# Patient Record
Sex: MALE | Race: WHITE | NOT HISPANIC OR LATINO | Employment: OTHER | URBAN - METROPOLITAN AREA
[De-identification: names, ages, dates, MRNs, and addresses within clinical notes are randomized per-mention and may not be internally consistent; named-entity substitution may affect disease eponyms.]

---

## 2019-09-09 ENCOUNTER — HOSPITAL ENCOUNTER (OUTPATIENT)
Facility: MEDICAL CENTER | Age: 59
DRG: 871 | End: 2019-09-09
Admitting: INTERNAL MEDICINE
Payer: MEDICARE

## 2019-09-10 ENCOUNTER — HOSPITAL ENCOUNTER (INPATIENT)
Facility: MEDICAL CENTER | Age: 59
LOS: 20 days | DRG: 871 | End: 2019-09-30
Attending: INTERNAL MEDICINE | Admitting: INTERNAL MEDICINE
Payer: MEDICARE

## 2019-09-10 ENCOUNTER — HOSPITAL ENCOUNTER (OUTPATIENT)
Dept: RADIOLOGY | Facility: MEDICAL CENTER | Age: 59
End: 2019-09-10

## 2019-09-10 DIAGNOSIS — J98.4 PNEUMONITIS: ICD-10-CM

## 2019-09-10 DIAGNOSIS — G89.29 OTHER CHRONIC PAIN: ICD-10-CM

## 2019-09-10 DIAGNOSIS — M54.2 CHRONIC NECK PAIN: ICD-10-CM

## 2019-09-10 DIAGNOSIS — A41.9 SEVERE SEPSIS (HCC): ICD-10-CM

## 2019-09-10 DIAGNOSIS — J96.01 ACUTE RESPIRATORY FAILURE WITH HYPOXIA (HCC): ICD-10-CM

## 2019-09-10 DIAGNOSIS — I95.9 HYPOTENSION, UNSPECIFIED HYPOTENSION TYPE: ICD-10-CM

## 2019-09-10 DIAGNOSIS — G89.29 CHRONIC NECK PAIN: ICD-10-CM

## 2019-09-10 DIAGNOSIS — R65.20 SEVERE SEPSIS (HCC): ICD-10-CM

## 2019-09-10 DIAGNOSIS — J43.8 PARASEPTAL EMPHYSEMA (HCC): ICD-10-CM

## 2019-09-10 DIAGNOSIS — A41.9 SEPSIS, DUE TO UNSPECIFIED ORGANISM: ICD-10-CM

## 2019-09-10 DIAGNOSIS — I27.20 PULMONARY HYPERTENSION (HCC): ICD-10-CM

## 2019-09-10 PROBLEM — R94.31 QT PROLONGATION: Status: ACTIVE | Noted: 2019-09-10

## 2019-09-10 PROBLEM — J18.9 CAP (COMMUNITY ACQUIRED PNEUMONIA): Status: ACTIVE | Noted: 2019-09-10

## 2019-09-10 PROBLEM — N17.9 AKI (ACUTE KIDNEY INJURY) (HCC): Status: ACTIVE | Noted: 2019-09-10

## 2019-09-10 LAB
ANION GAP SERPL CALC-SCNC: 9 MMOL/L (ref 0–11.9)
BASOPHILS # BLD AUTO: 0.4 % (ref 0–1.8)
BASOPHILS # BLD: 0.05 K/UL (ref 0–0.12)
BUN SERPL-MCNC: 17 MG/DL (ref 8–22)
CALCIUM SERPL-MCNC: 8.7 MG/DL (ref 8.5–10.5)
CHLORIDE SERPL-SCNC: 101 MMOL/L (ref 96–112)
CO2 SERPL-SCNC: 25 MMOL/L (ref 20–33)
CREAT SERPL-MCNC: 1.1 MG/DL (ref 0.5–1.4)
EKG IMPRESSION: NORMAL
EKG IMPRESSION: NORMAL
EOSINOPHIL # BLD AUTO: 0.33 K/UL (ref 0–0.51)
EOSINOPHIL NFR BLD: 2.9 % (ref 0–6.9)
ERYTHROCYTE [DISTWIDTH] IN BLOOD BY AUTOMATED COUNT: 43.4 FL (ref 35.9–50)
GLUCOSE SERPL-MCNC: 179 MG/DL (ref 65–99)
GRAM STN SPEC: NORMAL
HCT VFR BLD AUTO: 36.8 % (ref 42–52)
HGB BLD-MCNC: 11.9 G/DL (ref 14–18)
IMM GRANULOCYTES # BLD AUTO: 0.05 K/UL (ref 0–0.11)
IMM GRANULOCYTES NFR BLD AUTO: 0.4 % (ref 0–0.9)
LACTATE BLD-SCNC: 0.4 MMOL/L (ref 0.5–2)
LYMPHOCYTES # BLD AUTO: 1.7 K/UL (ref 1–4.8)
LYMPHOCYTES NFR BLD: 14.9 % (ref 22–41)
MAGNESIUM SERPL-MCNC: 1.9 MG/DL (ref 1.5–2.5)
MCH RBC QN AUTO: 27.8 PG (ref 27–33)
MCHC RBC AUTO-ENTMCNC: 32.3 G/DL (ref 33.7–35.3)
MCV RBC AUTO: 86 FL (ref 81.4–97.8)
MONOCYTES # BLD AUTO: 0.68 K/UL (ref 0–0.85)
MONOCYTES NFR BLD AUTO: 6 % (ref 0–13.4)
NEUTROPHILS # BLD AUTO: 8.61 K/UL (ref 1.82–7.42)
NEUTROPHILS NFR BLD: 75.4 % (ref 44–72)
NRBC # BLD AUTO: 0 K/UL
NRBC BLD-RTO: 0 /100 WBC
PLATELET # BLD AUTO: 243 K/UL (ref 164–446)
PMV BLD AUTO: 9.2 FL (ref 9–12.9)
POTASSIUM SERPL-SCNC: 3.6 MMOL/L (ref 3.6–5.5)
PROCALCITONIN SERPL-MCNC: 0.52 NG/ML
RBC # BLD AUTO: 4.28 M/UL (ref 4.7–6.1)
SIGNIFICANT IND 70042: NORMAL
SITE SITE: NORMAL
SODIUM SERPL-SCNC: 135 MMOL/L (ref 135–145)
SOURCE SOURCE: NORMAL
TROPONIN T SERPL-MCNC: 15 NG/L (ref 6–19)
TROPONIN T SERPL-MCNC: 17 NG/L (ref 6–19)
WBC # BLD AUTO: 11.4 K/UL (ref 4.8–10.8)

## 2019-09-10 PROCEDURE — 85025 COMPLETE CBC W/AUTO DIFF WBC: CPT

## 2019-09-10 PROCEDURE — 93010 ELECTROCARDIOGRAM REPORT: CPT | Performed by: INTERNAL MEDICINE

## 2019-09-10 PROCEDURE — 700111 HCHG RX REV CODE 636 W/ 250 OVERRIDE (IP): Performed by: INTERNAL MEDICINE

## 2019-09-10 PROCEDURE — 94760 N-INVAS EAR/PLS OXIMETRY 1: CPT

## 2019-09-10 PROCEDURE — 83735 ASSAY OF MAGNESIUM: CPT

## 2019-09-10 PROCEDURE — 93005 ELECTROCARDIOGRAM TRACING: CPT | Performed by: INTERNAL MEDICINE

## 2019-09-10 PROCEDURE — 99223 1ST HOSP IP/OBS HIGH 75: CPT | Performed by: INTERNAL MEDICINE

## 2019-09-10 PROCEDURE — 700102 HCHG RX REV CODE 250 W/ 637 OVERRIDE(OP): Performed by: INTERNAL MEDICINE

## 2019-09-10 PROCEDURE — A9270 NON-COVERED ITEM OR SERVICE: HCPCS | Performed by: INTERNAL MEDICINE

## 2019-09-10 PROCEDURE — 770020 HCHG ROOM/CARE - TELE (206)

## 2019-09-10 PROCEDURE — 36415 COLL VENOUS BLD VENIPUNCTURE: CPT

## 2019-09-10 PROCEDURE — 84145 PROCALCITONIN (PCT): CPT

## 2019-09-10 PROCEDURE — 80048 BASIC METABOLIC PNL TOTAL CA: CPT

## 2019-09-10 PROCEDURE — 700105 HCHG RX REV CODE 258: Performed by: INTERNAL MEDICINE

## 2019-09-10 PROCEDURE — 87502 INFLUENZA DNA AMP PROBE: CPT

## 2019-09-10 PROCEDURE — 83605 ASSAY OF LACTIC ACID: CPT

## 2019-09-10 PROCEDURE — 84484 ASSAY OF TROPONIN QUANT: CPT | Mod: 91

## 2019-09-10 PROCEDURE — 87205 SMEAR GRAM STAIN: CPT

## 2019-09-10 RX ORDER — TEMAZEPAM 15 MG/1
15 CAPSULE ORAL EVERY EVENING
Status: DISCONTINUED | OUTPATIENT
Start: 2019-09-10 | End: 2019-09-14

## 2019-09-10 RX ORDER — TRAMADOL HYDROCHLORIDE 50 MG/1
100 TABLET ORAL EVERY 4 HOURS PRN
Status: DISCONTINUED | OUTPATIENT
Start: 2019-09-10 | End: 2019-09-11

## 2019-09-10 RX ORDER — POLYETHYLENE GLYCOL 3350 17 G/17G
1 POWDER, FOR SOLUTION ORAL
Status: DISCONTINUED | OUTPATIENT
Start: 2019-09-10 | End: 2019-09-14

## 2019-09-10 RX ORDER — GABAPENTIN 400 MG/1
1600 CAPSULE ORAL EVERY EVENING
Status: DISCONTINUED | OUTPATIENT
Start: 2019-09-10 | End: 2019-09-14

## 2019-09-10 RX ORDER — BISACODYL 10 MG
10 SUPPOSITORY, RECTAL RECTAL
Status: DISCONTINUED | OUTPATIENT
Start: 2019-09-10 | End: 2019-09-14

## 2019-09-10 RX ORDER — SIMVASTATIN 40 MG
40 TABLET ORAL NIGHTLY
Status: DISCONTINUED | OUTPATIENT
Start: 2019-09-10 | End: 2019-09-14

## 2019-09-10 RX ORDER — METHADONE HYDROCHLORIDE 10 MG/1
20-30 TABLET ORAL 3 TIMES DAILY
Status: DISCONTINUED | OUTPATIENT
Start: 2019-09-10 | End: 2019-09-10

## 2019-09-10 RX ORDER — METHADONE HYDROCHLORIDE 10 MG/1
30 TABLET ORAL EVERY MORNING
Status: DISCONTINUED | OUTPATIENT
Start: 2019-09-10 | End: 2019-09-10

## 2019-09-10 RX ORDER — TRAMADOL HYDROCHLORIDE 50 MG/1
50-100 TABLET ORAL EVERY 4 HOURS PRN
Status: DISCONTINUED | OUTPATIENT
Start: 2019-09-10 | End: 2019-09-10

## 2019-09-10 RX ORDER — TRAMADOL HYDROCHLORIDE 50 MG/1
50 TABLET ORAL EVERY 4 HOURS PRN
Status: DISCONTINUED | OUTPATIENT
Start: 2019-09-10 | End: 2019-09-11

## 2019-09-10 RX ORDER — GABAPENTIN 400 MG/1
2400 CAPSULE ORAL EVERY MORNING
Status: DISCONTINUED | OUTPATIENT
Start: 2019-09-11 | End: 2019-09-14

## 2019-09-10 RX ORDER — METHADONE HYDROCHLORIDE 10 MG/1
30 TABLET ORAL
Status: DISCONTINUED | OUTPATIENT
Start: 2019-09-10 | End: 2019-09-10

## 2019-09-10 RX ORDER — GABAPENTIN 800 MG/1
TABLET ORAL 2 TIMES DAILY
COMMUNITY

## 2019-09-10 RX ORDER — LEVOTHYROXINE SODIUM 0.12 MG/1
125 TABLET ORAL
Status: DISCONTINUED | OUTPATIENT
Start: 2019-09-10 | End: 2019-09-14

## 2019-09-10 RX ORDER — OXYCODONE HYDROCHLORIDE 10 MG/1
10 TABLET ORAL EVERY 4 HOURS PRN
Status: DISCONTINUED | OUTPATIENT
Start: 2019-09-10 | End: 2019-09-10

## 2019-09-10 RX ORDER — ONDANSETRON 4 MG/1
4 TABLET, FILM COATED ORAL EVERY 6 HOURS PRN
Status: DISCONTINUED | OUTPATIENT
Start: 2019-09-10 | End: 2019-09-10

## 2019-09-10 RX ORDER — TEMAZEPAM 15 MG/1
15 CAPSULE ORAL EVERY EVENING
Refills: 0 | Status: ON HOLD | COMMUNITY
Start: 2019-08-27 | End: 2019-09-30 | Stop reason: SDUPTHER

## 2019-09-10 RX ORDER — PROMETHAZINE HYDROCHLORIDE 25 MG/1
25 TABLET ORAL ONCE
Status: COMPLETED | OUTPATIENT
Start: 2019-09-10 | End: 2019-09-10

## 2019-09-10 RX ORDER — METHADONE HYDROCHLORIDE 10 MG/1
10 TABLET ORAL EVERY EVENING
Status: DISCONTINUED | OUTPATIENT
Start: 2019-09-10 | End: 2019-09-10

## 2019-09-10 RX ORDER — SODIUM CHLORIDE, SODIUM LACTATE, POTASSIUM CHLORIDE, AND CALCIUM CHLORIDE .6; .31; .03; .02 G/100ML; G/100ML; G/100ML; G/100ML
1000 INJECTION, SOLUTION INTRAVENOUS
Status: DISCONTINUED | OUTPATIENT
Start: 2019-09-10 | End: 2019-09-16

## 2019-09-10 RX ORDER — AZITHROMYCIN 250 MG/1
500 TABLET, FILM COATED ORAL DAILY
Status: DISCONTINUED | OUTPATIENT
Start: 2019-09-11 | End: 2019-09-10

## 2019-09-10 RX ORDER — OXYCODONE HYDROCHLORIDE 5 MG/1
5 TABLET ORAL EVERY 4 HOURS PRN
Status: DISCONTINUED | OUTPATIENT
Start: 2019-09-10 | End: 2019-09-14

## 2019-09-10 RX ORDER — DOXYCYCLINE 100 MG/1
100 TABLET ORAL EVERY 12 HOURS
Status: DISCONTINUED | OUTPATIENT
Start: 2019-09-10 | End: 2019-09-14

## 2019-09-10 RX ORDER — METHADONE HYDROCHLORIDE 10 MG/1
10 TABLET ORAL SEE ADMIN INSTRUCTIONS
Status: DISCONTINUED | OUTPATIENT
Start: 2019-09-10 | End: 2019-09-10

## 2019-09-10 RX ORDER — ACETAMINOPHEN 325 MG/1
650 TABLET ORAL EVERY 6 HOURS PRN
Status: DISCONTINUED | OUTPATIENT
Start: 2019-09-10 | End: 2019-09-14

## 2019-09-10 RX ORDER — AMOXICILLIN 250 MG
2 CAPSULE ORAL 2 TIMES DAILY
Status: DISCONTINUED | OUTPATIENT
Start: 2019-09-10 | End: 2019-09-14

## 2019-09-10 RX ORDER — SODIUM CHLORIDE 9 MG/ML
INJECTION, SOLUTION INTRAVENOUS CONTINUOUS
Status: DISCONTINUED | OUTPATIENT
Start: 2019-09-10 | End: 2019-09-13

## 2019-09-10 RX ORDER — ONDANSETRON 2 MG/ML
4 INJECTION INTRAMUSCULAR; INTRAVENOUS EVERY 4 HOURS PRN
Status: DISCONTINUED | OUTPATIENT
Start: 2019-09-10 | End: 2019-09-10

## 2019-09-10 RX ORDER — ALBUTEROL SULFATE 90 UG/1
2 AEROSOL, METERED RESPIRATORY (INHALATION) EVERY 6 HOURS PRN
Status: DISCONTINUED | OUTPATIENT
Start: 2019-09-10 | End: 2019-09-11

## 2019-09-10 RX ORDER — GABAPENTIN 400 MG/1
800 CAPSULE ORAL 2 TIMES DAILY
Status: DISCONTINUED | OUTPATIENT
Start: 2019-09-10 | End: 2019-09-10

## 2019-09-10 RX ADMIN — TEMAZEPAM 15 MG: 15 CAPSULE ORAL at 04:03

## 2019-09-10 RX ADMIN — DOXYCYCLINE 100 MG: 100 TABLET, FILM COATED ORAL at 16:19

## 2019-09-10 RX ADMIN — SODIUM CHLORIDE: 9 INJECTION, SOLUTION INTRAVENOUS at 22:04

## 2019-09-10 RX ADMIN — LEVOTHYROXINE SODIUM 125 MCG: 125 TABLET ORAL at 08:40

## 2019-09-10 RX ADMIN — TEMAZEPAM 15 MG: 15 CAPSULE ORAL at 20:29

## 2019-09-10 RX ADMIN — AZITHROMYCIN MONOHYDRATE 500 MG: 500 INJECTION, POWDER, LYOPHILIZED, FOR SOLUTION INTRAVENOUS at 04:12

## 2019-09-10 RX ADMIN — METHADONE HYDROCHLORIDE 30 MG: 10 TABLET ORAL at 11:59

## 2019-09-10 RX ADMIN — METHADONE HYDROCHLORIDE 30 MG: 10 TABLET ORAL at 06:07

## 2019-09-10 RX ADMIN — SODIUM CHLORIDE: 9 INJECTION, SOLUTION INTRAVENOUS at 04:03

## 2019-09-10 RX ADMIN — PROMETHAZINE HYDROCHLORIDE 25 MG: 25 TABLET ORAL at 16:11

## 2019-09-10 RX ADMIN — SIMVASTATIN 40 MG: 40 TABLET, FILM COATED ORAL at 20:29

## 2019-09-10 RX ADMIN — SENNOSIDES, DOCUSATE SODIUM 2 TABLET: 50; 8.6 TABLET, FILM COATED ORAL at 16:19

## 2019-09-10 RX ADMIN — CEFTRIAXONE SODIUM 2 G: 2 INJECTION, POWDER, FOR SOLUTION INTRAMUSCULAR; INTRAVENOUS at 12:01

## 2019-09-10 RX ADMIN — SODIUM CHLORIDE: 9 INJECTION, SOLUTION INTRAVENOUS at 13:38

## 2019-09-10 RX ADMIN — GABAPENTIN 1600 MG: 400 CAPSULE ORAL at 16:19

## 2019-09-10 RX ADMIN — SENNOSIDES, DOCUSATE SODIUM 2 TABLET: 50; 8.6 TABLET, FILM COATED ORAL at 06:07

## 2019-09-10 RX ADMIN — GABAPENTIN 800 MG: 400 CAPSULE ORAL at 04:03

## 2019-09-10 ASSESSMENT — COGNITIVE AND FUNCTIONAL STATUS - GENERAL
SUGGESTED CMS G CODE MODIFIER MOBILITY: CI
CLIMB 3 TO 5 STEPS WITH RAILING: A LITTLE
SUGGESTED CMS G CODE MODIFIER DAILY ACTIVITY: CH
DAILY ACTIVITIY SCORE: 24
MOBILITY SCORE: 23

## 2019-09-10 ASSESSMENT — LIFESTYLE VARIABLES
CONSUMPTION TOTAL: NEGATIVE
HAVE PEOPLE ANNOYED YOU BY CRITICIZING YOUR DRINKING: NO
EVER FELT BAD OR GUILTY ABOUT YOUR DRINKING: NO
AVERAGE NUMBER OF DAYS PER WEEK YOU HAVE A DRINK CONTAINING ALCOHOL: 0
EVER_SMOKED: YES
TOTAL SCORE: 0
TOTAL SCORE: 0
EVER_SMOKED: YES
HOW MANY TIMES IN THE PAST YEAR HAVE YOU HAD 5 OR MORE DRINKS IN A DAY: 0
TOTAL SCORE: 0
EVER HAD A DRINK FIRST THING IN THE MORNING TO STEADY YOUR NERVES TO GET RID OF A HANGOVER: NO
HAVE YOU EVER FELT YOU SHOULD CUT DOWN ON YOUR DRINKING: NO
ON A TYPICAL DAY WHEN YOU DRINK ALCOHOL HOW MANY DRINKS DO YOU HAVE: 0
ALCOHOL_USE: NO

## 2019-09-10 ASSESSMENT — ENCOUNTER SYMPTOMS
COUGH: 1
FEVER: 0
HEADACHES: 0
NECK PAIN: 0
SPUTUM PRODUCTION: 1
FLANK PAIN: 0
CHILLS: 1
SHORTNESS OF BREATH: 1
SORE THROAT: 0
ABDOMINAL PAIN: 0
BRUISES/BLEEDS EASILY: 0
BLOOD IN STOOL: 0
DIAPHORESIS: 0
DIZZINESS: 0
DIARRHEA: 0
BACK PAIN: 0
VOMITING: 0
PALPITATIONS: 0
NAUSEA: 0
BLURRED VISION: 0
MYALGIAS: 0
SEIZURES: 0
WHEEZING: 0
FOCAL WEAKNESS: 0

## 2019-09-10 ASSESSMENT — COPD QUESTIONNAIRES
COPD SCREENING SCORE: 5
DURING THE PAST 4 WEEKS HOW MUCH DID YOU FEEL SHORT OF BREATH: SOME OF THE TIME
DO YOU EVER COUGH UP ANY MUCUS OR PHLEGM?: NO/ONLY WITH OCCASIONAL COLDS OR INFECTIONS
HAVE YOU SMOKED AT LEAST 100 CIGARETTES IN YOUR ENTIRE LIFE: YES

## 2019-09-10 ASSESSMENT — PATIENT HEALTH QUESTIONNAIRE - PHQ9
SUM OF ALL RESPONSES TO PHQ9 QUESTIONS 1 AND 2: 0
2. FEELING DOWN, DEPRESSED, IRRITABLE, OR HOPELESS: NOT AT ALL
1. LITTLE INTEREST OR PLEASURE IN DOING THINGS: NOT AT ALL

## 2019-09-10 NOTE — PROGRESS NOTES
Assumed care this am from night shift RN. Patient sleeping during report. Call bell and personal items within reach, bed locked in low position. Bed exit alarm armed. Hourly rounding in place.

## 2019-09-10 NOTE — CARE PLAN
Problem: Communication  Goal: The ability to communicate needs accurately and effectively will improve  Outcome: PROGRESSING AS EXPECTED  Note:   Patient encouraged to voice all feelings/questions/concerns. All needs met at this time. Call light within reach.      Problem: Safety  Goal: Will remain free from falls  Outcome: PROGRESSING AS EXPECTED  Note:   Safety precautions in place. Education provided to patient, verbalized understanding. Bed in lowest/locked position. Bed alarm on. Room close to nurses station. Call light and personal belongings within reach. Will continue to monitor.       Problem: Infection  Goal: Will remain free from infection  Outcome: PROGRESSING AS EXPECTED  Note:   Standard precautions in place. Education provided to patient. Hand hygiene being preformed correctly prior to and after patient contact by staff.

## 2019-09-10 NOTE — PROGRESS NOTES
59-year-old male who presents with exertional shortness of breath and was found to be hypoxic in the low 50s on room air with improvement to 92% on 4 L of oxygen.  CTA revealed patchy infiltrates concerning for pneumonia.  Also had some T wave inversions on EKG however denied any chest pain.  Initial troponin negative.

## 2019-09-10 NOTE — ASSESSMENT & PLAN NOTE
Extubated on 9/16/2019    Continue oxygen and RT protocol  No wheezing on exam we will DC prednisone

## 2019-09-10 NOTE — ASSESSMENT & PLAN NOTE
Patient chronically on methadone  Avoid other QTC prolonging agents  Monitor and replete electrolytes

## 2019-09-10 NOTE — PROGRESS NOTES
[facility] Patient able to ambulate self to hospital bed. Found to be A&Ox4. VSS, on 4L NC. Tele box placed on patient, monitor room notified. Current rhythm SR 70s. No signs of distress, complains of pain to the neck, back, and feet. See pain assessment for details. Safety precautions in place and discussed with patient, verbalized understanding. Patient is refusing non-slip socks. Bed in lowest/locked position. Call light and personal belongings within reach.

## 2019-09-10 NOTE — ASSESSMENT & PLAN NOTE
This is severe sepsis with the following associated acute organ dysfunction(s): acute kidney failure, acute respiratory failure.   Likely source is pneumonia  Sepsis resolved

## 2019-09-10 NOTE — PROGRESS NOTES
2 RN skin check completed with BEBA Finley  Devices in place: NC, glasses  Skin assessed under devices: Yes, intact  Confirmed pressure ulcers found on: n/a  New potential pressure ulcers notes on: n/a  The following interventions in place: pressure redistribution mattress, pillows for positioning/support, pillows to float the heels, silicone NC, grey foams on NC at ears, moisturizer, and education provided to patient regarding intervention in place to prevent skin breakdown    Bilateral ears: pink/blanching  BUE: scattered dry/red patches, excoriated from scratching   Sacrum: pink/blanching  Groin: pink/mosit   L knee: dry patch

## 2019-09-10 NOTE — H&P
Hospital Medicine History & Physical Note    Date of Service  9/10/2019    Primary Care Physician  Pcp Not In Computer    Consultants  None    Code Status  Full code    Chief Complaint  Shortness of breath    History of Presenting Illness  59 y.o. male with a past medical history of asthma, emphysema, chronic pain, hypothyroidism who presented 9/10/2019 with shortness of breath for the past 4 days.  The patient reports worsening dyspnea and a productive cough.  He reports chills.  He denies any chest pain, lightheadedness, nausea or diaphoresis.  He quit smoking cigarettes 4 years ago.    He presented to [facility], I discussed the case with the transferring physician and reviewed the medical records from the transferring facility which are summarized as follows:    WBC 12.3, hemoglobin 12.8, platelets 258, neutrophils 69.2%    Sodium 137, potassium 4, chloride 100, total CO2 28, calcium 9.4, glucose 105, BUN 23, creatinine 1.54, total protein 8, albumin 3.4, total bili 0.4, alkaline phosphatase 122, AST 39, ALT 23, anion gap 13    Lactic acid 2.6    Troponin 0 0.023    CTA chest with IV contrast: No evidence of pulmonary embolism.  There is mild to moderate pulmonary paraseptal emphysema.  Patchy regions of bilateral pulmonary groundglass opacities concerning for multifocal pneumonia.  No pleural effusions are seen    EKG interpreted by me reveals sinus rhythm with occasional supraventricular premature complex.  T wave inversions in V6.  No ST elevation noted    He was noted to be hypoxic at the outlying facility and was started on 4 L of oxygen.  He was treated with IV ceftriaxone and IV fluids prior to transfer    Review of Systems  Review of Systems   Constitutional: Positive for chills and malaise/fatigue. Negative for diaphoresis and fever.   HENT: Negative for hearing loss and sore throat.    Eyes: Negative for blurred vision.   Respiratory: Positive for cough, sputum production and shortness  of breath. Negative for wheezing.    Cardiovascular: Negative for chest pain, palpitations and leg swelling.   Gastrointestinal: Negative for abdominal pain, blood in stool, diarrhea, nausea and vomiting.   Genitourinary: Negative for dysuria, flank pain and urgency.   Musculoskeletal: Negative for back pain, joint pain, myalgias and neck pain.   Skin: Negative for rash.   Neurological: Negative for dizziness, focal weakness, seizures and headaches.   Endo/Heme/Allergies: Does not bruise/bleed easily.   Psychiatric/Behavioral: Negative for suicidal ideas.   All other systems reviewed and are negative.      Past Medical History   has a past medical history of Anxiety, ASTHMA, C6 cervical fracture, Chronic back pain, Chronic pain, COPD, Depression, Diverticulitis, Hypothyroid, Neck pain, Neuropathy (CMS-HCC), Pelvic fracture, Pneumonia, and Unspecified cataract.    Surgical History   has a past surgical history that includes other orthopedic surgery; exploratory laparotomy (N/A, 2015); colostomy (2015); acl reconstruction (Left, ); bone spur excision (Left, ); retinal detachment repair (Right, 2015); colostomy closure (N/A, 2015); and exploratory laparotomy (8/15/2015).     Family History  No pertinent family    Social History   reports that he quit smoking about 4 years ago. His smoking use included cigarettes. He has a 17.00 pack-year smoking history. He has never used smokeless tobacco. He reports that he does not drink alcohol or use drugs.    Allergies  No Known Allergies    Medications  Prior to Admission Medications   Prescriptions Last Dose Informant Patient Reported? Taking?   PrednisoLONE Sodium Phosphate (PREDNISOLONE SOD PHOS, OPHTH, OP) 2019 at 2000  Yes No   Si Drop by Ophthalmic route 4 times a day.   albuterol (VENTOLIN OR PROVENTIL) 108 (90 BASE) MCG/ACT AERS inhalation aerosol 2019  No No   Sig: Inhale 2 Puffs by mouth every 6 hours as needed for Shortness of Breath.    gabapentin (NEURONTIN) 400 MG CAPS 9/9/2019 at 0800  No No   Sig: Take 2 Caps by mouth 3 times a day.   Patient taking differently: Take 800 mg by mouth 2 times a day.   levothyroxine (SYNTHROID) 125 MCG Tab 9/9/2019 at 0730 Patient Yes No   Sig: Take 125 mcg by mouth every morning before breakfast.   methadone (DOLOPHINE) 10 MG TABS 9/9/2019 at 1200 Patient Yes No   Sig: Take 20-30 mg by mouth 3 times a day. 30 mg in AM  20 mg at NOON  20 mg at BEDTIME   ondansetron (ZOFRAN) 4 MG TABS tablet 9/9/2019 at 2100  No No   Sig: Take 1 Tab by mouth every 6 hours as needed for Nausea/Vomiting.   oxycodone immediate release (ROXICODONE) 10 MG immediate release tablet 9/1/2019  No No   Sig: Take 1 Tab by mouth every four hours as needed for Moderate Pain.   simvastatin (ZOCOR) 40 MG Tab 9/8/2019 at 2000  Yes No   Sig: Take 40 mg by mouth every evening.   temazepam (RESTORIL) 15 MG Cap 9/8/2019 at 2200  Yes No   Sig: Take 15 mg by mouth every evening.   tramadol (ULTRAM) 50 MG TABS 9/1/2019 Patient Yes No   Sig: Take  mg by mouth every four hours as needed. Indications: Moderate to Moderately Severe Pain      Facility-Administered Medications: None       Physical Exam  Temp:  [-13.8 °C (7.1 °F)] -13.8 °C (7.1 °F)  Pulse:  [66] 66  Resp:  [16] 16  BP: (136)/(87) 136/87  SpO2:  [96 %] 96 %    Physical Exam   Constitutional: He is oriented to person, place, and time. He appears well-developed and well-nourished. No distress.   HENT:   Head: Normocephalic and atraumatic.   Mouth/Throat: Oropharynx is clear and moist.   Eyes: Pupils are equal, round, and reactive to light. Conjunctivae are normal. No scleral icterus.   Neck: Normal range of motion. Neck supple.   Cardiovascular: Normal rate, regular rhythm and normal heart sounds.   Pulmonary/Chest: No respiratory distress. He has no wheezes. He has no rales.   Abdominal: Soft. Bowel sounds are normal. He exhibits no distension. There is no tenderness. There is no rebound.    Musculoskeletal: Normal range of motion. He exhibits no edema or tenderness.   Lymphadenopathy:     He has no cervical adenopathy.   Neurological: He is alert and oriented to person, place, and time. No cranial nerve deficit. Coordination normal.   Skin: Skin is warm. No rash noted.   Psychiatric: He has a normal mood and affect. His behavior is normal.   Nursing note and vitals reviewed.      Laboratory:          No results for input(s): ALTSGPT, ASTSGOT, ALKPHOSPHAT, TBILIRUBIN, DBILIRUBIN, GAMMAGT, AMYLASE, LIPASE, ALB, PREALBUMIN, GLUCOSE in the last 72 hours.      No results for input(s): NTPROBNP in the last 72 hours.      Recent Labs     09/10/19  0235   TROPONINT 17       Urinalysis:    No results found     Imaging:  No orders to display         Assessment/Plan:  I anticipate this patient will require at least two midnights for appropriate medical management, necessitating inpatient admission.    Sepsis (HCC)- (present on admission)  Assessment & Plan  This is severe sepsis with the following associated acute organ dysfunction(s): acute kidney failure, acute respiratory failure.   Likely source is pneumonia  Patient has been started on IV fluids per septic protocol  Trend lactate  Patient is started on IV ceftriaxone and azithromycin  Follow blood cultures      CAP (community acquired pneumonia)- (present on admission)  Assessment & Plan  Patient has been started on IV ceftriaxone and azithromycin  Check pro calcitonin, if within normal limits we'll consider de-escalating antibiotics  RT protocol  Continue supplemental oxygen, wean as tolerated  Follow blood cultures      Chronic pain- (present on admission)  Assessment & Plan  Continue methadone and Neurontin    INDIA (acute kidney injury) (HCC)- (present on admission)  Assessment & Plan  Likely prerenal  IV fluid hydration with normal saline  Monitor BMP and assess response  Avoid IV contrast/nephrotoxins/NSAIDs  Dose adjust meds for decreased  GFR        Acute respiratory failure with hypoxia (HCC)- (present on admission)  Assessment & Plan  Secondary to pneumonia  Currently on 4 L of oxygen  RT protocol      VTE prophylaxis: Heparin    I spent a total of 35 minutes of non face to face time performing additional research, reviewing medical records from transferring facility, discussing plan of care with other healthcare providers. Start time: 3 10 am. End time: 3 45 am

## 2019-09-10 NOTE — CARE PLAN
Problem: Safety  Goal: Will remain free from injury  Outcome: PROGRESSING AS EXPECTED  Note:   Patient calls appropriately for assistance, oxygen tubing extension provided for restroom. Bed locked in low position, non skid socks in place.      Problem: Respiratory:  Goal: Respiratory status will improve  Outcome: PROGRESSING AS EXPECTED  Intervention: Educate and encourage incentive spirometry usage  Note:   IS reviewed and patient able to pull 1200 and demonstrates correctly.

## 2019-09-10 NOTE — ASSESSMENT & PLAN NOTE
With narcotic dependence    On high-dose methadone and gabapentin chronically and holding parameters to methadone to hold for SBP less than 90

## 2019-09-11 PROBLEM — J43.8 PARASEPTAL EMPHYSEMA (HCC): Status: ACTIVE | Noted: 2019-09-11

## 2019-09-11 PROBLEM — J98.4 PNEUMONITIS: Status: ACTIVE | Noted: 2019-09-10

## 2019-09-11 PROBLEM — J98.4 ACUTE PNEUMONITIS: Status: ACTIVE | Noted: 2019-09-11

## 2019-09-11 PROBLEM — J84.9 INTERSTITIAL LUNG DISEASE (HCC): Status: ACTIVE | Noted: 2019-09-11

## 2019-09-11 LAB
EKG IMPRESSION: NORMAL
ERYTHROCYTE [DISTWIDTH] IN BLOOD BY AUTOMATED COUNT: 43.9 FL (ref 35.9–50)
FLUAV RNA SPEC QL NAA+PROBE: NEGATIVE
FLUAV RNA SPEC QL NAA+PROBE: NEGATIVE
FLUBV RNA SPEC QL NAA+PROBE: NEGATIVE
FLUBV RNA SPEC QL NAA+PROBE: NEGATIVE
GRAM STN SPEC: NORMAL
HCT VFR BLD AUTO: 38.3 % (ref 42–52)
HGB BLD-MCNC: 12.2 G/DL (ref 14–18)
MCH RBC QN AUTO: 27.5 PG (ref 27–33)
MCHC RBC AUTO-ENTMCNC: 31.9 G/DL (ref 33.7–35.3)
MCV RBC AUTO: 86.3 FL (ref 81.4–97.8)
PLATELET # BLD AUTO: 297 K/UL (ref 164–446)
PMV BLD AUTO: 8.7 FL (ref 9–12.9)
RBC # BLD AUTO: 4.44 M/UL (ref 4.7–6.1)
RSV RNA SPEC QL NAA+PROBE: NEGATIVE
SIGNIFICANT IND 70042: NORMAL
SITE SITE: NORMAL
SOURCE SOURCE: NORMAL
WBC # BLD AUTO: 12.7 K/UL (ref 4.8–10.8)

## 2019-09-11 PROCEDURE — 700102 HCHG RX REV CODE 250 W/ 637 OVERRIDE(OP): Performed by: INTERNAL MEDICINE

## 2019-09-11 PROCEDURE — 82784 ASSAY IGA/IGD/IGG/IGM EACH: CPT

## 2019-09-11 PROCEDURE — 85027 COMPLETE CBC AUTOMATED: CPT

## 2019-09-11 PROCEDURE — A9270 NON-COVERED ITEM OR SERVICE: HCPCS | Performed by: INTERNAL MEDICINE

## 2019-09-11 PROCEDURE — 770020 HCHG ROOM/CARE - TELE (206)

## 2019-09-11 PROCEDURE — 93005 ELECTROCARDIOGRAM TRACING: CPT | Performed by: INTERNAL MEDICINE

## 2019-09-11 PROCEDURE — 87631 RESP VIRUS 3-5 TARGETS: CPT

## 2019-09-11 PROCEDURE — 87205 SMEAR GRAM STAIN: CPT

## 2019-09-11 PROCEDURE — 94760 N-INVAS EAR/PLS OXIMETRY 1: CPT

## 2019-09-11 PROCEDURE — 92610 EVALUATE SWALLOWING FUNCTION: CPT

## 2019-09-11 PROCEDURE — 99223 1ST HOSP IP/OBS HIGH 75: CPT | Performed by: INTERNAL MEDICINE

## 2019-09-11 PROCEDURE — 99233 SBSQ HOSP IP/OBS HIGH 50: CPT | Performed by: INTERNAL MEDICINE

## 2019-09-11 PROCEDURE — 700105 HCHG RX REV CODE 258: Performed by: INTERNAL MEDICINE

## 2019-09-11 PROCEDURE — 36415 COLL VENOUS BLD VENIPUNCTURE: CPT

## 2019-09-11 PROCEDURE — 700111 HCHG RX REV CODE 636 W/ 250 OVERRIDE (IP): Performed by: INTERNAL MEDICINE

## 2019-09-11 RX ORDER — ECHINACEA PURPUREA EXTRACT 125 MG
2 TABLET ORAL
Status: DISCONTINUED | OUTPATIENT
Start: 2019-09-11 | End: 2019-09-30 | Stop reason: HOSPADM

## 2019-09-11 RX ORDER — HEPARIN SODIUM 5000 [USP'U]/ML
5000 INJECTION, SOLUTION INTRAVENOUS; SUBCUTANEOUS EVERY 8 HOURS
Status: DISCONTINUED | OUTPATIENT
Start: 2019-09-12 | End: 2019-09-13

## 2019-09-11 RX ORDER — TIOTROPIUM BROMIDE 18 UG/1
1 CAPSULE ORAL; RESPIRATORY (INHALATION)
Status: DISCONTINUED | OUTPATIENT
Start: 2019-09-11 | End: 2019-09-14

## 2019-09-11 RX ORDER — IPRATROPIUM BROMIDE AND ALBUTEROL SULFATE 2.5; .5 MG/3ML; MG/3ML
3 SOLUTION RESPIRATORY (INHALATION)
Status: DISCONTINUED | OUTPATIENT
Start: 2019-09-11 | End: 2019-09-14

## 2019-09-11 RX ADMIN — SENNOSIDES, DOCUSATE SODIUM 2 TABLET: 50; 8.6 TABLET, FILM COATED ORAL at 17:25

## 2019-09-11 RX ADMIN — GABAPENTIN 2400 MG: 400 CAPSULE ORAL at 05:03

## 2019-09-11 RX ADMIN — OXYCODONE HYDROCHLORIDE 5 MG: 5 TABLET ORAL at 01:42

## 2019-09-11 RX ADMIN — LEVOTHYROXINE SODIUM 125 MCG: 125 TABLET ORAL at 05:03

## 2019-09-11 RX ADMIN — DOXYCYCLINE 100 MG: 100 TABLET, FILM COATED ORAL at 05:03

## 2019-09-11 RX ADMIN — GABAPENTIN 1600 MG: 400 CAPSULE ORAL at 17:25

## 2019-09-11 RX ADMIN — SODIUM CHLORIDE: 9 INJECTION, SOLUTION INTRAVENOUS at 15:53

## 2019-09-11 RX ADMIN — SODIUM CHLORIDE: 9 INJECTION, SOLUTION INTRAVENOUS at 06:57

## 2019-09-11 RX ADMIN — TEMAZEPAM 15 MG: 15 CAPSULE ORAL at 17:26

## 2019-09-11 RX ADMIN — CEFTRIAXONE SODIUM 2 G: 2 INJECTION, POWDER, FOR SOLUTION INTRAMUSCULAR; INTRAVENOUS at 05:04

## 2019-09-11 RX ADMIN — SENNOSIDES, DOCUSATE SODIUM 2 TABLET: 50; 8.6 TABLET, FILM COATED ORAL at 05:03

## 2019-09-11 RX ADMIN — TIOTROPIUM BROMIDE 1 CAPSULE: 18 CAPSULE ORAL; RESPIRATORY (INHALATION) at 22:24

## 2019-09-11 RX ADMIN — SIMVASTATIN 40 MG: 40 TABLET, FILM COATED ORAL at 20:31

## 2019-09-11 RX ADMIN — DOXYCYCLINE 100 MG: 100 TABLET, FILM COATED ORAL at 17:25

## 2019-09-11 ASSESSMENT — ENCOUNTER SYMPTOMS
ABDOMINAL PAIN: 0
SEIZURES: 0
EYE REDNESS: 0
PND: 0
FOCAL WEAKNESS: 0
SORE THROAT: 0
BLOOD IN STOOL: 0
SINUS PAIN: 0
NAUSEA: 0
SPUTUM PRODUCTION: 0
FALLS: 0
HEADACHES: 0
NECK PAIN: 1
VOMITING: 0
WHEEZING: 0
WEAKNESS: 0
DIZZINESS: 0
SHORTNESS OF BREATH: 1
CHILLS: 0
BACK PAIN: 1
COUGH: 1
HEMOPTYSIS: 0
LOSS OF CONSCIOUSNESS: 0
TREMORS: 0
PALPITATIONS: 0
NERVOUS/ANXIOUS: 0
EYE PAIN: 0
MYALGIAS: 0
FEVER: 0
CONSTIPATION: 0
INSOMNIA: 0
DIARRHEA: 0
PSYCHIATRIC NEGATIVE: 1

## 2019-09-11 ASSESSMENT — PATIENT HEALTH QUESTIONNAIRE - PHQ9
SUM OF ALL RESPONSES TO PHQ9 QUESTIONS 1 AND 2: 0
1. LITTLE INTEREST OR PLEASURE IN DOING THINGS: NOT AT ALL
2. FEELING DOWN, DEPRESSED, IRRITABLE, OR HOPELESS: NOT AT ALL

## 2019-09-11 NOTE — CARE PLAN
Problem: Knowledge Deficit  Goal: Knowledge of disease process/condition, treatment plan, diagnostic tests, and medications will improve  Outcome: PROGRESSING AS EXPECTED   Assess knowledge level of disease process/condition, treatment plan, diagnostic tests, and medications.      Problem: Pain Management  Goal: Pain level will decrease to patient's comfort goal  Outcome: PROGRESSING AS EXPECTED   Follow pain managment plan developed in collaboration with patient and Interdisciplinary Team

## 2019-09-11 NOTE — PROGRESS NOTES
Report received. Patient oriented x4. Pain level 5 out of 10 medication provided earlier.. Denies SOB. Fall risk interventions in place, bed in low position,upself

## 2019-09-11 NOTE — PROGRESS NOTES
I saw Pancho Montenegropavithra French.  History of chronic pain syndrome on methadone and high doses of gabapentin.  History of emphysema but not on chronic O2.  After further inquiry, he has recurrent pneumonias, sinusitides.  Presented at [facility] with SOB. He was found to be hypoxic there. He was put on O2.   CTA Chest showed no evidence of pulmonary embolism but showed patchy opacities concerning for pneumonia. He was sent here.  He was started on IV antibiotics, and breathing treatments.  Given his history ordered IgA, influenza screen and exercise oximetry.  He is from [facility], if he does not improve on antibiotics consider ordering Hantavirus study and Pulmonology consult.  Screen for pneumonia vaccination.  I also reviewed his EKG and his QT is prolonged.  I have discontinued all QT prolonging drugs

## 2019-09-11 NOTE — CARE PLAN
Problem: Communication  Goal: The ability to communicate needs accurately and effectively will improve  Outcome: PROGRESSING AS EXPECTED     Problem: Pain Management  Goal: Pain level will decrease to patient's comfort goal  Outcome: PROGRESSING AS EXPECTED

## 2019-09-11 NOTE — THERAPY
"  Speech Language Therapy Clinical Swallow Evaluation completed.  Functional Status: Pt seen on this date for a clinical swallow evaluation. Pt A&Ox4 and agreeable to therapy and pt's son present at bedside. MD requesting swallow evaluation to rule out aspiration. Pt c/o thin liquids \"getting stuck\" intermittently, however, did not c/o globus with any trials during this session. He reports 3 bouts of pneumonia since 2011. No gross deficits during the oral motor evaluation. Cough x1 appreciated with large consecutive cup sips, however, with cues to take smaller sips, no overt s/sx of aspiration. No overt s/sx of aspiration with soft solids, mixed consistencies, and solids. Vocal quality clear with all PO trials. Upon palpation, laryngeal elevation was complete and swallow trigger timely. At this time, recommend that pt continue regular/thin liquid diet with strategies (small bites/sips, reduced eating rate, up at 90* during meals). Dysphagia and role of SLP education provided to pt and his son and they verbalized understanding. SLP to follow x1-2 to ensure tolerance.    Recommendations - Diet: continue regular/thin liquids with strategies                          Strategies: Head of Bed at 90 Degrees                          Medication Administration: whole with liquid wash  Plan of Care: Will benefit from Speech Therapy 2 times per week  Post-Acute Therapy: Anticipate that the patient will have no further speech therapy needs after discharge from the hospital.      See \"Rehab Therapy-Acute\" Patient Summary Report for complete documentation.   "

## 2019-09-11 NOTE — CONSULTS
[facility] INFECTIOUS DISEASES INPATIENT CONSULT NOTE     Date of Service: 9/11/2019    Consult Requested By: Berny Flood M.D.    Reason for Consultation: Pneumonia    Chief Complaint: Shortness of breath    History of Present Illness:     Pancho Martinez Jr. is a 59 y.o. male admitted 9/10/2019.  Patient has history of chronic pain syndrome on methadone and high doses of gabapentin, emphysema not on chronic oxygen at home.  Patient lives in [place], presented to [facility] with shortness of breath, was placed on oxygen.  CT showed no PE but showed patchy opacities.  He was then transferred to [facility], started on IV ceftriaxone and doxycycline.  Afebrile, mild leukocytosis to 12.  When further history was obtained, patient stated multiple recurrent pneumonias, thus ID consulted for recommendations.    CT chest with significant emphysematous changes, diffuse groundglass opacities.  Mildly elevated procalcitonin to 0.52.  Sputum culture not performed due to likely oropharyngeal contamination of the sample.    Per patient, he has been doing relatively well, with no restriction of physical activity such as cleaning his home etc., till about 3 weeks ago when he developed a progressive shortness of breath on exertion, particularly worse over the past 3 days.  He had a similar episode of shortness of breath back in 2011 when he was noted to have emphysematous changes and interstitial thickening on CT, but states that this episode is not as bad as it was in 2011.  Patient lives with his 2 sons, 1 of them had a viral URI about 3 weeks ago.  Patient states that he is feeling a little better since he has been inpatient.    Patient states that he started smoking at 14 years of age, smoked about a pack a day till the age of 24, then quit for 10 years, restarted at the age of 34 and then quit again 5 years ago.  He reports no weight loss.    Patient reports no cough or chest pain.    Review of Systems:  All  other systems reviewed and are negative expect as noted in HPI    Past Medical History:   Diagnosis Date   • Anxiety    • ASTHMA    • C6 cervical fracture     c5-7   • Chronic back pain    • Chronic pain    • COPD    • Depression    • Diverticulitis    • Hypothyroid    • Neck pain    • Neuropathy (CMS-HCC)    • Pelvic fracture    • Pneumonia    • Unspecified cataract        Past Surgical History:   Procedure Laterality Date   • EXPLORATORY LAPAROTOMY  8/15/2015    Procedure: EXPLORATORY LAPAROTOMY, abdominal washout;  Surgeon: Ad Raman M.D.;  [facility];  Service:    • COLOSTOMY CLOSURE N/A 2015    Procedure: COLOSTOMY CLOSURE;  Surgeon: Nataliia Maldonado M.D.;  Location: SURGERY [facility];  Service:    • EXPLORATORY LAPAROTOMY N/A 2015    Procedure: EXPLORATORY LAPAROTOMY;  Surgeon: Nataliia Maldonado M.D.;  Location: SURGERY [facility];  Service:    • COLOSTOMY  2015   • RETINAL DETACHMENT REPAIR Right 2015   • BONE SPUR EXCISION Left    • ACL RECONSTRUCTION Left    • OTHER ORTHOPEDIC SURGERY      spine fx       Family history  Son with viral URI 3 weeks ago    Social History     Socioeconomic History   • Marital status:      Spouse name: Not on file   • Number of children: Not on file   • Years of education: Not on file   • Highest education level: Not on file   Occupational History   • Not on file   Social Needs   • Financial resource strain: Not on file   • Food insecurity:     Worry: Not on file     Inability: Not on file   • Transportation needs:     Medical: Not on file     Non-medical: Not on file   Tobacco Use   • Smoking status: Former Smoker     Packs/day: 1.00     Years: 17.00     Pack years: 17.00     Types: Cigarettes     Last attempt to quit: 2015     Years since quittin.6   • Smokeless tobacco: Never Used   Substance and Sexual Activity   • Alcohol use: No   • Drug use: No   • Sexual activity: Not on file   Lifestyle   •  Physical activity:     Days per week: Not on file     Minutes per session: Not on file   • Stress: Not on file   Relationships   • Social connections:     Talks on phone: Not on file     Gets together: Not on file     Attends Holiness service: Not on file     Active member of club or organization: Not on file     Attends meetings of clubs or organizations: Not on file     Relationship status: Not on file   • Intimate partner violence:     Fear of current or ex partner: Not on file     Emotionally abused: Not on file     Physically abused: Not on file     Forced sexual activity: Not on file   Other Topics Concern   • Not on file   Social History Narrative    ** Merged History Encounter **            No Known Allergies    Medications:    Current Facility-Administered Medications:   •  senna-docusate (PERICOLACE or SENOKOT S) 8.6-50 MG per tablet 2 Tab, 2 Tab, Oral, BID, 2 Tab at 09/11/19 0503 **AND** polyethylene glycol/lytes (MIRALAX) PACKET 1 Packet, 1 Packet, Oral, QDAY PRN **AND** magnesium hydroxide (MILK OF MAGNESIA) suspension 30 mL, 30 mL, Oral, QDAY PRN **AND** bisacodyl (DULCOLAX) suppository 10 mg, 10 mg, Rectal, QDAY PRN, Asim Holley M.D.  •  Respiratory Care per Protocol, , Nebulization, Continuous RT, Asim Holley M.D.  •  lactated ringers infusion (BOLUS), 1,000 mL, Intravenous, Once PRN, Asim Holley M.D.  •  acetaminophen (TYLENOL) tablet 650 mg, 650 mg, Oral, Q6HRS PRN, Asim Holley M.D.  •  cefTRIAXone (ROCEPHIN) 2 g in  mL IVPB, 2 g, Intravenous, Q24HRS, Asim Holley M.D., Stopped at 09/11/19 0534  •  levothyroxine (SYNTHROID) tablet 125 mcg, 125 mcg, Oral, QAM AC, Asim Holley M.D., 125 mcg at 09/11/19 0503  •  temazepam (RESTORIL) capsule 15 mg, 15 mg, Oral, Q EVENING, Asim Holley M.D., 15 mg at 09/10/19 2029  •  NS infusion, , Intravenous, Continuous, Asim Holley M.D., Last Rate: 125 mL/hr at 09/11/19 0657  •  albuterol inhaler 2 Puff, 2 Puff, Inhalation, Q6HRS PRN, Berny Flood,  "M.D.  •  simvastatin (ZOCOR) tablet 40 mg, 40 mg, Oral, Nightly, Berny Flood M.D., 40 mg at 09/10/19 2029  •  tramadol (ULTRAM) 50 MG tablet 50 mg, 50 mg, Oral, Q4HRS PRN **OR** tramadol (ULTRAM) 50 MG tablet 100 mg, 100 mg, Oral, Q4HRS PRN, Berny Flood M.D.  •  gabapentin (NEURONTIN) capsule 2,400 mg, 2,400 mg, Oral, QAM, Berny Flood M.D., 2,400 mg at 19 0503  •  gabapentin (NEURONTIN) capsule 1,600 mg, 1,600 mg, Oral, Q EVENING, Berny Flood M.D., 1,600 mg at 09/10/19 1619  •  doxycycline monohydrate (ADOXA) tablet 100 mg, 100 mg, Oral, Q12HRS, Berny Flood M.D., 100 mg at 19 0503  •  oxyCODONE immediate-release (ROXICODONE) tablet 5 mg, 5 mg, Oral, Q4HRS PRN, Berny Flood M.D., 5 mg at 19 0142    Physical Exam:   Vital Signs: /82   Pulse 81   Temp 37 °C (98.6 °F) (Temporal)   Resp 17   Ht 1.803 m (5' 11\")   Wt 77 kg (169 lb 12.1 oz)   SpO2 94%   BMI 23.68 kg/m²   Temp  Av.5 °C (88.7 °F)  Min: -13.8 °C (7.1 °F)  Max: 37 °C (98.6 °F)  Vital signs reviewed  Constitutional: Patient is oriented to person, place, and time. Appears ill. No distress  Head: Atraumatic, normocephalic  Eyes: Conjunctivae normal, EOM intact. Pupils are equal, round, and reactive to light.   Mouth/Throat: Lips without lesions, nasal cannula O2 in place, oropharynx is clear and moist.  Neck: Neck supple. No masses/lymphadenopathy  Cardiovascular: Tachycardic, regular rhythm, normal S1S2 and intact distal pulses. No murmur, gallop, or friction rub. No pedal edema.  Pulmonary/Chest: No respiratory distress. Unlabored respiratory effort, scattered bilateral crackles  Abdominal: Soft, non tender. BS + x 4. No masses or hepatosplenomegaly.   Musculoskeletal: No joint tenderness, swelling, erythema, or restriction of motion noted.  Neurological: Alert and oriented to person, place, and time. No gross cranial nerve deficit. No focal neural deficit noted  Skin: Skin is warm and dry. " No rashes or embolic phenomena noted on exposed skin  Psychiatric: Normal mood and affect. Behavior is normal.     LABS:  Recent Labs     09/10/19  0617 09/11/19  0851   WBC 11.4* 12.7*      Recent Labs     09/10/19  0617 09/11/19  0851   HEMOGLOBIN 11.9* 12.2*   HEMATOCRIT 36.8* 38.3*   MCV 86.0 86.3   MCH 27.8 27.5   PLATELETCT 243 297       Recent Labs     09/10/19  0617   SODIUM 135   POTASSIUM 3.6   CHLORIDE 101   CO2 25   CREATININE 1.10        No results for input(s): ALBUMIN in the last 72 hours.    Invalid input(s): AST, ALT, ALKPHOS, BILITOT, TOTALBILIRUB, BILIRUBINTOT, BILIRUBINDIR, BILIRUBININD, ALKALINEPHOS     MICRO:  Blood Culture   Date Value Ref Range Status   01/03/2015 No growth after 5 days of incubation.  Final        Latest pertinent labs were reviewed    IMAGING STUDIES:  CT with significant emphysematous changes, worse compared to previous scans, bilateral diffuse ground glass opacities also noted.    Hospital Course/Assessment:   Pancho Martinez Jr. is a 59 y.o. male with a history of history of chronic pain syndrome on methadone and high doses of gabapentin, significant smoking history, emphysema not on chronic oxygen at home, presented with 3 weeks of progressive shortness of breath, especially worse over the past 3 days.  CT with worsening emphysematous changes and diffuse groundglass opacities.    Patient seems to have underlying worsening emphysematous changes. He may or may not have a superimposed bacterial pneumonia, but if there is a superimposed infection, the groundglass opacities on CT are more suggestive of either a viral or any typical bacterial infection.  His son had a viral URI around the same time the patient's symptoms began.    Plan:   -Will stop IV ceftriaxone. Continue p.o. doxycycline 100 mg twice daily for now  -Will send respiratory viral panel  -We will need to establish care with pulmonary, may benefit from outpatient PFTs, evaluation for continuous home  oxygen  -Anticipate 5 to 7 days of antibiotic    Plan was discussed with the primary team, Dr. Flood    ID will follow. Please feel free to call with questions.    Pablo Dang M.D.    Please note that this dictation was created using voice recognition software. I have worked with technical experts from [facility] to optimize the interface.  I have made every reasonable attempt to correct obvious errors, but there may be errors of grammar and possibly content that I did not discover before finalizing the note.

## 2019-09-11 NOTE — DISCHARGE PLANNING
Anticipated Discharge Disposition: Home, possibly with home oxygen.    Action: RN CM met with pt to discuss dc plan. Assessment done. Pt states his son, whom he lives with in [place] [facility] [facility], will be his ride home on day of discharge. Pt states he does not work and is not forthcoming to financials. Pt kept eyes closed with headphones in during assessment. Affect flat. Pt currently on 4 liters oxygen, does not use at home. Pt uses [facility] pharmacy on [address] In [place].    Barriers to Discharge: Medical clearance, possible financial barrier with potential dc meds.    Plan: Assist with dc meds if needed. Assist with home oxygen if needed.

## 2019-09-11 NOTE — RESPIRATORY CARE
COPD EDUCATION by COPD CLINICAL EDUCATOR  9/11/2019 at 8:54 AM by Indigo Crowley     Patient reviewed by COPD education team. Patient refuses short intervention at this time.

## 2019-09-12 ENCOUNTER — APPOINTMENT (OUTPATIENT)
Dept: RADIOLOGY | Facility: MEDICAL CENTER | Age: 59
DRG: 871 | End: 2019-09-12
Attending: INTERNAL MEDICINE
Payer: MEDICARE

## 2019-09-12 LAB
ANION GAP SERPL CALC-SCNC: 10 MMOL/L (ref 0–11.9)
BASE EXCESS BLDA CALC-SCNC: -2 MMOL/L (ref -4–3)
BODY TEMPERATURE: 37 CENTIGRADE
BUN SERPL-MCNC: 10 MG/DL (ref 8–22)
CALCIUM SERPL-MCNC: 8.3 MG/DL (ref 8.5–10.5)
CHLORIDE SERPL-SCNC: 103 MMOL/L (ref 96–112)
CO2 SERPL-SCNC: 23 MMOL/L (ref 20–33)
CREAT SERPL-MCNC: 0.97 MG/DL (ref 0.5–1.4)
EKG IMPRESSION: NORMAL
EKG IMPRESSION: NORMAL
ERYTHROCYTE [DISTWIDTH] IN BLOOD BY AUTOMATED COUNT: 42.5 FL (ref 35.9–50)
GLUCOSE SERPL-MCNC: 96 MG/DL (ref 65–99)
HCO3 BLDA-SCNC: 22 MMOL/L (ref 17–25)
HCT VFR BLD AUTO: 33.4 % (ref 42–52)
HGB BLD-MCNC: 10.7 G/DL (ref 14–18)
IGA SERPL-MCNC: 282 MG/DL (ref 68–408)
INHALED O2 FLOW RATE: 5 L/MIN (ref 2–10)
MCH RBC QN AUTO: 27.2 PG (ref 27–33)
MCHC RBC AUTO-ENTMCNC: 32 G/DL (ref 33.7–35.3)
MCV RBC AUTO: 85 FL (ref 81.4–97.8)
PCO2 BLDA: 36.7 MMHG (ref 26–37)
PCO2 TEMP ADJ BLDA: 36.7 MMHG (ref 26–37)
PH BLDA: 7.4 [PH] (ref 7.4–7.5)
PH TEMP ADJ BLDA: 7.4 [PH] (ref 7.4–7.5)
PLATELET # BLD AUTO: 243 K/UL (ref 164–446)
PMV BLD AUTO: 8.6 FL (ref 9–12.9)
PO2 BLDA: 60.7 MMHG (ref 64–87)
PO2 TEMP ADJ BLDA: 60.7 MMHG (ref 64–87)
POTASSIUM SERPL-SCNC: 3.4 MMOL/L (ref 3.6–5.5)
PROCALCITONIN SERPL-MCNC: 0.27 NG/ML
RBC # BLD AUTO: 3.93 M/UL (ref 4.7–6.1)
SAO2 % BLDA: 90.7 % (ref 93–99)
SODIUM SERPL-SCNC: 136 MMOL/L (ref 135–145)
WBC # BLD AUTO: 11.1 K/UL (ref 4.8–10.8)

## 2019-09-12 PROCEDURE — A9270 NON-COVERED ITEM OR SERVICE: HCPCS | Performed by: HOSPITALIST

## 2019-09-12 PROCEDURE — 94668 MNPJ CHEST WALL SBSQ: CPT

## 2019-09-12 PROCEDURE — 700102 HCHG RX REV CODE 250 W/ 637 OVERRIDE(OP): Performed by: HOSPITALIST

## 2019-09-12 PROCEDURE — 36415 COLL VENOUS BLD VENIPUNCTURE: CPT

## 2019-09-12 PROCEDURE — 84145 PROCALCITONIN (PCT): CPT

## 2019-09-12 PROCEDURE — 93010 ELECTROCARDIOGRAM REPORT: CPT | Performed by: INTERNAL MEDICINE

## 2019-09-12 PROCEDURE — 94669 MECHANICAL CHEST WALL OSCILL: CPT

## 2019-09-12 PROCEDURE — 770020 HCHG ROOM/CARE - TELE (206)

## 2019-09-12 PROCEDURE — 85027 COMPLETE CBC AUTOMATED: CPT

## 2019-09-12 PROCEDURE — 700111 HCHG RX REV CODE 636 W/ 250 OVERRIDE (IP): Performed by: INTERNAL MEDICINE

## 2019-09-12 PROCEDURE — 700102 HCHG RX REV CODE 250 W/ 637 OVERRIDE(OP): Performed by: INTERNAL MEDICINE

## 2019-09-12 PROCEDURE — 99233 SBSQ HOSP IP/OBS HIGH 50: CPT | Performed by: INTERNAL MEDICINE

## 2019-09-12 PROCEDURE — 87633 RESP VIRUS 12-25 TARGETS: CPT

## 2019-09-12 PROCEDURE — 82803 BLOOD GASES ANY COMBINATION: CPT

## 2019-09-12 PROCEDURE — 700101 HCHG RX REV CODE 250: Performed by: INTERNAL MEDICINE

## 2019-09-12 PROCEDURE — 93005 ELECTROCARDIOGRAM TRACING: CPT | Performed by: INTERNAL MEDICINE

## 2019-09-12 PROCEDURE — 80048 BASIC METABOLIC PNL TOTAL CA: CPT

## 2019-09-12 PROCEDURE — A9270 NON-COVERED ITEM OR SERVICE: HCPCS | Performed by: INTERNAL MEDICINE

## 2019-09-12 PROCEDURE — 94667 MNPJ CHEST WALL 1ST: CPT

## 2019-09-12 PROCEDURE — 94640 AIRWAY INHALATION TREATMENT: CPT

## 2019-09-12 PROCEDURE — 94760 N-INVAS EAR/PLS OXIMETRY 1: CPT

## 2019-09-12 PROCEDURE — 87486 CHLMYD PNEUM DNA AMP PROBE: CPT

## 2019-09-12 PROCEDURE — 700105 HCHG RX REV CODE 258: Performed by: INTERNAL MEDICINE

## 2019-09-12 PROCEDURE — 87581 M.PNEUMON DNA AMP PROBE: CPT

## 2019-09-12 PROCEDURE — 71046 X-RAY EXAM CHEST 2 VIEWS: CPT

## 2019-09-12 RX ORDER — METHADONE HYDROCHLORIDE 10 MG/1
20 TABLET ORAL EVERY EVENING
Status: DISCONTINUED | OUTPATIENT
Start: 2019-09-12 | End: 2019-09-14

## 2019-09-12 RX ORDER — METHYLPREDNISOLONE SODIUM SUCCINATE 40 MG/ML
40 INJECTION, POWDER, LYOPHILIZED, FOR SOLUTION INTRAMUSCULAR; INTRAVENOUS EVERY 6 HOURS
Status: DISCONTINUED | OUTPATIENT
Start: 2019-09-12 | End: 2019-09-12

## 2019-09-12 RX ORDER — POTASSIUM CHLORIDE 20 MEQ/1
40 TABLET, EXTENDED RELEASE ORAL ONCE
Status: COMPLETED | OUTPATIENT
Start: 2019-09-12 | End: 2019-09-12

## 2019-09-12 RX ORDER — METHADONE HYDROCHLORIDE 10 MG/1
30 TABLET ORAL ONCE
Status: COMPLETED | OUTPATIENT
Start: 2019-09-12 | End: 2019-09-12

## 2019-09-12 RX ORDER — IPRATROPIUM BROMIDE AND ALBUTEROL SULFATE 2.5; .5 MG/3ML; MG/3ML
3 SOLUTION RESPIRATORY (INHALATION)
Status: DISCONTINUED | OUTPATIENT
Start: 2019-09-12 | End: 2019-09-12

## 2019-09-12 RX ORDER — METHADONE HYDROCHLORIDE 10 MG/1
20-30 TABLET ORAL 3 TIMES DAILY
Status: DISCONTINUED | OUTPATIENT
Start: 2019-09-12 | End: 2019-09-12

## 2019-09-12 RX ORDER — METHADONE HYDROCHLORIDE 10 MG/1
20 TABLET ORAL
Status: DISCONTINUED | OUTPATIENT
Start: 2019-09-13 | End: 2019-09-14

## 2019-09-12 RX ORDER — METHADONE HYDROCHLORIDE 10 MG/1
30 TABLET ORAL EVERY MORNING
Status: DISCONTINUED | OUTPATIENT
Start: 2019-09-13 | End: 2019-09-14

## 2019-09-12 RX ORDER — IPRATROPIUM BROMIDE AND ALBUTEROL SULFATE 2.5; .5 MG/3ML; MG/3ML
3 SOLUTION RESPIRATORY (INHALATION)
Status: DISCONTINUED | OUTPATIENT
Start: 2019-09-13 | End: 2019-09-13

## 2019-09-12 RX ADMIN — SODIUM CHLORIDE: 9 INJECTION, SOLUTION INTRAVENOUS at 08:41

## 2019-09-12 RX ADMIN — SIMVASTATIN 40 MG: 40 TABLET, FILM COATED ORAL at 20:09

## 2019-09-12 RX ADMIN — DOXYCYCLINE 100 MG: 100 TABLET, FILM COATED ORAL at 06:03

## 2019-09-12 RX ADMIN — GABAPENTIN 2400 MG: 400 CAPSULE ORAL at 06:03

## 2019-09-12 RX ADMIN — SENNOSIDES, DOCUSATE SODIUM 2 TABLET: 50; 8.6 TABLET, FILM COATED ORAL at 06:02

## 2019-09-12 RX ADMIN — SODIUM CHLORIDE: 9 INJECTION, SOLUTION INTRAVENOUS at 18:11

## 2019-09-12 RX ADMIN — METHYLPREDNISOLONE SODIUM SUCCINATE 40 MG: 40 INJECTION, POWDER, FOR SOLUTION INTRAMUSCULAR; INTRAVENOUS at 17:20

## 2019-09-12 RX ADMIN — IPRATROPIUM BROMIDE AND ALBUTEROL SULFATE 3 ML: .5; 3 SOLUTION RESPIRATORY (INHALATION) at 15:03

## 2019-09-12 RX ADMIN — Medication 2 SPRAY: at 00:35

## 2019-09-12 RX ADMIN — SENNOSIDES, DOCUSATE SODIUM 2 TABLET: 50; 8.6 TABLET, FILM COATED ORAL at 17:20

## 2019-09-12 RX ADMIN — IPRATROPIUM BROMIDE AND ALBUTEROL SULFATE 3 ML: .5; 3 SOLUTION RESPIRATORY (INHALATION) at 10:51

## 2019-09-12 RX ADMIN — HEPARIN SODIUM 5000 UNITS: 5000 INJECTION INTRAVENOUS; SUBCUTANEOUS at 00:36

## 2019-09-12 RX ADMIN — POTASSIUM CHLORIDE 40 MEQ: 1500 TABLET, EXTENDED RELEASE ORAL at 06:03

## 2019-09-12 RX ADMIN — DOXYCYCLINE 100 MG: 100 TABLET, FILM COATED ORAL at 17:20

## 2019-09-12 RX ADMIN — HEPARIN SODIUM 5000 UNITS: 5000 INJECTION INTRAVENOUS; SUBCUTANEOUS at 06:02

## 2019-09-12 RX ADMIN — METHADONE HYDROCHLORIDE 20 MG: 10 TABLET ORAL at 20:09

## 2019-09-12 RX ADMIN — LEVOTHYROXINE SODIUM 125 MCG: 125 TABLET ORAL at 06:03

## 2019-09-12 RX ADMIN — IPRATROPIUM BROMIDE AND ALBUTEROL SULFATE 3 ML: .5; 3 SOLUTION RESPIRATORY (INHALATION) at 08:30

## 2019-09-12 RX ADMIN — TIOTROPIUM BROMIDE 1 CAPSULE: 18 CAPSULE ORAL; RESPIRATORY (INHALATION) at 21:58

## 2019-09-12 RX ADMIN — SODIUM CHLORIDE: 9 INJECTION, SOLUTION INTRAVENOUS at 00:38

## 2019-09-12 RX ADMIN — TEMAZEPAM 15 MG: 15 CAPSULE ORAL at 17:20

## 2019-09-12 RX ADMIN — HEPARIN SODIUM 5000 UNITS: 5000 INJECTION INTRAVENOUS; SUBCUTANEOUS at 15:12

## 2019-09-12 RX ADMIN — METHYLPREDNISOLONE SODIUM SUCCINATE 40 MG: 40 INJECTION, POWDER, FOR SOLUTION INTRAMUSCULAR; INTRAVENOUS at 10:22

## 2019-09-12 RX ADMIN — METHADONE HYDROCHLORIDE 30 MG: 10 TABLET ORAL at 10:22

## 2019-09-12 RX ADMIN — IPRATROPIUM BROMIDE AND ALBUTEROL SULFATE 3 ML: .5; 3 SOLUTION RESPIRATORY (INHALATION) at 17:54

## 2019-09-12 RX ADMIN — HEPARIN SODIUM 5000 UNITS: 5000 INJECTION INTRAVENOUS; SUBCUTANEOUS at 21:58

## 2019-09-12 RX ADMIN — GABAPENTIN 1600 MG: 400 CAPSULE ORAL at 17:20

## 2019-09-12 ASSESSMENT — ENCOUNTER SYMPTOMS
VOMITING: 0
PALPITATIONS: 0
INSOMNIA: 0
BLOOD IN STOOL: 0
COUGH: 1
FALLS: 0
DIZZINESS: 0
ABDOMINAL PAIN: 0
DIARRHEA: 0
HEMOPTYSIS: 0
TREMORS: 0
WHEEZING: 0
COUGH: 0
SEIZURES: 0
EYE PAIN: 0
FEVER: 0
DIAPHORESIS: 0
CHILLS: 0
NAUSEA: 0
WEAKNESS: 0
CONSTIPATION: 0
NERVOUS/ANXIOUS: 0
HEADACHES: 0
MYALGIAS: 0
EYE REDNESS: 0
LOSS OF CONSCIOUSNESS: 0
FOCAL WEAKNESS: 0
SHORTNESS OF BREATH: 1

## 2019-09-12 ASSESSMENT — COPD QUESTIONNAIRES
COPD SCREENING SCORE: 5
DO YOU EVER COUGH UP ANY MUCUS OR PHLEGM?: NO/ONLY WITH OCCASIONAL COLDS OR INFECTIONS
DURING THE PAST 4 WEEKS HOW MUCH DID YOU FEEL SHORT OF BREATH: SOME OF THE TIME
HAVE YOU SMOKED AT LEAST 100 CIGARETTES IN YOUR ENTIRE LIFE: YES

## 2019-09-12 ASSESSMENT — PATIENT HEALTH QUESTIONNAIRE - PHQ9
1. LITTLE INTEREST OR PLEASURE IN DOING THINGS: NOT AT ALL
2. FEELING DOWN, DEPRESSED, IRRITABLE, OR HOPELESS: NOT AT ALL
SUM OF ALL RESPONSES TO PHQ9 QUESTIONS 1 AND 2: 0

## 2019-09-12 ASSESSMENT — LIFESTYLE VARIABLES: EVER_SMOKED: YES

## 2019-09-12 NOTE — DISCHARGE PLANNING
Certificate of Competency signed by Dr. Flood for AD purposes. RN CM to give pt packet when pt is awake.
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
## 2019-09-12 NOTE — CARE PLAN
Problem: Communication  Goal: The ability to communicate needs accurately and effectively will improve  Intervention: Educate patient and significant other/support system about the plan of care, procedures, treatments, medications and allow for questions  Note:   Pt educated on plan of care and treatments for the day.      Problem: Safety  Goal: Will remain free from falls  Intervention: Implement fall precautions  Note:   Fall precautions in place, pt educated to call for assistance.

## 2019-09-12 NOTE — DIETARY
"Nutrition services: Day 2 of admit. Pancho Martinez Jr. is a 59 y.o. male with admitting DX of acute respiratory failure with hypoxia, emphysema and pneumonitis, sepsis, paraseptal emphysema, interstitial lung disease, acute pneumonitis, INDIA, QT prolongation, hypothyroidism, and chronic pain. Consult received for COPD admission.     Patient reports he dislikes meals being provided due to lack of flavor. He stated his appetite is \"not great.\" Patient reports eating <25% of meals. However, he states that his weight is stable. Patient said he does not follow a cardiac diet at home and enjoys spaghetti, steak, and tri-tip. Discussed snack options with patient. Added fruit and ice cream.     Assessment:  Height: 180.3 cm (5' 11\")  Weight: 77.3 kg (170 lb 6.7 oz)  Body mass index is 23.77 kg/m²., BMI classification: normal  Diet/Intake: cardiac, <% of meals    Evaluation:  1. Weight: Patient stated UBW is 79.5 kg (175 lbs). Noted some weight loss from UBW, unable to determine severity of weight loss due to unknown time frame of loss.   2. Medications: synthyroid, pericolace, KCl  3. Fluids: NS @ 125 ml/hr  4. Last BM: 9/11    Malnutrition Risk: No criteria at this time.     Recommendations/Plan:  1. Liberalize diet order when medically appropriate.    2. Snack preferences taken and scheduled at this time.   3. Encourage intake of meals/supplements.  4. Document intake of all meals/supplements as % taken in ADL's to provide interdisciplinary communication across all shifts.   5. Monitor weight.  6. Nutrition rep will continue to see patient for ongoing meal and snack preferences.     RD following.      "

## 2019-09-12 NOTE — ASSESSMENT & PLAN NOTE
Bronchodilators as needed  Continue oxygen  Taper off steroids  Will need outpatient follow-up with pulmonary with full PFTs and work-up for ILD

## 2019-09-12 NOTE — ASSESSMENT & PLAN NOTE
Positive for rhinovirus/enterovirus on viral respiratory panel, unlikely to be the primary culprit in this case however may be an exacerbating factor.  Start Solu-Medrol for possible ILD flare  Continue Rocephin and doxycycline

## 2019-09-12 NOTE — PROGRESS NOTES
Hospital Medicine Daily Progress Note    Date of Service  9/11/2019    Chief Complaint  59 y.o. male admitted 9/10/2019 direct admit for shortness of breath      Hospital Course    History of chronic pain syndrome on methadone and high doses of gabapentin.  History of emphysema but not on chronic O2.  After further inquiry, he has recurrent pneumonias, sinusitides.  Presented at [facility] with SOB. He was found to be hypoxic there. He was put on O2.   CTA Chest showed no evidence of pulmonary embolism but showed patchy opacities concerning for pneumonia. He was sent here.  He was started on IV antibiotics, and breathing treatments.      Interval Problem Update  9/11. Complains he wants his methadone. Explained about his Qtc  Spoke with Pulmonology and ID.    Consultants/Specialty  Pulmonology  ID    Code Status  full    Disposition  Home when better    Review of Systems  Review of Systems   Constitutional: Positive for malaise/fatigue. Negative for chills and fever.   HENT: Negative for congestion, hearing loss and nosebleeds.    Eyes: Negative for pain and redness.   Respiratory: Positive for cough and shortness of breath. Negative for hemoptysis and wheezing.    Cardiovascular: Negative for chest pain and palpitations.   Gastrointestinal: Negative for abdominal pain, blood in stool, constipation, diarrhea, nausea and vomiting.   Genitourinary: Negative for dysuria, frequency and hematuria.   Musculoskeletal: Negative for falls, joint pain and myalgias.   Skin: Negative for rash.   Neurological: Negative for dizziness, tremors, focal weakness, seizures, loss of consciousness, weakness and headaches.   Psychiatric/Behavioral: The patient is not nervous/anxious and does not have insomnia.    All other systems reviewed and are negative.       Physical Exam  Temp:  [36.7 °C (98 °F)-37.4 °C (99.3 °F)] 37.4 °C (99.3 °F)  Pulse:  [] 85  Resp:  [16-20] 18  BP: (129-150)/(73-88) 150/88  SpO2:  [90  %-96 %] 92 %    Physical Exam    Fluids    Intake/Output Summary (Last 24 hours) at 9/11/2019 2334  Last data filed at 9/11/2019 2212  Gross per 24 hour   Intake 1590 ml   Output 500 ml   Net 1090 ml       Laboratory  Recent Labs     09/10/19  0617 09/11/19  0851   WBC 11.4* 12.7*   RBC 4.28* 4.44*   HEMOGLOBIN 11.9* 12.2*   HEMATOCRIT 36.8* 38.3*   MCV 86.0 86.3   MCH 27.8 27.5   MCHC 32.3* 31.9*   RDW 43.4 43.9   PLATELETCT 243 297   MPV 9.2 8.7*     Recent Labs     09/10/19  0617   SODIUM 135   POTASSIUM 3.6   CHLORIDE 101   CO2 25   GLUCOSE 179*   BUN 17   CREATININE 1.10   CALCIUM 8.7                   Imaging  OUTSIDE IMAGES-CT CHEST   Final Result      EC-ECHOCARDIOGRAM COMPLETE W/O CONT    (Results Pending)        Assessment/Plan  * Acute respiratory failure with hypoxia, emphsema and pneumonitis (HCC)- (present on admission)  Assessment & Plan  ddrerss above issues  Resp and O2 per protocol  Consulted Pulmonology    Pneumonitis- (present on admission)  Assessment & Plan  Patient has been started on IV ceftriaxone and azithromycin  Check pro calcitonin, if within normal limits we'll consider de-escalating antibiotics  RT protocol  Continue supplemental oxygen, wean as tolerated  Follow blood cultures  STOP azithromycin switch to doxycycline  Pattern on CT seems viral/atypical  Ordered flu already that was negatiove  Resp PCR panel ordered  Consulted and discussed with Dr. Goodman and Dr. Dang.      Sepsis (HCC)- (present on admission)  Assessment & Plan  This is severe sepsis with the following associated acute organ dysfunction(s): acute kidney failure, acute respiratory failure.   Likely source is pneumonia  Patient has been started on IV fluids per septic protocol  Trend lactate  Patient is started on IV ceftriaxone and azithromycin  Follow blood cultures      Paraseptal emphysema (HCC)  Assessment & Plan  Due to his smoking history  Worse compared to previous CT scans  Discussed with Dr. Goodman  May  need O2 at discharge    QT prolongation  Assessment & Plan  STOP methadone. PRN oxycodones for now and will need to follow up with his Pain clinic doctor  STOP azithromycin. Switch to doxycycline.  STOP Zofran  Check Mg level  Long talk with him about methadone which he prefers.  I spoke with on call Cardiology  We can restart the methadone as he has been on this long term and continue holding Zofran and Azithromycin  Keep checking QTcs.    INDIA (acute kidney injury) (HCC)- (present on admission)  Assessment & Plan  Likely prerenal  IV fluid hydration with normal saline  Monitor BMP and assess response  Avoid IV contrast/nephrotoxins/NSAIDs  Dose adjust meds for decreased GFR        Hypothyroidism- (present on admission)  Assessment & Plan  Continue Synthroid    Chronic pain- (present on admission)  Assessment & Plan  Continue methadone and Neurontin  STOP methadone but will restart when QTc acceptable.         VTE prophylaxis: Per Dr. Holley, heparin SQ. Ordered it as that was his intent.  Reviewed vitals, labs, imaging, staff notes.  Discussed assessment and plan with Pancho Martinez Jr.   Discussed with ID, Pulmonology    I spent 60 minutes reviewing  the chart, notes, vitals, labs, imaging, ordering labs, evaluating Pancho Martinez JrKhadra for assessment, enacting the plan above. Medical decision making is therefore complex. Time was devoted to counseling and coordinating care including review of records, pertinent lab data and studies, as well as discussing diagnostic evaluation and work up, planned therapeutic interventions and future disposition of care. Where indicated, the assessment and plan reflect discussion of patient with consultants, other healthcare providers, family members, and additional research needed to obtain further information in formulating the plan of care for Pancho Montenegropavithra Tello

## 2019-09-12 NOTE — RESPIRATORY CARE
COPD EDUCATION by COPD CLINICAL EDUCATOR  9/12/2019  at  2:32 PM by Saad Horne     Patient interviewed by COPD education team.  Patient unable to participate in full program.  Short intervention has been conducted.

## 2019-09-12 NOTE — CARE PLAN
Problem: Knowledge Deficit  Goal: Knowledge of disease process/condition, treatment plan, diagnostic tests, and medications will improve  Outcome: PROGRESSING AS EXPECTED   Patient will communicate with team regarding any concerns or questions of treatment plan and changes to medications. Answer call lights and questions.       Problem: Pain Management  Goal: Pain level will decrease to patient's comfort goal  Outcome: PROGRESSING AS EXPECTED   Provide comfort measures, nontherapeutic pain management, repositioning/rest and medications as prescribed to decrease pt's pain to comfort goal.      Problem: Respiratory:  Goal: Respiratory status will improve  Outcome: PROGRESSING AS EXPECTED   Patient is working with RT for breathing treatment. Educate on COPD, encourage use of incentive spirometer, assess lung sounds and monitor abnormal labs and diagnostic tests.

## 2019-09-12 NOTE — ASSESSMENT & PLAN NOTE
Continues to improve. Almost at baseline. Intubated 9/14-9/16. Bronch with no growth, no cell count/diff performed. Possibly secondary to GERD, chemical aspiration pneumonitis in the setting of chronic Methadone use. Also + rhinovirus, although severity of illness exceeds what would be expected for this virus. Completed 5 days of doxycycline on 9/15 and completed 5 days of Rocephin on 9/18. Pulmonary infiltrates first noted in 2011, worsening in 2015, and worsening again this visit.     Plan  -Completed pulse steroids, since tapered off completed 9/22  -Cont Symbicort, prilosec  -Home oxygen eval if patient going home  -Cont PT/OT and likely SNF placement for deconditioning and weakness  -Set up appointment with Pulmonary follow up, patient lives in [facility], wants to followup in [place]

## 2019-09-12 NOTE — PROGRESS NOTES
Infectious Disease Progress Note    Author: Pablo Dang M.D. Date & Time of service: 2019  3:43 PM    Chief Complaint:  Shortness of breath    Interval History:   afebrile, white count 11.1.  On high-dose IV steroids. Reports feeling a little better. Still having SOA on exertion.    Labs Reviewed and Medications Reviewed.    Review of Systems:  Review of Systems   Constitutional: Positive for malaise/fatigue. Negative for chills, diaphoresis and fever.   Respiratory: Positive for shortness of breath. Negative for cough.    Cardiovascular: Negative for chest pain.   Gastrointestinal: Negative for abdominal pain, diarrhea, nausea and vomiting.   Skin: Negative for itching and rash.   All other systems reviewed and are negative.      Hemodynamics:  Temp (24hrs), Av.1 °C (98.7 °F), Min:36.6 °C (97.8 °F), Max:37.4 °C (99.3 °F)  Temperature: 37 °C (98.6 °F)  Pulse  Av.5  Min: 66  Max: 110   Blood Pressure: 132/89       Physical Exam:  Physical Exam   Constitutional: He is oriented to person, place, and time. He appears well-nourished. No distress.   Thin  Appears ill   HENT:   NC o2 in place   Eyes: Pupils are equal, round, and reactive to light. Conjunctivae are normal. No scleral icterus.   Neck: Neck supple.   Cardiovascular: Normal rate, regular rhythm and normal heart sounds.   No murmur heard.  Pulmonary/Chest: Effort normal and breath sounds normal. No respiratory distress.   B/L scattered crackles   Abdominal: Soft. Bowel sounds are normal. He exhibits no distension. There is no tenderness.   Musculoskeletal: Normal range of motion. He exhibits no edema.   Lymphadenopathy:     He has no cervical adenopathy.   Neurological: He is alert and oriented to person, place, and time. No cranial nerve deficit.   No gross FND   Skin: Skin is warm and dry. No rash noted. He is not diaphoretic. No erythema.   Psychiatric: His behavior is normal.   Flat affect   Nursing note and vitals  reviewed.      Meds:    Current Facility-Administered Medications:   •  Respiratory Care per Protocol  •  ipratropium-albuterol  •  methylPREDNISolone  •  tiotropium  •  ipratropium-albuterol  •  heparin  •  sodium chloride  •  senna-docusate **AND** polyethylene glycol/lytes **AND** magnesium hydroxide **AND** bisacodyl  •  LR  •  acetaminophen  •  levothyroxine  •  temazepam  •  NS  •  simvastatin  •  gabapentin  •  gabapentin  •  doxycycline monohydrate  •  oxyCODONE immediate-release    Labs:  Recent Labs     09/10/19  0617 09/11/19  0851 09/12/19 0317   WBC 11.4* 12.7* 11.1*   RBC 4.28* 4.44* 3.93*   HEMOGLOBIN 11.9* 12.2* 10.7*   HEMATOCRIT 36.8* 38.3* 33.4*   MCV 86.0 86.3 85.0   MCH 27.8 27.5 27.2   RDW 43.4 43.9 42.5   PLATELETCT 243 297 243   MPV 9.2 8.7* 8.6*   NEUTSPOLYS 75.40*  --   --    LYMPHOCYTES 14.90*  --   --    MONOCYTES 6.00  --   --    EOSINOPHILS 2.90  --   --    BASOPHILS 0.40  --   --      Recent Labs     09/10/19  0617 09/12/19 0317   SODIUM 135 136   POTASSIUM 3.6 3.4*   CHLORIDE 101 103   CO2 25 23   GLUCOSE 179* 96   BUN 17 10     Recent Labs     09/10/19  0617 09/12/19 0317   CREATININE 1.10 0.97       Imaging:  Dx-chest-2 Views    Result Date: 9/12/2019 9/12/2019 12:18 PM HISTORY/REASON FOR EXAM:  Short of breath. TECHNIQUE/EXAM DESCRIPTION AND NUMBER OF VIEWS: Two views of the chest. COMPARISON:  1/3/2015. FINDINGS: New airspace opacity in the left lower lobe. No pleural effusions, no pneumothorax are appreciated. Stable cardiopericardial silhouette.     New airspace opacity in the left lower lobe, concerning for pneumonia.      Micro:  Results     Procedure Component Value Units Date/Time    Flu and RSV by PCR [[id number]] Collected:  09/11/19 2045    Order Status:  Completed Specimen:  Respirate from Nasopharyngeal Updated:  09/11/19 2217     Influenza virus A RNA Negative     Influenza virus B, PCR Negative     RSV, PCR Negative    Narrative:       Collected By:[id number]  [facility]ly [facility]IR[facility]    GRAM STAIN [[id number]] Collected:  09/11/19 0500    Order Status:  Completed Specimen:  Respirate Updated:  09/11/19 1114     Significant Indicator .     Source RESP     Site SPUTUM     Gram Stain Result Sputum Gram stain quality score is <1, probable  oropharyngeal contamination. Culture not performed.  Recollect if clinically indicated.      Narrative:       Collected By:[id number] HERB MARSHALL  Collected By:[id number] HERB MARSHALL    CULTURE RESPIRATORY W/ [facility] STN [[id number]] Collected:  09/11/19 0500    Order Status:  Canceled Specimen:  Sputum     Influenza A/B By PCR (Adult - Flu Only) [[id number]] Collected:  09/10/19 2342    Order Status:  Completed Specimen:  Respirate from Nasopharyngeal Updated:  09/11/19 0030     Influenza virus A RNA Negative     Influenza virus B, PCR Negative    Narrative:       Collected By:[id number] HERB MARSHALL    CULTURE RESPIRATORY W/ [facility] STN [[id number]]     Order Status:  Completed     GRAM STAIN [[id number]] Collected:  09/10/19 1111    Order Status:  Completed Specimen:  Respirate Updated:  09/10/19 1154     Significant Indicator .     Source RESP     Site SPUTUM     Gram Stain Result Sputum Gram stain quality score is <1, probable  oropharyngeal contamination. Culture not performed.  Recollect if clinically indicated.      Narrative:       CALL  Ivy  171 tel. [phone],  CALLED  171 tel. [phone] 09/10/2019, 11:53, RB PERF. RESULTS CALLED TO:BEBA Thomson  Collected By:[id number] ORVILLE GUNN  Preferably before first antibiotic dose - add Gram stain if  indicated  Collected By:[id number] ORVILLE GUNN    Culture Respiratory W/ [facility] [[id number]] Collected:  09/10/19 1111    Order Status:  Completed Specimen:  Respirate from Sputum Updated:  09/10/19 1126    Narrative:       Collected By:[id number] ORVILLE GUNN  Preferably before first antibiotic dose - add Gram stain if  indicated          Assessment:  Pancho WAYNE Juan Tello is a 59 y.o. male with a history  of history of chronic pain syndrome on methadone and high doses of gabapentin, significant smoking history, emphysema not on chronic oxygen at home, presented with 3 weeks of progressive shortness of breath, especially worse over the past 3 days.  CT with worsening emphysematous changes and diffuse groundglass opacities.     Patient seems to have underlying worsening emphysematous changes. He may or may not have a superimposed bacterial pneumonia, but if there is a superimposed infection, the groundglass opacities on CT are more suggestive of either a viral or an atypical bacterial infection.  His son had a viral URI around the same time the patient's symptoms began.    Pertinent Diagnoses  Sepsis  Acute hypoxemic respiratory failure  Pneumonia  Emphysema  Possible underlying ILD    Plan:  -Continue p.o. doxycycline 100 mg twice daily  -Will send respiratory viral panel. If positive, would stop doxycycline, otherwise continue for a 7-day course  -Will need to establish care with pulmonary, may benefit from outpatient PFTs, evaluation for continuous home oxygen.  Pulmonary is on board    Discussed with internal medicine, Dr. Flood    ID will sign off.  Please call with questions.

## 2019-09-12 NOTE — PROGRESS NOTES
Pt had 9 beats of accelerated junctional, paged on call hospitalist. Dr. Watson returned call, new orders for potassium placed. Will continue to monitor.

## 2019-09-12 NOTE — ASSESSMENT & PLAN NOTE
Improving. 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 Patient completed steroid taper  Plan  -Follow up in Pulm clinic for PFT's and monitoring

## 2019-09-13 ENCOUNTER — APPOINTMENT (OUTPATIENT)
Dept: CARDIOLOGY | Facility: MEDICAL CENTER | Age: 59
DRG: 871 | End: 2019-09-13
Attending: INTERNAL MEDICINE
Payer: MEDICARE

## 2019-09-13 ENCOUNTER — APPOINTMENT (OUTPATIENT)
Dept: RADIOLOGY | Facility: MEDICAL CENTER | Age: 59
DRG: 871 | End: 2019-09-13
Attending: INTERNAL MEDICINE
Payer: MEDICARE

## 2019-09-13 LAB
ANION GAP SERPL CALC-SCNC: 8 MMOL/L (ref 0–11.9)
BASE EXCESS BLDA CALC-SCNC: -2 MMOL/L (ref -4–3)
BODY TEMPERATURE: ABNORMAL CENTIGRADE
BUN SERPL-MCNC: 13 MG/DL (ref 8–22)
C PNEUM DNA SPEC QL NAA+PROBE: NOT DETECTED
CALCIUM SERPL-MCNC: 8.7 MG/DL (ref 8.5–10.5)
CHLORIDE SERPL-SCNC: 105 MMOL/L (ref 96–112)
CO2 SERPL-SCNC: 24 MMOL/L (ref 20–33)
CREAT SERPL-MCNC: 0.88 MG/DL (ref 0.5–1.4)
EKG IMPRESSION: NORMAL
ERYTHROCYTE [DISTWIDTH] IN BLOOD BY AUTOMATED COUNT: 43.1 FL (ref 35.9–50)
FLUAV H1 2009 PAND RNA SPEC QL NAA+PROBE: NOT DETECTED
FLUAV H1 RNA SPEC QL NAA+PROBE: NOT DETECTED
FLUAV H3 RNA SPEC QL NAA+PROBE: NOT DETECTED
FLUAV RNA SPEC QL NAA+PROBE: NOT DETECTED
FLUBV RNA SPEC QL NAA+PROBE: NOT DETECTED
GLUCOSE SERPL-MCNC: 188 MG/DL (ref 65–99)
GRAM STN SPEC: NORMAL
HADV DNA SPEC QL NAA+PROBE: NOT DETECTED
HCO3 BLDA-SCNC: 22 MMOL/L (ref 17–25)
HCOV RNA SPEC QL NAA+PROBE: NOT DETECTED
HCT VFR BLD AUTO: 34.3 % (ref 42–52)
HGB BLD-MCNC: 10.8 G/DL (ref 14–18)
HMPV RNA SPEC QL NAA+PROBE: NOT DETECTED
HPIV1 RNA SPEC QL NAA+PROBE: NOT DETECTED
HPIV2 RNA SPEC QL NAA+PROBE: NOT DETECTED
HPIV3 RNA SPEC QL NAA+PROBE: NOT DETECTED
HPIV4 RNA SPEC QL NAA+PROBE: NOT DETECTED
INHALED O2 FLOW RATE: 15 L/MIN (ref 2–10)
LACTATE BLD-SCNC: 2.8 MMOL/L (ref 0.5–2)
LV EJECT FRACT  99904: 60
LV EJECT FRACT MOD 2C 99903: 67.87
LV EJECT FRACT MOD 4C 99902: 70.71
LV EJECT FRACT MOD BP 99901: 68.96
M PNEUMO DNA SPEC QL NAA+PROBE: NOT DETECTED
MCH RBC QN AUTO: 27.1 PG (ref 27–33)
MCHC RBC AUTO-ENTMCNC: 31.5 G/DL (ref 33.7–35.3)
MCV RBC AUTO: 86.2 FL (ref 81.4–97.8)
PCO2 BLDA: 33 MMHG (ref 26–37)
PH BLDA: 7.43 [PH] (ref 7.4–7.5)
PLATELET # BLD AUTO: 268 K/UL (ref 164–446)
PMV BLD AUTO: 8.9 FL (ref 9–12.9)
PO2 BLDA: 51 MMHG (ref 64–87)
POTASSIUM SERPL-SCNC: 3.6 MMOL/L (ref 3.6–5.5)
PROCALCITONIN SERPL-MCNC: 0.22 NG/ML
RBC # BLD AUTO: 3.98 M/UL (ref 4.7–6.1)
RSV A RNA SPEC QL NAA+PROBE: NOT DETECTED
RSV B RNA SPEC QL NAA+PROBE: NOT DETECTED
RV+EV RNA SPEC QL NAA+PROBE: DETECTED
SAO2 % BLDA: 85.9 % (ref 93–99)
SIGNIFICANT IND 70042: NORMAL
SITE SITE: NORMAL
SODIUM SERPL-SCNC: 137 MMOL/L (ref 135–145)
SOURCE SOURCE: NORMAL
WBC # BLD AUTO: 15.7 K/UL (ref 4.8–10.8)

## 2019-09-13 PROCEDURE — 770022 HCHG ROOM/CARE - ICU (200)

## 2019-09-13 PROCEDURE — 82803 BLOOD GASES ANY COMBINATION: CPT

## 2019-09-13 PROCEDURE — 94002 VENT MGMT INPAT INIT DAY: CPT

## 2019-09-13 PROCEDURE — 99233 SBSQ HOSP IP/OBS HIGH 50: CPT | Mod: 25 | Performed by: INTERNAL MEDICINE

## 2019-09-13 PROCEDURE — 700102 HCHG RX REV CODE 250 W/ 637 OVERRIDE(OP): Performed by: INTERNAL MEDICINE

## 2019-09-13 PROCEDURE — 93306 TTE W/DOPPLER COMPLETE: CPT | Mod: 26 | Performed by: INTERNAL MEDICINE

## 2019-09-13 PROCEDURE — 83605 ASSAY OF LACTIC ACID: CPT

## 2019-09-13 PROCEDURE — 71045 X-RAY EXAM CHEST 1 VIEW: CPT

## 2019-09-13 PROCEDURE — 93010 ELECTROCARDIOGRAM REPORT: CPT | Performed by: INTERNAL MEDICINE

## 2019-09-13 PROCEDURE — 87070 CULTURE OTHR SPECIMN AEROBIC: CPT

## 2019-09-13 PROCEDURE — 700111 HCHG RX REV CODE 636 W/ 250 OVERRIDE (IP): Performed by: INTERNAL MEDICINE

## 2019-09-13 PROCEDURE — 94640 AIRWAY INHALATION TREATMENT: CPT

## 2019-09-13 PROCEDURE — 700105 HCHG RX REV CODE 258: Performed by: INTERNAL MEDICINE

## 2019-09-13 PROCEDURE — 99233 SBSQ HOSP IP/OBS HIGH 50: CPT | Performed by: INTERNAL MEDICINE

## 2019-09-13 PROCEDURE — 86606 ASPERGILLUS ANTIBODY: CPT | Mod: 91

## 2019-09-13 PROCEDURE — 99292 CRITICAL CARE ADDL 30 MIN: CPT | Performed by: INTERNAL MEDICINE

## 2019-09-13 PROCEDURE — 94760 N-INVAS EAR/PLS OXIMETRY 1: CPT

## 2019-09-13 PROCEDURE — 99358 PROLONG SERVICE W/O CONTACT: CPT | Performed by: INTERNAL MEDICINE

## 2019-09-13 PROCEDURE — 94669 MECHANICAL CHEST WALL OSCILL: CPT

## 2019-09-13 PROCEDURE — 80048 BASIC METABOLIC PNL TOTAL CA: CPT

## 2019-09-13 PROCEDURE — A9270 NON-COVERED ITEM OR SERVICE: HCPCS | Performed by: INTERNAL MEDICINE

## 2019-09-13 PROCEDURE — 87205 SMEAR GRAM STAIN: CPT

## 2019-09-13 PROCEDURE — 99222 1ST HOSP IP/OBS MODERATE 55: CPT | Performed by: INTERNAL MEDICINE

## 2019-09-13 PROCEDURE — 84145 PROCALCITONIN (PCT): CPT

## 2019-09-13 PROCEDURE — 700101 HCHG RX REV CODE 250: Performed by: INTERNAL MEDICINE

## 2019-09-13 PROCEDURE — 93306 TTE W/DOPPLER COMPLETE: CPT

## 2019-09-13 PROCEDURE — 99291 CRITICAL CARE FIRST HOUR: CPT | Performed by: INTERNAL MEDICINE

## 2019-09-13 PROCEDURE — 94668 MNPJ CHEST WALL SBSQ: CPT

## 2019-09-13 PROCEDURE — 93005 ELECTROCARDIOGRAM TRACING: CPT | Performed by: INTERNAL MEDICINE

## 2019-09-13 PROCEDURE — 86331 IMMUNODIFFUSION OUCHTERLONY: CPT | Mod: 91

## 2019-09-13 PROCEDURE — 85027 COMPLETE CBC AUTOMATED: CPT

## 2019-09-13 RX ORDER — OXYCODONE HCL 10 MG/1
10 TABLET, FILM COATED, EXTENDED RELEASE ORAL ONCE
Status: DISCONTINUED | OUTPATIENT
Start: 2019-09-13 | End: 2019-09-14

## 2019-09-13 RX ORDER — IPRATROPIUM BROMIDE AND ALBUTEROL SULFATE 2.5; .5 MG/3ML; MG/3ML
3 SOLUTION RESPIRATORY (INHALATION)
Status: DISCONTINUED | OUTPATIENT
Start: 2019-09-13 | End: 2019-09-16 | Stop reason: ALTCHOICE

## 2019-09-13 RX ORDER — METHYLPREDNISOLONE SODIUM SUCCINATE 125 MG/2ML
125 INJECTION, POWDER, LYOPHILIZED, FOR SOLUTION INTRAMUSCULAR; INTRAVENOUS EVERY 6 HOURS
Status: DISCONTINUED | OUTPATIENT
Start: 2019-09-13 | End: 2019-09-13

## 2019-09-13 RX ORDER — FUROSEMIDE 10 MG/ML
20 INJECTION INTRAMUSCULAR; INTRAVENOUS ONCE
Status: COMPLETED | OUTPATIENT
Start: 2019-09-13 | End: 2019-09-13

## 2019-09-13 RX ORDER — METHYLPREDNISOLONE SODIUM SUCCINATE 125 MG/2ML
125 INJECTION, POWDER, LYOPHILIZED, FOR SOLUTION INTRAMUSCULAR; INTRAVENOUS EVERY 6 HOURS
Status: DISCONTINUED | OUTPATIENT
Start: 2019-09-14 | End: 2019-09-14

## 2019-09-13 RX ADMIN — IPRATROPIUM BROMIDE AND ALBUTEROL SULFATE 3 ML: .5; 3 SOLUTION RESPIRATORY (INHALATION) at 10:44

## 2019-09-13 RX ADMIN — IPRATROPIUM BROMIDE AND ALBUTEROL SULFATE 3 ML: .5; 3 SOLUTION RESPIRATORY (INHALATION) at 12:36

## 2019-09-13 RX ADMIN — FUROSEMIDE 20 MG: 10 INJECTION, SOLUTION INTRAMUSCULAR; INTRAVENOUS at 23:30

## 2019-09-13 RX ADMIN — HEPARIN SODIUM 5000 UNITS: 5000 INJECTION INTRAVENOUS; SUBCUTANEOUS at 21:05

## 2019-09-13 RX ADMIN — Medication 2 SPRAY: at 16:47

## 2019-09-13 RX ADMIN — DOXYCYCLINE 100 MG: 100 TABLET, FILM COATED ORAL at 06:23

## 2019-09-13 RX ADMIN — HEPARIN SODIUM 5000 UNITS: 5000 INJECTION INTRAVENOUS; SUBCUTANEOUS at 14:11

## 2019-09-13 RX ADMIN — METHADONE HYDROCHLORIDE 20 MG: 10 TABLET ORAL at 17:03

## 2019-09-13 RX ADMIN — LEVOTHYROXINE SODIUM 125 MCG: 125 TABLET ORAL at 06:23

## 2019-09-13 RX ADMIN — METHYLPREDNISOLONE SODIUM SUCCINATE 125 MG: 125 INJECTION, POWDER, FOR SOLUTION INTRAMUSCULAR; INTRAVENOUS at 12:42

## 2019-09-13 RX ADMIN — GABAPENTIN 1600 MG: 400 CAPSULE ORAL at 17:02

## 2019-09-13 RX ADMIN — GABAPENTIN 2400 MG: 400 CAPSULE ORAL at 06:23

## 2019-09-13 RX ADMIN — SODIUM CHLORIDE: 9 INJECTION, SOLUTION INTRAVENOUS at 06:23

## 2019-09-13 RX ADMIN — IPRATROPIUM BROMIDE AND ALBUTEROL SULFATE 3 ML: .5; 3 SOLUTION RESPIRATORY (INHALATION) at 21:16

## 2019-09-13 RX ADMIN — SENNOSIDES, DOCUSATE SODIUM 2 TABLET: 50; 8.6 TABLET, FILM COATED ORAL at 06:23

## 2019-09-13 RX ADMIN — VANCOMYCIN HYDROCHLORIDE 1900 MG: 500 INJECTION, POWDER, LYOPHILIZED, FOR SOLUTION INTRAVENOUS at 19:44

## 2019-09-13 RX ADMIN — HEPARIN SODIUM 5000 UNITS: 5000 INJECTION INTRAVENOUS; SUBCUTANEOUS at 06:22

## 2019-09-13 RX ADMIN — IPRATROPIUM BROMIDE AND ALBUTEROL SULFATE 3 ML: .5; 3 SOLUTION RESPIRATORY (INHALATION) at 07:51

## 2019-09-13 RX ADMIN — TEMAZEPAM 15 MG: 15 CAPSULE ORAL at 17:03

## 2019-09-13 RX ADMIN — IPRATROPIUM BROMIDE AND ALBUTEROL SULFATE 3 ML: .5; 3 SOLUTION RESPIRATORY (INHALATION) at 15:28

## 2019-09-13 RX ADMIN — METHYLPREDNISOLONE SODIUM SUCCINATE 125 MG: 125 INJECTION, POWDER, FOR SOLUTION INTRAMUSCULAR; INTRAVENOUS at 23:30

## 2019-09-13 RX ADMIN — DOXYCYCLINE 100 MG: 100 TABLET, FILM COATED ORAL at 17:03

## 2019-09-13 RX ADMIN — METHYLPREDNISOLONE SODIUM SUCCINATE 125 MG: 125 INJECTION, POWDER, FOR SOLUTION INTRAMUSCULAR; INTRAVENOUS at 17:03

## 2019-09-13 RX ADMIN — SIMVASTATIN 40 MG: 40 TABLET, FILM COATED ORAL at 21:06

## 2019-09-13 RX ADMIN — IPRATROPIUM BROMIDE AND ALBUTEROL SULFATE 3 ML: .5; 3 SOLUTION RESPIRATORY (INHALATION) at 19:06

## 2019-09-13 ASSESSMENT — ENCOUNTER SYMPTOMS
PALPITATIONS: 0
BACK PAIN: 1
EYE REDNESS: 0
CONSTIPATION: 0
MYALGIAS: 0
ABDOMINAL PAIN: 0
SHORTNESS OF BREATH: 1
HEMOPTYSIS: 0
BLOOD IN STOOL: 0
FALLS: 0
SPUTUM PRODUCTION: 0
WHEEZING: 0
SEIZURES: 0
DIZZINESS: 0
COUGH: 0
PSYCHIATRIC NEGATIVE: 1
FEVER: 0
SINUS PAIN: 0
VOMITING: 0
NECK PAIN: 1
NERVOUS/ANXIOUS: 0
FOCAL WEAKNESS: 0
CHILLS: 0
SORE THROAT: 0
SPUTUM PRODUCTION: 1
INSOMNIA: 0
NAUSEA: 0
DIARRHEA: 0
COUGH: 1
MYALGIAS: 1
SENSORY CHANGE: 0
EYE PAIN: 0
TREMORS: 0
ORTHOPNEA: 0
STRIDOR: 0
HEADACHES: 0
LOSS OF CONSCIOUSNESS: 0
EYE DISCHARGE: 0
WEAKNESS: 0

## 2019-09-13 ASSESSMENT — PULMONARY FUNCTION TESTS: EPAP_CMH2O: 8

## 2019-09-13 NOTE — PROGRESS NOTES
Patient requesting 1200 scheduled methadone. Dr. Flood stated to hold medication as patient's AM QTc was 573. Order received for a one time dose of Oxycontin 10 mg PO once.
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
## 2019-09-13 NOTE — PROGRESS NOTES
Hospital Medicine Daily Progress Note    Date of Service  9/12/2019    Chief Complaint  59 y.o. male admitted 9/10/2019 direct admit for shortness of breath      Hospital Course    History of chronic pain syndrome on methadone and high doses of gabapentin.  History of emphysema but not on chronic O2.  After further inquiry, he has recurrent pneumonias, sinusitides.  Presented at [facility] with SOB. He was found to be hypoxic there. He was put on O2.   CTA Chest showed no evidence of pulmonary embolism but showed patchy opacities concerning for pneumonia. He was sent here.  He was started on IV antibiotics, and breathing treatments.      Interval Problem Update  9/11. Complains he wants his methadone. Explained about his Qtc  Spoke with Pulmonology and ID.  9/12. He was thankful for the methadone. QTc below 500. Check EKG again tomorrow.    Consultants/Specialty  Pulmonology  ID    Code Status  full    Disposition  Home when better    Review of Systems  Review of Systems   Constitutional: Positive for malaise/fatigue. Negative for chills and fever.   HENT: Negative for congestion, hearing loss and nosebleeds.    Eyes: Negative for pain and redness.   Respiratory: Positive for cough and shortness of breath. Negative for hemoptysis and wheezing.    Cardiovascular: Negative for chest pain and palpitations.   Gastrointestinal: Negative for abdominal pain, blood in stool, constipation, diarrhea, nausea and vomiting.   Genitourinary: Negative for dysuria, frequency and hematuria.   Musculoskeletal: Negative for falls, joint pain and myalgias.   Skin: Negative for rash.   Neurological: Negative for dizziness, tremors, focal weakness, seizures, loss of consciousness, weakness and headaches.   Psychiatric/Behavioral: The patient is not nervous/anxious and does not have insomnia.    All other systems reviewed and are negative.       Physical Exam  Temp:  [36.6 °C (97.8 °F)-37.4 °C (99.3 °F)] 36.6 °C (97.9  °F)  Pulse:  [71-85] 80  Resp:  [16-20] 16  BP: (130-150)/(79-90) 131/79  SpO2:  [88 %-95 %] 92 %    Physical Exam   Constitutional: He appears well-developed.   HENT:   Head: Normocephalic and atraumatic.   Eyes: Conjunctivae are normal. No scleral icterus.   Neck: Normal range of motion. Neck supple.   Cardiovascular: Normal rate and regular rhythm. Exam reveals no gallop and no friction rub.   No murmur heard.  Pulmonary/Chest: Breath sounds normal. He is in respiratory distress. He has no wheezes (Occ). He has no rales.   Diminished LULZ   Abdominal: Soft. Bowel sounds are normal. He exhibits no distension. There is no tenderness. There is no rebound and no guarding.   Musculoskeletal: He exhibits no edema or tenderness.   Neurological: He is alert.   Skin: Skin is warm.   Psychiatric: He has a normal mood and affect. His behavior is normal.       Fluids    Intake/Output Summary (Last 24 hours) at 9/12/2019 1843  Last data filed at 9/12/2019 0300  Gross per 24 hour   Intake --   Output 870 ml   Net -870 ml       Laboratory  Recent Labs     09/10/19  0617 09/11/19  0851 09/12/19  0317   WBC 11.4* 12.7* 11.1*   RBC 4.28* 4.44* 3.93*   HEMOGLOBIN 11.9* 12.2* 10.7*   HEMATOCRIT 36.8* 38.3* 33.4*   MCV 86.0 86.3 85.0   MCH 27.8 27.5 27.2   MCHC 32.3* 31.9* 32.0*   RDW 43.4 43.9 42.5   PLATELETCT 243 297 243   MPV 9.2 8.7* 8.6*     Recent Labs     09/10/19  0617 09/12/19  0317   SODIUM 135 136   POTASSIUM 3.6 3.4*   CHLORIDE 101 103   CO2 25 23   GLUCOSE 179* 96   BUN 17 10   CREATININE 1.10 0.97   CALCIUM 8.7 8.3*                   Imaging  DX-CHEST-2 VIEWS   Final Result         New airspace opacity in the left lower lobe, concerning for pneumonia.      OUTSIDE IMAGES-CT CHEST   Final Result      EC-ECHOCARDIOGRAM COMPLETE W/O CONT    (Results Pending)        Assessment/Plan  * Acute respiratory failure with hypoxia, emphsema and pneumonitis (HCC)- (present on admission)  Assessment & Plan  Addrerss above  issues  Resp and O2 per protocol  Consulted Pulmonology.  On 9/12, inc O2 need  Ordered IV steroids and breathing treatment  Ordered CXR  Ordered ABG  Discussed with Dr. Goodman. Stop steroids for now on 9/12 as we need to r/o viral/atypical infectious process. I will restart if more hypoxic or wheezing.  See below    Pneumonitis- (present on admission)  Assessment & Plan  Patient has been started on IV ceftriaxone and azithromycin  Check pro calcitonin, if within normal limits we'll consider de-escalating antibiotics  RT protocol  Continue supplemental oxygen, wean as tolerated  Follow blood cultures  STOP azithromycin switch to doxycycline  Pattern on CT seems viral/atypical  Ordered flu already that was negatiove  Resp PCR panel ordered  Consulted and discussed with Dr. Goodman and Dr. Dang.  On 9/12 discussed with Dr. Dang and Dr. Goodman. Spent time with [facility] research analysis and planning.  Pending viral panel  If viral/atypical, continue doxycycline only.  Meanwhile, ordered CXR 9/12 which showed MARCEL opacity  Will repeat CXR in the morning  Dr. Goodman contemplating bronch but that requires transfer to [facility].    Sepsis (HCC)- (present on admission)  Assessment & Plan  This is severe sepsis with the following associated acute organ dysfunction(s): acute kidney failure, acute respiratory failure.   Likely source is pneumonia  Patient has been started on IV fluids per septic protocol  Trend lactate  Patient is started on IV ceftriaxone and azithromycin  Follow blood cultures  STOPPED azithromycin  Continue doxycycline  See above      Paraseptal emphysema (HCC)  Assessment & Plan  Due to his smoking history  Worse compared to previous CT scans  Discussed with Dr. Goodman  May need O2 at discharge    QT prolongation  Assessment & Plan  STOP methadone. PRN oxycodones for now and will need to follow up with his Pain clinic doctor  STOP azithromycin. Switch to doxycycline.  STOP Zofran  Check Mg  level  Long talk with him about methadone which he prefers.  I spoke with on call Cardiology  We can restart the methadone as he has been on this long term and continue holding Zofran and Azithromycin  Keep checking QTcs.  Improved QTc. Ordered daily EKHG  Reorder methadone and monitor QTc    INDIA (acute kidney injury) (HCC)- (present on admission)  Assessment & Plan  Likely prerenal  IV fluid hydration with normal saline  Monitor BMP and assess response  Avoid IV contrast/nephrotoxins/NSAIDs  Dose adjust meds for decreased GFR        Hypothyroidism- (present on admission)  Assessment & Plan  Continue Synthroid    Chronic pain- (present on admission)  Assessment & Plan  Continue methadone and Neurontin  STOP methadone but will restart when QTc acceptable.       VTE prophylaxis: Per Dr. Holley, heparin SQ. Ordered it as that was his intent.  Reviewed vitals, labs, imaging, staff notes.  Discussed assessment and plan with Pancho Martinez Jr.   Discussed with ID, Pulmonology again    I spent 65 minutes reviewing  the chart, notes, vitals, labs, imaging, ordering labs, evaluating Pancho Martinez Jr. for assessment, enacting the plan above. Medical decision making is therefore complex. Time was devoted to counseling and coordinating care including review of records, pertinent lab data and studies, as well as discussing diagnostic evaluation and work up, planned therapeutic interventions and future disposition of care. Where indicated, the assessment and plan reflect discussion of patient with consultants, other healthcare providers, family members, and additional research needed to obtain further information in formulating the plan of care for Pancho Martinez Jr.

## 2019-09-13 NOTE — CONSULTS
Reason for Consult:  Asked by Dr Flood to see this patient with  Long QT  Patient's PCP: Pcp Not In Computer    CC: Shortness of breath and cough    HPI: 59-year-old male patient with history of chronic pain syndrome, asthma, emphysema, hypothyroidism, history of pulmonary embolism presented with shortness of breath cough, sputum production.  His symptoms were insidious onset, progressively worse.  He was diagnosed with pneumonia has been treated with antibiotics.  I was consulted to evaluate lung QTC and need for methadone.  Denies chest pain , denies lightheadedness, syncope.  He has been on methadone for last 10 years.      Medications / Drug list prior to admission:  No current facility-administered medications on file prior to encounter.      Current Outpatient Medications on File Prior to Encounter   Medication Sig Dispense Refill   • gabapentin (NEURONTIN) 800 MG tablet Take 2,400 mg by mouth every morning.     • GABAPENTIN PO Take 1,600 mg by mouth every evening.     • temazepam (RESTORIL) 15 MG Cap Take 15 mg by mouth every evening.  0   • oxycodone immediate release (ROXICODONE) 10 MG immediate release tablet Take 1 Tab by mouth every four hours as needed for Moderate Pain. 30 Tab 0   • simvastatin (ZOCOR) 40 MG Tab Take 40 mg by mouth every evening.     • PrednisoLONE Sodium Phosphate (PREDNISOLONE SOD PHOS, OPHTH, OP) 1 Drop by Ophthalmic route 4 times a day.     • ondansetron (ZOFRAN) 4 MG TABS tablet Take 1 Tab by mouth every 6 hours as needed for Nausea/Vomiting. 20 Tab 0   • albuterol (VENTOLIN OR PROVENTIL) 108 (90 BASE) MCG/ACT AERS inhalation aerosol Inhale 2 Puffs by mouth every 6 hours as needed for Shortness of Breath. 8.5 g 3   • methadone (DOLOPHINE) 10 MG TABS Take 20-30 mg by mouth 3 times a day. 30 mg in AM  20 mg at NOON  20 mg at BEDTIME     • levothyroxine (SYNTHROID) 125 MCG Tab Take 125 mcg by mouth every morning before breakfast.     • tramadol (ULTRAM) 50 MG TABS Take  mg by  mouth every four hours as needed. Indications: Moderate to Moderately Severe Pain         Current list of administered Medications:    Current Facility-Administered Medications:   •  Respiratory Care per Protocol, , Nebulization, Continuous RT, Berny Flood M.D.  •  methadone (DOLOPHINE) tablet 30 mg, 30 mg, Oral, QAM, Stopped at 09/13/19 0600 **AND** methadone (DOLOPHINE) tablet 20 mg, 20 mg, Oral, DAILY AT NOON **AND** methadone (DOLOPHINE) tablet 20 mg, 20 mg, Oral, Q EVENING, Berny Flood M.D., 20 mg at 09/12/19 2009  •  ipratropium-albuterol (DUONEB) nebulizer solution, 3 mL, Nebulization, 4X/DAY (RT), Berny Flood M.D., 3 mL at 09/13/19 1044  •  tiotropium (SPIRIVA) 18 MCG inhalation capsule 1 Cap, 1 Cap, Inhalation, QDAILY (RT), Marco Goodman M.D., 1 Cap at 09/12/19 2158  •  ipratropium-albuterol (DUONEB) nebulizer solution, 3 mL, Nebulization, Q4H PRN (RT), Marco Goodman M.D.  •  heparin injection 5,000 Units, 5,000 Units, Subcutaneous, Q8HRS, Berny Flood M.D., 5,000 Units at 09/13/19 0622  •  sodium chloride (OCEAN) 0.65 % nasal spray 2 Spray, 2 Spray, Nasal, Q2HRS PRN, Mary Silva M.D., 2 [place] at 09/12/19 0035  •  senna-docusate (PERICOLACE or SENOKOT S) 8.6-50 MG per tablet 2 Tab, 2 Tab, Oral, BID, 2 Tab at 09/13/19 0623 **AND** polyethylene glycol/lytes (MIRALAX) PACKET 1 Packet, 1 Packet, Oral, QDAY PRN **AND** magnesium hydroxide (MILK OF MAGNESIA) suspension 30 mL, 30 mL, Oral, QDAY PRN **AND** bisacodyl (DULCOLAX) suppository 10 mg, 10 mg, Rectal, QDAY PRN, Asim Holley M.D.  •  lactated ringers infusion (BOLUS), 1,000 mL, Intravenous, Once PRN, Asim Holley M.D.  •  acetaminophen (TYLENOL) tablet 650 mg, 650 mg, Oral, Q6HRS PRN, Asim Holley M.D.  •  levothyroxine (SYNTHROID) tablet 125 mcg, 125 mcg, Oral, QAM AC, Asim Holley M.D., 125 mcg at 09/13/19 0623  •  temazepam (RESTORIL) capsule 15 mg, 15 mg, Oral, Q EVENING, Asim Holley M.D., 15 mg at 09/12/19  1720  •  NS infusion, , Intravenous, Continuous, Asim Holley M.D., Last Rate: 125 mL/hr at 09/13/19 0800  •  simvastatin (ZOCOR) tablet 40 mg, 40 mg, Oral, Nightly, Berny Flood M.D., 40 mg at 09/12/19 2009  •  gabapentin (NEURONTIN) capsule 2,400 mg, 2,400 mg, Oral, QAM, Berny Flood M.D., 2,400 mg at 09/13/19 0623  •  gabapentin (NEURONTIN) capsule 1,600 mg, 1,600 mg, Oral, Q EVENING, Berny Flood M.D., 1,600 mg at 09/12/19 1720  •  doxycycline monohydrate (ADOXA) tablet 100 mg, 100 mg, Oral, Q12HRS, Berny Flood M.D., 100 mg at 09/13/19 0623  •  oxyCODONE immediate-release (ROXICODONE) tablet 5 mg, 5 mg, Oral, Q4HRS PRN, Berny Flood M.D., 5 mg at 09/11/19 0142    Past Medical History:   Diagnosis Date   • Anxiety    • ASTHMA    • C6 cervical fracture     c5-7   • Chronic back pain    • Chronic pain    • COPD    • Depression    • Diverticulitis    • Hypothyroid    • Neck pain    • Neuropathy (CMS-HCC)    • Pelvic fracture    • Pneumonia    • Unspecified cataract        Past Surgical History:   Procedure Laterality Date   • EXPLORATORY LAPAROTOMY  8/15/2015    Procedure: EXPLORATORY LAPAROTOMY, abdominal washout;  Surgeon: Ad Raman M.D.;  Location: SURGERY [facility];  Service:    • COLOSTOMY CLOSURE N/A 8/13/2015    Procedure: COLOSTOMY CLOSURE;  Surgeon: Nataliia Maldonado M.D.;  Location: SURGERY [facility];  Service:    • EXPLORATORY LAPAROTOMY N/A 5/8/2015    Procedure: EXPLORATORY LAPAROTOMY;  Surgeon: Nataliia Maldonado M.D.;  Location: SURGERY [facility];  Service:    • COLOSTOMY  5/2015   • RETINAL DETACHMENT REPAIR Right 2/2015   • BONE SPUR EXCISION Left 1983   • ACL RECONSTRUCTION Left 1978   • OTHER ORTHOPEDIC SURGERY      spine fx       No family history on file.    Social History     Socioeconomic History   • Marital status:      Spouse name: Not on file   • Number of children: Not on file   • Years of education: Not on file   • Highest education  level: Not on file   Occupational History   • Not on file   Social Needs   • Financial resource strain: Not on file   • Food insecurity:     Worry: Not on file     Inability: Not on file   • Transportation needs:     Medical: Not on file     Non-medical: Not on file   Tobacco Use   • Smoking status: Former Smoker     Packs/day: 1.00     Years: 17.00     Pack years: 17.00     Types: Cigarettes     Last attempt to quit: 2015     Years since quittin.6   • Smokeless tobacco: Never Used   Substance and Sexual Activity   • Alcohol use: No   • Drug use: No   • Sexual activity: Not on file   Lifestyle   • Physical activity:     Days per week: Not on file     Minutes per session: Not on file   • Stress: Not on file   Relationships   • Social connections:     Talks on phone: Not on file     Gets together: Not on file     Attends Confucianist service: Not on file     Active member of club or organization: Not on file     Attends meetings of clubs or organizations: Not on file     Relationship status: Not on file   • Intimate partner violence:     Fear of current or ex partner: Not on file     Emotionally abused: Not on file     Physically abused: Not on file     Forced sexual activity: Not on file   Other Topics Concern   • Not on file   Social History Narrative    ** Merged History Encounter **            ALLERGIES:  No Known Allergies    Review of systems:  A detailed review of symptoms was reviewed with patient. This is reviewed in H&P and PMH. ALL OTHERS reviewed and negative    Physical exam:  Patient Vitals for the past 24 hrs:   BP Temp Temp src Pulse Resp SpO2 Weight   19 1045 -- -- -- 77 16 94 % --   19 0758 129/74 36.9 °C (98.4 °F) Temporal 74 16 92 % --   19 0751 -- -- -- 77 16 93 % --   19 0400 116/67 36.5 °C (97.7 °F) Temporal 65 18 90 % --   19 0000 122/70 36.5 °C (97.7 °F) Temporal 76 16 90 % --   19 2047 -- -- -- 77 16 92 % --   19 2000 126/71 36.7 °C (98.1 °F)  Temporal 76 17 90 % 77.8 kg (171 lb 8.3 oz)   09/12/19 1754 -- -- -- 80 16 92 % --   09/12/19 1600 131/79 36.6 °C (97.9 °F) Temporal 84 18 90 % --   09/12/19 1503 -- -- -- 72 16 91 % --   09/12/19 1128 132/89 37 °C (98.6 °F) Temporal 85 17 90 % --     General: No acute distress.   EYES: no jaundice  HEENT: OP clear   Neck: No bruits No JVD.   CVS: RRR. S1 + S2. No M/R/G  Resp: Clear anteriorly  Abdomen: Soft, NT, ND,  Skin: Grossly nothing acute no obvious rashes  Neurological: Alert, Moves all extremities, no cranial nerve defects on limited exam  Extremities: Pulse 2+ in b/l LE.  No edema. No cyanosis.       Data:  Laboratory studies:  Recent Results (from the past 24 hour(s))   Procalcitonin    Collection Time: 09/12/19  1:02 PM   Result Value Ref Range    Procalcitonin 0.27 (H) <0.25 ng/mL   ABG - LAB    Collection Time: 09/12/19  1:02 PM   Result Value Ref Range    Ph 7.40 7.40 - 7.50    Pco2 36.7 26.0 - 37.0 mmHg    Po2 60.7 (L) 64.0 - 87.0 mmHg    O2 Saturation 90.7 (L) 93.0 - 99.0 %    Hco3 22 17 - 25 mmol/L    Base Excess -2 -4 - 3 mmol/L    Body Temp 37.0 Centigrade    O2 Therapy 5.0 2.0 - 10.0 L/min    Ph -TC 7.40 7.40 - 7.50    Pco2 -TC 36.7 26.0 - 37.0 mmHg    Po2 -TC 60.7 (L) 64.0 - 87.0 mmHg   CBC WITHOUT DIFFERENTIAL    Collection Time: 09/13/19  3:20 AM   Result Value Ref Range    WBC 15.7 (H) 4.8 - 10.8 K/uL    RBC 3.98 (L) 4.70 - 6.10 M/uL    Hemoglobin 10.8 (L) 14.0 - 18.0 g/dL    Hematocrit 34.3 (L) 42.0 - 52.0 %    MCV 86.2 81.4 - 97.8 fL    MCH 27.1 27.0 - 33.0 pg    MCHC 31.5 (L) 33.7 - 35.3 g/dL    RDW 43.1 35.9 - 50.0 fL    Platelet Count 268 164 - 446 K/uL    MPV 8.9 (L) 9.0 - 12.9 fL   Basic Metabolic Panel    Collection Time: 09/13/19  3:20 AM   Result Value Ref Range    Sodium 137 135 - 145 mmol/L    Potassium 3.6 3.6 - 5.5 mmol/L    Chloride 105 96 - 112 mmol/L    Co2 24 20 - 33 mmol/L    Glucose 188 (H) 65 - 99 mg/dL    Bun 13 8 - 22 mg/dL    Creatinine 0.88 0.50 - 1.40 mg/dL     Calcium 8.7 8.5 - 10.5 mg/dL    Anion Gap 8.0 0.0 - 11.9   ESTIMATED GFR    Collection Time: 19  3:20 AM   Result Value Ref Range    GFR If African American >60 >60 mL/min/1.73 m 2    GFR If Non African American >60 >60 mL/min/1.73 m 2   EKG    Collection Time: 19  6:21 AM   Result Value Ref Range    Report       [facility]    Test Date:  2019  Pt Name:    RENNY RUSHING              Department: 171  MRN:        [id number]                      Room:       T732  Gender:     Male                         Technician: EARLE  :        1960                   Requested By:FLOYD CROOKS  Order #:    [id number]                    Reading MD:    Measurements  Intervals                                Axis  Rate:       70                           P:          23  WV:         142                          QRS:        -35  QRSD:       106                          T:          159  QT:         530  QTc:        573    Interpretive Statements  SINUS RHYTHM  LEFT AXIS DEVIATION  ABNORMAL T, CONSIDER ISCHEMIA, LATERAL LEADS  PROLONGED QT INTERVAL  Compared to ECG 2019 09:47:50  T-wave abnormality now present  Possible ischemia now present  Prolonged QT interval now present  Poor R-wave progression no longer present         Imaging:  DX-CHEST-2 VIEWS   Final Result         New airspace opacity in the left lower lobe, concerning for pneumonia.      OUTSIDE IMAGES-CT CHEST   Final Result      EC-ECHOCARDIOGRAM COMPLETE W/O CONT    (Results Pending)   CT-CHEST (THORAX) W/O    (Results Pending)     Echo 2015  CONCLUSIONS  Normal left ventricular size, thickness, systolic function, and   diastolic function.  Left ventricular ejection fraction is 60% to 65%.  Mildly thickened mitral valve leaflets with normal leaflet excursion.  Mild to moderate mitral regurgitation.  Mild to moderate tricuspid regurgitation.  Right ventricular systolic pressure is estimated to be 37 to 42 mmHg   consistent with mild to  moderate pulmonary hypertension.    EKG : personally reviewed by me sinus rhythm, .    All pertinent features of laboratory and imaging reviewed including primary images where applicable    Assessment / Plan:  59-year-old male patient with asthma, hypothyroidism, emphysema, chronic pain syndrome admitted with pneumonia.  Long QTC on the EKG.  I reviewed his prior EKGs, on several occasions his QTC was prolonged.  No family history of sudden cardiac death, he had some EKGs in the past with normal QTC.  I suspect this is related to methadone.  Check TSH.  Keep potassium more than 4.  I reviewed long QT, risk of sudden death with patient in detail.  He says methadone is very important for his chronic pain for his lifestyle.  He would take the risk of arrhythmias, he is not willing to stop methadone.  -Patient has been on methadone for several years without any events, had long QTC in the past as well.  I think it is reasonable to continue methadone and watch his QTC.  Patient understands the risks.  Avoid other QTC prolonging drugs.    It is my pleasure to participate in the care of Mr. Martinez.  Please do not hesitate to contact me with questions or concerns.    Jay Linder    9/13/2019

## 2019-09-13 NOTE — PROGRESS NOTES
Dr. Goodman at bedside to assess pt. Order received via verbal with read back to discontinue continuous maintenance fluids.

## 2019-09-14 ENCOUNTER — APPOINTMENT (OUTPATIENT)
Dept: RADIOLOGY | Facility: MEDICAL CENTER | Age: 59
DRG: 871 | End: 2019-09-14
Attending: INTERNAL MEDICINE
Payer: MEDICARE

## 2019-09-14 PROBLEM — E87.6 HYPOKALEMIA: Status: ACTIVE | Noted: 2019-09-14

## 2019-09-14 LAB
ACTION RANGE TRIGGERED IACRT: NO
ACTION RANGE TRIGGERED IACRT: YES
ALBUMIN SERPL BCP-MCNC: 3.7 G/DL (ref 3.2–4.9)
APPEARANCE UR: CLEAR
BACTERIA #/AREA URNS HPF: NEGATIVE /HPF
BASE EXCESS BLDA CALC-SCNC: -1 MMOL/L (ref -4–3)
BASE EXCESS BLDA CALC-SCNC: 0 MMOL/L (ref -4–3)
BILIRUB UR QL STRIP.AUTO: NEGATIVE
BODY TEMPERATURE: ABNORMAL DEGREES
BODY TEMPERATURE: ABNORMAL DEGREES
BUN SERPL-MCNC: 18 MG/DL (ref 8–22)
CALCIUM SERPL-MCNC: 8.5 MG/DL (ref 8.5–10.5)
CHLORIDE SERPL-SCNC: 105 MMOL/L (ref 96–112)
CO2 BLDA-SCNC: 25 MMOL/L (ref 20–33)
CO2 BLDA-SCNC: 29 MMOL/L (ref 20–33)
CO2 SERPL-SCNC: 24 MMOL/L (ref 20–33)
COLOR UR: YELLOW
CREAT SERPL-MCNC: 0.89 MG/DL (ref 0.5–1.4)
EKG IMPRESSION: NORMAL
EPI CELLS #/AREA URNS HPF: NEGATIVE /HPF
ERYTHROCYTE [DISTWIDTH] IN BLOOD BY AUTOMATED COUNT: 43.1 FL (ref 35.9–50)
GLUCOSE BLD-MCNC: 140 MG/DL (ref 65–99)
GLUCOSE BLD-MCNC: 145 MG/DL (ref 65–99)
GLUCOSE BLD-MCNC: 175 MG/DL (ref 65–99)
GLUCOSE BLD-MCNC: 192 MG/DL (ref 65–99)
GLUCOSE SERPL-MCNC: 203 MG/DL (ref 65–99)
GLUCOSE UR STRIP.AUTO-MCNC: NEGATIVE MG/DL
GRAM STN SPEC: NORMAL
HCO3 BLDA-SCNC: 23.7 MMOL/L (ref 17–25)
HCO3 BLDA-SCNC: 26.9 MMOL/L (ref 17–25)
HCT VFR BLD AUTO: 38.1 % (ref 42–52)
HGB BLD-MCNC: 12.3 G/DL (ref 14–18)
HOROWITZ INDEX BLDA+IHG-RTO: 143 MM[HG]
HOROWITZ INDEX BLDA+IHG-RTO: 282 MM[HG]
HYALINE CASTS #/AREA URNS LPF: ABNORMAL /LPF
INST. QUALIFIED PATIENT IIQPT: YES
INST. QUALIFIED PATIENT IIQPT: YES
KETONES UR STRIP.AUTO-MCNC: NEGATIVE MG/DL
LACTATE BLD-SCNC: 2.2 MMOL/L (ref 0.5–2)
LDH SERPL L TO P-CCNC: 304 U/L (ref 107–266)
LEUKOCYTE ESTERASE UR QL STRIP.AUTO: ABNORMAL
MAGNESIUM SERPL-MCNC: 1.9 MG/DL (ref 1.5–2.5)
MCH RBC QN AUTO: 27.4 PG (ref 27–33)
MCHC RBC AUTO-ENTMCNC: 32.3 G/DL (ref 33.7–35.3)
MCV RBC AUTO: 84.9 FL (ref 81.4–97.8)
MICRO URNS: ABNORMAL
NITRITE UR QL STRIP.AUTO: NEGATIVE
O2/TOTAL GAS SETTING VFR VENT: 100 %
O2/TOTAL GAS SETTING VFR VENT: 60 %
PCO2 BLDA: 39.6 MMHG (ref 26–37)
PCO2 BLDA: 54.4 MMHG (ref 26–37)
PCO2 TEMP ADJ BLDA: 37.9 MMHG (ref 26–37)
PCO2 TEMP ADJ BLDA: 51.9 MMHG (ref 26–37)
PH BLDA: 7.3 [PH] (ref 7.4–7.5)
PH BLDA: 7.38 [PH] (ref 7.4–7.5)
PH TEMP ADJ BLDA: 7.32 [PH] (ref 7.4–7.5)
PH TEMP ADJ BLDA: 7.4 [PH] (ref 7.4–7.5)
PH UR STRIP.AUTO: 5 [PH] (ref 5–8)
PHOSPHATE SERPL-MCNC: 4.7 MG/DL (ref 2.5–4.5)
PLATELET # BLD AUTO: 357 K/UL (ref 164–446)
PMV BLD AUTO: 8.4 FL (ref 9–12.9)
PO2 BLDA: 282 MMHG (ref 64–87)
PO2 BLDA: 86 MMHG (ref 64–87)
PO2 TEMP ADJ BLDA: 277 MMHG (ref 64–87)
PO2 TEMP ADJ BLDA: 81 MMHG (ref 64–87)
POTASSIUM SERPL-SCNC: 3.2 MMOL/L (ref 3.6–5.5)
PROT UR QL STRIP: NEGATIVE MG/DL
RBC # BLD AUTO: 4.49 M/UL (ref 4.7–6.1)
RBC # URNS HPF: ABNORMAL /HPF
RBC UR QL AUTO: NEGATIVE
SAO2 % BLDA: 100 % (ref 93–99)
SAO2 % BLDA: 96 % (ref 93–99)
SIGNIFICANT IND 70042: NORMAL
SITE SITE: NORMAL
SODIUM SERPL-SCNC: 141 MMOL/L (ref 135–145)
SOURCE SOURCE: NORMAL
SP GR UR STRIP.AUTO: 1.01
SPECIMEN DRAWN FROM PATIENT: ABNORMAL
SPECIMEN DRAWN FROM PATIENT: ABNORMAL
UROBILINOGEN UR STRIP.AUTO-MCNC: 0.2 MG/DL
WBC # BLD AUTO: 28.5 K/UL (ref 4.8–10.8)
WBC #/AREA URNS HPF: ABNORMAL /HPF

## 2019-09-14 PROCEDURE — 88112 CYTOPATH CELL ENHANCE TECH: CPT

## 2019-09-14 PROCEDURE — A9270 NON-COVERED ITEM OR SERVICE: HCPCS | Performed by: INTERNAL MEDICINE

## 2019-09-14 PROCEDURE — 83735 ASSAY OF MAGNESIUM: CPT

## 2019-09-14 PROCEDURE — 71250 CT THORAX DX C-: CPT

## 2019-09-14 PROCEDURE — 02HV33Z INSERTION OF INFUSION DEVICE INTO SUPERIOR VENA CAVA, PERCUTANEOUS APPROACH: ICD-10-PCS | Performed by: INTERNAL MEDICINE

## 2019-09-14 PROCEDURE — 31645 BRNCHSC W/THER ASPIR 1ST: CPT | Performed by: INTERNAL MEDICINE

## 2019-09-14 PROCEDURE — 87070 CULTURE OTHR SPECIMN AEROBIC: CPT

## 2019-09-14 PROCEDURE — 93010 ELECTROCARDIOGRAM REPORT: CPT | Performed by: INTERNAL MEDICINE

## 2019-09-14 PROCEDURE — 94003 VENT MGMT INPAT SUBQ DAY: CPT

## 2019-09-14 PROCEDURE — 36600 WITHDRAWAL OF ARTERIAL BLOOD: CPT

## 2019-09-14 PROCEDURE — 36556 INSERT NON-TUNNEL CV CATH: CPT | Mod: LT | Performed by: INTERNAL MEDICINE

## 2019-09-14 PROCEDURE — 0BC68ZZ EXTIRPATION OF MATTER FROM RIGHT LOWER LOBE BRONCHUS, VIA NATURAL OR ARTIFICIAL OPENING ENDOSCOPIC: ICD-10-PCS | Performed by: INTERNAL MEDICINE

## 2019-09-14 PROCEDURE — 770022 HCHG ROOM/CARE - ICU (200)

## 2019-09-14 PROCEDURE — 94640 AIRWAY INHALATION TREATMENT: CPT

## 2019-09-14 PROCEDURE — 85027 COMPLETE CBC AUTOMATED: CPT

## 2019-09-14 PROCEDURE — 302214 INTUBATION BOX: Performed by: INTERNAL MEDICINE

## 2019-09-14 PROCEDURE — B548ZZA ULTRASONOGRAPHY OF SUPERIOR VENA CAVA, GUIDANCE: ICD-10-PCS | Performed by: INTERNAL MEDICINE

## 2019-09-14 PROCEDURE — 93005 ELECTROCARDIOGRAM TRACING: CPT | Performed by: INTERNAL MEDICINE

## 2019-09-14 PROCEDURE — 71045 X-RAY EXAM CHEST 1 VIEW: CPT

## 2019-09-14 PROCEDURE — 87206 SMEAR FLUORESCENT/ACID STAI: CPT

## 2019-09-14 PROCEDURE — 99292 CRITICAL CARE ADDL 30 MIN: CPT | Mod: 25 | Performed by: INTERNAL MEDICINE

## 2019-09-14 PROCEDURE — A4314 CATH W/DRAINAGE 2-WAY LATEX: HCPCS | Performed by: INTERNAL MEDICINE

## 2019-09-14 PROCEDURE — 87102 FUNGUS ISOLATION CULTURE: CPT

## 2019-09-14 PROCEDURE — 99233 SBSQ HOSP IP/OBS HIGH 50: CPT | Performed by: INTERNAL MEDICINE

## 2019-09-14 PROCEDURE — 83615 LACTATE (LD) (LDH) ENZYME: CPT

## 2019-09-14 PROCEDURE — 81001 URINALYSIS AUTO W/SCOPE: CPT

## 2019-09-14 PROCEDURE — 88305 TISSUE EXAM BY PATHOLOGIST: CPT

## 2019-09-14 PROCEDURE — 5A1945Z RESPIRATORY VENTILATION, 24-96 CONSECUTIVE HOURS: ICD-10-PCS | Performed by: INTERNAL MEDICINE

## 2019-09-14 PROCEDURE — 31500 INSERT EMERGENCY AIRWAY: CPT

## 2019-09-14 PROCEDURE — 82962 GLUCOSE BLOOD TEST: CPT | Mod: 91

## 2019-09-14 PROCEDURE — 31628 BRONCHOSCOPY/LUNG BX EACH: CPT | Performed by: INTERNAL MEDICINE

## 2019-09-14 PROCEDURE — 700105 HCHG RX REV CODE 258: Performed by: INTERNAL MEDICINE

## 2019-09-14 PROCEDURE — 88342 IMHCHEM/IMCYTCHM 1ST ANTB: CPT

## 2019-09-14 PROCEDURE — P9047 ALBUMIN (HUMAN), 25%, 50ML: HCPCS | Mod: JG | Performed by: INTERNAL MEDICINE

## 2019-09-14 PROCEDURE — 87015 SPECIMEN INFECT AGNT CONCNTJ: CPT

## 2019-09-14 PROCEDURE — 31500 INSERT EMERGENCY AIRWAY: CPT | Performed by: INTERNAL MEDICINE

## 2019-09-14 PROCEDURE — 87205 SMEAR GRAM STAIN: CPT | Mod: 91

## 2019-09-14 PROCEDURE — 700102 HCHG RX REV CODE 250 W/ 637 OVERRIDE(OP): Performed by: INTERNAL MEDICINE

## 2019-09-14 PROCEDURE — C1751 CATH, INF, PER/CENT/MIDLINE: HCPCS

## 2019-09-14 PROCEDURE — 700111 HCHG RX REV CODE 636 W/ 250 OVERRIDE (IP): Performed by: INTERNAL MEDICINE

## 2019-09-14 PROCEDURE — 36556 INSERT NON-TUNNEL CV CATH: CPT

## 2019-09-14 PROCEDURE — 99291 CRITICAL CARE FIRST HOUR: CPT | Mod: 25 | Performed by: INTERNAL MEDICINE

## 2019-09-14 PROCEDURE — 700101 HCHG RX REV CODE 250: Performed by: INTERNAL MEDICINE

## 2019-09-14 PROCEDURE — 0BCB8ZZ EXTIRPATION OF MATTER FROM LEFT LOWER LOBE BRONCHUS, VIA NATURAL OR ARTIFICIAL OPENING ENDOSCOPIC: ICD-10-PCS | Performed by: INTERNAL MEDICINE

## 2019-09-14 PROCEDURE — 87641 MR-STAPH DNA AMP PROBE: CPT

## 2019-09-14 PROCEDURE — 700111 HCHG RX REV CODE 636 W/ 250 OVERRIDE (IP)

## 2019-09-14 PROCEDURE — 94002 VENT MGMT INPAT INIT DAY: CPT

## 2019-09-14 PROCEDURE — 302978 HCHG BRONCHOSCOPY-DIAGNOSTIC

## 2019-09-14 PROCEDURE — 82803 BLOOD GASES ANY COMBINATION: CPT | Mod: 91

## 2019-09-14 PROCEDURE — 87116 MYCOBACTERIA CULTURE: CPT

## 2019-09-14 PROCEDURE — 0BH18EZ INSERTION OF ENDOTRACHEAL AIRWAY INTO TRACHEA, VIA NATURAL OR ARTIFICIAL OPENING ENDOSCOPIC: ICD-10-PCS | Performed by: INTERNAL MEDICINE

## 2019-09-14 PROCEDURE — 80069 RENAL FUNCTION PANEL: CPT

## 2019-09-14 PROCEDURE — 83605 ASSAY OF LACTIC ACID: CPT

## 2019-09-14 RX ORDER — MAGNESIUM SULFATE HEPTAHYDRATE 40 MG/ML
2 INJECTION, SOLUTION INTRAVENOUS ONCE
Status: COMPLETED | OUTPATIENT
Start: 2019-09-14 | End: 2019-09-14

## 2019-09-14 RX ORDER — DEXMEDETOMIDINE HYDROCHLORIDE 4 UG/ML
.1-1.5 INJECTION, SOLUTION INTRAVENOUS CONTINUOUS
Status: DISCONTINUED | OUTPATIENT
Start: 2019-09-14 | End: 2019-09-14

## 2019-09-14 RX ORDER — OXYCODONE HYDROCHLORIDE 5 MG/1
5 TABLET ORAL EVERY 4 HOURS PRN
Status: DISCONTINUED | OUTPATIENT
Start: 2019-09-14 | End: 2019-09-15

## 2019-09-14 RX ORDER — POTASSIUM CHLORIDE 7.45 MG/ML
10 INJECTION INTRAVENOUS
Status: DISCONTINUED | OUTPATIENT
Start: 2019-09-14 | End: 2019-09-14

## 2019-09-14 RX ORDER — GABAPENTIN 400 MG/1
1600 CAPSULE ORAL EVERY EVENING
Status: DISCONTINUED | OUTPATIENT
Start: 2019-09-14 | End: 2019-09-18

## 2019-09-14 RX ORDER — ETOMIDATE 2 MG/ML
40 INJECTION INTRAVENOUS ONCE
Status: COMPLETED | OUTPATIENT
Start: 2019-09-14 | End: 2019-09-14

## 2019-09-14 RX ORDER — DOXYCYCLINE 100 MG/1
100 TABLET ORAL EVERY 12 HOURS
Status: COMPLETED | OUTPATIENT
Start: 2019-09-14 | End: 2019-09-15

## 2019-09-14 RX ORDER — FUROSEMIDE 10 MG/ML
20 INJECTION INTRAMUSCULAR; INTRAVENOUS EVERY 12 HOURS
Status: DISCONTINUED | OUTPATIENT
Start: 2019-09-14 | End: 2019-09-16

## 2019-09-14 RX ORDER — GABAPENTIN 400 MG/1
2400 CAPSULE ORAL EVERY MORNING
Status: DISCONTINUED | OUTPATIENT
Start: 2019-09-14 | End: 2019-09-18

## 2019-09-14 RX ORDER — METHYLPREDNISOLONE SODIUM SUCCINATE 125 MG/2ML
62.5 INJECTION, POWDER, LYOPHILIZED, FOR SOLUTION INTRAMUSCULAR; INTRAVENOUS EVERY 6 HOURS
Status: DISCONTINUED | OUTPATIENT
Start: 2019-09-14 | End: 2019-09-14

## 2019-09-14 RX ORDER — METHADONE HYDROCHLORIDE 10 MG/1
30 TABLET ORAL EVERY MORNING
Status: DISCONTINUED | OUTPATIENT
Start: 2019-09-14 | End: 2019-09-18

## 2019-09-14 RX ORDER — MORPHINE SULFATE 4 MG/ML
4 INJECTION, SOLUTION INTRAMUSCULAR; INTRAVENOUS
Status: DISCONTINUED | OUTPATIENT
Start: 2019-09-14 | End: 2019-09-14

## 2019-09-14 RX ORDER — AMOXICILLIN 250 MG
2 CAPSULE ORAL 2 TIMES DAILY
Status: DISCONTINUED | OUTPATIENT
Start: 2019-09-14 | End: 2019-09-18

## 2019-09-14 RX ORDER — ACETAMINOPHEN 325 MG/1
650 TABLET ORAL EVERY 6 HOURS PRN
Status: DISCONTINUED | OUTPATIENT
Start: 2019-09-14 | End: 2019-09-18

## 2019-09-14 RX ORDER — BISACODYL 10 MG
10 SUPPOSITORY, RECTAL RECTAL
Status: DISCONTINUED | OUTPATIENT
Start: 2019-09-14 | End: 2019-09-18

## 2019-09-14 RX ORDER — MORPHINE SULFATE 10 MG/ML
5 INJECTION, SOLUTION INTRAMUSCULAR; INTRAVENOUS
Status: DISCONTINUED | OUTPATIENT
Start: 2019-09-14 | End: 2019-09-14

## 2019-09-14 RX ORDER — ROCURONIUM BROMIDE 10 MG/ML
100 INJECTION, SOLUTION INTRAVENOUS ONCE
Status: COMPLETED | OUTPATIENT
Start: 2019-09-14 | End: 2019-09-14

## 2019-09-14 RX ORDER — POTASSIUM CHLORIDE 29.8 MG/ML
40 INJECTION INTRAVENOUS ONCE
Status: COMPLETED | OUTPATIENT
Start: 2019-09-14 | End: 2019-09-15

## 2019-09-14 RX ORDER — MORPHINE SULFATE 4 MG/ML
2 INJECTION, SOLUTION INTRAMUSCULAR; INTRAVENOUS
Status: DISCONTINUED | OUTPATIENT
Start: 2019-09-14 | End: 2019-09-14

## 2019-09-14 RX ORDER — METHADONE HYDROCHLORIDE 10 MG/1
20 TABLET ORAL EVERY EVENING
Status: DISCONTINUED | OUTPATIENT
Start: 2019-09-14 | End: 2019-09-18

## 2019-09-14 RX ORDER — POLYETHYLENE GLYCOL 3350 17 G/17G
1 POWDER, FOR SOLUTION ORAL
Status: DISCONTINUED | OUTPATIENT
Start: 2019-09-14 | End: 2019-09-18

## 2019-09-14 RX ORDER — PROPOFOL 10 MG/ML
200 INJECTION, EMULSION INTRAVENOUS ONCE
Status: ACTIVE | OUTPATIENT
Start: 2019-09-14 | End: 2019-09-15

## 2019-09-14 RX ORDER — IPRATROPIUM BROMIDE AND ALBUTEROL SULFATE 2.5; .5 MG/3ML; MG/3ML
3 SOLUTION RESPIRATORY (INHALATION)
Status: DISCONTINUED | OUTPATIENT
Start: 2019-09-14 | End: 2019-09-30 | Stop reason: HOSPADM

## 2019-09-14 RX ORDER — POTASSIUM CHLORIDE 29.8 MG/ML
40 INJECTION INTRAVENOUS ONCE
Status: COMPLETED | OUTPATIENT
Start: 2019-09-14 | End: 2019-09-14

## 2019-09-14 RX ORDER — SIMVASTATIN 40 MG
40 TABLET ORAL NIGHTLY
Status: DISCONTINUED | OUTPATIENT
Start: 2019-09-14 | End: 2019-09-18

## 2019-09-14 RX ORDER — METHADONE HYDROCHLORIDE 10 MG/1
20 TABLET ORAL
Status: DISCONTINUED | OUTPATIENT
Start: 2019-09-14 | End: 2019-09-18

## 2019-09-14 RX ORDER — TEMAZEPAM 15 MG/1
15 CAPSULE ORAL EVERY EVENING
Status: DISCONTINUED | OUTPATIENT
Start: 2019-09-14 | End: 2019-09-16

## 2019-09-14 RX ORDER — FAMOTIDINE 20 MG/1
20 TABLET, FILM COATED ORAL EVERY 12 HOURS
Status: DISCONTINUED | OUTPATIENT
Start: 2019-09-14 | End: 2019-09-16

## 2019-09-14 RX ORDER — NOREPINEPHRINE BITARTRATE 1 MG/ML
INJECTION, SOLUTION INTRAVENOUS
Status: ACTIVE
Start: 2019-09-14 | End: 2019-09-14

## 2019-09-14 RX ORDER — MORPHINE SULFATE 4 MG/ML
1 INJECTION, SOLUTION INTRAMUSCULAR; INTRAVENOUS
Status: DISCONTINUED | OUTPATIENT
Start: 2019-09-14 | End: 2019-09-14

## 2019-09-14 RX ORDER — DEXMEDETOMIDINE HYDROCHLORIDE 4 UG/ML
0-1.5 INJECTION, SOLUTION INTRAVENOUS CONTINUOUS
Status: DISCONTINUED | OUTPATIENT
Start: 2019-09-14 | End: 2019-09-14

## 2019-09-14 RX ORDER — MORPHINE SULFATE 4 MG/ML
3 INJECTION, SOLUTION INTRAMUSCULAR; INTRAVENOUS
Status: DISCONTINUED | OUTPATIENT
Start: 2019-09-14 | End: 2019-09-14

## 2019-09-14 RX ORDER — PHENYLEPHRINE HCL IN 0.9% NACL 0.5 MG/5ML
SYRINGE (ML) INTRAVENOUS
Status: DISCONTINUED
Start: 2019-09-14 | End: 2019-09-14

## 2019-09-14 RX ORDER — LEVOTHYROXINE SODIUM 0.12 MG/1
125 TABLET ORAL
Status: DISCONTINUED | OUTPATIENT
Start: 2019-09-14 | End: 2019-09-18

## 2019-09-14 RX ORDER — OXYCODONE HCL 10 MG/1
10 TABLET, FILM COATED, EXTENDED RELEASE ORAL ONCE
Status: DISCONTINUED | OUTPATIENT
Start: 2019-09-14 | End: 2019-09-14

## 2019-09-14 RX ORDER — METHYLPREDNISOLONE SODIUM SUCCINATE 125 MG/2ML
125 INJECTION, POWDER, LYOPHILIZED, FOR SOLUTION INTRAMUSCULAR; INTRAVENOUS EVERY 6 HOURS
Status: DISCONTINUED | OUTPATIENT
Start: 2019-09-14 | End: 2019-09-16

## 2019-09-14 RX ORDER — ALBUMIN (HUMAN) 12.5 G/50ML
25 SOLUTION INTRAVENOUS EVERY 6 HOURS
Status: COMPLETED | OUTPATIENT
Start: 2019-09-14 | End: 2019-09-14

## 2019-09-14 RX ADMIN — METHYLPREDNISOLONE SODIUM SUCCINATE 62.5 MG: 125 INJECTION, POWDER, FOR SOLUTION INTRAMUSCULAR; INTRAVENOUS at 12:41

## 2019-09-14 RX ADMIN — LEVOTHYROXINE SODIUM 125 MCG: 125 TABLET ORAL at 08:13

## 2019-09-14 RX ADMIN — METHYLPREDNISOLONE SODIUM SUCCINATE 125 MG: 125 INJECTION, POWDER, FOR SOLUTION INTRAMUSCULAR; INTRAVENOUS at 05:21

## 2019-09-14 RX ADMIN — VANCOMYCIN HYDROCHLORIDE 1200 MG: 500 INJECTION, POWDER, LYOPHILIZED, FOR SOLUTION INTRAVENOUS at 22:35

## 2019-09-14 RX ADMIN — ETOMIDATE 40 MG: 2 INJECTION, SOLUTION INTRAVENOUS at 00:52

## 2019-09-14 RX ADMIN — NOREPINEPHRINE BITARTRATE 5 MCG/MIN: 1 INJECTION INTRAVENOUS at 04:20

## 2019-09-14 RX ADMIN — DEXMEDETOMIDINE HYDROCHLORIDE 1 MCG/KG/HR: 100 INJECTION, SOLUTION INTRAVENOUS at 00:56

## 2019-09-14 RX ADMIN — FENTANYL CITRATE 100 MCG/HR: 50 INJECTION, SOLUTION INTRAMUSCULAR; INTRAVENOUS at 01:09

## 2019-09-14 RX ADMIN — IPRATROPIUM BROMIDE AND ALBUTEROL SULFATE 3 ML: .5; 3 SOLUTION RESPIRATORY (INHALATION) at 18:19

## 2019-09-14 RX ADMIN — FENTANYL CITRATE 100 MCG: 50 INJECTION, SOLUTION INTRAMUSCULAR; INTRAVENOUS at 01:27

## 2019-09-14 RX ADMIN — METHADONE HYDROCHLORIDE 20 MG: 10 TABLET ORAL at 12:41

## 2019-09-14 RX ADMIN — METHADONE HYDROCHLORIDE 20 MG: 10 TABLET ORAL at 17:46

## 2019-09-14 RX ADMIN — TEMAZEPAM 15 MG: 15 CAPSULE ORAL at 17:46

## 2019-09-14 RX ADMIN — DEXMEDETOMIDINE HYDROCHLORIDE 1 MCG/KG/HR: 100 INJECTION, SOLUTION INTRAVENOUS at 01:10

## 2019-09-14 RX ADMIN — FAMOTIDINE 20 MG: 20 TABLET ORAL at 17:46

## 2019-09-14 RX ADMIN — IPRATROPIUM BROMIDE AND ALBUTEROL SULFATE 3 ML: .5; 3 SOLUTION RESPIRATORY (INHALATION) at 06:34

## 2019-09-14 RX ADMIN — DOXYCYCLINE 100 MG: 100 TABLET, FILM COATED ORAL at 08:13

## 2019-09-14 RX ADMIN — GABAPENTIN 1600 MG: 400 CAPSULE ORAL at 17:47

## 2019-09-14 RX ADMIN — SENNOSIDES, DOCUSATE SODIUM 2 TABLET: 50; 8.6 TABLET, FILM COATED ORAL at 17:46

## 2019-09-14 RX ADMIN — METHADONE HYDROCHLORIDE 30 MG: 10 TABLET ORAL at 08:11

## 2019-09-14 RX ADMIN — IPRATROPIUM BROMIDE AND ALBUTEROL SULFATE 3 ML: .5; 3 SOLUTION RESPIRATORY (INHALATION) at 10:04

## 2019-09-14 RX ADMIN — IPRATROPIUM BROMIDE AND ALBUTEROL SULFATE 3 ML: .5; 3 SOLUTION RESPIRATORY (INHALATION) at 14:08

## 2019-09-14 RX ADMIN — PROPOFOL 15 MCG/KG/MIN: 10 INJECTION, EMULSION INTRAVENOUS at 06:02

## 2019-09-14 RX ADMIN — INSULIN HUMAN 1 UNITS: 100 INJECTION, SOLUTION PARENTERAL at 03:14

## 2019-09-14 RX ADMIN — ROCURONIUM BROMIDE 100 MG: 10 INJECTION INTRAVENOUS at 00:53

## 2019-09-14 RX ADMIN — VANCOMYCIN HYDROCHLORIDE 1200 MG: 500 INJECTION, POWDER, LYOPHILIZED, FOR SOLUTION INTRAVENOUS at 10:54

## 2019-09-14 RX ADMIN — GABAPENTIN 2400 MG: 400 CAPSULE ORAL at 08:12

## 2019-09-14 RX ADMIN — IPRATROPIUM BROMIDE AND ALBUTEROL SULFATE 3 ML: .5; 3 SOLUTION RESPIRATORY (INHALATION) at 02:16

## 2019-09-14 RX ADMIN — METHYLPREDNISOLONE SODIUM SUCCINATE 125 MG: 125 INJECTION, POWDER, FOR SOLUTION INTRAMUSCULAR; INTRAVENOUS at 23:50

## 2019-09-14 RX ADMIN — CEFTRIAXONE SODIUM 2 G: 2 INJECTION, POWDER, FOR SOLUTION INTRAMUSCULAR; INTRAVENOUS at 05:09

## 2019-09-14 RX ADMIN — SENNOSIDES, DOCUSATE SODIUM 2 TABLET: 50; 8.6 TABLET, FILM COATED ORAL at 08:11

## 2019-09-14 RX ADMIN — INSULIN HUMAN 1 UNITS: 100 INJECTION, SOLUTION PARENTERAL at 23:51

## 2019-09-14 RX ADMIN — PROPOFOL 30 MCG/KG/MIN: 10 INJECTION, EMULSION INTRAVENOUS at 12:45

## 2019-09-14 RX ADMIN — DOXYCYCLINE 100 MG: 100 TABLET, FILM COATED ORAL at 17:46

## 2019-09-14 RX ADMIN — FENTANYL CITRATE 100 MCG: 50 INJECTION, SOLUTION INTRAMUSCULAR; INTRAVENOUS at 00:59

## 2019-09-14 RX ADMIN — METHYLPREDNISOLONE SODIUM SUCCINATE 125 MG: 125 INJECTION, POWDER, FOR SOLUTION INTRAMUSCULAR; INTRAVENOUS at 17:48

## 2019-09-14 RX ADMIN — SIMVASTATIN 40 MG: 40 TABLET, FILM COATED ORAL at 20:32

## 2019-09-14 RX ADMIN — ENOXAPARIN SODIUM 40 MG: 100 INJECTION SUBCUTANEOUS at 05:20

## 2019-09-14 RX ADMIN — FENTANYL CITRATE 100 MCG: 50 INJECTION, SOLUTION INTRAMUSCULAR; INTRAVENOUS at 03:05

## 2019-09-14 RX ADMIN — IPRATROPIUM BROMIDE AND ALBUTEROL SULFATE 3 ML: .5; 3 SOLUTION RESPIRATORY (INHALATION) at 22:12

## 2019-09-14 RX ADMIN — POTASSIUM CHLORIDE 40 MEQ: 29.8 INJECTION, SOLUTION INTRAVENOUS at 20:32

## 2019-09-14 RX ADMIN — FENTANYL CITRATE 300 MCG/HR: 50 INJECTION, SOLUTION INTRAMUSCULAR; INTRAVENOUS at 01:15

## 2019-09-14 RX ADMIN — PROPOFOL 30 MCG/KG/MIN: 10 INJECTION, EMULSION INTRAVENOUS at 19:06

## 2019-09-14 RX ADMIN — HYDROMORPHONE HYDROCHLORIDE 0.2 MG/HR: 10 INJECTION, SOLUTION INTRAMUSCULAR; INTRAVENOUS; SUBCUTANEOUS at 11:09

## 2019-09-14 RX ADMIN — FUROSEMIDE 20 MG: 10 INJECTION, SOLUTION INTRAMUSCULAR; INTRAVENOUS at 09:56

## 2019-09-14 RX ADMIN — POTASSIUM CHLORIDE 40 MEQ: 29.8 INJECTION, SOLUTION INTRAVENOUS at 08:29

## 2019-09-14 RX ADMIN — MAGNESIUM SULFATE IN WATER 2 G: 40 INJECTION, SOLUTION INTRAVENOUS at 08:29

## 2019-09-14 RX ADMIN — FAMOTIDINE 20 MG: 10 INJECTION INTRAVENOUS at 05:21

## 2019-09-14 RX ADMIN — ALBUMIN (HUMAN) 25 G: 12.5 SOLUTION INTRAVENOUS at 04:30

## 2019-09-14 RX ADMIN — FENTANYL CITRATE 100 MCG: 50 INJECTION, SOLUTION INTRAMUSCULAR; INTRAVENOUS at 01:15

## 2019-09-14 RX ADMIN — INSULIN HUMAN 1 UNITS: 100 INJECTION, SOLUTION PARENTERAL at 05:29

## 2019-09-14 ASSESSMENT — ENCOUNTER SYMPTOMS
COUGH: 1
SPUTUM PRODUCTION: 1
SHORTNESS OF BREATH: 1

## 2019-09-14 NOTE — PROGRESS NOTES
Infectious Disease Progress Note    Author: Pablo Dang M.D. Date & Time of service: 2019  2:45 PM    Chief Complaint:  Shortness of breath    Interval History:   afebrile, white count 11.1.  On high-dose IV steroids. Reports feeling a little better. Still having SOA on exertion.   afebrile, white count up to 28.5.  Intubated yesterday due to progressive respiratory distress.  RVP positive for rhino/enterovirus.    Labs Reviewed and Medications Reviewed.    Review of Systems:  Review of Systems   Unable to perform ROS: Intubated       Hemodynamics:  Temp (24hrs), Av.6 °C (97.8 °F), Min:36.6 °C (97.8 °F), Max:36.6 °C (97.8 °F)  Temperature: 36.6 °C (97.8 °F), Monitored Temp: 36.5 °C (97.7 °F)  Pulse  Av.7  Min: 59  Max: 110Heart Rate (Monitored): 100  Blood Pressure: 103/69       Physical Exam:  Physical Exam   Constitutional: He is oriented to person, place, and time. He appears well-nourished. No distress.   Thin  Intubated and sedated   HENT:   ET tube in place   Eyes: Pupils are equal, round, and reactive to light. Conjunctivae are normal. No scleral icterus.   Neck: Neck supple.   Cardiovascular: Normal rate, regular rhythm and normal heart sounds.   No murmur heard.  Pulmonary/Chest: Effort normal and breath sounds normal. No respiratory distress.   Coarse breath sounds bilaterally   Abdominal: Soft. Bowel sounds are normal. He exhibits no distension. There is no tenderness.   No grimace on palpation   Musculoskeletal: Normal range of motion. He exhibits no edema.   Lymphadenopathy:     He has no cervical adenopathy.   Neurological: He is alert and oriented to person, place, and time.   Intubated and sedated   Skin: Skin is warm and dry. No rash noted. He is not diaphoretic. No erythema.   Psychiatric:   Unable to assess   Nursing note and vitals reviewed.      Meds:    Current Facility-Administered Medications:   •  Respiratory Care per Protocol  •  ipratropium-albuterol  •   famotidine **OR** famotidine  •  senna-docusate **AND** polyethylene glycol/lytes **AND** magnesium hydroxide **AND** bisacodyl  •  MD Alert...Adult ICU Electrolyte Replacement per Pharmacy  •  lidocaine  •  insulin regular **AND** Accu-Chek Q6 if NPO **AND** NOTIFY MD and PharmD **AND** glucose **AND** dextrose 10% bolus  •  Pharmacy  •  temazepam  •  simvastatin  •  oxyCODONE immediate-release  •  levothyroxine  •  methadone **AND** methadone **AND** methadone  •  gabapentin  •  gabapentin  •  doxycycline monohydrate  •  acetaminophen  •  NORepinephrine (LEVOPHED) infusion  •  norepinephrine  •  propofol  •  HYDROmorphone  •  propofol **AND** Triglycerides Starting now and then Every 3 Days  •  methylPREDNISolone  •  furosemide  •  potassium chloride (KCL-CENTRAL) IV *Administer in ICU only*  •  ipratropium-albuterol  •  MD Alert...Vancomycin per Pharmacy  •  cefTRIAXone (ROCEPHIN) IV  •  vancomycin  •  enoxaparin (LOVENOX) injection  •  sodium chloride  •  LR    Labs:  Recent Labs     09/12/19 0317 09/13/19 0320 09/14/19  0300   WBC 11.1* 15.7* 28.5*   RBC 3.93* 3.98* 4.49*   HEMOGLOBIN 10.7* 10.8* 12.3*   HEMATOCRIT 33.4* 34.3* 38.1*   MCV 85.0 86.2 84.9   MCH 27.2 27.1 27.4   RDW 42.5 43.1 43.1   PLATELETCT 243 268 357   MPV 8.6* 8.9* 8.4*     Recent Labs     09/12/19 0317 09/13/19 0320 09/14/19  0300   SODIUM 136 137 141   POTASSIUM 3.4* 3.6 3.2*   CHLORIDE 103 105 105   CO2 23 24 24   GLUCOSE 96 188* 203*   BUN 10 13 18     Recent Labs     09/12/19 0317 09/13/19 0320 09/14/19  0300   ALBUMIN  --   --  3.7   CREATININE 0.97 0.88 0.89       Imaging:  Dx-chest-2 Views    Result Date: 9/12/2019 9/12/2019 12:18 PM HISTORY/REASON FOR EXAM:  Short of breath. TECHNIQUE/EXAM DESCRIPTION AND NUMBER OF VIEWS: Two views of the chest. COMPARISON:  1/3/2015. FINDINGS: New airspace opacity in the left lower lobe. No pleural effusions, no pneumothorax are appreciated. Stable cardiopericardial silhouette.     New  airspace opacity in the left lower lobe, concerning for pneumonia.      Micro:  Results     Procedure Component Value Units Date/Time    MRSA By PCR (Amp) [[id number]] Collected:  09/14/19 1035    Order Status:  Completed Specimen:  Respirate from Nares Updated:  09/14/19 1115    Narrative:       Collected By:[id number] SURYA CLARK    URINALYSIS [[id number]]  (Abnormal) Collected:  09/14/19 1035    Order Status:  Completed Specimen:  Urine, Bell Cath Updated:  09/14/19 1054     Color Yellow     Character Clear     Specific Gravity 1.008     Ph 5.0     Glucose Negative mg/dL      Ketones Negative mg/dL      Protein Negative mg/dL      Bilirubin Negative     Urobilinogen, Urine 0.2     Nitrite Negative     Leukocyte Esterase Moderate     Occult Blood Negative     Micro Urine Req Microscopic    Narrative:       Collected By:[id number] SURYA CLARK    GRAM STAIN [[id number]] Collected:  09/14/19 0110    Order Status:  Completed Specimen:  Respirate Updated:  09/14/19 0550     Significant Indicator .     Source RESP     Site BRONCHOALVEOLAR LAVAGE LLL     Gram Stain Result Many WBCs.  Rare Gram positive cocci.  <5% intracellular organisms.      Narrative:       Collected By:[id number] TONY ROSE  BAL from LLL lobe  Collected By:[id number] TONY AN.    Quant Bronchial Washing [[id number]] Collected:  09/14/19 0110    Order Status:  Completed Specimen:  Respirate from Bronchoalveolar Lavage Updated:  09/14/19 0301    Narrative:       Collected By:[id number] TONY AN.  BAL from LLL lobe    Fungal Culture - BAL [[id number]] Collected:  09/14/19 0110    Order Status:  Sent Specimen:  Respirate from Bronchoalveolar Lavage     Fungal Smear - BAL [[id number]] Collected:  09/14/19 0110    Order Status:  Sent Specimen:  Respirate from Bronchoalveolar Lavage     Culture Respiratory W/ Grm Stn - BAL [[id number]] Collected:  09/14/19 0110    Order Status:  Completed Specimen:  Respirate from Bronchoalveolar Lavage     AFB  Culture - BAL [[id number]] Collected:  09/14/19 0110    Order Status:  Sent Specimen:  Respirate from Bronchoalveolar Lavage     GRAM STAIN [[id number]] Collected:  09/13/19 1600    Order Status:  Completed Specimen:  Respirate Updated:  09/13/19 1912     Significant Indicator .     Source RESP     Site Sputum     Gram Stain Result Sputum Gram stain quality score is <1, probable  oropharyngeal contamination. Culture not performed.  Recollect if clinically indicated.      CULTURE RESPIRATORY W/ GRM STN [[id number]] Collected:  09/13/19 1600    Order Status:  Completed Updated:  09/13/19 1608    Flu and RSV by PCR [[id number]] Collected:  09/11/19 2045    Order Status:  Completed Specimen:  Respirate from Nasopharyngeal Updated:  09/11/19 2217     Influenza virus A RNA Negative     Influenza virus B, PCR Negative     RSV, PCR Negative    Narrative:       Collected By:[id number] VONNovant Health Matthews Medical CenterADELINAVA [facility] SystemJAROD CUETOAH    GRAM STAIN [[id number]] Collected:  09/11/19 0500    Order Status:  Completed Specimen:  Respirate Updated:  09/11/19 1114     Significant Indicator .     Source RESP     Site SPUTUM     Gram Stain Result Sputum Gram stain quality score is <1, probable  oropharyngeal contamination. Culture not performed.  Recollect if clinically indicated.      Narrative:       Collected By:[id number] HERB MARSHALL  Collected By:[id number] HERB MARSHALL    CULTURE RESPIRATORY W/ GRM STN [[id number]] Collected:  09/11/19 0500    Order Status:  Canceled Specimen:  Sputum     Influenza A/B By PCR (Adult - Flu Only) [[id number]] Collected:  09/10/19 2342    Order Status:  Completed Specimen:  Respirate from Nasopharyngeal Updated:  09/11/19 0030     Influenza virus A RNA Negative     Influenza virus B, PCR Negative    Narrative:       Collected By:[id number] HERB MARSHALL    GRAM STAIN [[id number]] Collected:  09/10/19 1111    Order Status:  Completed Specimen:  Respirate Updated:  09/10/19 1154     Significant Indicator .     Source RESP     Site SPUTUM      Gram Stain Result Sputum Gram stain quality score is <1, probable  oropharyngeal contamination. Culture not performed.  Recollect if clinically indicated.      Narrative:       CALL  Ivy  171 tel. [phone],  CALLED  171 tel. [phone] 09/10/2019, 11:53, RB PERF. RESULTS CALLED TO:BEBA Thomson  Collected By:[id number] ORVILLE GUNN  Preferably before first antibiotic dose - add Gram stain if  indicated  Collected By:[id number] ORVILLE CLARK.    Culture Respiratory W/ GRM STN [[id number]] Collected:  09/10/19 1111    Order Status:  Completed Specimen:  Respirate from Sputum Updated:  09/10/19 1126    Narrative:       Collected By:[id number] DOWNS ROWENA CLARK.  Preferably before first antibiotic dose - add Gram stain if  indicated          Assessment:  Pancho Martinez Jr. is a 59 y.o. male with a history of history of chronic pain syndrome on methadone and high doses of gabapentin, significant smoking history, emphysema not on chronic oxygen at home, presented with 3 weeks of progressive shortness of breath, especially worse over the past 3 days.  CT with worsening emphysematous changes and diffuse groundglass opacities.     Patient seems to have underlying worsening emphysematous changes. He may or may not have a superimposed bacterial pneumonia, but if there is a superimposed infection, the groundglass opacities on CT are more suggestive of either a viral or an atypical bacterial infection. His son had a viral URI around the same time the patient's symptoms began.    Patient was also started on high-dose IV steroids, had progressive respiratory distress, intubated on 9/13.  RVP positive for rhino/enterovirus    Pertinent Diagnoses  Sepsis  Acute hypoxemic respiratory failure, worse  Viral pneumonia  Emphysema  Possible underlying ILD with flare  Leukocytosis, worse    Plan:  -Worsening leukocytosis but also on high-dose IV steroids  -Repeat CT from today again with bilateral emphysematous changes, diffuse ground glass  opacities, about the same compared to previous CT  -RVP with rhino/enterovirus.  Could be the  of his current presentation in setting of underlying emphysema, ILD with possible induced flare, poor reserve. Procalcitonin improved to 0.22 which further supports this  -Continue empiric IV vancomycin, ceftriaxone, doxycycline for now given his acute worsening.  Follow BAL cultures from today.  Will de-escalate as appropriate     Discussed with internal medicine, Dr. Gonda    ID will follow.  Please call with questions.

## 2019-09-14 NOTE — PROGRESS NOTES
Transport arrived to take patient to CT. Patient up to wheelchair and oxygen saturation dropped to 70%. Patient tachypneic and SOB. Patient put back in bed, placed on oxymask, and titrated up to 8L, oxygen saturation up to 80%. Patient then placed on non-rebreather at 15L and rapid response called at 1230. Patient oxygen saturation came up to 93%. Dr. Flood came to bedside, as well as Ara RT, and rapid team. PRN duoneb given by RT. Solumedrol 125 mg IV push given per MAR. Lab collected stat ABG and rapid response ended at 1243.     Patient now resting in bed comfortably. Denies SOB and respiratory rate within normal ranges. RT following patient closely. Will continue to monitor.

## 2019-09-14 NOTE — CARE PLAN
Problem: Knowledge Deficit  Goal: Knowledge of disease process/condition, treatment plan, diagnostic tests, and medications will improve  Outcome: PROGRESSING AS EXPECTED   Explain information regarding disease process, medications and diagnostic tests. Answer questions/concerns and assess patient's knowledge of disease condition.       Problem: Pain Management  Goal: Pain level will decrease to patient's comfort goal  Outcome: PROGRESSING AS EXPECTED   Pain medication administered as needed/ordered. Patient will call for any worsening pain/symptoms.

## 2019-09-14 NOTE — CARE PLAN
Problem: Communication  Goal: The ability to communicate needs accurately and effectively will improve  Intervention: Educate patient and significant other/support system about the plan of care, procedures, treatments, medications and allow for questions  Note:   Pt educated on plan of care and treatments for the day.      Problem: Respiratory:  Goal: Respiratory status will improve  Intervention: Assess and monitor pulmonary status  Note:   Pt is on high flow oxygen, continuous pulse monitoring and close respiratory care.

## 2019-09-14 NOTE — PROGRESS NOTES
Paged on call hospitalist regarding pts lactic acid result and desating to 84% while using urinal. Dr. Watson returned call, stated to page Dr to bedside. Dr. Wong, on the floor already, updated dr on pt, Dr. Wong coming to bedside to evaluate.  2145: Dr at bedside, new orders placed for pt to be sent to the ICU.

## 2019-09-14 NOTE — PROGRESS NOTES
Critical Care Progress Note    Date of admission  9/10/2019    Chief Complaint  59 y.o. male admitted 9/10/2019 with Respiratory failure    Hospital Course    Majority of history is obtained from chart as the patient is in severe respiratory distress limiting adequate history.  59 y.o. male with past medical history of chronic pain on methadone and gabapentin, COPD, depression, hypothyroidism, asthma, who presented 9/10/2019 as a transfer from [facility] where he presented on 9/10/2019 for shortness of breath for the last 4 days and a productive cough.  The patient was transferred to [facility] for higher level of care.  A CT scan showed multifocal pneumonia with groundglass opacities overlying his chronic paraseptal emphysema and lower lobe honeycombing.  He was started on Rocephin and doxycycline.  Infectious disease was consulted and a respiratory viral panel was sent which was positive for rhinovirus/enterovirus.  Procalcitonin was initially 0.52 on 9/10/2019 and quickly improved to 0.27 on 9/12/2019 and 0.22 on 9/13/2019.  This afternoon the patient's respiratory status worsened and is oxygen requirements increased to 15 L via Oxymask.  This evening patient required placement on high flow nasal cannula for persistent hypoxia in the 80s and he was transferred to the ICU for further care.  Upon arrival in the ICU he was clearly in respiratory distress and placed on BiPAP.  He subsequently worsened even on BiPAP with respiratory rate at 37 and persistent hypoxia. Intubation was discussed with the patient and he elected to proceed for his severe respiratory failure.     Per the patient's chart he has had several recurrent episodes of pneumonia and he states he was intubated approximately 6 years ago for the same.  He reportedly uses no oxygen at home and has no known pulmonary diagnosis.        Interval Problem Update  Reviewed last 24 hour events:   - failed HFNC --> BiPAP -->  intubated at 0100 and started on full vent support   - AF, increasing WBC, PCT 0.22   - levophed @ 5   - sinus tach   - propofol gtt   - NPO   - low K   - good UOP   - glucose elevated   - lactic acid improving   - echo with EF 60%, RVSP 55   - responds and follows   - BAL performed   - day 4 doxy, day 2 vanco, day 1 rocephin    Review of Systems  Review of Systems   Unable to perform ROS: Intubated        Vital Signs for last 24 hours   Temp:  [36.6 °C (97.8 °F)-36.9 °C (98.4 °F)] 36.6 °C (97.8 °F)  Pulse:  [] 67  Resp:  [16-35] 35  BP: ()/() 90/62  SpO2:  [89 %-100 %] 99 %    Hemodynamic parameters for last 24 hours       Respiratory Information for the last 24 hours  Morales Vent Mode: APVCMV  Rate (breaths/min): 22  Vt Target (mL): 480  PEEP/CPAP: 12  FiO2: 60  P MEAN: 16  Control VTE (exp VT): 689    Physical Exam   Physical Exam   Constitutional: He appears well-developed and well-nourished. He is sleeping and cooperative.  Non-toxic appearance. He appears ill. He is sedated and intubated.   HENT:   Head: Normocephalic and atraumatic.   Nose: Nose normal.   Mouth/Throat: Oropharynx is clear and moist.   Endotracheal tube and gastric tube in position   Eyes: Pupils are equal, round, and reactive to light. Conjunctivae are normal. No scleral icterus.   Neck: Neck supple. No JVD present. No tracheal deviation present.   Cardiovascular: Regular rhythm.  No extrasystoles are present. Tachycardia present. PMI is not displaced. Exam reveals decreased pulses. Exam reveals no distant heart sounds.   Pulses:       Radial pulses are 1+ on the right side, and 1+ on the left side.   Requiring norepinephrine infusion   Pulmonary/Chest: No accessory muscle usage. He is intubated. He has no decreased breath sounds. He has no wheezes. He has rhonchi in the right middle field, the right lower field, the left middle field and the left lower field. He has no rales.   Plateau pressures less than 30   Abdominal:  Soft. Bowel sounds are normal. He exhibits no distension. There is no tenderness. There is no rebound.   Genitourinary:   Genitourinary Comments: Bell catheter in place   Musculoskeletal: He exhibits no edema or deformity.   Neurological: He exhibits normal muscle tone.   Drowsy from sedatives but awakens and follows commands intermittently, no focal deficits   Skin: Skin is warm and dry. He is not diaphoretic. No pallor.   Psychiatric:   Unable to assess given current clinical condition   Nursing note and vitals reviewed.      Medications  Current Facility-Administered Medications   Medication Dose Route Frequency Provider Last Rate Last Dose   • Respiratory Care per Protocol   Nebulization Continuous RT FLORA Mcdermott Jr..O.       • ipratropium-albuterol (DUONEB) nebulizer solution  3 mL Nebulization Q2HRS PRN (RT) FLORA Mcdermott Jr..O.       • famotidine (PEPCID) tablet 20 mg  20 mg Enteral Tube Q12HRS FLORA Mcdermott Jr..O.        Or   • famotidine (PEPCID) injection 20 mg  20 mg Intravenous Q12HRS FLORA Mcdermott Jr..O.   20 mg at 09/14/19 0521   • senna-docusate (PERICOLACE or SENOKOT S) 8.6-50 MG per tablet 2 Tab  2 Tab Enteral Tube BID FLORA Mcdermott Jr..O.        And   • polyethylene glycol/lytes (MIRALAX) PACKET 1 Packet  1 Packet Enteral Tube QDAY PRN FLORA Mcdermott Jr..O.        And   • magnesium hydroxide (MILK OF MAGNESIA) suspension 30 mL  30 mL Enteral Tube QDAY PRN FLORA Mcdermott Jr..O.        And   • bisacodyl (DULCOLAX) suppository 10 mg  10 mg Rectal QDAY PRN FLORA Mcdermott Jr..O.       • MD Alert...ICU Electrolyte Replacement per Pharmacy   Other PHARMACY TO DOSE FLORA Mcdermott Jr..O.       • lidocaine (XYLOCAINE) 1 % injection 1-2 mL  1-2 mL Tracheal Tube Q30 MIN PRN FLORA Mcdermott Jr..O.       • insulin regular (HUMULIN R) injection 1-6 Units  1-6 Units Subcutaneous Q6HRS FLORA Mcdermott Jr..O.   1 Units at 09/14/19 0529    And   • glucose 4 g chewable  tablet 16 g  16 g Oral Q15 MIN PRN FLORA Mcdermott Jr..O.        And   • DEXTROSE 10% BOLUS 250 mL  250 mL Intravenous Q15 MIN PRN FLORA Mcdermott Jr..O.       • Pharmacy Consult: Enteral tube insertion - review meds/change route/product selection  1 Each Other PHARMACY TO DOSE FLORA Mcdermott Jr..O.       • temazepam (RESTORIL) capsule 15 mg  15 mg Enteral Tube Q EVENING FLORA Mcdermott Jr..O.       • simvastatin (ZOCOR) tablet 40 mg  40 mg Enteral Tube Nightly FLORA Mcdermott Jr..O.       • oxyCODONE immediate-release (ROXICODONE) tablet 5 mg  5 mg Enteral Tube Q4HRS PRN FLORA Mcdermott Jr..O.       • levothyroxine (SYNTHROID) tablet 125 mcg  125 mcg Enteral Tube QAM AC FLORA Mcdermott Jr..O.       • methadone (DOLOPHINE) tablet 20 mg  20 mg Enteral Tube DAILY AT NOON FLORA Mcdermott Jr..OKhadra        And   • methadone (DOLOPHINE) tablet 30 mg  30 mg Enteral Tube QAM FLORA Mcdermott Jr..O.        And   • methadone (DOLOPHINE) tablet 20 mg  20 mg Enteral Tube Q EVENING FLORA Mcdermott Jr..O.       • gabapentin (NEURONTIN) capsule 2,400 mg  2,400 mg Enteral Tube QAM FLORA Mcdermott Jr..O.       • gabapentin (NEURONTIN) capsule 1,600 mg  1,600 mg Enteral Tube Q EVENING FLORA Mcdermott Jr..O.       • doxycycline monohydrate (ADOXA) tablet 100 mg  100 mg Enteral Tube Q12HRS FLORA Mcdermott Jr..O.       • acetaminophen (TYLENOL) tablet 650 mg  650 mg Enteral Tube Q6HRS PRN FLORA Mcdermott Jr..O.       • norepinephrine (LEVOPHED) 8 mg in  mL Infusion  0-30 mcg/min Intravenous Continuous FLORA Mcdermott Jr..O. 9.4 mL/hr at 09/14/19 0454 5 mcg/min at 09/14/19 0454   • NOREPINEPHRINE BITARTRATE 1 MG/ML IV SOLN            • propofol (DIPRIVAN) injection  200 mg Intravenous Once FLORA Mcdermott Jr..O.       • morphine (pf) 4 mg/ml injection 1 mg  1 mg Intravenous Q HOUR PRN JS Mcdermott Jr.O.        Or   • morphine (pf) 4 mg/ml injection 2 mg  2 mg Intravenous Q HOUR  PRN FLORA Mcdermott Jr..O.        Or   • morphine (pf) 4 mg/ml injection 3 mg  3 mg Intravenous Q HOUR PRN JS Mcdermott Jr.OKhadra        Or   • morphine (pf) 4 mg/ml injection 4 mg  4 mg Intravenous Q HOUR PRN FLORA Mcdermott Jr..O.        Or   • morphine (pf) 10 mg/mL injection 5 mg  5 mg Intravenous Q HOUR PRN FLORA Mcdermott Jr..O.       • HYDROmorphone (DILAUDID) 0.2 mg/mL in 50mL (Continuous Infusion)   Intravenous Continuous FLORA Mcdermott Jr..O.       • propofol (DIPRIVAN) injection  0-80 mcg/kg/min Intravenous Continuous FLORA Mcdermott Jr..O. 7.1 mL/hr at 09/14/19 0602 15 mcg/kg/min at 09/14/19 0602   • potassium chloride (KCL) ivpb 10 mEq  10 mEq Intravenous Q HOUR Jeremy M Gonda, M.D.       • magnesium sulfate IVPB premix 2 g  2 g Intravenous Once Jeremy M Gonda, M.D.       • ipratropium-albuterol (DUONEB) nebulizer solution  3 mL Nebulization Q4HRS (RT) Berny Flood M.D.   3 mL at 09/14/19 0634   • MD Alert...Vancomycin per Pharmacy   Other PHARMACY TO DOSE Berny Flood M.D.       • cefTRIAXone (ROCEPHIN) 2 g in  mL IVPB  2 g Intravenous Q24HRS Berny Flood M.D.   Stopped at 09/14/19 0539   • vancomycin (VANCOCIN) 1,200 mg in  mL IVPB  1,200 mg Intravenous Q12HR Berny Flood M.D.       • enoxaparin (LOVENOX) inj 40 mg  40 mg Subcutaneous DAILY Marck Montero Jr. D.O.   40 mg at 09/14/19 0520   • methylPREDNISolone sod succ (SOLU-MEDROL) 125 MG injection 125 mg  125 mg Intravenous Q6HRS Marck Montero Jr. D.O.   125 mg at 09/14/19 0521   • sodium chloride (OCEAN) 0.65 % nasal spray 2 Spray  2 Spray Nasal Q2HRS PRN Mary Silva M.D.   2 [place] at 09/13/19 1647   • lactated ringers infusion (BOLUS)  1,000 mL Intravenous Once PRN Asim Holley M.D.           Fluids    Intake/Output Summary (Last 24 hours) at 9/14/2019 0731  Last data filed at 9/14/2019 0400  Gross per 24 hour   Intake 694.42 ml   Output 1325 ml   Net -630.58 ml        Laboratory  Recent Labs     09/12/19  1302 09/13/19  1254 09/14/19  0150 09/14/19  0447   HFNUX26M 7.40 7.43  --   --    [id number] 36.7 33.0  --   --    [id number] 60.7* 51.0*  --   --    FXQP1AZY 90.7* 85.9*  --   --    ARTHCO3 22 22  --   --    [id number] 5.0 15.0*  --   --    ARTBE -2 -2  --   --    ISTATAPH  --   --  7.302* 7.385*   ISTATAPCO2  --   --  54.4* 39.6*   ISTATAPO2  --   --  282* 86   ISTATATCO2  --   --  29 25   [id number]  --   --  100* 96   ISTATARTHCO3  --   --  26.9* 23.7   ISTATARTBE  --   --  0 -1   ISTATTEMP  --   --  96.6 F 96.8 F   ISTATFIO2  --   --  100 60   ISTATSPEC  --   --  Arterial Arterial   ISTATAPHTC  --   --  7.318* 7.400   [id number]  --   --  277* 81         Recent Labs     09/12/19 0317 09/13/19 0320 09/14/19  0300   SODIUM 136 137 141   POTASSIUM 3.4* 3.6 3.2*   CHLORIDE 103 105 105   CO2 23 24 24   BUN 10 13 18   CREATININE 0.97 0.88 0.89   MAGNESIUM  --   --  1.9   PHOSPHORUS  --   --  4.7*   CALCIUM 8.3* 8.7 8.5     Recent Labs     09/12/19 0317 09/13/19  0320 09/14/19  0300   GLUCOSE 96 188* 203*     Recent Labs     09/12/19 0317 09/13/19  0320 09/14/19  0300   WBC 11.1* 15.7* 28.5*     Recent Labs     09/12/19 0317 09/13/19  0320 09/14/19  0300   RBC 3.93* 3.98* 4.49*   HEMOGLOBIN 10.7* 10.8* 12.3*   HEMATOCRIT 33.4* 34.3* 38.1*   PLATELETCT 243 268 357       Imaging  X-Ray:  I have personally reviewed the images and compared with prior images. and My impression is: Mild improvement in diffuse bilateral infiltrates, large cardiac silhouette, endotracheal tube in good position  CT:    Reviewed  Echo:   Reviewed    Assessment/Plan  * Acute respiratory failure with hypoxia, emphsema and pneumonitis (HCC)- (present on admission)  Assessment & Plan  Intubated 9/14/2019 for severe hypoxia  Continue full mechanical ventilatory support, decrease respiratory to 18  Continue to titrate FiO2 to goal SaO2 greater than 92%  RT/O2 protocol  Mobilize  Daily sedation  vacation, spontaneous breathing trial, ABCDEF bundle    Interstitial lung disease (HCC)  Assessment & Plan  Honeycomb changes in the lower lobes bilaterally concerning for interstitial lung disease, interstitial pneumonitis  Cell count on bronchoscopy to evaluate for lymphocytosis ([facility]), CD1a level on BAL ([facility])  Sensitivity pneumonitis panel pending  DDx also includes NSIP, UIP  Consider autoimmune evaluation  Continue high-dose Solumedrol    Pneumonitis- (present on admission)  Assessment & Plan  Continue empiric antibiotics  Follow-up on BAL and blood culture results  Aspiration precautions    Paraseptal emphysema (HCC)  Assessment & Plan  Paraseptal emphysema, 25-pack-year history  Honeycomb pattern in lower lobes concerning for underlying interstitial lung disease, ?CPFE (combined pulmonary fibrosis and emphysema)  Continue scheduled DuoNeb, and Spiriva  Outpatient PFTs, high-resolution CT when acute issues resolve, and eval for outpatient pulmonary rehab after establishing care with pulmonary as an outpatient  Steroids    QT prolongation- (present on admission)  Assessment & Plan  Monitor closely  Optimize electrolytes  Avoid medications that prolong QTC    INDIA (acute kidney injury) ([facility])- (present on admission)  Assessment & Plan  Improved  Renal dose meds, avoid nephrotoxins  Strict I/Os  Follow renal function    Sepsis (HCC)- (present on admission)  Assessment & Plan  This is severe sepsis with the following associated acute organ dysfunction(s): acute respiratory failure.   Source = Pulmonary    Status post sepsis protocol  Continue to titrate norepinephrine to maintain mean artery pressure greater than 65  Antibiotics, follow-up on cultures  Status post fluid resuscitation  Monitor lactic acid, urine output, endorgan perfusion for adequacy of resuscitation    Hypothyroidism- (present on admission)  Assessment & Plan  Continue Synthroid 125 mcg p.o. daily    Hypokalemia  Assessment & Plan  Repletion and  monitor    Chronic pain- (present on admission)  Assessment & Plan  Continue methadone  Fentanyl ordered for breakthrough pain       VTE:  Lovenox  Ulcer: H2 Antagonist  Lines: Central Line  Ongoing indication addressed and Bell Catheter  Ongoing indication addressed    I have performed a physical exam and reviewed and updated ROS and Plan today (9/14/2019). In review of yesterday's note (9/13/2019), there are no changes except as documented above.     Patient remains critically ill today requirement active titration of his norepinephrine infusion to maintain adequate perfusion, mechanical ventilatory support and titration, sedation drips. High risk of deterioration and worsening vital organ dysfunction and death without the above critical care interventions.    Discussed patient condition and risk of morbidity and/or mortality with RN, RT, Pharmacy, , Charge nurse / hot rounds and Patient  The patient remains critically ill.  Critical care time = 89 minutes in directly providing and coordinating critical care and extensive data review.  No time overlap and excludes procedures.

## 2019-09-14 NOTE — PROGRESS NOTES
Patient requesting scheduled medications including methadone, temazepam, and gabapentin. Dr. Aleena perez MD stated to give scheduled medication and watch patient closely for respiratory depression. Patient's oxygen being titrated by respiratory therapy at this time. MD aware patient on 13L oxymask per respiratory therapy recommendation. Will continue to monitor closely.

## 2019-09-14 NOTE — ASSESSMENT & PLAN NOTE
Continue home methadone program  Continue Ultram, 50 mg twice daily  Continue oxycodone for breakthrough pain  Continue home gabapentin regimen

## 2019-09-14 NOTE — PROGRESS NOTES
Hospital Medicine Daily Progress Note    Date of Service  9/13/2019    Chief Complaint  59 y.o. male admitted 9/10/2019 direct admit for shortness of breath      Hospital Course    History of chronic pain syndrome on methadone and high doses of gabapentin.  History of emphysema but not on chronic O2.  After further inquiry, he has recurrent pneumonias, sinusitides.  Presented at [facility] with SOB. He was found to be hypoxic there. He was put on O2.   CTA Chest showed no evidence of pulmonary embolism but showed patchy opacities concerning for pneumonia. He was sent here.  He was started on IV antibiotics, and breathing treatments.      Interval Problem Update  9/11. Complains he wants his methadone. Explained about his Qtc  Spoke with Pulmonology and ID.  9/12. He was thankful for the methadone. QTc below 500. Check EKG again tomorrow.  9/13. Worsening respiratory distress. QTc to 573. I made multiple visits and discussed with consultants.     Consultants/Specialty  Pulmonology  ID    Code Status  full    Disposition  Home when better    Review of Systems  Review of Systems   Constitutional: Positive for malaise/fatigue. Negative for chills and fever.   HENT: Negative for congestion, hearing loss and nosebleeds.    Eyes: Negative for pain and redness.   Respiratory: Positive for cough and shortness of breath. Negative for hemoptysis and wheezing.    Cardiovascular: Negative for chest pain and palpitations.   Gastrointestinal: Negative for abdominal pain, blood in stool, constipation, diarrhea, nausea and vomiting.   Genitourinary: Negative for dysuria, frequency and hematuria.   Musculoskeletal: Negative for falls, joint pain and myalgias.   Skin: Negative for rash.   Neurological: Negative for dizziness, tremors, focal weakness, seizures, loss of consciousness, weakness and headaches.   Psychiatric/Behavioral: The patient is not nervous/anxious and does not have insomnia.    All other systems  reviewed and are negative.       Physical Exam  Temp:  [36.5 °C (97.7 °F)-36.9 °C (98.4 °F)] 36.6 °C (97.8 °F)  Pulse:  [65-91] 90  Resp:  [16-25] 18  BP: (116-154)/(67-94) 154/94  SpO2:  [89 %-94 %] 91 %    Physical Exam   Constitutional: He appears well-developed. He appears distressed (Respiratory).   Ill appearing   HENT:   Head: Normocephalic and atraumatic.   Eyes: Conjunctivae are normal. No scleral icterus.   Neck: Normal range of motion. Neck supple.   Cardiovascular: Normal rate and regular rhythm. Exam reveals no gallop and no friction rub.   No murmur heard.  Pulmonary/Chest: Breath sounds normal. He is in respiratory distress (Tachypnea). He has no wheezes (Occ). He has no rales (Mild baibasilar).   Diminished LULZ   Abdominal: Soft. Bowel sounds are normal. He exhibits no distension. There is no tenderness. There is no rebound and no guarding.   Musculoskeletal: He exhibits no edema or tenderness.   Neurological: He is alert.   Skin: Skin is warm.   Psychiatric:   Distressed       Fluids    Intake/Output Summary (Last 24 hours) at 9/13/2019 2223  Last data filed at 9/13/2019 1812  Gross per 24 hour   Intake --   Output 450 ml   Net -450 ml       Laboratory  Recent Labs     09/11/19  0851 09/12/19  0317 09/13/19  0320   WBC 12.7* 11.1* 15.7*   RBC 4.44* 3.93* 3.98*   HEMOGLOBIN 12.2* 10.7* 10.8*   HEMATOCRIT 38.3* 33.4* 34.3*   MCV 86.3 85.0 86.2   MCH 27.5 27.2 27.1   MCHC 31.9* 32.0* 31.5*   RDW 43.9 42.5 43.1   PLATELETCT 297 243 268   MPV 8.7* 8.6* 8.9*     Recent Labs     09/12/19  0317 09/13/19  0320   SODIUM 136 137   POTASSIUM 3.4* 3.6   CHLORIDE 103 105   CO2 23 24   GLUCOSE 96 188*   BUN 10 13   CREATININE 0.97 0.88   CALCIUM 8.3* 8.7                   Imaging  EC-ECHOCARDIOGRAM COMPLETE W/O CONT         DX-CHEST-PORTABLE (1 VIEW)   Final Result      Bilateral airspace opacities, left greater than right worrisome for multifocal pneumonia.         DX-CHEST-2 VIEWS   Final Result         New  airspace opacity in the left lower lobe, concerning for pneumonia.      OUTSIDE IMAGES-CT CHEST   Final Result      CT-CHEST (THORAX) W/O    (Results Pending)        Assessment/Plan  * Acute respiratory failure with hypoxia, emphsema and pneumonitis (HCC)- (present on admission)  Assessment & Plan  Addrerss above issues  Resp and O2 per protocol  Consulted Pulmonology.  On 9/12, inc O2 need  Ordered IV steroids and breathing treatment  Ordered CXR  Ordered ABG  Discussed with Dr. Goodman. Stop steroids for now on 9/12 as we need to r/o viral/atypical infectious process. I will restart if more hypoxic or wheezing.  See below    Pneumonitis- (present on admission)  Assessment & Plan  Patient has been started on IV ceftriaxone and azithromycin  Check pro calcitonin, if within normal limits we'll consider de-escalating antibiotics  RT protocol  Continue supplemental oxygen, wean as tolerated  Follow blood cultures  STOP azithromycin switch to doxycycline  Pattern on CT seems viral/atypical  Ordered flu already that was negatiove  Resp PCR panel ordered  Consulted and discussed with Dr. Goodman and Dr. Dang.  On 9/12 discussed with Dr. Dang and Dr. Goodman. Spent time with firther research analysis and planning.  Pending viral panel  If viral/atypical, continue doxycycline only.  Meanwhile, ordered CXR 9/12 which showed MARCEL opacity  Will repeat CXR in the morning  Dr. Goodman contemplating bronch but that requires transfer to [facility].  9./13. Trajectory of repiratory distress worsening could potentially end up in ICU Meanwhile high flow.  I spoke with Dr. Goodman Pulmonology for a while.  I spoke with Dr. Dang, ID  Rhinovirus positive  We decided to hold steroids as likely this can worsen pneumonitis. Not much wheezing anyway  Started antibiotics for superinfection  If he does not improve with high flow, may need ICU and ventilation. Gave signout to on call physicians.    Paraseptal emphysema  (HCC)  Assessment & Plan  Due to his smoking history  Worse compared to previous CT scans  Discussed with Dr. Goodman  May need O2 at discharge  For now holding off steroids    Sepsis (HCC)- (present on admission)  Assessment & Plan  This is severe sepsis with the following associated acute organ dysfunction(s): acute kidney failure, acute respiratory failure.   Likely source is pneumonia  Patient has been started on IV fluids per septic protocol  Trend lactate  Patient is started on IV ceftriaxone and azithromycin  Follow blood cultures  STOPPED azithromycin  Continue doxycycline  See above  Worsened on 9/13.  Restarted Rocephin and start vancomycin for superinfection      QT prolongation  Assessment & Plan  STOP methadone. PRN oxycodones for now and will need to follow up with his Pain clinic doctor  STOP azithromycin. Switch to doxycycline.  STOP Zofran  Check Mg level  Long talk with him about methadone which he prefers.  I spoke with on call Cardiology  We can restart the methadone as he has been on this long term and continue holding Zofran and Azithromycin  Keep checking QTcs.  Improved QTc. Ordered daily EKHG  Reorder methadone and monitor QTc  I spoke with Dr. Linder Cardiology and formally consulted them  After a long discussion with patient he understands risks of QT prolongation and wants the methadone    INDIA (acute kidney injury) (HCC)- (present on admission)  Assessment & Plan  Likely prerenal  IV fluid hydration with normal saline  Monitor BMP and assess response  Avoid IV contrast/nephrotoxins/NSAIDs  Dose adjust meds for decreased GFR        Hypothyroidism- (present on admission)  Assessment & Plan  Continue Synthroid    Chronic pain- (present on admission)  Assessment & Plan  Continue methadone and Neurontin  STOP methadone but will restart when QTc acceptable.       VTE prophylaxis: Per Dr. Holley, heparin SQ. Ordered it as that was his intent.  Reviewed vitals, labs, imaging, staff  notes.  Discussed assessment and plan with Pancho Martinez Jr.   Discussed with ID, Pulmonology again    I spent 35 minutes reviewing  the chart, notes, vitals, labs, imaging, ordering labs, evaluating Pancho Martinez Jr. for assessment, enacting the plan above. Medical decision making is therefore complex. Time was devoted to counseling and coordinating care including review of records, pertinent lab data and studies, as well as discussing diagnostic evaluation and work up, planned therapeutic interventions and future disposition of care. Where indicated, the assessment and plan reflect discussion of patient with consultants, other healthcare providers, family members, and additional research needed to obtain further information in formulating the plan of care for Pancho Martinez Jr..     I spent additional (over) 60 minutes of non face to face time performing additional research, reviewing medical records, discussing plan of care with other healthcare providers.     Responded to RN for respiratory distress, discussed with respiratory team, added bag mask vent, ordered CT Chest then STAT CXR, alerted pulmonology, short discussion, initiated steroids because of hypoxic distress  Start time: 1215 End time: 1245    Discussed with Dr. Linder. Saw patient again. Switched to high flow. Discussed with him re: Qtc and methadone. Spoe to plan with RN  Start time: 1500 End time 1530    Discussed with RN and rapid. Spoke face to face with Dr. Goodman. Discussed CT, discussed findings, Called Dr. Cardoso, Discussed restarting antibiotics, Stopped steroids. Low threshold ICU, signed out to on call physicians.  START time: 1830 STOP time: 1900

## 2019-09-14 NOTE — PROGRESS NOTES
"Pharmacy Kinetics 59 y.o. male on vancomycin day # 2  2019    Currently on Vancomycin 1200 mg iv q12hr      Indication for Treatment: PNA    Pertinent history per medical record: Admitted on 9/10/2019 for complaints of SOB x 4 days, with worsening dyspnea and productive cough.  PMH includes asthma, emphysema, and hypothyroidism.  Pt required intubation.  Rhinovirus/Enterovirus PCR positive. ID signed off.     Other antibiotics: Ceftriaxone 2g IV q24h and Doxycycline 100 mg BID x 5 days     Allergies: Patient has no known allergies.     List concerns for renal function: requiring Levophed    Pertinent cultures to date:   9/10- sputum cx's = poor quality   19: BAL = Many WBCs. Rare Gram positive cocci. <5% intracellular organisms       Ref. Range 2019 13:02 2019 18:03   Procalcitonin Latest Ref Range: <0.25 ng/mL 0.27 (H) 0.22       MRSA nares swab if pneumonia is a concern (ordered/positive/negative/n-a): not ordered, now awaiting BAL result    Recent Labs     19  0317 19  0320 19  0300   WBC 11.1* 15.7* 28.5*     Recent Labs     19  0317 19  0320 19  0300   BUN 10 13 18   CREATININE 0.97 0.88 0.89   ALBUMIN  --   --  3.7     No results for input(s): VANCOTROUGH, VANCOPEAK, VANCORANDOM in the last 72 hours.    Intake/Output Summary (Last 24 hours) at 2019 1319  Last data filed at 2019 1254  Gross per 24 hour   Intake 1420.24 ml   Output 2025 ml   Net -604.76 ml      /69   Pulse 72   Temp 36.6 °C (97.8 °F) (Temporal)   Resp (!) 22   Ht 1.803 m (5' 11\")   Wt 79.1 kg (174 lb 6.1 oz)   SpO2 98%  Temp (24hrs), Av.6 °C (97.8 °F), Min:36.6 °C (97.8 °F), Max:36.6 °C (97.8 °F)      A/P   1. Vancomycin dose change: No  2. Next vancomycin level: 1030 on 9/15  3. Goal trough: 16-20 mcg/ml  4. Comments: Pt's renal function good. Will check vanco level tomorrow morning. Pt was bronched early this morning, await results to see if we can " de-escalate abx.     Neymar Langley, PharmD

## 2019-09-14 NOTE — PROGRESS NOTES
Patient denies SOB or chest pain, however patient only maintaining 91% on 15L oxymask. Ara RT at bedside, and stating she will place patient on high flow. Paged Dr. Flood, updated MD on RTs plan of care. RT requesting a portable CXR, and Dr. Flood stated he will order that diagnostic test. MD also made aware of patient positive for rhinovirus/enterovirus per PCR.

## 2019-09-14 NOTE — DIETARY
"Nutrition Support Assessment:  Day 4 of admit.  Pancho Martinez Jr. is a 59 y.o. male with admitting DX of      Current problem list:  1. Interstitial lung disease  2. Acute pneumonitis  3. Pneumonitis  4. Acute respiratory failure with hypoxia, emphysema and pneumonia  5. Paraseptal emphysema  6. Sepsis  7. INDIA  8. QT prolongation  9. Hypothyroidism  10. Chronic pain     Assessment:  Estimated Nutritional Needs based on:   Height: 180.3 cm (5' 11\")  Weight: 79.1 kg (174 lb 6.1 oz)  Weight to Use in Calculations: 77.3 kg (170 lb 6.7 oz) - stand up scale wt following admit, likely most accurate wt taken  Ideal Body Weight: 78 kg (172 lb)  Percent Ideal Body Weight: 99.1  Body mass index is 23.77 kg/m²., BMI classification: normal    Calculation/Equation: MSJ x 1.2 = 1928 kcals/day; RMR per [facility] (vent L/min: 12.1, Tmax/24 hrs: 36.4) = 1783 kcals/day  Total Calories / day: 1800 - 2000  (Calories / k - 26)  Total Grams Protein / day: 93 - 108 (Grams Protein / k.2 - 1.4)     Evaluation:   1. Admitted with shortness of breath, hypoxia.  2. Initially admitted to the floor, RD assessed pt for PO and wt hx.  3. Pt previously on a Cardiac diet with PO %.  4. Transferred to the ICU  for hypoxia.  5. Cortrak placed and verified.  6. Potassium 3.2, Glucose 203, Phos 4.7  7. Pertinent meds: Rocephin, Adoxa, Pepcid, Lasix, SSI, Solu-medrol, Pericolace, Levo @ 5 mcg/min  8. Propofol currently running @ 20 mcg/min (9.5 mL/hr) = 251 kcals/day; RD to monitor propofol rate and adjust TF as needed.  9. Specialized, fiber free formula indicated to meet pt's estimated protein needs while on pressor support.       Malnutrition Risk: Unable to identify at this time.     Recommendations/Plan:  Start Impact Peptide 1.5 @ 25 ml/hr and advance per protocol to 45 ml/hr (goal rate with propofol) to provide 1620 kcal + kcal from propofol (21 kcal/kg), 102 grams protein (1.3 gm/kg), and 832 ml free water per day.   When propofol " d/c'd increase TF to final goal rate of 50 ml/hr to provide 1800 kcal (23 kcal/kg), 113 grams protein (1.5 gm/kg), and 924 ml free water per day.  Fluids per MD; tube feeding formula is low volume, pt would need ~1800 mL of additional free water per day to meet estimated fluid needs.  RD to monitor wt trends.  PO diet when safe/appropriate per SLP/MD and pt can adequately consume.    RD following.

## 2019-09-14 NOTE — PROGRESS NOTES
Pt arrived to floor accompanied by RN and CCT, visibly in respiratory distress, MD at bedside. Pt to be started on Bipap.

## 2019-09-14 NOTE — PROCEDURES
Procedures  Procedure Note    Date: 9/14/2019  Time: 1:05 AM    Procedure: Bronchoscopy    Indication: Multifocal pneumonia, interstitial lung disease, respiratory failure    Consent: Informed consent obtained from patient. Patient expressed understanding and agreement and signed consent which can be found in the patient's chart.    Procedure: After obtaining consent, a time-out was performed. Respiratory therapy and nursing at bedside throughout procedure. Patient provided sedation and analgesia throughout the procedure. Placed on full ventilator support with an FiO2 of 100% throughout the procedure. Using a fiberoptic bronchoscope, trachea entered via ET tube.  0 mL of local anesthetic sprayed at the myrtle (2% lidocaine) achieving appropriate comfort level for patient. Airways visualized directly and the following intervention was performed: BAL, suctioning mucus. Findings as below. Patient tolerated procedure well without any difficulties and left in care of bedside nurse/RT.     Medications: Etomidate, rocuronium  Findings: Upper airway - Not visualized as bronchoscope passed through ETT.        Trachea to myrtle -inflamed appearing mucosa without lesions or mass, ETT tip measured 3 cm from the myrtle.        R proximal and distal airways -inflamed, friable appearing mucosa without mass/lesion/anatomic variance, secretions: Scant, white, thick secretions in the right mainstem and right lower lobe.  All airways inspected and suctioned clear of mucus.        L proximal and distal airways -inflamed, friable appearing mucosa without mass/lesion/anatomic variance, secretions: Minimal, white, thick secretions in the left mainstem and left lower lobe secondary bronchus.  All mucus collected in specimen trap and left lower lobe BAL obtained,        Samples -left lower lobe bronchus BAL    Complications: None apparent  CXR (if applicable): Pending    Marck Montero DO  Critical Care Medicine

## 2019-09-14 NOTE — PROGRESS NOTES
MD notified re hypotension despite sedation being off. Patient continues to fluctuate from a RASS of -2 to +2. While agitated SBP remains 82. Plan to start levophed via PIV and albumin.

## 2019-09-14 NOTE — PROGRESS NOTES
"Pharmacy Kinetics 59 y.o. male on vancomycin day # 1 2019    Currently on Vancomycin 1200 mg iv q12hr      Indication for Treatment: Pneumonia    Pertinent history per medical record: Admitted on 9/10/2019 for complaints of SOB x 4 days, with worsening dyspnea and productive cough.  PMH includes asthma, emphysema, and hypothyroidism.  Currently maintaining O2 of 91% on 15L oxymask.  Rhinovirus/Enterovirus PCR positive    Other antibiotics: Ceftriaxone 2g IV q24h    Allergies: Patient has no known allergies.     List concerns for renal function : n/a    Pertinent cultures to date:   n/a    MRSA nares swab if pneumonia is a concern (ordered/positive/negative/n-a): Ordered     Recent Labs     19  0851 19  0317 19  0320   WBC 12.7* 11.1* 15.7*     Recent Labs     19  0317 19  0320   BUN 10 13   CREATININE 0.97 0.88     No results for input(s): VANCOTROUGH, VANCOPEAK, VANCORANDOM in the last 72 hours.    Intake/Output Summary (Last 24 hours) at 2019 1839  Last data filed at 2019 1812  Gross per 24 hour   Intake --   Output 450 ml   Net -450 ml      /82   Pulse 90   Temp 36.8 °C (98.2 °F)   Resp 20   Ht 1.803 m (5' 11\")   Wt 77.8 kg (171 lb 8.3 oz)   SpO2 91%  Temp (24hrs), Av.7 °C (98 °F), Min:36.5 °C (97.7 °F), Max:36.9 °C (98.4 °F)      A/P   1. Vancomycin dose change: Initiate Vancomycin 1900 mg x1 then to continue with Vancomycin 1200mg IV q12h  2. Next vancomycin level: 9/15/19 @ 0730  3. Goal trough: ~16 mcg/ml  4. Comments: Pharmacy to continue to monitor and adjust vancomycin per protocol.    Ameena Stevenson, PharmD    "

## 2019-09-14 NOTE — CONSULTS
Critical Care Consultation    Date of consult: 9/13/2019    Referring Physician  Oneil Wong M.D.    Reason for Consultation  Hypoxia, multifocal pneumonia, sepsis    History of Presenting Illness  Majority of history is obtained from chart as the patient is in severe respiratory distress limiting adequate history.  59 y.o. male with past medical history of chronic pain on methadone and gabapentin, COPD, depression, hypothyroidism, asthma, who presented 9/10/2019 as a [place] [facility] [place] presented on 9/10/2019 for shortness of breath for the last 4 days and a productive cough.  The patient was transferred to [facility] for higher level of care.  A CT scan showed multifocal pneumonia with groundglass opacities overlying his chronic paraseptal emphysema and lower lobe honeycombing.  He was started on Rocephin and doxycycline.  Infectious disease was consulted and a respiratory viral panel was sent which was positive for rhinovirus/enterovirus.  Procalcitonin was initially 0.52 on 9/10/2019 and quickly improved to 0.27 on 9/12/2019 and 0.22 on 9/13/2019.  This afternoon the patient's respiratory status worsened and is oxygen requirements increased to 15 L via Oxymask.  This evening patient required placement on high flow nasal cannula for persistent hypoxia in the 80s and he was transferred to the ICU for further care.  Upon arrival in the ICU he was clearly in respiratory distress and placed on BiPAP.  He subsequently worsened even on BiPAP with respiratory rate at 37 and persistent hypoxia. Intubation was discussed with the patient and he elected to proceed for his severe respiratory failure.     Per the patient's chart he has had several recurrent episodes of pneumonia and he states he was intubated approximately 6 years ago for the same.  He reportedly uses no oxygen at home and has no known pulmonary diagnosis.    Code Status  Full Code    Review of  Systems  Review of Systems   Unable to perform ROS: Acuity of condition   Respiratory: Positive for cough, sputum production and shortness of breath.        Past Medical History   has a past medical history of Anxiety, ASTHMA, C6 cervical fracture, Chronic back pain, Chronic pain, COPD, Depression, Diverticulitis, Hypothyroid, Neck pain, Neuropathy (CMS-HCC), Pelvic fracture, Pneumonia, and Unspecified cataract.    Surgical History   has a past surgical history that includes other orthopedic surgery; exploratory laparotomy (N/A, 5/8/2015); colostomy (5/2015); acl reconstruction (Left, 1978); bone spur excision (Left, 1983); retinal detachment repair (Right, 2/2015); colostomy closure (N/A, 8/13/2015); and exploratory laparotomy (8/15/2015).    Family History  family history is not on file.    Social History   reports that he quit smoking about 4 years ago. His smoking use included cigarettes. He has a 17.00 pack-year smoking history. He has never used smokeless tobacco. He reports that he does not drink alcohol or use drugs.    Medications  Home Medications     Reviewed by Jaymie Berry R.N. (Registered Nurse) on 09/10/19 at 1353  Med List Status: Complete   Medication Last Dose Status   albuterol (VENTOLIN OR PROVENTIL) 108 (90 BASE) MCG/ACT AERS inhalation aerosol 9/1/2019 Active   gabapentin (NEURONTIN) 800 MG tablet 9/9/2019 Active   GABAPENTIN PO 9/9/2019 Active   levothyroxine (SYNTHROID) 125 MCG Tab 9/9/2019 Active   methadone (DOLOPHINE) 10 MG TABS 9/9/2019 Active   ondansetron (ZOFRAN) 4 MG TABS tablet 9/9/2019 Active   oxycodone immediate release (ROXICODONE) 10 MG immediate release tablet 9/1/2019 Active   PrednisoLONE Sodium Phosphate (PREDNISOLONE SOD PHOS, OPHTH, OP) 9/8/2019 Active   simvastatin (ZOCOR) 40 MG Tab 9/8/2019 Active   temazepam (RESTORIL) 15 MG Cap 9/8/2019 Active   tramadol (ULTRAM) 50 MG TABS 9/1/2019 Active              Current Facility-Administered Medications   Medication Dose  Route Frequency Provider Last Rate Last Dose   • Respiratory Care per Protocol   Nebulization Continuous RT FLORA Mcdermott Jr..O.       • ipratropium-albuterol (DUONEB) nebulizer solution  3 mL Nebulization Q2HRS PRN (RT) FLORA Mcdermott Jr..O.       • famotidine (PEPCID) tablet 20 mg  20 mg Enteral Tube Q12HRS Marck Montero Jr., D.O.        Or   • famotidine (PEPCID) injection 20 mg  20 mg Intravenous Q12HRS FLORA Mcdermott Jr..O.       • senna-docusate (PERICOLACE or SENOKOT S) 8.6-50 MG per tablet 2 Tab  2 Tab Enteral Tube BID FLORA Mcdermott Jr..OKhadra        And   • polyethylene glycol/lytes (MIRALAX) PACKET 1 Packet  1 Packet Enteral Tube QDAY PRN FLORA Mcdermott Jr..O.        And   • magnesium hydroxide (MILK OF MAGNESIA) suspension 30 mL  30 mL Enteral Tube QDAY PRN FLORA Mcdermott Jr..OKhadra        And   • bisacodyl (DULCOLAX) suppository 10 mg  10 mg Rectal QDAY PRN FLORA Mcdermott Jr..O.       • MD Alert...ICU Electrolyte Replacement per Pharmacy   Other PHARMACY TO DOSE LFORA Mcdermott Jr..LUCY.       • lidocaine (XYLOCAINE) 1 % injection 1-2 mL  1-2 mL Tracheal Tube Q30 MIN PRN FLORA Mcdermott Jr..O.       • insulin regular (HUMULIN R) injection 1-6 Units  1-6 Units Subcutaneous Q6HRS FLORA Mcdermott Jr..O.   1 Units at 09/14/19 0314    And   • glucose 4 g chewable tablet 16 g  16 g Oral Q15 MIN PRN FLORA Mcdermott Jr..O.        And   • DEXTROSE 10% BOLUS 250 mL  250 mL Intravenous Q15 MIN PRN FLORA Mcdermott Jr..O.       • fentaNYL (SUBLIMAZE) injection 100 mcg  100 mcg Intravenous Q15 MIN PRN FLORA Mcdermott Jr..O.   100 mcg at 09/14/19 0305    And   • fentaNYL (SUBLIMAZE) injection 200 mcg  200 mcg Intravenous Q15 MIN PRN FLORA Mcdermott Jr..O.        And   • fentaNYL (SUBLIMAZE) 50 mcg/mL in 50mL (Continuous Infusion)   Intravenous Continuous Marck Montero Jr., D.O. 10 mL/hr at 09/14/19 0300 500 mcg/hr at 09/14/19 0300    And   • dexmedetomidine (PRECEDEX) 400  mcg/100mL NS premix infusion  0-1.5 mcg/kg/hr Intravenous Continuous FLORA Mcdermott Jr..O. 23.1 mL/hr at 09/14/19 0305 1.2 mcg/kg/hr at 09/14/19 0305   • Pharmacy Consult: Enteral tube insertion - review meds/change route/product selection  1 Each Other PHARMACY TO DOSE FLORA Mcdermott Jr..O.       • temazepam (RESTORIL) capsule 15 mg  15 mg Enteral Tube Q EVENING FLORA Mcdermott Jr..O.       • simvastatin (ZOCOR) tablet 40 mg  40 mg Enteral Tube Nightly FLORA Mcdermott Jr..O.       • oxyCODONE immediate-release (ROXICODONE) tablet 5 mg  5 mg Enteral Tube Q4HRS PRN FLORA Mcdermott Jr..O.       • levothyroxine (SYNTHROID) tablet 125 mcg  125 mcg Enteral Tube QAM AC Marck Montero Jr. D.O.       • methadone (DOLOPHINE) tablet 20 mg  20 mg Enteral Tube DAILY AT NOON FLORA Mcdermott Jr..O.        And   • methadone (DOLOPHINE) tablet 30 mg  30 mg Enteral Tube QAM FLORA Mcdermott Jr..O.        And   • methadone (DOLOPHINE) tablet 20 mg  20 mg Enteral Tube Q EVENING FLORA Mcdermott Jr..O.       • gabapentin (NEURONTIN) capsule 2,400 mg  2,400 mg Enteral Tube QAM Marck Montero Jr. D.O.       • gabapentin (NEURONTIN) capsule 1,600 mg  1,600 mg Enteral Tube Q EVENING FLORA Mcdermott Jr..O.       • doxycycline monohydrate (ADOXA) tablet 100 mg  100 mg Enteral Tube Q12HRS FLORA Mcdermott Jr..O.       • acetaminophen (TYLENOL) tablet 650 mg  650 mg Enteral Tube Q6HRS PRN FLORA Mcdermott Jr..O.       • albumin human 25% solution 25 g  25 g Intravenous Q6HRS FLORA Mcdermott Jr..O.       • norepinephrine (LEVOPHED) 8 mg in  mL Infusion  0-30 mcg/min Intravenous Continuous FLORA Mcdermott Jr..O.       • ipratropium-albuterol (DUONEB) nebulizer solution  3 mL Nebulization Q4HRS (RT) Berny Flood M.D.   3 mL at 09/14/19 0216   • MD Alert...Vancomycin per Pharmacy   Other PHARMACY TO DOSE Berny Flood M.D.       • cefTRIAXone (ROCEPHIN) 2 g in  mL IVPB  2 g Intravenous  Q24HRS Berny Flood M.D.       • vancomycin (VANCOCIN) 1,200 mg in  mL IVPB  1,200 mg Intravenous Q12HR Berny Flood M.D.       • enoxaparin (LOVENOX) inj 40 mg  40 mg Subcutaneous DAILY Marck Montero Jr., D.O.       • methylPREDNISolone sod succ (SOLU-MEDROL) 125 MG injection 125 mg  125 mg Intravenous Q6HRS Marck Montero Jr., D.O.   125 mg at 09/13/19 2330   • sodium chloride (OCEAN) 0.65 % nasal spray 2 Spray  2 Spray Nasal Q2HRS PRN Mary Silva M.D.   2 [place] at 09/13/19 1647   • lactated ringers infusion (BOLUS)  1,000 mL Intravenous Once PRN Asim Holley M.D.           Allergies  No Known Allergies    Vital Signs last 24 hours  Temp:  [36.6 °C (97.8 °F)-36.9 °C (98.4 °F)] 36.6 °C (97.8 °F)  Pulse:  [] 81  Resp:  [16-35] 35  BP: (113-199)/() 113/77  SpO2:  [89 %-100 %] 98 %    Physical Exam  Physical Exam   Constitutional: He is oriented to person, place, and time. He appears well-developed and well-nourished.   Thin, frail, chronically ill-appearing   HENT:   Right Ear: External ear normal.   Left Ear: External ear normal.   Mouth/Throat: Oropharynx is clear and moist.   Eyes: Pupils are equal, round, and reactive to light. Conjunctivae and EOM are normal. No scleral icterus.   Neck: Neck supple. No JVD present. No tracheal deviation present.   Cardiovascular: Normal rate, regular rhythm and intact distal pulses.   Pulmonary/Chest: Accessory muscle usage present. Tachypnea noted. He is in respiratory distress. He has decreased breath sounds. He has wheezes. He has rales.   Abdominal: Soft. Bowel sounds are normal. He exhibits no distension.   Musculoskeletal: Normal range of motion. He exhibits no edema or tenderness.   Neurological: He is alert and oriented to person, place, and time. No cranial nerve deficit. He exhibits normal muscle tone. Coordination normal.   Skin: Skin is warm and dry.   Psychiatric:   Agitated   Nursing note and vitals  reviewed.      Fluids    Intake/Output Summary (Last 24 hours) at 2019 0408  Last data filed at 2019 0200  Gross per 24 hour   Intake 662.37 ml   Output 1050 ml   Net -387.63 ml       Laboratory  Recent Results (from the past 48 hour(s))   EKG    Collection Time: 19  9:47 AM   Result Value Ref Range    Report       [facility]    Test Date:  2019  Pt Name:    RENNY RUSHING              Department: [facility]  MRN:        [id number]                      Room:       [facility]  Gender:     Male                         Technician: [facility]  :        1960                   Requested By:FLOYD CROOKS  Order #:    [id number]                    Reading MD: Archana Leiva    Measurements  Intervals                                Axis  Rate:       79                           P:          27  AK:         140                          QRS:        -32  QRSD:       105                          T:          115  QT:         418  QTc:        480    Interpretive Statements  SINUS RHYTHM  LEFT AXIS DEVIATION  LOW VOLTAGE, EXTREMITY LEADS  Delayed R wave progression  Electronically Signed On 2019 19:01:19 PDT by Archana Leiva     Procalcitonin    Collection Time: 19  1:02 PM   Result Value Ref Range    Procalcitonin 0.27 (H) <0.25 ng/mL   ABG - LAB    Collection Time: 19  1:02 PM   Result Value Ref Range    Ph 7.40 7.40 - 7.50    Pco2 36.7 26.0 - 37.0 mmHg    Po2 60.7 (L) 64.0 - 87.0 mmHg    O2 Saturation 90.7 (L) 93.0 - 99.0 %    Hco3 22 17 - 25 mmol/L    Base Excess -2 -4 - 3 mmol/L    Body Temp 37.0 Centigrade    O2 Therapy 5.0 2.0 - 10.0 L/min    Ph -TC 7.40 7.40 - 7.50    Pco2 -TC 36.7 26.0 - 37.0 mmHg    Po2 -TC 60.7 (L) 64.0 - 87.0 mmHg   Respiratory Panel By PCR    Collection Time: 19 11:42 PM   Result Value Ref Range    Adenovirus, PCR Not Detected     Coronavirus, PCR Not Detected     Human Metapneumovirus, PCR Not Detected     Rhinovirus / Enterovirus, PCR DETECTED (A)     Influenza  virus A RNA Not Detected     Influenza virus A H1 RNA Not Detected     Influenza A 2009, H1N1, PCR Not Detected     Influenza virus A H3 RNA Not Detected     Influenza virus B, PCR Not Detected     Parainfluenza virus 1, PCR Not Detected     Parainfluenza virus 2, PCR Not Detected     Parainfluenza virus 3, PCR Not Detected     Parainfluenza 4, PCR Not Detected     Resp Syncytial Virus A, PCR Not Detected     Resp Syncytial Virus B, PCR Not Detected     Chlamydia pneumoniae, PCR Not Detected     Mycoplasma pneumoniae, PCR Not Detected    CBC WITHOUT DIFFERENTIAL    Collection Time: 19  3:20 AM   Result Value Ref Range    WBC 15.7 (H) 4.8 - 10.8 K/uL    RBC 3.98 (L) 4.70 - 6.10 M/uL    Hemoglobin 10.8 (L) 14.0 - 18.0 g/dL    Hematocrit 34.3 (L) 42.0 - 52.0 %    MCV 86.2 81.4 - 97.8 fL    MCH 27.1 27.0 - 33.0 pg    MCHC 31.5 (L) 33.7 - 35.3 g/dL    RDW 43.1 35.9 - 50.0 fL    Platelet Count 268 164 - 446 K/uL    MPV 8.9 (L) 9.0 - 12.9 fL   Basic Metabolic Panel    Collection Time: 19  3:20 AM   Result Value Ref Range    Sodium 137 135 - 145 mmol/L    Potassium 3.6 3.6 - 5.5 mmol/L    Chloride 105 96 - 112 mmol/L    Co2 24 20 - 33 mmol/L    Glucose 188 (H) 65 - 99 mg/dL    Bun 13 8 - 22 mg/dL    Creatinine 0.88 0.50 - 1.40 mg/dL    Calcium 8.7 8.5 - 10.5 mg/dL    Anion Gap 8.0 0.0 - 11.9   ESTIMATED GFR    Collection Time: 19  3:20 AM   Result Value Ref Range    GFR If African American >60 >60 mL/min/1.73 m 2    GFR If Non African American >60 >60 mL/min/1.73 m 2   EKG    Collection Time: 19  6:21 AM   Result Value Ref Range    Report       [facility]    Test Date:  2019  Pt Name:    RENNY RUSHING              Department: 171  MRN:        [id number]                      Room:       T732  Gender:     Male                         Technician: EARLE  :        1960                   Requested By:FLOYD CROOKS  Order #:    [id number]                    Kacey MD: Jacqui Alvarez,  MD    Measurements  Intervals                                Axis  Rate:       70                           P:          23  IL:         142                          QRS:        -35  QRSD:       106                          T:          159  QT:         530  QTc:        573    Interpretive Statements  SINUS RHYTHM  LEFT AXIS DEVIATION  ABNORMAL T, CONSIDER ISCHEMIA, LATERAL LEADS  PROLONGED QT INTERVAL  Compared to ECG 09/12/2019 09:47:50  T-wave abnormality now present  Possible ischemia now present  Prolonged QT interval now present  Poor R-wave progression no longer present    Electronically Signed On 9- 13:39:01 PDT by Humera Alvarez MD     ABG - LAB    Collection Time: 09/13/19 12:54 PM   Result Value Ref Range    Ph 7.43 7.40 - 7.50    Pco2 33.0 26.0 - 37.0 mmHg    Po2 51.0 (L) 64.0 - 87.0 mmHg    O2 Saturation 85.9 (L) 93.0 - 99.0 %    Hco3 22 17 - 25 mmol/L    Base Excess -2 -4 - 3 mmol/L    Body Temp see below Centigrade    O2 Therapy 15.0 (H) 2.0 - 10.0 L/min   GRAM STAIN    Collection Time: 09/13/19  4:00 PM   Result Value Ref Range    Significant Indicator .     Source RESP     Site Sputum     Gram Stain Result       Sputum Gram stain quality score is <1, probable  oropharyngeal contamination. Culture not performed.  Recollect if clinically indicated.     PROCALCITONIN    Collection Time: 09/13/19  6:03 PM   Result Value Ref Range    Procalcitonin 0.22 <0.25 ng/mL   EC-ECHOCARDIOGRAM COMPLETE W/O CONT    Collection Time: 09/13/19  6:59 PM   Result Value Ref Range    Eject.Frac. MOD BP 68.96     Eject.Frac. MOD 4C 70.71     Eject.Frac. MOD 2C 67.87     Left Ventrical Ejection Fraction 60    LACTIC ACID    Collection Time: 09/13/19  8:13 PM   Result Value Ref Range    Lactic Acid 2.8 (H) 0.5 - 2.0 mmol/L   ISTAT ARTERIAL BLOOD GAS    Collection Time: 09/14/19  1:50 AM   Result Value Ref Range    Ph 7.302 (L) 7.400 - 7.500    Pco2 54.4 (HH) 26.0 - 37.0 mmHg    Po2 282 (H) 64 - 87 mmHg    Tco2 29  20 - 33 mmol/L    S02 100 (H) 93 - 99 %    Hco3 26.9 (H) 17.0 - 25.0 mmol/L    BE 0 -4 - 3 mmol/L    Body Temp 96.6 F degrees    O2 Therapy 100 %    iPF Ratio 282     Ph Temp Chu 7.318 (L) 7.400 - 7.500    Pco2 Temp Co 51.9 (HH) 26.0 - 37.0 mmHg    Po2 Temp Cor 277 (H) 64 - 87 mmHg    Specimen Arterial     Action Range Triggered YES     Inst. Qualified Patient YES    CBC WITHOUT DIFFERENTIAL    Collection Time: 09/14/19  3:00 AM   Result Value Ref Range    WBC 28.5 (H) 4.8 - 10.8 K/uL    RBC 4.49 (L) 4.70 - 6.10 M/uL    Hemoglobin 12.3 (L) 14.0 - 18.0 g/dL    Hematocrit 38.1 (L) 42.0 - 52.0 %    MCV 84.9 81.4 - 97.8 fL    MCH 27.4 27.0 - 33.0 pg    MCHC 32.3 (L) 33.7 - 35.3 g/dL    RDW 43.1 35.9 - 50.0 fL    Platelet Count 357 164 - 446 K/uL    MPV 8.4 (L) 9.0 - 12.9 fL   Renal Function Panel    Collection Time: 09/14/19  3:00 AM   Result Value Ref Range    Sodium 141 135 - 145 mmol/L    Potassium 3.2 (L) 3.6 - 5.5 mmol/L    Chloride 105 96 - 112 mmol/L    Co2 24 20 - 33 mmol/L    Glucose 203 (H) 65 - 99 mg/dL    Creatinine 0.89 0.50 - 1.40 mg/dL    Bun 18 8 - 22 mg/dL    Calcium 8.5 8.5 - 10.5 mg/dL    Phosphorus 4.7 (H) 2.5 - 4.5 mg/dL    Albumin 3.7 3.2 - 4.9 g/dL   LACTIC ACID    Collection Time: 09/14/19  3:00 AM   Result Value Ref Range    Lactic Acid 2.2 (H) 0.5 - 2.0 mmol/L   LDH    Collection Time: 09/14/19  3:00 AM   Result Value Ref Range    LDH Total 304 (H) 107 - 266 U/L   MAGNESIUM    Collection Time: 09/14/19  3:00 AM   Result Value Ref Range    Magnesium 1.9 1.5 - 2.5 mg/dL   ESTIMATED GFR    Collection Time: 09/14/19  3:00 AM   Result Value Ref Range    GFR If African American >60 >60 mL/min/1.73 m 2    GFR If Non African American >60 >60 mL/min/1.73 m 2   ACCU-CHEK GLUCOSE    Collection Time: 09/14/19  3:06 AM   Result Value Ref Range    Glucose - Accu-Ck 192 (H) 65 - 99 mg/dL       Imaging  DX-CHEST-PORTABLE (1 VIEW)   Final Result         1. Interval intubation. No other significant interval  change.      EC-ECHOCARDIOGRAM COMPLETE W/O CONT   Final Result      DX-CHEST-PORTABLE (1 VIEW)   Final Result      Bilateral airspace opacities, left greater than right worrisome for multifocal pneumonia.         DX-CHEST-2 VIEWS   Final Result         New airspace opacity in the left lower lobe, concerning for pneumonia.      OUTSIDE IMAGES-CT CHEST   Final Result      CT-CHEST (THORAX) W/O    (Results Pending)   DX-CHEST-PORTABLE (1 VIEW)    (Results Pending)   DX-ABDOMEN FOR TUBE PLACEMENT    (Results Pending)       Assessment/Plan  * Acute respiratory failure with hypoxia, emphsema and pneumonitis (HCC)- (present on admission)  Assessment & Plan  Intubated 9/14/2019 for severe hypoxia  RT/O2 protocols  Daily and PRN ABGs  Titration of ventilator therapy based on ABGs and patient's status  Sedation as tolerated/indicated  Daily CXR  HOB >30 degrees and peridex for VAP prevention  Pepcid for GI prophylaxis  SAT/SBT when able (ABCDEF Bundle)  Early mobility  Trial diuresis    Acute pneumonitis  Assessment & Plan  Positive for rhinovirus/enterovirus on viral respiratory panel, unlikely to be the primary culprit in this case however may be an exacerbating factor.  Start Solu-Medrol for possible ILD flare  Continue Rocephin and doxycycline    Interstitial lung disease (HCC)  Assessment & Plan  Honeycomb changes in the lower lobes bilaterally concerning for interstitial lung disease, interstitial pneumonitis  Cell count on bronchoscopy to evaluate for lymphocytosis ([facility]), CD1a level on BAL ([facility])  Sensitivity pneumonitis panel pending  DDx also includes NSIP, UIP  Consider autoimmune evaluation  Start high-dose Solumedrol    Paraseptal emphysema (HCC)  Assessment & Plan  Paraseptal emphysema, 25-pack-year history  Honeycomb pattern in lower lobes concerning for underlying interstitial lung disease, ?CPFE (combined pulmonary fibrosis and emphysema)  Continue scheduled DuoNeb, and Spiriva  Outpatient PFTs,  high-resolution CT when acute issues resolve, and eval for outpatient pulmonary rehab after establishing care with pulmonary as an outpatient    Sepsis (HCC)- (present on admission)  Assessment & Plan  This is severe sepsis with the following associated acute organ dysfunction(s): acute respiratory failure.     sepsis protocol  Pulmonary source; so far evaluation only positive for rhinovirus.   This level of decompensation is unexpected for viral pneumonia in an immunocompetent host, will continue to treat for possible overlying bacterial pneumonia  Continue Rocephin and doxycycline for now, low threshold to expand antimicrobial coverage  Induced sputum culture quality very low, bronchoscopy will be completed for studies and targeted antimicrobial treatment.  Adequate IV fluid provided during stay  PRN IVF bolus to maintain MAP >65 mmHg  Pressors if needed to maintain MAP >65 mmHg  F/u blood and respiratory cultures, check UA  Check lactate      QT prolongation- (present on admission)  Assessment & Plan  Patient was evaluated by cardiology and request to continue methadone use  Monitor QTc  Avoid any additional QT prolonging medications    INDIA (acute kidney injury) ([facility])- (present on admission)  Assessment & Plan  Renal dose meds, avoid nephrotoxins  Strict I/Os  Follow renal function    Hypothyroidism- (present on admission)  Assessment & Plan  Continue Synthroid 125 mcg p.o. daily    Chronic pain- (present on admission)  Assessment & Plan  Continue methadone  Fentanyl ordered for breakthrough pain      Discussed patient condition and risk of morbidity and/or mortality with Hospitalist, RN, RT, Pharmacy, Code status disscussed, Charge nurse / hot rounds and Patient.      The patient remains critically ill.  Critical care time = 80 minutes in directly providing and coordinating critical care and extensive data review.  No time overlap and excludes procedures.

## 2019-09-14 NOTE — PROGRESS NOTES
Bedside report received. Assumed care of pt. Pt sitting up in bed, A&O X4 pt sating on 91% on 15L. No signs of distress, no complaints of pain at this time. Tele box on.Call light and belongings within reach. Bed alarm on, bed locked and in lowest position.

## 2019-09-14 NOTE — ASSESSMENT & PLAN NOTE
Possibly secondary to GERD, chemical aspiration pneumonitis in the setting of chronic Methadone use. Completed 5 days of doxycycline on 9/15 and completed 5 days of Rocephin on 9/18. Per patient he takes prilosec at home.   Plan  -Counseled on head of bed elevation  -Ideally, patient would benefit most from weaning off Methadone  -Continue home prilosec

## 2019-09-14 NOTE — PROGRESS NOTES
Cortrak Placement    Tube Team verified patient name and medical record number prior to tube placement.  Cortrak tube 10 [place]) placed at 86 cm in right nare.  Per Cortrak picture, tube appears to be in the small bowel.    Nursing Instructions: Awaiting KUB to confirm placement before use for medications or feeding. Once placement confirmed, flush tube with 30 ml of water, and then remove and save stylet, in patient medication drawer.

## 2019-09-14 NOTE — PROGRESS NOTES
2 RN skin check completed with Bee YODER.   Redness noted to ears bilat, R buttock and elbow, scabbing to R lat knee.   Plan to reposition Q2h, low airloss mattress, pillows for positioning.

## 2019-09-14 NOTE — PROCEDURES
Intubation  Date/Time: 9/14/2019 12:55 PM  Performed by: Marck Montero Jr., D.O.  Authorized by: Marck Montero Jr., D.O.     Consent:     Consent obtained:  Verbal    Consent given by:  Patient  Pre-procedure details:     Patient status:  Awake    Mallampati score:  I    Pretreatment meds: etomidate.    Paralytics:  Rocuronium  Procedure details:     Preoxygenation:  BiPAP    CPR in progress: no      Intubation method:  Oral    Oral intubation technique:  Video-assisted    Laryngoscope type:  GlideScope    Laryngoscope blade:  Mac 3    Cormack-Lehane Classification:  Grade 1    Tube size (mm):  8.0    Tube type:  Cuffed    Number of attempts:  1    Cricoid pressure: no      Tube visualized through cords: yes    Placement assessment:     ETT to teeth:  24    Tube secured with:  ETT velasquez    Breath sounds:  Equal    Placement verification: chest rise, condensation, CXR verification, direct visualization, equal breath sounds, ETCO2 detector and fiberoptic scope      CXR findings:  ETT in proper place  Post-procedure details:     Patient tolerance of procedure:  Tolerated well, no immediate complications

## 2019-09-14 NOTE — PROCEDURES
Central Line Insertion  Date/Time: 9/14/2019 7:30 AM  Performed by: Marck Montero Jr., D.O.  Authorized by: Marck Montero Jr., D.O.     Consent:     Consent obtained:  Emergent situation (Patient heavily sedated on vent, multiple attempts by myself and RN to reach family were unsuccessful)  Pre-procedure details:     Hand hygiene: Hand hygiene performed prior to insertion      Sterile barrier technique: All elements of maximal sterile technique followed      Skin preparation:  2% chlorhexidine    Skin preparation agent: Skin preparation agent completely dried prior to procedure    Anesthesia:     Anesthesia method:  Local infiltration    Local anesthetic:  Lidocaine 1% w/o epi  Procedure details:     Location:  L internal jugular    Patient position:  Flat    Procedural supplies:  Triple lumen    Catheter size:  7 Fr    Landmarks identified: yes      Ultrasound guidance: yes      Sterile ultrasound techniques: Sterile gel and sterile probe covers were used      Number of attempts:  1    Successful placement: yes    Post-procedure details:     Post-procedure:  Dressing applied and line sutured    Assessment:  Blood return through all ports, placement verified by x-ray, free fluid flow and no pneumothorax on x-ray    Patient tolerance of procedure:  Tolerated well, no immediate complications

## 2019-09-14 NOTE — CARE PLAN
Adult Ventilation Update    Total Vent Days: 1    Patient Lines/Drains/Airways Status      Active Airway       Name: Placement date: Placement time: Site: Days:    Airway ETT Oral 8.0  09/14/19   0045   Oral  less than 1                  Patient failed trials because of Barriers to Wean: FiO2 >60% or PEEP >10 CM H2O (09/14/19 0641)    Sputum/Suction   Cough: Non Productive (09/14/19 1600)  Sputum Amount: Scant (09/14/19 0641)  Sputum Color: Yellow (09/14/19 0641)  Sputum Consistency: Thick (09/14/19 0641)    Mobility  Activity Performed: Unable to mobilize (09/14/19 1600)  Reason Not Mobilized: Bed rest;Unstable condition (09/14/19 1600)    Events/Summary/Plan: rr to 18, lowered oxygen as sue (09/14/19 0816)

## 2019-09-14 NOTE — PROGRESS NOTES
Pt ++ anxious, tripoding in bed, RR 35-40, yelling, requesting water Q10min, hesitency voiding, bladder scan ordered. Md notified.
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
## 2019-09-14 NOTE — RESPIRATORY CARE
Adult Ventilation Update    Total Vent Days: 1    Patient Lines/Drains/Airways Status    Active Airway     Name: Placement date: Placement time: Site: Days:    Airway ETT Oral 8.0  09/14/19 0045   Oral  less than 1               Plateau Pressure (Q Shift): 28 (09/14/19 0201)  Static Compliance (ml / cm H2O): 34 (09/14/19 0201)    Sputum/Suction   Cough: Non Productive (09/14/19 0000)  Sputum Amount: Unable to Evaluate (09/10/19 1402)  Sputum Color: Unable to Evaluate (09/10/19 1402)  Sputum Consistency: Unable to Evaluate (09/10/19 1402)    Mobility  Level of Mobility: Level I (09/14/19 0200)  Activity Performed: Sitting up in bed (09/14/19 0000)  Time Activity Tolerated: 30 min (09/13/19 2000)  Distance Per Occurrence (ft.): 0 feet (09/13/19 2000)  # of Times Distance was Traveled: 0 (09/13/19 2000)  Assistance: Assistance of One (09/14/19 0000)  Ambulation Tolerance: Tolerates Poorly (09/14/19 0000)  Pt Calls for Assistance: Yes (09/14/19 0000)  Staff Present for Mobilization: RN (09/13/19 2000)  Gait: Steady (09/13/19 0800)  Assistive Devices: None (09/13/19 0800)    Events/Summary/Plan: RR 22, Best peep 12 (09/14/19 0201)

## 2019-09-15 ENCOUNTER — APPOINTMENT (OUTPATIENT)
Dept: RADIOLOGY | Facility: MEDICAL CENTER | Age: 59
DRG: 871 | End: 2019-09-15
Attending: INTERNAL MEDICINE
Payer: MEDICARE

## 2019-09-15 PROBLEM — R94.31 ABNORMAL EKG: Status: ACTIVE | Noted: 2019-09-15

## 2019-09-15 PROBLEM — R73.09 ELEVATED GLUCOSE: Status: ACTIVE | Noted: 2019-09-15

## 2019-09-15 LAB
ACTION RANGE TRIGGERED IACRT: NO
ANION GAP SERPL CALC-SCNC: 7 MMOL/L (ref 0–11.9)
BASE EXCESS BLDA CALC-SCNC: 0 MMOL/L (ref -4–3)
BASOPHILS # BLD AUTO: 0.2 % (ref 0–1.8)
BASOPHILS # BLD: 0.06 K/UL (ref 0–0.12)
BODY TEMPERATURE: NORMAL DEGREES
BUN SERPL-MCNC: 32 MG/DL (ref 8–22)
CALCIUM SERPL-MCNC: 8.5 MG/DL (ref 8.5–10.5)
CHLORIDE SERPL-SCNC: 108 MMOL/L (ref 96–112)
CO2 BLDA-SCNC: 25 MMOL/L (ref 20–33)
CO2 SERPL-SCNC: 28 MMOL/L (ref 20–33)
CREAT SERPL-MCNC: 0.96 MG/DL (ref 0.5–1.4)
CRP SERPL HS-MCNC: 7.23 MG/DL (ref 0–0.75)
EKG IMPRESSION: NORMAL
EOSINOPHIL # BLD AUTO: 0 K/UL (ref 0–0.51)
EOSINOPHIL NFR BLD: 0 % (ref 0–6.9)
ERYTHROCYTE [DISTWIDTH] IN BLOOD BY AUTOMATED COUNT: 44.9 FL (ref 35.9–50)
GLUCOSE BLD-MCNC: 141 MG/DL (ref 65–99)
GLUCOSE BLD-MCNC: 159 MG/DL (ref 65–99)
GLUCOSE BLD-MCNC: 163 MG/DL (ref 65–99)
GLUCOSE BLD-MCNC: 170 MG/DL (ref 65–99)
GLUCOSE SERPL-MCNC: 188 MG/DL (ref 65–99)
HCO3 BLDA-SCNC: 24.2 MMOL/L (ref 17–25)
HCT VFR BLD AUTO: 33.8 % (ref 42–52)
HGB BLD-MCNC: 10.8 G/DL (ref 14–18)
HOROWITZ INDEX BLDA+IHG-RTO: 160 MM[HG]
IMM GRANULOCYTES # BLD AUTO: 0.3 K/UL (ref 0–0.11)
IMM GRANULOCYTES NFR BLD AUTO: 1.1 % (ref 0–0.9)
INST. QUALIFIED PATIENT IIQPT: YES
LDH SERPL L TO P-CCNC: 206 U/L (ref 107–266)
LYMPHOCYTES # BLD AUTO: 1.2 K/UL (ref 1–4.8)
LYMPHOCYTES NFR BLD: 4.4 % (ref 22–41)
MAGNESIUM SERPL-MCNC: 2.6 MG/DL (ref 1.5–2.5)
MCH RBC QN AUTO: 27.3 PG (ref 27–33)
MCHC RBC AUTO-ENTMCNC: 32 G/DL (ref 33.7–35.3)
MCV RBC AUTO: 85.6 FL (ref 81.4–97.8)
MONOCYTES # BLD AUTO: 1.29 K/UL (ref 0–0.85)
MONOCYTES NFR BLD AUTO: 4.7 % (ref 0–13.4)
MRSA DNA SPEC QL NAA+PROBE: NORMAL
NEUTROPHILS # BLD AUTO: 24.51 K/UL (ref 1.82–7.42)
NEUTROPHILS NFR BLD: 89.6 % (ref 44–72)
NRBC # BLD AUTO: 0 K/UL
NRBC BLD-RTO: 0 /100 WBC
O2/TOTAL GAS SETTING VFR VENT: 45 %
PCO2 BLDA: 35.4 MMHG (ref 26–37)
PCO2 TEMP ADJ BLDA: 34.3 MMHG (ref 26–37)
PH BLDA: 7.44 [PH] (ref 7.4–7.5)
PH TEMP ADJ BLDA: 7.45 [PH] (ref 7.4–7.5)
PHOSPHATE SERPL-MCNC: 2.4 MG/DL (ref 2.5–4.5)
PLATELET # BLD AUTO: 379 K/UL (ref 164–446)
PMV BLD AUTO: 8.7 FL (ref 9–12.9)
PO2 BLDA: 72 MMHG (ref 64–87)
PO2 TEMP ADJ BLDA: 69 MMHG (ref 64–87)
POTASSIUM SERPL-SCNC: 4.1 MMOL/L (ref 3.6–5.5)
PREALB SERPL-MCNC: 9 MG/DL (ref 18–38)
PROCALCITONIN SERPL-MCNC: 0.57 NG/ML
RBC # BLD AUTO: 3.95 M/UL (ref 4.7–6.1)
SAO2 % BLDA: 95 % (ref 93–99)
SIGNIFICANT IND 70042: NORMAL
SITE SITE: NORMAL
SODIUM SERPL-SCNC: 143 MMOL/L (ref 135–145)
SOURCE SOURCE: NORMAL
SPECIMEN DRAWN FROM PATIENT: NORMAL
TROPONIN T SERPL-MCNC: 57 NG/L (ref 6–19)
TROPONIN T SERPL-MCNC: 68 NG/L (ref 6–19)
VANCOMYCIN TROUGH SERPL-MCNC: 24.1 UG/ML (ref 10–20)
VANCOMYCIN TROUGH SERPL-MCNC: 27.2 UG/ML (ref 10–20)
WBC # BLD AUTO: 27.4 K/UL (ref 4.8–10.8)

## 2019-09-15 PROCEDURE — 71045 X-RAY EXAM CHEST 1 VIEW: CPT

## 2019-09-15 PROCEDURE — 700111 HCHG RX REV CODE 636 W/ 250 OVERRIDE (IP): Performed by: INTERNAL MEDICINE

## 2019-09-15 PROCEDURE — 99292 CRITICAL CARE ADDL 30 MIN: CPT | Performed by: INTERNAL MEDICINE

## 2019-09-15 PROCEDURE — A9270 NON-COVERED ITEM OR SERVICE: HCPCS | Performed by: INTERNAL MEDICINE

## 2019-09-15 PROCEDURE — 700101 HCHG RX REV CODE 250: Performed by: INTERNAL MEDICINE

## 2019-09-15 PROCEDURE — 36600 WITHDRAWAL OF ARTERIAL BLOOD: CPT

## 2019-09-15 PROCEDURE — 700105 HCHG RX REV CODE 258: Performed by: INTERNAL MEDICINE

## 2019-09-15 PROCEDURE — 84484 ASSAY OF TROPONIN QUANT: CPT | Mod: 91

## 2019-09-15 PROCEDURE — 94150 VITAL CAPACITY TEST: CPT

## 2019-09-15 PROCEDURE — 85025 COMPLETE CBC W/AUTO DIFF WBC: CPT

## 2019-09-15 PROCEDURE — 99233 SBSQ HOSP IP/OBS HIGH 50: CPT | Performed by: INTERNAL MEDICINE

## 2019-09-15 PROCEDURE — 80048 BASIC METABOLIC PNL TOTAL CA: CPT

## 2019-09-15 PROCEDURE — 82962 GLUCOSE BLOOD TEST: CPT | Mod: 91

## 2019-09-15 PROCEDURE — 84145 PROCALCITONIN (PCT): CPT

## 2019-09-15 PROCEDURE — 99291 CRITICAL CARE FIRST HOUR: CPT | Performed by: INTERNAL MEDICINE

## 2019-09-15 PROCEDURE — 83615 LACTATE (LD) (LDH) ENZYME: CPT

## 2019-09-15 PROCEDURE — 93010 ELECTROCARDIOGRAM REPORT: CPT | Performed by: INTERNAL MEDICINE

## 2019-09-15 PROCEDURE — 700102 HCHG RX REV CODE 250 W/ 637 OVERRIDE(OP): Performed by: INTERNAL MEDICINE

## 2019-09-15 PROCEDURE — 84134 ASSAY OF PREALBUMIN: CPT

## 2019-09-15 PROCEDURE — 83735 ASSAY OF MAGNESIUM: CPT

## 2019-09-15 PROCEDURE — 770022 HCHG ROOM/CARE - ICU (200)

## 2019-09-15 PROCEDURE — 84100 ASSAY OF PHOSPHORUS: CPT

## 2019-09-15 PROCEDURE — 80202 ASSAY OF VANCOMYCIN: CPT

## 2019-09-15 PROCEDURE — 82803 BLOOD GASES ANY COMBINATION: CPT

## 2019-09-15 PROCEDURE — 86140 C-REACTIVE PROTEIN: CPT

## 2019-09-15 PROCEDURE — 94640 AIRWAY INHALATION TREATMENT: CPT

## 2019-09-15 PROCEDURE — 93005 ELECTROCARDIOGRAM TRACING: CPT | Performed by: INTERNAL MEDICINE

## 2019-09-15 PROCEDURE — 94003 VENT MGMT INPAT SUBQ DAY: CPT

## 2019-09-15 RX ORDER — OXYCODONE HYDROCHLORIDE 5 MG/1
5-10 TABLET ORAL EVERY 4 HOURS PRN
Status: DISCONTINUED | OUTPATIENT
Start: 2019-09-15 | End: 2019-09-18

## 2019-09-15 RX ORDER — ASPIRIN 325 MG
325 TABLET ORAL DAILY
Status: DISCONTINUED | OUTPATIENT
Start: 2019-09-15 | End: 2019-09-15

## 2019-09-15 RX ORDER — DEXMEDETOMIDINE HYDROCHLORIDE 4 UG/ML
.1-1.5 INJECTION, SOLUTION INTRAVENOUS CONTINUOUS
Status: DISCONTINUED | OUTPATIENT
Start: 2019-09-15 | End: 2019-09-17

## 2019-09-15 RX ORDER — ASPIRIN 325 MG
325 TABLET ORAL DAILY
Status: DISCONTINUED | OUTPATIENT
Start: 2019-09-16 | End: 2019-09-18

## 2019-09-15 RX ADMIN — PROPOFOL 30 MCG/KG/MIN: 10 INJECTION, EMULSION INTRAVENOUS at 08:56

## 2019-09-15 RX ADMIN — ENOXAPARIN SODIUM 40 MG: 100 INJECTION SUBCUTANEOUS at 05:05

## 2019-09-15 RX ADMIN — DOXYCYCLINE 100 MG: 100 TABLET, FILM COATED ORAL at 05:06

## 2019-09-15 RX ADMIN — PROPOFOL 30 MCG/KG/MIN: 10 INJECTION, EMULSION INTRAVENOUS at 00:21

## 2019-09-15 RX ADMIN — HYDROMORPHONE HYDROCHLORIDE 0.3 MG/HR: 10 INJECTION, SOLUTION INTRAMUSCULAR; INTRAVENOUS; SUBCUTANEOUS at 22:27

## 2019-09-15 RX ADMIN — ASPIRIN 325 MG: 325 TABLET, FILM COATED ORAL at 11:06

## 2019-09-15 RX ADMIN — METHYLPREDNISOLONE SODIUM SUCCINATE 125 MG: 125 INJECTION, POWDER, FOR SOLUTION INTRAMUSCULAR; INTRAVENOUS at 05:05

## 2019-09-15 RX ADMIN — OXYCODONE HYDROCHLORIDE 10 MG: 5 TABLET ORAL at 14:54

## 2019-09-15 RX ADMIN — OXYCODONE HYDROCHLORIDE 5 MG: 5 TABLET ORAL at 06:49

## 2019-09-15 RX ADMIN — TEMAZEPAM 15 MG: 15 CAPSULE ORAL at 18:17

## 2019-09-15 RX ADMIN — LEVOTHYROXINE SODIUM 125 MCG: 125 TABLET ORAL at 05:43

## 2019-09-15 RX ADMIN — GABAPENTIN 2400 MG: 400 CAPSULE ORAL at 05:06

## 2019-09-15 RX ADMIN — ACETAMINOPHEN 650 MG: 325 TABLET, FILM COATED ORAL at 06:49

## 2019-09-15 RX ADMIN — SENNOSIDES, DOCUSATE SODIUM 2 TABLET: 50; 8.6 TABLET, FILM COATED ORAL at 18:16

## 2019-09-15 RX ADMIN — IPRATROPIUM BROMIDE AND ALBUTEROL SULFATE 3 ML: .5; 3 SOLUTION RESPIRATORY (INHALATION) at 01:55

## 2019-09-15 RX ADMIN — METHYLPREDNISOLONE SODIUM SUCCINATE 125 MG: 125 INJECTION, POWDER, FOR SOLUTION INTRAMUSCULAR; INTRAVENOUS at 23:35

## 2019-09-15 RX ADMIN — FAMOTIDINE 20 MG: 20 TABLET ORAL at 05:07

## 2019-09-15 RX ADMIN — CEFTRIAXONE SODIUM 2 G: 2 INJECTION, POWDER, FOR SOLUTION INTRAMUSCULAR; INTRAVENOUS at 05:04

## 2019-09-15 RX ADMIN — ACETAMINOPHEN 650 MG: 325 TABLET, FILM COATED ORAL at 18:15

## 2019-09-15 RX ADMIN — IPRATROPIUM BROMIDE AND ALBUTEROL SULFATE 3 ML: .5; 3 SOLUTION RESPIRATORY (INHALATION) at 21:27

## 2019-09-15 RX ADMIN — VANCOMYCIN HYDROCHLORIDE 1300 MG: 500 INJECTION, POWDER, LYOPHILIZED, FOR SOLUTION INTRAVENOUS at 16:52

## 2019-09-15 RX ADMIN — FAMOTIDINE 20 MG: 20 TABLET ORAL at 18:16

## 2019-09-15 RX ADMIN — IPRATROPIUM BROMIDE AND ALBUTEROL SULFATE 3 ML: .5; 3 SOLUTION RESPIRATORY (INHALATION) at 06:14

## 2019-09-15 RX ADMIN — DEXMEDETOMIDINE HYDROCHLORIDE 0.2 MCG/KG/HR: 100 INJECTION, SOLUTION INTRAVENOUS at 14:34

## 2019-09-15 RX ADMIN — METHADONE HYDROCHLORIDE 20 MG: 10 TABLET ORAL at 11:15

## 2019-09-15 RX ADMIN — POLYETHYLENE GLYCOL 3350 1 PACKET: 17 POWDER, FOR SOLUTION ORAL at 12:17

## 2019-09-15 RX ADMIN — METHADONE HYDROCHLORIDE 20 MG: 10 TABLET ORAL at 18:17

## 2019-09-15 RX ADMIN — METHYLPREDNISOLONE SODIUM SUCCINATE 125 MG: 125 INJECTION, POWDER, FOR SOLUTION INTRAMUSCULAR; INTRAVENOUS at 18:16

## 2019-09-15 RX ADMIN — FUROSEMIDE 20 MG: 10 INJECTION, SOLUTION INTRAMUSCULAR; INTRAVENOUS at 05:05

## 2019-09-15 RX ADMIN — OXYCODONE HYDROCHLORIDE 10 MG: 5 TABLET ORAL at 19:53

## 2019-09-15 RX ADMIN — IPRATROPIUM BROMIDE AND ALBUTEROL SULFATE 3 ML: .5; 3 SOLUTION RESPIRATORY (INHALATION) at 18:17

## 2019-09-15 RX ADMIN — IPRATROPIUM BROMIDE AND ALBUTEROL SULFATE 3 ML: .5; 3 SOLUTION RESPIRATORY (INHALATION) at 14:03

## 2019-09-15 RX ADMIN — SIMVASTATIN 40 MG: 40 TABLET, FILM COATED ORAL at 20:22

## 2019-09-15 RX ADMIN — METHADONE HYDROCHLORIDE 30 MG: 10 TABLET ORAL at 05:06

## 2019-09-15 RX ADMIN — DIBASIC SODIUM PHOSPHATE, MONOBASIC POTASSIUM PHOSPHATE AND MONOBASIC SODIUM PHOSPHATE 2 TABLET: 852; 155; 130 TABLET ORAL at 11:06

## 2019-09-15 RX ADMIN — GABAPENTIN 1600 MG: 400 CAPSULE ORAL at 18:16

## 2019-09-15 RX ADMIN — IPRATROPIUM BROMIDE AND ALBUTEROL SULFATE 3 ML: .5; 3 SOLUTION RESPIRATORY (INHALATION) at 09:44

## 2019-09-15 RX ADMIN — METHYLPREDNISOLONE SODIUM SUCCINATE 125 MG: 125 INJECTION, POWDER, FOR SOLUTION INTRAMUSCULAR; INTRAVENOUS at 11:06

## 2019-09-15 RX ADMIN — SENNOSIDES, DOCUSATE SODIUM 2 TABLET: 50; 8.6 TABLET, FILM COATED ORAL at 05:07

## 2019-09-15 RX ADMIN — DEXMEDETOMIDINE HYDROCHLORIDE 0.8 MCG/KG/HR: 100 INJECTION, SOLUTION INTRAVENOUS at 22:26

## 2019-09-15 RX ADMIN — NOREPINEPHRINE BITARTRATE 5 MCG/MIN: 1 INJECTION INTRAVENOUS at 11:17

## 2019-09-15 ASSESSMENT — PULMONARY FUNCTION TESTS: FVC: 1.97

## 2019-09-15 NOTE — PROGRESS NOTES
Infectious Disease Progress Note    Author: Pablo Dang M.D. Date & Time of service: 9/15/2019  9:14 AM    Chief Complaint:  Shortness of breath    Interval History:   afebrile, white count 11.1.  On high-dose IV steroids. Reports feeling a little better. Still having SOA on exertion.   afebrile, white count up to 28.5.  Intubated yesterday due to progressive respiratory distress.  RVP positive for rhino/enterovirus.  9/15 remains afebrile, white count 27.4.  Patient's respiratory status has improved some, currently on pressure support trial.    Labs Reviewed and Medications Reviewed.    Review of Systems:  Review of Systems   Unable to perform ROS: Intubated       Hemodynamics:  No data recorded.  Monitored Temp: 35.9 °C (96.6 °F)  Pulse  Av.7  Min: 59  Max: 110Heart Rate (Monitored): 74  Blood Pressure: 115/68  CVP (mm Hg): (!) 16 MM HG    Physical Exam:  Physical Exam   Constitutional: He is oriented to person, place, and time. He appears well-nourished. No distress.   Thin  Intubated and sedated   HENT:   ET tube in place   Eyes: Pupils are equal, round, and reactive to light. Conjunctivae are normal. No scleral icterus.   Neck: Neck supple.   Cardiovascular: Normal rate, regular rhythm and normal heart sounds.   No murmur heard.  Pulmonary/Chest: Effort normal and breath sounds normal. No respiratory distress.   Coarse breath sounds bilaterally   Abdominal: Soft. Bowel sounds are normal. He exhibits no distension. There is no tenderness.   No grimace on palpation   Musculoskeletal: Normal range of motion. He exhibits no edema.   Lymphadenopathy:     He has no cervical adenopathy.   Neurological: He is alert and oriented to person, place, and time.   Intubated and sedated   Skin: Skin is warm and dry. No rash noted. He is not diaphoretic. No erythema.   Psychiatric:   Unable to assess   Nursing note and vitals reviewed.      Meds:    Current Facility-Administered Medications:   •  insulin  regular  •  phosphorus  •  Respiratory Care per Protocol  •  ipratropium-albuterol  •  famotidine **OR** famotidine  •  senna-docusate **AND** polyethylene glycol/lytes **AND** magnesium hydroxide **AND** bisacodyl  •  MD Alert...Adult ICU Electrolyte Replacement per Pharmacy  •  lidocaine  •  [DISCONTINUED] insulin regular **AND** Accu-Chek Q6 if NPO **AND** NOTIFY MD and PharmD **AND** glucose **AND** dextrose 10% bolus  •  Pharmacy  •  temazepam  •  simvastatin  •  oxyCODONE immediate-release  •  levothyroxine  •  methadone **AND** methadone **AND** methadone  •  gabapentin  •  gabapentin  •  acetaminophen  •  NORepinephrine (LEVOPHED) infusion  •  HYDROmorphone  •  propofol **AND** Triglycerides Starting now and then Every 3 Days  •  furosemide  •  methylPREDNISolone  •  ipratropium-albuterol  •  MD Alert...Vancomycin per Pharmacy  •  cefTRIAXone (ROCEPHIN) IV  •  vancomycin  •  enoxaparin (LOVENOX) injection  •  sodium chloride  •  LR    Labs:  Recent Labs     09/13/19  0320 09/14/19  0300 09/15/19  0532   WBC 15.7* 28.5* 27.4*   RBC 3.98* 4.49* 3.95*   HEMOGLOBIN 10.8* 12.3* 10.8*   HEMATOCRIT 34.3* 38.1* 33.8*   MCV 86.2 84.9 85.6   MCH 27.1 27.4 27.3   RDW 43.1 43.1 44.9   PLATELETCT 268 357 379   MPV 8.9* 8.4* 8.7*   NEUTSPOLYS  --   --  89.60*   LYMPHOCYTES  --   --  4.40*   MONOCYTES  --   --  4.70   EOSINOPHILS  --   --  0.00   BASOPHILS  --   --  0.20     Recent Labs     09/13/19  0320 09/14/19  0300 09/15/19  0532   SODIUM 137 141 143   POTASSIUM 3.6 3.2* 4.1   CHLORIDE 105 105 108   CO2 24 24 28   GLUCOSE 188* 203* 188*   BUN 13 18 32*     Recent Labs     09/13/19  0320 09/14/19  0300 09/15/19  0532   ALBUMIN  --  3.7  --    CREATININE 0.88 0.89 0.96       Imaging:  Dx-chest-2 Views    Result Date: 9/12/2019 9/12/2019 12:18 PM HISTORY/REASON FOR EXAM:  Short of breath. TECHNIQUE/EXAM DESCRIPTION AND NUMBER OF VIEWS: Two views of the chest. COMPARISON:  1/3/2015. FINDINGS: New airspace opacity in the  left lower lobe. No pleural effusions, no pneumothorax are appreciated. Stable cardiopericardial silhouette.     New airspace opacity in the left lower lobe, concerning for pneumonia.      Micro:  Results     Procedure Component Value Units Date/Time    MRSA By PCR (Amp) [[id number]] Collected:  09/14/19 1035    Order Status:  Completed Specimen:  Respirate from Nares Updated:  09/14/19 1115    Narrative:       Collected By:[id number] SURAY CLARK    URINALYSIS [[id number]]  (Abnormal) Collected:  09/14/19 1035    Order Status:  Completed Specimen:  Urine, Bell Cath Updated:  09/14/19 1054     Color Yellow     Character Clear     Specific Gravity 1.008     Ph 5.0     Glucose Negative mg/dL      Ketones Negative mg/dL      Protein Negative mg/dL      Bilirubin Negative     Urobilinogen, Urine 0.2     Nitrite Negative     Leukocyte Esterase Moderate     Occult Blood Negative     Micro Urine Req Microscopic    Narrative:       Collected By:[id number] SURYA CLARK    GRAM STAIN [[id number]] Collected:  09/14/19 0110    Order Status:  Completed Specimen:  Respirate Updated:  09/14/19 0550     Significant Indicator .     Source RESP     Site BRONCHOALVEOLAR LAVAGE LLL     Gram Stain Result Many WBCs.  Rare Gram positive cocci.  <5% intracellular organisms.      Narrative:       Collected By:[id number] TONY ROSE  BAL from LLL lobe  Collected By:[id number] TONY Leslie Bronchial Washing [[id number]] Collected:  09/14/19 0110    Order Status:  Completed Specimen:  Respirate from Bronchoalveolar Lavage Updated:  09/14/19 0301    Narrative:       Collected By:[id number] TONY ROSE  BAL from LLL lobe    Fungal Culture - BAL [[id number]] Collected:  09/14/19 0110    Order Status:  Sent Specimen:  Respirate from Bronchoalveolar Lavage     Fungal Smear - BAL [[id number]] Collected:  09/14/19 0110    Order Status:  Sent Specimen:  Respirate from Bronchoalveolar Lavage     Culture Respiratory W/ Grm Stn - BAL  [[id number]] Collected:  09/14/19 0110    Order Status:  Completed Specimen:  Respirate from Bronchoalveolar Lavage     AFB Culture - BAL [[id number]] Collected:  09/14/19 0110    Order Status:  Sent Specimen:  Respirate from Bronchoalveolar Lavage     GRAM STAIN [[id number]] Collected:  09/13/19 1600    Order Status:  Completed Specimen:  Respirate Updated:  09/13/19 1912     Significant Indicator .     Source RESP     Site Sputum     Gram Stain Result Sputum Gram stain quality score is <1, probable  oropharyngeal contamination. Culture not performed.  Recollect if clinically indicated.      CULTURE RESPIRATORY W/ GRM STN [[id number]] Collected:  09/13/19 1600    Order Status:  Completed Updated:  09/13/19 1608    Flu and RSV by PCR [[id number]] Collected:  09/11/19 2045    Order Status:  Completed Specimen:  Respirate from Nasopharyngeal Updated:  09/11/19 2217     Influenza virus A RNA Negative     Influenza virus B, PCR Negative     RSV, PCR Negative    Narrative:       Collected By:[id number] [facility]    GRAM STAIN [[id number]] Collected:  09/11/19 0500    Order Status:  Completed Specimen:  Respirate Updated:  09/11/19 1114     Significant Indicator .     Source RESP     Site SPUTUM     Gram Stain Result Sputum Gram stain quality score is <1, probable  oropharyngeal contamination. Culture not performed.  Recollect if clinically indicated.      Narrative:       Collected By:[id number] HERB MARSHALL  Collected By:[id number] HERB MARSHALL    CULTURE RESPIRATORY W/ GRM STN [[id number]] Collected:  09/11/19 0500    Order Status:  Canceled Specimen:  Sputum     Influenza A/B By PCR (Adult - Flu Only) [[id number]] Collected:  09/10/19 2342    Order Status:  Completed Specimen:  Respirate from Nasopharyngeal Updated:  09/11/19 0030     Influenza virus A RNA Negative     Influenza virus B, PCR Negative    Narrative:       Collected By:[id number] HERB MARSHALL    GRAM STAIN [[id number]] Collected:  09/10/19 1111    Order Status:   Completed Specimen:  Respirate Updated:  09/10/19 1154     Significant Indicator .     Source RESP     Site SPUTUM     Gram Stain Result Sputum Gram stain quality score is <1, probable  oropharyngeal contamination. Culture not performed.  Recollect if clinically indicated.      Narrative:       CALL  Ivy  171 tel. [phone],  CALLED  171 tel. [phone] 09/10/2019, 11:53, RB PERF. RESULTS CALLED TO:BEBA Thomson  Collected By:[id number] ORVILLE GUNN  Preferably before first antibiotic dose - add Gram stain if  indicated  Collected By:[id number] ORVILLE CLARK.    Culture Respiratory W/ GRM STN [[id number]] Collected:  09/10/19 1111    Order Status:  Completed Specimen:  Respirate from Sputum Updated:  09/10/19 1126    Narrative:       Collected By:[id number] ORVILLE GUNN  Preferably before first antibiotic dose - add Gram stain if  indicated          Assessment:  Pancho Martinez Jr. is a 59 y.o. male with a history of history of chronic pain syndrome on methadone and high doses of gabapentin, significant smoking history, emphysema not on chronic oxygen at home, presented with 3 weeks of progressive shortness of breath, especially worse over the past 3 days.  CT with worsening emphysematous changes and diffuse groundglass opacities.     Patient seems to have underlying worsening emphysematous changes. He may or may not have a superimposed bacterial pneumonia, but if there is a superimposed infection, the groundglass opacities on CT are more suggestive of either a viral or an atypical bacterial infection. His son had a viral URI around the same time the patient's symptoms began.    Patient was also started on high-dose IV steroids, had progressive respiratory distress, intubated on 9/13.  RVP positive for rhino/enterovirus    Pertinent Diagnoses  Sepsis  Acute hypoxemic respiratory failure, worse  Viral pneumonia  Emphysema  Possible underlying ILD with flare  Leukocytosis, worse    Plan:  -Worsening leukocytosis but  also on high-dose IV steroids  -Repeat CT from 9/14 again with bilateral emphysematous changes, diffuse ground glass opacities, about the same compared to previous CT  -RVP with rhino/enterovirus.  Could be the  of his current presentation in setting of underlying emphysema, ILD with possible induced flare, poor reserve. Procalcitonin improved to 0.22 which further supports this  -Continue empiric coverage for a superimposed bacterial pneumonia with IV vancomycin, ceftriaxone, doxycycline (given atypical appearance of his CT) for now given his acute worsening.  Follow BAL cultures from 9/14.  Will de-escalate as appropriate    -Repeat chest x-ray from today with no significant interval changes noted    Discussed with internal medicine, Dr. Gonda    ID will follow.  Please call with questions.

## 2019-09-15 NOTE — PROGRESS NOTES
"Pharmacy Kinetics 59 y.o. male on vancomycin day # 3 9/15/2019    Currently on Vancomycin 1200 mg iv q12hr  Provider specified end date: TBD    Indication for Treatment: Pneumonia    Pertinent history per medical record: Admitted on 9/10/2019 for complaints of SOB x 4 days, with worsening dyspnea and productive cough.  PMH includes asthma, emphysema, and hypothyroidism.  Pt required intubation.  Rhinovirus/Enterovirus PCR positive. Imaging suggests atypical pneumonia.  Empiric antibiotics for coverage of superimposed bacterial pneumonia.  ID consulting.    Other antibiotics: ceftriaxone 2 grams iv q24h, completed doxycycline therapy this morning    Allergies: Patient has no known allergies.     List concerns for renal function: SCr elevated from baseline (baseline ~0.67), BUN/SCr ratio > 20:1    Pertinent cultures to date:   9/14/19 BAL: rare GPC    MRSA nares swab if pneumonia is a concern: in process from 9/13    Recent Labs     09/13/19  0320 09/14/19  0300 09/15/19  0532   WBC 15.7* 28.5* 27.4*   NEUTSPOLYS  --   --  89.60*     Recent Labs     09/13/19  0320 09/14/19  0300 09/15/19  0532   BUN 13 18 32*   CREATININE 0.88 0.89 0.96   ALBUMIN  --  3.7  --      Recent Labs     09/15/19  0915 09/15/19  1103   VANCOTROUGH 27.2* 24.1*       Intake/Output Summary (Last 24 hours) at 9/15/2019 1340  Last data filed at 9/15/2019 1200  Gross per 24 hour   Intake 2098.27 ml   Output 2045 ml   Net 53.27 ml      /77   Pulse 76   Temp 36.6 °C (97.8 °F) (Temporal)   Resp 16   Ht 1.803 m (5' 11\")   Wt 73.5 kg (162 lb 0.6 oz)   SpO2 95%  No data recorded.      A/P   1. Vancomycin dose change: yes, vancomycin 1300 mg iv q24h  2. Next vancomycin level: 3 days  3. Goal trough: 16-20 mcg/mL  4. Assessment: BUN/SCr slowly drifting upward.  The patient is also being diuresed.  Vancomycin level this morning is supra-therapeutic.  The patient is not at steady state.  5. Plan:  Changed regimen to 18 mg/kg iv q24h starting " 19-hrs after the last dose was given with a recommended follow up level in 3 days.    Cecilia Joseph, Pharm.D., BCPS

## 2019-09-15 NOTE — CARE PLAN
Adult Ventilation Update    Total Vent Days: 2    Patient Lines/Drains/Airways Status      Active Airway       Name: Placement date: Placement time: Site: Days:    Airway ETT Oral 8.0  09/14/19   0045   Oral  1                    In the last 24 hours, the patient tolerated SBT for 5 hours plus on settings of CPAP 8/5    #FVC / Vital Capacity (liters) : 1.97 (09/15/19 1147)  NIF (cm H2O) : -31 (09/15/19 1147)  Rapid Shallow Breathing Index (RR/VT): 17 (09/15/19 1147)  Plateau Pressure (Q Shift): 16 (09/14/19 1819)  Static Compliance (ml / cm H2O): 101 (09/14/19 1819)    Patient failed trials because of Barriers to Wean  Barriers to SBT    Length of Weaning Trial      Cough: Non Productive (09/14/19 1600)  Sputum Amount: Small (09/15/19 1200)  Sputum Color: Clear (09/15/19 1200)  Sputum Consistency: Thin (09/15/19 1200)    Mobility  Level of Mobility: Level IV (09/15/19 1200)  Activity Performed: Unable to mobilize (09/15/19 1200)  Time Activity Tolerated: 30 min (09/13/19 2000)  Distance Per Occurrence (ft.): 0 feet (09/13/19 2000)  # of Times Distance was Traveled: 0 (09/13/19 2000)  Assistance: Assistance of One (09/14/19 0000)  Ambulation Tolerance: Tolerates Poorly (09/14/19 0000)  Pt Calls for Assistance: Yes (09/14/19 0000)  Staff Present for Mobilization: RN (09/13/19 2000)  Gait: Steady (09/13/19 0800)  Assistive Devices: None (09/15/19 1200)  Reason Not Mobilized: Bed rest;Unstable condition (09/15/19 1200)    Events/Summary/Plan: Decreased RR to 14 and PEEP to 10. SBT'd for 5 hours plus.

## 2019-09-15 NOTE — PROGRESS NOTES
Critical Care Progress Note    Date of admission  9/10/2019    Chief Complaint  59 y.o. male admitted 9/10/2019 with Respiratory failure    Hospital Course    Majority of history is obtained from chart as the patient is in severe respiratory distress limiting adequate history.  59 y.o. male with past medical history of chronic pain on methadone and gabapentin, COPD, depression, hypothyroidism, asthma, who presented 9/10/2019 as a transfer from [facility] where he presented on 9/10/2019 for shortness of breath for the last 4 days and a productive cough.  The patient was transferred to [facility] for higher level of care.  A CT scan showed multifocal pneumonia with groundglass opacities overlying his chronic paraseptal emphysema and lower lobe honeycombing.  He was started on Rocephin and doxycycline.  Infectious disease was consulted and a respiratory viral panel was sent which was positive for rhinovirus/enterovirus.  Procalcitonin was initially 0.52 on 9/10/2019 and quickly improved to 0.27 on 9/12/2019 and 0.22 on 9/13/2019.  This afternoon the patient's respiratory status worsened and is oxygen requirements increased to 15 L via Oxymask.  This evening patient required placement on high flow nasal cannula for persistent hypoxia in the 80s and he was transferred to the ICU for further care.  Upon arrival in the ICU he was clearly in respiratory distress and placed on BiPAP.  He subsequently worsened even on BiPAP with respiratory rate at 37 and persistent hypoxia. Intubation was discussed with the patient and he elected to proceed for his severe respiratory failure.     Per the patient's chart he has had several recurrent episodes of pneumonia and he states he was intubated approximately 6 years ago for the same.  He reportedly uses no oxygen at home and has no known pulmonary diagnosis.        Interval Problem Update  Reviewed last 24 hour events:   - constipation   - d/c  dilaudid gtt, increase oxycodone   - NE gtt @ 3   - decreasing WBC   - glucose remains elevated on SSI   - low phos   - abnormal EKG this morning   -Remains on norepinephrine infusion at 5 mcg for persistent shock    Review of Systems  Review of Systems   Unable to perform ROS: Intubated        Vital Signs for last 24 hours   Pulse:  [61-85] 71  Resp:  [16-22] 16  BP: ()/(57-75) 115/68  SpO2:  [93 %-99 %] 95 %    Hemodynamic parameters for last 24 hours  CVP:  [7 MM HG-16 MM HG] 16 MM HG    Respiratory Information for the last 24 hours  [place] Vent Mode: APVCMV  Rate (breaths/min): 18  Vt Target (mL): 550  PEEP/CPAP: 12  FiO2: 45  P MEAN: 14  Control VTE (exp VT): 687    Physical Exam   Physical Exam   Constitutional: He appears well-developed and well-nourished. He is cooperative.  Non-toxic appearance. He appears ill. No distress. He is intubated.   HENT:   Head: Normocephalic and atraumatic.   Nose: Nose normal.   Endotracheal tube and gastric tube in position   Eyes: Pupils are equal, round, and reactive to light. Conjunctivae are normal. Right eye exhibits no discharge. Left eye exhibits no discharge.   Neck: Neck supple. No JVD present. No thyromegaly present.   Cardiovascular: Normal rate, regular rhythm and intact distal pulses.  No extrasystoles are present. PMI is not displaced. Exam reveals decreased pulses.   No murmur heard.  Pulses:       Radial pulses are 1+ on the right side, and 1+ on the left side.   Still requiring norepinephrine infusion, brisk capillary refill   Pulmonary/Chest: No accessory muscle usage. No tachypnea. He is intubated. He has no decreased breath sounds. He has no wheezes. He has rhonchi in the right middle field, the right lower field, the left middle field and the left lower field. He has no rales.   Minimal secretions   Abdominal: Soft. Bowel sounds are normal. There is no tenderness. There is no rebound and no guarding.   Genitourinary:   Genitourinary Comments: Arabella  catheter in place   Musculoskeletal: He exhibits no edema or tenderness.   Neurological: He is alert. No cranial nerve deficit. He exhibits normal muscle tone.   Awakens easily, follows commands, mildly anxious, no focal deficits   Skin: Skin is warm and dry. No erythema.   Psychiatric:   Unable to assess given current clinical condition   Nursing note and vitals reviewed.      Medications  Current Facility-Administered Medications   Medication Dose Route Frequency Provider Last Rate Last Dose   • Respiratory Care per Protocol   Nebulization Continuous RT FLORA Mcdermott Jr..O.       • ipratropium-albuterol (DUONEB) nebulizer solution  3 mL Nebulization Q2HRS PRN (RT) FLORA Mcdermott Jr..O.       • famotidine (PEPCID) tablet 20 mg  20 mg Enteral Tube Q12HRS FLORA Mcdermott Jr..O.   20 mg at 09/15/19 0507    Or   • famotidine (PEPCID) injection 20 mg  20 mg Intravenous Q12HRS FLORA Mcdermott Jr..O.   20 mg at 09/14/19 0521   • senna-docusate (PERICOLACE or SENOKOT S) 8.6-50 MG per tablet 2 Tab  2 Tab Enteral Tube BID FLORA Mcdermott Jr..O.   2 Tab at 09/15/19 0507    And   • polyethylene glycol/lytes (MIRALAX) PACKET 1 Packet  1 Packet Enteral Tube QDAY PRN FLORA Mcdermtot Jr..O.        And   • magnesium hydroxide (MILK OF MAGNESIA) suspension 30 mL  30 mL Enteral Tube QDAY PRN FLORA Mcdermott Jr..O.        And   • bisacodyl (DULCOLAX) suppository 10 mg  10 mg Rectal QDAY PRN FLORA Mcdermott Jr..O.       • MD Alert...ICU Electrolyte Replacement per Pharmacy   Other PHARMACY TO DOSE FLORA Mcdermott Jr..O.       • lidocaine (XYLOCAINE) 1 % injection 1-2 mL  1-2 mL Tracheal Tube Q30 MIN PRN FLORA Mcdermott Jr..O.       • insulin regular (HUMULIN R) injection 1-6 Units  1-6 Units Subcutaneous Q6HRS FLORA Mcdermott Jr..O.   Stopped at 09/15/19 0600    And   • glucose 4 g chewable tablet 16 g  16 g Oral Q15 MIN PRN Marck M Winston Jr., D.O.        And   • DEXTROSE 10% BOLUS 250 mL  250 mL  Intravenous Q15 MIN PRN Marck Montero Jr., D.O.       • Pharmacy Consult: Enteral tube insertion - review meds/change route/product selection  1 Each Other PHARMACY TO DOSE Marck Montero Jr., D.O.       • temazepam (RESTORIL) capsule 15 mg  15 mg Enteral Tube Q EVENING Marck Montero Jr. D.O.   15 mg at 09/14/19 1746   • simvastatin (ZOCOR) tablet 40 mg  40 mg Enteral Tube Nightly Marck Montero Jr., D.O.   40 mg at 09/14/19 2032   • oxyCODONE immediate-release (ROXICODONE) tablet 5 mg  5 mg Enteral Tube Q4HRS PRN Marck Montero Jr. D.O.   5 mg at 09/15/19 0649   • levothyroxine (SYNTHROID) tablet 125 mcg  125 mcg Enteral Tube QAM AC Marck Montero Jr., D.O.   125 mcg at 09/15/19 0543   • methadone (DOLOPHINE) tablet 20 mg  20 mg Enteral Tube DAILY AT NOON Marck Montero Jr., D.O.   20 mg at 09/14/19 1241    And   • methadone (DOLOPHINE) tablet 30 mg  30 mg Enteral Tube QAM Marck Montero Jr., D.O.   30 mg at 09/15/19 0506    And   • methadone (DOLOPHINE) tablet 20 mg  20 mg Enteral Tube Q EVENING Marck Montero Jr., D.O.   20 mg at 09/14/19 1746   • gabapentin (NEURONTIN) capsule 2,400 mg  2,400 mg Enteral Tube QAM Marck Montero Jr., D.O.   2,400 mg at 09/15/19 0506   • gabapentin (NEURONTIN) capsule 1,600 mg  1,600 mg Enteral Tube Q EVENING Marck Montero Jr., D.O.   1,600 mg at 09/14/19 1747   • acetaminophen (TYLENOL) tablet 650 mg  650 mg Enteral Tube Q6HRS PRN Marck Montero Jr., D.O.   650 mg at 09/15/19 0649   • norepinephrine (LEVOPHED) 8 mg in  mL Infusion  0-30 mcg/min Intravenous Continuous Marck Montero Jr. D.O. 5.6 mL/hr at 09/15/19 0521 3 mcg/min at 09/15/19 0521   • HYDROmorphone (DILAUDID) 0.2 mg/mL in 50mL (Continuous Infusion)   Intravenous Continuous Marck Montero Jr., D.O. 1.5 mL/hr at 09/15/19 0657 0.3 mg/hr at 09/15/19 0657   • propofol (DIPRIVAN) injection  0-80 mcg/kg/min Intravenous Continuous Marck Montero Jr., D.O. 14.2 mL/hr at 09/15/19 0021 30 mcg/kg/min at  09/15/19 0021   • furosemide (LASIX) injection 20 mg  20 mg Intravenous Q12HRS Jeremy M Gonda, M.D.   20 mg at 09/15/19 0505   • methylPREDNISolone sod succ (SOLU-MEDROL) 125 MG injection 125 mg  125 mg Intravenous Q6HRS Jeremy M Gonda, M.D.   125 mg at 09/15/19 0505   • ipratropium-albuterol (DUONEB) nebulizer solution  3 mL Nebulization Q4HRS (RT) Berny Flood M.D.   3 mL at 09/15/19 0614   • MD Alert...Vancomycin per Pharmacy   Other PHARMACY TO DOSE Berny Flood M.D.       • cefTRIAXone (ROCEPHIN) 2 g in  mL IVPB  2 g Intravenous Q24HRS Berny Flood M.D.   Stopped at 09/15/19 0534   • vancomycin (VANCOCIN) 1,200 mg in  mL IVPB  1,200 mg Intravenous Q12HR Berny Flood M.D.   Stopped at 09/15/19 0035   • enoxaparin (LOVENOX) inj 40 mg  40 mg Subcutaneous DAILY Marck Montero Jr., D.O.   40 mg at 09/15/19 0505   • sodium chloride (OCEAN) 0.65 % nasal spray 2 Spray  2 Spray Nasal Q2HRS PRN Mary Silva M.D.   2 [place] at 09/13/19 1647   • lactated ringers infusion (BOLUS)  1,000 mL Intravenous Once PRN Asim Holley M.D.           Fluids    Intake/Output Summary (Last 24 hours) at 9/15/2019 0741  Last data filed at 9/15/2019 0600  Gross per 24 hour   Intake 2104.59 ml   Output 1370 ml   Net 734.59 ml       Laboratory  Recent Labs     09/12/19  1302 09/13/19  1254 09/14/19  0150 09/14/19  0447 09/15/19  0451   BUWVG51C 7.40 7.43  --   --   --    [id number] 36.7 33.0  --   --   --    [id number] 60.7* 51.0*  --   --   --    [id number] 90.7* 85.9*  --   --   --    ARTHCO3 22 22  --   --   --    H1FYCZINL 5.0 15.0*  --   --   --    ARTBE -2 -2  --   --   --    ISTATAPH  --   --  7.302* 7.385* 7.444   ISTATAPCO2  --   --  54.4* 39.6* 35.4   ISTATAPO2  --   --  282* 86 72   ISTATATCO2  --   --  29 25 25   [id number]  --   --  100* 96 95   ISTATARTHCO3  --   --  26.9* 23.7 24.2   ISTATARTBE  --   --  0 -1 0   ISTATTEMP  --   --  96.6 F 96.8 F 97.3 F   ISTATFIO2  --   --  100 60 45    ISTATSPEC  --   --  Arterial Arterial Arterial   ISTATAPHTC  --   --  7.318* 7.400 7.454   [id number]  --   --  277* 81 69         Recent Labs     09/13/19  0320 09/14/19  0300 09/15/19  0532   SODIUM 137 141 143   POTASSIUM 3.6 3.2* 4.1   CHLORIDE 105 105 108   CO2 24 24 28   BUN 13 18 32*   CREATININE 0.88 0.89 0.96   MAGNESIUM  --  1.9 2.6*   PHOSPHORUS  --  4.7* 2.4*   CALCIUM 8.7 8.5 8.5     Recent Labs     09/13/19  0320 09/14/19  0300 09/15/19  0532   PREALBUMIN  --   --  9.0*   GLUCOSE 188* 203* 188*     Recent Labs     09/13/19  0320 09/14/19  0300 09/15/19  0532   WBC 15.7* 28.5* 27.4*   NEUTSPOLYS  --   --  89.60*   LYMPHOCYTES  --   --  4.40*   MONOCYTES  --   --  4.70   EOSINOPHILS  --   --  0.00   BASOPHILS  --   --  0.20     Recent Labs     09/13/19  0320 09/14/19  0300 09/15/19  0532   RBC 3.98* 4.49* 3.95*   HEMOGLOBIN 10.8* 12.3* 10.8*   HEMATOCRIT 34.3* 38.1* 33.8*   PLATELETCT 268 357 379       Imaging  X-Ray:  I have personally reviewed the images and compared with prior images. and My impression is: Unchanged diffuse bilateral infiltrates with scarring and reticular pattern, enlarged cardiac silhouette, endotracheal tube/gastric tube/left IJ central venous catheter in good position  CT:    Reviewed    Assessment/Plan  * Acute respiratory failure with hypoxia, emphsema and pneumonitis (HCC)- (present on admission)  Assessment & Plan  Intubated 9/14  Continue full mechanical ventilatory support, decrease respiratory to 14, PEEP to 10  Continue to titrate FiO2 to goal SaO2 greater than 92%  Start daily sedation vacation spontaneous breathing trials  RT/O2 protocol  Mobilize  ABCDEF bundle    Interstitial lung disease (HCC)  Assessment & Plan  Honeycomb changes in the lower lobes bilaterally concerning for interstitial lung disease, interstitial pneumonitis  Cell count on bronchoscopy to evaluate for lymphocytosis ([facility]), CD1a level on BAL ([facility])  Sensitivity pneumonitis panel still pending  DDx  also includes NSIP, UIP  Consider autoimmune evaluation  Continue high-dose Solumedrol    Pneumonitis- (present on admission)  Assessment & Plan  Likely rhinovirus exacerbated IPF flare  Continue empiric antibiotics per infectious disease  Follow-up on final BAL and blood culture results  Aspiration precautions    Elevated glucose  Assessment & Plan  Without pre-existing diabetes, likely secondary to high steroids  Increase sliding scale to moderate    Abnormal EKG  Assessment & Plan  Check troponin, minimize sedation to assess pain  Start aspirin daily    Paraseptal emphysema (HCC)  Assessment & Plan  Paraseptal emphysema, 25-pack-year history  Honeycomb pattern in lower lobes concerning for underlying interstitial lung disease, ?CPFE (combined pulmonary fibrosis and emphysema)  Continue scheduled DuoNeb, and Spiriva  Outpatient PFTs, high-resolution CT when acute issues resolve, and eval for outpatient pulmonary rehab after establishing care with pulmonary as an outpatient  Continue steroids    QT prolongation- (present on admission)  Assessment & Plan  Monitor closely  Optimize electrolytes  Avoid medications that prolong QTC    INDIA (acute kidney injury) (HCC)- (present on admission)  Assessment & Plan  ATN from sepsis, improved  avoid nephrotoxins  Strict I/Os  Follow renal function    Sepsis (HCC)- (present on admission)  Assessment & Plan  This is severe sepsis with the following associated acute organ dysfunction(s): acute respiratory failure. -Improving  Source = Pulmonary    Status post sepsis protocol, euvolemic  Continue to titrate norepinephrine to maintain mean artery pressure greater than 65  Antibiotics, follow-up on final cultures  Status post fluid resuscitation    Hypothyroidism- (present on admission)  Assessment & Plan  Continue Synthroid 125 mcg p.o. daily    Hypokalemia  Assessment & Plan  Repleted    Hypophosphatemia- (present on admission)  Assessment & Plan  Repletion and monitor  daily    Chronic pain- (present on admission)  Assessment & Plan  Continue methadone  Increase oxycodone to home regimen, DC Dilaudid drip       VTE:  Lovenox  Ulcer: H2 Antagonist  Lines: Central Line  Ongoing indication addressed and Bell Catheter  Ongoing indication addressed    I have performed a physical exam and reviewed and updated ROS and Plan today (9/15/2019). In review of yesterday's note (9/14/2019), there are no changes except as documented above.     Patient is critically ill today requiring my active management of his mechanical ventilatory support, titration of vasopressor to maintain adequate perfusion as well as sedation drips. High risk of deterioration and worsening vital organ dysfunction and death without the above critical care interventions.    Discussed patient condition and risk of morbidity and/or mortality with RN, RT, Pharmacy, Charge nurse / hot rounds, Patient and infectious disease  The patient remains critically ill.  Critical care time = 92 minutes in directly providing and coordinating critical care and extensive data review.  No time overlap and excludes procedures.

## 2019-09-16 ENCOUNTER — APPOINTMENT (OUTPATIENT)
Dept: RADIOLOGY | Facility: MEDICAL CENTER | Age: 59
DRG: 871 | End: 2019-09-16
Attending: INTERNAL MEDICINE
Payer: MEDICARE

## 2019-09-16 PROBLEM — R65.20 SEVERE SEPSIS (HCC): Status: ACTIVE | Noted: 2019-09-10

## 2019-09-16 LAB
ACTION RANGE TRIGGERED IACRT: NO
ALBUMIN SERPL BCP-MCNC: 3.1 G/DL (ref 3.2–4.9)
ALBUMIN/GLOB SERPL: 1.2 G/DL
ALP SERPL-CCNC: 69 U/L (ref 30–99)
ALT SERPL-CCNC: 14 U/L (ref 2–50)
ANION GAP SERPL CALC-SCNC: 7 MMOL/L (ref 0–11.9)
AST SERPL-CCNC: 14 U/L (ref 12–45)
BACTERIA BRONCH AEROBE CULT: NORMAL
BASE EXCESS BLDA CALC-SCNC: 0 MMOL/L (ref -4–3)
BASOPHILS # BLD AUTO: 0.1 % (ref 0–1.8)
BASOPHILS # BLD: 0.02 K/UL (ref 0–0.12)
BILIRUB SERPL-MCNC: 0.3 MG/DL (ref 0.1–1.5)
BODY TEMPERATURE: NORMAL DEGREES
BUN SERPL-MCNC: 45 MG/DL (ref 8–22)
CALCIUM SERPL-MCNC: 8.2 MG/DL (ref 8.5–10.5)
CHLORIDE SERPL-SCNC: 109 MMOL/L (ref 96–112)
CO2 BLDA-SCNC: 24 MMOL/L (ref 20–33)
CO2 SERPL-SCNC: 29 MMOL/L (ref 20–33)
CREAT SERPL-MCNC: 0.83 MG/DL (ref 0.5–1.4)
CRP SERPL HS-MCNC: 3.11 MG/DL (ref 0–0.75)
CYTOLOGY REG CYTOL: NORMAL
EKG IMPRESSION: NORMAL
EOSINOPHIL # BLD AUTO: 0 K/UL (ref 0–0.51)
EOSINOPHIL NFR BLD: 0 % (ref 0–6.9)
ERYTHROCYTE [DISTWIDTH] IN BLOOD BY AUTOMATED COUNT: 46.1 FL (ref 35.9–50)
FUNGUS SPEC FUNGUS STN: NORMAL
GLOBULIN SER CALC-MCNC: 2.5 G/DL (ref 1.9–3.5)
GLUCOSE BLD-MCNC: 113 MG/DL (ref 65–99)
GLUCOSE BLD-MCNC: 117 MG/DL (ref 65–99)
GLUCOSE BLD-MCNC: 134 MG/DL (ref 65–99)
GLUCOSE BLD-MCNC: 139 MG/DL (ref 65–99)
GLUCOSE BLD-MCNC: 183 MG/DL (ref 65–99)
GLUCOSE SERPL-MCNC: 154 MG/DL (ref 65–99)
GRAM STN SPEC: NORMAL
HCO3 BLDA-SCNC: 23.4 MMOL/L (ref 17–25)
HCT VFR BLD AUTO: 31.9 % (ref 42–52)
HGB BLD-MCNC: 10.6 G/DL (ref 14–18)
HOROWITZ INDEX BLDA+IHG-RTO: 171 MM[HG]
IMM GRANULOCYTES # BLD AUTO: 0.1 K/UL (ref 0–0.11)
IMM GRANULOCYTES NFR BLD AUTO: 0.6 % (ref 0–0.9)
INST. QUALIFIED PATIENT IIQPT: YES
LDH SERPL L TO P-CCNC: 184 U/L (ref 107–266)
LYMPHOCYTES # BLD AUTO: 1 K/UL (ref 1–4.8)
LYMPHOCYTES NFR BLD: 6.2 % (ref 22–41)
MAGNESIUM SERPL-MCNC: 2.5 MG/DL (ref 1.5–2.5)
MCH RBC QN AUTO: 28.8 PG (ref 27–33)
MCHC RBC AUTO-ENTMCNC: 33.2 G/DL (ref 33.7–35.3)
MCV RBC AUTO: 86.7 FL (ref 81.4–97.8)
MONOCYTES # BLD AUTO: 0.85 K/UL (ref 0–0.85)
MONOCYTES NFR BLD AUTO: 5.3 % (ref 0–13.4)
NEUTROPHILS # BLD AUTO: 14.21 K/UL (ref 1.82–7.42)
NEUTROPHILS NFR BLD: 87.8 % (ref 44–72)
NRBC # BLD AUTO: 0 K/UL
NRBC BLD-RTO: 0 /100 WBC
O2/TOTAL GAS SETTING VFR VENT: 45 %
PCO2 BLDA: 33.6 MMHG (ref 26–37)
PCO2 TEMP ADJ BLDA: 33.2 MMHG (ref 26–37)
PH BLDA: 7.45 [PH] (ref 7.4–7.5)
PH TEMP ADJ BLDA: 7.46 [PH] (ref 7.4–7.5)
PHOSPHATE SERPL-MCNC: 2.9 MG/DL (ref 2.5–4.5)
PLATELET # BLD AUTO: 285 K/UL (ref 164–446)
PMV BLD AUTO: 9.1 FL (ref 9–12.9)
PO2 BLDA: 77 MMHG (ref 64–87)
PO2 TEMP ADJ BLDA: 75 MMHG (ref 64–87)
POTASSIUM SERPL-SCNC: 3.9 MMOL/L (ref 3.6–5.5)
PREALB SERPL-MCNC: 12 MG/DL (ref 18–38)
PROT SERPL-MCNC: 5.6 G/DL (ref 6–8.2)
RBC # BLD AUTO: 3.68 M/UL (ref 4.7–6.1)
RHODAMINE-AURAMINE STN SPEC: NORMAL
SAO2 % BLDA: 96 % (ref 93–99)
SIGNIFICANT IND 70042: NORMAL
SITE SITE: NORMAL
SODIUM SERPL-SCNC: 145 MMOL/L (ref 135–145)
SOURCE SOURCE: NORMAL
SPECIMEN DRAWN FROM PATIENT: NORMAL
TRIGL SERPL-MCNC: 109 MG/DL (ref 0–149)
WBC # BLD AUTO: 16.2 K/UL (ref 4.8–10.8)

## 2019-09-16 PROCEDURE — 82962 GLUCOSE BLOOD TEST: CPT | Mod: 91

## 2019-09-16 PROCEDURE — 700101 HCHG RX REV CODE 250: Performed by: INTERNAL MEDICINE

## 2019-09-16 PROCEDURE — 93010 ELECTROCARDIOGRAM REPORT: CPT | Performed by: INTERNAL MEDICINE

## 2019-09-16 PROCEDURE — 99233 SBSQ HOSP IP/OBS HIGH 50: CPT | Performed by: INTERNAL MEDICINE

## 2019-09-16 PROCEDURE — 86140 C-REACTIVE PROTEIN: CPT

## 2019-09-16 PROCEDURE — 700111 HCHG RX REV CODE 636 W/ 250 OVERRIDE (IP): Performed by: INTERNAL MEDICINE

## 2019-09-16 PROCEDURE — A9270 NON-COVERED ITEM OR SERVICE: HCPCS | Performed by: INTERNAL MEDICINE

## 2019-09-16 PROCEDURE — 85025 COMPLETE CBC W/AUTO DIFF WBC: CPT

## 2019-09-16 PROCEDURE — 80053 COMPREHEN METABOLIC PANEL: CPT

## 2019-09-16 PROCEDURE — 84478 ASSAY OF TRIGLYCERIDES: CPT

## 2019-09-16 PROCEDURE — 94003 VENT MGMT INPAT SUBQ DAY: CPT

## 2019-09-16 PROCEDURE — 36600 WITHDRAWAL OF ARTERIAL BLOOD: CPT

## 2019-09-16 PROCEDURE — 92610 EVALUATE SWALLOWING FUNCTION: CPT

## 2019-09-16 PROCEDURE — 700102 HCHG RX REV CODE 250 W/ 637 OVERRIDE(OP): Performed by: INTERNAL MEDICINE

## 2019-09-16 PROCEDURE — 99291 CRITICAL CARE FIRST HOUR: CPT | Performed by: INTERNAL MEDICINE

## 2019-09-16 PROCEDURE — 93005 ELECTROCARDIOGRAM TRACING: CPT | Performed by: INTERNAL MEDICINE

## 2019-09-16 PROCEDURE — 94640 AIRWAY INHALATION TREATMENT: CPT

## 2019-09-16 PROCEDURE — 99292 CRITICAL CARE ADDL 30 MIN: CPT | Performed by: INTERNAL MEDICINE

## 2019-09-16 PROCEDURE — 83615 LACTATE (LD) (LDH) ENZYME: CPT

## 2019-09-16 PROCEDURE — 83735 ASSAY OF MAGNESIUM: CPT

## 2019-09-16 PROCEDURE — 700105 HCHG RX REV CODE 258: Performed by: INTERNAL MEDICINE

## 2019-09-16 PROCEDURE — 84134 ASSAY OF PREALBUMIN: CPT

## 2019-09-16 PROCEDURE — 94150 VITAL CAPACITY TEST: CPT

## 2019-09-16 PROCEDURE — 770022 HCHG ROOM/CARE - ICU (200)

## 2019-09-16 PROCEDURE — 84100 ASSAY OF PHOSPHORUS: CPT

## 2019-09-16 PROCEDURE — 82803 BLOOD GASES ANY COMBINATION: CPT

## 2019-09-16 PROCEDURE — 71045 X-RAY EXAM CHEST 1 VIEW: CPT

## 2019-09-16 RX ORDER — METHYLPREDNISOLONE SODIUM SUCCINATE 40 MG/ML
30 INJECTION, POWDER, LYOPHILIZED, FOR SOLUTION INTRAMUSCULAR; INTRAVENOUS EVERY 6 HOURS
Status: DISCONTINUED | OUTPATIENT
Start: 2019-09-16 | End: 2019-09-18

## 2019-09-16 RX ORDER — ALPRAZOLAM 0.25 MG/1
.5-1 TABLET ORAL EVERY 6 HOURS PRN
Status: DISCONTINUED | OUTPATIENT
Start: 2019-09-16 | End: 2019-09-20

## 2019-09-16 RX ORDER — TRAMADOL HYDROCHLORIDE 50 MG/1
50 TABLET ORAL 2 TIMES DAILY
Status: DISCONTINUED | OUTPATIENT
Start: 2019-09-16 | End: 2019-09-18

## 2019-09-16 RX ORDER — TEMAZEPAM 15 MG/1
15 CAPSULE ORAL
Status: DISCONTINUED | OUTPATIENT
Start: 2019-09-16 | End: 2019-09-18

## 2019-09-16 RX ORDER — TRAMADOL HYDROCHLORIDE 50 MG/1
50 TABLET ORAL 2 TIMES DAILY
Status: DISCONTINUED | OUTPATIENT
Start: 2019-09-16 | End: 2019-09-16

## 2019-09-16 RX ADMIN — ENOXAPARIN SODIUM 40 MG: 100 INJECTION SUBCUTANEOUS at 06:03

## 2019-09-16 RX ADMIN — GABAPENTIN 2400 MG: 400 CAPSULE ORAL at 06:05

## 2019-09-16 RX ADMIN — LEVOTHYROXINE SODIUM 125 MCG: 125 TABLET ORAL at 06:05

## 2019-09-16 RX ADMIN — GABAPENTIN 1600 MG: 400 CAPSULE ORAL at 17:23

## 2019-09-16 RX ADMIN — CEFTRIAXONE SODIUM 2 G: 2 INJECTION, POWDER, FOR SOLUTION INTRAMUSCULAR; INTRAVENOUS at 06:03

## 2019-09-16 RX ADMIN — DEXMEDETOMIDINE HYDROCHLORIDE 1.5 MCG/KG/HR: 100 INJECTION, SOLUTION INTRAVENOUS at 07:09

## 2019-09-16 RX ADMIN — OXYCODONE HYDROCHLORIDE 10 MG: 5 TABLET ORAL at 20:43

## 2019-09-16 RX ADMIN — IPRATROPIUM BROMIDE AND ALBUTEROL SULFATE 3 ML: .5; 3 SOLUTION RESPIRATORY (INHALATION) at 09:53

## 2019-09-16 RX ADMIN — ALPRAZOLAM 0.5 MG: 0.25 TABLET ORAL at 15:31

## 2019-09-16 RX ADMIN — TEMAZEPAM 15 MG: 15 CAPSULE ORAL at 20:43

## 2019-09-16 RX ADMIN — POTASSIUM BICARBONATE 25 MEQ: 978 TABLET, EFFERVESCENT ORAL at 08:25

## 2019-09-16 RX ADMIN — SENNOSIDES, DOCUSATE SODIUM 2 TABLET: 50; 8.6 TABLET, FILM COATED ORAL at 06:04

## 2019-09-16 RX ADMIN — METHYLPREDNISOLONE SODIUM SUCCINATE 30 MG: 40 INJECTION, POWDER, FOR SOLUTION INTRAMUSCULAR; INTRAVENOUS at 12:10

## 2019-09-16 RX ADMIN — METHYLPREDNISOLONE SODIUM SUCCINATE 30 MG: 40 INJECTION, POWDER, FOR SOLUTION INTRAMUSCULAR; INTRAVENOUS at 23:38

## 2019-09-16 RX ADMIN — FUROSEMIDE 20 MG: 10 INJECTION, SOLUTION INTRAMUSCULAR; INTRAVENOUS at 06:03

## 2019-09-16 RX ADMIN — METHYLPREDNISOLONE SODIUM SUCCINATE 30 MG: 40 INJECTION, POWDER, FOR SOLUTION INTRAMUSCULAR; INTRAVENOUS at 17:24

## 2019-09-16 RX ADMIN — METHADONE HYDROCHLORIDE 20 MG: 10 TABLET ORAL at 12:10

## 2019-09-16 RX ADMIN — TRAMADOL HYDROCHLORIDE 50 MG: 50 TABLET, FILM COATED ORAL at 17:23

## 2019-09-16 RX ADMIN — SIMVASTATIN 40 MG: 40 TABLET, FILM COATED ORAL at 20:43

## 2019-09-16 RX ADMIN — OXYCODONE HYDROCHLORIDE 10 MG: 5 TABLET ORAL at 00:21

## 2019-09-16 RX ADMIN — FAMOTIDINE 20 MG: 20 TABLET ORAL at 17:23

## 2019-09-16 RX ADMIN — METHADONE HYDROCHLORIDE 30 MG: 10 TABLET ORAL at 06:04

## 2019-09-16 RX ADMIN — TRAMADOL HYDROCHLORIDE 50 MG: 50 TABLET, FILM COATED ORAL at 10:46

## 2019-09-16 RX ADMIN — METHADONE HYDROCHLORIDE 20 MG: 10 TABLET ORAL at 17:23

## 2019-09-16 RX ADMIN — METHYLPREDNISOLONE SODIUM SUCCINATE 125 MG: 125 INJECTION, POWDER, FOR SOLUTION INTRAMUSCULAR; INTRAVENOUS at 06:02

## 2019-09-16 RX ADMIN — FAMOTIDINE 20 MG: 20 TABLET ORAL at 06:04

## 2019-09-16 RX ADMIN — IPRATROPIUM BROMIDE AND ALBUTEROL SULFATE 3 ML: .5; 3 SOLUTION RESPIRATORY (INHALATION) at 06:31

## 2019-09-16 RX ADMIN — LIDOCAINE HYDROCHLORIDE 2 ML: 10 INJECTION, SOLUTION INFILTRATION; PERINEURAL at 02:26

## 2019-09-16 RX ADMIN — OXYCODONE HYDROCHLORIDE 10 MG: 5 TABLET ORAL at 10:46

## 2019-09-16 RX ADMIN — IPRATROPIUM BROMIDE AND ALBUTEROL SULFATE 3 ML: .5; 3 SOLUTION RESPIRATORY (INHALATION) at 01:53

## 2019-09-16 RX ADMIN — ASPIRIN 325 MG: 325 TABLET, FILM COATED ORAL at 06:04

## 2019-09-16 RX ADMIN — DEXMEDETOMIDINE HYDROCHLORIDE 1.5 MCG/KG/HR: 100 INJECTION, SOLUTION INTRAVENOUS at 03:55

## 2019-09-16 ASSESSMENT — PULMONARY FUNCTION TESTS: FVC: 1.76

## 2019-09-16 ASSESSMENT — COPD QUESTIONNAIRES
HAVE YOU SMOKED AT LEAST 100 CIGARETTES IN YOUR ENTIRE LIFE: YES
DURING THE PAST 4 WEEKS HOW MUCH DID YOU FEEL SHORT OF BREATH: SOME OF THE TIME
DO YOU EVER COUGH UP ANY MUCUS OR PHLEGM?: NO/ONLY WITH OCCASIONAL COLDS OR INFECTIONS
COPD SCREENING SCORE: 5

## 2019-09-16 ASSESSMENT — LIFESTYLE VARIABLES: EVER_SMOKED: YES

## 2019-09-16 NOTE — PROGRESS NOTES
Infectious Disease Progress Note    Author: Cecelia Mcdonald M.D. Date & Time of service: 2019  8:51 AM    Chief Complaint:  Follow-up for viral pneumonia    Interval History:   afebrile, white count 11.1.  On high-dose IV steroids. Reports feeling a little better. Still having SOA on exertion.   afebrile, white count up to 28.5.  Intubated yesterday due to progressive respiratory distress.  RVP positive for rhino/enterovirus.  9/15 remains afebrile, white count 27.4.  Patient's respiratory status has improved some, currently on pressure support trial.   afebrile WBC 16.2 patient remains intubated and sedated.  No issues to report overnight per nursing staff    Labs Reviewed and Medications Reviewed.    Review of Systems:  Review of Systems   Unable to perform ROS: Intubated       Hemodynamics:  No data recorded.  Monitored Temp: 36.6 °C (97.9 °F)  Pulse  Av.7  Min: 56  Max: 110Heart Rate (Monitored): (!) 57  Blood Pressure: 114/69  CVP (mm Hg): (!) 8 MM HG    Physical Exam:  Physical Exam   Constitutional: He appears well-nourished. No distress.   Thin  Intubated and sedated   HENT:   ET tube in place   Eyes: Pupils are equal, round, and reactive to light. Conjunctivae are normal. No scleral icterus.   Neck: Neck supple.   Left IJ catheter   Cardiovascular: Normal rate, regular rhythm and normal heart sounds.   No murmur heard.  Pulmonary/Chest: Effort normal and breath sounds normal. No respiratory distress.   Coarse breath sounds bilaterally   Abdominal: Soft. Bowel sounds are normal. He exhibits no distension. There is no tenderness.   Musculoskeletal: Normal range of motion. He exhibits no edema.   Lymphadenopathy:     He has no cervical adenopathy.   Neurological:   Intubated and sedated   Skin: Skin is warm and dry. No rash noted. He is not diaphoretic. No erythema.   Psychiatric:   Unable to assess as patient sedated   Nursing note and vitals reviewed.      Meds:    Current  Facility-Administered Medications:   •  insulin regular  •  vancomycin  •  oxyCODONE immediate-release  •  dexmedetomidine (PRECEDEX) infusion  •  aspirin  •  Respiratory Care per Protocol  •  ipratropium-albuterol  •  famotidine **OR** [DISCONTINUED] famotidine  •  senna-docusate **AND** polyethylene glycol/lytes **AND** magnesium hydroxide **AND** bisacodyl  •  MD Alert...Adult ICU Electrolyte Replacement per Pharmacy  •  lidocaine  •  [DISCONTINUED] insulin regular **AND** Accu-Chek Q6 if NPO **AND** NOTIFY MD and PharmD **AND** [DISCONTINUED] glucose **AND** dextrose 10% bolus  •  Pharmacy  •  temazepam  •  simvastatin  •  levothyroxine  •  methadone **AND** methadone **AND** methadone  •  gabapentin  •  gabapentin  •  acetaminophen  •  NORepinephrine (LEVOPHED) infusion  •  HYDROmorphone  •  propofol **AND** Triglycerides Starting now and then Every 3 Days  •  furosemide  •  methylPREDNISolone  •  ipratropium-albuterol  •  MD Alert...Vancomycin per Pharmacy  •  cefTRIAXone (ROCEPHIN) IV  •  enoxaparin (LOVENOX) injection  •  sodium chloride  •  LR    Labs:  Recent Labs     09/14/19  0300 09/15/19  0532 09/16/19  0559   WBC 28.5* 27.4* 16.2*   RBC 4.49* 3.95* 3.68*   HEMOGLOBIN 12.3* 10.8* 10.6*   HEMATOCRIT 38.1* 33.8* 31.9*   MCV 84.9 85.6 86.7   MCH 27.4 27.3 28.8   RDW 43.1 44.9 46.1   PLATELETCT 357 379 285   MPV 8.4* 8.7* 9.1   NEUTSPOLYS  --  89.60* 87.80*   LYMPHOCYTES  --  4.40* 6.20*   MONOCYTES  --  4.70 5.30   EOSINOPHILS  --  0.00 0.00   BASOPHILS  --  0.20 0.10     Recent Labs     09/14/19  0300 09/15/19  0532 09/16/19  0559   SODIUM 141 143 145   POTASSIUM 3.2* 4.1 3.9   CHLORIDE 105 108 109   CO2 24 28 29   GLUCOSE 203* 188* 154*   BUN 18 32* 45*     Recent Labs     09/14/19  0300 09/15/19  0532 09/16/19  0559   ALBUMIN 3.7  --  3.1*   TBILIRUBIN  --   --  0.3   ALKPHOSPHAT  --   --  69   TOTPROTEIN  --   --  5.6*   ALTSGPT  --   --  14   ASTSGOT  --   --  14   CREATININE 0.89 0.96 0.83              Imaging:  Dx-chest-2 Views    Result Date: 9/12/2019 9/12/2019 12:18 PM HISTORY/REASON FOR EXAM:  Short of breath. TECHNIQUE/EXAM DESCRIPTION AND NUMBER OF VIEWS: Two views of the chest. COMPARISON:  1/3/2015. FINDINGS: New airspace opacity in the left lower lobe. No pleural effusions, no pneumothorax are appreciated. Stable cardiopericardial silhouette.     New airspace opacity in the left lower lobe, concerning for pneumonia.      Micro:  Results     Procedure Component Value Units Date/Time    Quant Bronchial Washing [[id number]] Collected:  09/14/19 0110    Order Status:  Completed Specimen:  Respirate from Bronchoalveolar Lavage Updated:  09/16/19 0733     Significant Indicator NEG     Source RESP     Site BRONCHOALVEOLAR LAVAGE LLL     Culture Result 3,800 cfu/mL usual upper respiratory jonny  No clinically significant Staphylococcus aureus, Methicillin  Resistant Staphylococcus aureus, or Pseudomonas species  isolated.       Gram Stain Result Many WBCs.  Rare Gram positive cocci.  <5% intracellular organisms.      Narrative:       Collected By:[id number] TONY ROSE  BAL from LLL lobe  Collected By:[id number] TONY ROSE    MRSA By PCR (Amp) [[id number]] Collected:  09/14/19 1035    Order Status:  Completed Specimen:  Respirate from Nares Updated:  09/15/19 1743     Significant Indicator NEG     Source RESP     Site NARES     MRSA PCR Negative for MRSA by PCR.    Narrative:       Collected By:[id number] SURYA CLARK  Collected By:[id number] SURYA CLARK    URINALYSIS [[id number]]  (Abnormal) Collected:  09/14/19 1035    Order Status:  Completed Specimen:  Urine, Bell Cath Updated:  09/14/19 1054     Color Yellow     Character Clear     Specific Gravity 1.008     Ph 5.0     Glucose Negative mg/dL      Ketones Negative mg/dL      Protein Negative mg/dL      Bilirubin Negative     Urobilinogen, Urine 0.2     Nitrite Negative     Leukocyte Esterase Moderate     Occult Blood Negative     Micro Urine Req  Microscopic    Narrative:       Collected By:[id number] SURYA CLARK    GRAM STAIN [[id number]] Collected:  09/14/19 0110    Order Status:  Completed Specimen:  Respirate Updated:  09/14/19 0550     Significant Indicator .     Source RESP     Site BRONCHOALVEOLAR LAVAGE LLL     Gram Stain Result Many WBCs.  Rare Gram positive cocci.  <5% intracellular organisms.      Narrative:       Collected By:[id number] TONY ROSE  BAL from LLL lobe  Collected By:[id number] TONY ROSE    Fungal Culture - BAL [[id number]] Collected:  09/14/19 0110    Order Status:  Sent Specimen:  Respirate from Bronchoalveolar Lavage     Fungal Smear - BAL [[id number]] Collected:  09/14/19 0110    Order Status:  Sent Specimen:  Respirate from Bronchoalveolar Lavage     Culture Respiratory W/ Grm Stn - BAL [[id number]] Collected:  09/14/19 0110    Order Status:  Completed Specimen:  Respirate from Bronchoalveolar Lavage     AFB Culture - BAL [[id number]] Collected:  09/14/19 0110    Order Status:  Sent Specimen:  Respirate from Bronchoalveolar Lavage     GRAM STAIN [[id number]] Collected:  09/13/19 1600    Order Status:  Completed Specimen:  Respirate Updated:  09/13/19 1912     Significant Indicator .     Source RESP     Site Sputum     Gram Stain Result Sputum Gram stain quality score is <1, probable  oropharyngeal contamination. Culture not performed.  Recollect if clinically indicated.      CULTURE RESPIRATORY W/ GRM STN [[id number]] Collected:  09/13/19 1600    Order Status:  Completed Updated:  09/13/19 1608    Flu and RSV by PCR [[id number]] Collected:  09/11/19 2045    Order Status:  Completed Specimen:  Respirate from Nasopharyngeal Updated:  09/11/19 2217     Influenza virus A RNA Negative     Influenza virus B, PCR Negative     RSV, PCR Negative    Narrative:       Collected By:[id number] JIMMY CUETOAH    GRAM STAIN [[id number]] Collected:  09/11/19 0500    Order Status:  Completed Specimen:  Respirate Updated:  09/11/19 1114      Significant Indicator .     Source RESP     Site SPUTUM     Gram Stain Result Sputum Gram stain quality score is <1, probable  oropharyngeal contamination. Culture not performed.  Recollect if clinically indicated.      Narrative:       Collected By:[id number] HERB MARSHALL  Collected By:[id number] HERB MARSHALL    CULTURE RESPIRATORY W/ GRM STN [[id number]] Collected:  09/11/19 0500    Order Status:  Canceled Specimen:  Sputum     Influenza A/B By PCR (Adult - Flu Only) [[id number]] Collected:  09/10/19 2342    Order Status:  Completed Specimen:  Respirate from Nasopharyngeal Updated:  09/11/19 0030     Influenza virus A RNA Negative     Influenza virus B, PCR Negative    Narrative:       Collected By:[id number] HERB MARSHALL    GRAM STAIN [[id number]] Collected:  09/10/19 1111    Order Status:  Completed Specimen:  Respirate Updated:  09/10/19 1154     Significant Indicator .     Source RESP     Site SPUTUM     Gram Stain Result Sputum Gram stain quality score is <1, probable  oropharyngeal contamination. Culture not performed.  Recollect if clinically indicated.      Narrative:       CALL  Ivy  171 tel. [phone],  CALLED  171 tel. [phone] 09/10/2019, 11:53, RB PERF. RESULTS CALLED TO:BEBA Thomson  Collected By:[id number] ORVILLE GUNN  Preferably before first antibiotic dose - add Gram stain if  indicated  Collected By:[id number] ORVILLE CLARK.    Culture Respiratory W/ [facility] [[id number]] Collected:  09/10/19 1111    Order Status:  Completed Specimen:  Respirate from Sputum Updated:  09/10/19 1126    Narrative:       Collected By:[id number] ORVILLE GUNN  Preferably before first antibiotic dose - add Gram stain if  indicated          Assessment:  Pancho Martinez  is a 59 y.o. male with a history of history of chronic pain syndrome on methadone and high doses of gabapentin, significant smoking history, emphysema not on chronic oxygen at home, presented with 3 weeks of progressive shortness of breath, especially worse  over the past 3 days.  CT with worsening emphysematous changes and diffuse groundglass opacities.     Patient seems to have underlying worsening emphysematous changes. He may or may not have a superimposed bacterial pneumonia, but if there is a superimposed infection, the groundglass opacities on CT are more suggestive of either a viral or an atypical bacterial infection. His son had a viral URI around the same time the patient's symptoms began.    Patient was also started on high-dose IV steroids, had progressive respiratory distress, intubated on 9/13.  RVP positive for rhino/enterovirus    Pertinent Diagnoses  Sepsis  VDRF  Viral pneumonia  Emphysema  Possible underlying ILD with flare  Leukocytosis, worse    Plan:  Vent dependent respiratory failure  Secondary to viral pneumonia  Status post intubation on 9/13  On FiO2 45 %   -Repeat CT from 9/14 again with bilateral emphysematous changes, diffuse ground glass opacities, about the same compared to previous CT  -Persistent leukocytosis but also on high-dose IV steroids  -RVP with rhino/enterovirus.  Could be the  of his current presentation in setting of underlying emphysema, ILD with possible induced flare, poor reserve. Procalcitonin improved to 0.22 which further supports this  Continue empiric coverage for a superimposed bacterial pneumonia with ceftriaxone, doxycycline (given atypical appearance of his CT) for now.  Anticipate a 5 to 7-day empiric course pending clinical improvement  Discontinue vancomycin.  Nasal MSRA PCR negative  Follow BAL cultures from 9/14 - URF  Chest x-rays reviewed by me today with bilateral pulmonary infiltrates    Leukocytosis, persistent  Multifactorial (infection, steroids)  On antibiotics above  Monitor    Emphysema  With possible underlying ILD with flare    Discussed with bedside RN

## 2019-09-16 NOTE — THERAPY
Speech Therapy Contact Note:  Patient transferred to ICU on 9/14 for respiratory distress. He was made NPO and intubated. SLP will continue to follow once appropriate

## 2019-09-16 NOTE — PROGRESS NOTES
Critical Care Progress Note    Date of admission  9/10/2019    Chief Complaint  59 y.o. male admitted 9/10/2019 with shortness of breath.    Hospital Course    This gentleman was admitted to the hospital with pneumonia and respiratory failure.  He developed worsening respiratory failure and was transferred to the ICU and was intubated.      Interval Problem Update  Reviewed last 24 hour events:      0830 hours:    Pneumonia  Sepsis  Vent 3  PEEP 10     45% - decrease PEEP to 8  Asthma  Hypothyroid  Anxiety  Dex 1.5  Dilaudid 0.3  Moves all 4  Follows  NE titrating - now off  CVP 8  TF  RSBI 31, NIF -45, FVC 1.7  ID is following  Finished 5 days of doxy and Rocephin  Methadone  Decrease Solumedrol 30 q 6  Start Ultram 50 BID    2000 hours:    Multiple serial reassessments  SBT - good weaning parameters  Liberated from the ventilator  SR      Review of Systems  Review of Systems   Unable to perform ROS: Acuity of condition        Vital Signs for last 24 hours   Pulse:  [56-96] 94  Resp:  [16] 16  BP: ()/(49-78) 134/75  SpO2:  [85 %-100 %] 95 %    Hemodynamic parameters for last 24 hours  CVP:  [8 MM HG-16 MM HG] 8 MM HG    Respiratory Information for the last 24 hours  Morales Vent Mode: Spont  Rate (breaths/min): 14  Vt Target (mL): 550  PEEP/CPAP: 8  FiO2: 45  P Support: 5  P MEAN: 9.5  Control VTE (exp VT): 553    Physical Exam   Physical Exam   Constitutional:   On ventilator   HENT:   Head: Normocephalic and atraumatic.   Right Ear: External ear normal.   Left Ear: External ear normal.   Nose: Nose normal.   Mouth/Throat: Oropharynx is clear and moist.   Eyes: Pupils are equal, round, and reactive to light. Conjunctivae are normal. Right eye exhibits no discharge. Left eye exhibits no discharge.   Neck: Normal range of motion. Neck supple. No tracheal deviation present.   Cardiovascular: Intact distal pulses. Exam reveals no gallop.   No murmur heard.  Sinus rhythm   Pulmonary/Chest: He has no wheezes. He  has rales (Few scattered coarse crackles bilaterally).   Abdominal: Soft. Bowel sounds are normal. He exhibits no distension. There is no tenderness. There is no rebound.   Tolerating enteral tube feedings   Musculoskeletal: Normal range of motion. He exhibits no tenderness.   No clubbing or cyanosis   Neurological:   Sedated.  Arouses easily.  Nods.  Follows.  Moves all 4 extremities.  Anxious at times.   Skin: Skin is warm and dry. No rash noted. He is not diaphoretic. No erythema.       Medications  Current Facility-Administered Medications   Medication Dose Route Frequency Provider Last Rate Last Dose   • methylPREDNISolone (SOLU-MEDROL) 40 MG injection 30 mg  30 mg Intravenous Q6HRS Eulogio Lopes M.D.   30 mg at 09/16/19 1724   • temazepam (RESTORIL) capsule 15 mg  15 mg Enteral Tube QHS Eulogio Lopes M.D.       • tramadol (ULTRAM) 50 MG tablet 50 mg  50 mg Enteral Tube BID Eulogio Lopes M.D.   50 mg at 09/16/19 1723   • ALPRAZolam (XANAX) tablet 0.5-1 mg  0.5-1 mg Oral Q6HRS PRN Eulogio Lopes M.D.   0.5 mg at 09/16/19 1531   • insulin regular (HUMULIN R) injection 2-9 Units  2-9 Units Subcutaneous Q6HRS Jeremy M Gonda, M.D.   Stopped at 09/16/19 1800   • oxyCODONE immediate-release (ROXICODONE) tablet 5-10 mg  5-10 mg Enteral Tube Q4HRS PRN Jeremy M Gonda, M.D.   10 mg at 09/16/19 1046   • dexmedetomidine (PRECEDEX) 400 mcg/100mL NS premix infusion  0.1-1.5 mcg/kg/hr Intravenous Continuous Jeremy M Gonda, M.D.   Stopped at 09/16/19 1000   • aspirin (ASA) tablet 325 mg  325 mg Enteral Tube DAILY Jeremy M Gonda, M.D.   325 mg at 09/16/19 0604   • Respiratory Care per Protocol   Nebulization Continuous RT Marck Montero Jr., D.O.       • ipratropium-albuterol (DUONEB) nebulizer solution  3 mL Nebulization Q2HRS PRN (RT) Marck Montero Jr., D.O.       • senna-docusate (PERICOLACE or SENOKOT S) 8.6-50 MG per tablet 2 Tab  2 Tab Enteral Tube BID Marck Montero Jr., D.O.    Stopped at 09/16/19 1800    And   • polyethylene glycol/lytes (MIRALAX) PACKET 1 Packet  1 Packet Enteral Tube QDAY PRN Marck Montero Jr. D.O.   1 Packet at 09/15/19 1217    And   • magnesium hydroxide (MILK OF MAGNESIA) suspension 30 mL  30 mL Enteral Tube QDAY PRN Marck Montero Jr. D.O.        And   • bisacodyl (DULCOLAX) suppository 10 mg  10 mg Rectal QDAY PRN FLORA Mcdermott Jr..O.       • MD Alert...ICU Electrolyte Replacement per Pharmacy   Other PHARMACY TO DOSE FLORA Mcdermott Jr..O.       • DEXTROSE 10% BOLUS 250 mL  250 mL Intravenous Q15 MIN PRN FLORA Mcdermott Jr..O.       • Pharmacy Consult: Enteral tube insertion - review meds/change route/product selection  1 Each Other PHARMACY TO DOSE FLORA Mcdermott Jr..O.       • simvastatin (ZOCOR) tablet 40 mg  40 mg Enteral Tube Nightly Marck Montero Jr. D.O.   40 mg at 09/15/19 2022   • levothyroxine (SYNTHROID) tablet 125 mcg  125 mcg Enteral Tube QAM AC Marck Montero Jr., D.O.   125 mcg at 09/16/19 0605   • methadone (DOLOPHINE) tablet 20 mg  20 mg Enteral Tube DAILY AT NOON Marck Montero Jr. D.O.   20 mg at 09/16/19 1210    And   • methadone (DOLOPHINE) tablet 30 mg  30 mg Enteral Tube QAM Marck Montero Jr. D.O.   30 mg at 09/16/19 0604    And   • methadone (DOLOPHINE) tablet 20 mg  20 mg Enteral Tube Q EVENING Marck Montero Jr. D.O.   20 mg at 09/16/19 1723   • gabapentin (NEURONTIN) capsule 2,400 mg  2,400 mg Enteral Tube QAM Marck Montero Jr. D.O.   2,400 mg at 09/16/19 0605   • gabapentin (NEURONTIN) capsule 1,600 mg  1,600 mg Enteral Tube Q EVENING Marck Montero Jr. D.O.   1,600 mg at 09/16/19 1723   • acetaminophen (TYLENOL) tablet 650 mg  650 mg Enteral Tube Q6HRS PRN FLORA Mcdermott Jr..O.   650 mg at 09/15/19 1815   • norepinephrine (LEVOPHED) 8 mg in  mL Infusion  0-30 mcg/min Intravenous Continuous Eulogio Lopes M.D.   Stopped at 09/16/19 0320   • HYDROmorphone (DILAUDID) 0.2 mg/mL in 50mL  (Continuous Infusion)   Intravenous Continuous Marck Montero Jr., D.O. 1.5 mL/hr at 09/16/19 1902 0.3 mg/hr at 09/16/19 1902   • cefTRIAXone (ROCEPHIN) 2 g in  mL IVPB  2 g Intravenous Q24HRS Berny Flood M.D.   Stopped at 09/16/19 0633   • enoxaparin (LOVENOX) inj 40 mg  40 mg Subcutaneous DAILY Marck Montero Jr., D.O.   40 mg at 09/16/19 0603   • sodium chloride (OCEAN) 0.65 % nasal spray 2 Spray  2 Spray Nasal Q2HRS PRN Mary Silva M.D.   2 [place] at 09/13/19 1647       Fluids    Intake/Output Summary (Last 24 hours) at 9/16/2019 2029  Last data filed at 9/16/2019 1800  Gross per 24 hour   Intake 1995.89 ml   Output 2535 ml   Net -539.11 ml       Laboratory  Recent Labs     09/14/19  0447 09/15/19  0451 09/16/19  0248   ISTATAPH 7.385* 7.444 7.452   ISTATAPCO2 39.6* 35.4 33.6   ISTATAPO2 86 72 77   ISTATATCO2 25 25 24   [id number] 96 95 96   ISTATARTHCO3 23.7 24.2 23.4   ISTATARTBE -1 0 0   ISTATTEMP 96.8 F 97.3 F 98.2 F   ISTATFIO2 60 45 45   ISTATSPEC Arterial Arterial Arterial   ISTATAPHTC 7.400 7.454 7.456   [id number] 81 69 75         Recent Labs     09/14/19  0300 09/15/19  0532 09/16/19  0559   SODIUM 141 143 145   POTASSIUM 3.2* 4.1 3.9   CHLORIDE 105 108 109   CO2 24 28 29   BUN 18 32* 45*   CREATININE 0.89 0.96 0.83   MAGNESIUM 1.9 2.6* 2.5   PHOSPHORUS 4.7* 2.4* 2.9   CALCIUM 8.5 8.5 8.2*     Recent Labs     09/14/19  0300 09/15/19  0532 09/16/19  0559   ALTSGPT  --   --  14   ASTSGOT  --   --  14   ALKPHOSPHAT  --   --  69   TBILIRUBIN  --   --  0.3   PREALBUMIN  --  9.0* 12.0*   GLUCOSE 203* 188* 154*     Recent Labs     09/14/19  0300 09/15/19  0532 09/16/19  0559   WBC 28.5* 27.4* 16.2*   NEUTSPOLYS  --  89.60* 87.80*   LYMPHOCYTES  --  4.40* 6.20*   MONOCYTES  --  4.70 5.30   EOSINOPHILS  --  0.00 0.00   BASOPHILS  --  0.20 0.10   ASTSGOT  --   --  14   ALTSGPT  --   --  14   ALKPHOSPHAT  --   --  69   TBILIRUBIN  --   --  0.3     Recent Labs     09/14/19 0300  09/15/19  0532 09/16/19  0559   RBC 4.49* 3.95* 3.68*   HEMOGLOBIN 12.3* 10.8* 10.6*   HEMATOCRIT 38.1* 33.8* 31.9*   PLATELETCT 357 379 285       Imaging  X-Ray:  I have personally reviewed the images and compared with prior images. and My impression is: Improved bilateral opacities    Assessment/Plan  * Acute respiratory failure with hypoxia, emphsema and pneumonitis (HCC)- (present on admission)  Assessment & Plan  Intubated 9/14  All the appropriate ventilator bundles are in place  Decrease PEEP from 10 to 8 cm of water  Continue vent support  SBT as tolerated    Pneumonitis- (present on admission)  Assessment & Plan  Completed 5 days of doxycycline on 9/15  Continue Rocephin  Usual jonny on sputum culture  Clinically improved    Elevated glucose  Assessment & Plan  Monitor glucose    Interstitial lung disease (HCC)  Assessment & Plan  Taper Solu-Medrol, 30 mg IV every 6 hours  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)  Will need complete pulmonary functions tests after he is dismissed and has recovered    Paraseptal emphysema (HCC)  Assessment & Plan  Taper Solu-Medrol, 30 mg IV every 6 hours  Continue bronchodilators  Significant smoking history  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)    QT prolongation- (present on admission)  Assessment & Plan  Monitor QTC  Optimize magnesium    INDIA (acute kidney injury) (HCC)- (present on admission)  Assessment & Plan  Resolved  Monitor renal function and urine output    Severe sepsis (HCC)- (present on admission)  Assessment & Plan  Associated respiratory failure  Pulmonary source  Improved  Continue antibiotics    Pulmonary hypertension (HCC)  Assessment & Plan  RVSP 55 mmHg    Chronic pain- (present on admission)  Assessment & Plan  Continue home methadone program  Resume Ultram, 50 mg twice daily  Continue oxycodone for breakthrough pain  Continue gabapentin    Hypothyroidism- (present on admission)  Assessment & Plan  Continue  levothyroxine, 125 mcg daily       VTE:  Lovenox  Ulcer: H2 Antagonist  Lines: Central Line  Ongoing indication addressed and Bell Catheter  Ongoing indication addressed    I have performed a physical exam and reviewed and updated ROS and Plan today (9/16/2019). In review of yesterday's note (9/15/2019), there are no changes except as documented above.     I have assessed and reassessed his respiratory status with spontaneous breathing trials and ventilator adjustments, airway mechanics, ventilator waveforms, blood pressure, hemodynamics, cardiovascular status, neurologic status with titration of dexmedetomidine.  He is at high risk for worsening respiratory and cardiovascular system dysfunction.    Discussed patient condition and risk of morbidity and/or mortality with RN, RT, Pharmacy, Charge nurse / hot rounds and QA team     The patient remains critically ill.  Critical care time = 95 minutes in directly providing and coordinating critical care and extensive data review.  No time overlap and excludes procedures.    Eulogio Lopes MD  Pulmonary and Critical Care Medicine

## 2019-09-16 NOTE — THERAPY
"Speech Language Therapy Clinical Swallow Evaluation completed.    Functional Status: Pt AOX4, provides own history. Per RN, ok to change Oxymask to nasal cannula (humidified) at 6L. Pt eager for ice and water, but confirms difficulty drinking water earlier with RN. Oral motor examination completed: no gross deficits. Voicing clear, but pt becomes SOB when engaging in conversation. Occasional cough with pt self-suctioning prior to PO trials: did not appreciate any mucous suctioned (pt stated the NG is bothering him). Pt demonstrated cough in 40% of trials thin liquid via cup. No clinical s/o aspiration demonstrated with ice chips, nectar thickened liquids, puree textures, or soft/chopped solids. Did not trial solid/regular textures d/t pt's mildly increased WOB during mastication. Laryngeal elevation was complete to palpation. Recommend initiation of a Dysphagia 2 diet with nectar thickened liquids. RN may provide ice chips as pt requests. SLP to follow.     Recommendations - Diet: Dysphagia 2, nectar thick; RN may provide ice chips as pt requests                          Strategies: Direct supervision during meals and Head of Bed at 90 Degrees; Small bites/sips; Slow rate of intake                           Medication Administration: Whole floated in puree    Plan of Care: Will benefit from Speech Therapy 5 times per week    Post-Acute Therapy: Recommend inpatient transitional care services for continued speech therapy services.      See \"Rehab Therapy-Acute\" Patient Summary Report for complete documentation.       "

## 2019-09-16 NOTE — FLOWSHEET NOTE
09/16/19 1043   Events/Summary/Plan   Events/Summary/Plan Patient extubated per MD order. Placed on 6LPM Oxymask.   Cuff leak present. No distress noted. Patient tolerated it well.

## 2019-09-16 NOTE — PROGRESS NOTES
Received report from day shift nurse. Reviewed plan of care with patient, addressed concerns and questions. Will continue to monitor.

## 2019-09-16 NOTE — CARE PLAN
Problem: Nutritional:  Goal: Nutrition support tolerated and meeting greater than 85% of estimated needs  Outcome: MET    TF at goal (without Propofol) per I/O flowsheet.

## 2019-09-16 NOTE — PROGRESS NOTES
Dr. Gonda updated on pt. Troponin level decreasing. Also notified of 16 beat run of V-tach, pt. BP stable and pt asymptomatic. Dr. Gonda also notified of low CVP and ordered to hold 1800 lasix. Orders to Dc troponin checks.

## 2019-09-16 NOTE — PROGRESS NOTES
Patient was anxious during the night.. I had to titrate precedex up and RT was in there at times due to patient coughing.

## 2019-09-16 NOTE — CARE PLAN
Problem: Communication  Goal: The ability to communicate needs accurately and effectively will improve  Outcome: PROGRESSING SLOWER THAN EXPECTED     Problem: Safety  Goal: Will remain free from injury  Outcome: PROGRESSING SLOWER THAN EXPECTED  Goal: Will remain free from falls  Outcome: PROGRESSING SLOWER THAN EXPECTED

## 2019-09-17 ENCOUNTER — APPOINTMENT (OUTPATIENT)
Dept: RADIOLOGY | Facility: MEDICAL CENTER | Age: 59
DRG: 871 | End: 2019-09-17
Attending: INTERNAL MEDICINE
Payer: MEDICARE

## 2019-09-17 PROBLEM — R47.02 DYSPHASIA: Status: ACTIVE | Noted: 2019-09-17

## 2019-09-17 LAB
A FLAVUS AB SER QL ID: NORMAL
A FUMIGATUS2 AB SER QL: NORMAL
A FUMIGATUS3 AB SER QL ID: NORMAL
ANION GAP SERPL CALC-SCNC: 7 MMOL/L (ref 0–11.9)
BASOPHILS # BLD AUTO: 0.2 % (ref 0–1.8)
BASOPHILS # BLD: 0.05 K/UL (ref 0–0.12)
BUN SERPL-MCNC: 48 MG/DL (ref 8–22)
CALCIUM SERPL-MCNC: 8.7 MG/DL (ref 8.5–10.5)
CHLORIDE SERPL-SCNC: 108 MMOL/L (ref 96–112)
CO2 SERPL-SCNC: 29 MMOL/L (ref 20–33)
CREAT SERPL-MCNC: 0.92 MG/DL (ref 0.5–1.4)
EKG IMPRESSION: NORMAL
EOSINOPHIL # BLD AUTO: 0 K/UL (ref 0–0.51)
EOSINOPHIL NFR BLD: 0 % (ref 0–6.9)
ERYTHROCYTE [DISTWIDTH] IN BLOOD BY AUTOMATED COUNT: 47.4 FL (ref 35.9–50)
GLUCOSE BLD-MCNC: 106 MG/DL (ref 65–99)
GLUCOSE BLD-MCNC: 93 MG/DL (ref 65–99)
GLUCOSE BLD-MCNC: 97 MG/DL (ref 65–99)
GLUCOSE BLD-MCNC: 98 MG/DL (ref 65–99)
GLUCOSE SERPL-MCNC: 99 MG/DL (ref 65–99)
HCT VFR BLD AUTO: 36.4 % (ref 42–52)
HGB BLD-MCNC: 11.7 G/DL (ref 14–18)
IMM GRANULOCYTES # BLD AUTO: 0.22 K/UL (ref 0–0.11)
IMM GRANULOCYTES NFR BLD AUTO: 0.9 % (ref 0–0.9)
LYMPHOCYTES # BLD AUTO: 2.54 K/UL (ref 1–4.8)
LYMPHOCYTES NFR BLD: 10.8 % (ref 22–41)
MAGNESIUM SERPL-MCNC: 2.3 MG/DL (ref 1.5–2.5)
MCH RBC QN AUTO: 28.1 PG (ref 27–33)
MCHC RBC AUTO-ENTMCNC: 32.1 G/DL (ref 33.7–35.3)
MCV RBC AUTO: 87.3 FL (ref 81.4–97.8)
MONOCYTES # BLD AUTO: 1.51 K/UL (ref 0–0.85)
MONOCYTES NFR BLD AUTO: 6.4 % (ref 0–13.4)
NEUTROPHILS # BLD AUTO: 19.16 K/UL (ref 1.82–7.42)
NEUTROPHILS NFR BLD: 81.7 % (ref 44–72)
NRBC # BLD AUTO: 0 K/UL
NRBC BLD-RTO: 0 /100 WBC
PHOSPHATE SERPL-MCNC: 2.7 MG/DL (ref 2.5–4.5)
PLATELET # BLD AUTO: 352 K/UL (ref 164–446)
PMV BLD AUTO: 8.8 FL (ref 9–12.9)
POTASSIUM SERPL-SCNC: 3.7 MMOL/L (ref 3.6–5.5)
RBC # BLD AUTO: 4.17 M/UL (ref 4.7–6.1)
S VIRIDIS AB SER QL: NORMAL
SODIUM SERPL-SCNC: 144 MMOL/L (ref 135–145)
T CANDIDUS AB SER QL: NORMAL
WBC # BLD AUTO: 23.5 K/UL (ref 4.8–10.8)

## 2019-09-17 PROCEDURE — A9270 NON-COVERED ITEM OR SERVICE: HCPCS | Performed by: HOSPITALIST

## 2019-09-17 PROCEDURE — 700102 HCHG RX REV CODE 250 W/ 637 OVERRIDE(OP): Performed by: INTERNAL MEDICINE

## 2019-09-17 PROCEDURE — 700102 HCHG RX REV CODE 250 W/ 637 OVERRIDE(OP): Performed by: HOSPITALIST

## 2019-09-17 PROCEDURE — 85025 COMPLETE CBC W/AUTO DIFF WBC: CPT

## 2019-09-17 PROCEDURE — 99232 SBSQ HOSP IP/OBS MODERATE 35: CPT | Performed by: INTERNAL MEDICINE

## 2019-09-17 PROCEDURE — A9270 NON-COVERED ITEM OR SERVICE: HCPCS | Performed by: INTERNAL MEDICINE

## 2019-09-17 PROCEDURE — 700105 HCHG RX REV CODE 258: Performed by: INTERNAL MEDICINE

## 2019-09-17 PROCEDURE — 83735 ASSAY OF MAGNESIUM: CPT

## 2019-09-17 PROCEDURE — 93010 ELECTROCARDIOGRAM REPORT: CPT | Performed by: INTERNAL MEDICINE

## 2019-09-17 PROCEDURE — 700111 HCHG RX REV CODE 636 W/ 250 OVERRIDE (IP): Performed by: INTERNAL MEDICINE

## 2019-09-17 PROCEDURE — 80048 BASIC METABOLIC PNL TOTAL CA: CPT

## 2019-09-17 PROCEDURE — 82962 GLUCOSE BLOOD TEST: CPT | Mod: 91

## 2019-09-17 PROCEDURE — 71045 X-RAY EXAM CHEST 1 VIEW: CPT

## 2019-09-17 PROCEDURE — 92526 ORAL FUNCTION THERAPY: CPT

## 2019-09-17 PROCEDURE — 99233 SBSQ HOSP IP/OBS HIGH 50: CPT | Performed by: HOSPITALIST

## 2019-09-17 PROCEDURE — 84100 ASSAY OF PHOSPHORUS: CPT

## 2019-09-17 PROCEDURE — 99233 SBSQ HOSP IP/OBS HIGH 50: CPT | Performed by: INTERNAL MEDICINE

## 2019-09-17 PROCEDURE — 93005 ELECTROCARDIOGRAM TRACING: CPT | Performed by: INTERNAL MEDICINE

## 2019-09-17 PROCEDURE — 770022 HCHG ROOM/CARE - ICU (200)

## 2019-09-17 RX ORDER — POTASSIUM CHLORIDE 20 MEQ/1
40 TABLET, EXTENDED RELEASE ORAL ONCE
Status: DISCONTINUED | OUTPATIENT
Start: 2019-09-17 | End: 2019-09-17

## 2019-09-17 RX ADMIN — ACETAMINOPHEN 650 MG: 325 TABLET, FILM COATED ORAL at 00:07

## 2019-09-17 RX ADMIN — SIMVASTATIN 40 MG: 40 TABLET, FILM COATED ORAL at 21:12

## 2019-09-17 RX ADMIN — TRAMADOL HYDROCHLORIDE 50 MG: 50 TABLET, FILM COATED ORAL at 17:55

## 2019-09-17 RX ADMIN — METHYLPREDNISOLONE SODIUM SUCCINATE 30 MG: 40 INJECTION, POWDER, FOR SOLUTION INTRAMUSCULAR; INTRAVENOUS at 12:01

## 2019-09-17 RX ADMIN — GABAPENTIN 2400 MG: 400 CAPSULE ORAL at 05:01

## 2019-09-17 RX ADMIN — METHYLPREDNISOLONE SODIUM SUCCINATE 30 MG: 40 INJECTION, POWDER, FOR SOLUTION INTRAMUSCULAR; INTRAVENOUS at 17:59

## 2019-09-17 RX ADMIN — TEMAZEPAM 15 MG: 15 CAPSULE ORAL at 21:12

## 2019-09-17 RX ADMIN — TRAMADOL HYDROCHLORIDE 50 MG: 50 TABLET, FILM COATED ORAL at 05:02

## 2019-09-17 RX ADMIN — OXYCODONE HYDROCHLORIDE 10 MG: 5 TABLET ORAL at 21:12

## 2019-09-17 RX ADMIN — Medication 2 SPRAY: at 09:13

## 2019-09-17 RX ADMIN — GABAPENTIN 1600 MG: 400 CAPSULE ORAL at 17:54

## 2019-09-17 RX ADMIN — METHADONE HYDROCHLORIDE 20 MG: 10 TABLET ORAL at 11:59

## 2019-09-17 RX ADMIN — POTASSIUM BICARBONATE 25 MEQ: 978 TABLET, EFFERVESCENT ORAL at 08:32

## 2019-09-17 RX ADMIN — ENOXAPARIN SODIUM 40 MG: 100 INJECTION SUBCUTANEOUS at 05:02

## 2019-09-17 RX ADMIN — ALPRAZOLAM 0.5 MG: 0.25 TABLET ORAL at 02:05

## 2019-09-17 RX ADMIN — ALPRAZOLAM 0.5 MG: 0.25 TABLET ORAL at 09:14

## 2019-09-17 RX ADMIN — CEFTRIAXONE SODIUM 2 G: 2 INJECTION, POWDER, FOR SOLUTION INTRAMUSCULAR; INTRAVENOUS at 05:02

## 2019-09-17 RX ADMIN — OXYCODONE HYDROCHLORIDE 10 MG: 5 TABLET ORAL at 00:54

## 2019-09-17 RX ADMIN — LEVOTHYROXINE SODIUM 125 MCG: 125 TABLET ORAL at 05:03

## 2019-09-17 RX ADMIN — OXYCODONE HYDROCHLORIDE 10 MG: 5 TABLET ORAL at 16:41

## 2019-09-17 RX ADMIN — ALPRAZOLAM 0.5 MG: 0.25 TABLET ORAL at 00:06

## 2019-09-17 RX ADMIN — ALPRAZOLAM 0.5 MG: 0.25 TABLET ORAL at 16:41

## 2019-09-17 RX ADMIN — ASPIRIN 325 MG: 325 TABLET, FILM COATED ORAL at 05:02

## 2019-09-17 RX ADMIN — METHYLPREDNISOLONE SODIUM SUCCINATE 30 MG: 40 INJECTION, POWDER, FOR SOLUTION INTRAMUSCULAR; INTRAVENOUS at 05:03

## 2019-09-17 RX ADMIN — METHADONE HYDROCHLORIDE 20 MG: 10 TABLET ORAL at 17:55

## 2019-09-17 RX ADMIN — OXYCODONE HYDROCHLORIDE 10 MG: 5 TABLET ORAL at 08:41

## 2019-09-17 RX ADMIN — METHADONE HYDROCHLORIDE 30 MG: 10 TABLET ORAL at 05:01

## 2019-09-17 RX ADMIN — OXYCODONE HYDROCHLORIDE 10 MG: 5 TABLET ORAL at 04:46

## 2019-09-17 ASSESSMENT — ENCOUNTER SYMPTOMS
NAUSEA: 0
BACK PAIN: 1
HALLUCINATIONS: 0
BRUISES/BLEEDS EASILY: 0
COUGH: 0
FOCAL WEAKNESS: 0
SEIZURES: 0
BLOOD IN STOOL: 0
DIARRHEA: 1
SENSORY CHANGE: 0
CONSTIPATION: 0
CHILLS: 0
DOUBLE VISION: 0
SPEECH CHANGE: 0
PALPITATIONS: 0
STRIDOR: 0
ABDOMINAL PAIN: 0
SENSORY CHANGE: 1
FEVER: 0
HEMOPTYSIS: 0
SPUTUM PRODUCTION: 1
MYALGIAS: 1
DIZZINESS: 0
NERVOUS/ANXIOUS: 1
COUGH: 1
PHOTOPHOBIA: 0
HEADACHES: 0
VOMITING: 0
SHORTNESS OF BREATH: 1
BLURRED VISION: 0

## 2019-09-17 NOTE — PROGRESS NOTES
Infectious Disease Progress Note    Author: Cecelia Mcdonald M.D. Date & Time of service: 2019  9:11 AM    Chief Complaint:  Follow-up for viral pneumonia    Interval History:   afebrile, white count 11.1.  On high-dose IV steroids. Reports feeling a little better. Still having SOA on exertion.   afebrile, white count up to 28.5.  Intubated yesterday due to progressive respiratory distress.  RVP positive for rhino/enterovirus.  9/15 remains afebrile, white count 27.4.  Patient's respiratory status has improved some, currently on pressure support trial.   afebrile WBC 16.2 patient remains intubated and sedated.  No issues to report overnight per nursing staff   afebrile WBC 23.5 patient extubated this morning.  He is breathing comfortably without any shortness of breath.  Also eating breakfast.  Complaining of bilateral foot pain secondary to neuropathy    Labs Reviewed and Medications Reviewed.    Review of Systems:  Review of Systems   Constitutional: Positive for malaise/fatigue. Negative for chills and fever.   Respiratory: Negative for cough.    Gastrointestinal: Negative for abdominal pain, nausea and vomiting.   Musculoskeletal: Positive for myalgias.   Neurological: Positive for sensory change. Negative for dizziness and headaches.       Hemodynamics:  No data recorded.  Monitored Temp: (P) 37 °C (98.6 °F)  Pulse  Av.9  Min: 56  Max: 110Heart Rate (Monitored): 90  Blood Pressure: (P) 131/70  CVP (mm Hg): (!) 7 MM HG    Physical Exam:  Physical Exam   Constitutional: He is oriented to person, place, and time. He appears well-nourished. No distress.   Thin     HENT:   Mouth/Throat: No oropharyngeal exudate.   Core track in place   Eyes: Pupils are equal, round, and reactive to light. Conjunctivae and EOM are normal. No scleral icterus.   Neck: Neck supple.   Left IJ catheter-nontender, no surrounding erythema   Cardiovascular: Normal rate, regular rhythm and normal heart sounds.   No  murmur heard.  Pulmonary/Chest: Effort normal and breath sounds normal. No respiratory distress.   Coarse breath sounds bilaterally   Abdominal: Soft. Bowel sounds are normal. He exhibits no distension. There is no tenderness.   Musculoskeletal: Normal range of motion. He exhibits no edema.   Lymphadenopathy:     He has no cervical adenopathy.   Neurological: He is alert and oriented to person, place, and time.   Skin: Skin is warm and dry. No rash noted. He is not diaphoretic. No erythema.   Psychiatric: He has a normal mood and affect. His behavior is normal.   Nursing note and vitals reviewed.      Meds:    Current Facility-Administered Medications:   •  methylPREDNISolone  •  temazepam  •  tramadol  •  ALPRAZolam  •  insulin regular **AND** Accu-Chek ACHS **AND** NOTIFY MD and PharmD **AND** dextrose 10% bolus  •  oxyCODONE immediate-release  •  aspirin  •  Respiratory Care per Protocol  •  ipratropium-albuterol  •  senna-docusate **AND** polyethylene glycol/lytes **AND** magnesium hydroxide **AND** bisacodyl  •  MD Alert...Adult ICU Electrolyte Replacement per Pharmacy  •  Pharmacy  •  simvastatin  •  levothyroxine  •  methadone **AND** methadone **AND** methadone  •  gabapentin  •  gabapentin  •  acetaminophen  •  cefTRIAXone (ROCEPHIN) IV  •  enoxaparin (LOVENOX) injection  •  sodium chloride    Labs:  Recent Labs     09/15/19  0532 09/16/19  0559 09/17/19  0320   WBC 27.4* 16.2* 23.5*   RBC 3.95* 3.68* 4.17*   HEMOGLOBIN 10.8* 10.6* 11.7*   HEMATOCRIT 33.8* 31.9* 36.4*   MCV 85.6 86.7 87.3   MCH 27.3 28.8 28.1   RDW 44.9 46.1 47.4   PLATELETCT 379 285 352   MPV 8.7* 9.1 8.8*   NEUTSPOLYS 89.60* 87.80* 81.70*   LYMPHOCYTES 4.40* 6.20* 10.80*   MONOCYTES 4.70 5.30 6.40   EOSINOPHILS 0.00 0.00 0.00   BASOPHILS 0.20 0.10 0.20     Recent Labs     09/15/19  0532 09/16/19  0559 09/17/19  0320   SODIUM 143 145 144   POTASSIUM 4.1 3.9 3.7   CHLORIDE 108 109 108   CO2 28 29 29   GLUCOSE 188* 154* 99   BUN 32* 45* 48*       Recent Labs     09/15/19  0532 09/16/19  0559 09/17/19  0320   ALBUMIN  --  3.1*  --    TBILIRUBIN  --  0.3  --    ALKPHOSPHAT  --  69  --    TOTPROTEIN  --  5.6*  --    ALTSGPT  --  14  --    ASTSGOT  --  14  --    CREATININE 0.96 0.83 0.92       Imaging:  Dx-chest-2 Views    Result Date: 9/12/2019 9/12/2019 12:18 PM HISTORY/REASON FOR EXAM:  Short of breath. TECHNIQUE/EXAM DESCRIPTION AND NUMBER OF VIEWS: Two views of the chest. COMPARISON:  1/3/2015. FINDINGS: New airspace opacity in the left lower lobe. No pleural effusions, no pneumothorax are appreciated. Stable cardiopericardial silhouette.     New airspace opacity in the left lower lobe, concerning for pneumonia.      Micro:  Results     Procedure Component Value Units Date/Time    Fungal Culture - BAL [[id number]] Collected:  09/14/19 0110    Order Status:  Completed Specimen:  Respirate from Bronchoalveolar Lavage Updated:  09/16/19 1858     Significant Indicator NEG     Source RESP     Site BRONCHOALVEOLAR LAVAGE     Culture Result Culture in progress.    Narrative:       Collected By:[id number] TONY ROSE  Collected By:[id number] TONY ROSE    Fungal Smear - BAL [[id number]] Collected:  09/14/19 0110    Order Status:  Completed Specimen:  Respirate from Bronchoalveolar Lavage Updated:  09/16/19 1858     Significant Indicator NEG     Source RESP     Site BRONCHOALVEOLAR LAVAGE     Fungal Smear Results No fungal elements seen.    Narrative:       Collected By:[id number] TONY ROSE  Collected By:[id number] TONY ROSE    AFB Culture - BAL [[id number]] Collected:  09/14/19 0110    Order Status:  Completed Specimen:  Respirate from Bronchoalveolar Lavage Updated:  09/16/19 1858     Significant Indicator NEG     Source RESP     Site BRONCHOALVEOLAR LAVAGE     Culture Result Culture in progress.     AFB Smear Results No acid fast bacilli seen.  No acid fast bacilli seen.      Narrative:       Collected By:[id number] ROSE  Collected  By:[id number] TONY ROSE    Acid Fast Stain [[id number]] Collected:  09/14/19 0110    Order Status:  Completed Specimen:  Respirate Updated:  09/16/19 1819     Significant Indicator NEG     Source RESP     Site BRONCHOALVEOLAR LAVAGE     AFB Smear Results No acid fast bacilli seen.  No acid fast bacilli seen.      Narrative:       Collected By:[id number] TONY ROSE  Collected By:[id number] TONY ROSE    Quant Bronchial Washing [[id number]] Collected:  09/14/19 0110    Order Status:  Completed Specimen:  Respirate from Bronchoalveolar Lavage Updated:  09/16/19 0733     Significant Indicator NEG     Source RESP     Site BRONCHOALVEOLAR LAVAGE LLL     Culture Result 3,800 cfu/mL usual upper respiratory jonny  No clinically significant Staphylococcus aureus, Methicillin  Resistant Staphylococcus aureus, or Pseudomonas species  isolated.       Gram Stain Result Many WBCs.  Rare Gram positive cocci.  <5% intracellular organisms.      Narrative:       Collected By:[id number] TONY ROSE  BAL from LLL lobe  Collected By:[id number] TONY ROSE    MRSA By PCR (Amp) [[id number]] Collected:  09/14/19 1035    Order Status:  Completed Specimen:  Respirate from Nares Updated:  09/15/19 1743     Significant Indicator NEG     Source RESP     Site NARES     MRSA PCR Negative for MRSA by PCR.    Narrative:       Collected By:[id number] SURYA CLARK  Collected By:[id number] SURYA CLARK    URINALYSIS [[id number]]  (Abnormal) Collected:  09/14/19 1035    Order Status:  Completed Specimen:  Urine, Bell Cath Updated:  09/14/19 1054     Color Yellow     Character Clear     Specific Gravity 1.008     Ph 5.0     Glucose Negative mg/dL      Ketones Negative mg/dL      Protein Negative mg/dL      Bilirubin Negative     Urobilinogen, Urine 0.2     Nitrite Negative     Leukocyte Esterase Moderate     Occult Blood Negative     Micro Urine Req Microscopic    Narrative:       Collected By:[id number] SURYA CLARK    GRAM STAIN  [[id number]] Collected:  09/14/19 0110    Order Status:  Completed Specimen:  Respirate Updated:  09/14/19 0550     Significant Indicator .     Source RESP     Site BRONCHOALVEOLAR LAVAGE LLL     Gram Stain Result Many WBCs.  Rare Gram positive cocci.  <5% intracellular organisms.      Narrative:       Collected By:[id number] TONY ROSE  BAL from LLL lobe  Collected By:[id number] TONY ROSE    Culture Respiratory W/ Grm Stn - BAL [[id number]] Collected:  09/14/19 0110    Order Status:  Canceled Specimen:  Other from Bronchoalveolar Lavage     GRAM STAIN [[id number]] Collected:  09/13/19 1600    Order Status:  Completed Specimen:  Respirate Updated:  09/13/19 1912     Significant Indicator .     Source RESP     Site Sputum     Gram Stain Result Sputum Gram stain quality score is <1, probable  oropharyngeal contamination. Culture not performed.  Recollect if clinically indicated.      CULTURE RESPIRATORY W/ GRM STN [[id number]] Collected:  09/13/19 1600    Order Status:  Completed Updated:  09/13/19 1608    Flu and RSV by PCR [[id number]] Collected:  09/11/19 2045    Order Status:  Completed Specimen:  Respirate from Nasopharyngeal Updated:  09/11/19 2217     Influenza virus A RNA Negative     Influenza virus B, PCR Negative     RSV, PCR Negative    Narrative:       Collected By:[id number] JIMMY CUETOAH    GRAM STAIN [[id number]] Collected:  09/11/19 0500    Order Status:  Completed Specimen:  Respirate Updated:  09/11/19 1114     Significant Indicator .     Source RESP     Site SPUTUM     Gram Stain Result Sputum Gram stain quality score is <1, probable  oropharyngeal contamination. Culture not performed.  Recollect if clinically indicated.      Narrative:       Collected By:[id number] HERB MARSHALL  Collected By:[id number] HERB MARSHALL    CULTURE RESPIRATORY W/ GRM STN [[id number]] Collected:  09/11/19 0500    Order Status:  Canceled Specimen:  Sputum     Influenza A/B By PCR (Adult - Flu Only) [[id number]] Collected:   09/10/19 2342    Order Status:  Completed Specimen:  Respirate from Nasopharyngeal Updated:  09/11/19 0030     Influenza virus A RNA Negative     Influenza virus B, PCR Negative    Narrative:       Collected By:[id number] HERB MARSHALL    GRAM STAIN [[id number]] Collected:  09/10/19 1111    Order Status:  Completed Specimen:  Respirate Updated:  09/10/19 1154     Significant Indicator .     Source RESP     Site SPUTUM     Gram Stain Result Sputum Gram stain quality score is <1, probable  oropharyngeal contamination. Culture not performed.  Recollect if clinically indicated.      Narrative:       CALL  Ivy  171 tel. [phone],  CALLED  171 tel. [phone] 09/10/2019, 11:53, RB PERF. RESULTS CALLED TO:BEBA Thomson  Collected By:[id number] ORVILLE GUNN  Preferably before first antibiotic dose - add Gram stain if  indicated  Collected By:[id number] ORVILLE CLARK.    Culture Respiratory W/ GRM STN [[id number]] Collected:  09/10/19 1111    Order Status:  Completed Specimen:  Respirate from Sputum Updated:  09/10/19 1126    Narrative:       Collected By:[id number] ORVILLE GUNN  Preferably before first antibiotic dose - add Gram stain if  indicated          Assessment:  Pancho Martinez Jr. is a 59 y.o. male with a history of history of chronic pain syndrome on methadone and high doses of gabapentin, significant smoking history, emphysema not on chronic oxygen at home, presented with 3 weeks of progressive shortness of breath, especially worse over the past 3 days.  CT with worsening emphysematous changes and diffuse groundglass opacities.     Patient seems to have underlying worsening emphysematous changes. He may or may not have a superimposed bacterial pneumonia, but if there is a superimposed infection, the groundglass opacities on CT are more suggestive of either a viral or an atypical bacterial infection. His son had a viral URI around the same time the patient's symptoms began.    Patient was also started on high-dose IV  steroids, had progressive respiratory distress, intubated on 9/13.  RVP positive for rhino/enterovirus    Pertinent Diagnoses  Sepsis  VDRF  Viral pneumonia  Emphysema  Possible underlying ILD with flare  Leukocytosis, worse    Plan:  Vent dependent respiratory failure, improved  Secondary to viral pneumonia  Status post intubation on 9/13  Extubated on 9/17  On oxygen mask  -Repeat CT from 9/14 again with bilateral emphysematous changes, diffuse ground glass opacities, about the same compared to previous CT  -Persistent leukocytosis but also on high-dose IV steroids  -RVP with rhino/enterovirus.  Could be the  of his current presentation in setting of underlying emphysema, ILD with possible induced flare, poor reserve. Procalcitonin improved to 0.22 which further supports this  Completed course of doxycycline for atypical coverage  Continue empiric coverage for a superimposed bacterial pneumonia with ceftriaxone. Anticipate a 5 day empiric course with stop date 9/18/19  Discontinued vancomycin 9/16.  Nasal MSRA PCR negative  Follow BAL cultures from 9/14 - URF    Leukocytosis, persistent  Multifactorial (infection, steroids)  On antibiotics above  Monitor    Emphysema  With possible underlying ILD with flare    Discussed with bedside RN

## 2019-09-17 NOTE — PROGRESS NOTES
Changes in patient's rhythm. Peaked P waves, with frequently dropping QRS. Patient asymptomatic . EKG ordered and am labs sent. Dr. Javier reviewed EKG and no orders at this time.

## 2019-09-17 NOTE — CARE PLAN
Problem: Communication  Goal: The ability to communicate needs accurately and effectively will improve  Outcome: PROGRESSING AS EXPECTED   Patient was extubated this am and has been encouraged to voice feelings and use call light if he needs anything.  Problem: Knowledge Deficit  Goal: Knowledge of disease process/condition, treatment plan, diagnostic tests, and medications will improve  Outcome: PROGRESSING AS EXPECTED  Plan of care reviewed with patient addressed all questions and concerns.  Goal: Knowledge of the prescribed therapeutic regimen will improve  Outcome: PROGRESSING AS EXPECTED   Reasons for taking his medications and general medications information during medications administration was provided.

## 2019-09-17 NOTE — PROGRESS NOTES
Received report from day nurse. Reviewed POC with patient and addressed questions and concerns. Will continue to monitor.

## 2019-09-17 NOTE — PROGRESS NOTES
Patient was very anxious and c/o of pain all night.At one point O2 saturation declined to the eighties and RT increased O2 from 6 liters to 10 liters via oxymask. He also  constantly had an average of 3 loose BM every 2 hours. All questions and concerns were addressed.

## 2019-09-17 NOTE — ASSESSMENT & PLAN NOTE
RVSP 55 mmHg, likely group III due to underlying lung disease  - Maintain euvolemia  - Pulm followup and repeat TTE as outpatient when acute issues resolve

## 2019-09-17 NOTE — PROGRESS NOTES
[facility] Medicine Daily Progress Note    Date of Service  9/17/2019    Chief Complaint  59 y.o. male admitted 9/10/2019 with history of chronic pain syndrome on high-dose narcotics admitted with respiratory failure secondary to pneumonia CTA was negative for PE required intubation on 9/14/2019    Hospital Course          Interval Problem Update    Extubated yesterday  SR 70-90  -140  Afebrile  Dysphagia 2 NTL  cortrak in place   Loose stool  On oxymask 7L  IS 1500  On ceftriaxone  Completed doxy 9-15  Solumedrol 30q6    Consultants/Specialty  ID  CC    Code Status  Full code    Disposition  To be determined    Review of Systems  Review of Systems   Constitutional: Positive for malaise/fatigue. Negative for fever.   Respiratory: Positive for cough and shortness of breath.    Cardiovascular: Negative for chest pain and palpitations.   Gastrointestinal: Positive for diarrhea. Negative for abdominal pain, constipation, nausea and vomiting.   Musculoskeletal: Positive for back pain and joint pain.   All other systems reviewed and are negative.       Physical Exam  Pulse:  [66-96] (P) 81  Resp:  [16] 16  BP: ()/() (P) 131/70  SpO2:  [85 %-100 %] (P) 97 %    Physical Exam   Constitutional: He is oriented to person, place, and time. He appears well-developed and well-nourished.   HENT:   Head: Normocephalic and atraumatic.   Right Ear: External ear normal.   Left Ear: External ear normal.   Mouth/Throat: No oropharyngeal exudate.   cortrak in place    Eyes: Conjunctivae are normal. Right eye exhibits no discharge. Left eye exhibits no discharge. No scleral icterus.   Neck: Neck supple. No JVD present. No tracheal deviation present.   Cardiovascular: Normal rate and regular rhythm. Exam reveals no gallop and no friction rub.   No murmur heard.  Pulmonary/Chest: Effort normal. No stridor. No respiratory distress. He has decreased breath sounds. He has no wheezes. He has rales. He exhibits no tenderness.    Abdominal: Soft. Bowel sounds are normal. He exhibits no distension. There is no tenderness. There is no rebound.   Musculoskeletal: He exhibits edema. He exhibits no tenderness.   Neurological: He is alert and oriented to person, place, and time. No cranial nerve deficit. He exhibits normal muscle tone.   Skin: Skin is warm and dry. He is not diaphoretic. No cyanosis. Nails show no clubbing.   Psychiatric: He has a normal mood and affect. His behavior is normal.   Nursing note and vitals reviewed.      Fluids    Intake/Output Summary (Last 24 hours) at 9/17/2019 0858  Last data filed at 9/17/2019 0600  Gross per 24 hour   Intake 1404.2 ml   Output 2435 ml   Net -1030.8 ml       Laboratory  Recent Labs     09/15/19  0532 09/16/19  0559 09/17/19  0320   WBC 27.4* 16.2* 23.5*   RBC 3.95* 3.68* 4.17*   HEMOGLOBIN 10.8* 10.6* 11.7*   HEMATOCRIT 33.8* 31.9* 36.4*   MCV 85.6 86.7 87.3   MCH 27.3 28.8 28.1   MCHC 32.0* 33.2* 32.1*   RDW 44.9 46.1 47.4   PLATELETCT 379 285 352   MPV 8.7* 9.1 8.8*     Recent Labs     09/15/19  0532 09/16/19  0559 09/17/19  0320   SODIUM 143 145 144   POTASSIUM 4.1 3.9 3.7   CHLORIDE 108 109 108   CO2 28 29 29   GLUCOSE 188* 154* 99   BUN 32* 45* 48*   CREATININE 0.96 0.83 0.92   CALCIUM 8.5 8.2* 8.7             Recent Labs     09/16/19  0559   TRIGLYCERIDE 109       Imaging  DX-CHEST-PORTABLE (1 VIEW)   Final Result         1.  Pulmonary edema and/or infiltrates are identified, which are stable since the prior exam.      DX-CHEST-PORTABLE (1 VIEW)   Final Result         1.  Pulmonary edema and/or infiltrates are identified, which are stable since the prior exam.      DX-CHEST-PORTABLE (1 VIEW)   Final Result         1.  No significant interval change.      CT-CHEST (THORAX) W/O   Final Result      1.  Minimal improvement of presumed bilateral pneumonitis compared to 9/9/2019.  Findings likely significantly improved from prior chest x-ray dated 9/12/2019.   2.  Moderate emphysema, likely  paraseptal.               DX-CHEST-FOR LINE PLACEMENT Perform procedure in: Patient's Room (S134)   Final Result            1. A left central venous catheter with tip projects appropriately over the expected area of the superior vena cava.      DX-ABDOMEN FOR TUBE PLACEMENT   Final Result         Feeding tube with tip projecting over the expected area of the stomach fundus.      DX-CHEST-PORTABLE (1 VIEW)   Final Result         1. Interval intubation. No other significant interval change.      EC-ECHOCARDIOGRAM COMPLETE W/O CONT   Final Result      DX-CHEST-PORTABLE (1 VIEW)   Final Result      Bilateral airspace opacities, left greater than right worrisome for multifocal pneumonia.         DX-CHEST-2 VIEWS   Final Result         New airspace opacity in the left lower lobe, concerning for pneumonia.      OUTSIDE IMAGES-CT CHEST   Final Result           Assessment/Plan  * Acute respiratory failure with hypoxia, emphsema and pneumonitis (HCC)- (present on admission)  Assessment & Plan  Tolerated extubation on 9/16/2019  Continue ceftriaxone  Bronchodilators per RT protocol  Encourage I-S use and mobilization    Pneumonitis- (present on admission)  Assessment & Plan  Completed doxycycline  Remains on ceftriaxone to complete 7-day course    Interstitial lung disease (HCC)  Assessment & Plan  Will need outpatient pulmonary follow-up    Paraseptal emphysema (HCC)  Assessment & Plan  Continue Solu-Medrol taper as tolerated  RT protocol      QT prolongation- (present on admission)  Assessment & Plan  Patient chronically on methadone  Avoid other QTC prolonging agents  Monitor and replete electrolytes    INDIA (acute kidney injury) (HCC)- (present on admission)  Assessment & Plan  Resolved        Severe sepsis (HCC)- (present on admission)  Assessment & Plan  This is severe sepsis with the following associated acute organ dysfunction(s): acute kidney failure, acute respiratory failure.   Likely source is pneumonia  Sepsis  resolved    Dysphasia  Assessment & Plan  Aspiration precautions  Tolerating diet encourage increase intake  Advance diet per SLP  MILVIA garrido    Chronic pain- (present on admission)  Assessment & Plan  With narcotic dependence    On high-dose methadone and gabapentin resumed home dosages  Avoid escalating doses of narcotics      Hypothyroidism- (present on admission)  Assessment & Plan  Continue levothyroxine      Plan of care reviewed with patient and discussed with nursing staff pharmacist and critical care    VTE prophylaxis: Lovenox

## 2019-09-17 NOTE — THERAPY
"Speech Language Therapy dysphagia treatment completed.   Functional Status:  Ongoing assessment of oropharyngeal swallow skills and safe swallow strategy training conducted during meal of Dysphagia 2/NTL with tx trials of thins via cup. RN present to provide pt two small pills whole with NTL rinse - no s/sx of aspiration occurred. Pt still has Cortrak for supplemental TF and medications. Per RN, pt does not like to take medications with applesauce or NTL and he does not NTL, has been requesting water, ice cream. Pt c/o food on breakfast tray, stating \"it's disgusting,\" though he had not eaten any scrambled eggs on tray. Pt agreed to fruit cup of peaches and nectar thick water with no s/sx of aspiration noted. Thin water trialed via cup sips though pt demo'd immediate coughing, concerning for aspiration. Re-educated pt re: rationale for dysphagia diet and pt was agreeable to continuing D2/NTL textures with ice chips w/ nsg. Pt declined to continue eating, limited by pain, requesting to lie down. RN aware.     Recommendations: Continue Dysphagia 2/NTL diet with single ice chips w/nsg supervision. NO thin liquids (if pt wants ice cream, please provide magic cup as this melts to a thicker liquid).   Plan of Care: Will benefit from Speech Therapy 3 times per week  Post-Acute Therapy: Recommend inpatient transitional care services for continued speech therapy services.        See \"Rehab Therapy-Acute\" Patient Summary Report for complete documentation.     "

## 2019-09-17 NOTE — PROGRESS NOTES
Critical Care Progress Note    Date of admission  9/10/2019    Chief Complaint  59 y.o. male admitted 9/10/2019 with shortness of breath.    Hospital Course    This gentleman was admitted to the hospital with pneumonia and respiratory failure.  He developed worsening respiratory failure and was transferred to the ICU and was intubated.      Interval Problem Update  Reviewed last 24 hour events:      0855 hours:    Extubated yest  Oriented x 4  SR  99.3  dyphagia 2 - nectar thick  Multiple loose stools  Good UOP  IS 1500  7 L mask  Rocephin  Replete K  Stop dex and dilaudid gtts      He feels better today.  He has a cough with some scant sputum production.  He denies hemoptysis.  His shortness of breath is improving.  He has no abdominal pain, nausea or vomiting.  He has no angina, palpitations or syncope.      Review of Systems  Review of Systems   Constitutional: Positive for malaise/fatigue. Negative for chills and fever.   HENT: Negative for ear pain and nosebleeds.    Eyes: Negative for blurred vision, double vision and photophobia.   Respiratory: Positive for cough and sputum production. Negative for hemoptysis and stridor.    Cardiovascular: Negative for chest pain, palpitations and leg swelling.   Gastrointestinal: Negative for abdominal pain, blood in stool, nausea and vomiting.   Genitourinary: Negative for dysuria, hematuria and urgency.   Musculoskeletal: Positive for back pain.   Skin: Negative for rash.   Neurological: Negative for sensory change, speech change, focal weakness, seizures and headaches.   Endo/Heme/Allergies: Does not bruise/bleed easily.   Psychiatric/Behavioral: Negative for hallucinations and suicidal ideas. The patient is nervous/anxious.         Vital Signs for last 24 hours   Pulse:  [73-95] 78  Resp:  [16-18] 18  BP: (120-165)/() 143/88  SpO2:  [87 %-100 %] 95 %    Hemodynamic parameters for last 24 hours  CVP:  [6 MM HG-8 MM HG] 7 MM HG    Respiratory Information for the  last 24 hours       Physical Exam   Physical Exam   Constitutional: He is oriented to person, place, and time. No distress.   HENT:   Head: Normocephalic.   Right Ear: External ear normal.   Left Ear: External ear normal.   Nose: Nose normal.   Mouth/Throat: No oropharyngeal exudate.   Eyes: Pupils are equal, round, and reactive to light. Right eye exhibits no discharge. Left eye exhibits no discharge. No scleral icterus.   Neck: Neck supple. No tracheal deviation present.   Cardiovascular: Intact distal pulses. Exam reveals no friction rub.   No murmur heard.  Sinus rhythm   Pulmonary/Chest: Effort normal. No stridor. No respiratory distress. He has no wheezes. He has rales (Scattered crackles bilaterally).   Abdominal: Soft. Bowel sounds are normal. He exhibits no distension. There is no tenderness. There is no guarding.   Musculoskeletal: Normal range of motion. He exhibits no deformity.   No clubbing or cyanosis   Neurological: He is alert and oriented to person, place, and time. No cranial nerve deficit.   No focal weakness   Skin: Skin is warm and dry. He is not diaphoretic. No erythema. No pallor.       Medications  Current Facility-Administered Medications   Medication Dose Route Frequency Provider Last Rate Last Dose   • methylPREDNISolone (SOLU-MEDROL) 40 MG injection 30 mg  30 mg Intravenous Q6HRS Eulogio Lopes M.D.   30 mg at 09/17/19 1759   • temazepam (RESTORIL) capsule 15 mg  15 mg Enteral Tube QHS Eulogio Lopes M.D.   15 mg at 09/16/19 2043   • tramadol (ULTRAM) 50 MG tablet 50 mg  50 mg Enteral Tube BID Eulogio Lopes M.D.   50 mg at 09/17/19 1755   • ALPRAZolam (XANAX) tablet 0.5-1 mg  0.5-1 mg Oral Q6HRS PRN Eulogio Lopes M.D.   0.5 mg at 09/17/19 1641   • insulin regular (HUMULIN R) injection 2-9 Units  2-9 Units Subcutaneous 4X/DAY KADEN Javier M.D.   Stopped at 09/16/19 2300    And   • DEXTROSE 10% BOLUS 250 mL  250 mL Intravenous Q15 MIN PRN Marck HINES  ARLINE Javier       • oxyCODONE immediate-release (ROXICODONE) tablet 5-10 mg  5-10 mg Enteral Tube Q4HRS PRN Jeremy M Gonda, M.D.   10 mg at 09/17/19 1641   • aspirin (ASA) tablet 325 mg  325 mg Enteral Tube DAILY Jeremy M Gonda, M.D.   325 mg at 09/17/19 0502   • Respiratory Care per Protocol   Nebulization Continuous RT FLORA Mcdermott Jr..O.       • ipratropium-albuterol (DUONEB) nebulizer solution  3 mL Nebulization Q2HRS PRN (RT) FLORA Mcdermott Jr..O.       • senna-docusate (PERICOLACE or SENOKOT S) 8.6-50 MG per tablet 2 Tab  2 Tab Enteral Tube BID FLORA Mcdermott Jr..OKhadra   Stopped at 09/16/19 1800    And   • polyethylene glycol/lytes (MIRALAX) PACKET 1 Packet  1 Packet Enteral Tube QDAY PRN FLORA Mcdermott Jr..O.   1 Packet at 09/15/19 1217    And   • magnesium hydroxide (MILK OF MAGNESIA) suspension 30 mL  30 mL Enteral Tube QDAY PRN FLORA Mcdermott Jr..O.        And   • bisacodyl (DULCOLAX) suppository 10 mg  10 mg Rectal QDAY PRN FLORA Mcdermott Jr..O.       • MD Alert...ICU Electrolyte Replacement per Pharmacy   Other PHARMACY TO DOSE FLORA Mcdermott Jr..O.       • Pharmacy Consult: Enteral tube insertion - review meds/change route/product selection  1 Each Other PHARMACY TO DOSE FLORA Mcdermott Jr..O.       • simvastatin (ZOCOR) tablet 40 mg  40 mg Enteral Tube Nightly FLORA Mcdermott Jr..O.   40 mg at 09/16/19 2043   • levothyroxine (SYNTHROID) tablet 125 mcg  125 mcg Enteral Tube QAM AC FLORA Mcdermott Jr..O.   125 mcg at 09/17/19 0503   • methadone (DOLOPHINE) tablet 20 mg  20 mg Enteral Tube DAILY AT NOON FLORA Mcdermott Jr..O.   20 mg at 09/17/19 1159    And   • methadone (DOLOPHINE) tablet 30 mg  30 mg Enteral Tube QAM Marck Montero Jr., D.O.   30 mg at 09/17/19 0501    And   • methadone (DOLOPHINE) tablet 20 mg  20 mg Enteral Tube Q EVENING Marck Montero Jr., D.O.   20 mg at 09/17/19 1752   • gabapentin (NEURONTIN) capsule 2,400 mg  2,400 mg Enteral Tube QAM  Marck Montero Jr., D.O.   2,400 mg at 09/17/19 0501   • gabapentin (NEURONTIN) capsule 1,600 mg  1,600 mg Enteral Tube Q EVENING Marck Montero Jr., D.O.   1,600 mg at 09/17/19 1754   • acetaminophen (TYLENOL) tablet 650 mg  650 mg Enteral Tube Q6HRS PRN Marck Montero Jr., D.O.   650 mg at 09/17/19 0007   • cefTRIAXone (ROCEPHIN) 2 g in  mL IVPB  2 g Intravenous Q24HRS Berny Flood M.D.   Stopped at 09/17/19 0532   • enoxaparin (LOVENOX) inj 40 mg  40 mg Subcutaneous DAILY Marck Montero Jr., D.O.   40 mg at 09/17/19 0502   • sodium chloride (OCEAN) 0.65 % nasal spray 2 Spray  2 Spray Nasal Q2HRS PRN Mary Silva M.D.   2 [place] at 09/17/19 0913       Fluids    Intake/Output Summary (Last 24 hours) at 9/17/2019 1925  Last data filed at 9/17/2019 1800  Gross per 24 hour   Intake 1324 ml   Output 3390 ml   Net -2066 ml       Laboratory  Recent Labs     09/15/19  0451 09/16/19  0248   ISTATAPH 7.444 7.452   ISTATAPCO2 35.4 33.6   ISTATAPO2 72 77   ISTATATCO2 25 24   [id number] 95 96   ISTATARTHCO3 24.2 23.4   ISTATARTBE 0 0   ISTATTEMP 97.3 F 98.2 F   ISTATFIO2 45 45   ISTATSPEC Arterial Arterial   ISTATAPHTC 7.454 7.456   [id number] 69 75         Recent Labs     09/15/19  0532 09/16/19  0559 09/17/19  0320   SODIUM 143 145 144   POTASSIUM 4.1 3.9 3.7   CHLORIDE 108 109 108   CO2 28 29 29   BUN 32* 45* 48*   CREATININE 0.96 0.83 0.92   MAGNESIUM 2.6* 2.5 2.3   PHOSPHORUS 2.4* 2.9 2.7   CALCIUM 8.5 8.2* 8.7     Recent Labs     09/15/19  0532 09/16/19  0559 09/17/19  0320   ALTSGPT  --  14  --    ASTSGOT  --  14  --    ALKPHOSPHAT  --  69  --    TBILIRUBIN  --  0.3  --    PREALBUMIN 9.0* 12.0*  --    GLUCOSE 188* 154* 99     Recent Labs     09/15/19  0532 09/16/19  0559 09/17/19  0320   WBC 27.4* 16.2* 23.5*   NEUTSPOLYS 89.60* 87.80* 81.70*   LYMPHOCYTES 4.40* 6.20* 10.80*   MONOCYTES 4.70 5.30 6.40   EOSINOPHILS 0.00 0.00 0.00   BASOPHILS 0.20 0.10 0.20   ASTSGOT  --  14  --    ALTSGPT  --   14  --    ALKPHOSPHAT  --  69  --    TBILIRUBIN  --  0.3  --      Recent Labs     09/15/19  0532 09/16/19  0559 09/17/19  0320   RBC 3.95* 3.68* 4.17*   HEMOGLOBIN 10.8* 10.6* 11.7*   HEMATOCRIT 33.8* 31.9* 36.4*   PLATELETCT 379 285 352       Imaging  X-Ray:  I have personally reviewed the images and compared with prior images. 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)    Paraseptal emphysema (HCC)  Assessment & Plan  Continue Solu-Medrol, 30 mg IV every 6 hours  Continue bronchodilators  He has a significant smoking history  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)    QT prolongation- (present on admission)  Assessment & Plan  Monitor QTC  Keep magnesium greater than 2    Severe sepsis (HCC)- (present on admission)  Assessment & Plan  Associated respiratory failure  Pulmonary source  He is improved  Continue Rocephin    Pulmonary hypertension (HCC)  Assessment & Plan  RVSP 55 mmHg    Chronic pain- (present on admission)  Assessment & Plan  Continue home methadone program  Continue Ultram, 50 mg twice daily  Continue oxycodone for breakthrough pain  Continue home gabapentin regimen    Hypothyroidism- (present on admission)  Assessment & Plan  Continue Synthroid, 125 mcg daily       VTE:  Lovenox  Ulcer: Not Indicated  Lines: Central Line  Ongoing indication addressed and Bell Catheter  Ongoing indication addressed    I have performed a  physical exam and reviewed and updated ROS and Plan today (9/17/2019). In review of yesterday's note (9/16/2019), there are no changes except as documented above.     Overall, he is improved.  Keep in ICU for now.  Increase activity.    Discussed patient condition and risk of morbidity and/or mortality with Hospitalist, RN, RT, Pharmacy, Charge nurse / hot rounds and QA team     Eulogio Lopes MD  Pulmonary and Critical Care Medicine

## 2019-09-17 NOTE — CARE PLAN
IS QID: Pt able to achieve 1500mL with good effort and technique. Pt requiring 3-6L oxygen via oxymask throughout the day.

## 2019-09-17 NOTE — RESPIRATORY CARE
IS QID ordered   Patient getting 1750 with good effort. His 60% predicted would be 2010  6LPM Oxymask

## 2019-09-17 NOTE — DOCUMENTATION QUERY
Formerly [facility], [facility]                                                                       Query Response Note      PATIENT:               RENNY RUSHING  ACCT #:                  [id number]  MRN:                     [id number]  :                      1960  ADMIT DATE:       9/10/2019 1:32 AM  DISCH DATE:          RESPONDING  PROVIDER #:        [id number]           QUERY TEXT:    Shock is documented in the Medical Record.  Please specify the type.    NOTE:  If an appropriate response is not listed below, please respond with a new note.    The patient's Clinical Indicators include:   9/15 [place] MOREJON documents: Remains on norepinephrine infusion at 5 mcg for persistent shock  Treatment: ICU care, Levophed gtt, Mechanical ventilation, Phynylephrine, NS IVF infusion  Risk Factors: Sepsis, Pneumonia, Acute respiratory failure, Interstitial lung disease, prolonged QT  Options provided:   -- Cardiogenic shock   -- Septic shock   -- Hypovolemic shock   -- Unable to determine      Query created by: Bernice Miranda on 2019 9:08 AM    RESPONSE TEXT:    Septic shock          Electronically signed by:  LUBNA ESTRADA 2019 12:30 PM

## 2019-09-17 NOTE — CARE PLAN
Respiratory Update  Pt is on 6LPM   SPO2 is 94%    Treatment modality: IS   Frequency:QID  1500    Pt tolerating current treatments well with no adverse reactions.

## 2019-09-18 ENCOUNTER — APPOINTMENT (OUTPATIENT)
Dept: RADIOLOGY | Facility: MEDICAL CENTER | Age: 59
DRG: 871 | End: 2019-09-18
Attending: INTERNAL MEDICINE
Payer: MEDICARE

## 2019-09-18 PROBLEM — N17.9 AKI (ACUTE KIDNEY INJURY) (HCC): Status: RESOLVED | Noted: 2019-09-10 | Resolved: 2019-09-18

## 2019-09-18 LAB
ANION GAP SERPL CALC-SCNC: 9 MMOL/L (ref 0–11.9)
BASOPHILS # BLD AUTO: 0.2 % (ref 0–1.8)
BASOPHILS # BLD: 0.03 K/UL (ref 0–0.12)
BUN SERPL-MCNC: 42 MG/DL (ref 8–22)
CALCIUM SERPL-MCNC: 8.9 MG/DL (ref 8.5–10.5)
CHLORIDE SERPL-SCNC: 103 MMOL/L (ref 96–112)
CO2 SERPL-SCNC: 25 MMOL/L (ref 20–33)
CREAT SERPL-MCNC: 0.86 MG/DL (ref 0.5–1.4)
EOSINOPHIL # BLD AUTO: 0 K/UL (ref 0–0.51)
EOSINOPHIL NFR BLD: 0 % (ref 0–6.9)
ERYTHROCYTE [DISTWIDTH] IN BLOOD BY AUTOMATED COUNT: 44.5 FL (ref 35.9–50)
GLUCOSE SERPL-MCNC: 128 MG/DL (ref 65–99)
HCT VFR BLD AUTO: 39.9 % (ref 42–52)
HGB BLD-MCNC: 12.5 G/DL (ref 14–18)
IMM GRANULOCYTES # BLD AUTO: 0.18 K/UL (ref 0–0.11)
IMM GRANULOCYTES NFR BLD AUTO: 0.9 % (ref 0–0.9)
LYMPHOCYTES # BLD AUTO: 1.67 K/UL (ref 1–4.8)
LYMPHOCYTES NFR BLD: 8.6 % (ref 22–41)
MAGNESIUM SERPL-MCNC: 2.5 MG/DL (ref 1.5–2.5)
MCH RBC QN AUTO: 26.9 PG (ref 27–33)
MCHC RBC AUTO-ENTMCNC: 31.3 G/DL (ref 33.7–35.3)
MCV RBC AUTO: 86 FL (ref 81.4–97.8)
MONOCYTES # BLD AUTO: 1.08 K/UL (ref 0–0.85)
MONOCYTES NFR BLD AUTO: 5.6 % (ref 0–13.4)
NEUTROPHILS # BLD AUTO: 16.45 K/UL (ref 1.82–7.42)
NEUTROPHILS NFR BLD: 84.7 % (ref 44–72)
NRBC # BLD AUTO: 0 K/UL
NRBC BLD-RTO: 0 /100 WBC
PHOSPHATE SERPL-MCNC: 4.6 MG/DL (ref 2.5–4.5)
PLATELET # BLD AUTO: 374 K/UL (ref 164–446)
PMV BLD AUTO: 8.9 FL (ref 9–12.9)
POTASSIUM SERPL-SCNC: 4 MMOL/L (ref 3.6–5.5)
RBC # BLD AUTO: 4.64 M/UL (ref 4.7–6.1)
SODIUM SERPL-SCNC: 137 MMOL/L (ref 135–145)
WBC # BLD AUTO: 19.4 K/UL (ref 4.8–10.8)

## 2019-09-18 PROCEDURE — 99233 SBSQ HOSP IP/OBS HIGH 50: CPT | Performed by: INTERNAL MEDICINE

## 2019-09-18 PROCEDURE — 700102 HCHG RX REV CODE 250 W/ 637 OVERRIDE(OP): Performed by: INTERNAL MEDICINE

## 2019-09-18 PROCEDURE — 700111 HCHG RX REV CODE 636 W/ 250 OVERRIDE (IP): Performed by: INTERNAL MEDICINE

## 2019-09-18 PROCEDURE — 700111 HCHG RX REV CODE 636 W/ 250 OVERRIDE (IP): Performed by: HOSPITALIST

## 2019-09-18 PROCEDURE — 51798 US URINE CAPACITY MEASURE: CPT

## 2019-09-18 PROCEDURE — A9270 NON-COVERED ITEM OR SERVICE: HCPCS | Performed by: INTERNAL MEDICINE

## 2019-09-18 PROCEDURE — 99232 SBSQ HOSP IP/OBS MODERATE 35: CPT | Performed by: HOSPITALIST

## 2019-09-18 PROCEDURE — 700105 HCHG RX REV CODE 258: Performed by: INTERNAL MEDICINE

## 2019-09-18 PROCEDURE — 700102 HCHG RX REV CODE 250 W/ 637 OVERRIDE(OP): Performed by: HOSPITALIST

## 2019-09-18 PROCEDURE — 99232 SBSQ HOSP IP/OBS MODERATE 35: CPT | Performed by: INTERNAL MEDICINE

## 2019-09-18 PROCEDURE — 71045 X-RAY EXAM CHEST 1 VIEW: CPT

## 2019-09-18 PROCEDURE — 84100 ASSAY OF PHOSPHORUS: CPT

## 2019-09-18 PROCEDURE — 770001 HCHG ROOM/CARE - MED/SURG/GYN PRIV*

## 2019-09-18 PROCEDURE — 83735 ASSAY OF MAGNESIUM: CPT

## 2019-09-18 PROCEDURE — 80048 BASIC METABOLIC PNL TOTAL CA: CPT

## 2019-09-18 PROCEDURE — 770022 HCHG ROOM/CARE - ICU (200)

## 2019-09-18 PROCEDURE — A6213 FOAM DRG >16<=48 SQ IN W/BDR: HCPCS | Performed by: HOSPITALIST

## 2019-09-18 PROCEDURE — 85025 COMPLETE CBC W/AUTO DIFF WBC: CPT

## 2019-09-18 PROCEDURE — 97162 PT EVAL MOD COMPLEX 30 MIN: CPT

## 2019-09-18 PROCEDURE — A9270 NON-COVERED ITEM OR SERVICE: HCPCS | Performed by: HOSPITALIST

## 2019-09-18 RX ORDER — OXYCODONE HYDROCHLORIDE 5 MG/1
5-10 TABLET ORAL EVERY 4 HOURS PRN
Status: DISCONTINUED | OUTPATIENT
Start: 2019-09-18 | End: 2019-09-30 | Stop reason: HOSPADM

## 2019-09-18 RX ORDER — BISACODYL 10 MG
10 SUPPOSITORY, RECTAL RECTAL
Status: DISCONTINUED | OUTPATIENT
Start: 2019-09-18 | End: 2019-09-30 | Stop reason: HOSPADM

## 2019-09-18 RX ORDER — METHADONE HYDROCHLORIDE 10 MG/1
30 TABLET ORAL EVERY MORNING
Status: DISCONTINUED | OUTPATIENT
Start: 2019-09-19 | End: 2019-09-30 | Stop reason: HOSPADM

## 2019-09-18 RX ORDER — METHADONE HYDROCHLORIDE 10 MG/1
20 TABLET ORAL
Status: DISCONTINUED | OUTPATIENT
Start: 2019-09-19 | End: 2019-09-30 | Stop reason: HOSPADM

## 2019-09-18 RX ORDER — ASPIRIN 325 MG
325 TABLET ORAL DAILY
Status: DISCONTINUED | OUTPATIENT
Start: 2019-09-19 | End: 2019-09-30 | Stop reason: HOSPADM

## 2019-09-18 RX ORDER — AMOXICILLIN 250 MG
2 CAPSULE ORAL 2 TIMES DAILY
Status: DISCONTINUED | OUTPATIENT
Start: 2019-09-18 | End: 2019-09-30 | Stop reason: HOSPADM

## 2019-09-18 RX ORDER — ACETAMINOPHEN 325 MG/1
650 TABLET ORAL EVERY 6 HOURS PRN
Status: DISCONTINUED | OUTPATIENT
Start: 2019-09-18 | End: 2019-09-30 | Stop reason: HOSPADM

## 2019-09-18 RX ORDER — POLYETHYLENE GLYCOL 3350 17 G/17G
1 POWDER, FOR SOLUTION ORAL
Status: DISCONTINUED | OUTPATIENT
Start: 2019-09-18 | End: 2019-09-30 | Stop reason: HOSPADM

## 2019-09-18 RX ORDER — SIMVASTATIN 40 MG
40 TABLET ORAL NIGHTLY
Status: DISCONTINUED | OUTPATIENT
Start: 2019-09-18 | End: 2019-09-30 | Stop reason: HOSPADM

## 2019-09-18 RX ORDER — TEMAZEPAM 15 MG/1
15 CAPSULE ORAL
Status: DISCONTINUED | OUTPATIENT
Start: 2019-09-18 | End: 2019-09-30 | Stop reason: HOSPADM

## 2019-09-18 RX ORDER — METHADONE HYDROCHLORIDE 10 MG/1
20 TABLET ORAL EVERY EVENING
Status: DISCONTINUED | OUTPATIENT
Start: 2019-09-18 | End: 2019-09-30 | Stop reason: HOSPADM

## 2019-09-18 RX ORDER — TRAMADOL HYDROCHLORIDE 50 MG/1
50 TABLET ORAL 2 TIMES DAILY
Status: DISCONTINUED | OUTPATIENT
Start: 2019-09-18 | End: 2019-09-30 | Stop reason: HOSPADM

## 2019-09-18 RX ORDER — GABAPENTIN 400 MG/1
2400 CAPSULE ORAL EVERY MORNING
Status: DISCONTINUED | OUTPATIENT
Start: 2019-09-19 | End: 2019-09-30 | Stop reason: HOSPADM

## 2019-09-18 RX ORDER — PREDNISONE 20 MG/1
40 TABLET ORAL DAILY
Status: DISCONTINUED | OUTPATIENT
Start: 2019-09-18 | End: 2019-09-19

## 2019-09-18 RX ORDER — GABAPENTIN 400 MG/1
1600 CAPSULE ORAL EVERY EVENING
Status: DISCONTINUED | OUTPATIENT
Start: 2019-09-18 | End: 2019-09-30 | Stop reason: HOSPADM

## 2019-09-18 RX ADMIN — CEFTRIAXONE SODIUM 2 G: 2 INJECTION, POWDER, FOR SOLUTION INTRAMUSCULAR; INTRAVENOUS at 05:39

## 2019-09-18 RX ADMIN — LEVOTHYROXINE SODIUM 125 MCG: 125 TABLET ORAL at 05:39

## 2019-09-18 RX ADMIN — ALPRAZOLAM 1 MG: 0.25 TABLET ORAL at 23:46

## 2019-09-18 RX ADMIN — METHADONE HYDROCHLORIDE 20 MG: 10 TABLET ORAL at 12:05

## 2019-09-18 RX ADMIN — ALPRAZOLAM 0.5 MG: 0.25 TABLET ORAL at 11:08

## 2019-09-18 RX ADMIN — TEMAZEPAM 15 MG: 15 CAPSULE ORAL at 20:54

## 2019-09-18 RX ADMIN — METHADONE HYDROCHLORIDE 20 MG: 10 TABLET ORAL at 17:11

## 2019-09-18 RX ADMIN — OXYCODONE HYDROCHLORIDE 10 MG: 5 TABLET ORAL at 02:20

## 2019-09-18 RX ADMIN — METHYLPREDNISOLONE SODIUM SUCCINATE 30 MG: 40 INJECTION, POWDER, FOR SOLUTION INTRAMUSCULAR; INTRAVENOUS at 00:24

## 2019-09-18 RX ADMIN — PREDNISONE 40 MG: 20 TABLET ORAL at 10:19

## 2019-09-18 RX ADMIN — METHADONE HYDROCHLORIDE 30 MG: 10 TABLET ORAL at 05:39

## 2019-09-18 RX ADMIN — TRAMADOL HYDROCHLORIDE 50 MG: 50 TABLET, FILM COATED ORAL at 05:40

## 2019-09-18 RX ADMIN — OXYCODONE HYDROCHLORIDE 5 MG: 5 TABLET ORAL at 23:46

## 2019-09-18 RX ADMIN — GABAPENTIN 2400 MG: 400 CAPSULE ORAL at 05:39

## 2019-09-18 RX ADMIN — TRAMADOL HYDROCHLORIDE 50 MG: 50 TABLET ORAL at 17:11

## 2019-09-18 RX ADMIN — METHYLPREDNISOLONE SODIUM SUCCINATE 30 MG: 40 INJECTION, POWDER, FOR SOLUTION INTRAMUSCULAR; INTRAVENOUS at 05:39

## 2019-09-18 RX ADMIN — ENOXAPARIN SODIUM 40 MG: 100 INJECTION SUBCUTANEOUS at 05:40

## 2019-09-18 RX ADMIN — ALPRAZOLAM 1 MG: 0.25 TABLET ORAL at 02:21

## 2019-09-18 RX ADMIN — SIMVASTATIN 40 MG: 40 TABLET, FILM COATED ORAL at 20:54

## 2019-09-18 RX ADMIN — GABAPENTIN 1600 MG: 400 CAPSULE ORAL at 17:11

## 2019-09-18 RX ADMIN — ASPIRIN 325 MG: 325 TABLET, FILM COATED ORAL at 05:39

## 2019-09-18 RX ADMIN — ALPRAZOLAM 0.5 MG: 0.25 TABLET ORAL at 17:11

## 2019-09-18 ASSESSMENT — ENCOUNTER SYMPTOMS
HALLUCINATIONS: 0
ABDOMINAL PAIN: 0
BACK PAIN: 1
EYE REDNESS: 0
WHEEZING: 0
NAUSEA: 0
MYALGIAS: 1
SPEECH CHANGE: 0
SEIZURES: 0
MYALGIAS: 0
CHILLS: 0
SINUS PAIN: 0
NERVOUS/ANXIOUS: 1
CONSTIPATION: 0
COUGH: 0
BRUISES/BLEEDS EASILY: 0
WEAKNESS: 0
SENSORY CHANGE: 1
FOCAL WEAKNESS: 0
SPUTUM PRODUCTION: 1
EYE PAIN: 0
DIAPHORESIS: 0
PALPITATIONS: 0
HEADACHES: 0
DIZZINESS: 0
COUGH: 1
FEVER: 0
FLANK PAIN: 0
EYE DISCHARGE: 0
VOMITING: 0
HEARTBURN: 0
CLAUDICATION: 0
SENSORY CHANGE: 0

## 2019-09-18 ASSESSMENT — COGNITIVE AND FUNCTIONAL STATUS - GENERAL
STANDING UP FROM CHAIR USING ARMS: A LITTLE
SUGGESTED CMS G CODE MODIFIER MOBILITY: CK
CLIMB 3 TO 5 STEPS WITH RAILING: A LOT
MOVING FROM LYING ON BACK TO SITTING ON SIDE OF FLAT BED: A LITTLE
WALKING IN HOSPITAL ROOM: A LITTLE
MOBILITY SCORE: 19

## 2019-09-18 ASSESSMENT — GAIT ASSESSMENTS
ASSISTIVE DEVICE: FRONT WHEEL WALKER
DISTANCE (FEET): 25
GAIT LEVEL OF ASSIST: MINIMAL ASSIST

## 2019-09-18 NOTE — CARE PLAN
Problem: Communication  Goal: The ability to communicate needs accurately and effectively will improve  Outcome: PROGRESSING AS EXPECTED     Problem: Safety  Goal: Will remain free from injury  Outcome: PROGRESSING AS EXPECTED     Problem: Venous Thromboembolism (VTW)/Deep Vein Thrombosis (DVT) Prevention:  Goal: Patient will participate in Venous Thrombosis (VTE)/Deep Vein Thrombosis (DVT)Prevention Measures  Outcome: PROGRESSING AS EXPECTED     Problem: Bowel/Gastric:  Goal: Normal bowel function is maintained or improved  Outcome: PROGRESSING AS EXPECTED  Goal: Will not experience complications related to bowel motility  Outcome: PROGRESSING AS EXPECTED     Problem: Knowledge Deficit  Goal: Knowledge of disease process/condition, treatment plan, diagnostic tests, and medications will improve  Outcome: PROGRESSING AS EXPECTED

## 2019-09-18 NOTE — PROGRESS NOTES
Critical Care Progress Note    Date of admission  9/10/2019    Chief Complaint  59 y.o. male admitted 9/10/2019 with shortness of breath.    Hospital Course    This gentleman was admitted to the hospital with pneumonia and respiratory failure.  He developed worsening respiratory failure and was transferred to the ICU and was intubated.      Interval Problem Update  Reviewed last 24 hour events:      0850 hours:    Xanax for anxiety - helps  Cortrak out  IS 1500  4 L mask  Rocephin  Change Solumedrol to Pred 40      He tells me he is feeling better today.  He continues to cough up some white sputum, but his cough is improving.  His shortness of breath is improving.  He has no angina, palpitations or syncope.  He has no abdominal pain, nausea or vomiting.      Review of Systems  Review of Systems   Constitutional: Negative for diaphoresis and fever.   HENT: Negative for hearing loss, sinus pain and tinnitus.    Eyes: Negative for pain, discharge and redness.   Respiratory: Positive for cough and sputum production. Negative for wheezing.    Cardiovascular: Negative for chest pain and claudication.   Gastrointestinal: Negative for abdominal pain, constipation, heartburn and melena.   Genitourinary: Negative for dysuria, flank pain and hematuria.   Musculoskeletal: Negative for myalgias.   Skin: Negative for itching.   Neurological: Negative for dizziness, sensory change, speech change, focal weakness and seizures.   Endo/Heme/Allergies: Does not bruise/bleed easily.   Psychiatric/Behavioral: Negative for hallucinations and suicidal ideas. The patient is nervous/anxious.         Vital Signs for last 24 hours   Pulse:  [] 70  Resp:  [18-20] 18  BP: ()/(56-94) 110/62  SpO2:  [77 %-98 %] 96 %    Hemodynamic parameters for last 24 hours       Respiratory Information for the last 24 hours       Physical Exam   Physical Exam   Constitutional: He is oriented to person, place, and time. He appears well-developed. No  distress.   HENT:   Head: Normocephalic and atraumatic.   Right Ear: External ear normal.   Left Ear: External ear normal.   Mouth/Throat: Oropharynx is clear and moist.   Eyes: Pupils are equal, round, and reactive to light. EOM are normal. Right eye exhibits no discharge. Left eye exhibits no discharge.   Neck: Normal range of motion. Neck supple. No JVD present. No tracheal deviation present.   Cardiovascular: Intact distal pulses. Exam reveals no gallop.   No murmur heard.  Sinus rhythm   Pulmonary/Chest: Effort normal. No respiratory distress. He has no wheezes. He has rales (Improved crackles at the bases).   Abdominal: Soft. Bowel sounds are normal. He exhibits no distension. There is no tenderness. There is no rebound.   Musculoskeletal: Normal range of motion. He exhibits no tenderness.   No clubbing or cyanosis   Neurological: He is alert and oriented to person, place, and time. No cranial nerve deficit. Coordination normal.   No focal weakness   Skin: Skin is warm and dry. No rash noted. He is not diaphoretic. No erythema.       Medications  Current Facility-Administered Medications   Medication Dose Route Frequency Provider Last Rate Last Dose   • predniSONE (DELTASONE) tablet 40 mg  40 mg Oral DAILY Rigoberto Galvan M.D.   40 mg at 09/18/19 1019   • [START ON 9/19/2019] aspirin (ASA) tablet 325 mg  325 mg Oral DAILY Rigoberto Galvan M.D.       • gabapentin (NEURONTIN) capsule 1,600 mg  1,600 mg Oral Q EVENING Rigoberto Galvan M.D.   1,600 mg at 09/18/19 1711   • [START ON 9/19/2019] gabapentin (NEURONTIN) capsule 2,400 mg  2,400 mg Oral QAM Rigoberto Galvan M.D.       • [START ON 9/19/2019] levothyroxine (SYNTHROID) tablet 125 mcg  125 mcg Oral QAM AC Rigoberto Galvan M.D.       • [START ON 9/19/2019] methadone (DOLOPHINE) tablet 30 mg  30 mg Oral QAROLANDO Galvan M.D.        And   • [START ON 9/19/2019] methadone (DOLOPHINE) tablet 20 mg  20 mg Oral DAILY AT NOON Rigoberto ALDANA  Israel Galvan M.D.        And   • methadone (DOLOPHINE) tablet 20 mg  20 mg Oral Q EVENING Rigoberto Galvan M.D.   20 mg at 09/18/19 1711   • senna-docusate (PERICOLACE or SENOKOT S) 8.6-50 MG per tablet 2 Tab  2 Tab Oral BID Rigoberto Galvan M.D.   Stopped at 09/18/19 1711    And   • polyethylene glycol/lytes (MIRALAX) PACKET 1 Packet  1 Packet Oral QDAY PRN Rigoberto Galvan M.D.        And   • magnesium hydroxide (MILK OF MAGNESIA) suspension 30 mL  30 mL Oral QDAY PRN Rigoberto Galvan M.D.        And   • bisacodyl (DULCOLAX) suppository 10 mg  10 mg Rectal QDAY PRN Rigoberto Galvan M.D.       • simvastatin (ZOCOR) tablet 40 mg  40 mg Oral Nightly Rigoberto Galvan M.D.       • temazepam (RESTORIL) capsule 15 mg  15 mg Oral QHS Rigoberto Galvan M.D.       • tramadol (ULTRAM) 50 MG tablet 50 mg  50 mg Oral BID Rigoberto Galvan M.D.   50 mg at 09/18/19 1711   • acetaminophen (TYLENOL) tablet 650 mg  650 mg Oral Q6HRS PRN Rigoberto Galvan M.D.       • oxyCODONE immediate-release (ROXICODONE) tablet 5-10 mg  5-10 mg Oral Q4HRS PRN Rigoberto Galvan M.D.       • ALPRAZolam (XANAX) tablet 0.5-1 mg  0.5-1 mg Oral Q6HRS PRN Eulogio Lopes M.D.   0.5 mg at 09/18/19 1711   • Respiratory Care per Protocol   Nebulization Continuous RT Marck Montero Jr., D.O.       • ipratropium-albuterol (DUONEB) nebulizer solution  3 mL Nebulization Q2HRS PRN (RT) Marck Montero Jr., D.O.       • MD Alert...ICU Electrolyte Replacement per Pharmacy   Other PHARMACY TO DOSE Marck M Winston Jr., D.O.       • enoxaparin (LOVENOX) inj 40 mg  40 mg Subcutaneous DAILY Marck Montero Jr., D.O.   40 mg at 09/18/19 0540   • sodium chloride (OCEAN) 0.65 % nasal spray 2 Spray  2 Spray Nasal Q2HRS PRN Mary Silva M.D.   2 [place] at 09/17/19 0913       Fluids    Intake/Output Summary (Last 24 hours) at 9/18/2019 2046  Last data filed at 9/18/2019 2000  Gross per 24 hour   Intake 670 ml   Output  1290 ml   Net -620 ml       Laboratory  Recent Labs     09/16/19  0248   ISTATAPH 7.452   ISTATAPCO2 33.6   ISTATAPO2 77   ISTATATCO2 24   [id number] 96   ISTATARTHCO3 23.4   ISTATARTBE 0   ISTATTEMP 98.2 F   ISTATFIO2 45   ISTATSPEC Arterial   ISTATAPHTC 7.456   [id number] 75         Recent Labs     09/16/19  0559 09/17/19  0320 09/18/19  0530   SODIUM 145 144 137   POTASSIUM 3.9 3.7 4.0   CHLORIDE 109 108 103   CO2 29 29 25   BUN 45* 48* 42*   CREATININE 0.83 0.92 0.86   MAGNESIUM 2.5 2.3 2.5   PHOSPHORUS 2.9 2.7 4.6*   CALCIUM 8.2* 8.7 8.9     Recent Labs     09/16/19  0559 09/17/19 0320 09/18/19  0530   ALTSGPT 14  --   --    ASTSGOT 14  --   --    ALKPHOSPHAT 69  --   --    TBILIRUBIN 0.3  --   --    PREALBUMIN 12.0*  --   --    GLUCOSE 154* 99 128*     Recent Labs     09/16/19  0559 09/17/19  0320 09/18/19  0530   WBC 16.2* 23.5* 19.4*   NEUTSPOLYS 87.80* 81.70* 84.70*   LYMPHOCYTES 6.20* 10.80* 8.60*   MONOCYTES 5.30 6.40 5.60   EOSINOPHILS 0.00 0.00 0.00   BASOPHILS 0.10 0.20 0.20   ASTSGOT 14  --   --    ALTSGPT 14  --   --    ALKPHOSPHAT 69  --   --    TBILIRUBIN 0.3  --   --      Recent Labs     09/16/19  0559 09/17/19 0320 09/18/19  0530   RBC 3.68* 4.17* 4.64*   HEMOGLOBIN 10.6* 11.7* 12.5*   HEMATOCRIT 31.9* 36.4* 39.9*   PLATELETCT 285 352 374       Imaging  X-Ray:  I have personally reviewed the images and compared with prior images. and My impression is: Unchanged bilateral opacities    Assessment/Plan  * Acute respiratory failure with hypoxia, emphsema and pneumonitis (HCC)- (present on admission)  Assessment & Plan  Intubated 9/14-9/16  Improving  Continue oxygen    Pneumonitis- (present on admission)  Assessment & Plan  Completed 5 days of doxycycline on 9/15  Usual jonny on sputum culture  He is clinically improved  Continue Rocephin    Elevated glucose  Assessment & Plan  Monitor blood glucose    Interstitial lung disease (HCC)  Assessment & Plan  Query combined pulmonary fibrosis and  emphysema (CPFE)  Stop Solu-Medrol and start prednisone, 40 mg daily    Paraseptal emphysema (HCC)  Assessment & Plan  Stop Solu-Medrol and start prednisone, 40 mg daily  Continue bronchodilators  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)    QT prolongation- (present on admission)  Assessment & Plan  Monitor QTC  Keep magnesium greater than 2    Severe sepsis (HCC)- (present on admission)  Assessment & Plan  Associated respiratory failure  Pulmonary source  Continue Rocephin  Improved    Pulmonary hypertension (HCC)  Assessment & Plan  RVSP 55 mmHg    Chronic pain- (present on admission)  Assessment & Plan  Continue home methadone program  Continue Ultram, 50 mg twice daily  Continue oxycodone for breakthrough pain  Continue home gabapentin regimen    Hypothyroidism- (present on admission)  Assessment & Plan  Continue levothyroxine, 125 mcg daily       VTE:  Lovenox  Ulcer: Not Indicated  Lines: Central Line  Ongoing indication addressed and Bell Catheter  Ongoing indication addressed    I have performed a physical exam and reviewed and updated ROS and Plan today (9/18/2019). In review of yesterday's note (9/17/2019), there are no changes except as documented above.     OK to transfer out of ICU.  [facility] will follow.    Discussed patient condition and risk of morbidity and/or mortality with Hospitalist, RN, RT, Pharmacy, Charge nurse / hot rounds and QA team     Eulogio Lopes MD  Pulmonary and Critical Care Medicine

## 2019-09-18 NOTE — THERAPY
"Physical Therapy Evaluation completed.   Bed Mobility:  Supine to Sit: Supervised  Transfers: Sit to Stand: Minimal Assist  Gait: Level Of Assist: Minimal Assist with Front-Wheel Walker       Plan of Care: Will benefit from Physical Therapy 3 times per week  Discharge Recommendations: Equipment: Will Continue to Assess for Equipment Needs. Post-acute therapy Discharge to a transitional care facility for continued skilled therapy services.    Pt admitted for pneumonia workup and presents most limited by ease of fatigue. He was able to manage himself during bed mobility but required min A for sit to stand and transfer for stability. Pt completed x25' of gait with FWW with min A. While here, PT will follow to address activity tolerance, weakness, and impaired gait. At current level, recommend placement.     See \"Rehab Therapy-Acute\" Patient Summary Report for complete documentation.     "

## 2019-09-18 NOTE — PROGRESS NOTES
[facility] Medicine Daily Progress Note    Date of Service  9/18/2019    Chief Complaint  59 y.o. male admitted 9/10/2019 with history of chronic pain syndrome on high-dose narcotics admitted with respiratory failure secondary to pneumonia CTA was negative for PE required intubation on 9/14/2019    Hospital Course          Interval Problem Update    Anxious  Mobilized to chair  -140  Tolerating diet but poor intake  4l Oxymask  CBG's stable        Consultants/Specialty  ID  CC    Code Status  Full code    Disposition  To be determined    Review of Systems  Review of Systems   Constitutional: Negative for chills and fever.   Cardiovascular: Negative for chest pain and palpitations.   Gastrointestinal: Negative for abdominal pain, nausea and vomiting.   Genitourinary: Negative for flank pain and hematuria.   Musculoskeletal: Positive for back pain and joint pain.   Neurological: Negative for speech change, focal weakness and weakness.   Psychiatric/Behavioral: The patient is nervous/anxious.    All other systems reviewed and are negative.       Physical Exam  Pulse:  [68-90] 77  Resp:  [18] 18  BP: (107-162)/(74-95) 107/79  SpO2:  [92 %-99 %] 93 %    Physical Exam   Constitutional: He is oriented to person, place, and time. He appears well-developed and well-nourished.   Thin male   HENT:   Head: Normocephalic and atraumatic.   Right Ear: External ear normal.   Left Ear: External ear normal.   Mouth/Throat: No oropharyngeal exudate.   Eyes: Pupils are equal, round, and reactive to light. Conjunctivae are normal. Right eye exhibits no discharge. Left eye exhibits no discharge.   Neck: Neck supple. No JVD present. No tracheal deviation present.   Cardiovascular: Normal rate and regular rhythm. Exam reveals no friction rub.   No murmur heard.  Pulmonary/Chest: Effort normal. No respiratory distress. He has decreased breath sounds. He has no wheezes. He has rales. He exhibits no tenderness.   Abdominal: Soft. Bowel  sounds are normal. He exhibits no distension. There is no tenderness. There is no rebound.   Musculoskeletal: He exhibits no edema or tenderness.   Neurological: He is alert and oriented to person, place, and time. No cranial nerve deficit. He exhibits normal muscle tone.   Skin: Skin is warm and dry. He is not diaphoretic. No cyanosis. Nails show no clubbing.   Psychiatric: He has a normal mood and affect. His behavior is normal.   Nursing note and vitals reviewed.      Fluids    Intake/Output Summary (Last 24 hours) at 9/18/2019 0839  Last data filed at 9/18/2019 0600  Gross per 24 hour   Intake 700 ml   Output 2625 ml   Net -1925 ml       Laboratory  Recent Labs     09/16/19  0559 09/17/19  0320 09/18/19  0530   WBC 16.2* 23.5* 19.4*   RBC 3.68* 4.17* 4.64*   HEMOGLOBIN 10.6* 11.7* 12.5*   HEMATOCRIT 31.9* 36.4* 39.9*   MCV 86.7 87.3 86.0   MCH 28.8 28.1 26.9*   MCHC 33.2* 32.1* 31.3*   RDW 46.1 47.4 44.5   PLATELETCT 285 352 374   MPV 9.1 8.8* 8.9*     Recent Labs     09/16/19  0559 09/17/19  0320 09/18/19  0530   SODIUM 145 144 137   POTASSIUM 3.9 3.7 4.0   CHLORIDE 109 108 103   CO2 29 29 25   GLUCOSE 154* 99 128*   BUN 45* 48* 42*   CREATININE 0.83 0.92 0.86   CALCIUM 8.2* 8.7 8.9             Recent Labs     09/16/19  0559   TRIGLYCERIDE 109       Imaging  DX-CHEST-PORTABLE (1 VIEW)   Final Result         1.  Pulmonary edema and/or infiltrates are identified, which are stable since the prior exam.         DX-CHEST-PORTABLE (1 VIEW)   Final Result         1.  Pulmonary edema and/or infiltrates are identified, which are stable since the prior exam.      DX-CHEST-PORTABLE (1 VIEW)   Final Result         1.  Pulmonary edema and/or infiltrates are identified, which are stable since the prior exam.      DX-CHEST-PORTABLE (1 VIEW)   Final Result         1.  No significant interval change.      CT-CHEST (THORAX) W/O   Final Result      1.  Minimal improvement of presumed bilateral pneumonitis compared to 9/9/2019.   Findings likely significantly improved from prior chest x-ray dated 9/12/2019.   2.  Moderate emphysema, likely paraseptal.               DX-CHEST-FOR LINE PLACEMENT Perform procedure in: Patient's Room (S134)   Final Result            1. A left central venous catheter with tip projects appropriately over the expected area of the superior vena cava.      DX-ABDOMEN FOR TUBE PLACEMENT   Final Result         Feeding tube with tip projecting over the expected area of the stomach fundus.      DX-CHEST-PORTABLE (1 VIEW)   Final Result         1. Interval intubation. No other significant interval change.      EC-ECHOCARDIOGRAM COMPLETE W/O CONT   Final Result      DX-CHEST-PORTABLE (1 VIEW)   Final Result      Bilateral airspace opacities, left greater than right worrisome for multifocal pneumonia.         DX-CHEST-2 VIEWS   Final Result         New airspace opacity in the left lower lobe, concerning for pneumonia.      OUTSIDE IMAGES-CT CHEST   Final Result           Assessment/Plan  * Acute respiratory failure with hypoxia, emphsema and pneumonitis (HCC)- (present on admission)  Assessment & Plan  Extubated on 9/16/2019    Continue bronchodilators per RT protocol  Wean off oxygen as tolerated  Taper steroids    Pneumonitis- (present on admission)  Assessment & Plan  Completed doxycycline    Will complete 5 day  course of ceftriaxone today discussed with Dr. Mcdonald    Elevated glucose  Assessment & Plan  Likely steroid-induced and reactive  CBG stable DC insulin sliding scale    Interstitial lung disease (HCC)  Assessment & Plan  Patient will need outpatient pulmonary follow-up for further work-up    Paraseptal emphysema (HCC)  Assessment & Plan  Taper steroids  Continue bronchodilators      Severe sepsis (HCC)- (present on admission)  Assessment & Plan  This is severe sepsis with the following associated acute organ dysfunction(s): acute kidney failure, acute respiratory failure.   Likely source is pneumonia  Sepsis  resolved    Pulmonary hypertension (HCC)  Assessment & Plan  RVSP 55  Monitor intake and output    Dysphasia  Assessment & Plan  Aspiration precautions  Advance diet per SLP    Chronic pain- (present on admission)  Assessment & Plan  With narcotic dependence    Continue home dose of methadone and gabapentin    Hypothyroidism- (present on admission)  Assessment & Plan  Stable on levothyroxine      Plan of care reviewed with patient and discussed with nursing staff pharmacist and critical care    VTE prophylaxis: Lovenox

## 2019-09-18 NOTE — CARE PLAN
Respiratory Update    Treatment modality: IS   1500     Frequency:BID    Pt tolerating current treatments well with no adverse reactions.

## 2019-09-18 NOTE — PROGRESS NOTES
Infectious Disease Progress Note    Author: Cecelia Mcdonald M.D. Date & Time of service: 2019  9:28 AM    Chief Complaint:  Follow-up for viral pneumonia    Interval History:   afebrile, white count 11.1.  On high-dose IV steroids. Reports feeling a little better. Still having SOA on exertion.   afebrile, white count up to 28.5.  Intubated yesterday due to progressive respiratory distress.  RVP positive for rhino/enterovirus.  9/15 remains afebrile, white count 27.4.  Patient's respiratory status has improved some, currently on pressure support trial.   afebrile WBC 16.2 patient remains intubated and sedated.  No issues to report overnight per nursing staff   afebrile WBC 23.5 patient extubated this morning.  He is breathing comfortably without any shortness of breath.  Also eating breakfast.  Complaining of bilateral foot pain secondary to neuropathy   afebrile WBC 19.4.  Patient continues to feel pallor, denies any shortness of breath.  Minimal cough.    Labs Reviewed and Medications Reviewed.    Review of Systems:  Review of Systems   Constitutional: Positive for malaise/fatigue. Negative for chills and fever.   Respiratory: Negative for cough.    Gastrointestinal: Negative for abdominal pain, nausea and vomiting.   Musculoskeletal: Positive for myalgias.   Neurological: Positive for sensory change. Negative for dizziness and headaches.       Hemodynamics:  No data recorded.  Monitored Temp: 36.8 °C (98.2 °F)  Pulse  Av.7  Min: 56  Max: 110   Blood Pressure: 105/62      Physical Exam:  Physical Exam   Constitutional: He is oriented to person, place, and time. He appears well-nourished. No distress.   Thin     HENT:   Mouth/Throat: No oropharyngeal exudate.   Core track in place   Eyes: Pupils are equal, round, and reactive to light. Conjunctivae and EOM are normal. No scleral icterus.   Neck: Neck supple.   Left IJ catheter-nontender, no surrounding erythema   Cardiovascular:  Normal rate, regular rhythm and normal heart sounds.   No murmur heard.  Pulmonary/Chest: Effort normal and breath sounds normal. No respiratory distress.   Coarse breath sounds bilaterally   Abdominal: Soft. Bowel sounds are normal. He exhibits no distension. There is no tenderness.   Musculoskeletal: Normal range of motion. He exhibits no edema.   Lymphadenopathy:     He has no cervical adenopathy.   Neurological: He is alert and oriented to person, place, and time.   Skin: Skin is warm and dry. No rash noted. He is not diaphoretic. No erythema.   Psychiatric: He has a normal mood and affect. His behavior is normal.   Nursing note and vitals reviewed.      Meds:    Current Facility-Administered Medications:   •  predniSONE  •  temazepam  •  tramadol  •  ALPRAZolam  •  oxyCODONE immediate-release  •  aspirin  •  Respiratory Care per Protocol  •  ipratropium-albuterol  •  senna-docusate **AND** polyethylene glycol/lytes **AND** magnesium hydroxide **AND** bisacodyl  •  MD Alert...Adult ICU Electrolyte Replacement per Pharmacy  •  Pharmacy  •  simvastatin  •  levothyroxine  •  methadone **AND** methadone **AND** methadone  •  gabapentin  •  gabapentin  •  acetaminophen  •  cefTRIAXone (ROCEPHIN) IV  •  enoxaparin (LOVENOX) injection  •  sodium chloride    Labs:  Recent Labs     09/16/19 0559 09/17/19 0320 09/18/19  0530   WBC 16.2* 23.5* 19.4*   RBC 3.68* 4.17* 4.64*   HEMOGLOBIN 10.6* 11.7* 12.5*   HEMATOCRIT 31.9* 36.4* 39.9*   MCV 86.7 87.3 86.0   MCH 28.8 28.1 26.9*   RDW 46.1 47.4 44.5   PLATELETCT 285 352 374   MPV 9.1 8.8* 8.9*   NEUTSPOLYS 87.80* 81.70* 84.70*   LYMPHOCYTES 6.20* 10.80* 8.60*   MONOCYTES 5.30 6.40 5.60   EOSINOPHILS 0.00 0.00 0.00   BASOPHILS 0.10 0.20 0.20     Recent Labs     09/16/19 0559 09/17/19 0320 09/18/19  0530   SODIUM 145 144 137   POTASSIUM 3.9 3.7 4.0   CHLORIDE 109 108 103   CO2 29 29 25   GLUCOSE 154* 99 128*   BUN 45* 48* 42*     Recent Labs     09/16/19  0559  09/17/19  0320 09/18/19  0530   ALBUMIN 3.1*  --   --    TBILIRUBIN 0.3  --   --    ALKPHOSPHAT 69  --   --    TOTPROTEIN 5.6*  --   --    ALTSGPT 14  --   --    ASTSGOT 14  --   --    CREATININE 0.83 0.92 0.86       Imaging:  Dx-chest-2 Views    Result Date: 9/12/2019 9/12/2019 12:18 PM HISTORY/REASON FOR EXAM:  Short of breath. TECHNIQUE/EXAM DESCRIPTION AND NUMBER OF VIEWS: Two views of the chest. COMPARISON:  1/3/2015. FINDINGS: New airspace opacity in the left lower lobe. No pleural effusions, no pneumothorax are appreciated. Stable cardiopericardial silhouette.     New airspace opacity in the left lower lobe, concerning for pneumonia.      Micro:  Results     Procedure Component Value Units Date/Time    Fungal Culture - BAL [[id number]] Collected:  09/14/19 0110    Order Status:  Completed Specimen:  Respirate from Bronchoalveolar Lavage Updated:  09/18/19 0907     Significant Indicator NEG     Source RESP     Site BRONCHOALVEOLAR LAVAGE     Culture Result No fungal growth to date.    Narrative:       Collected By:[id number] TONY ROSE  Collected By:[id number] TONY ROSE    Fungal Smear - BAL [[id number]] Collected:  09/14/19 0110    Order Status:  Completed Specimen:  Respirate from Bronchoalveolar Lavage Updated:  09/18/19 0907     Significant Indicator NEG     Source RESP     Site BRONCHOALVEOLAR LAVAGE     Fungal Smear Results No fungal elements seen.    Narrative:       Collected By:[id number] TONY ROSE  Collected By:[id number] TONY ROSE    AFB Culture - BAL [[id number]] Collected:  09/14/19 0110    Order Status:  Completed Specimen:  Respirate from Bronchoalveolar Lavage Updated:  09/18/19 0907     Significant Indicator NEG     Source RESP     Site BRONCHOALVEOLAR LAVAGE     Culture Result Culture in progress.     AFB Smear Results No acid fast bacilli seen.    Narrative:       Collected By:[id number] TONY ROSE  Collected By:[id number] TONY ROSE    Acid Fast Stain [[id number]]  Collected:  09/14/19 0110    Order Status:  Completed Specimen:  Respirate Updated:  09/16/19 1819     Significant Indicator NEG     Source RESP     Site BRONCHOALVEOLAR LAVAGE     AFB Smear Results No acid fast bacilli seen.  No acid fast bacilli seen.      Narrative:       Collected By:[id number] TONY ROSE  Collected By:[id number] TONY ROSE    Quant Bronchial Washing [[id number]] Collected:  09/14/19 0110    Order Status:  Completed Specimen:  Respirate from Bronchoalveolar Lavage Updated:  09/16/19 0733     Significant Indicator NEG     Source RESP     Site BRONCHOALVEOLAR LAVAGE LLL     Culture Result 3,800 cfu/mL usual upper respiratory jonny  No clinically significant Staphylococcus aureus, Methicillin  Resistant Staphylococcus aureus, or Pseudomonas species  isolated.       Gram Stain Result Many WBCs.  Rare Gram positive cocci.  <5% intracellular organisms.      Narrative:       Collected By:[id number] TONY ROSE  BAL from LLL lobe  Collected By:[id number] TONY ROSE    MRSA By PCR (Amp) [[id number]] Collected:  09/14/19 1035    Order Status:  Completed Specimen:  Respirate from Nares Updated:  09/15/19 1743     Significant Indicator NEG     Source RESP     Site NARES     MRSA PCR Negative for MRSA by PCR.    Narrative:       Collected By:[id number] SURYA CLARK  Collected By:[id number] SURYA CLARK    URINALYSIS [[id number]]  (Abnormal) Collected:  09/14/19 1035    Order Status:  Completed Specimen:  Urine, Bell Cath Updated:  09/14/19 1054     Color Yellow     Character Clear     Specific Gravity 1.008     Ph 5.0     Glucose Negative mg/dL      Ketones Negative mg/dL      Protein Negative mg/dL      Bilirubin Negative     Urobilinogen, Urine 0.2     Nitrite Negative     Leukocyte Esterase Moderate     Occult Blood Negative     Micro Urine Req Microscopic    Narrative:       Collected By:[id number] SURYA CLARK    GRAM STAIN [[id number]] Collected:  09/14/19 0110    Order Status:  Completed  Specimen:  Respirate Updated:  09/14/19 0550     Significant Indicator .     Source RESP     Site BRONCHOALVEOLAR LAVAGE LLL     Gram Stain Result Many WBCs.  Rare Gram positive cocci.  <5% intracellular organisms.      Narrative:       Collected By:[id number] TONY ROSE  BAL from LLL lobe  Collected By:[id number] TONY AN.    Culture Respiratory W/ Grm Stn - BAL [[id number]] Collected:  09/14/19 0110    Order Status:  Canceled Specimen:  Other from Bronchoalveolar Lavage     GRAM STAIN [[id number]] Collected:  09/13/19 1600    Order Status:  Completed Specimen:  Respirate Updated:  09/13/19 1912     Significant Indicator .     Source RESP     Site Sputum     Gram Stain Result Sputum Gram stain quality score is <1, probable  oropharyngeal contamination. Culture not performed.  Recollect if clinically indicated.      CULTURE RESPIRATORY W/ GRM STN [[id number]] Collected:  09/13/19 1600    Order Status:  Completed Updated:  09/13/19 1608    Flu and RSV by PCR [[id number]] Collected:  09/11/19 2045    Order Status:  Completed Specimen:  Respirate from Nasopharyngeal Updated:  09/11/19 2217     Influenza virus A RNA Negative     Influenza virus B, PCR Negative     RSV, PCR Negative    Narrative:       Collected By:[id number] [facility]    GRAM STAIN [[id number]] Collected:  09/11/19 0500    Order Status:  Completed Specimen:  Respirate Updated:  09/11/19 1114     Significant Indicator .     Source RESP     Site SPUTUM     Gram Stain Result Sputum Gram stain quality score is <1, probable  oropharyngeal contamination. Culture not performed.  Recollect if clinically indicated.      Narrative:       Collected By:[id number] HERB MARSHALL  Collected By:[id number] HERB MARSHALL          Assessment:  Pancho Martinez  is a 59 y.o. male with a history of history of chronic pain syndrome on methadone and high doses of gabapentin, significant smoking history, emphysema not on chronic oxygen at home, presented with 3 weeks of  progressive shortness of breath, especially worse over the past 3 days.  CT with worsening emphysematous changes and diffuse groundglass opacities.     Patient seems to have underlying worsening emphysematous changes. He may or may not have a superimposed bacterial pneumonia, but if there is a superimposed infection, the groundglass opacities on CT are more suggestive of either a viral or an atypical bacterial infection. His son had a viral URI around the same time the patient's symptoms began.    Patient was also started on high-dose IV steroids, had progressive respiratory distress, intubated on 9/13.  RVP positive for rhino/enterovirus    Pertinent Diagnoses  Sepsis  VDRF  Viral pneumonia  Emphysema  Possible underlying ILD with flare  Leukocytosis, worse    Plan:  Vent dependent respiratory failure, improved overall  Secondary to viral pneumonia  Status post intubation on 9/13  Extubated on 9/17  On oxygen mask 5 L  -Repeat CT from 9/14 again with bilateral emphysematous changes, diffuse ground glass opacities, about the same compared to previous CT  -Persistent leukocytosis but also on high-dose IV steroids  -RVP with rhino/enterovirus.  Could be the  of his current presentation in setting of underlying emphysema, ILD with possible induced flare, poor reserve. Procalcitonin improved to 0.22 which further supports this  Completed course of doxycycline for atypical coverage  Continue empiric coverage for a superimposed bacterial pneumonia with ceftriaxone. Anticipate a 5 day empiric course with stop date 9/18/19 today  Discontinued vancomycin 9/16.  Nasal MSRA PCR negative  Follow BAL cultures from 9/14 - URF    Leukocytosis, persistent.  Improving  Multifactorial (infection, steroids)  On antibiotics above  Monitor    Emphysema  With possible underlying ILD with flare    I have performed a physical exam and reviewed and updated ROS and plan today 9/18/2019.  In review of yesterday's note 9/17/2019, there  are no changes except as documented above.    Plan of care discussed with IM/Dr. Buck.  Will sign off.  Please reconsult if needed

## 2019-09-19 PROBLEM — R65.20 SEVERE SEPSIS (HCC): Status: RESOLVED | Noted: 2019-09-10 | Resolved: 2019-09-19

## 2019-09-19 PROBLEM — R33.9 URINARY RETENTION: Status: ACTIVE | Noted: 2019-09-19

## 2019-09-19 PROBLEM — A41.9 SEVERE SEPSIS (HCC): Status: RESOLVED | Noted: 2019-09-10 | Resolved: 2019-09-19

## 2019-09-19 LAB
ANION GAP SERPL CALC-SCNC: 10 MMOL/L (ref 0–11.9)
BASOPHILS # BLD AUTO: 0.2 % (ref 0–1.8)
BASOPHILS # BLD: 0.05 K/UL (ref 0–0.12)
BUN SERPL-MCNC: 44 MG/DL (ref 8–22)
CALCIUM SERPL-MCNC: 9 MG/DL (ref 8.5–10.5)
CHLORIDE SERPL-SCNC: 107 MMOL/L (ref 96–112)
CO2 SERPL-SCNC: 24 MMOL/L (ref 20–33)
CREAT SERPL-MCNC: 1.03 MG/DL (ref 0.5–1.4)
EOSINOPHIL # BLD AUTO: 0.39 K/UL (ref 0–0.51)
EOSINOPHIL NFR BLD: 1.7 % (ref 0–6.9)
ERYTHROCYTE [DISTWIDTH] IN BLOOD BY AUTOMATED COUNT: 44.9 FL (ref 35.9–50)
GLUCOSE SERPL-MCNC: 116 MG/DL (ref 65–99)
HCT VFR BLD AUTO: 45.7 % (ref 42–52)
HGB BLD-MCNC: 14.3 G/DL (ref 14–18)
IMM GRANULOCYTES # BLD AUTO: 0.49 K/UL (ref 0–0.11)
IMM GRANULOCYTES NFR BLD AUTO: 2.1 % (ref 0–0.9)
LYMPHOCYTES # BLD AUTO: 4.06 K/UL (ref 1–4.8)
LYMPHOCYTES NFR BLD: 17.5 % (ref 22–41)
MAGNESIUM SERPL-MCNC: 2.5 MG/DL (ref 1.5–2.5)
MCH RBC QN AUTO: 27.1 PG (ref 27–33)
MCHC RBC AUTO-ENTMCNC: 31.3 G/DL (ref 33.7–35.3)
MCV RBC AUTO: 86.6 FL (ref 81.4–97.8)
MONOCYTES # BLD AUTO: 1.73 K/UL (ref 0–0.85)
MONOCYTES NFR BLD AUTO: 7.5 % (ref 0–13.4)
NEUTROPHILS # BLD AUTO: 16.47 K/UL (ref 1.82–7.42)
NEUTROPHILS NFR BLD: 71 % (ref 44–72)
NRBC # BLD AUTO: 0 K/UL
NRBC BLD-RTO: 0 /100 WBC
PHOSPHATE SERPL-MCNC: 3.4 MG/DL (ref 2.5–4.5)
PLATELET # BLD AUTO: 438 K/UL (ref 164–446)
PMV BLD AUTO: 9 FL (ref 9–12.9)
POTASSIUM SERPL-SCNC: 3.4 MMOL/L (ref 3.6–5.5)
RBC # BLD AUTO: 5.28 M/UL (ref 4.7–6.1)
SODIUM SERPL-SCNC: 141 MMOL/L (ref 135–145)
WBC # BLD AUTO: 23.2 K/UL (ref 4.8–10.8)

## 2019-09-19 PROCEDURE — 700101 HCHG RX REV CODE 250: Performed by: HOSPITALIST

## 2019-09-19 PROCEDURE — 80048 BASIC METABOLIC PNL TOTAL CA: CPT

## 2019-09-19 PROCEDURE — 83735 ASSAY OF MAGNESIUM: CPT

## 2019-09-19 PROCEDURE — 84100 ASSAY OF PHOSPHORUS: CPT

## 2019-09-19 PROCEDURE — A9270 NON-COVERED ITEM OR SERVICE: HCPCS | Performed by: HOSPITALIST

## 2019-09-19 PROCEDURE — 700111 HCHG RX REV CODE 636 W/ 250 OVERRIDE (IP): Performed by: HOSPITALIST

## 2019-09-19 PROCEDURE — 85025 COMPLETE CBC W/AUTO DIFF WBC: CPT

## 2019-09-19 PROCEDURE — 770001 HCHG ROOM/CARE - MED/SURG/GYN PRIV*

## 2019-09-19 PROCEDURE — A9270 NON-COVERED ITEM OR SERVICE: HCPCS | Performed by: INTERNAL MEDICINE

## 2019-09-19 PROCEDURE — 700102 HCHG RX REV CODE 250 W/ 637 OVERRIDE(OP): Performed by: INTERNAL MEDICINE

## 2019-09-19 PROCEDURE — 99233 SBSQ HOSP IP/OBS HIGH 50: CPT | Performed by: INTERNAL MEDICINE

## 2019-09-19 PROCEDURE — 97167 OT EVAL HIGH COMPLEX 60 MIN: CPT

## 2019-09-19 PROCEDURE — 99232 SBSQ HOSP IP/OBS MODERATE 35: CPT | Performed by: HOSPITALIST

## 2019-09-19 PROCEDURE — 700102 HCHG RX REV CODE 250 W/ 637 OVERRIDE(OP): Performed by: HOSPITALIST

## 2019-09-19 PROCEDURE — 700111 HCHG RX REV CODE 636 W/ 250 OVERRIDE (IP): Performed by: INTERNAL MEDICINE

## 2019-09-19 RX ORDER — TAMSULOSIN HYDROCHLORIDE 0.4 MG/1
0.4 CAPSULE ORAL
Status: DISCONTINUED | OUTPATIENT
Start: 2019-09-19 | End: 2019-09-30 | Stop reason: HOSPADM

## 2019-09-19 RX ORDER — GLIMEPIRIDE 2 MG/1
1 TABLET ORAL 2 TIMES DAILY
Status: DISCONTINUED | OUTPATIENT
Start: 2019-09-19 | End: 2019-09-30 | Stop reason: HOSPADM

## 2019-09-19 RX ORDER — POTASSIUM CHLORIDE 20 MEQ/1
60 TABLET, EXTENDED RELEASE ORAL ONCE
Status: COMPLETED | OUTPATIENT
Start: 2019-09-19 | End: 2019-09-19

## 2019-09-19 RX ADMIN — GABAPENTIN 2400 MG: 400 CAPSULE ORAL at 04:58

## 2019-09-19 RX ADMIN — SIMVASTATIN 40 MG: 40 TABLET, FILM COATED ORAL at 20:45

## 2019-09-19 RX ADMIN — TEMAZEPAM 15 MG: 15 CAPSULE ORAL at 20:45

## 2019-09-19 RX ADMIN — ALPRAZOLAM 1 MG: 0.25 TABLET ORAL at 06:17

## 2019-09-19 RX ADMIN — OXYCODONE HYDROCHLORIDE 10 MG: 5 TABLET ORAL at 06:17

## 2019-09-19 RX ADMIN — GABAPENTIN 1600 MG: 400 CAPSULE ORAL at 17:09

## 2019-09-19 RX ADMIN — ASPIRIN 325 MG: 325 TABLET, FILM COATED ORAL at 04:58

## 2019-09-19 RX ADMIN — LEVOTHYROXINE SODIUM 125 MCG: 25 TABLET ORAL at 06:17

## 2019-09-19 RX ADMIN — TIMOLOL MALEATE 1 DROP: 2.5 SOLUTION OPHTHALMIC at 17:10

## 2019-09-19 RX ADMIN — ALPRAZOLAM 1 MG: 0.25 TABLET ORAL at 14:45

## 2019-09-19 RX ADMIN — OXYCODONE HYDROCHLORIDE 10 MG: 5 TABLET ORAL at 12:51

## 2019-09-19 RX ADMIN — TRAMADOL HYDROCHLORIDE 50 MG: 50 TABLET ORAL at 17:09

## 2019-09-19 RX ADMIN — ALPRAZOLAM 1 MG: 0.25 TABLET ORAL at 20:45

## 2019-09-19 RX ADMIN — TIMOLOL MALEATE 1 DROP: 2.5 SOLUTION OPHTHALMIC at 10:03

## 2019-09-19 RX ADMIN — METHADONE HYDROCHLORIDE 20 MG: 10 TABLET ORAL at 17:09

## 2019-09-19 RX ADMIN — PREDNISONE 40 MG: 20 TABLET ORAL at 04:57

## 2019-09-19 RX ADMIN — TRAMADOL HYDROCHLORIDE 50 MG: 50 TABLET ORAL at 04:58

## 2019-09-19 RX ADMIN — METHADONE HYDROCHLORIDE 20 MG: 10 TABLET ORAL at 11:10

## 2019-09-19 RX ADMIN — POTASSIUM CHLORIDE 60 MEQ: 20 TABLET, EXTENDED RELEASE ORAL at 08:09

## 2019-09-19 RX ADMIN — METHADONE HYDROCHLORIDE 30 MG: 10 TABLET ORAL at 06:17

## 2019-09-19 RX ADMIN — ENOXAPARIN SODIUM 40 MG: 100 INJECTION SUBCUTANEOUS at 04:58

## 2019-09-19 RX ADMIN — TAMSULOSIN HYDROCHLORIDE 0.4 MG: 0.4 CAPSULE ORAL at 10:03

## 2019-09-19 ASSESSMENT — COGNITIVE AND FUNCTIONAL STATUS - GENERAL
TOILETING: A LOT
DRESSING REGULAR LOWER BODY CLOTHING: A LOT
DRESSING REGULAR UPPER BODY CLOTHING: A LOT
DAILY ACTIVITIY SCORE: 13
EATING MEALS: A LITTLE
HELP NEEDED FOR BATHING: A LOT
SUGGESTED CMS G CODE MODIFIER DAILY ACTIVITY: CL
PERSONAL GROOMING: A LOT

## 2019-09-19 ASSESSMENT — ENCOUNTER SYMPTOMS
SHORTNESS OF BREATH: 0
SPEECH CHANGE: 0
CONSTIPATION: 0
PHOTOPHOBIA: 0
NERVOUS/ANXIOUS: 1
COUGH: 1
MYALGIAS: 0
CHILLS: 0
VOMITING: 0
BLOOD IN STOOL: 0
FOCAL WEAKNESS: 0
SPUTUM PRODUCTION: 1
SPUTUM PRODUCTION: 0
LOSS OF CONSCIOUSNESS: 0
PALPITATIONS: 0
BACK PAIN: 1
NAUSEA: 0
DOUBLE VISION: 0
FEVER: 0
HALLUCINATIONS: 0
STRIDOR: 0
HEADACHES: 0
BRUISES/BLEEDS EASILY: 0
ABDOMINAL PAIN: 0
BLURRED VISION: 0
HEMOPTYSIS: 0
SEIZURES: 0

## 2019-09-19 NOTE — THERAPY
"Occupational Therapy Evaluation completed.   Plan of Care: Will benefit from Occupational Therapy 3 times per week  Discharge Recommendations:  Equipment: Will Continue to Assess for Equipment Needs. Post-acute therapy Recommend post-acute placement for additional occupational therapy services prior to discharge home. Patient can tolerate post-acute therapies at a 5x/week frequency.    Patient presents in [facility] due to PNA work up necessitating increased o2 demands. Patient reports I with ADLs, IADLs, and mobility PTA. Patient, upon eval, presents with decreased ADLs, mobility, endurance, tolerance, sustained strength, and increased supplemental o2 necessitating Max A ADLs and Min A mobility with hand held assist. Patient required increased time and demoed increased effort t/o session. Patient would benefit from skilled OT in this setting followed by post acute placement.     See \"Rehab Therapy-Acute\" Patient Summary Report for complete documentation.    "

## 2019-09-19 NOTE — DISCHARGE PLANNING
Received Choice form at 0997  Agency/Facility Name: [facility]   Referral sent per Choice form @ [phone]

## 2019-09-19 NOTE — PROGRESS NOTES
[facility] Medicine Daily Progress Note    Date of Service  9/19/2019    Chief Complaint  59 y.o. male admitted 9/10/2019 with history of chronic pain syndrome on high-dose narcotics admitted with respiratory failure secondary to pneumonia CTA was negative for PE required intubation on 9/14/2019    Hospital Course          Interval Problem Update    AOx4  Anxious at times  SR  BP stable  3L NC  K 3.4  Bell replaced for retention        Consultants/Specialty  ID  CC    Code Status  Full code    Disposition  SNF referral    Review of Systems  Review of Systems   Constitutional: Negative for chills and fever.   Respiratory: Positive for cough. Negative for sputum production and shortness of breath.    Cardiovascular: Negative for chest pain and palpitations.   Gastrointestinal: Negative for constipation, nausea and vomiting.   Musculoskeletal: Positive for back pain and joint pain.   All other systems reviewed and are negative.       Physical Exam  Temp:  [36.2 °C (97.2 °F)-36.5 °C (97.7 °F)] 36.2 °C (97.2 °F)  Pulse:  [] 78  Resp:  [18-22] 22  BP: ()/(56-88) 127/72  SpO2:  [77 %-97 %] 92 %    Physical Exam   Constitutional: He is oriented to person, place, and time. He appears well-developed and well-nourished.   HENT:   Head: Normocephalic and atraumatic.   Mouth/Throat: Oropharynx is clear and moist.   Eyes: Conjunctivae are normal. Right eye exhibits no discharge. Left eye exhibits no discharge.   Neck: Neck supple. No JVD present. No tracheal deviation present.   Cardiovascular: Normal rate, regular rhythm and normal heart sounds.   Pulmonary/Chest: Effort normal. No respiratory distress. He has rales. He exhibits no tenderness.   Abdominal: Soft. Bowel sounds are normal. He exhibits no distension. There is no tenderness. There is no rebound.   Musculoskeletal: He exhibits no edema or tenderness.   Neurological: He is alert and oriented to person, place, and time. No cranial nerve deficit. He exhibits  normal muscle tone.   Skin: Skin is warm and dry. No rash noted. He is not diaphoretic. No cyanosis. Nails show no clubbing.   Psychiatric: He has a normal mood and affect. Judgment normal.   Nursing note and vitals reviewed.      Fluids    Intake/Output Summary (Last 24 hours) at 9/19/2019 0821  Last data filed at 9/19/2019 0800  Gross per 24 hour   Intake 500 ml   Output 1070 ml   Net -570 ml       Laboratory  Recent Labs     09/17/19 0320 09/18/19  0530 09/19/19  0454   WBC 23.5* 19.4* 23.2*   RBC 4.17* 4.64* 5.28   HEMOGLOBIN 11.7* 12.5* 14.3   HEMATOCRIT 36.4* 39.9* 45.7   MCV 87.3 86.0 86.6   MCH 28.1 26.9* 27.1   MCHC 32.1* 31.3* 31.3*   RDW 47.4 44.5 44.9   PLATELETCT 352 374 438   MPV 8.8* 8.9* 9.0     Recent Labs     09/17/19 0320 09/18/19 0530 09/19/19  0454   SODIUM 144 137 141   POTASSIUM 3.7 4.0 3.4*   CHLORIDE 108 103 107   CO2 29 25 24   GLUCOSE 99 128* 116*   BUN 48* 42* 44*   CREATININE 0.92 0.86 1.03   CALCIUM 8.7 8.9 9.0                   Imaging  DX-CHEST-PORTABLE (1 VIEW)   Final Result         1.  Pulmonary edema and/or infiltrates are identified, which are stable since the prior exam.         DX-CHEST-PORTABLE (1 VIEW)   Final Result         1.  Pulmonary edema and/or infiltrates are identified, which are stable since the prior exam.      DX-CHEST-PORTABLE (1 VIEW)   Final Result         1.  Pulmonary edema and/or infiltrates are identified, which are stable since the prior exam.      DX-CHEST-PORTABLE (1 VIEW)   Final Result         1.  No significant interval change.      CT-CHEST (THORAX) W/O   Final Result      1.  Minimal improvement of presumed bilateral pneumonitis compared to 9/9/2019.  Findings likely significantly improved from prior chest x-ray dated 9/12/2019.   2.  Moderate emphysema, likely paraseptal.               DX-CHEST-FOR LINE PLACEMENT Perform procedure in: Patient's Room (S134)   Final Result            1. A left central venous catheter with tip projects  appropriately over the expected area of the superior vena cava.      DX-ABDOMEN FOR TUBE PLACEMENT   Final Result         Feeding tube with tip projecting over the expected area of the stomach fundus.      DX-CHEST-PORTABLE (1 VIEW)   Final Result         1. Interval intubation. No other significant interval change.      EC-ECHOCARDIOGRAM COMPLETE W/O CONT   Final Result      DX-CHEST-PORTABLE (1 VIEW)   Final Result      Bilateral airspace opacities, left greater than right worrisome for multifocal pneumonia.         DX-CHEST-2 VIEWS   Final Result         New airspace opacity in the left lower lobe, concerning for pneumonia.      OUTSIDE IMAGES-CT CHEST   Final Result           Assessment/Plan  * Acute respiratory failure with hypoxia, emphsema and pneumonitis (HCC)- (present on admission)  Assessment & Plan  Extubated on 9/16/2019    Continue oxygen wean off as tolerated  On prednisone taper    Pneumonitis- (present on admission)  Assessment & Plan  Completed antibiotic course with ceftriaxone and doxycycline  Leukocytosis likely reactive and related to steroids monitor clinically off antibiotics    Elevated glucose  Assessment & Plan  Steroid-induced    Interstitial lung disease (HCC)  Assessment & Plan  Patient will need outpatient pulmonary follow-up for further work-up    Paraseptal emphysema (HCC)  Assessment & Plan  Bronchodilators as needed  Taper prednisone dose  Will need outpatient PFTs and pulmonary evaluation      Pulmonary hypertension (HCC)  Assessment & Plan  RVSP 55    Diuresis  as needed  Monitor I's and O's    Urinary retention  Assessment & Plan  Start Flomax    Dysphasia  Assessment & Plan  Diet per SLP  Aspiration precautions    Chronic pain- (present on admission)  Assessment & Plan  With narcotic dependence    Continue home dose of methadone and gabapentin  Avoid escalating doses of narcotics    Hypothyroidism- (present on admission)  Assessment & Plan  Stable on levothyroxine      Plan of  care reviewed with patient and discussed with nursing staff and Dr. Padilla.  Also discussed with case coordination regarding SNF referral    VTE prophylaxis: Lovenox

## 2019-09-19 NOTE — CARE PLAN
Problem: Communication  Goal: The ability to communicate needs accurately and effectively will improve  Outcome: PROGRESSING AS EXPECTED     Problem: Safety  Goal: Will remain free from injury  Outcome: PROGRESSING AS EXPECTED  Goal: Will remain free from falls  Outcome: PROGRESSING AS EXPECTED     Problem: Infection  Goal: Will remain free from infection  Outcome: PROGRESSING AS EXPECTED     Problem: Venous Thromboembolism (VTW)/Deep Vein Thrombosis (DVT) Prevention:  Goal: Patient will participate in Venous Thrombosis (VTE)/Deep Vein Thrombosis (DVT)Prevention Measures  Outcome: PROGRESSING AS EXPECTED     Problem: Bowel/Gastric:  Goal: Normal bowel function is maintained or improved  Outcome: PROGRESSING AS EXPECTED  Goal: Will not experience complications related to bowel motility  Outcome: PROGRESSING AS EXPECTED     Problem: Knowledge Deficit  Goal: Knowledge of disease process/condition, treatment plan, diagnostic tests, and medications will improve  Outcome: PROGRESSING AS EXPECTED  Goal: Knowledge of the prescribed therapeutic regimen will improve  Outcome: PROGRESSING AS EXPECTED

## 2019-09-19 NOTE — PROGRESS NOTES
Critical Care Progress Note    Date of admission  9/10/2019    Chief Complaint  59 y.o. male admitted 9/10/2019 with shortness of breath.    Hospital Course    This gentleman was admitted to the hospital with pneumonia and respiratory failure.  He developed worsening respiratory failure and was transferred to the ICU and was intubated.      Interval Problem Update  Reviewed last 24 hour events:      0825 hours:    Anxious  On his pain regimen  Medical orders  Regular rhythm  3 L NC  Replete K  IS 2000  Completed Rocephin yest      He continues to improve.  He denies any wheezing.  He has a cough with scant sputum production.  He definitely feels like his breathing is improved.  He has no abdominal pain, nausea or vomiting.  He denies angina, palpitations or syncope.      Review of Systems  Review of Systems   Constitutional: Negative for chills and malaise/fatigue.   HENT: Negative for ear pain and nosebleeds.    Eyes: Negative for blurred vision, double vision and photophobia.   Respiratory: Positive for cough and sputum production. Negative for hemoptysis and stridor.    Cardiovascular: Negative for chest pain, palpitations and leg swelling.   Gastrointestinal: Negative for abdominal pain, blood in stool, nausea and vomiting.   Genitourinary: Negative for dysuria and urgency.   Musculoskeletal: Negative for myalgias.   Skin: Negative for rash.   Neurological: Negative for speech change, focal weakness, seizures, loss of consciousness and headaches.   Endo/Heme/Allergies: Does not bruise/bleed easily.   Psychiatric/Behavioral: Negative for hallucinations and suicidal ideas. The patient is nervous/anxious.         Vital Signs for last 24 hours   Temp:  [36.2 °C (97.2 °F)-36.5 °C (97.7 °F)] 36.5 °C (97.7 °F)  Pulse:  [] 78  Resp:  [18-22] 22  BP: ()/(62-89) 115/89  SpO2:  [77 %-97 %] 92 %    Hemodynamic parameters for last 24 hours       Respiratory Information for the last 24 hours       Physical Exam    Physical Exam   Constitutional: He is oriented to person, place, and time. He appears well-developed. No distress.   HENT:   Head: Normocephalic.   Right Ear: External ear normal.   Left Ear: External ear normal.   Nose: Nose normal.   Mouth/Throat: No oropharyngeal exudate.   Eyes: Pupils are equal, round, and reactive to light. Conjunctivae are normal. Right eye exhibits no discharge. Left eye exhibits no discharge.   Neck: Neck supple. No tracheal deviation present.   Cardiovascular: Regular rhythm and intact distal pulses. Exam reveals no friction rub.   No murmur heard.  Pulmonary/Chest: Effort normal. No stridor. No respiratory distress. He has no wheezes. He has rales (Very few crackles).   Abdominal: Soft. Bowel sounds are normal. He exhibits no distension. There is no tenderness. There is no guarding.   Musculoskeletal: Normal range of motion. He exhibits no deformity.   No clubbing or cyanosis   Neurological: He is alert and oriented to person, place, and time. No cranial nerve deficit. Coordination normal.   No focal weakness   Skin: Skin is warm and dry. He is not diaphoretic. No erythema. No pallor.       Medications  Current Facility-Administered Medications   Medication Dose Route Frequency Provider Last Rate Last Dose   • tamsulosin (FLOMAX) capsule 0.4 mg  0.4 mg Oral AFTER BREAKFAST Rigoberto Galvan M.D.   0.4 mg at 09/19/19 1003   • timolol (TIMOPTIC) 0.25 % ophthalmic solution 1 Drop  1 Drop Both Eyes BID Rigoberto Galvan M.D.   1 Drop at 09/19/19 1003   • [START ON 9/20/2019] predniSONE (DELTASONE) tablet 30 mg  30 mg Oral DAILY Eulogio Lopes M.D.       • aspirin (ASA) tablet 325 mg  325 mg Oral DAILY Rigoberto Galvan M.D.   325 mg at 09/19/19 0458   • gabapentin (NEURONTIN) capsule 1,600 mg  1,600 mg Oral Q EVENING Rigoberto Galvan M.D.   1,600 mg at 09/18/19 1711   • gabapentin (NEURONTIN) capsule 2,400 mg  2,400 mg Oral QAM Rigoberto Galvan M.D.   2,400 mg at  09/19/19 0458   • levothyroxine (SYNTHROID) tablet 125 mcg  125 mcg Oral QAM AC Rigoberto Galvan M.D.   125 mcg at 09/19/19 0617   • methadone (DOLOPHINE) tablet 30 mg  30 mg Oral QAM Rigoberto Galvan M.D.   30 mg at 09/19/19 0617    And   • methadone (DOLOPHINE) tablet 20 mg  20 mg Oral DAILY AT NOON Rigoberto Galvan M.D.   20 mg at 09/19/19 1110    And   • methadone (DOLOPHINE) tablet 20 mg  20 mg Oral Q EVENING Rigoberto Galvan M.D.   20 mg at 09/18/19 1711   • senna-docusate (PERICOLACE or SENOKOT S) 8.6-50 MG per tablet 2 Tab  2 Tab Oral BID Rigoberto Galvan M.D.   Stopped at 09/18/19 1711    And   • polyethylene glycol/lytes (MIRALAX) PACKET 1 Packet  1 Packet Oral QDAY PRN Rigoberto Galvan M.D.        And   • magnesium hydroxide (MILK OF MAGNESIA) suspension 30 mL  30 mL Oral QDAY PRN Rigoberto Galvan M.D.        And   • bisacodyl (DULCOLAX) suppository 10 mg  10 mg Rectal QDAY PRN Rigoberto Galvan M.D.       • simvastatin (ZOCOR) tablet 40 mg  40 mg Oral Nightly Rigoberto Galvan M.D.   40 mg at 09/18/19 2054   • temazepam (RESTORIL) capsule 15 mg  15 mg Oral QHS Rigoberto Galvan M.D.   15 mg at 09/18/19 2054   • tramadol (ULTRAM) 50 MG tablet 50 mg  50 mg Oral BID Rigoberto Galvan M.D.   50 mg at 09/19/19 0458   • acetaminophen (TYLENOL) tablet 650 mg  650 mg Oral Q6HRS PRN Rgioberto Galvan M.D.       • oxyCODONE immediate-release (ROXICODONE) tablet 5-10 mg  5-10 mg Oral Q4HRS PRN Rigoberto Galvan M.D.   10 mg at 09/19/19 0617   • ALPRAZolam (XANAX) tablet 0.5-1 mg  0.5-1 mg Oral Q6HRS PRN Eulogio Lopes M.D.   1 mg at 09/19/19 0617   • Respiratory Care per Protocol   Nebulization Continuous RT Marck Montero Jr., D.O.       • ipratropium-albuterol (DUONEB) nebulizer solution  3 mL Nebulization Q2HRS PRN (RT) Marck Montero Jr., D.O.       • MD Alert...ICU Electrolyte Replacement per Pharmacy   Other PHARMACY TO DOSE Marck Montero Jr., D.O.        • enoxaparin (LOVENOX) inj 40 mg  40 mg Subcutaneous DAILY Marck Montero Jr., D.O.   40 mg at 09/19/19 0458   • sodium chloride (OCEAN) 0.65 % nasal spray 2 Spray  2 Spray Nasal Q2HRS PRN Mary Silva M.D.   2 [place] at 09/17/19 0913       Fluids    Intake/Output Summary (Last 24 hours) at 9/19/2019 1245  Last data filed at 9/19/2019 1000  Gross per 24 hour   Intake 960 ml   Output 1145 ml   Net -185 ml       Laboratory          Recent Labs     09/17/19  0320 09/18/19  0530 09/19/19  0454   SODIUM 144 137 141   POTASSIUM 3.7 4.0 3.4*   CHLORIDE 108 103 107   CO2 29 25 24   BUN 48* 42* 44*   CREATININE 0.92 0.86 1.03   MAGNESIUM 2.3 2.5 2.5   PHOSPHORUS 2.7 4.6* 3.4   CALCIUM 8.7 8.9 9.0     Recent Labs     09/17/19  0320 09/18/19  0530 09/19/19  0454   GLUCOSE 99 128* 116*     Recent Labs     09/17/19  0320 09/18/19  0530 09/19/19  0454   WBC 23.5* 19.4* 23.2*   NEUTSPOLYS 81.70* 84.70* 71.00   LYMPHOCYTES 10.80* 8.60* 17.50*   MONOCYTES 6.40 5.60 7.50   EOSINOPHILS 0.00 0.00 1.70   BASOPHILS 0.20 0.20 0.20     Recent Labs     09/17/19 0320 09/18/19  0530 09/19/19  0454   RBC 4.17* 4.64* 5.28   HEMOGLOBIN 11.7* 12.5* 14.3   HEMATOCRIT 36.4* 39.9* 45.7   PLATELETCT 352 374 438       Imaging  None    Assessment/Plan  * Acute respiratory failure with hypoxia, emphsema and pneumonitis (HCC)- (present on admission)  Assessment & Plan  Intubated 9/14-9/16  Continue oxygen  Improved    Pneumonitis- (present on admission)  Assessment & Plan  Completed 5 days of doxycycline on 9/15  Completed 5 days of Rocephin on 9/18  Usual jonny on sputum culture  He is improved  Observe off antibiotics    Interstitial lung disease (HCC)  Assessment & Plan  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)  Taper prednisone, 30 mg daily    Paraseptal emphysema (HCC)  Assessment & Plan  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)  Taper prednisone, 30 mg daily  Continue bronchodilators    QT prolongation-  (present on admission)  Assessment & Plan  Monitor QTC  Keep magnesium greater than 2    Severe sepsis (HCC)- (present on admission)  Assessment & Plan  Associated respiratory failure  Pulmonary source  Improved  Observe off antibiotics    Pulmonary hypertension (HCC)  Assessment & Plan  RVSP 55 mmHg  Maintain euvolemia    Hypokalemia  Assessment & Plan  Replete potassium    Chronic pain- (present on admission)  Assessment & Plan  Continue home methadone program  Continue Ultram, 50 mg twice daily  Continue oxycodone for breakthrough pain  Continue home gabapentin regimen    Hypothyroidism- (present on admission)  Assessment & Plan  Continue Synthroid, 125 mg daily       VTE:  Lovenox  Ulcer: Not Indicated  Lines: Central Line  Ongoing indication addressed and Bell Catheter  Ongoing indication addressed    I have performed a physical exam and reviewed and updated ROS and Plan today (9/19/2019). In review of yesterday's note (9/18/2019), there are no changes except as documented above.     OK to transfer out of ICU.  [facility] will follow.    Discussed patient condition and risk of morbidity and/or mortality with Hospitalist, RN, RT, Pharmacy, Charge nurse / hot rounds and QA team     Eluogio Lopes MD  Pulmonary and Critical Care Medicine

## 2019-09-19 NOTE — DISCHARGE PLANNING
Agency/Facility Name: [facility]  Outcome: fax is failing to send. Re faxed manually @ [phone]    Olive KIM notified.

## 2019-09-19 NOTE — PROGRESS NOTES
2 RN assessment complete by the Skin Prev Team.      - Skin under the following devices assessed: BP cuff, purewick, SCDs, puls ox, EKG leads and IV sites.     - Slow to ye Redness bilat feet, possibly due to pt's slippers. Pt educated regarding importance of offloading feet, pt refusing intervention at this time due to pain.   -RN alerted that IVs need to be replaced.       -Adequate nutrition.      Patient repositioned and bedside table next to patient.

## 2019-09-19 NOTE — DISCHARGE PLANNING
Anticipated Discharge Disposition: SNF    Action: Pt has order for SNF, met with pt to discuss. Pt lives in [place] but has been staying with son in [place]. First choice is for SNF in [place]. Alos referred to all local facilities in case [place] not able to accept. PASRR completed # [id number].    Barriers to Discharge: Medical clearance and SNF accept.    Plan: SNF

## 2019-09-20 ENCOUNTER — APPOINTMENT (OUTPATIENT)
Dept: RADIOLOGY | Facility: MEDICAL CENTER | Age: 59
DRG: 871 | End: 2019-09-20
Attending: HOSPITALIST
Payer: MEDICARE

## 2019-09-20 ENCOUNTER — APPOINTMENT (OUTPATIENT)
Dept: RADIOLOGY | Facility: MEDICAL CENTER | Age: 59
DRG: 871 | End: 2019-09-20
Attending: INTERNAL MEDICINE
Payer: MEDICARE

## 2019-09-20 LAB
ANION GAP SERPL CALC-SCNC: 8 MMOL/L (ref 0–11.9)
BASOPHILS # BLD AUTO: 0.3 % (ref 0–1.8)
BASOPHILS # BLD: 0.06 K/UL (ref 0–0.12)
BUN SERPL-MCNC: 40 MG/DL (ref 8–22)
CALCIUM SERPL-MCNC: 8.6 MG/DL (ref 8.5–10.5)
CHLORIDE SERPL-SCNC: 108 MMOL/L (ref 96–112)
CO2 SERPL-SCNC: 25 MMOL/L (ref 20–33)
CREAT SERPL-MCNC: 0.95 MG/DL (ref 0.5–1.4)
EOSINOPHIL # BLD AUTO: 0.82 K/UL (ref 0–0.51)
EOSINOPHIL NFR BLD: 3.8 % (ref 0–6.9)
ERYTHROCYTE [DISTWIDTH] IN BLOOD BY AUTOMATED COUNT: 46.4 FL (ref 35.9–50)
GLUCOSE SERPL-MCNC: 112 MG/DL (ref 65–99)
HCT VFR BLD AUTO: 45 % (ref 42–52)
HGB BLD-MCNC: 13.7 G/DL (ref 14–18)
IMM GRANULOCYTES # BLD AUTO: 0.73 K/UL (ref 0–0.11)
IMM GRANULOCYTES NFR BLD AUTO: 3.4 % (ref 0–0.9)
LYMPHOCYTES # BLD AUTO: 4.32 K/UL (ref 1–4.8)
LYMPHOCYTES NFR BLD: 19.9 % (ref 22–41)
MAGNESIUM SERPL-MCNC: 2.3 MG/DL (ref 1.5–2.5)
MCH RBC QN AUTO: 27.2 PG (ref 27–33)
MCHC RBC AUTO-ENTMCNC: 30.4 G/DL (ref 33.7–35.3)
MCV RBC AUTO: 89.5 FL (ref 81.4–97.8)
MONOCYTES # BLD AUTO: 2 K/UL (ref 0–0.85)
MONOCYTES NFR BLD AUTO: 9.2 % (ref 0–13.4)
NEUTROPHILS # BLD AUTO: 13.74 K/UL (ref 1.82–7.42)
NEUTROPHILS NFR BLD: 63.4 % (ref 44–72)
NRBC # BLD AUTO: 0 K/UL
NRBC BLD-RTO: 0 /100 WBC
PHOSPHATE SERPL-MCNC: 4 MG/DL (ref 2.5–4.5)
PLATELET # BLD AUTO: 403 K/UL (ref 164–446)
PMV BLD AUTO: 9 FL (ref 9–12.9)
POTASSIUM SERPL-SCNC: 4.1 MMOL/L (ref 3.6–5.5)
RBC # BLD AUTO: 5.03 M/UL (ref 4.7–6.1)
SODIUM SERPL-SCNC: 141 MMOL/L (ref 135–145)
WBC # BLD AUTO: 21.7 K/UL (ref 4.8–10.8)

## 2019-09-20 PROCEDURE — 770022 HCHG ROOM/CARE - ICU (200)

## 2019-09-20 PROCEDURE — 700102 HCHG RX REV CODE 250 W/ 637 OVERRIDE(OP): Performed by: HOSPITALIST

## 2019-09-20 PROCEDURE — 80048 BASIC METABOLIC PNL TOTAL CA: CPT

## 2019-09-20 PROCEDURE — 85025 COMPLETE CBC W/AUTO DIFF WBC: CPT

## 2019-09-20 PROCEDURE — 700102 HCHG RX REV CODE 250 W/ 637 OVERRIDE(OP): Performed by: INTERNAL MEDICINE

## 2019-09-20 PROCEDURE — 97110 THERAPEUTIC EXERCISES: CPT

## 2019-09-20 PROCEDURE — 92526 ORAL FUNCTION THERAPY: CPT

## 2019-09-20 PROCEDURE — 83735 ASSAY OF MAGNESIUM: CPT

## 2019-09-20 PROCEDURE — 700105 HCHG RX REV CODE 258: Performed by: INTERNAL MEDICINE

## 2019-09-20 PROCEDURE — A9270 NON-COVERED ITEM OR SERVICE: HCPCS | Performed by: INTERNAL MEDICINE

## 2019-09-20 PROCEDURE — 700111 HCHG RX REV CODE 636 W/ 250 OVERRIDE (IP): Performed by: INTERNAL MEDICINE

## 2019-09-20 PROCEDURE — 97530 THERAPEUTIC ACTIVITIES: CPT

## 2019-09-20 PROCEDURE — P9047 ALBUMIN (HUMAN), 25%, 50ML: HCPCS | Mod: JG | Performed by: INTERNAL MEDICINE

## 2019-09-20 PROCEDURE — 84100 ASSAY OF PHOSPHORUS: CPT

## 2019-09-20 PROCEDURE — 700105 HCHG RX REV CODE 258: Performed by: HOSPITALIST

## 2019-09-20 PROCEDURE — 99291 CRITICAL CARE FIRST HOUR: CPT | Performed by: INTERNAL MEDICINE

## 2019-09-20 PROCEDURE — A9270 NON-COVERED ITEM OR SERVICE: HCPCS | Performed by: HOSPITALIST

## 2019-09-20 PROCEDURE — 99232 SBSQ HOSP IP/OBS MODERATE 35: CPT | Performed by: HOSPITALIST

## 2019-09-20 RX ORDER — SODIUM CHLORIDE, SODIUM LACTATE, POTASSIUM CHLORIDE, AND CALCIUM CHLORIDE .6; .31; .03; .02 G/100ML; G/100ML; G/100ML; G/100ML
1000 INJECTION, SOLUTION INTRAVENOUS ONCE
Status: COMPLETED | OUTPATIENT
Start: 2019-09-20 | End: 2019-09-20

## 2019-09-20 RX ORDER — SODIUM CHLORIDE, SODIUM LACTATE, POTASSIUM CHLORIDE, CALCIUM CHLORIDE 600; 310; 30; 20 MG/100ML; MG/100ML; MG/100ML; MG/100ML
1000 INJECTION, SOLUTION INTRAVENOUS ONCE
Status: COMPLETED | OUTPATIENT
Start: 2019-09-20 | End: 2019-09-20

## 2019-09-20 RX ORDER — ALBUMIN (HUMAN) 12.5 G/50ML
50 SOLUTION INTRAVENOUS ONCE
Status: COMPLETED | OUTPATIENT
Start: 2019-09-20 | End: 2019-09-21

## 2019-09-20 RX ORDER — ALPRAZOLAM 0.25 MG/1
0.25 TABLET ORAL EVERY 6 HOURS PRN
Status: DISCONTINUED | OUTPATIENT
Start: 2019-09-20 | End: 2019-09-30 | Stop reason: HOSPADM

## 2019-09-20 RX ORDER — POTASSIUM CHLORIDE 20 MEQ/1
60 TABLET, EXTENDED RELEASE ORAL ONCE
Status: COMPLETED | OUTPATIENT
Start: 2019-09-20 | End: 2019-09-20

## 2019-09-20 RX ADMIN — PREDNISONE 30 MG: 20 TABLET ORAL at 08:01

## 2019-09-20 RX ADMIN — SIMVASTATIN 40 MG: 40 TABLET, FILM COATED ORAL at 21:12

## 2019-09-20 RX ADMIN — TAMSULOSIN HYDROCHLORIDE 0.4 MG: 0.4 CAPSULE ORAL at 08:01

## 2019-09-20 RX ADMIN — TEMAZEPAM 15 MG: 15 CAPSULE ORAL at 22:56

## 2019-09-20 RX ADMIN — GABAPENTIN 1600 MG: 400 CAPSULE ORAL at 22:37

## 2019-09-20 RX ADMIN — LEVOTHYROXINE SODIUM 125 MCG: 25 TABLET ORAL at 06:00

## 2019-09-20 RX ADMIN — ALPRAZOLAM 1 MG: 0.25 TABLET ORAL at 03:05

## 2019-09-20 RX ADMIN — SODIUM CHLORIDE, POTASSIUM CHLORIDE, SODIUM LACTATE AND CALCIUM CHLORIDE 1000 ML: 600; 310; 30; 20 INJECTION, SOLUTION INTRAVENOUS at 12:30

## 2019-09-20 RX ADMIN — POTASSIUM CHLORIDE 60 MEQ: 1500 TABLET, EXTENDED RELEASE ORAL at 08:01

## 2019-09-20 RX ADMIN — METHADONE HYDROCHLORIDE 30 MG: 10 TABLET ORAL at 05:59

## 2019-09-20 RX ADMIN — ASPIRIN 325 MG: 325 TABLET, FILM COATED ORAL at 06:00

## 2019-09-20 RX ADMIN — ENOXAPARIN SODIUM 40 MG: 100 INJECTION SUBCUTANEOUS at 06:00

## 2019-09-20 RX ADMIN — METHADONE HYDROCHLORIDE 20 MG: 10 TABLET ORAL at 21:45

## 2019-09-20 RX ADMIN — GABAPENTIN 2400 MG: 400 CAPSULE ORAL at 05:59

## 2019-09-20 RX ADMIN — TRAMADOL HYDROCHLORIDE 50 MG: 50 TABLET ORAL at 17:49

## 2019-09-20 RX ADMIN — TRAMADOL HYDROCHLORIDE 50 MG: 50 TABLET ORAL at 06:00

## 2019-09-20 RX ADMIN — OXYCODONE HYDROCHLORIDE 5 MG: 5 TABLET ORAL at 03:05

## 2019-09-20 RX ADMIN — ALBUMIN (HUMAN) 50 G: 12.5 SOLUTION INTRAVENOUS at 15:34

## 2019-09-20 RX ADMIN — SODIUM CHLORIDE, POTASSIUM CHLORIDE, SODIUM LACTATE AND CALCIUM CHLORIDE 1000 ML: 600; 310; 30; 20 INJECTION, SOLUTION INTRAVENOUS at 14:55

## 2019-09-20 RX ADMIN — ALPRAZOLAM 1 MG: 0.25 TABLET ORAL at 13:42

## 2019-09-20 RX ADMIN — TIMOLOL MALEATE 1 DROP: 2.5 SOLUTION OPHTHALMIC at 06:01

## 2019-09-20 RX ADMIN — TIMOLOL MALEATE 1 DROP: 2.5 SOLUTION OPHTHALMIC at 17:50

## 2019-09-20 RX ADMIN — SODIUM CHLORIDE, POTASSIUM CHLORIDE, SODIUM LACTATE AND CALCIUM CHLORIDE 1000 ML: 600; 310; 30; 20 INJECTION, SOLUTION INTRAVENOUS at 11:25

## 2019-09-20 ASSESSMENT — ENCOUNTER SYMPTOMS
SEIZURES: 0
NAUSEA: 0
VOMITING: 0
FALLS: 0
EYE DISCHARGE: 0
EYE PAIN: 0
TREMORS: 0
BRUISES/BLEEDS EASILY: 0
SPUTUM PRODUCTION: 0
HEMOPTYSIS: 0
HALLUCINATIONS: 0
WEIGHT LOSS: 0
ABDOMINAL PAIN: 0
SINUS PAIN: 0
SHORTNESS OF BREATH: 1
FEVER: 0
SPUTUM PRODUCTION: 1
MYALGIAS: 0
SENSORY CHANGE: 0
PALPITATIONS: 0
CHILLS: 0
COUGH: 0
FLANK PAIN: 0
BACK PAIN: 1
HEARTBURN: 0
CLAUDICATION: 0
COUGH: 1
SPEECH CHANGE: 0
LOSS OF CONSCIOUSNESS: 0
EYE REDNESS: 0
NECK PAIN: 0
CONSTIPATION: 0
NERVOUS/ANXIOUS: 1

## 2019-09-20 ASSESSMENT — COGNITIVE AND FUNCTIONAL STATUS - GENERAL
STANDING UP FROM CHAIR USING ARMS: A LITTLE
MOBILITY SCORE: 19
SUGGESTED CMS G CODE MODIFIER MOBILITY: CK
CLIMB 3 TO 5 STEPS WITH RAILING: A LOT
MOVING FROM LYING ON BACK TO SITTING ON SIDE OF FLAT BED: A LITTLE
WALKING IN HOSPITAL ROOM: A LITTLE

## 2019-09-20 ASSESSMENT — GAIT ASSESSMENTS
DISTANCE (FEET): 5
GAIT LEVEL OF ASSIST: MINIMAL ASSIST
ASSISTIVE DEVICE: FRONT WHEEL WALKER
DEVIATION: BRADYKINETIC

## 2019-09-20 NOTE — CARE PLAN
Problem: Nutritional:  Goal: Achieve adequate nutritional intake  Description  Patient will consume 50% of meals   Outcome: PROGRESSING AS EXPECTED     PO intake average 50% with supplements. See RD note.    RD following.

## 2019-09-20 NOTE — THERAPY
"Physical Therapy Treatment completed.   Bed Mobility:  Supine to Sit: Supervised  Transfers: Sit to Stand: Supervised  Gait: Level Of Assist: Minimal Assist with Front-Wheel Walker       Plan of Care: Will benefit from Physical Therapy 3 times per week  Discharge Recommendations: Equipment: Will Continue to Assess for Equipment Needs. Post-acute therapy Recommend post-acute placement for continued physical therapy services prior to discharge home. Patient can tolerate post-acute therapies at a 5x/week frequency.       See \"Rehab Therapy-Acute\" Patient Summary Report for complete documentation.     Pt continues to present w/ poor activity tolerance. Pt BP upone intrance at 71/58 however pt did not feel symptomatic. Pts BP increased w/ activity. Once standing pts BP at 80/58. Pt only able to tolerate short bursts of activity as his O2 drops quickly. Pt able to ambulate 5 feet before his O2 would drop to 83 w/ quick rebound once sitting. Pt educated on breathing techniques and exercises. Pt continues to be at a level in which he will require post acute therapy.  "

## 2019-09-20 NOTE — DIETARY
Nutrition Services: Update   Day 10 of admit.  Pancho Martinez Jr. is a 59 y.o. male with admitting DX of Pneumonia, sepsis, and respiratory failure.    Pt is TF update for today, however pt was extubated 9/16 and cortrak was pulled 9/18. SLP recs dysphagia 2, NTL diet on 9/17. Per rounds, SLP to see again for possible diet upgrade as feasible. Wound seen, no pressure injuries.    Pt is currently on dysphagia 2, NTL diet. Pt has variable PO intake with overall average of 50% of meals with increasing PO intake over last 2 days. Wt 9/20: 73.7 kg via bed scale - pt has fluctuating weight with ~4 kg weight loss over last 6 days since admit. Spoke with pt during visit, observed breakfast with 50% consumed and pt states likes only chocolate magic cups. Food preferences taken during visit.    Malnutrition Risk: Malnutrition criteria not met at this time.    Recommendations/Plan:  1. Recommend diet per SLP, changed magic cup preferences with meals.  2. Encourage intake of meals  3. Document intake of all meals as % taken in ADL's to provide interdisciplinary communication across all shifts.   4. Monitor weight.  5. Nutrition rep will continue to see patient for ongoing meal and snack preferences.  6. Obtain supplement order per RD as needed.    RD following.

## 2019-09-20 NOTE — DISCHARGE PLANNING
Agency/Facility Name: [facility] [place]  Spoke To: Sheeba  Outcome: referral pending. [facility] is out until Monday so the referral will be reviewed by them on Monday. Sheeba explained if the pt is still on methadone they will not be able to accept. Sheeba will push the referral through in case meds are changed. CCA notified Barbie SANDERS.

## 2019-09-20 NOTE — THERAPY
"Speech Language Therapy dysphagia treatment completed.   Functional Status:  Pt seen for ongoing assessment of oropharyngeal swallow skills and safe swallow strategy training during meal of Dysphagia 3/thin trials. Pt demo'd adequate mastication, bolus formation, A-P transit, time swallow with no s/sx of aspiration during meal. Rare min cues provided for pt to focus on meal and avoid talking. Recommend diet upgrade to Dysphagia 3/thins with monitoring during meal.     Recommendations: Upgrade to Dysphagia 3/thins; monitor during meals; hold PO with any change in status.  Plan of Care: Will benefit from Speech Therapy 3 times per week  Post-Acute Therapy: Discharge to home with outpatient or home health for additional skilled therapy services.    See \"Rehab Therapy-Acute\" Patient Summary Report for complete documentation.     "

## 2019-09-20 NOTE — DISCHARGE PLANNING
Anticipated Discharge Disposition: [facility] [place]    Action: TOMMY was informed by Yashira that [facility] referral is still pending and we wont have an answer probably until Monday 09/23. Also, [facility] can not accommodate pt's methadone. If pt goes to SNF pt's med will need to be changed.     Barriers to Discharge: Medical clearance, pending SNF referral, finalizing meds    Plan: follow up with care team

## 2019-09-20 NOTE — CARE PLAN
"  Problem: Knowledge Deficit  Goal: Knowledge of the prescribed therapeutic regimen will improve  Outcome: NOT MET  Note:   Pt educated multiple times on diet restrictions and acknowledges risk of aspiration. Pt states \"yeah, I know\" in response to education. Pt is resistant to following diet by repeatedly ordering food not allowed and stating he wants to refuse his diet. Pt informed he cannot refuse the diet order. Pt attempted to call speech therapy himself from cellphone. RN called speech for pt but office was closed.      Problem: Psychosocial Needs:  Goal: Level of anxiety will decrease  9/19/2019 1825 by Liss Nesbitt, R.N.  Outcome: PROGRESSING SLOWER THAN EXPECTED  Flowsheets (Taken 9/19/2019 1825)  Patient Behaviors: Angry; Non Compliant; Irritable; Uncooperative  Note:   Pt is argumentative at times. Pt also requested scissors to keep at bedside claiming that he came in with some that were taken from him in the ed. No note on belongings found to indicate such. Pt informed he cannot have sharps at the bedside.   9/19/2019 1825 by Liss Nesbitt, R.N.  Outcome: NOT MET     "

## 2019-09-20 NOTE — DISCHARGE INSTRUCTIONS
Sepsis, Adult  Sepsis is a serious infection of your blood or tissues that affects your whole body. The infection that causes sepsis may be bacterial, viral, fungal, or parasitic. Sepsis may be life threatening. Sepsis can cause your blood pressure to drop. This may result in shock. Shock causes your central nervous system and your organs to stop working correctly.  What increases the risk?  Sepsis can happen in anyone, but it is more likely to happen in people who have weakened immune systems.  What are the signs or symptoms?  Symptoms of sepsis can include:  · Fever or low body temperature (hypothermia).  · Rapid breathing (hyperventilation).  · Chills.  · Rapid heartbeat (tachycardia).  · Confusion or light-headedness.  · Trouble breathing.  · Urinating much less than usual.  · Cool, clammy skin or red, flushed skin.  · Other problems with the heart, kidneys, or brain.  How is this diagnosed?  Your health care provider will likely do tests to look for an infection, to see if the infection has spread to your blood, and to see how serious your condition is. Tests can include:  · Blood tests, including cultures of your blood.  · Cultures of other fluids from your body, such as:  ¨ Urine.  ¨ Pus from wounds.  ¨ Mucus coughed up from your lungs.  · Urine tests other than cultures.  · X-ray exams or other imaging tests.  How is this treated?  Treatment will begin with elimination of the source of infection. If your sepsis is likely caused by a bacterial or fungal infection, you will be given antibiotic or antifungal medicines.  You may also receive:  · Oxygen.  · Fluids through an IV tube.  · Medicines to increase your blood pressure.  · A machine to clean your blood (dialysis) if your kidneys fail.  · A machine to help you breathe if your lungs fail.  Get help right away if:  You get an infection or develop any of the signs and symptoms of sepsis after surgery or a hospitalization.  This information is not intended to  replace advice given to you by your health care provider. Make sure you discuss any questions you have with your health care provider.  Document Released: 09/15/2004 Document Revised: 05/25/2017 Document Reviewed: 08/25/2014  [employer] Interactive Patient Education © 2017 [employer]  Community-Acquired Pneumonia, Adult  Pneumonia is an infection of the lungs. There are different types of pneumonia. One type can develop while a person is in a hospital. A different type, called community-acquired pneumonia, develops in people who are not, or have not recently been, in the hospital or other health care facility.  What are the causes?  Pneumonia may be caused by bacteria, viruses, or funguses. Community-acquired pneumonia is often caused by Streptococcus pneumonia bacteria. These bacteria are often passed from one person to another by breathing in droplets from the cough or sneeze of an infected person.  What increases the risk?  The condition is more likely to develop in:  · People who have chronic diseases, such as chronic obstructive pulmonary disease (COPD), asthma, congestive heart failure, cystic fibrosis, diabetes, or kidney disease.  · People who have early-stage or late-stage HIV.  · People who have sickle cell disease.  · People who have had their spleen removed (splenectomy).  · People who have poor dental hygiene.  · People who have medical conditions that increase the risk of breathing in (aspirating) secretions their own mouth and nose.  · People who have a weakened immune system (immunocompromised).  · People who smoke.  · People who travel to areas where pneumonia-causing germs commonly exist.  · People who are around animal habitats or animals that have pneumonia-causing germs, including birds, bats, rabbits, cats, and farm animals.  What are the signs or symptoms?  Symptoms of this condition include:  · A dry cough.  · A wet (productive) cough.  · Fever.  · Sweating.  · Chest pain, especially when  breathing deeply or coughing.  · Rapid breathing or difficulty breathing.  · Shortness of breath.  · Shaking chills.  · Fatigue.  · Muscle aches.  How is this diagnosed?  Your health care provider will take a medical history and perform a physical exam. You may also have other tests, including:  · Imaging studies of your chest, including X-rays.  · Tests to check your blood oxygen level and other blood gases.  · Other tests on blood, mucus (sputum), fluid around your lungs (pleural fluid), and urine.  If your pneumonia is severe, other tests may be done to identify the specific cause of your illness.  How is this treated?  The type of treatment that you receive depends on many factors, such as the cause of your pneumonia, the medicines you take, and other medical conditions that you have. For most adults, treatment and recovery from pneumonia may occur at home. In some cases, treatment must happen in a hospital. Treatment may include:  · Antibiotic medicines, if the pneumonia was caused by bacteria.  · Antiviral medicines, if the pneumonia was caused by a virus.  · Medicines that are given by mouth or through an IV tube.  · Oxygen.  · Respiratory therapy.  Although rare, treating severe pneumonia may include:  · Mechanical ventilation. This is done if you are not breathing well on your own and you cannot maintain a safe blood oxygen level.  · Thoracentesis. This procedure removes fluid around one lung or both lungs to help you breathe better.  Follow these instructions at home:  · Take over-the-counter and prescription medicines only as told by your health care provider.  ¨ Only take cough medicine if you are losing sleep. Understand that cough medicine can prevent your body’s natural ability to remove mucus from your lungs.  ¨ If you were prescribed an antibiotic medicine, take it as told by your health care provider. Do not stop taking the antibiotic even if you start to feel better.  · Sleep in a semi-upright  position at night. Try sleeping in a reclining chair, or place a few pillows under your head.  · Do not use tobacco products, including cigarettes, chewing tobacco, and e-cigarettes. If you need help quitting, ask your health care provider.  · Drink enough water to keep your urine clear or pale yellow. This will help to thin out mucus secretions in your lungs.  How is this prevented?  There are ways that you can decrease your risk of developing community-acquired pneumonia. Consider getting a pneumococcal vaccine if:  · You are older than 65 years of age.  · You are older than 19 years of age and are undergoing cancer treatment, have chronic lung disease, or have other medical conditions that affect your immune system. Ask your health care provider if this applies to you.  There are different types and schedules of pneumococcal vaccines. Ask your health care provider which vaccination option is best for you.  You may also prevent community-acquired pneumonia if you take these actions:  · Get an influenza vaccine every year. Ask your health care provider which type of influenza vaccine is best for you.  · Go to the dentist on a regular basis.  · Wash your hands often. Use hand  if soap and water are not available.  Contact a health care provider if:  · You have a fever.  · You are losing sleep because you cannot control your cough with cough medicine.  Get help right away if:  · You have worsening shortness of breath.  · You have increased chest pain.  · Your sickness becomes worse, especially if you are an older adult or have a weakened immune system.  · You cough up blood.  This information is not intended to replace advice given to you by your health care provider. Make sure you discuss any questions you have with your health care provider.  Document Released: 12/18/2006 Document Revised: 04/27/2017 Document Reviewed: 04/13/2016  [employer] Interactive Patient Education © 2017 [employer]

## 2019-09-20 NOTE — PROGRESS NOTES
Critical Care Progress Note    Date of admission  9/10/2019    Chief Complaint  59 y.o. male admitted 9/10/2019 with shortness of breath.    Hospital Course    This gentleman was admitted to the hospital with pneumonia and respiratory failure.  He developed worsening respiratory failure and was transferred to the ICU and was intubated.      Interval Problem Update  Reviewed last 24 hour events:      0855 hours:    SR  Bell placed for retention  BM this am  3 L NC  Pred tapered today to 30    1520 hours:    Earlier developed hypotension with blood pressure as low as 74/49  Bolused with a total of 2 L of IV LR with improvement in blood pressure, but then pressure decreased down to 84/55  I will give 50 g of IV albumin as well as 1 more liter of LR  Keep in ICU  No chest pain, chest pressure, dyspnea      Review of Systems  Review of Systems   Constitutional: Negative for fever and weight loss.   HENT: Negative for ear discharge, sinus pain and tinnitus.    Eyes: Negative for pain, discharge and redness.   Respiratory: Positive for cough and sputum production. Negative for hemoptysis.    Cardiovascular: Negative for chest pain and claudication.   Gastrointestinal: Negative for abdominal pain, constipation and heartburn.   Genitourinary: Negative for dysuria, flank pain, hematuria and urgency.   Musculoskeletal: Negative for myalgias and neck pain.   Skin: Negative for itching.   Neurological: Negative for tremors, sensory change, speech change, seizures and loss of consciousness.   Endo/Heme/Allergies: Does not bruise/bleed easily.   Psychiatric/Behavioral: Negative for hallucinations and suicidal ideas. The patient is nervous/anxious.         Vital Signs for last 24 hours   Temp:  [36.3 °C (97.3 °F)-36.4 °C (97.6 °F)] 36.3 °C (97.3 °F)  Pulse:  [61-73] 65  Resp:  [16] 16  BP: ()/(49-73) 83/58  SpO2:  [91 %-100 %] 98 %    Hemodynamic parameters for last 24 hours       Respiratory Information for the last 24  hours       Physical Exam   Physical Exam   Constitutional: He is oriented to person, place, and time. He appears well-developed.   HENT:   Head: Normocephalic and atraumatic.   Right Ear: External ear normal.   Left Ear: External ear normal.   Mouth/Throat: Oropharynx is clear and moist.   Eyes: Pupils are equal, round, and reactive to light. EOM are normal. Right eye exhibits no discharge. Left eye exhibits no discharge.   Neck: Normal range of motion. Neck supple. No JVD present. No tracheal deviation present.   Cardiovascular: Intact distal pulses. Exam reveals no gallop.   No murmur heard.  Sinus rhythm   Pulmonary/Chest: Effort normal. No respiratory distress. He has no wheezes. He has rales (Unchanged crackles bilaterally).   Abdominal: Soft. Bowel sounds are normal. He exhibits no distension. There is no tenderness. There is no rebound.   Musculoskeletal: Normal range of motion. He exhibits no edema.   No clubbing or cyanosis   Neurological: He is alert and oriented to person, place, and time. No cranial nerve deficit. Coordination normal.   No focal weakness   Skin: Skin is warm and dry. No rash noted. He is not diaphoretic.       Medications  Current Facility-Administered Medications   Medication Dose Route Frequency Provider Last Rate Last Dose   • tamsulosin (FLOMAX) capsule 0.4 mg  0.4 mg Oral AFTER BREAKFAST Rigoberto Galvan M.D.   0.4 mg at 09/20/19 0801   • timolol (TIMOPTIC) 0.25 % ophthalmic solution 1 Drop  1 Drop Both Eyes BID Rigoberto Galvan M.D.   1 Drop at 09/20/19 0601   • predniSONE (DELTASONE) tablet 30 mg  30 mg Oral DAILY Eulogio Lopes M.D.   30 mg at 09/20/19 0801   • aspirin (ASA) tablet 325 mg  325 mg Oral DAILY Rigoberto Galvan M.D.   325 mg at 09/20/19 0600   • gabapentin (NEURONTIN) capsule 1,600 mg  1,600 mg Oral Q EVENING Rigoberto Galvan M.D.   1,600 mg at 09/19/19 1709   • gabapentin (NEURONTIN) capsule 2,400 mg  2,400 mg Oral QAM Rigoberto Galvan  M.D.   2,400 mg at 09/20/19 0559   • levothyroxine (SYNTHROID) tablet 125 mcg  125 mcg Oral QAM AC Rigoberto Galvan M.D.   125 mcg at 09/20/19 0600   • methadone (DOLOPHINE) tablet 30 mg  30 mg Oral QAM Rigoberto Galvan M.D.   30 mg at 09/20/19 0559    And   • methadone (DOLOPHINE) tablet 20 mg  20 mg Oral DAILY AT NOON Rigoberto Galvan M.D.   Stopped at 09/20/19 1200    And   • methadone (DOLOPHINE) tablet 20 mg  20 mg Oral Q EVENING Rigoberto Galvan M.D.   20 mg at 09/19/19 1709   • senna-docusate (PERICOLACE or SENOKOT S) 8.6-50 MG per tablet 2 Tab  2 Tab Oral BID Rigoberto Galvan M.D.   Stopped at 09/18/19 1711    And   • polyethylene glycol/lytes (MIRALAX) PACKET 1 Packet  1 Packet Oral QDAY PRN Rigoberto Galvan M.D.        And   • magnesium hydroxide (MILK OF MAGNESIA) suspension 30 mL  30 mL Oral QDAY PRN Rigoberto Galvan M.D.        And   • bisacodyl (DULCOLAX) suppository 10 mg  10 mg Rectal QDAY PRN Rigoberto Galvan M.D.       • simvastatin (ZOCOR) tablet 40 mg  40 mg Oral Nightly Rigoberto Galvan M.D.   40 mg at 09/19/19 2045   • temazepam (RESTORIL) capsule 15 mg  15 mg Oral QHS Rigoberto Gavlan M.D.   15 mg at 09/19/19 2045   • tramadol (ULTRAM) 50 MG tablet 50 mg  50 mg Oral BID Rigoberto Galvan M.D.   50 mg at 09/20/19 0600   • acetaminophen (TYLENOL) tablet 650 mg  650 mg Oral Q6HRS PRN Rigoberto Galvan M.D.       • oxyCODONE immediate-release (ROXICODONE) tablet 5-10 mg  5-10 mg Oral Q4HRS PRN Rigoberto Galvan M.D.   5 mg at 09/20/19 0305   • ALPRAZolam (XANAX) tablet 0.5-1 mg  0.5-1 mg Oral Q6HRS PRN Eulogio Lopes M.D.   1 mg at 09/20/19 1342   • Respiratory Care per Protocol   Nebulization Continuous RT Marck Montero Jr., D.O.       • ipratropium-albuterol (DUONEB) nebulizer solution  3 mL Nebulization Q2HRS PRN (RT) Marck Montero Jr., D.O.       • MD Alert...ICU Electrolyte Replacement per Pharmacy   Other PHARMACY TO DOSE Marck MARSHALL  Winston Tello, D.O.       • enoxaparin (LOVENOX) inj 40 mg  40 mg Subcutaneous DAILY Marck Montero Jr. D.O.   40 mg at 09/20/19 0600   • sodium chloride (OCEAN) 0.65 % nasal spray 2 Spray  2 Spray Nasal Q2HRS PRN Mary Silva M.D.   2 [place] at 09/17/19 0913       Fluids    Intake/Output Summary (Last 24 hours) at 9/20/2019 1536  Last data filed at 9/20/2019 1455  Gross per 24 hour   Intake 2400 ml   Output 950 ml   Net 1450 ml       Laboratory          Recent Labs     09/18/19  0530 09/19/19  0454 09/20/19  0625   SODIUM 137 141 141   POTASSIUM 4.0 3.4* 4.1   CHLORIDE 103 107 108   CO2 25 24 25   BUN 42* 44* 40*   CREATININE 0.86 1.03 0.95   MAGNESIUM 2.5 2.5 2.3   PHOSPHORUS 4.6* 3.4 4.0   CALCIUM 8.9 9.0 8.6     Recent Labs     09/18/19  0530 09/19/19  0454 09/20/19  0625   GLUCOSE 128* 116* 112*     Recent Labs     09/18/19  0530 09/19/19  0454 09/20/19  0625   WBC 19.4* 23.2* 21.7*   NEUTSPOLYS 84.70* 71.00 63.40   LYMPHOCYTES 8.60* 17.50* 19.90*   MONOCYTES 5.60 7.50 9.20   EOSINOPHILS 0.00 1.70 3.80   BASOPHILS 0.20 0.20 0.30     Recent Labs     09/18/19  0530 09/19/19  0454 09/20/19  0625   RBC 4.64* 5.28 5.03   HEMOGLOBIN 12.5* 14.3 13.7*   HEMATOCRIT 39.9* 45.7 45.0   PLATELETCT 374 438 403       Imaging  None    Assessment/Plan  * Hypotension- (present on admission)  Assessment & Plan  I suspect that this is due to intravascular volume depletion  Bolus with intravenous LR  IV albumin    Acute respiratory failure with hypoxia, emphsema and pneumonitis (HCC)- (present on admission)  Assessment & Plan  Intubated 9/14-9/16  He is improved  Continue oxygen    Pneumonitis- (present on admission)  Assessment & Plan  Completed 5 days of doxycycline on 9/15  Completed 5 days of Rocephin on 9/18  Usual jonny on sputum culture  Continue to observe off antibiotics    Interstitial lung disease (HCC)  Assessment & Plan  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)  Continue prednisone, 30 mg  daily    Paraseptal emphysema (HCC)  Assessment & Plan  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)  Continue prednisone, 30 mg daily  Continue bronchodilators    QT prolongation- (present on admission)  Assessment & Plan  Monitor QTC  Keep magnesium greater than 2    Pulmonary hypertension (HCC)  Assessment & Plan  RVSP 55 mmHg    Urinary retention  Assessment & Plan  Bell catheter in place  Flomax, 0.4 mg daily    Chronic pain- (present on admission)  Assessment & Plan  Continue home methadone program  Continue Ultram, 50 mg twice daily  Continue oxycodone for breakthrough pain  Continue home gabapentin regimen    Hypothyroidism- (present on admission)  Assessment & Plan  Continue levothyroxine, 125 mcg daily       VTE:  Lovenox  Ulcer: Not Indicated  Lines: Bell Catheter  Ongoing indication addressed    I have performed a physical exam and reviewed and updated ROS and Plan today (9/20/2019). In review of yesterday's note (9/19/2019), there are no changes except as documented above.     I have assessed and reassessed his blood pressure, hemodynamics, cardiovascular status, urine output, response to IV fluid resuscitation and respiratory status.  Keep in ICU.  He is critically ill with significant hypotension.  He is at high risk for worsening respiratory and cardiovascular system dysfunction.    Discussed patient condition and risk of morbidity and/or mortality with Hospitalist, RN, RT, Pharmacy, Charge nurse / hot rounds and QA team     The patient remains critically ill.  Critical care time = 35 minutes in directly providing and coordinating critical care and extensive data review.  No time overlap and excludes procedures.    Eulogio Lopes MD  Pulmonary and Critical Care Medicine

## 2019-09-20 NOTE — CARE PLAN
Problem: Communication  Goal: The ability to communicate needs accurately and effectively will improve  Outcome: PROGRESSING AS EXPECTED     Problem: Safety  Goal: Will remain free from injury  Outcome: PROGRESSING AS EXPECTED  Goal: Will remain free from falls  Outcome: PROGRESSING AS EXPECTED     Problem: Infection  Goal: Will remain free from infection  Outcome: PROGRESSING AS EXPECTED     Problem: Venous Thromboembolism (VTW)/Deep Vein Thrombosis (DVT) Prevention:  Goal: Patient will participate in Venous Thrombosis (VTE)/Deep Vein Thrombosis (DVT)Prevention Measures  Outcome: PROGRESSING AS EXPECTED     Problem: Bowel/Gastric:  Goal: Normal bowel function is maintained or improved  Outcome: PROGRESSING AS EXPECTED  Goal: Will not experience complications related to bowel motility  Outcome: PROGRESSING AS EXPECTED     Problem: Knowledge Deficit  Goal: Knowledge of disease process/condition, treatment plan, diagnostic tests, and medications will improve  Outcome: PROGRESSING AS EXPECTED  Goal: Knowledge of the prescribed therapeutic regimen will improve  Outcome: PROGRESSING AS EXPECTED     Problem: Discharge Barriers/Planning  Goal: Patient's continuum of care needs will be met  Outcome: PROGRESSING AS EXPECTED     Problem: Pain Management  Goal: Pain level will decrease to patient's comfort goal  Outcome: PROGRESSING AS EXPECTED     Problem: Fluid Volume:  Goal: Will maintain balanced intake and output  Outcome: PROGRESSING AS EXPECTED     Problem: Respiratory:  Goal: Respiratory status will improve  Outcome: PROGRESSING AS EXPECTED     Problem: Psychosocial Needs:  Goal: Level of anxiety will decrease  Outcome: PROGRESSING AS EXPECTED     Problem: Urinary Elimination:  Goal: Ability to reestablish a normal urinary elimination pattern will improve  Outcome: PROGRESSING AS EXPECTED     Problem: Safety - Medical Restraint  Goal: Remains free of injury from restraints (Restraint for Interference with Medical  Device)  Description  INTERVENTIONS:  1. Determine that other, less restrictive measures have been tried or would not be effective before applying the restraint  2. Evaluate the patient's condition at the time of restraint application  3. Inform patient/family regarding the reason for restraint  4. Q2H: Monitor safety, psychosocial status, comfort, nutrition and hydration  Outcome: PROGRESSING AS EXPECTED  Goal: Free from restraint(s) (Restraint for Interference with Medical Device)  Description  INTERVENTIONS:  1. ONCE/SHIFT or MINIMUM Q12H: Assess and document the continuing need for restraints  2. Q24H: Continued use of restraint requires LIP to perform face to face examination and written order  3. Identify and implement measures to help patient regain control  Outcome: PROGRESSING AS EXPECTED     Problem: Skin Integrity  Goal: Risk for impaired skin integrity will decrease  Outcome: PROGRESSING AS EXPECTED     Problem: Mobility  Goal: Risk for activity intolerance will decrease  Outcome: PROGRESSING AS EXPECTED

## 2019-09-21 LAB
ANION GAP SERPL CALC-SCNC: 9 MMOL/L (ref 0–11.9)
BASOPHILS # BLD AUTO: 0.2 % (ref 0–1.8)
BASOPHILS # BLD: 0.04 K/UL (ref 0–0.12)
BUN SERPL-MCNC: 25 MG/DL (ref 8–22)
CALCIUM SERPL-MCNC: 9.1 MG/DL (ref 8.4–10.2)
CHLORIDE SERPL-SCNC: 102 MMOL/L (ref 96–112)
CO2 SERPL-SCNC: 26 MMOL/L (ref 20–33)
CREAT SERPL-MCNC: 0.8 MG/DL (ref 0.5–1.4)
EOSINOPHIL # BLD AUTO: 0.78 K/UL (ref 0–0.51)
EOSINOPHIL NFR BLD: 3.7 % (ref 0–6.9)
ERYTHROCYTE [DISTWIDTH] IN BLOOD BY AUTOMATED COUNT: 43.8 FL (ref 35.9–50)
GLUCOSE SERPL-MCNC: 84 MG/DL (ref 65–99)
HCT VFR BLD AUTO: 40.5 % (ref 42–52)
HGB BLD-MCNC: 12.9 G/DL (ref 14–18)
IMM GRANULOCYTES # BLD AUTO: 0.46 K/UL (ref 0–0.11)
IMM GRANULOCYTES NFR BLD AUTO: 2.2 % (ref 0–0.9)
LYMPHOCYTES # BLD AUTO: 4.35 K/UL (ref 1–4.8)
LYMPHOCYTES NFR BLD: 20.9 % (ref 22–41)
MAGNESIUM SERPL-MCNC: 2 MG/DL (ref 1.5–2.5)
MCH RBC QN AUTO: 27.9 PG (ref 27–33)
MCHC RBC AUTO-ENTMCNC: 31.9 G/DL (ref 33.7–35.3)
MCV RBC AUTO: 87.7 FL (ref 81.4–97.8)
MONOCYTES # BLD AUTO: 1.71 K/UL (ref 0–0.85)
MONOCYTES NFR BLD AUTO: 8.2 % (ref 0–13.4)
NEUTROPHILS # BLD AUTO: 13.5 K/UL (ref 1.82–7.42)
NEUTROPHILS NFR BLD: 64.8 % (ref 44–72)
NRBC # BLD AUTO: 0 K/UL
NRBC BLD-RTO: 0 /100 WBC
PHOSPHATE SERPL-MCNC: 2.8 MG/DL (ref 2.5–4.5)
PLATELET # BLD AUTO: 338 K/UL (ref 164–446)
PMV BLD AUTO: 9.3 FL (ref 9–12.9)
POTASSIUM SERPL-SCNC: 4.6 MMOL/L (ref 3.6–5.5)
RBC # BLD AUTO: 4.62 M/UL (ref 4.7–6.1)
SODIUM SERPL-SCNC: 137 MMOL/L (ref 135–145)
WBC # BLD AUTO: 20.8 K/UL (ref 4.8–10.8)

## 2019-09-21 PROCEDURE — 99233 SBSQ HOSP IP/OBS HIGH 50: CPT | Performed by: INTERNAL MEDICINE

## 2019-09-21 PROCEDURE — 700111 HCHG RX REV CODE 636 W/ 250 OVERRIDE (IP): Performed by: INTERNAL MEDICINE

## 2019-09-21 PROCEDURE — 80048 BASIC METABOLIC PNL TOTAL CA: CPT

## 2019-09-21 PROCEDURE — 99233 SBSQ HOSP IP/OBS HIGH 50: CPT | Performed by: HOSPITALIST

## 2019-09-21 PROCEDURE — 85025 COMPLETE CBC W/AUTO DIFF WBC: CPT

## 2019-09-21 PROCEDURE — 83735 ASSAY OF MAGNESIUM: CPT

## 2019-09-21 PROCEDURE — A9270 NON-COVERED ITEM OR SERVICE: HCPCS | Performed by: HOSPITALIST

## 2019-09-21 PROCEDURE — 71045 X-RAY EXAM CHEST 1 VIEW: CPT

## 2019-09-21 PROCEDURE — 84100 ASSAY OF PHOSPHORUS: CPT

## 2019-09-21 PROCEDURE — 770001 HCHG ROOM/CARE - MED/SURG/GYN PRIV*

## 2019-09-21 PROCEDURE — 700102 HCHG RX REV CODE 250 W/ 637 OVERRIDE(OP): Performed by: HOSPITALIST

## 2019-09-21 RX ORDER — BUSPIRONE HYDROCHLORIDE 10 MG/1
5 TABLET ORAL 3 TIMES DAILY
Status: DISCONTINUED | OUTPATIENT
Start: 2019-09-21 | End: 2019-09-30 | Stop reason: HOSPADM

## 2019-09-21 RX ORDER — PREDNISONE 20 MG/1
20 TABLET ORAL DAILY
Status: DISCONTINUED | OUTPATIENT
Start: 2019-09-22 | End: 2019-09-22

## 2019-09-21 RX ADMIN — BUSPIRONE HYDROCHLORIDE 5 MG: 10 TABLET ORAL at 12:04

## 2019-09-21 RX ADMIN — ENOXAPARIN SODIUM 40 MG: 100 INJECTION SUBCUTANEOUS at 05:48

## 2019-09-21 RX ADMIN — PREDNISONE 30 MG: 20 TABLET ORAL at 05:49

## 2019-09-21 RX ADMIN — METHADONE HYDROCHLORIDE 30 MG: 10 TABLET ORAL at 05:49

## 2019-09-21 RX ADMIN — GABAPENTIN 2400 MG: 400 CAPSULE ORAL at 05:49

## 2019-09-21 RX ADMIN — GABAPENTIN 1600 MG: 400 CAPSULE ORAL at 19:40

## 2019-09-21 RX ADMIN — TRAMADOL HYDROCHLORIDE 50 MG: 50 TABLET ORAL at 17:07

## 2019-09-21 RX ADMIN — ALPRAZOLAM 0.25 MG: 0.25 TABLET ORAL at 19:41

## 2019-09-21 RX ADMIN — TIMOLOL MALEATE 1 DROP: 2.5 SOLUTION OPHTHALMIC at 17:07

## 2019-09-21 RX ADMIN — TRAMADOL HYDROCHLORIDE 50 MG: 50 TABLET ORAL at 06:52

## 2019-09-21 RX ADMIN — TAMSULOSIN HYDROCHLORIDE 0.4 MG: 0.4 CAPSULE ORAL at 07:58

## 2019-09-21 RX ADMIN — ASPIRIN 325 MG: 325 TABLET, FILM COATED ORAL at 05:49

## 2019-09-21 RX ADMIN — ALPRAZOLAM 0.25 MG: 0.25 TABLET ORAL at 08:45

## 2019-09-21 RX ADMIN — TIMOLOL MALEATE 1 DROP: 2.5 SOLUTION OPHTHALMIC at 07:58

## 2019-09-21 RX ADMIN — LEVOTHYROXINE SODIUM 125 MCG: 25 TABLET ORAL at 06:52

## 2019-09-21 RX ADMIN — BUSPIRONE HYDROCHLORIDE 5 MG: 10 TABLET ORAL at 17:07

## 2019-09-21 RX ADMIN — METHADONE HYDROCHLORIDE 20 MG: 10 TABLET ORAL at 12:04

## 2019-09-21 RX ADMIN — TEMAZEPAM 15 MG: 15 CAPSULE ORAL at 21:13

## 2019-09-21 RX ADMIN — METHADONE HYDROCHLORIDE 20 MG: 10 TABLET ORAL at 19:40

## 2019-09-21 RX ADMIN — SIMVASTATIN 40 MG: 40 TABLET, FILM COATED ORAL at 21:13

## 2019-09-21 RX ADMIN — ALPRAZOLAM 0.25 MG: 0.25 TABLET ORAL at 01:30

## 2019-09-21 ASSESSMENT — ENCOUNTER SYMPTOMS
SORE THROAT: 0
COUGH: 0
SEIZURES: 0
SPEECH CHANGE: 0
DIAPHORESIS: 0
HEMOPTYSIS: 0
FEVER: 0
PHOTOPHOBIA: 0
NERVOUS/ANXIOUS: 1
DOUBLE VISION: 0
CHILLS: 0
BLURRED VISION: 0
SPUTUM PRODUCTION: 1
VOMITING: 0
FOCAL WEAKNESS: 0
COUGH: 1
NAUSEA: 0
PALPITATIONS: 0
BLOOD IN STOOL: 0
HALLUCINATIONS: 0
WHEEZING: 0
MYALGIAS: 0
HEADACHES: 0
ABDOMINAL PAIN: 0
BRUISES/BLEEDS EASILY: 0

## 2019-09-21 NOTE — PROGRESS NOTES
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 Rn informed MD that methadone was held at 1200 and pushed back to 2000 as well as his neurontin d/t lethargy and inability to stay awake for conversation. Md had decreased xanax dose and ordered to reassess at 2000 for administration of neurontin and methadone

## 2019-09-21 NOTE — PROGRESS NOTES
[facility] Medicine Daily Progress Note    Date of Service  9/21/2019    Chief Complaint  59 y.o. male admitted 9/10/2019 with history of chronic pain syndrome on high-dose narcotics admitted with respiratory failure secondary to pneumonia CTA was negative for PE required intubation on 9/14/2019    Hospital Course          Interval Problem Update    Aox4  SR 60-70  SBP   Has questions about reducing his Xanax dose  Improved intake since diet was upgraded  Chest x-ray reviewed stable      Consultants/Specialty  ID  CC    Code Status  Full code    Disposition  SNF referral    Review of Systems  Review of Systems   Constitutional: Negative for chills and fever.   Respiratory: Negative for cough and hemoptysis.    Cardiovascular: Negative for chest pain and palpitations.   Gastrointestinal: Negative for abdominal pain, nausea and vomiting.   Musculoskeletal: Positive for joint pain.   Psychiatric/Behavioral: The patient is nervous/anxious.    All other systems reviewed and are negative.       Physical Exam  Temp:  [36.1 °C (97 °F)-36.7 °C (98.1 °F)] 36.1 °C (97 °F)  Pulse:  [60-81] 76  BP: ()/(49-71) 91/56  SpO2:  [90 %-100 %] 94 %    Physical Exam   Constitutional: He is oriented to person, place, and time. He appears well-developed and well-nourished.   HENT:   Head: Normocephalic and atraumatic.   Mouth/Throat: Oropharynx is clear and moist.   Eyes: Conjunctivae are normal. Right eye exhibits no discharge. Left eye exhibits no discharge.   Neck: Neck supple. No JVD present. No tracheal deviation present.   Cardiovascular: Normal rate, regular rhythm and normal heart sounds.   Pulmonary/Chest: Effort normal. No respiratory distress. He has decreased breath sounds. He has rales. He exhibits no tenderness.   Abdominal: Soft. Bowel sounds are normal. He exhibits no distension. There is no tenderness. There is no rebound.   Musculoskeletal: He exhibits no edema or tenderness.   Neurological: He is alert and  oriented to person, place, and time. No cranial nerve deficit. He exhibits normal muscle tone.   Skin: Skin is warm and dry. No rash noted. He is not diaphoretic. No cyanosis. Nails show no clubbing.   Psychiatric: He has a normal mood and affect. His behavior is normal.   Nursing note and vitals reviewed.      Fluids    Intake/Output Summary (Last 24 hours) at 9/21/2019 0909  Last data filed at 9/21/2019 0800  Gross per 24 hour   Intake 3800 ml   Output 2775 ml   Net 1025 ml       Laboratory  Recent Labs     09/19/19 0454 09/20/19 0625 09/21/19  0528   WBC 23.2* 21.7* 20.8*   RBC 5.28 5.03 4.62*   HEMOGLOBIN 14.3 13.7* 12.9*   HEMATOCRIT 45.7 45.0 40.5*   MCV 86.6 89.5 87.7   MCH 27.1 27.2 27.9   MCHC 31.3* 30.4* 31.9*   RDW 44.9 46.4 43.8   PLATELETCT 438 403 338   MPV 9.0 9.0 9.3     Recent Labs     09/19/19 0454 09/20/19 0625 09/21/19  0528   SODIUM 141 141 137   POTASSIUM 3.4* 4.1 4.6   CHLORIDE 107 108 102   CO2 24 25 26   GLUCOSE 116* 112* 84   BUN 44* 40* 25*   CREATININE 1.03 0.95 0.80   CALCIUM 9.0 8.6 9.1                   Imaging  DX-CHEST-PORTABLE (1 VIEW)   Final Result         1.  Pulmonary edema and/or infiltrates are identified, which are stable since the prior exam.      DX-CHEST-PORTABLE (1 VIEW)   Final Result         1.  Pulmonary edema and/or infiltrates are identified, which are stable since the prior exam.         DX-CHEST-PORTABLE (1 VIEW)   Final Result         1.  Pulmonary edema and/or infiltrates are identified, which are stable since the prior exam.      DX-CHEST-PORTABLE (1 VIEW)   Final Result         1.  Pulmonary edema and/or infiltrates are identified, which are stable since the prior exam.      DX-CHEST-PORTABLE (1 VIEW)   Final Result         1.  No significant interval change.      CT-CHEST (THORAX) W/O   Final Result      1.  Minimal improvement of presumed bilateral pneumonitis compared to 9/9/2019.  Findings likely significantly improved from prior chest x-ray dated  9/12/2019.   2.  Moderate emphysema, likely paraseptal.               DX-CHEST-FOR LINE PLACEMENT Perform procedure in: Patient's Room (S134)   Final Result            1. A left central venous catheter with tip projects appropriately over the expected area of the superior vena cava.      DX-ABDOMEN FOR TUBE PLACEMENT   Final Result         Feeding tube with tip projecting over the expected area of the stomach fundus.      DX-CHEST-PORTABLE (1 VIEW)   Final Result         1. Interval intubation. No other significant interval change.      EC-ECHOCARDIOGRAM COMPLETE W/O CONT   Final Result      DX-CHEST-PORTABLE (1 VIEW)   Final Result      Bilateral airspace opacities, left greater than right worrisome for multifocal pneumonia.         DX-CHEST-2 VIEWS   Final Result         New airspace opacity in the left lower lobe, concerning for pneumonia.      OUTSIDE IMAGES-CT CHEST   Final Result           Assessment/Plan  * Hypotension  Assessment & Plan  Improved    Acute respiratory failure with hypoxia, emphsema and pneumonitis (HCC)- (present on admission)  Assessment & Plan  Extubated on 9/16/2019    Continue oxygen wean off as tolerated  Taper prednisone to 20 mg  RT protocol  Aspiration precautions    Pneumonitis- (present on admission)  Assessment & Plan  Completed antibiotics  Clinically improved    Elevated glucose  Assessment & Plan  Likely steroid-induced    Interstitial lung disease (HCC)  Assessment & Plan  Will need further work-up as outpatient    Paraseptal emphysema (HCC)  Assessment & Plan  Bronchodilators as needed  No wheezing on exam today Taper steroids      Pulmonary hypertension (HCC)  Assessment & Plan  RVSP 55    Monitor fluid status  Will need outpatient follow-up with pulmonary    Urinary retention  Assessment & Plan  Continue Flomax  Voiding trial    Dysphasia  Assessment & Plan  Diet advance per speech with improved intake  Aspiration precautions    Chronic pain- (present on  admission)  Assessment & Plan  With narcotic dependence    Continue methadone and gabapentin  At home dose  Avoid increasing sedating agents  Reviewed with patient concerned with oversedation with higher doses of Xanax  We will start him on BuSpar to help with anxiety    Hypothyroidism- (present on admission)  Assessment & Plan  Continue levothyroxine      Plan of care reviewed with the patient and  questions answered, total face to face time spent 35 minutes >50% counseling on plan of care and discussing treatment for anxiety and side effects of narcotics and benzodiazepines    Discussed with nursing staff and Dr. Padilla    VTE prophylaxis: Lovenox

## 2019-09-21 NOTE — CARE PLAN
Problem: Communication  Goal: The ability to communicate needs accurately and effectively will improve  Outcome: PROGRESSING SLOWER THAN EXPECTED     Problem: Safety  Goal: Will remain free from injury  Outcome: PROGRESSING SLOWER THAN EXPECTED  Goal: Will remain free from falls  Outcome: PROGRESSING SLOWER THAN EXPECTED     Problem: Infection  Goal: Will remain free from infection  Outcome: PROGRESSING SLOWER THAN EXPECTED     Problem: Venous Thromboembolism (VTW)/Deep Vein Thrombosis (DVT) Prevention:  Goal: Patient will participate in Venous Thrombosis (VTE)/Deep Vein Thrombosis (DVT)Prevention Measures  Outcome: PROGRESSING SLOWER THAN EXPECTED     Problem: Bowel/Gastric:  Goal: Normal bowel function is maintained or improved  Outcome: PROGRESSING SLOWER THAN EXPECTED  Goal: Will not experience complications related to bowel motility  Outcome: PROGRESSING SLOWER THAN EXPECTED     Problem: Knowledge Deficit  Goal: Knowledge of disease process/condition, treatment plan, diagnostic tests, and medications will improve  Outcome: PROGRESSING SLOWER THAN EXPECTED  Goal: Knowledge of the prescribed therapeutic regimen will improve  Outcome: PROGRESSING SLOWER THAN EXPECTED     Problem: Discharge Barriers/Planning  Goal: Patient's continuum of care needs will be met  Outcome: PROGRESSING SLOWER THAN EXPECTED     Problem: Pain Management  Goal: Pain level will decrease to patient's comfort goal  Outcome: PROGRESSING SLOWER THAN EXPECTED     Problem: Fluid Volume:  Goal: Will maintain balanced intake and output  Outcome: PROGRESSING SLOWER THAN EXPECTED     Problem: Respiratory:  Goal: Respiratory status will improve  Outcome: PROGRESSING SLOWER THAN EXPECTED     Problem: Psychosocial Needs:  Goal: Level of anxiety will decrease  Outcome: PROGRESSING SLOWER THAN EXPECTED     Problem: Urinary Elimination:  Goal: Ability to reestablish a normal urinary elimination pattern will improve  Outcome: PROGRESSING SLOWER THAN  EXPECTED     Problem: Safety - Medical Restraint  Goal: Remains free of injury from restraints (Restraint for Interference with Medical Device)  Description  INTERVENTIONS:  1. Determine that other, less restrictive measures have been tried or would not be effective before applying the restraint  2. Evaluate the patient's condition at the time of restraint application  3. Inform patient/family regarding the reason for restraint  4. Q2H: Monitor safety, psychosocial status, comfort, nutrition and hydration  Outcome: PROGRESSING SLOWER THAN EXPECTED  Goal: Free from restraint(s) (Restraint for Interference with Medical Device)  Description  INTERVENTIONS:  1. ONCE/SHIFT or MINIMUM Q12H: Assess and document the continuing need for restraints  2. Q24H: Continued use of restraint requires LIP to perform face to face examination and written order  3. Identify and implement measures to help patient regain control  Outcome: PROGRESSING SLOWER THAN EXPECTED     Problem: Skin Integrity  Goal: Risk for impaired skin integrity will decrease  Outcome: PROGRESSING SLOWER THAN EXPECTED     Problem: Mobility  Goal: Risk for activity intolerance will decrease  Outcome: PROGRESSING SLOWER THAN EXPECTED

## 2019-09-21 NOTE — ASSESSMENT & PLAN NOTE
Likely secondary to hypovolemia and sedating meds  And holding parameters to methadone  Half liter LR bolus now  We will start Midodrin  Monitor blood pressure

## 2019-09-21 NOTE — PROGRESS NOTES
Critical Care Progress Note    Date of admission  9/10/2019    Chief Complaint  59 y.o. male admitted 9/10/2019 with shortness of breath.    Hospital Course    This gentleman was admitted to the hospital with pneumonia and respiratory failure.  He developed worsening respiratory failure and was transferred to the ICU and was intubated.      Interval Problem Update  Reviewed last 24 hour events:      0905 hours    Oriented x 4  4 L NC  SR  BP improved  Bell for retention  Good appetite  Taper pred to 20      He is feeling better today.  His cough and sputum production are decreased.  He denies shortness of breath or wheezing.  He has no hemoptysis.  He is eating and drinking well.  He has no abdominal pain, nausea or vomiting.  He denies angina, palpitations or syncope.      Review of Systems  Review of Systems   Constitutional: Negative for chills, diaphoresis and fever.   HENT: Negative for ear pain, hearing loss and sore throat.    Eyes: Negative for blurred vision, double vision and photophobia.   Respiratory: Positive for cough and sputum production. Negative for wheezing.    Cardiovascular: Negative for chest pain, palpitations and leg swelling.   Gastrointestinal: Negative for abdominal pain, blood in stool, nausea and vomiting.   Genitourinary: Negative for dysuria and hematuria.   Musculoskeletal: Negative for joint pain and myalgias.   Skin: Negative for rash.   Neurological: Negative for speech change, focal weakness, seizures and headaches.   Endo/Heme/Allergies: Does not bruise/bleed easily.   Psychiatric/Behavioral: Negative for hallucinations and suicidal ideas. The patient is nervous/anxious.          Vital Signs for last 24 hours   Temp:  [36.1 °C (97 °F)-36.7 °C (98.1 °F)] 36.6 °C (97.9 °F)  Pulse:  [60-81] 65  BP: ()/(52-71) 93/52  SpO2:  [90 %-100 %] 94 %    Hemodynamic parameters for last 24 hours       Respiratory Information for the last 24 hours       Physical Exam   Physical Exam    Constitutional: He is oriented to person, place, and time. He appears well-developed. No distress.   HENT:   Head: Normocephalic.   Right Ear: External ear normal.   Left Ear: External ear normal.   Nose: Nose normal.   Mouth/Throat: No oropharyngeal exudate.   Eyes: Pupils are equal, round, and reactive to light. Conjunctivae are normal. Right eye exhibits no discharge. Left eye exhibits no discharge. No scleral icterus.   Neck: Normal range of motion. Neck supple. No tracheal deviation present.   Cardiovascular: Intact distal pulses. Exam reveals no friction rub.   No murmur heard.  Sinus rhythm   Pulmonary/Chest: Effort normal. No stridor. No respiratory distress. He has no wheezes. He has rales (Very few crackles at the bases).   Abdominal: Soft. Bowel sounds are normal. He exhibits no distension. There is no tenderness. There is no guarding.   Musculoskeletal: Normal range of motion. He exhibits no edema or deformity.   No clubbing or cyanosis   Neurological: He is alert and oriented to person, place, and time. No cranial nerve deficit. Coordination normal.   No focal weakness   Skin: Skin is warm and dry. He is not diaphoretic. No erythema. No pallor.       Medications  Current Facility-Administered Medications   Medication Dose Route Frequency Provider Last Rate Last Dose   • [START ON 9/22/2019] predniSONE (DELTASONE) tablet 20 mg  20 mg Oral DAILY Eulogio Lopes M.D.       • busPIRone (BUSPAR) tablet 5 mg  5 mg Oral TID Rigoberto Galvan M.D.   5 mg at 09/21/19 1204   • ALPRAZolam (XANAX) tablet 0.25 mg  0.25 mg Oral Q6HRS PRN Rigoberto Galvan M.D.   0.25 mg at 09/21/19 0845   • tamsulosin (FLOMAX) capsule 0.4 mg  0.4 mg Oral AFTER BREAKFAST Rigoberto Galvan M.D.   0.4 mg at 09/21/19 0758   • timolol (TIMOPTIC) 0.25 % ophthalmic solution 1 Drop  1 Drop Both Eyes BID Rigoberto Galvan M.D.   1 Drop at 09/21/19 8498   • aspirin (ASA) tablet 325 mg  325 mg Oral DAILY Rigoberto Wood  ARLINE Galvan   325 mg at 09/21/19 0549   • gabapentin (NEURONTIN) capsule 1,600 mg  1,600 mg Oral Q EVENING Rigoberto Galvan M.D.   1,600 mg at 09/20/19 2237   • gabapentin (NEURONTIN) capsule 2,400 mg  2,400 mg Oral QAM Rigoberto Galvan M.D.   2,400 mg at 09/21/19 0549   • levothyroxine (SYNTHROID) tablet 125 mcg  125 mcg Oral QAM AC Rigoberto Galvan M.D.   125 mcg at 09/21/19 0652   • methadone (DOLOPHINE) tablet 30 mg  30 mg Oral QAM Rigoberto Galvan M.D.   30 mg at 09/21/19 0549    And   • methadone (DOLOPHINE) tablet 20 mg  20 mg Oral DAILY AT NOON Rigoberto Galvan M.D.   20 mg at 09/21/19 1204    And   • methadone (DOLOPHINE) tablet 20 mg  20 mg Oral Q EVENING Rigoberto Galvan M.D.   20 mg at 09/20/19 2145   • senna-docusate (PERICOLACE or SENOKOT S) 8.6-50 MG per tablet 2 Tab  2 Tab Oral BID Rigoberto Galvan M.D.   Stopped at 09/18/19 1711    And   • polyethylene glycol/lytes (MIRALAX) PACKET 1 Packet  1 Packet Oral QDAY PRN Rigoberto Galvan M.D.        And   • magnesium hydroxide (MILK OF MAGNESIA) suspension 30 mL  30 mL Oral QDAY PRN Rigoberto Galvan M.D.        And   • bisacodyl (DULCOLAX) suppository 10 mg  10 mg Rectal QDAY PRN Rigoberto Galvan M.D.       • simvastatin (ZOCOR) tablet 40 mg  40 mg Oral Nightly Rigoberto Galvan M.D.   40 mg at 09/20/19 2112   • temazepam (RESTORIL) capsule 15 mg  15 mg Oral QHS Rigoberto Galvan M.D.   15 mg at 09/20/19 2256   • tramadol (ULTRAM) 50 MG tablet 50 mg  50 mg Oral BID Rigoberto Galvan M.D.   50 mg at 09/21/19 0652   • acetaminophen (TYLENOL) tablet 650 mg  650 mg Oral Q6HRS PRN Rigoberto Galvan M.D.       • oxyCODONE immediate-release (ROXICODONE) tablet 5-10 mg  5-10 mg Oral Q4HRS PRN Rigoberto Galvan M.D.   5 mg at 09/20/19 0305   • Respiratory Care per Protocol   Nebulization Continuous RT Marck Montero Jr., D.O.       • ipratropium-albuterol (DUONEB) nebulizer solution  3 mL Nebulization Q2HRS  PRN (RT) FLORA Mcdermott Jr..O.       • MD Alert...ICU Electrolyte Replacement per Pharmacy   Other PHARMACY TO DOSE FLORA Mcdermott Jr..LUCY.       • enoxaparin (LOVENOX) inj 40 mg  40 mg Subcutaneous DAILY Marck Montero Jr. D.O.   40 mg at 09/21/19 0548   • sodium chloride (OCEAN) 0.65 % nasal spray 2 Spray  2 Spray Nasal Q2HRS PRN Mary Silva M.D.   2 [place] at 09/17/19 0913       Fluids    Intake/Output Summary (Last 24 hours) at 9/21/2019 1419  Last data filed at 9/21/2019 1204  Gross per 24 hour   Intake 1800 ml   Output 3700 ml   Net -1900 ml       Laboratory          Recent Labs     09/19/19  0454 09/20/19  0625 09/21/19  0528   SODIUM 141 141 137   POTASSIUM 3.4* 4.1 4.6   CHLORIDE 107 108 102   CO2 24 25 26   BUN 44* 40* 25*   CREATININE 1.03 0.95 0.80   MAGNESIUM 2.5 2.3 2.0   PHOSPHORUS 3.4 4.0 2.8   CALCIUM 9.0 8.6 9.1     Recent Labs     09/19/19  0454 09/20/19  0625 09/21/19  0528   GLUCOSE 116* 112* 84     Recent Labs     09/19/19  0454 09/20/19  0625 09/21/19  0528   WBC 23.2* 21.7* 20.8*   NEUTSPOLYS 71.00 63.40 64.80   LYMPHOCYTES 17.50* 19.90* 20.90*   MONOCYTES 7.50 9.20 8.20   EOSINOPHILS 1.70 3.80 3.70   BASOPHILS 0.20 0.30 0.20     Recent Labs     09/19/19  0454 09/20/19  0625 09/21/19  0528   RBC 5.28 5.03 4.62*   HEMOGLOBIN 14.3 13.7* 12.9*   HEMATOCRIT 45.7 45.0 40.5*   PLATELETCT 438 403 338       Imaging  X-Ray:  I have personally reviewed the images and compared with prior images. and My impression is: Slight improvement of bilateral opacities    Assessment/Plan  * Hypotension  Assessment & Plan  I suspect that this was due to intravascular volume depletion  Improved with IV fluids    Acute respiratory failure with hypoxia, emphsema and pneumonitis (HCC)- (present on admission)  Assessment & Plan  Intubated 9/14-9/16  He continues to improve    Pneumonitis- (present on admission)  Assessment & Plan  Completed 5 days of doxycycline on 9/15  Completed 5 days of Rocephin  on 9/18  Continue to observe off antibiotics  Usual jonny on sputum culture    Interstitial lung disease (HCC)  Assessment & Plan  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)  Taper prednisone, 20 mg daily    Paraseptal emphysema (HCC)  Assessment & Plan  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)  Taper prednisone, 20 mg daily  Continue bronchodilators    QT prolongation- (present on admission)  Assessment & Plan  Monitor QTC  Keep magnesium greater than 2    Pulmonary hypertension (HCC)  Assessment & Plan  RVSP 55 mmHg    Urinary retention  Assessment & Plan  Continue Flomax, 0.4 mg daily    Chronic pain- (present on admission)  Assessment & Plan  Continue home methadone program  Continue Ultram, 50 mg twice daily  Continue oxycodone for breakthrough pain  Continue home gabapentin regimen    Hypothyroidism- (present on admission)  Assessment & Plan  Continue Synthroid, 125 mcg daily       VTE:  Lovenox  Ulcer: Not Indicated  Lines: Bell Catheter  Ongoing indication addressed    I have performed a physical exam and reviewed and updated ROS and Plan today (9/21/2019). In review of yesterday's note (9/20/2019), there are no changes except as documented above.     OK to transfer out of ICU.  [facility] will follow.    Discussed patient condition and risk of morbidity and/or mortality with Hospitalist, RN, RT, Pharmacy, Charge nurse / hot rounds and QA team .    Eulogio Lopes MD  Pulmonary and Critical Care Medicine

## 2019-09-22 PROBLEM — E83.42 HYPOMAGNESEMIA: Status: ACTIVE | Noted: 2019-09-22

## 2019-09-22 PROBLEM — F41.9 ANXIETY: Status: ACTIVE | Noted: 2019-09-22

## 2019-09-22 LAB
ANION GAP SERPL CALC-SCNC: 8 MMOL/L (ref 0–11.9)
BASOPHILS # BLD AUTO: 0.2 % (ref 0–1.8)
BASOPHILS # BLD: 0.05 K/UL (ref 0–0.12)
BUN SERPL-MCNC: 20 MG/DL (ref 8–22)
CALCIUM SERPL-MCNC: 9.1 MG/DL (ref 8.5–10.5)
CHLORIDE SERPL-SCNC: 101 MMOL/L (ref 96–112)
CO2 SERPL-SCNC: 30 MMOL/L (ref 20–33)
CREAT SERPL-MCNC: 1.02 MG/DL (ref 0.5–1.4)
EOSINOPHIL # BLD AUTO: 0.68 K/UL (ref 0–0.51)
EOSINOPHIL NFR BLD: 3.2 % (ref 0–6.9)
ERYTHROCYTE [DISTWIDTH] IN BLOOD BY AUTOMATED COUNT: 44.6 FL (ref 35.9–50)
GLUCOSE SERPL-MCNC: 88 MG/DL (ref 65–99)
HCT VFR BLD AUTO: 38.7 % (ref 42–52)
HGB BLD-MCNC: 12.5 G/DL (ref 14–18)
IMM GRANULOCYTES # BLD AUTO: 0.59 K/UL (ref 0–0.11)
IMM GRANULOCYTES NFR BLD AUTO: 2.8 % (ref 0–0.9)
LYMPHOCYTES # BLD AUTO: 4.29 K/UL (ref 1–4.8)
LYMPHOCYTES NFR BLD: 20.3 % (ref 22–41)
MAGNESIUM SERPL-MCNC: 1.9 MG/DL (ref 1.5–2.5)
MCH RBC QN AUTO: 28.3 PG (ref 27–33)
MCHC RBC AUTO-ENTMCNC: 32.3 G/DL (ref 33.7–35.3)
MCV RBC AUTO: 87.8 FL (ref 81.4–97.8)
MONOCYTES # BLD AUTO: 1.83 K/UL (ref 0–0.85)
MONOCYTES NFR BLD AUTO: 8.7 % (ref 0–13.4)
NEUTROPHILS # BLD AUTO: 13.66 K/UL (ref 1.82–7.42)
NEUTROPHILS NFR BLD: 64.8 % (ref 44–72)
NRBC # BLD AUTO: 0 K/UL
NRBC BLD-RTO: 0 /100 WBC
PHOSPHATE SERPL-MCNC: 2.5 MG/DL (ref 2.5–4.5)
PLATELET # BLD AUTO: 330 K/UL (ref 164–446)
PMV BLD AUTO: 10.2 FL (ref 9–12.9)
POTASSIUM SERPL-SCNC: 3.7 MMOL/L (ref 3.6–5.5)
RBC # BLD AUTO: 4.41 M/UL (ref 4.7–6.1)
SODIUM SERPL-SCNC: 139 MMOL/L (ref 135–145)
WBC # BLD AUTO: 21.1 K/UL (ref 4.8–10.8)

## 2019-09-22 PROCEDURE — 700102 HCHG RX REV CODE 250 W/ 637 OVERRIDE(OP): Performed by: INTERNAL MEDICINE

## 2019-09-22 PROCEDURE — 99233 SBSQ HOSP IP/OBS HIGH 50: CPT | Performed by: INTERNAL MEDICINE

## 2019-09-22 PROCEDURE — 84100 ASSAY OF PHOSPHORUS: CPT

## 2019-09-22 PROCEDURE — 85025 COMPLETE CBC W/AUTO DIFF WBC: CPT

## 2019-09-22 PROCEDURE — 700111 HCHG RX REV CODE 636 W/ 250 OVERRIDE (IP): Performed by: INTERNAL MEDICINE

## 2019-09-22 PROCEDURE — 80048 BASIC METABOLIC PNL TOTAL CA: CPT

## 2019-09-22 PROCEDURE — 770006 HCHG ROOM/CARE - MED/SURG/GYN SEMI*

## 2019-09-22 PROCEDURE — A9270 NON-COVERED ITEM OR SERVICE: HCPCS | Performed by: HOSPITALIST

## 2019-09-22 PROCEDURE — 99232 SBSQ HOSP IP/OBS MODERATE 35: CPT | Performed by: HOSPITALIST

## 2019-09-22 PROCEDURE — 700102 HCHG RX REV CODE 250 W/ 637 OVERRIDE(OP): Performed by: HOSPITALIST

## 2019-09-22 PROCEDURE — A9270 NON-COVERED ITEM OR SERVICE: HCPCS | Performed by: INTERNAL MEDICINE

## 2019-09-22 PROCEDURE — 83735 ASSAY OF MAGNESIUM: CPT

## 2019-09-22 RX ORDER — POTASSIUM CHLORIDE 20 MEQ/1
40 TABLET, EXTENDED RELEASE ORAL ONCE
Status: DISPENSED | OUTPATIENT
Start: 2019-09-22 | End: 2019-09-23

## 2019-09-22 RX ORDER — MAGNESIUM SULFATE HEPTAHYDRATE 40 MG/ML
2 INJECTION, SOLUTION INTRAVENOUS ONCE
Status: COMPLETED | OUTPATIENT
Start: 2019-09-22 | End: 2019-09-22

## 2019-09-22 RX ORDER — PREDNISONE 10 MG/1
10 TABLET ORAL DAILY
Status: DISCONTINUED | OUTPATIENT
Start: 2019-09-23 | End: 2019-09-22

## 2019-09-22 RX ORDER — POTASSIUM CHLORIDE 20 MEQ/1
40 TABLET, EXTENDED RELEASE ORAL ONCE
Status: COMPLETED | OUTPATIENT
Start: 2019-09-22 | End: 2019-09-22

## 2019-09-22 RX ADMIN — MAGNESIUM SULFATE IN WATER 2 G: 40 INJECTION, SOLUTION INTRAVENOUS at 07:45

## 2019-09-22 RX ADMIN — TRAMADOL HYDROCHLORIDE 50 MG: 50 TABLET ORAL at 17:20

## 2019-09-22 RX ADMIN — BUSPIRONE HYDROCHLORIDE 5 MG: 10 TABLET ORAL at 06:04

## 2019-09-22 RX ADMIN — ALPRAZOLAM 0.25 MG: 0.25 TABLET ORAL at 20:14

## 2019-09-22 RX ADMIN — GABAPENTIN 1600 MG: 400 CAPSULE ORAL at 17:20

## 2019-09-22 RX ADMIN — BUSPIRONE HYDROCHLORIDE 5 MG: 10 TABLET ORAL at 17:21

## 2019-09-22 RX ADMIN — PREDNISONE 20 MG: 20 TABLET ORAL at 06:05

## 2019-09-22 RX ADMIN — TAMSULOSIN HYDROCHLORIDE 0.4 MG: 0.4 CAPSULE ORAL at 07:45

## 2019-09-22 RX ADMIN — METHADONE HYDROCHLORIDE 20 MG: 10 TABLET ORAL at 12:27

## 2019-09-22 RX ADMIN — TIMOLOL MALEATE 1 DROP: 2.5 SOLUTION OPHTHALMIC at 17:20

## 2019-09-22 RX ADMIN — LEVOTHYROXINE SODIUM 125 MCG: 25 TABLET ORAL at 06:05

## 2019-09-22 RX ADMIN — METHADONE HYDROCHLORIDE 30 MG: 10 TABLET ORAL at 06:05

## 2019-09-22 RX ADMIN — POTASSIUM CHLORIDE 40 MEQ: 1500 TABLET, EXTENDED RELEASE ORAL at 07:45

## 2019-09-22 RX ADMIN — ENOXAPARIN SODIUM 40 MG: 100 INJECTION SUBCUTANEOUS at 06:03

## 2019-09-22 RX ADMIN — GABAPENTIN 2400 MG: 400 CAPSULE ORAL at 06:04

## 2019-09-22 RX ADMIN — BUSPIRONE HYDROCHLORIDE 5 MG: 10 TABLET ORAL at 12:27

## 2019-09-22 RX ADMIN — SIMVASTATIN 40 MG: 40 TABLET, FILM COATED ORAL at 20:14

## 2019-09-22 RX ADMIN — ALPRAZOLAM 0.25 MG: 0.25 TABLET ORAL at 13:16

## 2019-09-22 RX ADMIN — METHADONE HYDROCHLORIDE 20 MG: 10 TABLET ORAL at 17:20

## 2019-09-22 RX ADMIN — ASPIRIN 325 MG: 325 TABLET, FILM COATED ORAL at 06:05

## 2019-09-22 RX ADMIN — TIMOLOL MALEATE 1 DROP: 2.5 SOLUTION OPHTHALMIC at 06:04

## 2019-09-22 RX ADMIN — TRAMADOL HYDROCHLORIDE 50 MG: 50 TABLET ORAL at 06:04

## 2019-09-22 RX ADMIN — TEMAZEPAM 15 MG: 15 CAPSULE ORAL at 20:14

## 2019-09-22 ASSESSMENT — ENCOUNTER SYMPTOMS
CONSTIPATION: 0
BRUISES/BLEEDS EASILY: 0
EYE REDNESS: 0
NAUSEA: 0
SINUS PAIN: 0
CHILLS: 0
SPUTUM PRODUCTION: 1
PND: 0
EYE DISCHARGE: 0
FOCAL WEAKNESS: 0
VOMITING: 0
NECK PAIN: 0
CLAUDICATION: 0
SHORTNESS OF BREATH: 1
HEARTBURN: 0
MYALGIAS: 0
ABDOMINAL PAIN: 0
FEVER: 0
NERVOUS/ANXIOUS: 1
SENSORY CHANGE: 0
HEMOPTYSIS: 0
SPEECH CHANGE: 0
LOSS OF CONSCIOUSNESS: 0
FLANK PAIN: 0
EYE PAIN: 0
PALPITATIONS: 0
HALLUCINATIONS: 0
DIZZINESS: 0
COUGH: 0
COUGH: 1

## 2019-09-22 NOTE — PROGRESS NOTES
[facility] Medicine Daily Progress Note    Date of Service  9/22/2019    Chief Complaint  59 y.o. male admitted 9/10/2019 with history of chronic pain syndrome on high-dose narcotics admitted with respiratory failure secondary to pneumonia CTA was negative for PE required intubation on 9/14/2019    Hospital Course          Interval Problem Update    No overnight events  Tolerating diet  4L NC  SR  SBP 90's  Bell removed           Consultants/Specialty  ID  CC    Code Status  Full code    Disposition  SNF referral    Review of Systems  Review of Systems   Constitutional: Negative for chills and fever.   Respiratory: Positive for shortness of breath. Negative for cough.    Cardiovascular: Negative for chest pain and palpitations.   Gastrointestinal: Negative for abdominal pain, nausea and vomiting.   Musculoskeletal: Positive for joint pain.   Neurological: Negative for speech change and focal weakness.   Psychiatric/Behavioral: Negative for hallucinations. The patient is nervous/anxious.    All other systems reviewed and are negative.       Physical Exam  Temp:  [36.4 °C (97.5 °F)-36.7 °C (98 °F)] 36.7 °C (98 °F)  Pulse:  [61-78] 68  Resp:  [16] 16  BP: (93-99)/(52-63) 98/60  SpO2:  [91 %-96 %] 91 %    Physical Exam   Constitutional: He is oriented to person, place, and time. He appears well-developed and well-nourished.   HENT:   Head: Normocephalic and atraumatic.   Right Ear: External ear normal.   Left Ear: External ear normal.   Mouth/Throat: No oropharyngeal exudate.   Eyes: Conjunctivae are normal. Right eye exhibits no discharge. Left eye exhibits no discharge. No scleral icterus.   Neck: Neck supple. No JVD present. No tracheal deviation present.   Cardiovascular: Normal rate and regular rhythm. Exam reveals no gallop and no friction rub.   No murmur heard.  Pulmonary/Chest: Effort normal. No stridor. No respiratory distress. He has decreased breath sounds. He has no wheezes. He has no rales. He exhibits no  tenderness.   Abdominal: Soft. Bowel sounds are normal. He exhibits no distension. There is no tenderness. There is no rebound.   Musculoskeletal: He exhibits no edema or tenderness.   Neurological: He is alert and oriented to person, place, and time. No cranial nerve deficit. He exhibits normal muscle tone.   Skin: Skin is warm and dry. He is not diaphoretic. No cyanosis. Nails show no clubbing.   Psychiatric: He has a normal mood and affect. His behavior is normal.   Nursing note and vitals reviewed.      Fluids    Intake/Output Summary (Last 24 hours) at 9/22/2019 0802  Last data filed at 9/22/2019 0600  Gross per 24 hour   Intake 1770 ml   Output 2550 ml   Net -780 ml       Laboratory  Recent Labs     09/20/19 0625 09/21/19 0528 09/22/19  0411   WBC 21.7* 20.8* 21.1*   RBC 5.03 4.62* 4.41*   HEMOGLOBIN 13.7* 12.9* 12.5*   HEMATOCRIT 45.0 40.5* 38.7*   MCV 89.5 87.7 87.8   MCH 27.2 27.9 28.3   MCHC 30.4* 31.9* 32.3*   RDW 46.4 43.8 44.6   PLATELETCT 403 338 330   MPV 9.0 9.3 10.2     Recent Labs     09/20/19 0625 09/21/19 0528 09/22/19  0411   SODIUM 141 137 139   POTASSIUM 4.1 4.6 3.7   CHLORIDE 108 102 101   CO2 25 26 30   GLUCOSE 112* 84 88   BUN 40* 25* 20   CREATININE 0.95 0.80 1.02   CALCIUM 8.6 9.1 9.1                   Imaging  DX-CHEST-PORTABLE (1 VIEW)   Final Result         1.  Pulmonary edema and/or infiltrates are identified, which are stable since the prior exam.      DX-CHEST-PORTABLE (1 VIEW)   Final Result         1.  Pulmonary edema and/or infiltrates are identified, which are stable since the prior exam.         DX-CHEST-PORTABLE (1 VIEW)   Final Result         1.  Pulmonary edema and/or infiltrates are identified, which are stable since the prior exam.      DX-CHEST-PORTABLE (1 VIEW)   Final Result         1.  Pulmonary edema and/or infiltrates are identified, which are stable since the prior exam.      DX-CHEST-PORTABLE (1 VIEW)   Final Result         1.  No significant interval change.       CT-CHEST (THORAX) W/O   Final Result      1.  Minimal improvement of presumed bilateral pneumonitis compared to 9/9/2019.  Findings likely significantly improved from prior chest x-ray dated 9/12/2019.   2.  Moderate emphysema, likely paraseptal.               DX-CHEST-FOR LINE PLACEMENT Perform procedure in: Patient's Room (S134)   Final Result            1. A left central venous catheter with tip projects appropriately over the expected area of the superior vena cava.      DX-ABDOMEN FOR TUBE PLACEMENT   Final Result         Feeding tube with tip projecting over the expected area of the stomach fundus.      DX-CHEST-PORTABLE (1 VIEW)   Final Result         1. Interval intubation. No other significant interval change.      EC-ECHOCARDIOGRAM COMPLETE W/O CONT   Final Result      DX-CHEST-PORTABLE (1 VIEW)   Final Result      Bilateral airspace opacities, left greater than right worrisome for multifocal pneumonia.         DX-CHEST-2 VIEWS   Final Result         New airspace opacity in the left lower lobe, concerning for pneumonia.      OUTSIDE IMAGES-CT CHEST   Final Result           Assessment/Plan  * Hypotension  Assessment & Plan  Stable continue to monitor and encourage po intake    Acute respiratory failure with hypoxia, emphsema and pneumonitis (HCC)- (present on admission)  Assessment & Plan  Extubated on 9/16/2019    Continue oxygen  RT protocol  Taper prednisone    Pneumonitis- (present on admission)  Assessment & Plan  Clinically resolved completed antibiotic treatment     Elevated glucose  Assessment & Plan  Likely steroid-induced    Interstitial lung disease (HCC)  Assessment & Plan  Will need PFT's and pulm eval as outpt    Paraseptal emphysema (HCC)  Assessment & Plan  Bronchodilators as needed  Taper steroids      Pulmonary hypertension (HCC)  Assessment & Plan  RVSP 55    Monitor I/O    Hypomagnesemia  Assessment & Plan  Replete with Mg sulfate    Anxiety  Assessment & Plan  Continue buspar low  dose xanax    Urinary retention  Assessment & Plan  Continue flomax  Bell d/c ed    Dysphasia  Assessment & Plan  Aspiration precaution   Diet per SLP    Chronic pain- (present on admission)  Assessment & Plan  With narcotic dependence    Continue home dose of methadone and gabapentin    Hypothyroidism- (present on admission)  Assessment & Plan  Stable on levothyroxine    Plan of care reviewed with patient and discussed with nursing staff and critical care  Encourage mobilization      VTE prophylaxis: Lovenox

## 2019-09-22 NOTE — PROGRESS NOTES
Report called to BEBA Leija. All questions addressed and answered. Pt will be transported to [facility] with transport in .

## 2019-09-22 NOTE — PROGRESS NOTES
Critical Care Progress Note    Date of admission  9/10/2019    Chief Complaint  59 y.o. male admitted 9/10/2019 with shortness of breath.    Hospital Course    This gentleman was admitted to the hospital with pneumonia and respiratory failure.  He developed worsening respiratory failure and was transferred to the ICU and was intubated.      Interval Problem Update  Reviewed last 24 hour events:      0830 hours:    Taper pred to 10  Replete K and Mg      He is feeling quite a bit better today.  His dyspnea, cough and sputum production continue to improve.  He denies any hemoptysis or wheezing.  He has no abdominal pain, nausea or vomiting.  He has no angina, palpitations or syncope.      Review of Systems  Review of Systems   Constitutional: Negative for malaise/fatigue.   HENT: Negative for ear discharge, nosebleeds, sinus pain and tinnitus.    Eyes: Negative for pain, discharge and redness.   Respiratory: Positive for cough and sputum production. Negative for hemoptysis.    Cardiovascular: Negative for chest pain, claudication and PND.   Gastrointestinal: Negative for abdominal pain, constipation, heartburn and melena.   Genitourinary: Negative for dysuria, flank pain and urgency.   Musculoskeletal: Negative for myalgias and neck pain.   Skin: Negative for itching.   Neurological: Negative for dizziness, sensory change, speech change, focal weakness and loss of consciousness.   Endo/Heme/Allergies: Does not bruise/bleed easily.   Psychiatric/Behavioral: Negative for hallucinations and suicidal ideas. The patient is nervous/anxious.          Vital Signs for last 24 hours   Temp:  [36.2 °C (97.2 °F)-36.7 °C (98 °F)] 36.7 °C (98 °F)  Pulse:  [61-76] 76  Resp:  [16-18] 18  BP: (90-98)/(56-65) 97/65  SpO2:  [91 %-100 %] 92 %    Hemodynamic parameters for last 24 hours       Respiratory Information for the last 24 hours       Physical Exam   Physical Exam   Constitutional: He is oriented to person, place, and time. He  appears well-developed. No distress.   HENT:   Head: Normocephalic and atraumatic.   Right Ear: External ear normal.   Left Ear: External ear normal.   Nose: Nose normal.   Mouth/Throat: Oropharynx is clear and moist.   Eyes: Pupils are equal, round, and reactive to light. EOM are normal. Right eye exhibits no discharge. Left eye exhibits no discharge.   Neck: Normal range of motion. Neck supple. No JVD present. No tracheal deviation present.   Cardiovascular: Intact distal pulses. Exam reveals no gallop.   No murmur heard.  Sinus rhythm   Pulmonary/Chest: Effort normal. No respiratory distress. He has no wheezes. He has rales (Unchanged crackles at the bases).   Abdominal: Soft. Bowel sounds are normal. He exhibits no distension. There is no tenderness. There is no rebound.   Musculoskeletal: Normal range of motion. He exhibits no edema or tenderness.   No clubbing or cyanosis   Lymphadenopathy:     He has no cervical adenopathy.   Neurological: He is alert and oriented to person, place, and time. No cranial nerve deficit. Coordination normal.   No focal weakness   Skin: Skin is warm and dry. No rash noted. He is not diaphoretic. No erythema.       Medications  Current Facility-Administered Medications   Medication Dose Route Frequency Provider Last Rate Last Dose   • potassium chloride SA (Kdur) tablet 40 mEq  40 mEq Oral Once Rigoberto Galvan M.D.       • busPIRone (BUSPAR) tablet 5 mg  5 mg Oral TID Rigoberto Galvan M.D.   5 mg at 09/22/19 1721   • ALPRAZolam (XANAX) tablet 0.25 mg  0.25 mg Oral Q6HRS PRN Rigoberto Galvan M.D.   0.25 mg at 09/22/19 1316   • tamsulosin (FLOMAX) capsule 0.4 mg  0.4 mg Oral AFTER BREAKFAST Rigoberto Galvan M.D.   0.4 mg at 09/22/19 0745   • timolol (TIMOPTIC) 0.25 % ophthalmic solution 1 Drop  1 Drop Both Eyes BID Rigoberto Galvan M.D.   1 Drop at 09/22/19 1720   • aspirin (ASA) tablet 325 mg  325 mg Oral DAILY Rigoberto Galvan M.D.   325 mg at 09/22/19 0605    • gabapentin (NEURONTIN) capsule 1,600 mg  1,600 mg Oral Q EVENING Rigoberto Galvan M.D.   1,600 mg at 09/22/19 1720   • gabapentin (NEURONTIN) capsule 2,400 mg  2,400 mg Oral QAM Rigoberto Galvan M.D.   2,400 mg at 09/22/19 0604   • levothyroxine (SYNTHROID) tablet 125 mcg  125 mcg Oral QAM AC Rigoberto Galvan M.D.   125 mcg at 09/22/19 0605   • methadone (DOLOPHINE) tablet 30 mg  30 mg Oral QAM Rigoberto Galvan M.D.   30 mg at 09/22/19 0605    And   • methadone (DOLOPHINE) tablet 20 mg  20 mg Oral DAILY AT NOON Rigoberto Galvan M.D.   20 mg at 09/22/19 1227    And   • methadone (DOLOPHINE) tablet 20 mg  20 mg Oral Q EVENING Rigoberto Galvan M.D.   20 mg at 09/22/19 1720   • senna-docusate (PERICOLACE or SENOKOT S) 8.6-50 MG per tablet 2 Tab  2 Tab Oral BID Rigoberto Galvan M.D.   Stopped at 09/18/19 1711    And   • polyethylene glycol/lytes (MIRALAX) PACKET 1 Packet  1 Packet Oral QDAY PRN Rigoberto Galvan M.D.        And   • magnesium hydroxide (MILK OF MAGNESIA) suspension 30 mL  30 mL Oral QDAY PRN Rigoberto Galvan M.D.        And   • bisacodyl (DULCOLAX) suppository 10 mg  10 mg Rectal QDAY PRN Rigoberto Galvan M.D.       • simvastatin (ZOCOR) tablet 40 mg  40 mg Oral Nightly Rigoberto Galvan M.D.   40 mg at 09/21/19 2113   • temazepam (RESTORIL) capsule 15 mg  15 mg Oral QHS Rigoberto Galvan M.D.   15 mg at 09/21/19 2113   • tramadol (ULTRAM) 50 MG tablet 50 mg  50 mg Oral BID Rigoberto Galvan M.D.   50 mg at 09/22/19 1720   • acetaminophen (TYLENOL) tablet 650 mg  650 mg Oral Q6HRS PRN Rigoberto Galvan M.D.       • oxyCODONE immediate-release (ROXICODONE) tablet 5-10 mg  5-10 mg Oral Q4HRS PRN Rigoberto Galvan M.D.   5 mg at 09/20/19 0305   • Respiratory Care per Protocol   Nebulization Continuous RT Marck Montero Jr., D.O.       • ipratropium-albuterol (DUONEB) nebulizer solution  3 mL Nebulization Q2HRS PRN (RT) Marck Montero Jr. D.O.        • MD Alert...ICU Electrolyte Replacement per Pharmacy   Other PHARMACY TO DOSE Marck Montero Jr., D.O.       • enoxaparin (LOVENOX) inj 40 mg  40 mg Subcutaneous DAILY Marck Montero Jr., D.O.   40 mg at 09/22/19 0603   • sodium chloride (OCEAN) 0.65 % nasal spray 2 Spray  2 Spray Nasal Q2HRS PRN Mary Silva M.D.   2 [place] at 09/17/19 0913       Fluids    Intake/Output Summary (Last 24 hours) at 9/22/2019 1848  Last data filed at 9/22/2019 1800  Gross per 24 hour   Intake 1280 ml   Output 950 ml   Net 330 ml       Laboratory          Recent Labs     09/20/19  0625 09/21/19  0528 09/22/19  0411   SODIUM 141 137 139   POTASSIUM 4.1 4.6 3.7   CHLORIDE 108 102 101   CO2 25 26 30   BUN 40* 25* 20   CREATININE 0.95 0.80 1.02   MAGNESIUM 2.3 2.0 1.9   PHOSPHORUS 4.0 2.8 2.5   CALCIUM 8.6 9.1 9.1     Recent Labs     09/20/19  0625 09/21/19  0528 09/22/19  0411   GLUCOSE 112* 84 88     Recent Labs     09/20/19  0625 09/21/19  0528 09/22/19  0411   WBC 21.7* 20.8* 21.1*   NEUTSPOLYS 63.40 64.80 64.80   LYMPHOCYTES 19.90* 20.90* 20.30*   MONOCYTES 9.20 8.20 8.70   EOSINOPHILS 3.80 3.70 3.20   BASOPHILS 0.30 0.20 0.20     Recent Labs     09/20/19  0625 09/21/19  0528 09/22/19  0411   RBC 5.03 4.62* 4.41*   HEMOGLOBIN 13.7* 12.9* 12.5*   HEMATOCRIT 45.0 40.5* 38.7*   PLATELETCT 403 338 330       Imaging  None    Assessment/Plan  * Hypotension  Assessment & Plan  I suspect that this was due to intravascular volume depletion  Resolved with IV fluids    Acute respiratory failure with hypoxia, emphsema and pneumonitis (HCC)- (present on admission)  Assessment & Plan  Intubated 9/14-9/16  Improved  Continue oxygen    Pneumonitis- (present on admission)  Assessment & Plan  Completed 5 days of doxycycline on 9/15  Completed 5 days of Rocephin on 9/18  Usual jonny on sputum culture  Observe off antibiotics    Interstitial lung disease (HCC)  Assessment & Plan  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)  Taper prednisone, 10 mg daily    Paraseptal emphysema (HCC)  Assessment & Plan  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)  Taper prednisone, 10 mg daily  Continue bronchodilators    QT prolongation- (present on admission)  Assessment & Plan  Monitor QTC  Keep magnesium greater than 2    Pulmonary hypertension (HCC)  Assessment & Plan  RVSP 55 mmHg  Maintain euvolemia    Hypomagnesemia  Assessment & Plan  Replete magnesium    Urinary retention  Assessment & Plan  Continue Flomax, 0.4 mg daily    Hypokalemia  Assessment & Plan  Replete potassium    Chronic pain- (present on admission)  Assessment & Plan  Continue home methadone program  Continue Ultram, 50 mg twice daily  Continue oxycodone for breakthrough pain  Continue home gabapentin regimen    Hypothyroidism- (present on admission)  Assessment & Plan  Continue levothyroxine, 125 mcg daily       VTE:  Lovenox  Ulcer: Not Indicated  Lines: Bell Catheter  Ongoing indication addressed    I have performed a physical exam and reviewed and updated ROS and Plan today (9/22/2019). In review of yesterday's note (9/21/2019), there are no changes except as documented above.     OK to transfer out of ICU.  [facility] will follow.    Discussed patient condition and risk of morbidity and/or mortality with Hospitalist, RN, RT, Pharmacy, Charge nurse / hot rounds and QA team .    Eulogio Lopes MD  Pulmonary and Critical Care Medicine

## 2019-09-22 NOTE — CARE PLAN
Problem: Communication  Goal: The ability to communicate needs accurately and effectively will improve  Outcome: PROGRESSING AS EXPECTED  Intervention: Educate patient and significant other/support system about the plan of care, procedures, treatments, medications and allow for questions  Note:   Patient's white board is updated. Patient is updated on plan of care. All questions were answered.       Problem: Knowledge Deficit  Goal: Knowledge of disease process/condition, treatment plan, diagnostic tests, and medications will improve  Outcome: PROGRESSING AS EXPECTED  Intervention: Explain information regarding disease process/condition, treatment plan, diagnostic tests, and medications and document in education  Note:   Pt educated about disease process and plan of care for the day.  Pt verbalized understanding.

## 2019-09-23 PROBLEM — E83.42 HYPOMAGNESEMIA: Status: RESOLVED | Noted: 2019-09-22 | Resolved: 2019-09-23

## 2019-09-23 LAB
A FUMIGATUS1 AB SER QL ID: NORMAL
A FUMIGATUS6 AB SER QL ID: NORMAL
A PULLULANS AB SER QL ID: NORMAL
PIGEON SERUM AB QL ID: NORMAL
S RECTIVIRGULA AB SER QL ID: NORMAL
T VULGARIS AB SER QL ID: NORMAL

## 2019-09-23 PROCEDURE — 97535 SELF CARE MNGMENT TRAINING: CPT

## 2019-09-23 PROCEDURE — 99232 SBSQ HOSP IP/OBS MODERATE 35: CPT | Performed by: HOSPITALIST

## 2019-09-23 PROCEDURE — 99232 SBSQ HOSP IP/OBS MODERATE 35: CPT | Performed by: INTERNAL MEDICINE

## 2019-09-23 PROCEDURE — 700111 HCHG RX REV CODE 636 W/ 250 OVERRIDE (IP): Performed by: INTERNAL MEDICINE

## 2019-09-23 PROCEDURE — A9270 NON-COVERED ITEM OR SERVICE: HCPCS | Performed by: HOSPITALIST

## 2019-09-23 PROCEDURE — 700102 HCHG RX REV CODE 250 W/ 637 OVERRIDE(OP): Performed by: HOSPITALIST

## 2019-09-23 PROCEDURE — 700102 HCHG RX REV CODE 250 W/ 637 OVERRIDE(OP): Performed by: STUDENT IN AN ORGANIZED HEALTH CARE EDUCATION/TRAINING PROGRAM

## 2019-09-23 PROCEDURE — A9270 NON-COVERED ITEM OR SERVICE: HCPCS | Performed by: STUDENT IN AN ORGANIZED HEALTH CARE EDUCATION/TRAINING PROGRAM

## 2019-09-23 PROCEDURE — 92526 ORAL FUNCTION THERAPY: CPT

## 2019-09-23 PROCEDURE — 770006 HCHG ROOM/CARE - MED/SURG/GYN SEMI*

## 2019-09-23 PROCEDURE — 700105 HCHG RX REV CODE 258: Performed by: HOSPITALIST

## 2019-09-23 RX ORDER — SODIUM CHLORIDE 9 MG/ML
500 INJECTION, SOLUTION INTRAVENOUS ONCE
Status: COMPLETED | OUTPATIENT
Start: 2019-09-23 | End: 2019-09-23

## 2019-09-23 RX ORDER — BUDESONIDE AND FORMOTEROL FUMARATE DIHYDRATE 80; 4.5 UG/1; UG/1
2 AEROSOL RESPIRATORY (INHALATION)
Status: DISCONTINUED | OUTPATIENT
Start: 2019-09-23 | End: 2019-09-30 | Stop reason: HOSPADM

## 2019-09-23 RX ORDER — MIDODRINE HYDROCHLORIDE 5 MG/1
5 TABLET ORAL
Status: DISCONTINUED | OUTPATIENT
Start: 2019-09-23 | End: 2019-09-24

## 2019-09-23 RX ADMIN — TIMOLOL MALEATE 1 DROP: 2.5 SOLUTION OPHTHALMIC at 05:21

## 2019-09-23 RX ADMIN — SIMVASTATIN 40 MG: 40 TABLET, FILM COATED ORAL at 21:54

## 2019-09-23 RX ADMIN — TAMSULOSIN HYDROCHLORIDE 0.4 MG: 0.4 CAPSULE ORAL at 08:04

## 2019-09-23 RX ADMIN — BUSPIRONE HYDROCHLORIDE 5 MG: 10 TABLET ORAL at 05:22

## 2019-09-23 RX ADMIN — OXYCODONE HYDROCHLORIDE 10 MG: 5 TABLET ORAL at 21:53

## 2019-09-23 RX ADMIN — MIDODRINE HYDROCHLORIDE 5 MG: 5 TABLET ORAL at 17:09

## 2019-09-23 RX ADMIN — ALPRAZOLAM 0.25 MG: 0.25 TABLET ORAL at 05:27

## 2019-09-23 RX ADMIN — LEVOTHYROXINE SODIUM 125 MCG: 25 TABLET ORAL at 06:35

## 2019-09-23 RX ADMIN — METHADONE HYDROCHLORIDE 30 MG: 10 TABLET ORAL at 05:22

## 2019-09-23 RX ADMIN — BUDESONIDE AND FORMOTEROL FUMARATE DIHYDRATE 2 PUFF: 80; 4.5 AEROSOL RESPIRATORY (INHALATION) at 17:09

## 2019-09-23 RX ADMIN — SODIUM CHLORIDE 500 ML: 9 INJECTION, SOLUTION INTRAVENOUS at 10:20

## 2019-09-23 RX ADMIN — ASPIRIN 325 MG: 325 TABLET, FILM COATED ORAL at 05:23

## 2019-09-23 RX ADMIN — ALPRAZOLAM 0.25 MG: 0.25 TABLET ORAL at 18:01

## 2019-09-23 RX ADMIN — GABAPENTIN 2400 MG: 400 CAPSULE ORAL at 05:23

## 2019-09-23 RX ADMIN — BUSPIRONE HYDROCHLORIDE 5 MG: 10 TABLET ORAL at 12:04

## 2019-09-23 RX ADMIN — METHADONE HYDROCHLORIDE 20 MG: 10 TABLET ORAL at 17:08

## 2019-09-23 RX ADMIN — TIMOLOL MALEATE 1 DROP: 2.5 SOLUTION OPHTHALMIC at 17:08

## 2019-09-23 RX ADMIN — METHADONE HYDROCHLORIDE 20 MG: 10 TABLET ORAL at 12:31

## 2019-09-23 RX ADMIN — ENOXAPARIN SODIUM 40 MG: 100 INJECTION SUBCUTANEOUS at 05:28

## 2019-09-23 RX ADMIN — GABAPENTIN 1600 MG: 400 CAPSULE ORAL at 17:12

## 2019-09-23 RX ADMIN — TEMAZEPAM 15 MG: 15 CAPSULE ORAL at 21:54

## 2019-09-23 ASSESSMENT — ENCOUNTER SYMPTOMS
ABDOMINAL PAIN: 0
BACK PAIN: 1
LOSS OF CONSCIOUSNESS: 0
CLAUDICATION: 0
EYE DISCHARGE: 0
NECK PAIN: 0
EYE REDNESS: 0
HALLUCINATIONS: 0
SENSORY CHANGE: 0
SINUS PAIN: 0
WHEEZING: 0
HEARTBURN: 0
HEMOPTYSIS: 0
PALPITATIONS: 0
NERVOUS/ANXIOUS: 0
MYALGIAS: 0
SPEECH CHANGE: 0
SPUTUM PRODUCTION: 1
PND: 0
CHILLS: 0
FLANK PAIN: 0
BRUISES/BLEEDS EASILY: 0
SHORTNESS OF BREATH: 1
DIZZINESS: 0
CONSTIPATION: 0
COUGH: 1
FEVER: 0
NERVOUS/ANXIOUS: 1
FOCAL WEAKNESS: 0
EYE PAIN: 0

## 2019-09-23 ASSESSMENT — COGNITIVE AND FUNCTIONAL STATUS - GENERAL
TOILETING: A LITTLE
HELP NEEDED FOR BATHING: A LITTLE
SUGGESTED CMS G CODE MODIFIER DAILY ACTIVITY: CJ
DRESSING REGULAR LOWER BODY CLOTHING: A LITTLE
DAILY ACTIVITIY SCORE: 21

## 2019-09-23 NOTE — CARE PLAN
Problem: Safety  Goal: Will remain free from falls  Outcome: PROGRESSING AS EXPECTED  Intervention: Implement fall precautions  Flowsheets (Taken 9/23/2019 0801)  Environmental Precautions: Treaded Slipper Socks on Patient;Personal Belongings, Wastebasket, Call Bell etc. in Easy Reach;Transferred to Stronger Side;Report Given to Other Health Care Providers Regarding Fall Risk;Communication Sign for Patients & Families;Bed in Low Position;Mobility Assessed & Appropriate Sign Placed  Note:   Safety precautions in place. Call light within reach. Bed is low and in locked position. Hourly rounding in place.       Problem: Discharge Barriers/Planning  Goal: Patient's continuum of care needs will be met  Outcome: PROGRESSING AS EXPECTED  Intervention: Assess potential discharge barriers on admission and throughout hospital stay  Flowsheets (Taken 9/23/2019 0813)  Does Admitting Nurse Feel This Could be a Complex Discharge?: No  Note:   Pt is unsure about the discharge plans. He is not sure if he is either going home or SNF. Will follow up with MD and TANJA.

## 2019-09-23 NOTE — CARE PLAN
Problem: Communication  Goal: The ability to communicate needs accurately and effectively will improve  Outcome: PROGRESSING AS EXPECTED     Problem: Safety  Goal: Will remain free from injury  Outcome: PROGRESSING AS EXPECTED  Note:   Free of falls.  SBA with walked to bathroom.  Calls for assistance appropriately.    Goal: Will remain free from falls  Outcome: PROGRESSING AS EXPECTED     Problem: Infection  Goal: Will remain free from infection  Outcome: PROGRESSING AS EXPECTED     Problem: Venous Thromboembolism (VTW)/Deep Vein Thrombosis (DVT) Prevention:  Goal: Patient will participate in Venous Thrombosis (VTE)/Deep Vein Thrombosis (DVT)Prevention Measures  Outcome: PROGRESSING AS EXPECTED     Problem: Bowel/Gastric:  Goal: Normal bowel function is maintained or improved  Outcome: PROGRESSING AS EXPECTED  Goal: Will not experience complications related to bowel motility  Outcome: PROGRESSING AS EXPECTED     Problem: Knowledge Deficit  Goal: Knowledge of disease process/condition, treatment plan, diagnostic tests, and medications will improve  Outcome: PROGRESSING AS EXPECTED  Goal: Knowledge of the prescribed therapeutic regimen will improve  Outcome: PROGRESSING AS EXPECTED     Problem: Discharge Barriers/Planning  Goal: Patient's continuum of care needs will be met  Outcome: PROGRESSING AS EXPECTED     Problem: Pain Management  Goal: Pain level will decrease to patient's comfort goal  Outcome: PROGRESSING AS EXPECTED     Problem: Fluid Volume:  Goal: Will maintain balanced intake and output  Outcome: PROGRESSING AS EXPECTED     Problem: Respiratory:  Goal: Respiratory status will improve  Outcome: PROGRESSING AS EXPECTED  Note:   Patients lungs clear to auscultation.  Using IS 5-10x an hour.  Demonstrated proper use of IS     Problem: Psychosocial Needs:  Goal: Level of anxiety will decrease  Outcome: PROGRESSING AS EXPECTED     Problem: Urinary Elimination:  Goal: Ability to reestablish a normal urinary  elimination pattern will improve  Outcome: PROGRESSING AS EXPECTED     Problem: Skin Integrity  Goal: Risk for impaired skin integrity will decrease  Outcome: PROGRESSING AS EXPECTED     Problem: Mobility  Goal: Risk for activity intolerance will decrease  Outcome: PROGRESSING AS EXPECTED     Problem: Safety - Medical Restraint  Goal: Remains free of injury from restraints (Restraint for Interference with Medical Device)  Description  INTERVENTIONS:  1. Determine that other, less restrictive measures have been tried or would not be effective before applying the restraint  2. Evaluate the patient's condition at the time of restraint application  3. Inform patient/family regarding the reason for restraint  4. Q2H: Monitor safety, psychosocial status, comfort, nutrition and hydration  Outcome: MET  Goal: Free from restraint(s) (Restraint for Interference with Medical Device)  Description  INTERVENTIONS:  1. ONCE/SHIFT or MINIMUM Q12H: Assess and document the continuing need for restraints  2. Q24H: Continued use of restraint requires LIP to perform face to face examination and written order  3. Identify and implement measures to help patient regain control  Outcome: MET

## 2019-09-23 NOTE — THERAPY
"Speech Language Therapy dysphagia treatment completed.   Functional Status: Patient currently on Dys3/thin liquid diet and per RN, patient appears to be tolerating diet without difficulty. Patient awake, alert, and pleasant. Patient endorsed same and reported mechanical soft diet is close to baseline, as \"pretzels and chips poke my mouth too much.\" Patient was noted to have peanuts at bedside and denied any s/sx of aspiration when consuming them. Patient consumed PO trials of mixed consistencies, thin liquids via cup sip and straw, and peanuts. Patient consumed all PO trials with no s/sx of aspiration. Minimal verbal cues were required to reduce bite/sip size and rate. Laryngeal elevation palpated as complete and initiation of swallow trigger was timely. At this time, patient appears at the level to continue Dys3/thin liquid diet, as patient reports this is his preference. RN aware. SLP is following likely 1-2 more times for diet tolerance.     Recommendations:  At this time, patient appears at the level to continue Dys3/thin liquid diet, as patient reports this is his preference. SLP is following likely 1-2 more times for diet tolerance.   Plan of Care: Will benefit from Speech Therapy 3 times per week during acute stay   Post-Acute Therapy: Anticipate that the patient will have no further speech therapy needs after discharge from the hospital.    See \"Rehab Therapy-Acute\" Patient Summary Report for complete documentation.     "

## 2019-09-23 NOTE — PROGRESS NOTES
2 RN skin check complete with Liss YODER.   Devices in place O2 tubing,  monitor.  Skin assessed under devices , no breakdown.  Confirmed pressure ulcers found on NA.  New potential pressure ulcers noted on NA.   The following interventions in place patient up standby and moves self in bed, moisturizer available.

## 2019-09-23 NOTE — THERAPY
"Occupational Therapy Treatment completed with focus on ADLs, ADL transfers and patient education.  Functional Status:  SPV ADLs and ADL transfers, poor O2 compensation during standing grooming ADLs  Plan of Care: Patient with no further skilled OT needs in the acute care setting at this time  Discharge Recommendations:  Equipment No Equipment Needed. Post-acute therapy Patient has achieved highest practical level of function in an acute care setting and will not be actively followed by acute occupational therapy services. Patient would still benefit from post-acute placement for additional rehabilitation services to focus on IADL and community reintegration that is not available in the acute care setting.       See \"Rehab Therapy-Acute\" Patient Summary Report for complete documentation.     Pt seen for OT tx, pt demonstrated improved independence with basic ADLs and increased activity tolerance from initial evaluation. Pt demonstrated LB dressing, toileting, and standing grooming with SPV. Pt with poor O2 compensation, sats dropped to 87% during standing grooming, cues for pursed lip breathing increased saturations to low 90s but would not sustain, pt returned to sitting up in bed. Pt primarily limited by poor activity tolerance and increased WOB with functional activities which will likely impact his independence with higher level IADLs and community mobility. May benefit from SW consult for [facility] on additional community resources. Pending progress with respiratory status pt may improve to be safe for d/c home however will likely need assist for higher level IADL tasks. Patient will not be actively followed for occupational therapy services at this time, however may be seen if requested by physician for 1 more visit within 30 days to address any discharge or equipment needs.       "

## 2019-09-23 NOTE — CARE PLAN
Problem: Nutritional:  Goal: Achieve adequate nutritional intake  Description  Patient will consume 50% of meals   Outcome: MET  PO >50% for most recent five documented meals.

## 2019-09-23 NOTE — PROGRESS NOTES
Pt SBP still < 90. Dr. Israel Galvan notified. MD to order Midodrine and instructions received to not give methadone if SBP < 90.

## 2019-09-23 NOTE — CARE PLAN
Problem: Infection  Goal: Will remain free from infection  Outcome: PROGRESSING AS EXPECTED  Note:   Standard precautions in place     Problem: Respiratory:  Goal: Respiratory status will improve  Outcome: PROGRESSING AS EXPECTED  Note:   Pt is on 4L O2 NC and 6L O2 when ambulating. Encourage IS ten times an hour.

## 2019-09-24 LAB
ANION GAP SERPL CALC-SCNC: 5 MMOL/L (ref 0–11.9)
BASOPHILS # BLD AUTO: 0.3 % (ref 0–1.8)
BASOPHILS # BLD: 0.07 K/UL (ref 0–0.12)
BUN SERPL-MCNC: 19 MG/DL (ref 8–22)
CALCIUM SERPL-MCNC: 9.3 MG/DL (ref 8.5–10.5)
CHLORIDE SERPL-SCNC: 98 MMOL/L (ref 96–112)
CO2 SERPL-SCNC: 34 MMOL/L (ref 20–33)
CREAT SERPL-MCNC: 1.12 MG/DL (ref 0.5–1.4)
EOSINOPHIL # BLD AUTO: 0.63 K/UL (ref 0–0.51)
EOSINOPHIL NFR BLD: 3 % (ref 0–6.9)
ERYTHROCYTE [DISTWIDTH] IN BLOOD BY AUTOMATED COUNT: 46 FL (ref 35.9–50)
GLUCOSE SERPL-MCNC: 87 MG/DL (ref 65–99)
HCT VFR BLD AUTO: 39.4 % (ref 42–52)
HGB BLD-MCNC: 12.5 G/DL (ref 14–18)
IMM GRANULOCYTES # BLD AUTO: 0.69 K/UL (ref 0–0.11)
IMM GRANULOCYTES NFR BLD AUTO: 3.2 % (ref 0–0.9)
LYMPHOCYTES # BLD AUTO: 3.94 K/UL (ref 1–4.8)
LYMPHOCYTES NFR BLD: 18.5 % (ref 22–41)
MAGNESIUM SERPL-MCNC: 2 MG/DL (ref 1.5–2.5)
MCH RBC QN AUTO: 28.1 PG (ref 27–33)
MCHC RBC AUTO-ENTMCNC: 31.7 G/DL (ref 33.7–35.3)
MCV RBC AUTO: 88.5 FL (ref 81.4–97.8)
MONOCYTES # BLD AUTO: 1.62 K/UL (ref 0–0.85)
MONOCYTES NFR BLD AUTO: 7.6 % (ref 0–13.4)
NEUTROPHILS # BLD AUTO: 14.29 K/UL (ref 1.82–7.42)
NEUTROPHILS NFR BLD: 67.4 % (ref 44–72)
NRBC # BLD AUTO: 0 K/UL
NRBC BLD-RTO: 0 /100 WBC
PLATELET # BLD AUTO: 264 K/UL (ref 164–446)
PMV BLD AUTO: 10.2 FL (ref 9–12.9)
POTASSIUM SERPL-SCNC: 4.6 MMOL/L (ref 3.6–5.5)
RBC # BLD AUTO: 4.45 M/UL (ref 4.7–6.1)
SODIUM SERPL-SCNC: 137 MMOL/L (ref 135–145)
WBC # BLD AUTO: 21.2 K/UL (ref 4.8–10.8)

## 2019-09-24 PROCEDURE — 85025 COMPLETE CBC W/AUTO DIFF WBC: CPT

## 2019-09-24 PROCEDURE — 97116 GAIT TRAINING THERAPY: CPT

## 2019-09-24 PROCEDURE — 770006 HCHG ROOM/CARE - MED/SURG/GYN SEMI*

## 2019-09-24 PROCEDURE — 700102 HCHG RX REV CODE 250 W/ 637 OVERRIDE(OP): Performed by: HOSPITALIST

## 2019-09-24 PROCEDURE — 700111 HCHG RX REV CODE 636 W/ 250 OVERRIDE (IP): Performed by: INTERNAL MEDICINE

## 2019-09-24 PROCEDURE — 83735 ASSAY OF MAGNESIUM: CPT

## 2019-09-24 PROCEDURE — 99233 SBSQ HOSP IP/OBS HIGH 50: CPT | Performed by: INTERNAL MEDICINE

## 2019-09-24 PROCEDURE — 36415 COLL VENOUS BLD VENIPUNCTURE: CPT

## 2019-09-24 PROCEDURE — 80048 BASIC METABOLIC PNL TOTAL CA: CPT

## 2019-09-24 PROCEDURE — A9270 NON-COVERED ITEM OR SERVICE: HCPCS | Performed by: HOSPITALIST

## 2019-09-24 PROCEDURE — 99232 SBSQ HOSP IP/OBS MODERATE 35: CPT | Performed by: INTERNAL MEDICINE

## 2019-09-24 RX ADMIN — METHADONE HYDROCHLORIDE 30 MG: 10 TABLET ORAL at 06:12

## 2019-09-24 RX ADMIN — TEMAZEPAM 15 MG: 15 CAPSULE ORAL at 21:40

## 2019-09-24 RX ADMIN — ALPRAZOLAM 0.25 MG: 0.25 TABLET ORAL at 10:12

## 2019-09-24 RX ADMIN — TIMOLOL MALEATE 1 DROP: 2.5 SOLUTION OPHTHALMIC at 06:14

## 2019-09-24 RX ADMIN — METHADONE HYDROCHLORIDE 20 MG: 10 TABLET ORAL at 17:34

## 2019-09-24 RX ADMIN — TIMOLOL MALEATE 1 DROP: 2.5 SOLUTION OPHTHALMIC at 17:38

## 2019-09-24 RX ADMIN — ALPRAZOLAM 0.25 MG: 0.25 TABLET ORAL at 17:35

## 2019-09-24 RX ADMIN — METHADONE HYDROCHLORIDE 20 MG: 10 TABLET ORAL at 13:04

## 2019-09-24 RX ADMIN — ENOXAPARIN SODIUM 40 MG: 100 INJECTION SUBCUTANEOUS at 06:13

## 2019-09-24 RX ADMIN — TAMSULOSIN HYDROCHLORIDE 0.4 MG: 0.4 CAPSULE ORAL at 09:48

## 2019-09-24 RX ADMIN — BUDESONIDE AND FORMOTEROL FUMARATE DIHYDRATE 2 PUFF: 80; 4.5 AEROSOL RESPIRATORY (INHALATION) at 09:48

## 2019-09-24 RX ADMIN — LEVOTHYROXINE SODIUM 125 MCG: 25 TABLET ORAL at 06:12

## 2019-09-24 RX ADMIN — GABAPENTIN 2400 MG: 400 CAPSULE ORAL at 06:13

## 2019-09-24 RX ADMIN — GABAPENTIN 1600 MG: 400 CAPSULE ORAL at 17:35

## 2019-09-24 RX ADMIN — ASPIRIN 325 MG: 325 TABLET, FILM COATED ORAL at 06:12

## 2019-09-24 RX ADMIN — BUDESONIDE AND FORMOTEROL FUMARATE DIHYDRATE 2 PUFF: 80; 4.5 AEROSOL RESPIRATORY (INHALATION) at 22:07

## 2019-09-24 RX ADMIN — SIMVASTATIN 40 MG: 40 TABLET, FILM COATED ORAL at 21:40

## 2019-09-24 ASSESSMENT — ENCOUNTER SYMPTOMS
HALLUCINATIONS: 0
MYALGIAS: 0
SPUTUM PRODUCTION: 0
CHILLS: 0
EYE REDNESS: 0
PND: 0
NAUSEA: 0
ABDOMINAL PAIN: 0
FLANK PAIN: 0
SINUS PAIN: 0
CLAUDICATION: 0
SPEECH CHANGE: 0
SHORTNESS OF BREATH: 1
DIZZINESS: 0
EYE PAIN: 0
WHEEZING: 0
LOSS OF CONSCIOUSNESS: 0
FOCAL WEAKNESS: 0
DIARRHEA: 0
COUGH: 1
FEVER: 0
HEMOPTYSIS: 0
VOMITING: 0
PALPITATIONS: 0
NERVOUS/ANXIOUS: 0
HEARTBURN: 0
SPUTUM PRODUCTION: 1
BRUISES/BLEEDS EASILY: 0
CONSTIPATION: 0
SENSORY CHANGE: 0
EYE DISCHARGE: 0
NECK PAIN: 0

## 2019-09-24 ASSESSMENT — COGNITIVE AND FUNCTIONAL STATUS - GENERAL
CLIMB 3 TO 5 STEPS WITH RAILING: A LITTLE
STANDING UP FROM CHAIR USING ARMS: A LITTLE
WALKING IN HOSPITAL ROOM: A LITTLE
MOBILITY SCORE: 20
SUGGESTED CMS G CODE MODIFIER MOBILITY: CJ
MOVING FROM LYING ON BACK TO SITTING ON SIDE OF FLAT BED: A LITTLE

## 2019-09-24 ASSESSMENT — GAIT ASSESSMENTS
DEVIATION: BRADYKINETIC
ASSISTIVE DEVICE: FRONT WHEEL WALKER
DISTANCE (FEET): 200
GAIT LEVEL OF ASSIST: SUPERVISED

## 2019-09-24 NOTE — PROGRESS NOTES
Pulmonary Progress Note    Date of admission  9/10/2019    Chief Complaint  59 y.o. male admitted 9/10/2019 with shortness of breath.    Hospital Course    This gentleman was admitted to the hospital with pneumonia and respiratory failure.  He developed worsening respiratory failure and was transferred to the ICU and was intubated    9/23Patient reports symptom improvement, notes he is able to ambulate further than before. Patient reports history of GERD    Interval Problem Update  Reviewed last 24 hour events:    9/24 - Patient reports improvement in his symptoms. He reports feeling much improved and was able to ambulate and move to different room. Almost at baseline    Review of Systems  Review of Systems   Constitutional: Negative for malaise/fatigue.   HENT: Negative for ear discharge, nosebleeds, sinus pain and tinnitus.    Eyes: Negative for pain, discharge and redness.   Respiratory: Positive for cough, sputum production and shortness of breath. Negative for hemoptysis and wheezing.         Improving   Cardiovascular: Negative for chest pain, claudication and PND.   Gastrointestinal: Negative for abdominal pain, constipation, heartburn and melena.   Genitourinary: Negative for dysuria, flank pain and urgency.   Musculoskeletal: Negative for myalgias and neck pain.   Skin: Negative for itching.   Neurological: Negative for dizziness, sensory change, speech change, focal weakness and loss of consciousness.   Endo/Heme/Allergies: Does not bruise/bleed easily.   Psychiatric/Behavioral: Negative for hallucinations and suicidal ideas. The patient is not nervous/anxious.          Vital Signs for last 24 hours   Temp:  [36.1 °C (96.9 °F)-36.7 °C (98.1 °F)] 36.7 °C (98.1 °F)  Pulse:  [66-80] 80  Resp:  [16-18] 17  BP: ()/(58-75) 102/66  SpO2:  [92 %-97 %] 94 %    Hemodynamic parameters for last 24 hours       Respiratory Information for the last 24 hours       Physical Exam   Physical Exam   Constitutional: He  is oriented to person, place, and time. He appears well-developed. No distress.   HENT:   Head: Normocephalic and atraumatic.   Right Ear: External ear normal.   Left Ear: External ear normal.   Nose: Nose normal.   Mouth/Throat: Oropharynx is clear and moist.   Eyes: Pupils are equal, round, and reactive to light. EOM are normal. Right eye exhibits no discharge. Left eye exhibits no discharge.   Neck: Normal range of motion. Neck supple. No JVD present. No tracheal deviation present.   Cardiovascular: Normal rate, regular rhythm, normal heart sounds and intact distal pulses. Exam reveals no gallop.   No murmur heard.  Pulmonary/Chest: Effort normal. No respiratory distress. He has no decreased breath sounds. He has no wheezes. He has no rales. He exhibits no tenderness.   Abdominal: Soft. Bowel sounds are normal. He exhibits no distension. There is no tenderness. There is no rebound.   Musculoskeletal: Normal range of motion. He exhibits no edema or tenderness.   No clubbing or cyanosis   Lymphadenopathy:     He has no cervical adenopathy.   Neurological: He is alert and oriented to person, place, and time. No cranial nerve deficit. Coordination normal.   No focal weakness   Skin: Skin is warm and dry. No rash noted. He is not diaphoretic. No erythema.       Medications  Current Facility-Administered Medications   Medication Dose Route Frequency Provider Last Rate Last Dose   • budesonide-formoterol (SYMBICORT) 80-4.5 MCG/ACT inhaler 2 Puff  2 Puff Inhalation BID (RT) Lennox Connors M.D.   2 Puff at 09/24/19 0948   • busPIRone (BUSPAR) tablet 5 mg  5 mg Oral TID Rigoberto Galvan M.D.   5 mg at 09/23/19 1204   • ALPRAZolam (XANAX) tablet 0.25 mg  0.25 mg Oral Q6HRS PRN Rigoberto Galvan M.D.   0.25 mg at 09/24/19 1012   • tamsulosin (FLOMAX) capsule 0.4 mg  0.4 mg Oral AFTER BREAKFAST Rigoberto Galvan M.D.   0.4 mg at 09/24/19 0948   • timolol (TIMOPTIC) 0.25 % ophthalmic solution 1 Drop  1 Drop Both  Eyes BID Rigoberto Galvan M.D.   1 Drop at 09/24/19 0614   • aspirin (ASA) tablet 325 mg  325 mg Oral DAILY Rigoberto Galvan M.D.   325 mg at 09/24/19 0612   • gabapentin (NEURONTIN) capsule 1,600 mg  1,600 mg Oral Q EVENING Rigoberto Galvan M.D.   1,600 mg at 09/23/19 1712   • gabapentin (NEURONTIN) capsule 2,400 mg  2,400 mg Oral QAM Rigoberto Galvan M.D.   2,400 mg at 09/24/19 0613   • levothyroxine (SYNTHROID) tablet 125 mcg  125 mcg Oral QAM AC Rigoberto Galvan M.D.   125 mcg at 09/24/19 0612   • methadone (DOLOPHINE) tablet 30 mg  30 mg Oral QAM Rigoberto Galvan M.D.   30 mg at 09/24/19 0612    And   • methadone (DOLOPHINE) tablet 20 mg  20 mg Oral DAILY AT NOON Rigoberto Galvan M.D.   20 mg at 09/24/19 1304    And   • methadone (DOLOPHINE) tablet 20 mg  20 mg Oral Q EVENING Rigoberto Galvan M.D.   20 mg at 09/23/19 1708   • senna-docusate (PERICOLACE or SENOKOT S) 8.6-50 MG per tablet 2 Tab  2 Tab Oral BID Rigoberto Galvan M.D.   Stopped at 09/18/19 1711    And   • polyethylene glycol/lytes (MIRALAX) PACKET 1 Packet  1 Packet Oral QDAY PRN Rigoberto Galvan M.D.        And   • magnesium hydroxide (MILK OF MAGNESIA) suspension 30 mL  30 mL Oral QDAY PRN Rigoberto Galvan M.D.        And   • bisacodyl (DULCOLAX) suppository 10 mg  10 mg Rectal QDAY PRN Rigoberto Galvan M.D.       • simvastatin (ZOCOR) tablet 40 mg  40 mg Oral Nightly Rigoberto Galvan M.D.   40 mg at 09/23/19 2154   • temazepam (RESTORIL) capsule 15 mg  15 mg Oral QHS Rigoberto Galvan M.D.   15 mg at 09/23/19 2154   • tramadol (ULTRAM) 50 MG tablet 50 mg  50 mg Oral BID Rigoberto Galvan M.D.   50 mg at 09/22/19 1720   • acetaminophen (TYLENOL) tablet 650 mg  650 mg Oral Q6HRS PRN Rigoberto Galvan M.D.       • oxyCODONE immediate-release (ROXICODONE) tablet 5-10 mg  5-10 mg Oral Q4HRS PRN Rigoberto Galvan M.D.   10 mg at 09/23/19 2153   • Respiratory Care per Protocol    Nebulization Continuous RT FLORA Mcdermott Jr..O.       • ipratropium-albuterol (DUONEB) nebulizer solution  3 mL Nebulization Q2HRS PRN (RT) FLORA Mcdermott Jr..LUCY.       • MD Alert...ICU Electrolyte Replacement per Pharmacy   Other PHARMACY TO DOSE FLORA Mcdermott Jr..O.       • enoxaparin (LOVENOX) inj 40 mg  40 mg Subcutaneous DAILY Marck Montero Jr. D.O.   40 mg at 09/24/19 0613   • sodium chloride (OCEAN) 0.65 % nasal spray 2 Spray  2 Spray Nasal Q2HRS PRN Mary Silva M.D.   2 [place] at 09/17/19 0913       Fluids  No intake or output data in the 24 hours ending 09/24/19 1424    Laboratory          Recent Labs     09/22/19  0411 09/24/19  0446   SODIUM 139 137   POTASSIUM 3.7 4.6   CHLORIDE 101 98   CO2 30 34*   BUN 20 19   CREATININE 1.02 1.12   MAGNESIUM 1.9 2.0   PHOSPHORUS 2.5  --    CALCIUM 9.1 9.3     Recent Labs     09/22/19  0411 09/24/19  0446   GLUCOSE 88 87     Recent Labs     09/22/19  0411 09/24/19  0446   WBC 21.1* 21.2*   NEUTSPOLYS 64.80 67.40   LYMPHOCYTES 20.30* 18.50*   MONOCYTES 8.70 7.60   EOSINOPHILS 3.20 3.00   BASOPHILS 0.20 0.30     Recent Labs     09/22/19  0411 09/24/19  0446   RBC 4.41* 4.45*   HEMOGLOBIN 12.5* 12.5*   HEMATOCRIT 38.7* 39.4*   PLATELETCT 330 264       Imaging  None    Assessment/Plan  Acute respiratory failure with hypoxia, emphsema and pneumonitis (HCC)- (present on admission)  Assessment & Plan  Improved. Almost at baseline. Intubated 9/14-9/16.   Plan  -Symbicort  -CXR in the AM  -Home oxygen eval if patient going home    Pneumonitis- (present on admission)  Assessment & Plan  Possibly secondary to GERD, chemical aspiration pneumonitis in the setting of chronic Methadone use. Completed 5 days of doxycycline on 9/15 and completed 5 days of Rocephin on 9/18.   Plan  -Counseled on head of bed elevation    Interstitial lung disease (HCC)  Assessment & Plan  Improving. 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 Patient completed  steroid taper    Paraseptal emphysema (HCC)  Assessment & Plan  Possible combined etiology as mentioned earlier.    Pulmonary hypertension (HCC)  Assessment & Plan  RVSP 55 mmHg. Maintain euvolemia

## 2019-09-24 NOTE — DISCHARGE PLANNING
Agency/Facility Name: [facility]  Spoke To: Sheeba  Outcome:  Updated med report, progress notes and therapy notes faxed to [facility] per Sheeba's request.

## 2019-09-24 NOTE — CARE PLAN
Problem: Safety  Goal: Will remain free from falls  Outcome: PROGRESSING AS EXPECTED  Note:   Safety precautions in place. Call light within reach. Bed is low and in locked position. Hourly rounding in place.    Intervention: Implement fall precautions  Flowsheets (Taken 9/24/2019 2101)  Environmental Precautions: Personal Belongings, Wastebasket, Call Bell etc. in Easy Reach;Treaded Slipper Socks on Patient;Transferred to Stronger Side;Report Given to Other Health Care Providers Regarding Fall Risk;Bed in Low Position;Communication Sign for Patients & Families;Mobility Assessed & Appropriate Sign Placed     Problem: Discharge Barriers/Planning  Goal: Patient's continuum of care needs will be met  Outcome: PROGRESSING AS EXPECTED  Intervention: Collaborate with Transitional Care Team and Interdisciplinary Team to meet discharge needs  Note:   Pt does not want to go to SNF in [facility]. SW was notified. Pt chose [facility]. SW assisting with discharge.

## 2019-09-24 NOTE — DISCHARGE PLANNING
Anticipated Discharge Disposition: SNF    Action: Bedside RN informed this LSW that the Pt stated he no longer wants to go to [facility] and wants to go to a [facility]. LSW met with the Pt and provided SNF choice, Pt wanted to look over the choices before making a decision. LSW also provided SNF brochures.     Barriers to Discharge: New SNF choice, accepting SNF    Plan: Follow up with new SNF choice

## 2019-09-25 ENCOUNTER — APPOINTMENT (OUTPATIENT)
Dept: RADIOLOGY | Facility: MEDICAL CENTER | Age: 59
DRG: 871 | End: 2019-09-25
Attending: STUDENT IN AN ORGANIZED HEALTH CARE EDUCATION/TRAINING PROGRAM
Payer: MEDICARE

## 2019-09-25 LAB — EKG IMPRESSION: NORMAL

## 2019-09-25 PROCEDURE — 92526 ORAL FUNCTION THERAPY: CPT

## 2019-09-25 PROCEDURE — 700111 HCHG RX REV CODE 636 W/ 250 OVERRIDE (IP): Performed by: INTERNAL MEDICINE

## 2019-09-25 PROCEDURE — 99232 SBSQ HOSP IP/OBS MODERATE 35: CPT | Performed by: INTERNAL MEDICINE

## 2019-09-25 PROCEDURE — 770006 HCHG ROOM/CARE - MED/SURG/GYN SEMI*

## 2019-09-25 PROCEDURE — A9270 NON-COVERED ITEM OR SERVICE: HCPCS | Performed by: HOSPITALIST

## 2019-09-25 PROCEDURE — 93010 ELECTROCARDIOGRAM REPORT: CPT | Performed by: INTERNAL MEDICINE

## 2019-09-25 PROCEDURE — 700102 HCHG RX REV CODE 250 W/ 637 OVERRIDE(OP): Performed by: HOSPITALIST

## 2019-09-25 PROCEDURE — 99233 SBSQ HOSP IP/OBS HIGH 50: CPT | Performed by: INTERNAL MEDICINE

## 2019-09-25 PROCEDURE — 36415 COLL VENOUS BLD VENIPUNCTURE: CPT

## 2019-09-25 PROCEDURE — 93005 ELECTROCARDIOGRAM TRACING: CPT | Performed by: INTERNAL MEDICINE

## 2019-09-25 PROCEDURE — 97116 GAIT TRAINING THERAPY: CPT

## 2019-09-25 PROCEDURE — 71046 X-RAY EXAM CHEST 2 VIEWS: CPT

## 2019-09-25 RX ADMIN — ASPIRIN 325 MG: 325 TABLET, FILM COATED ORAL at 05:16

## 2019-09-25 RX ADMIN — METHADONE HYDROCHLORIDE 30 MG: 10 TABLET ORAL at 06:22

## 2019-09-25 RX ADMIN — TIMOLOL MALEATE 1 DROP: 2.5 SOLUTION OPHTHALMIC at 05:08

## 2019-09-25 RX ADMIN — Medication 2 SPRAY: at 05:06

## 2019-09-25 RX ADMIN — BUDESONIDE AND FORMOTEROL FUMARATE DIHYDRATE 2 PUFF: 80; 4.5 AEROSOL RESPIRATORY (INHALATION) at 08:35

## 2019-09-25 RX ADMIN — BUDESONIDE AND FORMOTEROL FUMARATE DIHYDRATE 2 PUFF: 80; 4.5 AEROSOL RESPIRATORY (INHALATION) at 21:08

## 2019-09-25 RX ADMIN — METHADONE HYDROCHLORIDE 20 MG: 10 TABLET ORAL at 17:23

## 2019-09-25 RX ADMIN — Medication 2 SPRAY: at 21:09

## 2019-09-25 RX ADMIN — TIMOLOL MALEATE 1 DROP: 2.5 SOLUTION OPHTHALMIC at 17:22

## 2019-09-25 RX ADMIN — LEVOTHYROXINE SODIUM 125 MCG: 25 TABLET ORAL at 05:22

## 2019-09-25 RX ADMIN — GABAPENTIN 2400 MG: 400 CAPSULE ORAL at 05:16

## 2019-09-25 RX ADMIN — TEMAZEPAM 15 MG: 15 CAPSULE ORAL at 21:39

## 2019-09-25 RX ADMIN — SIMVASTATIN 40 MG: 40 TABLET, FILM COATED ORAL at 21:08

## 2019-09-25 RX ADMIN — GABAPENTIN 1600 MG: 400 CAPSULE ORAL at 17:23

## 2019-09-25 RX ADMIN — ENOXAPARIN SODIUM 40 MG: 100 INJECTION SUBCUTANEOUS at 05:15

## 2019-09-25 RX ADMIN — METHADONE HYDROCHLORIDE 20 MG: 10 TABLET ORAL at 11:38

## 2019-09-25 RX ADMIN — TAMSULOSIN HYDROCHLORIDE 0.4 MG: 0.4 CAPSULE ORAL at 08:35

## 2019-09-25 RX ADMIN — ALPRAZOLAM 0.25 MG: 0.25 TABLET ORAL at 05:16

## 2019-09-25 RX ADMIN — ALPRAZOLAM 0.25 MG: 0.25 TABLET ORAL at 17:28

## 2019-09-25 ASSESSMENT — ENCOUNTER SYMPTOMS
FOCAL WEAKNESS: 0
ABDOMINAL PAIN: 0
HEMOPTYSIS: 0
NERVOUS/ANXIOUS: 0
FEVER: 0
DIZZINESS: 0
BRUISES/BLEEDS EASILY: 0
SPUTUM PRODUCTION: 1
SHORTNESS OF BREATH: 1
VOMITING: 0
PALPITATIONS: 0
SINUS PAIN: 0
CONSTIPATION: 0
HEARTBURN: 0
LOSS OF CONSCIOUSNESS: 0
DIARRHEA: 0
SHORTNESS OF BREATH: 0
PND: 0
SENSORY CHANGE: 0
MYALGIAS: 0
WHEEZING: 0
SPEECH CHANGE: 0
EYE DISCHARGE: 0
CHILLS: 0
FLANK PAIN: 0
NECK PAIN: 0
NAUSEA: 0
CLAUDICATION: 0
EYE PAIN: 0
HALLUCINATIONS: 0
EYE REDNESS: 0
COUGH: 1

## 2019-09-25 ASSESSMENT — COGNITIVE AND FUNCTIONAL STATUS - GENERAL
MOBILITY SCORE: 20
STANDING UP FROM CHAIR USING ARMS: A LITTLE
MOVING FROM LYING ON BACK TO SITTING ON SIDE OF FLAT BED: A LITTLE
WALKING IN HOSPITAL ROOM: A LITTLE
CLIMB 3 TO 5 STEPS WITH RAILING: A LITTLE
SUGGESTED CMS G CODE MODIFIER MOBILITY: CJ

## 2019-09-25 ASSESSMENT — PATIENT HEALTH QUESTIONNAIRE - PHQ9
1. LITTLE INTEREST OR PLEASURE IN DOING THINGS: NOT AT ALL
SUM OF ALL RESPONSES TO PHQ9 QUESTIONS 1 AND 2: 0
2. FEELING DOWN, DEPRESSED, IRRITABLE, OR HOPELESS: NOT AT ALL

## 2019-09-25 ASSESSMENT — GAIT ASSESSMENTS
DISTANCE (FEET): 350
ASSISTIVE DEVICE: FRONT WHEEL WALKER
GAIT LEVEL OF ASSIST: SUPERVISED
DEVIATION: BRADYKINETIC

## 2019-09-25 NOTE — PROGRESS NOTES
HOSPITAL MEDICINE PROGRESS NOTE    Date of service  9/24/2019    Chief Complaint  No chief complaint on file.      Hospital Course:     59 years old man with a history of chronic pain syndrome on massive amount narcotics and Neurontin, admitted for community-acquired pneumonia complicated by acute hypoxic respiratory failure, necessitate intubation and ICU transfer.  He was extubated after 3 days, on 9/16/19.  Transferred to floor on 9/18/19.         Interval History Updates:  No event overnight.  Afebrile and hypotension to 80s.  This morning, blood pressure up to about 99 217.  Asymptomatic.  Received 250 mL normal saline bolus x1 overnight.  Still requiring 4 L oxygen supplementation via nasal cannula.    Consultants/Specialty  Pulmonary    Review of Systems   Review of Systems   Constitutional: Negative for chills and fever.   Respiratory: Positive for cough and shortness of breath. Negative for sputum production and wheezing.         He has occasional dry cough.   Cardiovascular: Negative for chest pain, palpitations and leg swelling.   Gastrointestinal: Negative for abdominal pain, constipation, diarrhea, nausea and vomiting.   Skin: Negative for rash.   Neurological: Negative for focal weakness.   Endo/Heme/Allergies: Negative.    All other systems reviewed and are negative.       Medications:  • budesonide-formoterol  2 Puff BID (RT)   • busPIRone  5 mg TID   • ALPRAZolam  0.25 mg Q6HRS PRN   • tamsulosin  0.4 mg AFTER BREAKFAST   • timolol  1 Drop BID   • aspirin  325 mg DAILY   • gabapentin  1,600 mg Q EVENING   • gabapentin  2,400 mg QAM   • levothyroxine  125 mcg QAM AC   • methadone  30 mg QAM    And   • methadone  20 mg DAILY AT NOON    And   • methadone  20 mg Q EVENING   • senna-docusate  2 Tab BID    And   • polyethylene glycol/lytes  1 Packet QDAY PRN    And   • magnesium hydroxide  30 mL QDAY PRN    And   • bisacodyl  10 mg QDAY PRN   • simvastatin  40 mg Nightly   • temazepam  15 mg QHS   •  tramadol  50 mg BID   • acetaminophen  650 mg Q6HRS PRN   • oxyCODONE immediate-release  5-10 mg Q4HRS PRN   • Respiratory Care per Protocol   Continuous RT   • ipratropium-albuterol  3 mL Q2HRS PRN (RT)   • MD Alert...Adult ICU Electrolyte Replacement per Pharmacy   PHARMACY TO DOSE   • enoxaparin (LOVENOX) injection  40 mg DAILY   • sodium chloride  2 Spray Q2HRS PRN       Physical Exam   Vitals:    09/24/19 0500 09/24/19 0900 09/24/19 1300 09/24/19 1530   BP: 117/75 (!) 95/67 102/66    Pulse: 71 66 80 78   Resp: 16 17  17   Temp: 36.4 °C (97.6 °F) 36.7 °C (98.1 °F)  36.5 °C (97.7 °F)   TempSrc: Temporal Temporal  Temporal   SpO2: 93% 94%  90%   Weight:       Height:           Physical Exam   Constitutional: He is oriented to person, place, and time and well-developed, well-nourished, and in no distress. No distress.   HENT:   Head: Normocephalic and atraumatic.   Eyes: Pupils are equal, round, and reactive to light. Conjunctivae are normal. No scleral icterus.   Neck: Normal range of motion. Neck supple. No JVD present.   Cardiovascular: Normal rate, regular rhythm and normal heart sounds. Exam reveals no gallop and no friction rub.   No murmur heard.  Pulmonary/Chest: Effort normal and breath sounds normal. No respiratory distress. He has no wheezes. He has no rales. He exhibits no tenderness.   Abdominal: Soft. Bowel sounds are normal. There is no tenderness. There is no rebound.   Musculoskeletal: Normal range of motion. He exhibits no edema, tenderness or deformity.   Neurological: He is alert and oriented to person, place, and time.   Skin: Skin is warm and dry. No rash noted. He is not diaphoretic. No erythema.   Psychiatric: Mood and affect normal.   Nursing note and vitals reviewed.         Laboratory   Recent Labs     09/22/19 0411 09/24/19  0446   WBC 21.1* 21.2*   RBC 4.41* 4.45*   HEMOGLOBIN 12.5* 12.5*   HEMATOCRIT 38.7* 39.4*   PLATELETCT 330 264     Recent Labs     09/22/19  0411 09/24/19  8652    SODIUM 139 137   POTASSIUM 3.7 4.6   CHLORIDE 101 98   CO2 30 34*   GLUCOSE 88 87   BUN 20 19   CREATININE 1.02 1.12      Recent Labs     09/22/19  0411 09/24/19  0446   GLUCOSE 88 87           C-reactive protein 7.23, 3.11.     Microbiology  Results     Procedure Component Value Units Date/Time    Fungal Culture - BAL [[id number]] Collected:  09/14/19 0110    Order Status:  Completed Specimen:  Respirate from Bronchoalveolar Lavage Updated:  09/18/19 0907     Significant Indicator NEG     Source RESP     Site BRONCHOALVEOLAR LAVAGE     Culture Result No fungal growth to date.    Narrative:       Collected By:[id number] TONY ROSE  Collected By:[id number] TONY ROSE    Fungal Smear - BAL [[id number]] Collected:  09/14/19 0110    Order Status:  Completed Specimen:  Respirate from Bronchoalveolar Lavage Updated:  09/18/19 0907     Significant Indicator NEG     Source RESP     Site BRONCHOALVEOLAR LAVAGE     Fungal Smear Results No fungal elements seen.    Narrative:       Collected By:[id number] TONY ROSE  Collected By:[id number] TONY ROSE    AFB Culture - BAL [[id number]] Collected:  09/14/19 0110    Order Status:  Completed Specimen:  Respirate from Bronchoalveolar Lavage Updated:  09/18/19 0907     Significant Indicator NEG     Source RESP     Site BRONCHOALVEOLAR LAVAGE     Culture Result Culture in progress.     AFB Smear Results No acid fast bacilli seen.    Narrative:       Collected By:[id number] TONY AN.  Collected By:[id number] TONY ROSE      Fungal serology from BAL negative, September 13, 2018.    Pathology report BAL fluid: September 14, 2019  A. Bronchoalveolar lavage:         No malignant cells identified.         No evidence of Pulmonary Langehans cell histiocytosis in this          specimen.         Mixed inflammatory cells and alveolar macrophages entrapped          within mucin and few scattered benign bronchial epithelial and          squamous cells present.     Labs reviewed  by me and noted above.    Radiology  I personally reviewed the chest x-ray portable on September 21, 2019 and per my interpretation, consistent with some pulmonary edema, no obvious consolidation or infiltrate.      CT chest 9/13/2019:  1.  Minimal improvement of presumed bilateral pneumonitis compared to 9/9/2019.  Findings likely significantly improved from prior chest x-ray dated 9/12/2019.  2.  Moderate emphysema, likely paraseptal.    Chest x-ray two-view pending today, September 24, 2019.       Echocardiogram September 11, 2019:  LV ejection fraction 60%.  Normal diastolic function.  The RVSP is estimated to be 55 debora-Hg.  This is unchanged compared to the prior study on January 2015.         Assessment and Plan    Principal Problem:    Hypotension POA: No.  Suspect hypovolemic.  Pressures improved with small volume IV fluid bolus.  I discontinue Midrin.  Close monitor.  Avoid blood pressure medication.    Persistent leukocytosis:  ?Secondary to steroid, though last dose of Solu-Medrol September 18, 2019.  Afebrile.  Will monitor for now.      Pneumonitis POA: Yes.  Suspect aspiration.  Patient finished a course of ceftriaxone and doxycycline.  Pulmonary is following.  Chest x-ray two-view ordered and is pending.      Acute respiratory failure with hypoxia, emphysema and pneumonitis (HCC) POA: Yes.  Continue to wean oxygen.  Anticipate he will need oxygen at discharge for short-term use.  Check ambulatory room air O2 sat.      Pulmonary hypertension (HCC) POA: Yes.  RV SP elevated on Echo, mild change vs in 2015.  Unfortunately, blood pressure limiting diuretic.      QT prolongation POA: Yes.  Patient has had discussion with cardiology, Dr Linder, regarding his long-term methadone.  He accept the risks of cardiovascular compromise and wished to continue his high-dose methadone.      Paraseptal emphysema (HCC) POA: Yes.  Status post a course of steroid.  Finished course of antibiotic.  Continue duo nebs,  bronchodilators, incentive spirometer.      Interstitial lung disease (HCC) POA: Yes.  Please see above.  Tobacco cessation counseled.      Anxiety POA: Yes.  Mood is stable.  On Xanax 0.25 mg p.o. every 6 as needed.      Hypothyroidism POA: Yes.  Continue Synthroid.      Chronic pain POA: Yes.  Controlled on home dose of methadone, gabapentin, tramadol.      Hypokalemia POA: No.  Resolved.  Potassium 4.6 today.      Dysphasia POA: Unknown.  No recurrent.  Tolerate regular diet texture without difficulty.      Urinary retention POA: Unknown.  Resolved.    Resolved Problems:    Severe sepsis (HCC) POA: Yes    INDIA (acute kidney injury) (HCC) POA: Yes    Hypomagnesemia POA: Unknown    Hypophosphatemia POA: Yes      DVT prophylaxis: Lovenox    CODE STATUS:  FULL CODE    Disposition: Anticipate SNF in 2-3 days.    Case was discussed with Primary RN and Charge Nurse, Medical SW, , and Pharmacist on IDTround in addition to individual(s) mentioned above.    I have performed a physical exam and reviewed and updated ROS as of today, 9/24/2019.  In review of yesterday's note dated, 9/23/2019, there are no changes except as documented above.      I spent 35 minutes and evaluating and treating the patient, more than 50% of the time in discussing, counseling, and answering patient's questions.      Matthew Watters M.D.

## 2019-09-25 NOTE — THERAPY
"Speech Language Therapy dysphagia treatment completed.   Functional Status: Patient awake, alert, and pleasant during tx session. Patient denied any difficulty w/ current diet or PO. Patient's vocal quality strong and clear. Patient consumed PO trials of mixed consistencies, crackers, and thin liquids via straw. Patient consumed all PO trials independently and with no s/sx of aspiration. Patient again reported that Dys3 diet is baseline, as he \"is careful\" about what dry textures he consumes, often disliking crunchy, dry foods. Recommend patient continue Dys3/thin liquid diet, as this is baseline diet, per his report. Okay for crackers or peanuts as tolerated. Straws okay. Patient does not appear to require any further acute SLP services. Please re-consult if needed.     Recommendations: Recommend patient continue Dys3/thin liquid diet, as this is baseline diet, per his report. Okay for crackers or peanuts as tolerated. Straws okay. Patient does not appear to require any further acute SLP services. SLP will no longer actively follow. Please re-consult if needed.   Plan of Care: Patient with no further skilled SLP needs in the acute care setting at this time  Post-Acute Therapy: SLP treatment completed.  Patient is currently not being actively followed for therapy services at this time, however may be seen if requested by attending provider for 1 more visit within 30 days to address any discharge needs or if the patient has a change in status.      See \"Rehab Therapy-Acute\" Patient Summary Report for complete documentation.     "

## 2019-09-25 NOTE — CARE PLAN
Problem: Communication  Goal: The ability to communicate needs accurately and effectively will improve  Outcome: PROGRESSING AS EXPECTED     Problem: Safety  Goal: Will remain free from falls  Outcome: PROGRESSING AS EXPECTED     Pt using call bell effectively; pt  has remained free from falls

## 2019-09-25 NOTE — THERAPY
"Physical Therapy Treatment completed.   Bed Mobility:  Supine to Sit: Supervised  Transfers: Sit to Stand: Supervised  Gait: Level Of Assist: 350ft Supervised with Front-Wheel Walker       Plan of Care: Patient with no further skilled PT needs in the acute care setting and demonstrates safety with mobility in this environment at this time.   Discharge Recommendations: Equipment: Front-Wheel Walker. Recommend home health transitional care for continued physical therapy services.     See \"Rehab Therapy-Acute\" Patient Summary Report for complete documentation.     Pt has improved in PT. Was able to ambulate 350ft with FWW and SPV, no LOB. Pt negotiated 4 steps without difficulty. Required one seated rest break. Attempted ambulating with cane, increased unsteadiness and pt agreeable to using FWW. Recommend FWW for use at home and home health PT for strengthening, endurance, and energy conservation. Pt with no further acute PT needs, would recommend pt ambulate with nursing daily as able.   "

## 2019-09-25 NOTE — PROGRESS NOTES
HOSPITAL MEDICINE PROGRESS NOTE    Date of service  9/25/2019    Chief Complaint  No chief complaint on file.      Hospital Course:     59 years old man with a history of chronic pain syndrome on massive amount narcotics and Neurontin, admitted for community-acquired pneumonia complicated by acute hypoxic respiratory failure, necessitate intubation and ICU transfer.  He was extubated after 3 days, on 9/16/19.  Transferred to floor on 9/18/19.         Interval History Updates:  BP trends over all better.   - 119.  No Fever.  Persistent leukocytosis.    Consultants/Specialty  Pulmonary    Review of Systems   Review of Systems   Constitutional: Negative for chills and fever.   Respiratory: Positive for cough. Negative for shortness of breath.    Cardiovascular: Negative for chest pain, palpitations and leg swelling.   Gastrointestinal: Negative for abdominal pain, constipation, diarrhea, nausea and vomiting.   Skin: Negative for rash.   Neurological: Negative for focal weakness.   Endo/Heme/Allergies: Negative.    All other systems reviewed and are negative.       Medications:  • budesonide-formoterol  2 Puff BID (RT)   • busPIRone  5 mg TID   • ALPRAZolam  0.25 mg Q6HRS PRN   • tamsulosin  0.4 mg AFTER BREAKFAST   • timolol  1 Drop BID   • aspirin  325 mg DAILY   • gabapentin  1,600 mg Q EVENING   • gabapentin  2,400 mg QAM   • levothyroxine  125 mcg QAM AC   • methadone  30 mg QAM    And   • methadone  20 mg DAILY AT NOON    And   • methadone  20 mg Q EVENING   • senna-docusate  2 Tab BID    And   • polyethylene glycol/lytes  1 Packet QDAY PRN    And   • magnesium hydroxide  30 mL QDAY PRN    And   • bisacodyl  10 mg QDAY PRN   • simvastatin  40 mg Nightly   • temazepam  15 mg QHS   • tramadol  50 mg BID   • acetaminophen  650 mg Q6HRS PRN   • oxyCODONE immediate-release  5-10 mg Q4HRS PRN   • Respiratory Care per Protocol   Continuous RT   • ipratropium-albuterol  3 mL Q2HRS PRN (RT)   • enoxaparin (LOVENOX)  injection  40 mg DAILY   • sodium chloride  2 Spray Q2HRS PRN       Physical Exam   Vitals:    09/25/19 0048 09/25/19 0453 09/25/19 0800 09/25/19 1100   BP: 108/65 105/68 (!) 99/64 (!) 93/61   Pulse: 65 73 64    Resp: 17 16 16    Temp: 36.9 °C (98.4 °F) 36.6 °C (97.8 °F) 36.3 °C (97.4 °F)    TempSrc: Temporal Temporal Temporal    SpO2: 96% 95% 91%    Weight:       Height:           Physical Exam   Constitutional: He is oriented to person, place, and time and well-developed, well-nourished, and in no distress. No distress.   HENT:   Head: Normocephalic and atraumatic.   Eyes: Pupils are equal, round, and reactive to light. Conjunctivae are normal. No scleral icterus.   Neck: Normal range of motion. Neck supple. No JVD present.   Cardiovascular: Normal rate, regular rhythm and normal heart sounds. Exam reveals no gallop and no friction rub.   No murmur heard.  Pulmonary/Chest: Effort normal and breath sounds normal. No respiratory distress. He has no wheezes. He has no rales. He exhibits no tenderness.   Coarse BS bilaterally, more at the lower lung fields.   Abdominal: Soft. Bowel sounds are normal. There is no tenderness. There is no rebound.   Musculoskeletal: Normal range of motion. He exhibits no edema, tenderness or deformity.   Neurological: He is alert and oriented to person, place, and time.   Skin: Skin is warm and dry. No rash noted. He is not diaphoretic. No erythema.   Psychiatric: Mood and affect normal.   Nursing note and vitals reviewed.         Laboratory   Recent Labs     09/24/19  0446   WBC 21.2*   RBC 4.45*   HEMOGLOBIN 12.5*   HEMATOCRIT 39.4*   PLATELETCT 264     Recent Labs     09/24/19  0446   SODIUM 137   POTASSIUM 4.6   CHLORIDE 98   CO2 34*   GLUCOSE 87   BUN 19   CREATININE 1.12      Recent Labs     09/24/19  0446   GLUCOSE 87           C-reactive protein 7.23, 3.11.     Microbiology  Results     Procedure Component Value Units Date/Time    Fungal Culture - BAL [[id number]] Collected:   09/14/19 0110    Order Status:  Completed Specimen:  Respirate from Bronchoalveolar Lavage Updated:  09/25/19 0805     Significant Indicator NEG     Source RESP     Site BRONCHOALVEOLAR LAVAGE     Culture Result No clinically significant fungal growth to date.    Narrative:       Collected By:[id number] TNOY AN.  Collected By:[id number] ROSE    Fungal Smear - BAL [[id number]] Collected:  09/14/19 0110    Order Status:  Completed Specimen:  Respirate from Bronchoalveolar Lavage Updated:  09/25/19 0805     Significant Indicator NEG     Source RESP     Site BRONCHOALVEOLAR LAVAGE     Fungal Smear Results No fungal elements seen.    Narrative:       Collected By:[id number] TONY AN.  Collected By:[id number] TONY ROSE    AFB Culture - BAL [[id number]] Collected:  09/14/19 0110    Order Status:  Completed Specimen:  Respirate from Bronchoalveolar Lavage Updated:  09/25/19 0805     Significant Indicator NEG     Source RESP     Site BRONCHOALVEOLAR LAVAGE     Culture Result Culture in progress.     AFB Smear Results No acid fast bacilli seen.    Narrative:       Collected By:[id number] TONY AN.  Collected By:[id number] TONY ROSE      Fungal serology from BAL negative, September 13, 2018.    Pathology report BAL fluid: September 14, 2019  A. Bronchoalveolar lavage:         No malignant cells identified.         No evidence of Pulmonary Langehans cell histiocytosis in this          specimen.         Mixed inflammatory cells and alveolar macrophages entrapped          within mucin and few scattered benign bronchial epithelial and          squamous cells present.     Labs reviewed by me and noted above.    Radiology  I personally reviewed the chest x-ray portable on September 21, 2019 and per my interpretation, consistent with some pulmonary edema, no obvious consolidation or infiltrate.      CT chest 9/13/2019:  I personally reviewed the images of the CT chest and per my interpretation, fibrotic  parenchymal changes in both lungs.    Chest x-ray two-view September 25, 2019 personally reviewed and per my interpretation, no significant change compared to past CXR, still with hazy opacities at the RLL and LLL, LML.  No consolidation.  No obvious pleural effusion.     Echocardiogram September 11, 2019:  LV ejection fraction 60%.  Normal diastolic function.  The RVSP is estimated to be 55 debora-Hg.  This is unchanged compared to the prior study on January 2015.     EKG performed 09/17/2019 was personally reviewed and per my interpretation shows normal sinus rhythm, rate 97.  The QT measured 366, corrected .      Assessment and Plan    Principal Problem:    Hypotension POA: No.  Suspect hypovolemic.  Pressures improved with small volume IV fluid bolus.  I discontinue midrodrin.  BP better now.  Close monitor.    Persistent leukocytosis:  ?Secondary to steroid, though last dose of Solu-Medrol September 18, 2019.  Afebrile.  Chart review shows no history of persistent leukocytosis.  Denies any B symptom.  I order peripheral smear review by Pathology and peripheral blood flow cytometry with markers to eval for hematologic malignacy.  More than likely, he will need outpatient follow up with Hematology after discharge.        Pneumonitis POA: Yes.  Suspect aspiration.  Patient finished a course of ceftriaxone and doxycycline.  Finished course of steroid taper.  Pulmonary is following.  Chest x-ray two-view reviewed as above.  CT chest also reviewed in comparison to CXR.  Patient will benefit from outpatient follow up with Pulmonology.        Acute respiratory failure with hypoxia, emphysema and pneumonitis (HCC) POA: Yes.  Continue to wean oxygen.  Anticipate he will need oxygen at discharge for short-term use.  Check ambulatory room air O2 sat, order placed but not completed yet.      Pulmonary hypertension (HCC) POA: Yes.  RV SP elevated on Echo, mild change vs in 2015.  Unfortunately, blood pressure limiting  diuretic.      QT prolongation POA: Yes.  Patient has had discussion with cardiology, Dr Linder, regarding his long-term methadone.  He accept the risks of cardiovascular compromise and wished to continue his high-dose methadone.  He is high risk for cardiovascular arhythmia including VT, VF.  EKG on September 17, 2018 interpretation as above.  I order another repeat EKG today to assess for his QTC again.      Paraseptal emphysema (HCC) POA: Yes.  Status post a course of steroid.  Finished course of antibiotic.  Continue duo nebs, bronchodilators, incentive spirometer.      Interstitial lung disease (HCC) POA: Yes.  Please see above.  Tobacco cessation counseled.      Anxiety POA: Yes.  Mood is stable.  On Xanax 0.25 mg p.o. every 6 as needed.      Hypothyroidism POA: Yes.  Continue Synthroid.      Chronic pain POA: Yes.  Controlled on home dose of methadone, gabapentin, tramadol.      Hypokalemia POA: No.  Resolved.  Potassium 4.6 yesterday.      Dysphasia POA: Unknown.  No recurrent.  Tolerate regular diet texture without difficulty.      Urinary retention POA: Unknown.  Resolved.    Resolved Problems:    Severe sepsis (HCC) POA: Yes    INDIA (acute kidney injury) (HCC) POA: Yes    Hypomagnesemia POA: Unknown    Hypophosphatemia POA: Yes      DVT prophylaxis: Lovenox    CODE STATUS:  FULL CODE    Disposition: Anticipate SNF in 2-3 days.    Case was discussed with Primary RN and Charge Nurse, Medical SW, , and Pharmacist on IDTround in addition to individual(s) mentioned above.    I have performed a physical exam and reviewed and updated ROS as of today, 9/25/2019.  In review of yesterday's note dated, 9/24/2019, there are no changes except as documented above.      Matthew Watters M.D.

## 2019-09-25 NOTE — THERAPY
"Physical Therapy Treatment completed.   Bed Mobility:  Supine to Sit: Supervised  Transfers: Sit to Stand: Supervised  Gait: Level Of Assist: 200ft Supervised with Front-Wheel Walker       Plan of Care: Will benefit from Physical Therapy 3 times per week  Discharge Recommendations: Equipment: Will Continue to Assess for Equipment Needs. Recommend home health transitional care for continued physical therapy services.     See \"Rehab Therapy-Acute\" Patient Summary Report for complete documentation.     Pt motivated to participate in therapy, limited by poor endurance, fatigue, increased SOB/WOB. Pt demonstrated all mobility with SPV, no LOB. Pt ambulated 200ft with FWW, postural cues and one seated rest break required. Discussed energy conservation techniques for return home. Will need to assess stairs, though anticipate pt will be able to return home with either FWW or 4WW. Pt reports someone is at home with him 24/7. Pt would benefit from home health therapy to assist in home set-up evaluation for safety and energy conservation as well as endurance/exercise program. Will continue to follow.   "

## 2019-09-25 NOTE — PROGRESS NOTES
Pulmonary Progress Note    Date of admission  9/10/2019    Chief Complaint  59 y.o. male admitted 9/10/2019 with shortness of breath.    Hospital Course    This gentleman was admitted to the hospital with pneumonia and respiratory failure.  He developed worsening respiratory failure and was transferred to the ICU and was intubated    9/23Patient reports symptom improvement, notes he is able to ambulate further than before. Patient reports history of GERD    9/24 - Patient reports improvement in his symptoms. He reports feeling much improved and was able to ambulate and move to different room. Almost at baseline    Interval Problem Update  Reviewed last 24 hour events:    9/25 - Patient had no acute complaints, he was slightly lethargic in his answers today.     Review of Systems  Review of Systems   Constitutional: Negative for malaise/fatigue.   HENT: Negative for ear discharge, nosebleeds, sinus pain and tinnitus.    Eyes: Negative for pain, discharge and redness.   Respiratory: Positive for cough, sputum production and shortness of breath. Negative for hemoptysis and wheezing.         Improving   Cardiovascular: Negative for chest pain, claudication and PND.   Gastrointestinal: Negative for abdominal pain, constipation, heartburn and melena.   Genitourinary: Negative for dysuria, flank pain and urgency.   Musculoskeletal: Negative for myalgias and neck pain.   Skin: Negative for itching.   Neurological: Negative for dizziness, sensory change, speech change, focal weakness and loss of consciousness.   Endo/Heme/Allergies: Does not bruise/bleed easily.   Psychiatric/Behavioral: Negative for hallucinations and suicidal ideas. The patient is not nervous/anxious.          Vital Signs for last 24 hours   Temp:  [36.3 °C (97.4 °F)-36.9 °C (98.4 °F)] 36.3 °C (97.4 °F)  Pulse:  [64-78] 64  Resp:  [16-17] 16  BP: ()/(61-72) 93/61  SpO2:  [90 %-98 %] 91 %    Hemodynamic parameters for last 24 hours       Respiratory  Information for the last 24 hours       Physical Exam   Physical Exam   Constitutional: He is oriented to person, place, and time. He appears well-developed. No distress.   HENT:   Head: Normocephalic and atraumatic.   Right Ear: External ear normal.   Left Ear: External ear normal.   Nose: Nose normal.   Mouth/Throat: Oropharynx is clear and moist.   Eyes: Pupils are equal, round, and reactive to light. EOM are normal. Right eye exhibits no discharge. Left eye exhibits no discharge.   Neck: Normal range of motion. Neck supple. No JVD present. No tracheal deviation present.   Cardiovascular: Normal rate, regular rhythm, normal heart sounds and intact distal pulses. Exam reveals no gallop.   No murmur heard.  Pulmonary/Chest: Effort normal. No respiratory distress. He has no decreased breath sounds. He has no wheezes. He has no rales. He exhibits no tenderness.   Abdominal: Soft. Bowel sounds are normal. He exhibits no distension. There is no tenderness. There is no rebound.   Musculoskeletal: Normal range of motion. He exhibits no edema or tenderness.   No clubbing or cyanosis   Lymphadenopathy:     He has no cervical adenopathy.   Neurological: He is alert and oriented to person, place, and time. No cranial nerve deficit. Coordination normal.   No focal weakness   Skin: Skin is warm and dry. No rash noted. He is not diaphoretic. No erythema.       Medications  Current Facility-Administered Medications   Medication Dose Route Frequency Provider Last Rate Last Dose   • budesonide-formoterol (SYMBICORT) 80-4.5 MCG/ACT inhaler 2 Puff  2 Puff Inhalation BID (RT) Lennox Connors M.D.   2 Puff at 09/25/19 0835   • busPIRone (BUSPAR) tablet 5 mg  5 mg Oral TID Rigoberto Galvan M.D.   5 mg at 09/23/19 1204   • ALPRAZolam (XANAX) tablet 0.25 mg  0.25 mg Oral Q6HRS PRN Rigoberto Galvan M.D.   0.25 mg at 09/25/19 0516   • tamsulosin (FLOMAX) capsule 0.4 mg  0.4 mg Oral AFTER BREAKFAST Rigoberto Galvan M.D.   0.4  mg at 09/25/19 0835   • timolol (TIMOPTIC) 0.25 % ophthalmic solution 1 Drop  1 Drop Both Eyes BID Rigoberto Galvan M.D.   1 Drop at 09/25/19 0508   • aspirin (ASA) tablet 325 mg  325 mg Oral DAILY Rigoberto Galvan M.D.   325 mg at 09/25/19 0516   • gabapentin (NEURONTIN) capsule 1,600 mg  1,600 mg Oral Q EVENING Rigoberto Galvan M.D.   1,600 mg at 09/24/19 1735   • gabapentin (NEURONTIN) capsule 2,400 mg  2,400 mg Oral QAM Rigoberto Galvan M.D.   2,400 mg at 09/25/19 0516   • levothyroxine (SYNTHROID) tablet 125 mcg  125 mcg Oral QAM AC Rigoberto Galvan M.D.   125 mcg at 09/25/19 0522   • methadone (DOLOPHINE) tablet 30 mg  30 mg Oral QAM Rigoberto Galvan M.D.   30 mg at 09/25/19 0622    And   • methadone (DOLOPHINE) tablet 20 mg  20 mg Oral DAILY AT NOON Rigoberto Galvan M.D.   20 mg at 09/25/19 1138    And   • methadone (DOLOPHINE) tablet 20 mg  20 mg Oral Q EVENING Rigoberto Galvan M.D.   20 mg at 09/24/19 1734   • senna-docusate (PERICOLACE or SENOKOT S) 8.6-50 MG per tablet 2 Tab  2 Tab Oral BID Rigoberto Galvan M.D.   Stopped at 09/18/19 1711    And   • polyethylene glycol/lytes (MIRALAX) PACKET 1 Packet  1 Packet Oral QDAY PRN Rigoberto Galvan M.D.        And   • magnesium hydroxide (MILK OF MAGNESIA) suspension 30 mL  30 mL Oral QDAY PRN Rigoberto Galvan M.D.        And   • bisacodyl (DULCOLAX) suppository 10 mg  10 mg Rectal QDAY PRN Rigoberto Galvan M.D.       • simvastatin (ZOCOR) tablet 40 mg  40 mg Oral Nightly Rigoberto Galvan M.D.   40 mg at 09/24/19 2140   • temazepam (RESTORIL) capsule 15 mg  15 mg Oral QHS Rigoberto Galvan M.D.   15 mg at 09/24/19 2140   • tramadol (ULTRAM) 50 MG tablet 50 mg  50 mg Oral BID Rigoberto Galvan M.D.   50 mg at 09/22/19 1720   • acetaminophen (TYLENOL) tablet 650 mg  650 mg Oral Q6HRS PRN Rigoberto Galvan M.D.       • oxyCODONE immediate-release (ROXICODONE) tablet 5-10 mg  5-10 mg Oral Q4HRS PRN Rigoberto  JOVAN Galvan M.D.   10 mg at 09/23/19 2153   • Respiratory Care per Protocol   Nebulization Continuous RT Marck Montero Jr., D.O.       • ipratropium-albuterol (DUONEB) nebulizer solution  3 mL Nebulization Q2HRS PRN (RT) Marck Montero Jr., D.O.       • enoxaparin (LOVENOX) inj 40 mg  40 mg Subcutaneous DAILY Marck Montero Jr., D.O.   40 mg at 09/25/19 0515   • sodium chloride (OCEAN) 0.65 % nasal spray 2 Spray  2 Spray Nasal Q2HRS PRN Mary Silva M.D.   2 [place] at 09/25/19 0506       Fluids    Intake/Output Summary (Last 24 hours) at 9/25/2019 1453  Last data filed at 9/25/2019 1300  Gross per 24 hour   Intake 80 ml   Output --   Net 80 ml       Laboratory          Recent Labs     09/24/19  0446   SODIUM 137   POTASSIUM 4.6   CHLORIDE 98   CO2 34*   BUN 19   CREATININE 1.12   MAGNESIUM 2.0   CALCIUM 9.3     Recent Labs     09/24/19  0446   GLUCOSE 87     Recent Labs     09/24/19  0446   WBC 21.2*   NEUTSPOLYS 67.40   LYMPHOCYTES 18.50*   MONOCYTES 7.60   EOSINOPHILS 3.00   BASOPHILS 0.30     Recent Labs     09/24/19  0446   RBC 4.45*   HEMOGLOBIN 12.5*   HEMATOCRIT 39.4*   PLATELETCT 264       Imaging  None    Assessment/Plan  Acute respiratory failure with hypoxia, emphsema and pneumonitis (HCC)- (present on admission)  Assessment & Plan  Improved. Almost at baseline. Intubated 9/14-9/16. CXR reveals finidings consistent with previous radiological imaging.   Plan  -Symbicort  -Home oxygen eval if patient going home  -Set up appointment with Pulmonary follow up, patient lives in [facility]    Pneumonitis- (present on admission)  Assessment & Plan  Possibly secondary to GERD, chemical aspiration pneumonitis in the setting of chronic Methadone use. Completed 5 days of doxycycline on 9/15 and completed 5 days of Rocephin on 9/18. Per patient he takes prilosec at home.   Plan  -Counseled on head of bed elevation  -Ideally, patient would benefit most from weaning off Methadone  -Continue  home prilosec    Interstitial lung disease (HCC)  Assessment & Plan  Improving. 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 Patient completed steroid taper  Plan  -Follow up in Pulm clinic for PFT's and monitoring    Paraseptal emphysema (HCC)  Assessment & Plan  Possible combined etiology as mentioned earlier.    Pulmonary hypertension (HCC)  Assessment & Plan  RVSP 55 mmHg. Maintain euvolemia

## 2019-09-26 ENCOUNTER — PATIENT OUTREACH (OUTPATIENT)
Dept: HEALTH INFORMATION MANAGEMENT | Facility: OTHER | Age: 59
End: 2019-09-26

## 2019-09-26 PROBLEM — R91.8 PULMONARY INFILTRATES: Status: ACTIVE | Noted: 2019-09-26

## 2019-09-26 LAB
BASOPHILS # BLD AUTO: 0.9 % (ref 0–1.8)
BASOPHILS # BLD: 0.16 K/UL (ref 0–0.12)
EOSINOPHIL # BLD AUTO: 0.63 K/UL (ref 0–0.51)
EOSINOPHIL NFR BLD: 3.5 % (ref 0–6.9)
ERYTHROCYTE [DISTWIDTH] IN BLOOD BY AUTOMATED COUNT: 46.4 FL (ref 35.9–50)
HCT VFR BLD AUTO: 40.5 % (ref 42–52)
HGB BLD-MCNC: 12.4 G/DL (ref 14–18)
LYMPHOCYTES # BLD AUTO: 2.51 K/UL (ref 1–4.8)
LYMPHOCYTES NFR BLD: 14 % (ref 22–41)
MANUAL DIFF BLD: ABNORMAL
MCH RBC QN AUTO: 27.3 PG (ref 27–33)
MCHC RBC AUTO-ENTMCNC: 30.6 G/DL (ref 33.7–35.3)
MCV RBC AUTO: 89 FL (ref 81.4–97.8)
MONOCYTES # BLD AUTO: 1.25 K/UL (ref 0–0.85)
MONOCYTES NFR BLD AUTO: 7 % (ref 0–13.4)
NEUTROPHILS # BLD AUTO: 13.35 K/UL (ref 1.82–7.42)
NEUTROPHILS NFR BLD: 74.6 % (ref 44–72)
PATH REV: NORMAL
PATH REV: NORMAL
PLATELET # BLD AUTO: 201 K/UL (ref 164–446)
PMV BLD AUTO: 9.5 FL (ref 9–12.9)
RBC # BLD AUTO: 4.55 M/UL (ref 4.7–6.1)
WBC # BLD AUTO: 17.9 K/UL (ref 4.8–10.8)

## 2019-09-26 PROCEDURE — 85007 BL SMEAR W/DIFF WBC COUNT: CPT

## 2019-09-26 PROCEDURE — 85027 COMPLETE CBC AUTOMATED: CPT

## 2019-09-26 PROCEDURE — 770006 HCHG ROOM/CARE - MED/SURG/GYN SEMI*

## 2019-09-26 PROCEDURE — A9270 NON-COVERED ITEM OR SERVICE: HCPCS | Performed by: STUDENT IN AN ORGANIZED HEALTH CARE EDUCATION/TRAINING PROGRAM

## 2019-09-26 PROCEDURE — 99232 SBSQ HOSP IP/OBS MODERATE 35: CPT | Performed by: INTERNAL MEDICINE

## 2019-09-26 PROCEDURE — 80500 HCHG CLINICAL PATH CONSULT-LIMITED: CPT

## 2019-09-26 PROCEDURE — 700102 HCHG RX REV CODE 250 W/ 637 OVERRIDE(OP): Performed by: HOSPITALIST

## 2019-09-26 PROCEDURE — A9270 NON-COVERED ITEM OR SERVICE: HCPCS | Performed by: HOSPITALIST

## 2019-09-26 PROCEDURE — 700102 HCHG RX REV CODE 250 W/ 637 OVERRIDE(OP): Performed by: STUDENT IN AN ORGANIZED HEALTH CARE EDUCATION/TRAINING PROGRAM

## 2019-09-26 RX ADMIN — METHADONE HYDROCHLORIDE 20 MG: 10 TABLET ORAL at 12:44

## 2019-09-26 RX ADMIN — ALPRAZOLAM 0.25 MG: 0.25 TABLET ORAL at 18:09

## 2019-09-26 RX ADMIN — TEMAZEPAM 15 MG: 15 CAPSULE ORAL at 19:45

## 2019-09-26 RX ADMIN — METHADONE HYDROCHLORIDE 30 MG: 10 TABLET ORAL at 05:15

## 2019-09-26 RX ADMIN — GABAPENTIN 1600 MG: 400 CAPSULE ORAL at 17:33

## 2019-09-26 RX ADMIN — LEVOTHYROXINE SODIUM 125 MCG: 25 TABLET ORAL at 08:16

## 2019-09-26 RX ADMIN — ASPIRIN 325 MG: 325 TABLET, FILM COATED ORAL at 05:15

## 2019-09-26 RX ADMIN — ALPRAZOLAM 0.25 MG: 0.25 TABLET ORAL at 06:21

## 2019-09-26 RX ADMIN — SENNOSIDES, DOCUSATE SODIUM 2 TABLET: 50; 8.6 TABLET, FILM COATED ORAL at 17:34

## 2019-09-26 RX ADMIN — GABAPENTIN 2400 MG: 400 CAPSULE ORAL at 05:14

## 2019-09-26 RX ADMIN — METHADONE HYDROCHLORIDE 20 MG: 10 TABLET ORAL at 17:34

## 2019-09-26 RX ADMIN — TAMSULOSIN HYDROCHLORIDE 0.4 MG: 0.4 CAPSULE ORAL at 08:16

## 2019-09-26 RX ADMIN — TIMOLOL MALEATE 1 DROP: 2.5 SOLUTION OPHTHALMIC at 17:36

## 2019-09-26 RX ADMIN — TIMOLOL MALEATE 1 DROP: 2.5 SOLUTION OPHTHALMIC at 05:16

## 2019-09-26 RX ADMIN — BUDESONIDE AND FORMOTEROL FUMARATE DIHYDRATE 2 PUFF: 80; 4.5 AEROSOL RESPIRATORY (INHALATION) at 21:36

## 2019-09-26 RX ADMIN — SIMVASTATIN 40 MG: 40 TABLET, FILM COATED ORAL at 19:45

## 2019-09-26 RX ADMIN — BUDESONIDE AND FORMOTEROL FUMARATE DIHYDRATE 2 PUFF: 80; 4.5 AEROSOL RESPIRATORY (INHALATION) at 10:50

## 2019-09-26 ASSESSMENT — COPD QUESTIONNAIRES
DO YOU EVER COUGH UP ANY MUCUS OR PHLEGM?: NO/ONLY WITH OCCASIONAL COLDS OR INFECTIONS
COPD SCREENING SCORE: 2
DURING THE PAST 4 WEEKS HOW MUCH DID YOU FEEL SHORT OF BREATH: NONE/LITTLE OF THE TIME
IN THE PAST 12 MONTHS DO YOU DO LESS THAN YOU USED TO BECAUSE OF YOUR BREATHING PROBLEMS: DISAGREE/UNSURE
HAVE YOU SMOKED AT LEAST 100 CIGARETTES IN YOUR ENTIRE LIFE: NO/DON'T KNOW

## 2019-09-26 ASSESSMENT — LIFESTYLE VARIABLES
HOW MANY TIMES IN THE PAST YEAR HAVE YOU HAD 5 OR MORE DRINKS IN A DAY: 0
CONSUMPTION TOTAL: INCOMPLETE
HAVE PEOPLE ANNOYED YOU BY CRITICIZING YOUR DRINKING: NO
AVERAGE NUMBER OF DAYS PER WEEK YOU HAVE A DRINK CONTAINING ALCOHOL: 0
ALCOHOL_USE: NO
EVER FELT BAD OR GUILTY ABOUT YOUR DRINKING: NO
TOTAL SCORE: 0
EVER HAD A DRINK FIRST THING IN THE MORNING TO STEADY YOUR NERVES TO GET RID OF A HANGOVER: NO
HAVE YOU EVER FELT YOU SHOULD CUT DOWN ON YOUR DRINKING: NO
TOTAL SCORE: 0
TOTAL SCORE: 0

## 2019-09-26 ASSESSMENT — ENCOUNTER SYMPTOMS
CLAUDICATION: 0
BRUISES/BLEEDS EASILY: 0
COUGH: 1
LOSS OF CONSCIOUSNESS: 0
NAUSEA: 0
VOMITING: 0
WHEEZING: 0
EYE DISCHARGE: 0
EYE PAIN: 0
SHORTNESS OF BREATH: 1
SHORTNESS OF BREATH: 0
HEMOPTYSIS: 0
FLANK PAIN: 0
SINUS PAIN: 0
SENSORY CHANGE: 0
HEARTBURN: 0
NERVOUS/ANXIOUS: 0
FEVER: 0
PND: 0
FOCAL WEAKNESS: 0
SPUTUM PRODUCTION: 0
SPEECH CHANGE: 0
NECK PAIN: 0
HALLUCINATIONS: 0
DIZZINESS: 0
CONSTIPATION: 0
CHILLS: 0
EYE REDNESS: 0
ABDOMINAL PAIN: 0
DIARRHEA: 0
COUGH: 0
MYALGIAS: 0
PALPITATIONS: 0

## 2019-09-26 ASSESSMENT — COGNITIVE AND FUNCTIONAL STATUS - GENERAL
SUGGESTED CMS G CODE MODIFIER MOBILITY: CH
SUGGESTED CMS G CODE MODIFIER DAILY ACTIVITY: CH
MOBILITY SCORE: 24
DAILY ACTIVITIY SCORE: 24

## 2019-09-26 NOTE — CARE PLAN
Problem: Communication  Goal: The ability to communicate needs accurately and effectively will improve  9/26/2019 1438 by Dao Jasso R.N.  Outcome: PROGRESSING AS EXPECTED  9/26/2019 1238 by JOSEFINA AngNKhadra  Outcome: PROGRESSING AS EXPECTED     Problem: Safety  Goal: Will remain free from injury  9/26/2019 1438 by Dao Jasso R.N.  Outcome: PROGRESSING AS EXPECTED  9/26/2019 1238 by JOSEFINA AngN.  Outcome: PROGRESSING AS EXPECTED  Goal: Will remain free from falls  Outcome: PROGRESSING AS EXPECTED     Problem: Infection  Goal: Will remain free from infection  Outcome: PROGRESSING AS EXPECTED     Patient Able to express basic needs. No falls while here. No new infections since in hospital noted. Call light within reach and hourly rounding made.

## 2019-09-26 NOTE — ASSESSMENT & PLAN NOTE
Patient has a history of pulmonary infiltrates first noted in 2011, worsening in 2015, and worsening again this visit.   Plan  -Outpatient pulmonary follow up

## 2019-09-26 NOTE — PROGRESS NOTES
Pulmonary Progress Note    Date of admission  9/10/2019    Chief Complaint  59 y.o. male admitted 9/10/2019 with shortness of breath.    Hospital Course    This gentleman was admitted to the hospital with pneumonia and respiratory failure.  He developed worsening respiratory failure and was transferred to the ICU and was intubated    9/23Patient reports symptom improvement, notes he is able to ambulate further than before. Patient reports history of GERD    9/24 - Patient reports improvement in his symptoms. He reports feeling much improved and was able to ambulate and move to different room. Almost at baseline    9/25 - Patient had no acute complaints, he was slightly lethargic in his answers today.    Interval Problem Update  Reviewed last 24 hour events:    9/26 - Patient mentions that he feels better, appears lethargic/sedated during interview      Review of Systems  Review of Systems   Constitutional: Negative for malaise/fatigue.   HENT: Negative for ear discharge, nosebleeds, sinus pain and tinnitus.    Eyes: Negative for pain, discharge and redness.   Respiratory: Positive for shortness of breath. Negative for cough, hemoptysis, sputum production and wheezing.         Improving   Cardiovascular: Negative for chest pain, claudication and PND.   Gastrointestinal: Negative for abdominal pain, constipation, heartburn and melena.   Genitourinary: Negative for dysuria, flank pain and urgency.   Musculoskeletal: Negative for myalgias and neck pain.   Skin: Negative for itching.   Neurological: Negative for dizziness, sensory change, speech change, focal weakness and loss of consciousness.   Endo/Heme/Allergies: Does not bruise/bleed easily.   Psychiatric/Behavioral: Negative for hallucinations and suicidal ideas. The patient is not nervous/anxious.          Vital Signs for last 24 hours   Temp:  [35.8 °C (96.5 °F)-36.6 °C (97.8 °F)] 36.4 °C (97.6 °F)  Pulse:  [60-68] 61  Resp:  [15-17] 16  BP: (92-99)/(48-68)  98/67  SpO2:  [92 %-97 %] 92 %    Hemodynamic parameters for last 24 hours       Respiratory Information for the last 24 hours       Physical Exam   Physical Exam   Constitutional: He is oriented to person, place, and time. He appears well-developed. He appears lethargic. No distress.   HENT:   Head: Normocephalic and atraumatic.   Right Ear: External ear normal.   Left Ear: External ear normal.   Nose: Nose normal.   Mouth/Throat: Oropharynx is clear and moist.   Eyes: Pupils are equal, round, and reactive to light. EOM are normal. Right eye exhibits no discharge. Left eye exhibits no discharge.   Neck: Normal range of motion. Neck supple. No JVD present. No tracheal deviation present.   Cardiovascular: Normal rate, regular rhythm, normal heart sounds and intact distal pulses. Exam reveals no gallop.   No murmur heard.  Pulmonary/Chest: Effort normal. No respiratory distress. He has no decreased breath sounds. He has no wheezes. He has no rales. He exhibits no tenderness.   Abdominal: Soft. Bowel sounds are normal. He exhibits no distension. There is no tenderness. There is no rebound.   Musculoskeletal: Normal range of motion. He exhibits no edema or tenderness.   Lymphadenopathy:     He has no cervical adenopathy.   Neurological: He is oriented to person, place, and time. He appears lethargic. No cranial nerve deficit. Coordination normal.   Skin: Skin is warm and dry. No rash noted. He is not diaphoretic. No erythema.       Medications  Current Facility-Administered Medications   Medication Dose Route Frequency Provider Last Rate Last Dose   • budesonide-formoterol (SYMBICORT) 80-4.5 MCG/ACT inhaler 2 Puff  2 Puff Inhalation BID (RT) Lennox Connors M.D.   2 Puff at 09/26/19 1050   • busPIRone (BUSPAR) tablet 5 mg  5 mg Oral TID Rigoberto Galvan M.D.   5 mg at 09/23/19 1204   • ALPRAZolam (XANAX) tablet 0.25 mg  0.25 mg Oral Q6HRS PRN Rigoberto Galvan M.D.   0.25 mg at 09/26/19 0621   • tamsulosin  (FLOMAX) capsule 0.4 mg  0.4 mg Oral AFTER BREAKFAST Rigoberto Galvan M.D.   0.4 mg at 09/26/19 0816   • timolol (TIMOPTIC) 0.25 % ophthalmic solution 1 Drop  1 Drop Both Eyes BID Rigoberto Galvan M.D.   1 Drop at 09/26/19 0516   • aspirin (ASA) tablet 325 mg  325 mg Oral DAILY Rigoberto Galvan M.D.   325 mg at 09/26/19 0515   • gabapentin (NEURONTIN) capsule 1,600 mg  1,600 mg Oral Q EVENING Rigoberto Galvan M.D.   1,600 mg at 09/25/19 1723   • gabapentin (NEURONTIN) capsule 2,400 mg  2,400 mg Oral QAM Rigoberto Galvan M.D.   2,400 mg at 09/26/19 0514   • levothyroxine (SYNTHROID) tablet 125 mcg  125 mcg Oral QAM AC Rigoberto Galvan M.D.   125 mcg at 09/26/19 0816   • methadone (DOLOPHINE) tablet 30 mg  30 mg Oral QAROLANDO Galvan M.D.   30 mg at 09/26/19 0515    And   • methadone (DOLOPHINE) tablet 20 mg  20 mg Oral DAILY AT NOON Rigoberto Galvan M.D.   20 mg at 09/26/19 1244    And   • methadone (DOLOPHINE) tablet 20 mg  20 mg Oral Q EVENING Rigoberto Galvan M.D.   20 mg at 09/25/19 1723   • senna-docusate (PERICOLACE or SENOKOT S) 8.6-50 MG per tablet 2 Tab  2 Tab Oral BID Rigoberto Galvan M.D.   Stopped at 09/18/19 1711    And   • polyethylene glycol/lytes (MIRALAX) PACKET 1 Packet  1 Packet Oral QDAY PRN Rigoberto Galvan M.D.        And   • magnesium hydroxide (MILK OF MAGNESIA) suspension 30 mL  30 mL Oral QDAY PRN Rigoberto Galvan M.D.        And   • bisacodyl (DULCOLAX) suppository 10 mg  10 mg Rectal QDAY PRN Rigoberto Galvan M.D.       • simvastatin (ZOCOR) tablet 40 mg  40 mg Oral Nightly Rigoberto Galvan M.D.   40 mg at 09/25/19 2108   • temazepam (RESTORIL) capsule 15 mg  15 mg Oral QHS Rigoberto Galvan M.D.   15 mg at 09/25/19 2139   • tramadol (ULTRAM) 50 MG tablet 50 mg  50 mg Oral BID Rigoberto Galvan M.D.   50 mg at 09/22/19 1720   • acetaminophen (TYLENOL) tablet 650 mg  650 mg Oral Q6HRS PRN Rigoberto Galvan M.D.       •  oxyCODONE immediate-release (ROXICODONE) tablet 5-10 mg  5-10 mg Oral Q4HRS PRN Rigoberto Galvan M.D.   10 mg at 09/23/19 2153   • Respiratory Care per Protocol   Nebulization Continuous RT Marck Montero Jr., D.O.       • ipratropium-albuterol (DUONEB) nebulizer solution  3 mL Nebulization Q2HRS PRN (RT) Marck Montero Jr. D.O.       • enoxaparin (LOVENOX) inj 40 mg  40 mg Subcutaneous DAILY Marck Montero Jr. D.O.   40 mg at 09/25/19 0515   • sodium chloride (OCEAN) 0.65 % nasal spray 2 Spray  2 Spray Nasal Q2HRS PRN Mary Silva M.D.   2 [place] at 09/25/19 2109       Fluids    Intake/Output Summary (Last 24 hours) at 9/26/2019 1545  Last data filed at 9/26/2019 1300  Gross per 24 hour   Intake 600 ml   Output --   Net 600 ml       Laboratory          Recent Labs     09/24/19  0446   SODIUM 137   POTASSIUM 4.6   CHLORIDE 98   CO2 34*   BUN 19   CREATININE 1.12   MAGNESIUM 2.0   CALCIUM 9.3     Recent Labs     09/24/19  0446   GLUCOSE 87     Recent Labs     09/24/19  0446 09/26/19  1054   WBC 21.2* 17.9*   NEUTSPOLYS 67.40 74.60*   LYMPHOCYTES 18.50* 14.00*   MONOCYTES 7.60 7.00   EOSINOPHILS 3.00 3.50   BASOPHILS 0.30 0.90     Recent Labs     09/24/19  0446 09/26/19  1054   RBC 4.45* 4.55*   HEMOGLOBIN 12.5* 12.4*   HEMATOCRIT 39.4* 40.5*   PLATELETCT 264 201       Imaging  None    Assessment/Plan  Acute respiratory failure with hypoxia, emphsema and pneumonitis (HCC)- (present on admission)  Assessment & Plan  Improved. Almost at baseline. Intubated 9/14-9/16. Pulmonary infiltrates first noted in 2011, worsening in 2015, and worsening again this visit.   Plan  -Symbicort  -Home oxygen eval if patient going home  -Set up appointment with Pulmonary follow up, patient lives in [facility]    Pneumonitis- (present on admission)  Assessment & Plan  Possibly secondary to GERD, chemical aspiration pneumonitis in the setting of chronic Methadone use. Completed 5 days of doxycycline on 9/15  and completed 5 days of Rocephin on 9/18. Per patient he takes prilosec at home.   Plan  -Counseled on head of bed elevation  -Ideally, patient would benefit most from weaning off Methadone  -Continue home prilosec    Interstitial lung disease (HCC)  Assessment & Plan  Improving. 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 Patient completed steroid taper  Plan  -Follow up in Pulm clinic for PFT's and monitoring    Paraseptal emphysema (HCC)  Assessment & Plan  Possible combined etiology as mentioned earlier.    Pulmonary hypertension (HCC)  Assessment & Plan  RVSP 55 mmHg. Maintain euvolemia    Pulmonary infiltrates  Assessment & Plan  Patient has a history of pulmonary infiltrates first noted in 2011, worsening in 2015, and worsening again this visit.   Plan  -Outpatient pulmonary follow up

## 2019-09-27 LAB
ACUTE LEUKEMIA MARKERS SPEC-IMP: NORMAL
EVENTS COUNTED SPEC: 21 MARKERS
SOURCE 9121: NORMAL

## 2019-09-27 PROCEDURE — 700102 HCHG RX REV CODE 250 W/ 637 OVERRIDE(OP): Performed by: HOSPITALIST

## 2019-09-27 PROCEDURE — A9270 NON-COVERED ITEM OR SERVICE: HCPCS | Performed by: HOSPITALIST

## 2019-09-27 PROCEDURE — 700111 HCHG RX REV CODE 636 W/ 250 OVERRIDE (IP): Performed by: INTERNAL MEDICINE

## 2019-09-27 PROCEDURE — 770006 HCHG ROOM/CARE - MED/SURG/GYN SEMI*

## 2019-09-27 PROCEDURE — 99232 SBSQ HOSP IP/OBS MODERATE 35: CPT | Performed by: INTERNAL MEDICINE

## 2019-09-27 RX ADMIN — GABAPENTIN 2400 MG: 400 CAPSULE ORAL at 05:27

## 2019-09-27 RX ADMIN — BUDESONIDE AND FORMOTEROL FUMARATE DIHYDRATE 2 PUFF: 80; 4.5 AEROSOL RESPIRATORY (INHALATION) at 08:05

## 2019-09-27 RX ADMIN — METHADONE HYDROCHLORIDE 20 MG: 10 TABLET ORAL at 11:08

## 2019-09-27 RX ADMIN — BUDESONIDE AND FORMOTEROL FUMARATE DIHYDRATE 2 PUFF: 80; 4.5 AEROSOL RESPIRATORY (INHALATION) at 19:50

## 2019-09-27 RX ADMIN — TIMOLOL MALEATE 1 DROP: 2.5 SOLUTION OPHTHALMIC at 05:25

## 2019-09-27 RX ADMIN — GABAPENTIN 1600 MG: 400 CAPSULE ORAL at 17:32

## 2019-09-27 RX ADMIN — TEMAZEPAM 15 MG: 15 CAPSULE ORAL at 19:50

## 2019-09-27 RX ADMIN — ENOXAPARIN SODIUM 40 MG: 100 INJECTION SUBCUTANEOUS at 06:00

## 2019-09-27 RX ADMIN — LEVOTHYROXINE SODIUM 125 MCG: 25 TABLET ORAL at 05:26

## 2019-09-27 RX ADMIN — ALPRAZOLAM 0.25 MG: 0.25 TABLET ORAL at 19:50

## 2019-09-27 RX ADMIN — TAMSULOSIN HYDROCHLORIDE 0.4 MG: 0.4 CAPSULE ORAL at 08:04

## 2019-09-27 RX ADMIN — METHADONE HYDROCHLORIDE 30 MG: 10 TABLET ORAL at 05:27

## 2019-09-27 RX ADMIN — METHADONE HYDROCHLORIDE 20 MG: 10 TABLET ORAL at 17:32

## 2019-09-27 RX ADMIN — SIMVASTATIN 40 MG: 40 TABLET, FILM COATED ORAL at 19:50

## 2019-09-27 RX ADMIN — TIMOLOL MALEATE 1 DROP: 2.5 SOLUTION OPHTHALMIC at 17:31

## 2019-09-27 RX ADMIN — ASPIRIN 325 MG: 325 TABLET, FILM COATED ORAL at 05:26

## 2019-09-27 ASSESSMENT — ENCOUNTER SYMPTOMS
CONSTIPATION: 0
FOCAL WEAKNESS: 0
SHORTNESS OF BREATH: 0
CHILLS: 0
DIARRHEA: 0
NAUSEA: 0
VOMITING: 0
ABDOMINAL PAIN: 0
PALPITATIONS: 0
FEVER: 0
COUGH: 1

## 2019-09-27 NOTE — CARE PLAN
Problem: Communication  Goal: The ability to communicate needs accurately and effectively will improve  Outcome: PROGRESSING AS EXPECTED     Problem: Safety  Goal: Will remain free from injury  Outcome: PROGRESSING AS EXPECTED     Problem: Venous Thromboembolism (VTW)/Deep Vein Thrombosis (DVT) Prevention:  Goal: Patient will participate in Venous Thrombosis (VTE)/Deep Vein Thrombosis (DVT)Prevention Measures  Outcome: PROGRESSING AS EXPECTED   Patient able to communicate basic needs. No falls while here. Call light within reach. No s/s of DVT.

## 2019-09-27 NOTE — DISCHARGE PLANNING
Anticipated Discharge Disposition: SNF    Action: LSW and Dr Watters met with the Pt at bedside to discuss SNF. Pt said he wants to go home and get Pt everyday. LSW stated HH will not be out everyday. Pt agreed to SNF choice and signed for 1. [facility] and 2. [facility].    Barriers to Discharge: SNF acceptance    Plan: follow up on SNF referral

## 2019-09-27 NOTE — DISCHARGE PLANNING
Received Choice form at 1240  Agency/Facility Name: [facility]  Referral sent per Choice form at 1249

## 2019-09-27 NOTE — PROGRESS NOTES
HOSPITAL MEDICINE PROGRESS NOTE    Date of service  9/26/2019    Chief Complaint  No chief complaint on file.      Hospital Course:     59 years old man with a history of chronic pain syndrome on massive amount narcotics and Neurontin, admitted for community-acquired pneumonia complicated by acute hypoxic respiratory failure, necessitate intubation and ICU transfer.  He was extubated after 3 days, on 9/16/19.  Transferred to floor on 9/18/19.         Interval History Updates:  His blood pressure continues to be low, actually slightly lower than the previous day.  Persistent leukocytosis, although slightly improved.    Consultants/Specialty  Pulmonary    Review of Systems   Review of Systems   Constitutional: Negative for chills and fever.   Respiratory: Positive for cough. Negative for shortness of breath.    Cardiovascular: Negative for chest pain, palpitations and leg swelling.   Gastrointestinal: Negative for abdominal pain, constipation, diarrhea, nausea and vomiting.   Skin: Negative for rash.   Neurological: Negative for focal weakness.   Endo/Heme/Allergies: Negative.    All other systems reviewed and are negative.       Medications:  • budesonide-formoterol  2 Puff BID (RT)   • busPIRone  5 mg TID   • ALPRAZolam  0.25 mg Q6HRS PRN   • tamsulosin  0.4 mg AFTER BREAKFAST   • timolol  1 Drop BID   • aspirin  325 mg DAILY   • gabapentin  1,600 mg Q EVENING   • gabapentin  2,400 mg QAM   • levothyroxine  125 mcg QAM AC   • methadone  30 mg QAM    And   • methadone  20 mg DAILY AT NOON    And   • methadone  20 mg Q EVENING   • senna-docusate  2 Tab BID    And   • polyethylene glycol/lytes  1 Packet QDAY PRN    And   • magnesium hydroxide  30 mL QDAY PRN    And   • bisacodyl  10 mg QDAY PRN   • simvastatin  40 mg Nightly   • temazepam  15 mg QHS   • tramadol  50 mg BID   • acetaminophen  650 mg Q6HRS PRN   • oxyCODONE immediate-release  5-10 mg Q4HRS PRN   • Respiratory Care per Protocol   Continuous RT   •  ipratropium-albuterol  3 mL Q2HRS PRN (RT)   • enoxaparin (LOVENOX) injection  40 mg DAILY   • sodium chloride  2 Spray Q2HRS PRN       Physical Exam   Vitals:    09/26/19 0043 09/26/19 0421 09/26/19 0800 09/26/19 1554   BP: (!) 96/65 (!) 96/68 (!) 98/67 (!) 98/53   Pulse: 65 60 61 71   Resp: 17 15 16 17   Temp: 36.4 °C (97.5 °F) 35.8 °C (96.5 °F) 36.4 °C (97.6 °F) 36.6 °C (97.8 °F)   TempSrc: Temporal Temporal Temporal Temporal   SpO2: 95% 97% 92% 96%   Weight:       Height:           Physical Exam   Constitutional: He is oriented to person, place, and time and well-developed, well-nourished, and in no distress. No distress.   HENT:   Head: Normocephalic and atraumatic.   Eyes: Pupils are equal, round, and reactive to light. Conjunctivae are normal. No scleral icterus.   Neck: Normal range of motion. Neck supple. No JVD present.   Cardiovascular: Normal rate, regular rhythm and normal heart sounds. Exam reveals no gallop and no friction rub.   No murmur heard.  Pulmonary/Chest: Effort normal and breath sounds normal. No respiratory distress. He has no wheezes. He has no rales. He exhibits no tenderness.   Coarse BS bilaterally, more at the lower lung fields.   Abdominal: Soft. Bowel sounds are normal. There is no tenderness. There is no rebound.   Musculoskeletal: Normal range of motion. He exhibits no edema, tenderness or deformity.   Neurological: He is alert and oriented to person, place, and time.   Skin: Skin is warm and dry. No rash noted. He is not diaphoretic. No erythema.   Psychiatric: Mood and affect normal.   Nursing note and vitals reviewed.         Laboratory   Recent Labs     09/24/19  0446 09/26/19  1054   WBC 21.2* 17.9*   RBC 4.45* 4.55*   HEMOGLOBIN 12.5* 12.4*   HEMATOCRIT 39.4* 40.5*   PLATELETCT 264 201     Recent Labs     09/24/19  0446   SODIUM 137   POTASSIUM 4.6   CHLORIDE 98   CO2 34*   GLUCOSE 87   BUN 19   CREATININE 1.12      Recent Labs     09/24/19  0446   GLUCOSE 87            C-reactive protein 7.23, 3.11.     Microbiology  Results     Procedure Component Value Units Date/Time    Fungal Culture - BAL [[id number]] Collected:  09/14/19 0110    Order Status:  Completed Specimen:  Respirate from Bronchoalveolar Lavage Updated:  09/25/19 0805     Significant Indicator NEG     Source RESP     Site BRONCHOALVEOLAR LAVAGE     Culture Result No clinically significant fungal growth to date.    Narrative:       Collected By:[id number] TONY AN.  Collected By:[id number] TONY ROSE    Fungal Smear - BAL [[id number]] Collected:  09/14/19 0110    Order Status:  Completed Specimen:  Respirate from Bronchoalveolar Lavage Updated:  09/25/19 0805     Significant Indicator NEG     Source RESP     Site BRONCHOALVEOLAR LAVAGE     Fungal Smear Results No fungal elements seen.    Narrative:       Collected By:[id number] TONY AN.  Collected By:[id number] TONY AN.    AFB Culture - BAL [[id number]] Collected:  09/14/19 0110    Order Status:  Completed Specimen:  Respirate from Bronchoalveolar Lavage Updated:  09/25/19 0805     Significant Indicator NEG     Source RESP     Site BRONCHOALVEOLAR LAVAGE     Culture Result Culture in progress.     AFB Smear Results No acid fast bacilli seen.    Narrative:       Collected By:[id number] TONY AN.  Collected By:[id number] TONY AN.      Fungal serology from BAL negative, September 13, 2018.    Pathology report BAL fluid: September 14, 2019  A. Bronchoalveolar lavage:         No malignant cells identified.         No evidence of Pulmonary Langehans cell histiocytosis in this          specimen.         Mixed inflammatory cells and alveolar macrophages entrapped          within mucin and few scattered benign bronchial epithelial and          squamous cells present.    Peripheral smear pathology review 09/26/2019  Comment: Absolute neutrophilia without a significant left shift or significant   toxic morphology. Given the history of pneumonia this may  be a reactive   process. Per medical record there is a history of mild chronic   neutrophilia. This may be seen with medications such as steroids,   smoking, and chronic inflammation. The morphology is not typical for   CML, but if there is clinical concern for a myeloproliferative neoplasm   a bone marrow biopsy should be performed.      Labs reviewed by me and noted above.    Radiology  I personally reviewed the chest x-ray portable on September 21, 2019 and per my interpretation, consistent with some pulmonary edema, no obvious consolidation or infiltrate.      CT chest 9/13/2019:  I personally reviewed the images of the CT chest and per my interpretation, fibrotic parenchymal changes in both lungs.    Chest x-ray two-view September 25, 2019 personally reviewed and per my interpretation, no significant change compared to past CXR, still with hazy opacities at the RLL and LLL, LML.  No consolidation.  No obvious pleural effusion.     Echocardiogram September 11, 2019:  LV ejection fraction 60%.  Normal diastolic function.  The RVSP is estimated to be 55 debora-Hg.  This is unchanged compared to the prior study on January 2015.     EKG performed 09/17/2019 was personally reviewed and per my interpretation shows normal sinus rhythm, rate 97.  The QT measured 366, corrected .      Assessment and Plan    Principal Problem:    Hypotension POA: No.  Suspect hypovolemic.  Pressures improved with small volume IV fluid bolus.  I discontinue midrodrin.  BP slightly lower, but asymptomatic.  Close monitor.    Persistent leukocytosis:  ?Secondary to steroid, though last dose of Solu-Medrol September 18, 2019.  Afebrile.  Chart review shows no history of persistent leukocytosis.  Denies any B symptom.  The peripheral smear review by pathology indicated most likely reactive leukocytosis.   I order peripheral blood flow cytometry with markers to eval for hematologic malignacy and this is pending.  He will need outpatient  follow up with Hematology after discharge.        Pneumonitis POA: Yes.  Suspect aspiration.  Patient finished a course of ceftriaxone and doxycycline.  Finished course of steroid taper.  Pulmonary is following.  Chest x-ray two-view reviewed as above.  CT chest also reviewed in comparison to CXR.  Patient will benefit from outpatient follow up with Pulmonology.        Acute respiratory failure with hypoxia, emphysema and pneumonitis (HCC) POA: Yes.  Continue to wean oxygen.  Anticipate he will need oxygen at discharge for short-term use.  Check ambulatory room air O2 sat, order placed but not completed yet.      Pulmonary hypertension (HCC) POA: Yes.  RV SP elevated on Echo, mild change vs in 2015.  Unfortunately, blood pressure limiting diuretic.      QT prolongation POA: Yes.  Patient has had discussion with cardiology, Dr Linder, regarding his long-term methadone.  He accept the risks of cardiovascular compromise and wished to continue his high-dose methadone.  He is high risk for cardiovascular arhythmia including VT, VF.  EKG on September 17, 2018 interpretation as above.  I order another repeat EKG today to assess for his QTC again.      Paraseptal emphysema (HCC) POA: Yes.  Status post a course of steroid.  Finished course of antibiotic.  Continue duo nebs, bronchodilators, incentive spirometer.      Interstitial lung disease (HCC) POA: Yes.  Please see above.  Tobacco cessation counseled.      Anxiety POA: Yes.  Mood is stable.  On Xanax 0.25 mg p.o. every 6 as needed.      Hypothyroidism POA: Yes.  Continue Synthroid.      Chronic pain POA: Yes.  Controlled on home dose of methadone, gabapentin, tramadol.      Hypokalemia POA: No.  Resolved.  Potassium 4.6 yesterday.      Dysphasia POA: Unknown.  No recurrent.  Tolerate regular diet texture without difficulty.      Urinary retention POA: Unknown.  Resolved.    Resolved Problems:    Severe sepsis (HCC) POA: Yes    INDIA (acute kidney injury) (HCC) POA:  Yes    Hypomagnesemia POA: Unknown    Hypophosphatemia POA: Yes      DVT prophylaxis: Lovenox    CODE STATUS:  FULL CODE    Disposition: Anticipate skilled nursing facility discharge.  Will reconsult PT for eval.    Case was discussed with Primary RN and Charge Nurse, Medical SW, , and Pharmacist on [employer] in addition to individual(s) mentioned above.    I have performed a physical exam and reviewed and updated ROS as of today, 9/26/2019.  In review of yesterday's note dated, 9/25/2019, there are no changes except as documented above.      Matthew Watters M.D.

## 2019-09-27 NOTE — DISCHARGE PLANNING
Anticipated Discharge Disposition: SNF vs Home with HH    Action: LSW met with the Pt for SNF choice and Pt said he doesn't think he needs SNF any more. LSW chart reviewed and saw that the PT notes show Home with HH recommended. LSW [place] text the doctor for update and recommendation.     Barriers to Discharge: Possible Home with HH    Plan: follow up with medical team

## 2019-09-27 NOTE — PROGRESS NOTES
HOSPITAL MEDICINE PROGRESS NOTE    Date of service  9/27/2019    Chief Complaint  No chief complaint on file.      Hospital Course:     59 years old man with a history of chronic pain syndrome on massive amount narcotics and Neurontin, admitted for community-acquired pneumonia complicated by acute hypoxic respiratory failure, necessitate intubation and ICU transfer.  He was extubated after 3 days, on 9/16/19.  Transferred to floor on 9/18/19.         Interval History Updates:  No events overnight.  Blood pressure is stable, but on the low side, high 90s systolic.  Patient is seen in conjunction with bedside RN and the medical social worker.    Consultants/Specialty  Pulmonary    Review of Systems   Review of Systems   Constitutional: Negative for chills and fever.   Respiratory: Positive for cough. Negative for shortness of breath.    Cardiovascular: Negative for chest pain, palpitations and leg swelling.   Gastrointestinal: Negative for abdominal pain, constipation, diarrhea, nausea and vomiting.   Skin: Negative for rash.   Neurological: Negative for focal weakness.   Endo/Heme/Allergies: Negative.    All other systems reviewed and are negative.       Medications:  • budesonide-formoterol  2 Puff BID (RT)   • busPIRone  5 mg TID   • ALPRAZolam  0.25 mg Q6HRS PRN   • tamsulosin  0.4 mg AFTER BREAKFAST   • timolol  1 Drop BID   • aspirin  325 mg DAILY   • gabapentin  1,600 mg Q EVENING   • gabapentin  2,400 mg QAM   • levothyroxine  125 mcg QAM AC   • methadone  30 mg QAM    And   • methadone  20 mg DAILY AT NOON    And   • methadone  20 mg Q EVENING   • senna-docusate  2 Tab BID    And   • polyethylene glycol/lytes  1 Packet QDAY PRN    And   • magnesium hydroxide  30 mL QDAY PRN    And   • bisacodyl  10 mg QDAY PRN   • simvastatin  40 mg Nightly   • temazepam  15 mg QHS   • tramadol  50 mg BID   • acetaminophen  650 mg Q6HRS PRN   • oxyCODONE immediate-release  5-10 mg Q4HRS PRN   • Respiratory Care per Protocol    Continuous RT   • ipratropium-albuterol  3 mL Q2HRS PRN (RT)   • enoxaparin (LOVENOX) injection  40 mg DAILY   • sodium chloride  2 Spray Q2HRS PRN       Physical Exam   Vitals:    09/26/19 2100 09/27/19 0008 09/27/19 0444 09/27/19 0715   BP: 101/57 120/67 112/67 (!) 95/51   Pulse: 63 69 65 (!) 57   Resp: 18 18 18 18   Temp: 36.7 °C (98.1 °F) 36.1 °C (97 °F) 36.4 °C (97.6 °F) 36.6 °C (97.8 °F)   TempSrc: Temporal Temporal Temporal Temporal   SpO2: 94% 93% 92% 96%   Weight:       Height:           Physical Exam   Constitutional: He is oriented to person, place, and time and well-developed, well-nourished, and in no distress. No distress.   HENT:   Head: Normocephalic and atraumatic.   Eyes: Pupils are equal, round, and reactive to light. Conjunctivae are normal. No scleral icterus.   Neck: Normal range of motion. Neck supple. No JVD present.   Cardiovascular: Normal rate, regular rhythm and normal heart sounds. Exam reveals no gallop and no friction rub.   No murmur heard.  Pulmonary/Chest: Effort normal and breath sounds normal. No respiratory distress. He has no wheezes. He has no rales. He exhibits no tenderness.   Coarse BS bilaterally, more at the lower lung fields.   Abdominal: Soft. Bowel sounds are normal. There is no tenderness. There is no rebound.   Musculoskeletal: Normal range of motion. He exhibits no edema, tenderness or deformity.   Neurological: He is alert and oriented to person, place, and time.   Skin: Skin is warm and dry. No rash noted. He is not diaphoretic. No erythema.   Psychiatric: Mood and affect normal.   Nursing note and vitals reviewed.         Laboratory   Recent Labs     09/26/19  1054   WBC 17.9*   RBC 4.55*   HEMOGLOBIN 12.4*   HEMATOCRIT 40.5*   PLATELETCT 201     No results for input(s): SODIUM, POTASSIUM, CHLORIDE, CO2, GLUCOSE, BUN, CREATININE in the last 72 hours.   No results for input(s): ALTSGPT, ASTSGOT, ALKPHOSPHAT, TBILIRUBIN, DBILIRUBIN, GAMMAGT, AMYLASE, LIPASE, ALB,  PREALBUMIN, GLUCOSE in the last 72 hours.        C-reactive protein 7.23, 3.11.     Microbiology  Results     Procedure Component Value Units Date/Time    Fungal Culture - BAL [[id number]] Collected:  09/14/19 0110    Order Status:  Completed Specimen:  Respirate from Bronchoalveolar Lavage Updated:  09/25/19 0805     Significant Indicator NEG     Source RESP     Site BRONCHOALVEOLAR LAVAGE     Culture Result No clinically significant fungal growth to date.    Narrative:       Collected By:[id number] TONY ROSE  Collected By:[id number] TONY ROSE    Fungal Smear - BAL [[id number]] Collected:  09/14/19 0110    Order Status:  Completed Specimen:  Respirate from Bronchoalveolar Lavage Updated:  09/25/19 0805     Significant Indicator NEG     Source RESP     Site BRONCHOALVEOLAR LAVAGE     Fungal Smear Results No fungal elements seen.    Narrative:       Collected By:[id number] TONY AN.  Collected By:[id number] TONY AN.    AFB Culture - BAL [[id number]] Collected:  09/14/19 0110    Order Status:  Completed Specimen:  Respirate from Bronchoalveolar Lavage Updated:  09/25/19 0805     Significant Indicator NEG     Source RESP     Site BRONCHOALVEOLAR LAVAGE     Culture Result Culture in progress.     AFB Smear Results No acid fast bacilli seen.    Narrative:       Collected By:[id number] TONY AN.  Collected By:[id number] TONY AN.      Fungal serology from BAL negative, September 13, 2018.    Pathology report BAL fluid: September 14, 2019  A. Bronchoalveolar lavage:         No malignant cells identified.         No evidence of Pulmonary Langehans cell histiocytosis in this          specimen.         Mixed inflammatory cells and alveolar macrophages entrapped          within mucin and few scattered benign bronchial epithelial and          squamous cells present.    Peripheral smear pathology review 09/26/2019  Comment: Absolute neutrophilia without a significant left shift or significant   toxic  morphology. Given the history of pneumonia this may be a reactive   process. Per medical record there is a history of mild chronic   neutrophilia. This may be seen with medications such as steroids,   smoking, and chronic inflammation. The morphology is not typical for   CML, but if there is clinical concern for a myeloproliferative neoplasm   a bone marrow biopsy should be performed.      Labs reviewed by me and noted above.    Radiology  I personally reviewed the chest x-ray portable on September 21, 2019 and per my interpretation, consistent with some pulmonary edema, no obvious consolidation or infiltrate.      CT chest 9/13/2019:  I personally reviewed the images of the CT chest and per my interpretation, fibrotic parenchymal changes in both lungs.    Chest x-ray two-view September 25, 2019 personally reviewed and per my interpretation, no significant change compared to past CXR, still with hazy opacities at the RLL and LLL, LML.  No consolidation.  No obvious pleural effusion.     Echocardiogram September 11, 2019:  LV ejection fraction 60%.  Normal diastolic function.  The RVSP is estimated to be 55 debora-Hg.  This is unchanged compared to the prior study on January 2015.     EKG performed 09/17/2019 was personally reviewed and per my interpretation shows normal sinus rhythm, rate 97.  The QT measured 366, corrected .    EKG performed 09/25 was personally reviewed and per my interpretation shows normal sinus rhythm, rate of 64.  The QT interval measure 468, QTc 483.    Assessment and Plan    Principal Problem:    Hypotension POA: No.  Suspect hypovolemic.  Pressures improved with small volume IV fluid bolus.  I discontinue midrodrin.  BP slightly lower, but asymptomatic.  Close monitor.    Persistent leukocytosis:  ?Secondary to steroid, though last dose of Solu-Medrol September 18, 2019.  Afebrile.  Chart review shows no history of persistent leukocytosis.  Denies any B symptom.  The peripheral smear  review by pathology indicated most likely reactive leukocytosis.   I order peripheral blood flow cytometry with markers to eval for hematologic malignacy and this is pending.  He will need outpatient follow up with Hematology after discharge.        Pneumonitis POA: Yes.  Suspect aspiration.  Patient finished a course of ceftriaxone and doxycycline.  Finished course of steroid taper.  Pulmonary is following.  Chest x-ray two-view reviewed as above.  CT chest also reviewed in comparison to CXR.  Patient will benefit from outpatient follow up with Pulmonology.        Acute respiratory failure with hypoxia, emphysema and pneumonitis (HCC) POA: Yes.  Continue to wean oxygen.  Anticipate he will need oxygen at discharge for short-term use.  Check ambulatory room air O2 sat, order placed but not completed yet.      Pulmonary hypertension (HCC) POA: Yes.  RV SP elevated on Echo, mild change vs in 2015.  Unfortunately, blood pressure limiting diuretic.      QT prolongation POA: Yes.  Patient has had discussion with cardiology, Dr Linder, regarding his long-term methadone.  He accept the risks of cardiovascular compromise and wished to continue his high-dose methadone.  He is high risk for cardiovascular arhythmia including VT, VF.  EKG on September 17, 2019 interpretation as above.  The repeat EKG on September 25, 2019 with QTc 483.      Paraseptal emphysema (HCC) POA: Yes.  Status post a course of steroid.  Finished course of antibiotic.  Continue duo nebs, bronchodilators, incentive spirometer.      Interstitial lung disease (HCC) POA: Yes.  Please see above.  Tobacco cessation counseled.      Anxiety POA: Yes.  Mood is stable.  On Xanax 0.25 mg p.o. every 6 as needed.      Hypothyroidism POA: Yes.  Continue Synthroid.      Chronic pain POA: Yes.  Controlled on home dose of methadone, gabapentin, tramadol.      Hypokalemia POA: No.  Resolved.  Potassium 4.6 yesterday.      Dysphasia POA: Unknown.  No recurrent.   Tolerate regular diet texture without difficulty.      Urinary retention POA: Unknown.  Resolved.    Resolved Problems:    Severe sepsis (HCC) POA: Yes    INDIA (acute kidney injury) (HCC) POA: Yes    Hypomagnesemia POA: Unknown    Hypophosphatemia POA: Yes      DVT prophylaxis: Lovenox    CODE STATUS:  FULL CODE    Disposition: I seen the patient today in conjunction with the medical social worker regarding his choice for disposition.  He would like to be discharged to a skilled nursing facility for ongoing PT and OT treatment.  Choice form were obtained by .  Annabella with  regarding discharge.  Patient still need repeat OT evaluation per report.    Case was discussed with Primary RN and Charge Nurse, Medical SW, , and Pharmacist on [employer]ound in addition to individual(s) mentioned above.    I have performed a physical exam and reviewed and updated ROS as of today, 9/27/2019.  In review of yesterday's note dated, 9/26/2019, there are no changes except as documented above.      Matthew Watters M.D.

## 2019-09-28 PROCEDURE — A9270 NON-COVERED ITEM OR SERVICE: HCPCS | Performed by: HOSPITALIST

## 2019-09-28 PROCEDURE — 770006 HCHG ROOM/CARE - MED/SURG/GYN SEMI*

## 2019-09-28 PROCEDURE — 700111 HCHG RX REV CODE 636 W/ 250 OVERRIDE (IP): Performed by: INTERNAL MEDICINE

## 2019-09-28 PROCEDURE — 99232 SBSQ HOSP IP/OBS MODERATE 35: CPT | Performed by: INTERNAL MEDICINE

## 2019-09-28 PROCEDURE — 700102 HCHG RX REV CODE 250 W/ 637 OVERRIDE(OP): Performed by: HOSPITALIST

## 2019-09-28 RX ADMIN — METHADONE HYDROCHLORIDE 30 MG: 10 TABLET ORAL at 04:57

## 2019-09-28 RX ADMIN — SENNOSIDES, DOCUSATE SODIUM 2 TABLET: 50; 8.6 TABLET, FILM COATED ORAL at 17:03

## 2019-09-28 RX ADMIN — LEVOTHYROXINE SODIUM 125 MCG: 25 TABLET ORAL at 05:01

## 2019-09-28 RX ADMIN — TAMSULOSIN HYDROCHLORIDE 0.4 MG: 0.4 CAPSULE ORAL at 09:11

## 2019-09-28 RX ADMIN — BUDESONIDE AND FORMOTEROL FUMARATE DIHYDRATE 2 PUFF: 80; 4.5 AEROSOL RESPIRATORY (INHALATION) at 09:11

## 2019-09-28 RX ADMIN — TIMOLOL MALEATE 1 DROP: 2.5 SOLUTION OPHTHALMIC at 04:57

## 2019-09-28 RX ADMIN — METHADONE HYDROCHLORIDE 20 MG: 10 TABLET ORAL at 11:54

## 2019-09-28 RX ADMIN — METHADONE HYDROCHLORIDE 20 MG: 10 TABLET ORAL at 17:03

## 2019-09-28 RX ADMIN — GABAPENTIN 1600 MG: 400 CAPSULE ORAL at 17:02

## 2019-09-28 RX ADMIN — SIMVASTATIN 40 MG: 40 TABLET, FILM COATED ORAL at 20:07

## 2019-09-28 RX ADMIN — ALPRAZOLAM 0.25 MG: 0.25 TABLET ORAL at 04:57

## 2019-09-28 RX ADMIN — BUDESONIDE AND FORMOTEROL FUMARATE DIHYDRATE 2 PUFF: 80; 4.5 AEROSOL RESPIRATORY (INHALATION) at 20:06

## 2019-09-28 RX ADMIN — GABAPENTIN 2400 MG: 400 CAPSULE ORAL at 04:56

## 2019-09-28 RX ADMIN — ALPRAZOLAM 0.25 MG: 0.25 TABLET ORAL at 17:06

## 2019-09-28 RX ADMIN — ASPIRIN 325 MG: 325 TABLET, FILM COATED ORAL at 04:57

## 2019-09-28 RX ADMIN — TEMAZEPAM 15 MG: 15 CAPSULE ORAL at 20:07

## 2019-09-28 RX ADMIN — ENOXAPARIN SODIUM 40 MG: 100 INJECTION SUBCUTANEOUS at 04:57

## 2019-09-28 ASSESSMENT — ENCOUNTER SYMPTOMS
BRUISES/BLEEDS EASILY: 0
NAUSEA: 0
NERVOUS/ANXIOUS: 0
EYE PAIN: 0
HEARTBURN: 0
FLANK PAIN: 0
LOSS OF CONSCIOUSNESS: 0
HEMOPTYSIS: 0
PND: 0
MYALGIAS: 0
EYE DISCHARGE: 0
COUGH: 1
DIARRHEA: 0
DIZZINESS: 0
VOMITING: 0
NECK PAIN: 0
SHORTNESS OF BREATH: 0
SENSORY CHANGE: 0
SINUS PAIN: 0
HALLUCINATIONS: 0
CLAUDICATION: 0
CHILLS: 0
SPEECH CHANGE: 0
EYE REDNESS: 0
PALPITATIONS: 0
CONSTIPATION: 0
FEVER: 0
ABDOMINAL PAIN: 0
FOCAL WEAKNESS: 0
SPUTUM PRODUCTION: 0

## 2019-09-28 NOTE — CARE PLAN
Problem: Safety  Goal: Will remain free from injury  Outcome: PROGRESSING AS EXPECTED   Pt continues to call appropriately for assistance out of bed and to the restroom.

## 2019-09-28 NOTE — PROGRESS NOTES
Pulmonary Progress Note    Date of admission  9/10/2019    Date of service  9/28/2019    Chief Complaint  59 y.o. male admitted 9/10/2019 with shortness of breath.    Hospital Course    This gentleman was admitted to the hospital with pneumonia and respiratory failure.  He developed worsening respiratory failure and was transferred to the ICU and was intubated.      Interval Problem Update  Reviewed last 24 hour events:  Tapered off prednisone, completed 9/22  Weaning supplemental O2, down to 2.5L NC and improving  Symptomatically feels much better than when I personally saw him prior to ICU transfer, and overall feeling better everyday. Endorses mild cough no sputum.    Review of Systems  Review of Systems   Constitutional: Negative for malaise/fatigue.   HENT: Negative for ear discharge, nosebleeds, sinus pain and tinnitus.    Eyes: Negative for pain, discharge and redness.   Respiratory: Positive for cough. Negative for hemoptysis and sputum production.    Cardiovascular: Negative for chest pain, claudication and PND.   Gastrointestinal: Negative for abdominal pain, constipation, heartburn and melena.   Genitourinary: Negative for dysuria, flank pain and urgency.   Musculoskeletal: Negative for myalgias and neck pain.   Skin: Negative for itching.   Neurological: Negative for dizziness, sensory change, speech change, focal weakness and loss of consciousness.   Endo/Heme/Allergies: Does not bruise/bleed easily.   Psychiatric/Behavioral: Negative for hallucinations and suicidal ideas. The patient is not nervous/anxious.    All other systems reviewed and are negative.        Vital Signs for last 24 hours   Temp:  [35.9 °C (96.6 °F)-36.7 °C (98 °F)] 36.7 °C (98 °F)  Pulse:  [59-68] 64  Resp:  [17-18] 17  BP: ()/(56-64) 91/56  SpO2:  [90 %-98 %] 90 %    Physical Exam   Physical Exam   Constitutional: He is oriented to person, place, and time. He appears well-developed. No distress.   HENT:   Head: Normocephalic  and atraumatic.   Right Ear: External ear normal.   Left Ear: External ear normal.   Nose: Nose normal.   Mouth/Throat: Oropharynx is clear and moist.   Eyes: Pupils are equal, round, and reactive to light. EOM are normal. Right eye exhibits no discharge. Left eye exhibits no discharge.   Neck: Normal range of motion. Neck supple. No JVD present. No tracheal deviation present.   Cardiovascular: Intact distal pulses. Exam reveals no gallop.   No murmur heard.  Sinus rhythm   Pulmonary/Chest: Effort normal. No respiratory distress. He has no wheezes. He has rales (Crackles most pronounced R > L base).   Abdominal: Soft. Bowel sounds are normal. He exhibits no distension. There is no tenderness. There is no rebound.   Musculoskeletal: Normal range of motion. He exhibits no edema or tenderness.   No clubbing or cyanosis   Lymphadenopathy:     He has no cervical adenopathy.   Neurological: He is alert and oriented to person, place, and time. No cranial nerve deficit. Coordination normal.   No focal weakness   Skin: Skin is warm and dry. No rash noted. He is not diaphoretic. No erythema.       Medications  Current Facility-Administered Medications   Medication Dose Route Frequency Provider Last Rate Last Dose   • budesonide-formoterol (SYMBICORT) 80-4.5 MCG/ACT inhaler 2 Puff  2 Puff Inhalation BID (RT) Lennox Connors M.D.   2 Puff at 09/28/19 0911   • busPIRone (BUSPAR) tablet 5 mg  5 mg Oral TID Rigoberto Galvan M.D.   Stopped at 09/28/19 1200   • ALPRAZolam (XANAX) tablet 0.25 mg  0.25 mg Oral Q6HRS PRN Rigoberto Galvan M.D.   0.25 mg at 09/28/19 0457   • tamsulosin (FLOMAX) capsule 0.4 mg  0.4 mg Oral AFTER BREAKFAST Rigoberto Galvan M.D.   0.4 mg at 09/28/19 0911   • timolol (TIMOPTIC) 0.25 % ophthalmic solution 1 Drop  1 Drop Both Eyes BID Rigoberto Galvan M.D.   1 Drop at 09/28/19 0457   • aspirin (ASA) tablet 325 mg  325 mg Oral DAILY ASHLEY MariaD.   325 mg at 09/28/19 0457   •  gabapentin (NEURONTIN) capsule 1,600 mg  1,600 mg Oral Q EVENING Rigoberto Galvan M.D.   1,600 mg at 09/27/19 1732   • gabapentin (NEURONTIN) capsule 2,400 mg  2,400 mg Oral QAM Rigoberto Galvan M.D.   2,400 mg at 09/28/19 0456   • levothyroxine (SYNTHROID) tablet 125 mcg  125 mcg Oral QAM AC Rigoberto Galvan M.D.   125 mcg at 09/28/19 0501   • methadone (DOLOPHINE) tablet 30 mg  30 mg Oral QAM Rigoberto Galvan M.D.   30 mg at 09/28/19 0457    And   • methadone (DOLOPHINE) tablet 20 mg  20 mg Oral DAILY AT NOON Rigoberto Galvan M.D.   20 mg at 09/28/19 1154    And   • methadone (DOLOPHINE) tablet 20 mg  20 mg Oral Q EVENING Rigoberto Galvan M.D.   20 mg at 09/27/19 1732   • senna-docusate (PERICOLACE or SENOKOT S) 8.6-50 MG per tablet 2 Tab  2 Tab Oral BID Rigoberto Galvan M.D.   2 Tab at 09/26/19 1734    And   • polyethylene glycol/lytes (MIRALAX) PACKET 1 Packet  1 Packet Oral QDAY PRN Rigoberto Galvan M.D.        And   • magnesium hydroxide (MILK OF MAGNESIA) suspension 30 mL  30 mL Oral QDAY PRN Rigoberto Galvan M.D.        And   • bisacodyl (DULCOLAX) suppository 10 mg  10 mg Rectal QDAY PRN Rigoberto Galvan M.D.       • simvastatin (ZOCOR) tablet 40 mg  40 mg Oral Nightly Rigoberto Galvan M.D.   40 mg at 09/27/19 1950   • temazepam (RESTORIL) capsule 15 mg  15 mg Oral QHS Rigoberto Galvan M.D.   15 mg at 09/27/19 1950   • tramadol (ULTRAM) 50 MG tablet 50 mg  50 mg Oral BID Rigoberto Galvan M.D.   50 mg at 09/22/19 1720   • acetaminophen (TYLENOL) tablet 650 mg  650 mg Oral Q6HRS PRN Rigoberto Galvan M.D.       • oxyCODONE immediate-release (ROXICODONE) tablet 5-10 mg  5-10 mg Oral Q4HRS PRN Rigoberto Galvan M.D.   10 mg at 09/23/19 2153   • Respiratory Care per Protocol   Nebulization Continuous RT Marck Montero Jr., D.O.       • ipratropium-albuterol (DUONEB) nebulizer solution  3 mL Nebulization Q2HRS PRN (RT) Marck Montero Jr., D.O.       •  enoxaparin (LOVENOX) inj 40 mg  40 mg Subcutaneous DAILY Marck Montero Jr., D.O.   40 mg at 09/28/19 0457   • sodium chloride (OCEAN) 0.65 % nasal spray 2 Spray  2 Spray Nasal Q2HRS PRN Mary Silva M.D.   2 [place] at 09/25/19 2109       Fluids  No intake or output data in the 24 hours ending 09/28/19 1435    Laboratory          No results for input(s): SODIUM, POTASSIUM, CHLORIDE, CO2, BUN, CREATININE, MAGNESIUM, PHOSPHORUS, CALCIUM in the last 72 hours.  No results for input(s): ALTSGPT, ASTSGOT, ALKPHOSPHAT, TBILIRUBIN, DBILIRUBIN, GAMMAGT, AMYLASE, LIPASE, ALB, PREALBUMIN, GLUCOSE in the last 72 hours.  Recent Labs     09/26/19  1054   WBC 17.9*   NEUTSPOLYS 74.60*   LYMPHOCYTES 14.00*   MONOCYTES 7.00   EOSINOPHILS 3.50   BASOPHILS 0.90     Recent Labs     09/26/19  1054   RBC 4.55*   HEMOGLOBIN 12.4*   HEMATOCRIT 40.5*   PLATELETCT 201     Imaging  CXR 9/25 reviewed, persistent interstitial opacities in bilateral mid-lung fields    Assessment/Plan  Acute respiratory failure with hypoxia, emphsema and pneumonitis (HCC)- (present on admission)  Assessment & Plan  Continues to improve. Almost at baseline. Intubated 9/14-9/16. Bronch with no growth, no cell count/diff performed. Possibly secondary to GERD, chemical aspiration pneumonitis in the setting of chronic Methadone use. Also + rhinovirus, although severity of illness exceeds what would be expected for this virus. Completed 5 days of doxycycline on 9/15 and completed 5 days of Rocephin on 9/18. Pulmonary infiltrates first noted in 2011, worsening in 2015, and worsening again this visit.     Plan  -Completed pulse steroids, since tapered off completed 9/22  -Cont Symbicort, prilosec  -Home oxygen eval if patient going home  -Set up appointment with Pulmonary follow up, patient lives in [facility], wants to followup in [place]    Pneumonitis- (present on admission)  Assessment & Plan  Possibly secondary to GERD, chemical aspiration  pneumonitis in the setting of chronic Methadone use. Completed 5 days of doxycycline on 9/15 and completed 5 days of Rocephin on 9/18. Per patient he takes prilosec at home.   Plan  -Counseled on head of bed elevation  -Ideally, patient would benefit most from weaning off Methadone  -Continue home prilosec    Interstitial lung disease (HCC)  Assessment & Plan  Improving. 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 Patient completed steroid taper  Plan  -Follow up in Pulm clinic for PFT's and monitoring    Paraseptal emphysema (HCC)  Assessment & Plan  Possible combined etiology as mentioned earlier.    Pulmonary hypertension (HCC)  Assessment & Plan  RVSP 55 mmHg, likely group III due to underlying lung disease  - Maintain euvolemia  - Pulm followup and repeat TTE as outpatient when acute issues resolve    Pulmonary infiltrates  Assessment & Plan  Patient has a history of pulmonary infiltrates first noted in 2011, worsening in 2015, and worsening again this visit.   Plan  -Outpatient pulmonary follow up       VTE:  Lovenox  Ulcer: Prilosec    I have performed a physical exam and reviewed and updated ROS and Plan today (9/28/2019). In review of yesterday's note (9/27/2019), there are no changes except as documented above.     Discussed patient condition and risk of morbidity and/or mortality with RN  __________  Marco Goodman MD  Pulmonary and Critical Care Medicine  [facility]

## 2019-09-28 NOTE — CARE PLAN
Problem: Communication  Goal: The ability to communicate needs accurately and effectively will improve  Outcome: PROGRESSING AS EXPECTED     Problem: Safety  Goal: Will remain free from injury  Outcome: PROGRESSING AS EXPECTED     Problem: Venous Thromboembolism (VTW)/Deep Vein Thrombosis (DVT) Prevention:  Goal: Patient will participate in Venous Thrombosis (VTE)/Deep Vein Thrombosis (DVT)Prevention Measures  Outcome: PROGRESSING AS EXPECTED   Patient able to communicate basic needs. No falls while here. No s/s of DVT.

## 2019-09-28 NOTE — PROGRESS NOTES
HOSPITAL MEDICINE PROGRESS NOTE    Date of service  9/28/2019    Chief Complaint  No chief complaint on file.      Hospital Course:     59 years old man with a history of chronic pain syndrome on massive amount narcotics and Neurontin, admitted for community-acquired pneumonia complicated by acute hypoxic respiratory failure, necessitate intubation and ICU transfer.  He was extubated after 3 days, on 9/16/19.  Transferred to floor on 9/18/19.         Interval History Updates:  No events overnight.  Blood pressure has improved.  Range /51-67.  Flow cytometry for peripheral blood resulted (see below).  Discussed with Dr. Goodman, pulmonologist.    Consultants/Specialty  Pulmonary    Review of Systems   Review of Systems   Constitutional: Negative for chills and fever.   Respiratory: Negative for shortness of breath.    Cardiovascular: Negative for chest pain, palpitations and leg swelling.   Gastrointestinal: Negative for abdominal pain, constipation, diarrhea, nausea and vomiting.   Skin: Negative for rash.   Neurological: Negative for focal weakness.   Endo/Heme/Allergies: Negative.    All other systems reviewed and are negative.       Medications:  • budesonide-formoterol  2 Puff BID (RT)   • busPIRone  5 mg TID   • ALPRAZolam  0.25 mg Q6HRS PRN   • tamsulosin  0.4 mg AFTER BREAKFAST   • timolol  1 Drop BID   • aspirin  325 mg DAILY   • gabapentin  1,600 mg Q EVENING   • gabapentin  2,400 mg QAM   • levothyroxine  125 mcg QAM AC   • methadone  30 mg QAM    And   • methadone  20 mg DAILY AT NOON    And   • methadone  20 mg Q EVENING   • senna-docusate  2 Tab BID    And   • polyethylene glycol/lytes  1 Packet QDAY PRN    And   • magnesium hydroxide  30 mL QDAY PRN    And   • bisacodyl  10 mg QDAY PRN   • simvastatin  40 mg Nightly   • temazepam  15 mg QHS   • tramadol  50 mg BID   • acetaminophen  650 mg Q6HRS PRN   • oxyCODONE immediate-release  5-10 mg Q4HRS PRN   • Respiratory Care per Protocol   Continuous  RT   • ipratropium-albuterol  3 mL Q2HRS PRN (RT)   • enoxaparin (LOVENOX) injection  40 mg DAILY   • sodium chloride  2 Spray Q2HRS PRN       Physical Exam   Vitals:    09/27/19 1700 09/27/19 2056 09/28/19 0500 09/28/19 1000   BP:  121/64 120/62 (!) 91/56   Pulse: (!) 59 68 64 64   Resp: 18 18 18 17   Temp: 36.6 °C (97.8 °F) 35.9 °C (96.6 °F) 36.1 °C (96.9 °F) 36.7 °C (98 °F)   TempSrc: Temporal Temporal Temporal Temporal   SpO2: 94% 98% 90% 90%   Weight:       Height:           Physical Exam   Constitutional: He is oriented to person, place, and time and well-developed, well-nourished, and in no distress. No distress.   HENT:   Head: Normocephalic and atraumatic.   Eyes: Pupils are equal, round, and reactive to light. Conjunctivae are normal. No scleral icterus.   Neck: Normal range of motion. Neck supple. No JVD present.   Cardiovascular: Normal rate, regular rhythm and normal heart sounds. Exam reveals no gallop and no friction rub.   No murmur heard.  Pulmonary/Chest: Effort normal and breath sounds normal. No respiratory distress. He has no wheezes. He has no rales. He exhibits no tenderness.   Abdominal: Soft. Bowel sounds are normal. There is no tenderness. There is no rebound.   Musculoskeletal: Normal range of motion. He exhibits no edema, tenderness or deformity.   Neurological: He is alert and oriented to person, place, and time.   Skin: Skin is warm and dry. No rash noted. He is not diaphoretic. No erythema.   Psychiatric: Mood and affect normal.   Nursing note and vitals reviewed.         Laboratory   Recent Labs     09/26/19  1054   WBC 17.9*   RBC 4.55*   HEMOGLOBIN 12.4*   HEMATOCRIT 40.5*   PLATELETCT 201     No results for input(s): SODIUM, POTASSIUM, CHLORIDE, CO2, GLUCOSE, BUN, CREATININE in the last 72 hours.   No results for input(s): ALTSGPT, ASTSGOT, ALKPHOSPHAT, TBILIRUBIN, DBILIRUBIN, GAMMAGT, AMYLASE, LIPASE, ALB, PREALBUMIN, GLUCOSE in the last 72 hours.        C-reactive protein 7.23,  3.11.       Microbiology  Results     Procedure Component Value Units Date/Time    Fungal Culture - BAL [[id number]] Collected:  09/14/19 0110    Order Status:  Completed Specimen:  Respirate from Bronchoalveolar Lavage Updated:  09/25/19 0805     Significant Indicator NEG     Source RESP     Site BRONCHOALVEOLAR LAVAGE     Culture Result No clinically significant fungal growth to date.    Narrative:       Collected By:[id number] TONY AN.  Collected By:[id number] TONY AN.    Fungal Smear - BAL [[id number]] Collected:  09/14/19 0110    Order Status:  Completed Specimen:  Respirate from Bronchoalveolar Lavage Updated:  09/25/19 0805     Significant Indicator NEG     Source RESP     Site BRONCHOALVEOLAR LAVAGE     Fungal Smear Results No fungal elements seen.    Narrative:       Collected By:[id number] TONY AN.  Collected By:[id number] TONY AN.    AFB Culture - BAL [[id number]] Collected:  09/14/19 0110    Order Status:  Completed Specimen:  Respirate from Bronchoalveolar Lavage Updated:  09/25/19 0805     Significant Indicator NEG     Source RESP     Site BRONCHOALVEOLAR LAVAGE     Culture Result Culture in progress.     AFB Smear Results No acid fast bacilli seen.    Narrative:       Collected By:[id number] TONY AN.  Collected By:[id number] TONY AN.      Fungal serology from BAL negative, September 13, 2018.    Pathology report BAL fluid: September 14, 2019  A. Bronchoalveolar lavage:         No malignant cells identified.         No evidence of Pulmonary Langehans cell histiocytosis in this          specimen.         Mixed inflammatory cells and alveolar macrophages entrapped          within mucin and few scattered benign bronchial epithelial and          squamous cells present.    Peripheral smear pathology review 09/26/2019  Comment: Absolute neutrophilia without a significant left shift or significant   toxic morphology. Given the history of pneumonia this may be a reactive   process.  Per medical record there is a history of mild chronic   neutrophilia. This may be seen with medications such as steroids,   smoking, and chronic inflammation. The morphology is not typical for   CML, but if there is clinical concern for a myeloproliferative neoplasm   a bone marrow biopsy should be performed.     Flow cytometry peripheral blood, 09/25/2019, result:  Phenotypically normal slightly left shifted myeloid cells without   flow cytometric evidence of monoclonality, acute leukemia, or   lymphoproliferative disorder. See comment.   COMMENT:   Please correlate the results from this study morphologically and   clinically for proper interpretation.   ANALYSIS:   Differential:   18%  lymphocytes   8%  monocytes   73%  myeloid cells   Lymphocytes:   63%   T-cells   7%   NK-cells   26%   B-cells   T-cells:  Heterogeneous with elevated CD4:CD8 ratio of 6.1   B-cells:  Polytypic with a Kappa:Lambda ratio of 1.6   Positive: CD19, CD20   Negative: CD5, CD10   Myeloid cells: normal   Positive for:CD10 (partial), CD13, CD16, CD33, CD64   Negative for:CD34   Mild Left shift:1% CD11b negative   CD34 positive myeloblasts: <0.1%   Viability: 98%   Disclaimers: none   Markers run: CD2, CD3, CD4, CD5, CD7, CD8, CD10, CD16, CD19, CD20,   CD13, CD34, CD38, CD45, CD56, CD57, CD64, surface Kappa, surface   Lambda, CD11b, CD33   These results have been reviewed and approved by Jess Kumari MD.   09/27/2019 18:34      Labs reviewed by me and noted above.    Radiology  I personally reviewed the chest x-ray portable on September 21, 2019 and per my interpretation, consistent with some pulmonary edema, no obvious consolidation or infiltrate.      CT chest 9/13/2019:  I personally reviewed the images of the CT chest and per my interpretation, fibrotic parenchymal changes in both lungs.    Chest x-ray two-view September 25, 2019 personally reviewed and per my interpretation, no significant change compared to past CXR, still with  hazy opacities at the RLL and LLL, LML.  No consolidation.  No obvious pleural effusion.     Echocardiogram September 11, 2019:  LV ejection fraction 60%.  Normal diastolic function.  The RVSP is estimated to be 55 debora-Hg.  This is unchanged compared to the prior study on January 2015.     EKG performed 09/17/2019 was personally reviewed and per my interpretation shows normal sinus rhythm, rate 97.  The QT measured 366, corrected .    EKG performed 09/25 was personally reviewed and per my interpretation shows normal sinus rhythm, rate of 64.  The QT interval measure 468, QTc 483.    Assessment and Plan    Principal Problem:    Hypotension POA: No.  Suspect hypovolemic.  Pressures improved with small volume IV fluid bolus.  I discontinue midrodrin.  BP slightly lower, but asymptomatic.  Close monitor.    Persistent leukocytosis:  ?Secondary to steroid, though last dose of Solu-Medrol September 18, 2019.  Afebrile.  Chart review shows no history of persistent leukocytosis.  Denies any B symptom.  The peripheral smear review by pathology indicated most likely reactive leukocytosis, which is reflected the same on peripheral blood flow cytometry.  This should be monitor after discharge in the outpatient setting.      Pneumonitis POA: Yes.  Suspect aspiration.  Patient finished a course of ceftriaxone and doxycycline.  Finished course of steroid taper.  Pulmonary is following.  Chest x-ray two-view reviewed as above.  CT chest also reviewed in comparison to CXR.  Patient will benefit from outpatient follow up with Pulmonology.  I have placed a outpatient referral and follow-up appointment in his discharge paperwork section.      Acute respiratory failure with hypoxia, emphysema and pneumonitis (HCC) POA: Yes.  Continue to wean oxygen.  Anticipate he will need oxygen at discharge for short-term use.  Check ambulatory room air O2 sat, order placed but not completed yet.      Pulmonary hypertension (HCC) POA: Yes.   RV SP elevated on Echo, mild change vs in 2015.  Unfortunately, blood pressure limiting diuretic.      QT prolongation POA: Yes.  Patient has had discussion with cardiology, Dr Linder, regarding his long-term methadone.  He accept the risks of cardiovascular compromise and wished to continue his high-dose methadone.  He is high risk for cardiovascular arhythmia including VT, VF.  EKG on September 17, 2019 interpretation as above.  The repeat EKG on September 25, 2019 with QTc 483.      Paraseptal emphysema (HCC) POA: Yes.  Status post a course of steroid.  Finished course of antibiotic.  Continue duo nebs, bronchodilators, incentive spirometer.      Interstitial lung disease (HCC) POA: Yes.  Please see above.  Tobacco cessation counseled.      Anxiety POA: Yes.  Mood is stable.  On Xanax 0.25 mg p.o. every 6 as needed.      Hypothyroidism POA: Yes.  Continue Synthroid.      Chronic pain POA: Yes.  Controlled on home dose of methadone, gabapentin, tramadol.      Hypokalemia POA: No.  Resolved.  Potassium 4.6 yesterday.      Dysphasia POA: Unknown.  No recurrent.  Tolerate regular diet texture without difficulty.      Urinary retention POA: Unknown.  Resolved.    Resolved Problems:    Severe sepsis (HCC) POA: Yes    INDIA (acute kidney injury) (HCC) POA: Yes    Hypomagnesemia POA: Unknown    Hypophosphatemia POA: Yes      DVT prophylaxis: Lovenox    CODE STATUS:  FULL CODE    Disposition: I seen the patient today in conjunction with the medical social worker regarding his choice for disposition.  He would like to be discharged to a skilled nursing facility for ongoing PT and OT treatment.  Choice form were obtained by .  Discussed with  regarding discharge.  Patient still need repeat OT evaluation per report.    Case was discussed with Primary RN and Charge Nurse, Medical SW, , and Pharmacist on IDTround in addition to individual(s) mentioned above.    I have performed a physical  exam and reviewed and updated ROS as of today, 9/28/2019.  In review of yesterday's note dated, 9/27/2019, there are no changes except as documented above.      Matthew Watters M.D.

## 2019-09-29 PROCEDURE — 770006 HCHG ROOM/CARE - MED/SURG/GYN SEMI*

## 2019-09-29 PROCEDURE — 700111 HCHG RX REV CODE 636 W/ 250 OVERRIDE (IP): Performed by: INTERNAL MEDICINE

## 2019-09-29 PROCEDURE — 99232 SBSQ HOSP IP/OBS MODERATE 35: CPT | Performed by: INTERNAL MEDICINE

## 2019-09-29 PROCEDURE — 700102 HCHG RX REV CODE 250 W/ 637 OVERRIDE(OP): Performed by: HOSPITALIST

## 2019-09-29 PROCEDURE — A9270 NON-COVERED ITEM OR SERVICE: HCPCS | Performed by: HOSPITALIST

## 2019-09-29 RX ADMIN — ALPRAZOLAM 0.25 MG: 0.25 TABLET ORAL at 11:05

## 2019-09-29 RX ADMIN — GABAPENTIN 1600 MG: 400 CAPSULE ORAL at 17:16

## 2019-09-29 RX ADMIN — TAMSULOSIN HYDROCHLORIDE 0.4 MG: 0.4 CAPSULE ORAL at 07:46

## 2019-09-29 RX ADMIN — LEVOTHYROXINE SODIUM 125 MCG: 25 TABLET ORAL at 05:23

## 2019-09-29 RX ADMIN — TEMAZEPAM 15 MG: 15 CAPSULE ORAL at 20:41

## 2019-09-29 RX ADMIN — METHADONE HYDROCHLORIDE 20 MG: 10 TABLET ORAL at 11:02

## 2019-09-29 RX ADMIN — ALPRAZOLAM 0.25 MG: 0.25 TABLET ORAL at 01:45

## 2019-09-29 RX ADMIN — SIMVASTATIN 40 MG: 40 TABLET, FILM COATED ORAL at 20:42

## 2019-09-29 RX ADMIN — METHADONE HYDROCHLORIDE 20 MG: 10 TABLET ORAL at 17:15

## 2019-09-29 RX ADMIN — ALPRAZOLAM 0.25 MG: 0.25 TABLET ORAL at 18:26

## 2019-09-29 RX ADMIN — ASPIRIN 325 MG: 325 TABLET, FILM COATED ORAL at 05:22

## 2019-09-29 RX ADMIN — ENOXAPARIN SODIUM 40 MG: 100 INJECTION SUBCUTANEOUS at 05:22

## 2019-09-29 RX ADMIN — METHADONE HYDROCHLORIDE 30 MG: 10 TABLET ORAL at 05:22

## 2019-09-29 RX ADMIN — SENNOSIDES, DOCUSATE SODIUM 2 TABLET: 50; 8.6 TABLET, FILM COATED ORAL at 17:16

## 2019-09-29 RX ADMIN — TIMOLOL MALEATE 1 DROP: 2.5 SOLUTION OPHTHALMIC at 05:24

## 2019-09-29 RX ADMIN — BUDESONIDE AND FORMOTEROL FUMARATE DIHYDRATE 2 PUFF: 80; 4.5 AEROSOL RESPIRATORY (INHALATION) at 07:47

## 2019-09-29 RX ADMIN — GABAPENTIN 2400 MG: 400 CAPSULE ORAL at 05:22

## 2019-09-29 ASSESSMENT — ENCOUNTER SYMPTOMS
NECK PAIN: 0
FOCAL WEAKNESS: 0
PALPITATIONS: 0
SINUS PAIN: 0
SENSORY CHANGE: 0
CHILLS: 0
FEVER: 0
HEMOPTYSIS: 0
NERVOUS/ANXIOUS: 0
CLAUDICATION: 0
DIZZINESS: 0
BRUISES/BLEEDS EASILY: 0
PND: 0
COUGH: 1
ABDOMINAL PAIN: 0
CONSTIPATION: 0
EYE PAIN: 0
HALLUCINATIONS: 0
MYALGIAS: 0
SHORTNESS OF BREATH: 0
EYE REDNESS: 0
HEARTBURN: 0
NAUSEA: 0
SPUTUM PRODUCTION: 0
DIARRHEA: 0
VOMITING: 0
LOSS OF CONSCIOUSNESS: 0
SPEECH CHANGE: 0
FLANK PAIN: 0
EYE DISCHARGE: 0

## 2019-09-29 NOTE — CARE PLAN
Problem: Communication  Goal: The ability to communicate needs accurately and effectively will improve  Outcome: PROGRESSING AS EXPECTED     Problem: Safety  Goal: Will remain free from injury  Outcome: PROGRESSING AS EXPECTED     Problem: Venous Thromboembolism (VTW)/Deep Vein Thrombosis (DVT) Prevention:  Goal: Patient will participate in Venous Thrombosis (VTE)/Deep Vein Thrombosis (DVT)Prevention Measures  Outcome: PROGRESSING AS EXPECTED   Patient able to express needs. No falls while here. No s/s of DVT.

## 2019-09-29 NOTE — PROGRESS NOTES
Pulmonary Progress Note    Date of admission  9/10/2019    Date of service  9/29/2019    Chief Complaint  59 y.o. male admitted 9/10/2019 with shortness of breath.    Hospital Course    This gentleman was admitted to the hospital with pneumonia and respiratory failure.  He developed worsening respiratory failure and was transferred to the ICU and was intubated.      Interval Problem Update  Reviewed last 24 hour events:  Tapered off prednisone, completed 9/22  Weaning supplemental O2, down to 2L NC and slowly improving daily  Pulling 1250cc on incentive spirometer  Continued mild cough no sputum and some upper airway discomfort with deep breaths.  Ambulated yesterday without walker, which was exhausting for him, plans to walk again today with walker    Review of Systems  Review of Systems   Constitutional: Negative for malaise/fatigue.   HENT: Negative for ear discharge, nosebleeds, sinus pain and tinnitus.    Eyes: Negative for pain, discharge and redness.   Respiratory: Positive for cough. Negative for hemoptysis and sputum production.    Cardiovascular: Negative for chest pain, claudication and PND.   Gastrointestinal: Negative for abdominal pain, constipation, heartburn and melena.   Genitourinary: Negative for dysuria, flank pain and urgency.   Musculoskeletal: Negative for myalgias and neck pain.   Skin: Negative for itching.   Neurological: Negative for dizziness, sensory change, speech change, focal weakness and loss of consciousness.   Endo/Heme/Allergies: Does not bruise/bleed easily.   Psychiatric/Behavioral: Negative for hallucinations and suicidal ideas. The patient is not nervous/anxious.    All other systems reviewed and are negative.        Vital Signs for last 24 hours   Temp:  [36.5 °C (97.7 °F)-37.1 °C (98.7 °F)] 36.8 °C (98.2 °F)  Pulse:  [53-76] 65  Resp:  [14-18] 14  BP: ()/(49-70) 88/49  SpO2:  [91 %-96 %] 91 %    Physical Exam   Physical Exam   Constitutional: He is oriented to person,  place, and time. He appears well-developed. No distress.   HENT:   Head: Normocephalic and atraumatic.   Right Ear: External ear normal.   Left Ear: External ear normal.   Nose: Nose normal.   Mouth/Throat: Oropharynx is clear and moist.   Eyes: Pupils are equal, round, and reactive to light. EOM are normal. Right eye exhibits no discharge. Left eye exhibits no discharge.   Neck: Normal range of motion. Neck supple. No JVD present. No tracheal deviation present.   Cardiovascular: Intact distal pulses. Exam reveals no gallop.   No murmur heard.  Sinus rhythm   Pulmonary/Chest: Effort normal. No respiratory distress. He has no wheezes. He has rales (Crackles most pronounced R side, left side clear today).   Abdominal: Soft. Bowel sounds are normal. He exhibits no distension. There is no tenderness. There is no rebound.   Musculoskeletal: Normal range of motion. He exhibits no edema or tenderness.   No clubbing or cyanosis   Lymphadenopathy:     He has no cervical adenopathy.   Neurological: He is alert and oriented to person, place, and time. No cranial nerve deficit. Coordination normal.   No focal weakness   Skin: Skin is warm and dry. No rash noted. He is not diaphoretic. No erythema.       Medications  Current Facility-Administered Medications   Medication Dose Route Frequency Provider Last Rate Last Dose   • budesonide-formoterol (SYMBICORT) 80-4.5 MCG/ACT inhaler 2 Puff  2 Puff Inhalation BID (RT) Lennox Connors M.D.   2 Puff at 09/29/19 0747   • busPIRone (BUSPAR) tablet 5 mg  5 mg Oral TID Rigoberto Galvan M.D.   Stopped at 09/28/19 1200   • ALPRAZolam (XANAX) tablet 0.25 mg  0.25 mg Oral Q6HRS PRN Rigoberto Galvan M.D.   0.25 mg at 09/29/19 0145   • tamsulosin (FLOMAX) capsule 0.4 mg  0.4 mg Oral AFTER BREAKFAST Rigoberto Galvan M.D.   0.4 mg at 09/29/19 0746   • timolol (TIMOPTIC) 0.25 % ophthalmic solution 1 Drop  1 Drop Both Eyes BID Rigoberto Galvan M.D.   1 Drop at 09/29/19 0524   •  aspirin (ASA) tablet 325 mg  325 mg Oral DAILY Rigoberto Galvan M.D.   325 mg at 09/29/19 0522   • gabapentin (NEURONTIN) capsule 1,600 mg  1,600 mg Oral Q EVENING Rigoberto Galvan M.D.   1,600 mg at 09/28/19 1702   • gabapentin (NEURONTIN) capsule 2,400 mg  2,400 mg Oral QAM Rigoberto Galvan M.D.   2,400 mg at 09/29/19 0522   • levothyroxine (SYNTHROID) tablet 125 mcg  125 mcg Oral QAM AC Rigoberto Galvan M.D.   125 mcg at 09/29/19 0523   • methadone (DOLOPHINE) tablet 30 mg  30 mg Oral QAM Rigoberto Galvan M.D.   30 mg at 09/29/19 0522    And   • methadone (DOLOPHINE) tablet 20 mg  20 mg Oral DAILY AT NOON Rigoberto Galvan M.D.   20 mg at 09/28/19 1154    And   • methadone (DOLOPHINE) tablet 20 mg  20 mg Oral Q EVENING Rigoberto Galvan M.D.   20 mg at 09/28/19 1703   • senna-docusate (PERICOLACE or SENOKOT S) 8.6-50 MG per tablet 2 Tab  2 Tab Oral BID Rigoberto Galvan M.D.   2 Tab at 09/28/19 1703    And   • polyethylene glycol/lytes (MIRALAX) PACKET 1 Packet  1 Packet Oral QDAY PRN Rigoberto Galvan M.D.        And   • magnesium hydroxide (MILK OF MAGNESIA) suspension 30 mL  30 mL Oral QDAY PRN Rigoberto Galvan M.D.        And   • bisacodyl (DULCOLAX) suppository 10 mg  10 mg Rectal QDAY PRN Rigoberto Galvan M.D.       • simvastatin (ZOCOR) tablet 40 mg  40 mg Oral Nightly Rigoberto Galvan M.D.   40 mg at 09/28/19 2007   • temazepam (RESTORIL) capsule 15 mg  15 mg Oral QHS Rigoberto Galvan M.D.   15 mg at 09/28/19 2007   • tramadol (ULTRAM) 50 MG tablet 50 mg  50 mg Oral BID Rigoberto Galvan M.D.   50 mg at 09/22/19 1720   • acetaminophen (TYLENOL) tablet 650 mg  650 mg Oral Q6HRS PRN Rigoberto Galvan M.D.       • oxyCODONE immediate-release (ROXICODONE) tablet 5-10 mg  5-10 mg Oral Q4HRS PRN Rigoberto Galvan M.D.   10 mg at 09/23/19 2153   • Respiratory Care per Protocol   Nebulization Continuous RT Marck Montero Jr., D.O.       •  ipratropium-albuterol (DUONEB) nebulizer solution  3 mL Nebulization Q2HRS PRN (RT) Marck Montero Jr., D.O.       • enoxaparin (LOVENOX) inj 40 mg  40 mg Subcutaneous DAILY Marck Montero Jr., D.O.   40 mg at 09/29/19 0522   • sodium chloride (OCEAN) 0.65 % nasal spray 2 Spray  2 Spray Nasal Q2HRS PRN Mary Silva M.D.   2 [place] at 09/25/19 2109       Fluids    Intake/Output Summary (Last 24 hours) at 9/29/2019 1016  Last data filed at 9/29/2019 0439  Gross per 24 hour   Intake 1000 ml   Output --   Net 1000 ml       Laboratory          No results for input(s): SODIUM, POTASSIUM, CHLORIDE, CO2, BUN, CREATININE, MAGNESIUM, PHOSPHORUS, CALCIUM in the last 72 hours.  No results for input(s): ALTSGPT, ASTSGOT, ALKPHOSPHAT, TBILIRUBIN, DBILIRUBIN, GAMMAGT, AMYLASE, LIPASE, ALB, PREALBUMIN, GLUCOSE in the last 72 hours.  Recent Labs     09/26/19  1054   WBC 17.9*   NEUTSPOLYS 74.60*   LYMPHOCYTES 14.00*   MONOCYTES 7.00   EOSINOPHILS 3.50   BASOPHILS 0.90     Recent Labs     09/26/19  1054   RBC 4.55*   HEMOGLOBIN 12.4*   HEMATOCRIT 40.5*   PLATELETCT 201     Imaging  CXR 9/25 reviewed, persistent interstitial opacities in bilateral mid-lung fields    No new labs    Assessment/Plan  Acute respiratory failure with hypoxia, emphsema and pneumonitis (HCC)- (present on admission)  Assessment & Plan  Continues to improve. Almost at baseline. Intubated 9/14-9/16. Bronch with no growth, no cell count/diff performed. Possibly secondary to GERD, chemical aspiration pneumonitis in the setting of chronic Methadone use. Also + rhinovirus, although severity of illness exceeds what would be expected for this virus. Completed 5 days of doxycycline on 9/15 and completed 5 days of Rocephin on 9/18. Pulmonary infiltrates first noted in 2011, worsening in 2015, and worsening again this visit.     Plan  -Completed pulse steroids, since tapered off completed 9/22  -Cont Symbicort, prilosec  -Home oxygen eval if patient going  home  -Set up appointment with Pulmonary follow up, patient lives in [facility], wants to followup in [place]    Pneumonitis- (present on admission)  Assessment & Plan  Possibly secondary to GERD, chemical aspiration pneumonitis in the setting of chronic Methadone use. Completed 5 days of doxycycline on 9/15 and completed 5 days of Rocephin on 9/18. Per patient he takes prilosec at home.   Plan  -Counseled on head of bed elevation  -Ideally, patient would benefit most from weaning off Methadone  -Continue home prilosec    Interstitial lung disease (HCC)  Assessment & Plan  Improving. 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 Patient completed steroid taper  Plan  -Follow up in Pulm clinic for PFT's and monitoring    Paraseptal emphysema (HCC)  Assessment & Plan  Possible combined etiology as mentioned earlier.    Pulmonary hypertension (HCC)  Assessment & Plan  RVSP 55 mmHg, likely group III due to underlying lung disease  - Maintain euvolemia  - Pulm followup and repeat TTE as outpatient when acute issues resolve    Pulmonary infiltrates  Assessment & Plan  Patient has a history of pulmonary infiltrates first noted in 2011, worsening in 2015, and worsening again this visit.   Plan  -Outpatient pulmonary follow up       VTE:  Lovenox  Ulcer: Prilosec    I have performed a physical exam and reviewed and updated ROS and Plan today (9/29/2019). In review of yesterday's note (9/28/2019), there are no changes except as documented above.     Discussed patient condition and risk of morbidity and/or mortality with Hospitalist  __________  Marco Goodman MD  Pulmonary and Critical Care Medicine  [facility]

## 2019-09-30 VITALS
DIASTOLIC BLOOD PRESSURE: 51 MMHG | OXYGEN SATURATION: 99 % | WEIGHT: 169.97 LBS | HEART RATE: 71 BPM | HEIGHT: 71 IN | TEMPERATURE: 99 F | SYSTOLIC BLOOD PRESSURE: 88 MMHG | RESPIRATION RATE: 17 BRPM | BODY MASS INDEX: 23.8 KG/M2

## 2019-09-30 PROBLEM — E87.6 HYPOKALEMIA: Status: RESOLVED | Noted: 2019-09-14 | Resolved: 2019-09-30

## 2019-09-30 PROBLEM — R91.8 PULMONARY INFILTRATES: Status: RESOLVED | Noted: 2019-09-26 | Resolved: 2019-09-30

## 2019-09-30 PROBLEM — R47.02 DYSPHASIA: Status: RESOLVED | Noted: 2019-09-17 | Resolved: 2019-09-30

## 2019-09-30 PROBLEM — R33.9 URINARY RETENTION: Status: RESOLVED | Noted: 2019-09-19 | Resolved: 2019-09-30

## 2019-09-30 PROBLEM — J96.01 ACUTE RESPIRATORY FAILURE WITH HYPOXIA (HCC): Status: RESOLVED | Noted: 2019-09-10 | Resolved: 2019-09-30

## 2019-09-30 PROBLEM — R73.09 ELEVATED GLUCOSE: Status: RESOLVED | Noted: 2019-09-15 | Resolved: 2019-09-30

## 2019-09-30 PROCEDURE — 700111 HCHG RX REV CODE 636 W/ 250 OVERRIDE (IP): Performed by: INTERNAL MEDICINE

## 2019-09-30 PROCEDURE — 99239 HOSP IP/OBS DSCHRG MGMT >30: CPT | Performed by: INTERNAL MEDICINE

## 2019-09-30 PROCEDURE — A9270 NON-COVERED ITEM OR SERVICE: HCPCS | Performed by: HOSPITALIST

## 2019-09-30 PROCEDURE — 700102 HCHG RX REV CODE 250 W/ 637 OVERRIDE(OP): Performed by: HOSPITALIST

## 2019-09-30 RX ORDER — TEMAZEPAM 15 MG/1
15 CAPSULE ORAL EVERY EVENING
Qty: 3 CAP | Refills: 0 | Status: SHIPPED | OUTPATIENT
Start: 2019-09-30 | End: 2019-10-03

## 2019-09-30 RX ORDER — GLIMEPIRIDE 2 MG/1
1 TABLET ORAL 2 TIMES DAILY
Qty: 1 BOTTLE | Refills: 0
Start: 2019-09-30 | End: 2020-02-18

## 2019-09-30 RX ORDER — TEMAZEPAM 15 MG/1
15 CAPSULE ORAL EVERY EVENING
Qty: 3 CAP | Refills: 0 | Status: SHIPPED | OUTPATIENT
Start: 2019-09-30 | End: 2019-09-30 | Stop reason: SDUPTHER

## 2019-09-30 RX ORDER — BUDESONIDE AND FORMOTEROL FUMARATE DIHYDRATE 80; 4.5 UG/1; UG/1
2 AEROSOL RESPIRATORY (INHALATION) 2 TIMES DAILY
Qty: 1 INHALER | Refills: 0
Start: 2019-09-30 | End: 2020-02-18

## 2019-09-30 RX ORDER — TAMSULOSIN HYDROCHLORIDE 0.4 MG/1
0.4 CAPSULE ORAL
Qty: 30 CAP | Refills: 0
Start: 2019-10-01 | End: 2019-10-31

## 2019-09-30 RX ORDER — TRAMADOL HYDROCHLORIDE 50 MG/1
50 TABLET ORAL EVERY 6 HOURS PRN
Qty: 8 TAB | Refills: 0 | Status: SHIPPED | OUTPATIENT
Start: 2019-09-30 | End: 2019-10-02

## 2019-09-30 RX ORDER — METHADONE HYDROCHLORIDE 10 MG/1
20-30 TABLET ORAL 3 TIMES DAILY
Qty: 10 TAB | Refills: 0 | Status: SHIPPED | OUTPATIENT
Start: 2019-09-30 | End: 2019-10-02

## 2019-09-30 RX ORDER — IPRATROPIUM BROMIDE AND ALBUTEROL SULFATE 2.5; .5 MG/3ML; MG/3ML
3 SOLUTION RESPIRATORY (INHALATION) EVERY 6 HOURS PRN
Qty: 30 BULLET | Refills: 0
Start: 2019-09-30 | End: 2020-02-18

## 2019-09-30 RX ORDER — TRAMADOL HYDROCHLORIDE 50 MG/1
50 TABLET ORAL EVERY 6 HOURS PRN
Qty: 8 TAB | Refills: 0 | Status: SHIPPED | OUTPATIENT
Start: 2019-09-30 | End: 2019-09-30 | Stop reason: SDUPTHER

## 2019-09-30 RX ORDER — METHADONE HYDROCHLORIDE 10 MG/1
20-30 TABLET ORAL 3 TIMES DAILY
Qty: 10 TAB | Refills: 0 | Status: SHIPPED | OUTPATIENT
Start: 2019-09-30 | End: 2019-09-30 | Stop reason: SDUPTHER

## 2019-09-30 RX ADMIN — ENOXAPARIN SODIUM 40 MG: 100 INJECTION SUBCUTANEOUS at 05:21

## 2019-09-30 RX ADMIN — ALPRAZOLAM 0.25 MG: 0.25 TABLET ORAL at 16:08

## 2019-09-30 RX ADMIN — METHADONE HYDROCHLORIDE 30 MG: 10 TABLET ORAL at 05:21

## 2019-09-30 RX ADMIN — ASPIRIN 325 MG: 325 TABLET, FILM COATED ORAL at 05:21

## 2019-09-30 RX ADMIN — ALPRAZOLAM 0.25 MG: 0.25 TABLET ORAL at 05:25

## 2019-09-30 RX ADMIN — TAMSULOSIN HYDROCHLORIDE 0.4 MG: 0.4 CAPSULE ORAL at 07:45

## 2019-09-30 RX ADMIN — GABAPENTIN 2400 MG: 400 CAPSULE ORAL at 05:21

## 2019-09-30 RX ADMIN — LEVOTHYROXINE SODIUM 125 MCG: 25 TABLET ORAL at 05:21

## 2019-09-30 RX ADMIN — OXYCODONE HYDROCHLORIDE 10 MG: 5 TABLET ORAL at 16:08

## 2019-09-30 RX ADMIN — BUDESONIDE AND FORMOTEROL FUMARATE DIHYDRATE 2 PUFF: 80; 4.5 AEROSOL RESPIRATORY (INHALATION) at 07:45

## 2019-09-30 ASSESSMENT — COGNITIVE AND FUNCTIONAL STATUS - GENERAL
SUGGESTED CMS G CODE MODIFIER MOBILITY: CH
SUGGESTED CMS G CODE MODIFIER DAILY ACTIVITY: CH
DAILY ACTIVITIY SCORE: 24
MOBILITY SCORE: 24

## 2019-09-30 ASSESSMENT — PATIENT HEALTH QUESTIONNAIRE - PHQ9
2. FEELING DOWN, DEPRESSED, IRRITABLE, OR HOPELESS: NOT AT ALL
SUM OF ALL RESPONSES TO PHQ9 QUESTIONS 1 AND 2: 0
1. LITTLE INTEREST OR PLEASURE IN DOING THINGS: NOT AT ALL

## 2019-09-30 NOTE — DISCHARGE SUMMARY
Discharge Summary    CHIEF COMPLAINT ON ADMISSION  Shortness of breath    Reason for Admission  Pneumonia     Admission Date  9/10/2019    CODE STATUS  Full Code    HPI & HOSPITAL COURSE  59 years old man with a history of chronic pain syndrome on massive amount narcotics and Neurontin, admitted for community-acquired pneumonia complicated by acute hypoxic respiratory failure, necessitate intubation and ICU transfer.  He was extubated after 3 days, on 9/16/19.  Transferred to floor on 9/18/19.      He was treated and resuscitated aggressively for his sepsis secondary to community-acquired pneumonia.  Treatment was successful.  He has finished a course of IV antibiotic for his pneumonia.  Sepsis is now resolved.    The pulmonary infection exacerbate his underlying COPD disease.  Additionally, there is suspicious that he probably aspirated.  CT of the chest show sign of pneumonitis.  Patient was treated with antibiotics and a tapering course of steroids, which has been completed.  Pulmonology has been following him and is recommending that he follow-up with the pulmonology clinic after discharge.  At this time, the patient is stable on 2 L of oxygen supplemental via nasal cannula.    He has a history of chronic neck and back pain.  He takes a very large amount of narcotics, all which has been confirmed by checking the [facility] website.  During his stay here, he was observed to have slightly prolonged QT interval.  The cardiologist was consulted to discuss this finding in relation to his long-term use of pain medication.  Patient has had discussion with cardiology, Dr Linder, regarding his long-term methadone.  He accept the risks of cardiovascular compromise and wished to continue his high-dose methadone.  He is high risk for cardiovascular arhythmia including VT, VF.  EKG on September 17, 2019 interpretation as above.  The repeat EKG on September 25, 2019 with QTc 483.    Hypotension POA: No.    Be observed to  have relatively hypotension.  His blood pressure ranged from  systolic.  He is completely asymptomatic.  According to the patient, this is always been his blood pressure at home.  No doubt, the pain medication including a cardiac is a contributing factor.     Persistent leukocytosis:  Chart review shows no history of persistent leukocytosis.  Denies any B symptom.  The peripheral smear review by pathology indicated most likely reactive leukocytosis, which is reflected the same on peripheral blood flow cytometry.  This should be monitor after discharge in the outpatient setting.  Most likely, this is reactive.       Pneumonitis POA: Yes.  Suspect aspiration.  Patient finished a course of ceftriaxone and doxycycline.  Finished course of steroid taper.  Pulmonary is following.  Chest x-ray two-view reviewed as above.  CT chest also reviewed in comparison to CXR.  Patient will benefit from outpatient follow up with Pulmonology.  I have placed a outpatient referral and follow-up appointment in his discharge paperwork section.       Acute respiratory failure with hypoxia, emphysema and pneumonitis (HCC) POA: Yes.  Continue to wean oxygen.    He is now stable on 2 L oxygen via nasal cannula.      Pulmonary hypertension (HCC) POA: Yes.  RV SP elevated on Echo, mild change vs in 2015.  Unfortunately, blood pressure limiting diuretic.       Paraseptal emphysema (HCC) POA: Yes.  Status post a course of steroid.  Finished course of antibiotic.  Continue duo nebs, bronchodilators, incentive spirometer.       Interstitial lung disease (HCC) POA: Yes.    Patient is recommended to follow-up with pulmonary clinic after discharge.       He continue on Synthroid for hypothyroidism.       Chronic pain POA: Yes.  Controlled on home dose of methadone, gabapentin, tramadol.  As discussed above, patient is willing to take the risk for complications of QT prolongation.  He noted during his discussion with cardiology said he is okay  with even death but would not give up his pain medication.  QTC was constantly monitored during this admission and as discussed above.     Due to acute illness, he has dysphagia, which has resolved.  At the moment, he tolerates regular diet texture without difficulty.       Urinary retention POA: Unknown.  Resolved.     Therefore, he is discharged in good and stable condition to skilled nursing facility.  Because the patient is being discharged to a skilled nursing facility, I have written controlled substance prescriptions for a duration of 2 days.  The [facility] website data on his long-term narcotic prescriptions was reviewed.    The patient met 2-midnight criteria for an inpatient stay at the time of discharge.    Discharge Date  September 30, 2019    FOLLOW UP ITEMS POST DISCHARGE  He needs to follow-up in pulmonary clinic.  This is been refer.  Routine check of CBC to monitor progressions of leukocytosis.    DISCHARGE DIAGNOSES  Principal Problem:    Hypotension POA: No  Active Problems:    Pneumonitis POA: Yes    Pulmonary hypertension (HCC) POA: Unknown    QT prolongation POA: Yes    Paraseptal emphysema (HCC) POA: Unknown    Interstitial lung disease (HCC) POA: Unknown    Abnormal EKG POA: Unknown    Anxiety POA: Yes    Hypothyroidism POA: Yes    Chronic pain POA: Yes  Resolved Problems:    Acute respiratory failure with hypoxia, emphsema and pneumonitis (HCC) POA: Yes    Severe sepsis (HCC) POA: Yes    INDIA (acute kidney injury) (HCC) POA: Yes    Elevated glucose POA: Unknown    Hypomagnesemia POA: Unknown    Pulmonary infiltrates POA: Yes    Hypophosphatemia POA: Yes      Overview: Replete and trend      5/13 - Recheck with am labs    Hypokalemia POA: Unknown    Dysphasia POA: Unknown    Urinary retention POA: Unknown      FOLLOW UP  No future appointments.  Primary care doctor      Please call and schedule an appointment as soon as possible for a hospital follow up. Thank you.    [facility]  Pulmonary Medicine  [address]  [address]  [facility] [id number] [phone]  Schedule an appointment as soon as possible for a visit in 2 weeks  Post hospital discharge follow up.      MEDICATIONS ON DISCHARGE     Medication List      START taking these medications      Instructions   budesonide-formoterol 80-4.5 MCG/ACT Aero  Commonly known as:  SYMBICORT   Inhale 2 Puffs by mouth 2 Times a Day.  Dose:  2 Puff     ipratropium-albuterol 0.5-2.5 (3) MG/3ML nebulizer solution  Commonly known as:  DUONEB   3 mL by Nebulization route every 6 hours as needed for Shortness of Breath.  Dose:  3 mL     tamsulosin 0.4 MG capsule  Start taking on:  10/1/2019  Commonly known as:  FLOMAX   Take 1 Cap by mouth ONE-HALF HOUR AFTER BREAKFAST for 30 days.  Dose:  0.4 mg     timolol 0.25 % Soln  Commonly known as:  TIMOPTIC   Place 1 Drop in both eyes 2 Times a Day.  Dose:  1 Drop        CHANGE how you take these medications      Instructions   tramadol 50 MG Tabs  What changed:    · how much to take  · when to take this  · reasons to take this  Commonly known as:  ULTRAM   Take 1 Tab by mouth every 6 hours as needed for Severe Pain for up to 2 days. Indications: Moderate to Moderately Severe Pain  Dose:  50 mg        CONTINUE taking these medications      Instructions   albuterol 108 (90 Base) MCG/ACT Aers inhalation aerosol   Inhale 2 Puffs by mouth every 6 hours as needed for Shortness of Breath.  Dose:  2 Puff     * gabapentin 800 MG tablet  Commonly known as:  NEURONTIN   Take 2,400 mg by mouth every morning.  Dose:  2,400 mg     * GABAPENTIN PO   Take 1,600 mg by mouth every evening.  Dose:  1,600 mg     levothyroxine 125 MCG Tabs  Commonly known as:  SYNTHROID   Take 125 mcg by mouth every morning before breakfast.  Dose:  125 mcg     methadone 10 MG Tabs  Commonly known as:  DOLOPHINE   Take 2-3 Tabs by mouth 3 times a day for 2 days. 30 mg in AM  20 mg at NOON  20 mg at BEDTIME  Dose:  20-30 mg     ondansetron 4 MG Tabs  tablet  Commonly known as:  ZOFRAN   Take 1 Tab by mouth every 6 hours as needed for Nausea/Vomiting.  Dose:  4 mg     PREDNISOLONE SOD PHOS (OPHTH) OP   1 Drop by Ophthalmic route 4 times a day.  Dose:  1 Drop     temazepam 15 MG Caps  Commonly known as:  RESTORIL   Take 1 Cap by mouth every evening for 3 days.  Dose:  15 mg     ZOCOR 40 MG Tabs  Generic drug:  simvastatin   Take 40 mg by mouth every evening.  Dose:  40 mg         * This list has 2 medication(s) that are the same as other medications prescribed for you. Read the directions carefully, and ask your doctor or other care provider to review them with you.            STOP taking these medications    oxyCODONE immediate release 10 MG immediate release tablet  Commonly known as:  ROXICODONE            Allergies  No Known Allergies    DIET  Regular diet    ACTIVITY  As tolerated and directed by skilled nursing.  Weight bearing as tolerated    CONSULTATIONS  Pulmonary  Critical care    PROCEDURES  None    LABORATORY  Lab Results   Component Value Date    SODIUM 137 09/24/2019    POTASSIUM 4.6 09/24/2019    CHLORIDE 98 09/24/2019    CO2 34 (H) 09/24/2019    GLUCOSE 87 09/24/2019    BUN 19 09/24/2019    CREATININE 1.12 09/24/2019        Lab Results   Component Value Date    WBC 17.9 (H) 09/26/2019    HEMOGLOBIN 12.4 (L) 09/26/2019    HEMATOCRIT 40.5 (L) 09/26/2019    PLATELETCT 201 09/26/2019        Total time of the discharge process exceeds 40 minutes.

## 2019-09-30 NOTE — DISCHARGE PLANNING
Anticipated Discharge Disposition: [facility]    Action: LSW met with the Pt and informed him he was accepted to [facility]. Pt signed COBRA. Transport set up for 1600. Dr and bedside RN notified.     Barriers to Discharge: none    Plan: DC at 1600

## 2019-09-30 NOTE — PROGRESS NOTES
HOSPITAL MEDICINE PROGRESS NOTE    Date of service  9/29/2019    Chief Complaint  No chief complaint on file.      Hospital Course:     59 years old man with a history of chronic pain syndrome on massive amount narcotics and Neurontin, admitted for community-acquired pneumonia complicated by acute hypoxic respiratory failure, necessitate intubation and ICU transfer.  He was extubated after 3 days, on 9/16/19.  Transferred to floor on 9/18/19.         Interval History Updates:  No event overnight.  Blood pressure overall stable, although on the low side of normal.  He is asymptomatic.  Discussed with Dr. Goodman, pulmonologist.    Consultants/Specialty  Pulmonary    Review of Systems   Review of Systems   Constitutional: Negative for chills and fever.   Respiratory: Negative for shortness of breath.    Cardiovascular: Negative for chest pain, palpitations and leg swelling.   Gastrointestinal: Negative for abdominal pain, constipation, diarrhea, nausea and vomiting.   Skin: Negative for rash.   Neurological: Negative for focal weakness.   Endo/Heme/Allergies: Negative.    All other systems reviewed and are negative.       Medications:  • budesonide-formoterol  2 Puff BID (RT)   • busPIRone  5 mg TID   • ALPRAZolam  0.25 mg Q6HRS PRN   • tamsulosin  0.4 mg AFTER BREAKFAST   • timolol  1 Drop BID   • aspirin  325 mg DAILY   • gabapentin  1,600 mg Q EVENING   • gabapentin  2,400 mg QAM   • levothyroxine  125 mcg QAM AC   • methadone  30 mg QAM    And   • methadone  20 mg DAILY AT NOON    And   • methadone  20 mg Q EVENING   • senna-docusate  2 Tab BID    And   • polyethylene glycol/lytes  1 Packet QDAY PRN    And   • magnesium hydroxide  30 mL QDAY PRN    And   • bisacodyl  10 mg QDAY PRN   • simvastatin  40 mg Nightly   • temazepam  15 mg QHS   • tramadol  50 mg BID   • acetaminophen  650 mg Q6HRS PRN   • oxyCODONE immediate-release  5-10 mg Q4HRS PRN   • Respiratory Care per Protocol   Continuous RT   •  ipratropium-albuterol  3 mL Q2HRS PRN (RT)   • enoxaparin (LOVENOX) injection  40 mg DAILY   • sodium chloride  2 Spray Q2HRS PRN       Physical Exam   Vitals:    09/29/19 0439 09/29/19 0900 09/29/19 1300 09/29/19 1500   BP: 106/70 (!) 88/49 (!) 99/59 (!) 97/62   Pulse: 64 65 68 73   Resp: 18 14 20 14   Temp: 36.5 °C (97.7 °F) 36.8 °C (98.2 °F) 36.4 °C (97.5 °F) 36.8 °C (98.2 °F)   TempSrc: Temporal Temporal Temporal Temporal   SpO2: 92% 91% 96% 93%   Weight:       Height:           Physical Exam   Constitutional: He is oriented to person, place, and time and well-developed, well-nourished, and in no distress. No distress.   HENT:   Head: Normocephalic and atraumatic.   Eyes: Pupils are equal, round, and reactive to light. Conjunctivae are normal. No scleral icterus.   Neck: Normal range of motion. Neck supple. No JVD present.   Cardiovascular: Normal rate, regular rhythm and normal heart sounds. Exam reveals no gallop and no friction rub.   No murmur heard.  Pulmonary/Chest: Effort normal and breath sounds normal. No respiratory distress. He has no wheezes. He has no rales. He exhibits no tenderness.   Abdominal: Soft. Bowel sounds are normal. There is no tenderness. There is no rebound.   Musculoskeletal: Normal range of motion. He exhibits no edema, tenderness or deformity.   Neurological: He is alert and oriented to person, place, and time.   Skin: Skin is warm and dry. No rash noted. He is not diaphoretic. No erythema.   Psychiatric: Mood and affect normal.   Nursing note and vitals reviewed.         Laboratory   No results for input(s): WBC, RBC, HEMOGLOBIN, HEMATOCRIT, PLATELETCT in the last 72 hours.  No results for input(s): SODIUM, POTASSIUM, CHLORIDE, CO2, GLUCOSE, BUN, CREATININE in the last 72 hours.   No results for input(s): ALTSGPT, ASTSGOT, ALKPHOSPHAT, TBILIRUBIN, DBILIRUBIN, GAMMAGT, AMYLASE, LIPASE, ALB, PREALBUMIN, GLUCOSE in the last 72 hours.        C-reactive protein 7.23, 3.11.        Microbiology  Results     Procedure Component Value Units Date/Time    Fungal Culture - BAL [[id number]] Collected:  09/14/19 0110    Order Status:  Completed Specimen:  Respirate from Bronchoalveolar Lavage Updated:  09/25/19 0805     Significant Indicator NEG     Source RESP     Site BRONCHOALVEOLAR LAVAGE     Culture Result No clinically significant fungal growth to date.    Narrative:       Collected By:[id number] TONY AN.  Collected By:[id number] TONY ROSE    Fungal Smear - BAL [[id number]] Collected:  09/14/19 0110    Order Status:  Completed Specimen:  Respirate from Bronchoalveolar Lavage Updated:  09/25/19 0805     Significant Indicator NEG     Source RESP     Site BRONCHOALVEOLAR LAVAGE     Fungal Smear Results No fungal elements seen.    Narrative:       Collected By:[id number] TONY AN.  Collected By:[id number] TONY ROSE    AFB Culture - BAL [[id number]] Collected:  09/14/19 0110    Order Status:  Completed Specimen:  Respirate from Bronchoalveolar Lavage Updated:  09/25/19 0805     Significant Indicator NEG     Source RESP     Site BRONCHOALVEOLAR LAVAGE     Culture Result Culture in progress.     AFB Smear Results No acid fast bacilli seen.    Narrative:       Collected By:[id number] TONY AN.  Collected By:[id number] TONY ROSE      Fungal serology from BAL negative, September 13, 2018.    Pathology report BAL fluid: September 14, 2019  A. Bronchoalveolar lavage:         No malignant cells identified.         No evidence of Pulmonary Langehans cell histiocytosis in this          specimen.         Mixed inflammatory cells and alveolar macrophages entrapped          within mucin and few scattered benign bronchial epithelial and          squamous cells present.    Peripheral smear pathology review 09/26/2019  Comment: Absolute neutrophilia without a significant left shift or significant   toxic morphology. Given the history of pneumonia this may be a reactive   process. Per  medical record there is a history of mild chronic   neutrophilia. This may be seen with medications such as steroids,   smoking, and chronic inflammation. The morphology is not typical for   CML, but if there is clinical concern for a myeloproliferative neoplasm   a bone marrow biopsy should be performed.     Flow cytometry peripheral blood, 09/25/2019, result:  Phenotypically normal slightly left shifted myeloid cells without   flow cytometric evidence of monoclonality, acute leukemia, or   lymphoproliferative disorder. See comment.   COMMENT:   Please correlate the results from this study morphologically and   clinically for proper interpretation.   ANALYSIS:   Differential:   18%  lymphocytes   8%  monocytes   73%  myeloid cells   Lymphocytes:   63%   T-cells   7%   NK-cells   26%   B-cells   T-cells:  Heterogeneous with elevated CD4:CD8 ratio of 6.1   B-cells:  Polytypic with a Kappa:Lambda ratio of 1.6   Positive: CD19, CD20   Negative: CD5, CD10   Myeloid cells: normal   Positive for:CD10 (partial), CD13, CD16, CD33, CD64   Negative for:CD34   Mild Left shift:1% CD11b negative   CD34 positive myeloblasts: <0.1%   Viability: 98%   Disclaimers: none   Markers run: CD2, CD3, CD4, CD5, CD7, CD8, CD10, CD16, CD19, CD20,   CD13, CD34, CD38, CD45, CD56, CD57, CD64, surface Kappa, surface   Lambda, CD11b, CD33   These results have been reviewed and approved by Jess Kumari MD.   09/27/2019 18:34      Labs reviewed by me and noted above.    Radiology  I personally reviewed the chest x-ray portable on September 21, 2019 and per my interpretation, consistent with some pulmonary edema, no obvious consolidation or infiltrate.      CT chest 9/13/2019:  I personally reviewed the images of the CT chest and per my interpretation, fibrotic parenchymal changes in both lungs.    Chest x-ray two-view September 25, 2019 personally reviewed and per my interpretation, no significant change compared to past CXR, still with hazy  opacities at the RLL and LLL, LML.  No consolidation.  No obvious pleural effusion.     Echocardiogram September 11, 2019:  LV ejection fraction 60%.  Normal diastolic function.  The RVSP is estimated to be 55 debora-Hg.  This is unchanged compared to the prior study on January 2015.     EKG performed 09/17/2019 was personally reviewed and per my interpretation shows normal sinus rhythm, rate 97.  The QT measured 366, corrected .    EKG performed 09/25 was personally reviewed and per my interpretation shows normal sinus rhythm, rate of 64.  The QT interval measure 468, QTc 483.    Assessment and Plan    Principal Problem:    Hypotension POA: No.  Suspect hypovolemic.  Pressures improved with small volume IV fluid bolus.  I discontinue midrodrin.  BP slightly lower, but asymptomatic.  Close monitor.    Persistent leukocytosis:  ?Secondary to steroid, though last dose of Solu-Medrol September 18, 2019.  Afebrile.  Chart review shows no history of persistent leukocytosis.  Denies any B symptom.  The peripheral smear review by pathology indicated most likely reactive leukocytosis, which is reflected the same on peripheral blood flow cytometry.  This should be monitor after discharge in the outpatient setting.      Pneumonitis POA: Yes.  Suspect aspiration.  Patient finished a course of ceftriaxone and doxycycline.  Finished course of steroid taper.  Pulmonary is following.  Chest x-ray two-view reviewed as above.  CT chest also reviewed in comparison to CXR.  Patient will benefit from outpatient follow up with Pulmonology.  I have placed a outpatient referral and follow-up appointment in his discharge paperwork section.      Acute respiratory failure with hypoxia, emphysema and pneumonitis (HCC) POA: Yes.  Continue to wean oxygen.  Anticipate he will need oxygen at discharge for short-term use.  Check ambulatory room air O2 sat, order placed but not completed yet.      Pulmonary hypertension (HCC) POA: Yes.  RV SP  elevated on Echo, mild change vs in 2015.  Unfortunately, blood pressure limiting diuretic.      QT prolongation POA: Yes.  Patient has had discussion with cardiology, Dr Linder, regarding his long-term methadone.  He accept the risks of cardiovascular compromise and wished to continue his high-dose methadone.  He is high risk for cardiovascular arhythmia including VT, VF.  EKG on September 17, 2019 interpretation as above.  The repeat EKG on September 25, 2019 with QTc 483.      Paraseptal emphysema (HCC) POA: Yes.  Status post a course of steroid.  Finished course of antibiotic.  Continue duo nebs, bronchodilators, incentive spirometer.      Interstitial lung disease (HCC) POA: Yes.  Please see above.  Tobacco cessation counseled.      Anxiety POA: Yes.  Mood is stable.  On Xanax 0.25 mg p.o. every 6 as needed.      Hypothyroidism POA: Yes.  Continue Synthroid.      Chronic pain POA: Yes.  Controlled on home dose of methadone, gabapentin, tramadol.      Hypokalemia POA: No.  Resolved.  Potassium 4.6 yesterday.      Dysphasia POA: Unknown.  No recurrent.  Tolerate regular diet texture without difficulty.      Urinary retention POA: Unknown.  Resolved.    Resolved Problems:    Severe sepsis (HCC) POA: Yes    INDIA (acute kidney injury) (HCC) POA: Yes    Hypomagnesemia POA: Unknown    Hypophosphatemia POA: Yes      DVT prophylaxis: Lovenox    CODE STATUS:  FULL CODE    Disposition: Skilled nursing facility when available.  Patient is medically stable for discharge to skilled nursing facility.    Case was discussed with Primary RN and Charge Nurse, Medical SW, , and Pharmacist on IDTround in addition to individual(s) mentioned above.    I have performed a physical exam and reviewed and updated ROS as of today, 9/29/2019.  In review of yesterday's note dated, 9/28/2019, there are no changes except as documented above.      Matthew Watters M.D.

## 2019-09-30 NOTE — DISCHARGE PLANNING
Agency/Facility Name: [facility]  Spoke To: Sweta  Outcome: Patient is set for transport for 9/30 at 1600 going to [facility].     TOMMY Staples notified.

## 2019-09-30 NOTE — PROGRESS NOTES
Patient set to be discharged. VSS- although BP is low 80s/50s. Held methadone for obvious reason. Patient insist I give the methadone but, per nursing judgement, I held the medication. AOX4. Ambulated throughout shift. Tolerating diet well. Plan of care ongoing.

## 2019-10-01 LAB
SIN NOMBRE VIRUS 93385: NORMAL
SIN NOMBRE VIRUS IGM 93386: NORMAL

## 2019-10-16 LAB
FUNGUS SPEC CULT: NORMAL
SIGNIFICANT IND 70042: NORMAL
SITE SITE: NORMAL
SOURCE SOURCE: NORMAL

## 2019-11-11 LAB
MYCOBACTERIUM SPEC CULT: NORMAL
RHODAMINE-AURAMINE STN SPEC: NORMAL
SIGNIFICANT IND 70042: NORMAL
SITE SITE: NORMAL
SOURCE SOURCE: NORMAL

## 2019-12-02 ENCOUNTER — APPOINTMENT (OUTPATIENT)
Dept: PULMONOLOGY | Facility: HOSPICE | Age: 59
End: 2019-12-02
Payer: MEDICARE

## 2020-02-18 ENCOUNTER — APPOINTMENT (OUTPATIENT)
Dept: RADIOLOGY | Facility: MEDICAL CENTER | Age: 60
DRG: 166 | End: 2020-02-18
Attending: EMERGENCY MEDICINE
Payer: MEDICARE

## 2020-02-18 ENCOUNTER — HOSPITAL ENCOUNTER (OUTPATIENT)
Dept: RADIOLOGY | Facility: MEDICAL CENTER | Age: 60
End: 2020-02-18
Payer: MEDICARE

## 2020-02-18 ENCOUNTER — HOSPITAL ENCOUNTER (INPATIENT)
Facility: MEDICAL CENTER | Age: 60
LOS: 22 days | DRG: 166 | End: 2020-03-11
Attending: EMERGENCY MEDICINE | Admitting: HOSPITALIST
Payer: MEDICARE

## 2020-02-18 DIAGNOSIS — J84.9 INTERSTITIAL LUNG DISEASE (HCC): ICD-10-CM

## 2020-02-18 DIAGNOSIS — J96.01 ACUTE RESPIRATORY FAILURE WITH HYPOXIA (HCC): ICD-10-CM

## 2020-02-18 DIAGNOSIS — J98.4 PNEUMONITIS: ICD-10-CM

## 2020-02-18 DIAGNOSIS — R94.31 QT PROLONGATION: ICD-10-CM

## 2020-02-18 DIAGNOSIS — R09.02 HYPOXIA: ICD-10-CM

## 2020-02-18 DIAGNOSIS — J43.8 OTHER EMPHYSEMA (HCC): ICD-10-CM

## 2020-02-18 DIAGNOSIS — Z93.8 S/P CHEST TUBE PLACEMENT (HCC): ICD-10-CM

## 2020-02-18 DIAGNOSIS — J43.8 PARASEPTAL EMPHYSEMA (HCC): ICD-10-CM

## 2020-02-18 DIAGNOSIS — I27.20 PULMONARY HYPERTENSION (HCC): ICD-10-CM

## 2020-02-18 DIAGNOSIS — J93.0 SPONTANEOUS TENSION PNEUMOTHORAX: ICD-10-CM

## 2020-02-18 DIAGNOSIS — J18.9 PNEUMONIA OF RIGHT LUNG DUE TO INFECTIOUS ORGANISM, UNSPECIFIED PART OF LUNG: ICD-10-CM

## 2020-02-18 LAB
ANION GAP SERPL CALC-SCNC: 11 MMOL/L (ref 0–11.9)
BASOPHILS # BLD AUTO: 0.5 % (ref 0–1.8)
BASOPHILS # BLD: 0.07 K/UL (ref 0–0.12)
BLOOD CULTURE HOLD CXBCH: NORMAL
BUN SERPL-MCNC: 15 MG/DL (ref 8–22)
CALCIUM SERPL-MCNC: 9.4 MG/DL (ref 8.5–10.5)
CHLORIDE SERPL-SCNC: 101 MMOL/L (ref 96–112)
CO2 SERPL-SCNC: 26 MMOL/L (ref 20–33)
CREAT SERPL-MCNC: 1.01 MG/DL (ref 0.5–1.4)
EKG IMPRESSION: NORMAL
EOSINOPHIL # BLD AUTO: 0.17 K/UL (ref 0–0.51)
EOSINOPHIL NFR BLD: 1.1 % (ref 0–6.9)
ERYTHROCYTE [DISTWIDTH] IN BLOOD BY AUTOMATED COUNT: 47.1 FL (ref 35.9–50)
FLUAV RNA SPEC QL NAA+PROBE: NEGATIVE
FLUBV RNA SPEC QL NAA+PROBE: NEGATIVE
GLUCOSE SERPL-MCNC: 101 MG/DL (ref 65–99)
GRAM STN SPEC: NORMAL
HCT VFR BLD AUTO: 40.3 % (ref 42–52)
HGB BLD-MCNC: 12.8 G/DL (ref 14–18)
IMM GRANULOCYTES # BLD AUTO: 0.06 K/UL (ref 0–0.11)
IMM GRANULOCYTES NFR BLD AUTO: 0.4 % (ref 0–0.9)
LYMPHOCYTES # BLD AUTO: 1.9 K/UL (ref 1–4.8)
LYMPHOCYTES NFR BLD: 12.8 % (ref 22–41)
MCH RBC QN AUTO: 26.7 PG (ref 27–33)
MCHC RBC AUTO-ENTMCNC: 31.8 G/DL (ref 33.7–35.3)
MCV RBC AUTO: 84.1 FL (ref 81.4–97.8)
MONOCYTES # BLD AUTO: 1.38 K/UL (ref 0–0.85)
MONOCYTES NFR BLD AUTO: 9.3 % (ref 0–13.4)
NEUTROPHILS # BLD AUTO: 11.25 K/UL (ref 1.82–7.42)
NEUTROPHILS NFR BLD: 75.9 % (ref 44–72)
NRBC # BLD AUTO: 0 K/UL
NRBC BLD-RTO: 0 /100 WBC
PLATELET # BLD AUTO: 226 K/UL (ref 164–446)
PMV BLD AUTO: 10.2 FL (ref 9–12.9)
POTASSIUM SERPL-SCNC: 3.5 MMOL/L (ref 3.6–5.5)
RBC # BLD AUTO: 4.79 M/UL (ref 4.7–6.1)
SIGNIFICANT IND 70042: NORMAL
SITE SITE: NORMAL
SODIUM SERPL-SCNC: 138 MMOL/L (ref 135–145)
SOURCE SOURCE: NORMAL
TROPONIN T SERPL-MCNC: 24 NG/L (ref 6–19)
WBC # BLD AUTO: 14.8 K/UL (ref 4.8–10.8)

## 2020-02-18 PROCEDURE — 87040 BLOOD CULTURE FOR BACTERIA: CPT | Mod: 91

## 2020-02-18 PROCEDURE — 99223 1ST HOSP IP/OBS HIGH 75: CPT | Mod: AI | Performed by: HOSPITALIST

## 2020-02-18 PROCEDURE — 700101 HCHG RX REV CODE 250: Performed by: HOSPITALIST

## 2020-02-18 PROCEDURE — 71045 X-RAY EXAM CHEST 1 VIEW: CPT

## 2020-02-18 PROCEDURE — 87205 SMEAR GRAM STAIN: CPT

## 2020-02-18 PROCEDURE — 770020 HCHG ROOM/CARE - TELE (206)

## 2020-02-18 PROCEDURE — C1729 CATH, DRAINAGE: HCPCS | Performed by: EMERGENCY MEDICINE

## 2020-02-18 PROCEDURE — 80048 BASIC METABOLIC PNL TOTAL CA: CPT

## 2020-02-18 PROCEDURE — 700111 HCHG RX REV CODE 636 W/ 250 OVERRIDE (IP): Performed by: EMERGENCY MEDICINE

## 2020-02-18 PROCEDURE — A9270 NON-COVERED ITEM OR SERVICE: HCPCS | Performed by: HOSPITALIST

## 2020-02-18 PROCEDURE — 85025 COMPLETE CBC W/AUTO DIFF WBC: CPT

## 2020-02-18 PROCEDURE — 87502 INFLUENZA DNA AMP PROBE: CPT

## 2020-02-18 PROCEDURE — 84484 ASSAY OF TROPONIN QUANT: CPT

## 2020-02-18 PROCEDURE — 96375 TX/PRO/DX INJ NEW DRUG ADDON: CPT

## 2020-02-18 PROCEDURE — 700102 HCHG RX REV CODE 250 W/ 637 OVERRIDE(OP): Performed by: HOSPITALIST

## 2020-02-18 PROCEDURE — 700111 HCHG RX REV CODE 636 W/ 250 OVERRIDE (IP): Performed by: HOSPITALIST

## 2020-02-18 PROCEDURE — 96374 THER/PROPH/DIAG INJ IV PUSH: CPT

## 2020-02-18 PROCEDURE — 93005 ELECTROCARDIOGRAM TRACING: CPT | Performed by: EMERGENCY MEDICINE

## 2020-02-18 PROCEDURE — 700105 HCHG RX REV CODE 258: Performed by: HOSPITALIST

## 2020-02-18 PROCEDURE — 93005 ELECTROCARDIOGRAM TRACING: CPT

## 2020-02-18 PROCEDURE — 99285 EMERGENCY DEPT VISIT HI MDM: CPT

## 2020-02-18 RX ORDER — TIMOLOL MALEATE 5 MG/ML
1 SOLUTION/ DROPS OPHTHALMIC EVERY EVENING
Status: DISCONTINUED | OUTPATIENT
Start: 2020-02-18 | End: 2020-03-11 | Stop reason: HOSPADM

## 2020-02-18 RX ORDER — ONDANSETRON 2 MG/ML
4 INJECTION INTRAMUSCULAR; INTRAVENOUS
Status: COMPLETED | OUTPATIENT
Start: 2020-02-18 | End: 2020-02-19

## 2020-02-18 RX ORDER — ACETAMINOPHEN 325 MG/1
650 TABLET ORAL EVERY 6 HOURS PRN
Status: DISCONTINUED | OUTPATIENT
Start: 2020-02-18 | End: 2020-02-21

## 2020-02-18 RX ORDER — OMEPRAZOLE 20 MG/1
20 CAPSULE, DELAYED RELEASE ORAL EVERY EVENING
Status: DISCONTINUED | OUTPATIENT
Start: 2020-02-18 | End: 2020-02-21

## 2020-02-18 RX ORDER — PROCHLORPERAZINE EDISYLATE 5 MG/ML
5-10 INJECTION INTRAMUSCULAR; INTRAVENOUS EVERY 4 HOURS PRN
Status: DISCONTINUED | OUTPATIENT
Start: 2020-02-18 | End: 2020-03-11 | Stop reason: HOSPADM

## 2020-02-18 RX ORDER — POLYETHYLENE GLYCOL 3350 17 G/17G
1 POWDER, FOR SOLUTION ORAL
Status: DISCONTINUED | OUTPATIENT
Start: 2020-02-18 | End: 2020-02-21

## 2020-02-18 RX ORDER — SODIUM CHLORIDE 9 MG/ML
1000 INJECTION, SOLUTION INTRAVENOUS ONCE
Status: COMPLETED | OUTPATIENT
Start: 2020-02-18 | End: 2020-02-18

## 2020-02-18 RX ORDER — TAMSULOSIN HYDROCHLORIDE 0.4 MG/1
0.4 CAPSULE ORAL EVERY EVENING
COMMUNITY
Start: 2020-02-01

## 2020-02-18 RX ORDER — GABAPENTIN 400 MG/1
1600 CAPSULE ORAL EVERY EVENING
Status: DISCONTINUED | OUTPATIENT
Start: 2020-02-18 | End: 2020-02-21

## 2020-02-18 RX ORDER — TRAMADOL HYDROCHLORIDE 50 MG/1
50 TABLET ORAL 2 TIMES DAILY
COMMUNITY
Start: 2020-02-04

## 2020-02-18 RX ORDER — PROMETHAZINE HYDROCHLORIDE 25 MG/1
12.5-25 TABLET ORAL EVERY 4 HOURS PRN
Status: DISCONTINUED | OUTPATIENT
Start: 2020-02-18 | End: 2020-02-21

## 2020-02-18 RX ORDER — TAMSULOSIN HYDROCHLORIDE 0.4 MG/1
0.4 CAPSULE ORAL EVERY EVENING
Status: DISCONTINUED | OUTPATIENT
Start: 2020-02-18 | End: 2020-02-21

## 2020-02-18 RX ORDER — BISACODYL 10 MG
10 SUPPOSITORY, RECTAL RECTAL
Status: DISCONTINUED | OUTPATIENT
Start: 2020-02-18 | End: 2020-02-21

## 2020-02-18 RX ORDER — LEVOTHYROXINE SODIUM 0.12 MG/1
125 TABLET ORAL
Status: DISCONTINUED | OUTPATIENT
Start: 2020-02-18 | End: 2020-02-21

## 2020-02-18 RX ORDER — MORPHINE SULFATE 4 MG/ML
4 INJECTION, SOLUTION INTRAMUSCULAR; INTRAVENOUS ONCE
Status: COMPLETED | OUTPATIENT
Start: 2020-02-18 | End: 2020-02-18

## 2020-02-18 RX ORDER — SIMVASTATIN 20 MG
40 TABLET ORAL NIGHTLY
Status: DISCONTINUED | OUTPATIENT
Start: 2020-02-18 | End: 2020-02-21

## 2020-02-18 RX ORDER — AMOXICILLIN 250 MG
2 CAPSULE ORAL 2 TIMES DAILY
Status: DISCONTINUED | OUTPATIENT
Start: 2020-02-18 | End: 2020-02-21

## 2020-02-18 RX ORDER — GUAIFENESIN/DEXTROMETHORPHAN 100-10MG/5
10 SYRUP ORAL EVERY 6 HOURS PRN
Status: DISCONTINUED | OUTPATIENT
Start: 2020-02-18 | End: 2020-02-21

## 2020-02-18 RX ORDER — LEVOFLOXACIN 5 MG/ML
INJECTION, SOLUTION INTRAVENOUS
COMMUNITY
Start: 2020-02-18 | End: 2020-02-18

## 2020-02-18 RX ORDER — METHADONE HYDROCHLORIDE 10 MG/1
10 TABLET ORAL
Status: DISCONTINUED | OUTPATIENT
Start: 2020-02-18 | End: 2020-02-21

## 2020-02-18 RX ORDER — LORAZEPAM 2 MG/ML
1 INJECTION INTRAMUSCULAR ONCE
Status: COMPLETED | OUTPATIENT
Start: 2020-02-18 | End: 2020-02-18

## 2020-02-18 RX ORDER — TRAMADOL HYDROCHLORIDE 50 MG/1
50 TABLET ORAL 2 TIMES DAILY
Status: DISCONTINUED | OUTPATIENT
Start: 2020-02-18 | End: 2020-02-21

## 2020-02-18 RX ORDER — GABAPENTIN 400 MG/1
2400 CAPSULE ORAL EVERY MORNING
Status: DISCONTINUED | OUTPATIENT
Start: 2020-02-18 | End: 2020-02-21

## 2020-02-18 RX ORDER — LIDOCAINE HYDROCHLORIDE AND EPINEPHRINE BITARTRATE 20; .01 MG/ML; MG/ML
10 INJECTION, SOLUTION SUBCUTANEOUS ONCE
Status: DISPENSED | OUTPATIENT
Start: 2020-02-18 | End: 2020-02-19

## 2020-02-18 RX ORDER — PROMETHAZINE HYDROCHLORIDE 25 MG/1
12.5-25 SUPPOSITORY RECTAL EVERY 4 HOURS PRN
Status: DISCONTINUED | OUTPATIENT
Start: 2020-02-18 | End: 2020-03-11 | Stop reason: HOSPADM

## 2020-02-18 RX ORDER — METHADONE HYDROCHLORIDE 10 MG/1
10-30 TABLET ORAL 3 TIMES DAILY
COMMUNITY
Start: 2020-02-03

## 2020-02-18 RX ORDER — TIMOLOL MALEATE 5 MG/ML
1 SOLUTION/ DROPS OPHTHALMIC EVERY EVENING
COMMUNITY

## 2020-02-18 RX ORDER — METHADONE HYDROCHLORIDE 10 MG/1
10-30 TABLET ORAL 3 TIMES DAILY
Status: DISCONTINUED | OUTPATIENT
Start: 2020-02-18 | End: 2020-02-18

## 2020-02-18 RX ORDER — KETOROLAC TROMETHAMINE 30 MG/ML
30 INJECTION, SOLUTION INTRAMUSCULAR; INTRAVENOUS ONCE
Status: COMPLETED | OUTPATIENT
Start: 2020-02-19 | End: 2020-02-19

## 2020-02-18 RX ORDER — OMEPRAZOLE 20 MG/1
20 CAPSULE, DELAYED RELEASE ORAL EVERY EVENING
COMMUNITY
Start: 2019-12-13

## 2020-02-18 RX ORDER — GABAPENTIN 800 MG/1
TABLET ORAL 2 TIMES DAILY
Status: DISCONTINUED | OUTPATIENT
Start: 2020-02-18 | End: 2020-02-18

## 2020-02-18 RX ORDER — METHADONE HYDROCHLORIDE 10 MG/1
30 TABLET ORAL 2 TIMES DAILY
Status: DISCONTINUED | OUTPATIENT
Start: 2020-02-18 | End: 2020-02-21

## 2020-02-18 RX ADMIN — CEFTRIAXONE SODIUM 2 G: 2 INJECTION, POWDER, FOR SOLUTION INTRAMUSCULAR; INTRAVENOUS at 15:06

## 2020-02-18 RX ADMIN — METRONIDAZOLE 500 MG: 500 INJECTION, SOLUTION INTRAVENOUS at 21:57

## 2020-02-18 RX ADMIN — GABAPENTIN 2400 MG: 400 CAPSULE ORAL at 11:39

## 2020-02-18 RX ADMIN — SENNOSIDES AND DOCUSATE SODIUM 2 TABLET: 8.6; 5 TABLET ORAL at 17:50

## 2020-02-18 RX ADMIN — METHADONE HYDROCHLORIDE 30 MG: 10 TABLET ORAL at 15:05

## 2020-02-18 RX ADMIN — TRAMADOL HYDROCHLORIDE 50 MG: 50 TABLET, FILM COATED ORAL at 17:50

## 2020-02-18 RX ADMIN — TIMOLOL MALEATE 1 DROP: 5 SOLUTION OPHTHALMIC at 17:51

## 2020-02-18 RX ADMIN — MORPHINE SULFATE 4 MG: 4 INJECTION INTRAVENOUS at 08:45

## 2020-02-18 RX ADMIN — LORAZEPAM 1 MG: 2 INJECTION INTRAMUSCULAR; INTRAVENOUS at 10:11

## 2020-02-18 RX ADMIN — METHADONE HYDROCHLORIDE 10 MG: 10 TABLET ORAL at 21:54

## 2020-02-18 RX ADMIN — GABAPENTIN 1600 MG: 400 CAPSULE ORAL at 17:50

## 2020-02-18 RX ADMIN — OMEPRAZOLE 20 MG: 20 CAPSULE, DELAYED RELEASE ORAL at 17:50

## 2020-02-18 RX ADMIN — ENOXAPARIN SODIUM 40 MG: 100 INJECTION SUBCUTANEOUS at 15:06

## 2020-02-18 RX ADMIN — SIMVASTATIN 40 MG: 40 TABLET, FILM COATED ORAL at 21:54

## 2020-02-18 RX ADMIN — ONDANSETRON 4 MG: 2 INJECTION INTRAMUSCULAR; INTRAVENOUS at 08:45

## 2020-02-18 RX ADMIN — METRONIDAZOLE 500 MG: 500 INJECTION, SOLUTION INTRAVENOUS at 13:52

## 2020-02-18 RX ADMIN — SODIUM CHLORIDE 1000 ML: 9 INJECTION, SOLUTION INTRAVENOUS at 10:12

## 2020-02-18 RX ADMIN — TAMSULOSIN HYDROCHLORIDE 0.4 MG: 0.4 CAPSULE ORAL at 17:50

## 2020-02-18 ASSESSMENT — COGNITIVE AND FUNCTIONAL STATUS - GENERAL
SUGGESTED CMS G CODE MODIFIER MOBILITY: CJ
MOBILITY SCORE: 22
DAILY ACTIVITIY SCORE: 20
DRESSING REGULAR UPPER BODY CLOTHING: A LITTLE
WALKING IN HOSPITAL ROOM: A LITTLE
TOILETING: A LITTLE
HELP NEEDED FOR BATHING: A LITTLE
SUGGESTED CMS G CODE MODIFIER DAILY ACTIVITY: CJ
CLIMB 3 TO 5 STEPS WITH RAILING: A LITTLE
DRESSING REGULAR LOWER BODY CLOTHING: A LITTLE

## 2020-02-18 ASSESSMENT — LIFESTYLE VARIABLES
EVER_SMOKED: NEVER
EVER FELT BAD OR GUILTY ABOUT YOUR DRINKING: NO
TOTAL SCORE: 0
EVER_SMOKED: NEVER
HAVE PEOPLE ANNOYED YOU BY CRITICIZING YOUR DRINKING: NO
CONSUMPTION TOTAL: NEGATIVE
ON A TYPICAL DAY WHEN YOU DRINK ALCOHOL HOW MANY DRINKS DO YOU HAVE: 0
EVER HAD A DRINK FIRST THING IN THE MORNING TO STEADY YOUR NERVES TO GET RID OF A HANGOVER: NO
TOTAL SCORE: 0
AVERAGE NUMBER OF DAYS PER WEEK YOU HAVE A DRINK CONTAINING ALCOHOL: 0
ALCOHOL_USE: NO
DOES PATIENT WANT TO STOP DRINKING: NO
TOTAL SCORE: 0
HOW MANY TIMES IN THE PAST YEAR HAVE YOU HAD 5 OR MORE DRINKS IN A DAY: 0
HAVE YOU EVER FELT YOU SHOULD CUT DOWN ON YOUR DRINKING: NO

## 2020-02-18 ASSESSMENT — ENCOUNTER SYMPTOMS
SHORTNESS OF BREATH: 1
ABDOMINAL PAIN: 1
MYALGIAS: 1
COUGH: 1
FEVER: 0
EYES NEGATIVE: 1
CHILLS: 1
NECK PAIN: 1
BACK PAIN: 1
NERVOUS/ANXIOUS: 1
NEUROLOGICAL NEGATIVE: 1
WHEEZING: 1
SPUTUM PRODUCTION: 1

## 2020-02-18 ASSESSMENT — COPD QUESTIONNAIRES
DURING THE PAST 4 WEEKS HOW MUCH DID YOU FEEL SHORT OF BREATH: SOME OF THE TIME
DO YOU EVER COUGH UP ANY MUCUS OR PHLEGM?: YES, A FEW DAYS A WEEK OR MONTH
COPD SCREENING SCORE: 5
HAVE YOU SMOKED AT LEAST 100 CIGARETTES IN YOUR ENTIRE LIFE: NO/DON'T KNOW

## 2020-02-18 NOTE — ED NOTES
Patient resting on gurney. No acute distress A & O x 4, GCS 15. L chest wall gauze dressing clean, dry, intact - denies pain to site. Complaining of normal chronic pain to BLE - states that he takes Methadone and Gabapentin at home. Oxygen 6L via simple mask remains in place. IV maintenance fluids remains in place. Ice chips provided per patient request. Denies further needs. Call bell within reach. Updated regarding bed assignment to floor.    Tele 7 BEBA Pang notified that patient is ready for bedside report and transfer to floor.

## 2020-02-18 NOTE — ED PROVIDER NOTES
ED Provider Note    Scribed for Miguel Estrada M.D. by Avery Solis. 2/18/2020  8:35 AM    Primary care provider: Pcp Not In Computer  Means of arrival: EMS transfr  History obtained from: prior records/patient  History limited by: none    CHIEF COMPLAINT  Chief Complaint   Patient presents with   • Shortness of Breath       HPI  Pancho Martinez Jr. is a 59 y.o. male who presents to the Emergency Department as a transfer from [place] for right PNA and left pneumothorax. He originally presented with complaints of acute shortness of breath acute onset 3 days ago. This acutely onset after vomiting. He then presented to the hospital earlier today where he was found to have the above issues. He had a left chest tube placed with resolution of his symptoms. He currently states that he is not short of breath. He is still nauseous. He denies any fevers, rhinorrhea, cough, chest pain, diarrhea, rash, peripheral edema, or abdominal pain. He has a history of emphysema. He does not currently use any inhalers. He has a history of PNA. No prior pneumothorax or recent trauma to the ribs. He is not currently on blood thinners. No history of diabetes.     REVIEW OF SYSTEMS  Pertinent positives include: shortness of breath (resolved) or vomiting.  Pertinent negatives include: fevers, rhinorrhea, cough, chest pain, diarrhea, rash, peripheral edema, or abdominal pain.  10+ systems reviewed and negative.      PAST MEDICAL HISTORY  Past Medical History:   Diagnosis Date   • Anxiety    • ASTHMA    • C6 cervical fracture (HCC)     c5-7   • Chronic back pain    • Chronic pain    • COPD    • Depression    • Diverticulitis    • Hypothyroid    • Neck pain    • Neuropathy (HCC)    • Pelvic fracture (HCC)    • Pneumonia    • Unspecified cataract        FAMILY HISTORY  No family history noted.     SOCIAL HISTORY  Social History     Tobacco Use   • Smoking status: Former Smoker     Packs/day: 1.00     Years: 17.00     Pack years: 17.00      "Types: Cigarettes     Last attempt to quit: 2015     Years since quittin.1   • Smokeless tobacco: Never Used   Substance Use Topics   • Alcohol use: No   • Drug use: No     Social History     Substance and Sexual Activity   Drug Use No       SURGICAL HISTORY  Past Surgical History:   Procedure Laterality Date   • EXPLORATORY LAPAROTOMY  8/15/2015    Procedure: EXPLORATORY LAPAROTOMY, abdominal washout;  Surgeon: Ad Raman M.D.;  Location: SURGERY [facility];  Service:    • COLOSTOMY CLOSURE N/A 2015    Procedure: COLOSTOMY CLOSURE;  Surgeon: Nataliia Maldonado M.D.;  Location: SURGERY [facility];  Service:    • EXPLORATORY LAPAROTOMY N/A 2015    Procedure: EXPLORATORY LAPAROTOMY;  Surgeon: Nataliia Maldonado M.D.;  Location: SURGERY [facility];  Service:    • COLOSTOMY  2015   • RETINAL DETACHMENT REPAIR Right 2015   • BONE SPUR EXCISION Left    • ACL RECONSTRUCTION Left    • OTHER ORTHOPEDIC SURGERY      spine fx       CURRENT MEDICATIONS  Home Medications     Reviewed by Gaby Dasilva (Pharmacy Tech) on 20 at 0904  Med List Status: Complete   Medication Last Dose Status   gabapentin (NEURONTIN) 800 MG tablet 2020 Active   levothyroxine (SYNTHROID) 125 MCG Tab 2020 Active   methadone (DOLOPHINE) 10 MG Tab 2020 Active   omeprazole (PRILOSEC) 20 MG delayed-release capsule 2020 Active   simvastatin (ZOCOR) 40 MG Tab 2020 Active   tamsulosin (FLOMAX) 0.4 MG capsule 2020 Active   timolol (TIMOPTIC) 0.5 % Solution 2020 Active   tramadol (ULTRAM) 50 MG Tab 2020 Active                ALLERGIES  Allergies   Allergen Reactions   • Fentanyl        PHYSICAL EXAM  VITAL SIGNS: /71   Pulse 95   Temp 36.2 °C (97.1 °F)   Resp (!) 22   Ht 1.854 m (6' 1\")   Wt 78 kg (172 lb)   SpO2 91%   BMI 22.69 kg/m²   Reviewed and afebrile, high normal blood pressure, low normal oxygenation room air  Constitutional: Well developed, Well " nourished, mild distress.  HENT: Normocephalic, atraumatic, bilateral external ears normal, oropharynx moist, No exudates or erythema.   Eyes: PERRLA, conjunctiva pink, no scleral icterus.   Cardiovascular: Regular rate and rhythm. No murmurs, rubs or gallops.   Respiratory: Chest tube in left chest, equal breath sounds, No wheezes, rales, or rhonchi.    Abdominal:  Multiple well-healed surgical scars. Abdomen soft, non-tender, non distended. No rebound, or guarding.    Skin: No erythema, no rash.   Genitourinary: No costovertebral angle tenderness.   Neurologic: Alert & oriented x 3, cranial nerves 2-12 intact by passive exam.  No focal deficit noted.  Psychiatric: Affect normal, Judgment normal, Mood normal.     DIFFERENTIAL DIAGNOSIS:  Pneumonia vs COPD vs spontaneous pneumothorax vs traumatic pneumothorax vs. tension pneumothorax.    EKG Interpretation:  Interpreted by me    Rhythm:  Sinus tachycardia  Rate: 109  Axis: normal  Ectopy: PVC's  Conduction: normal  ST Segments: no acute change  T Waves: no acute change  Q Waves: none  Clinical Impression: sinus tachycardia with PVC's     RADIOLOGY/PROCEDURES  DX-CHEST-LIMITED (1 VIEW)   Final Result      Stable small left pneumothorax. Interval removal of left chest tube.      DX-CHEST-LIMITED (1 VIEW)   Final Result      1.  Left chest tube tip at the origin of the left hemithorax. Advancement is suggested. Stable small left pneumothorax.   2.  Interstitial and airspace opacities, worse on the right which could represent asymmetric edema or infection.        Radiologist interpretation have been reviewed by me.     LABORATORY:  Results for orders placed or performed during the hospital encounter of 02/18/20   CBC WITH DIFFERENTIAL   Result Value Ref Range    WBC 14.8 (H) 4.8 - 10.8 K/uL    RBC 4.79 4.70 - 6.10 M/uL    Hemoglobin 12.8 (L) 14.0 - 18.0 g/dL    Hematocrit 40.3 (L) 42.0 - 52.0 %    MCV 84.1 81.4 - 97.8 fL    MCH 26.7 (L) 27.0 - 33.0 pg    MCHC 31.8 (L)  33.7 - 35.3 g/dL    RDW 47.1 35.9 - 50.0 fL    Platelet Count 226 164 - 446 K/uL    MPV 10.2 9.0 - 12.9 fL    Neutrophils-Polys 75.90 (H) 44.00 - 72.00 %    Lymphocytes 12.80 (L) 22.00 - 41.00 %    Monocytes 9.30 0.00 - 13.40 %    Eosinophils 1.10 0.00 - 6.90 %    Basophils 0.50 0.00 - 1.80 %    Immature Granulocytes 0.40 0.00 - 0.90 %    Nucleated RBC 0.00 /100 WBC    Neutrophils (Absolute) 11.25 (H) 1.82 - 7.42 K/uL    Lymphs (Absolute) 1.90 1.00 - 4.80 K/uL    Monos (Absolute) 1.38 (H) 0.00 - 0.85 K/uL    Eos (Absolute) 0.17 0.00 - 0.51 K/uL    Baso (Absolute) 0.07 0.00 - 0.12 K/uL    Immature Granulocytes (abs) 0.06 0.00 - 0.11 K/uL    NRBC (Absolute) 0.00 K/uL   Basic Metabolic Panel   Result Value Ref Range    Sodium 138 135 - 145 mmol/L    Potassium 3.5 (L) 3.6 - 5.5 mmol/L    Chloride 101 96 - 112 mmol/L    Co2 26 20 - 33 mmol/L    Glucose 101 (H) 65 - 99 mg/dL    Bun 15 8 - 22 mg/dL    Creatinine 1.01 0.50 - 1.40 mg/dL    Calcium 9.4 8.5 - 10.5 mg/dL    Anion Gap 11.0 0.0 - 11.9   TROPONIN   Result Value Ref Range    Troponin T 24 (H) 6 - 19 ng/L    Influenza virus A RNA Negative Negative    Influenza virus B, PCR Negative Negative   GRAM STAIN   Result Value Ref Range    Significant Indicator .     Source RESP     Site SPUTUM     Gram Stain Result       Sputum Gram stain quality score is <1, probable  oropharyngeal contamination. Culture not performed.  Recollect if clinically indicated.        Lab results reviewed by me.     INTERVENTIONS:  Medications   ondansetron (ZOFRAN) syringe/vial injection 4 mg (4 mg Intravenous Given 2/18/20 0445)   lidocaine-epinephrine 2 %-1:394467 injection 10 mL (0 mL Injection Held 2/18/20 1045)   morphine (pf) 4 MG/ML injection 4 mg (4 mg Intravenous Given 2/18/20 4099)   LORazepam (ATIVAN) injection 1 mg (1 mg Intravenous Given 2/18/20 1011)   Chest tube removed by me at the request of general surgery.  The chest tube was not sutured in place which is likely why it  withdrew during transport.  Repeat chest x-ray in 2 hours demonstrated no progression of the small residual pneumothorax.  Response: improved.    ED COURSE:  Nursing notes, VS, PMSFHx reviewed in chart.     Review of past medical records shows the patient is a transfer patient from [place] for acute on chronic shortness of breath. He originally presented for shortness of breath, nausea, and left upper abdominal pain for 3 days. He was on 3L home O2 supplementation. He has a history of smoking and quit in 2015. He was found to have a left pneumothorax and right PNA. He has a history of emphysema and PNA. He had a left chest tube placed. He was given Levaquin. Troponin and BNP were normal. His BNP was 643, lactic 2.1, WBC 15.9, and CMP unremarkable.     8:35 AM - Patient seen and examined at bedside. Patient will be treated with morphine 4 mg and Zofran 4 mg for his symptoms. Ordered DX chest, CBC w/ diff, BMP, troponin, blood culture, and EKG to evaluate.     8:47 AM Paged hospitalist and general surgery.     8:54 AM I discussed the patient's case and the above findings with Dr. Ozuna (Hospitalist) who agrees to hospitalize the patient.     9:52 AM - I discussed the patient's case and the above findings with Dr. Recio (General Surgery) who recommended that the patient's pneumothorax had decreased enough that the chest tube could be removed with reevaluation in 2 hours.     11:00 AM - Patient was reevaluated at bedside. Discussed the plan of care. He agrees with the plan for chest tube removal.     11:10 AM - Chest tube was removed.     MEDICAL DECISION MAKING:  This patient presents with a right-sided pneumonia, history of COPD and spontaneous pneumothorax likely triggered by vomiting a couple of days ago.  The pneumothorax improved dramatically with a temporary chest tube that accidentally dislodged during transport.  Patient does not appear to have septic shock or severe sepsis.    PLAN:  Hospitalize patient  with Dr. Itz Montanez for IV antibiotics, oxygen, bronchodilators and steroids as needed, observation for resolution of pneumothorax.    CONDITION: Guarded.     FINAL IMPRESSION  1. Spontaneous tension pneumothorax    2. Pneumonia of right lung due to infectious organism, unspecified part of lung    3. Other emphysema (HCC)          Avery KNIGHT (Scribe), am scribing for, and in the presence of, Miguel Estrada M.D..    Electronically signed by: Avery Solis (Scribe), 2/18/2020    IMiguel M.D. personally performed the services described in this documentation, as scribed by Avery Solis in my presence, and it is both accurate and complete. C    The note accurately reflects work and decisions made by me.  Miguel Estrada M.D.  2/18/2020  2:06 PM

## 2020-02-18 NOTE — ED NOTES
Report given at bedside to Tele 7 RN Bird.    Patient transported to floor via gurney in stable condition with cardiac monitor and oxygen in place accompanied by Tele RN. All belongings accounted for.

## 2020-02-18 NOTE — CONSULTS
Thoracic Surgery Consult    Date: 2/18/2020    Requesting Physician: Dr. Estrada  PCP: Pcp Not In Computer  Attending Physician: John Ganser M.D.    CC: SOB    HPI: This is a 59 y.o. male who is presenting with several days of illness associated with increasing shortness of breath, worse after an episode of vomiting. In [place] he was found to have pneumonia and left pneumothorax. A chest tube was placed and he was transferred. On CXR her the tube had backed out under the skin with a small residual PTX. The tube was removed. CXR this afternoon shows a stable PTX. He is breathing easily, having mild pain at the chest tube site    Past Medical History:   Diagnosis Date   • Anxiety    • ASTHMA    • C6 cervical fracture (HCC)     c5-7   • Chronic back pain    • Chronic pain    • COPD    • Depression    • Diverticulitis    • Hypothyroid    • Neck pain    • Neuropathy (HCC)    • Pelvic fracture (HCC)    • Pneumonia    • Unspecified cataract        Past Surgical History:   Procedure Laterality Date   • EXPLORATORY LAPAROTOMY  8/15/2015    Procedure: EXPLORATORY LAPAROTOMY, abdominal washout;  Surgeon: Ad Raman M.D.;  Location: SURGERY [facility];  Service:    • COLOSTOMY CLOSURE N/A 8/13/2015    Procedure: COLOSTOMY CLOSURE;  Surgeon: Nataliia Maldonado M.D.;  Location: SURGERY [facility];  Service:    • EXPLORATORY LAPAROTOMY N/A 5/8/2015    Procedure: EXPLORATORY LAPAROTOMY;  Surgeon: Nataliia Maldonado M.D.;  Location: SURGERY [facility];  Service:    • COLOSTOMY  5/2015   • RETINAL DETACHMENT REPAIR Right 2/2015   • BONE SPUR EXCISION Left 1983   • ACL RECONSTRUCTION Left 1978   • OTHER ORTHOPEDIC SURGERY      spine fx       Current Facility-Administered Medications   Medication Dose Route Frequency Provider Last Rate Last Dose   • ondansetron (ZOFRAN) syringe/vial injection 4 mg  4 mg Intravenous Q HOUR PRN Miguel Estrada M.D.   4 mg at 02/18/20 0845   • levothyroxine (SYNTHROID) tablet 125 mcg  125 mcg  Oral QAM AC Tim Bustos M.D.   Stopped at 02/18/20 1130   • omeprazole (PRILOSEC) capsule 20 mg  20 mg Oral Q EVENING Tim Bustos M.D.       • simvastatin (ZOCOR) tablet 40 mg  40 mg Oral Nightly Tim Bustos M.D.       • tamsulosin (FLOMAX) capsule 0.4 mg  0.4 mg Oral Q EVENING Tim Bustos M.D.       • timolol (TIMOPTIC) 0.5 % ophthalmic solution 1 Drop  1 Drop Both Eyes Q EVENING Tim Bustos M.D.       • tramadol (ULTRAM) 50 MG tablet 50 mg  50 mg Oral BID Tim Bustos M.D.       • senna-docusate (PERICOLACE or SENOKOT S) 8.6-50 MG per tablet 2 Tab  2 Tab Oral BID Tim Bustos M.D.        And   • polyethylene glycol/lytes (MIRALAX) PACKET 1 Packet  1 Packet Oral QDAY PRN Tim Bustos M.D.        And   • magnesium hydroxide (MILK OF MAGNESIA) suspension 30 mL  30 mL Oral QDAY PRN Tim Bustos M.D.        And   • bisacodyl (DULCOLAX) suppository 10 mg  10 mg Rectal QDAY PRN Tim Bustos M.D.       • Respiratory Therapy Consult   Nebulization Continuous RT Tim Bustos M.D.       • enoxaparin (LOVENOX) inj 40 mg  40 mg Subcutaneous DAILY Tim Bustos M.D.       • acetaminophen (TYLENOL) tablet 650 mg  650 mg Oral Q6HRS PRN Tim Bustos M.D.       • cefTRIAXone (ROCEPHIN) 2 g in  mL IVPB  2 g Intravenous Q24HRS Tim Bustos M.D.       • metroNIDAZOLE (FLAGYL) IVPB 500 mg  500 mg Intravenous Q8HRS Tim Bustos M.D.   Stopped at 02/18/20 1452   • promethazine (PHENERGAN) tablet 12.5-25 mg  12.5-25 mg Oral Q4HRS PRN Tim Bustos M.D.       • promethazine (PHENERGAN) suppository 12.5-25 mg  12.5-25 mg Rectal Q4HRS PRN Tim Bustos M.D.       • prochlorperazine (COMPAZINE) injection 5-10 mg  5-10 mg Intravenous Q4HRS PRN Tim Bustos M.D.       • guaiFENesin dextromethorphan (ROBITUSSIN DM) 100-10 MG/5ML syrup 10 mL  10 mL Oral Q6HRS PRN Tim Bustos M.D.       •  lidocaine-epinephrine 2 %-1:544427 injection 10 mL  10 mL Injection Once Miguel Estrada M.D.   Stopped at 20 1045   • gabapentin (NEURONTIN) capsule 2,400 mg  2,400 mg Oral QAM Tim Bustos M.D.   2,400 mg at 20 1139    And   • gabapentin (NEURONTIN) capsule 1,600 mg  1,600 mg Oral Q EVENING Tim Bustos M.D.       • methadone (DOLOPHINE) tablet 30 mg  30 mg Oral BID Tim Bustos M.D.   30 mg at 20 1505    And   • methadone (DOLOPHINE) tablet 10 mg  10 mg Oral QHS Tim Bustos M.D.           Social History     Socioeconomic History   • Marital status:      Spouse name: Not on file   • Number of children: Not on file   • Years of education: Not on file   • Highest education level: Not on file   Occupational History   • Not on file   Social Needs   • Financial resource strain: Not on file   • Food insecurity     Worry: Not on file     Inability: Not on file   • Transportation needs     Medical: Not on file     Non-medical: Not on file   Tobacco Use   • Smoking status: Former Smoker     Packs/day: 1.00     Years: 17.00     Pack years: 17.00     Types: Cigarettes     Last attempt to quit: 2015     Years since quittin.1   • Smokeless tobacco: Never Used   Substance and Sexual Activity   • Alcohol use: No   • Drug use: No   • Sexual activity: Not on file   Lifestyle   • Physical activity     Days per week: Not on file     Minutes per session: Not on file   • Stress: Not on file   Relationships   • Social connections     Talks on phone: Not on file     Gets together: Not on file     Attends Jain service: Not on file     Active member of club or organization: Not on file     Attends meetings of clubs or organizations: Not on file     Relationship status: Not on file   • Intimate partner violence     Fear of current or ex partner: Not on file     Emotionally abused: Not on file     Physically abused: Not on file     Forced sexual activity: Not on file   Other  "Topics Concern   • Not on file   Social History Narrative    ** Merged History Encounter **            No family history on file.    Allergies:  Fentanyl    Review of Systems:    Respiratory: Positive for cough, shortness of breath  Otherwise negative      Physical Exam:  /70   Pulse 90   Temp 36.2 °C (97.1 °F)   Resp (!) 22   Ht 1.854 m (6' 1\")   Wt 78 kg (172 lb)   SpO2 97%     Constitutional: he is oriented to person, place, and time.  he appears well-developed and well-nourished. No distress.   Cardiovascular: Normal rate, regular rhythm  Pulmonary/Chest: Effort normal and breath sounds bilat       Labs:  Recent Labs     02/18/20  0840   WBC 14.8*   RBC 4.79   HEMOGLOBIN 12.8*   HEMATOCRIT 40.3*   MCV 84.1   MCH 26.7*   MCHC 31.8*   RDW 47.1   PLATELETCT 226   MPV 10.2     Recent Labs     02/18/20  0840   SODIUM 138   POTASSIUM 3.5*   CHLORIDE 101   CO2 26   GLUCOSE 101*   BUN 15   CREATININE 1.01   CALCIUM 9.4               Radiology:  DX-CHEST-LIMITED (1 VIEW)   Final Result      Stable small left pneumothorax. Interval removal of left chest tube.      DX-CHEST-LIMITED (1 VIEW)   Final Result      1.  Left chest tube tip at the origin of the left hemithorax. Advancement is suggested. Stable small left pneumothorax.   2.  Interstitial and airspace opacities, worse on the right which could represent asymmetric edema or infection.      These findings were discussed with nurse Hung on 2/18/2020 9:16 AM.      OUTSIDE IMAGES-DX CHEST   Final Result      OUTSIDE IMAGES-DX CHEST   Final Result          Assessment: This is a 59 y.o with spontaneous pneumothorax. This appears stable after the chest tube was removed.      Plan: Repeat CXR in AM or if develops increased shortness of breath. Discussed possible need for another chest tube if the pneumothorax enlarges. Questions answered.    Thank you very much for this consultation.    John Ganser M.D.  [facility]  [phone]              "

## 2020-02-18 NOTE — ED NOTES
Pt resting quietly in bed. Respirations even and unlabored. Pt tachypenic. Pt given extra blankets. Call light within reach. Awaiting bed assignment.

## 2020-02-18 NOTE — ED TRIAGE NOTES
[place] [facility] [place] [facility] for SOB x2 days. He arrived at hospital SpO2 70%, CXR shows right sided pneumonia and 20% left pneumothorax. Left axillary chest tube placed prior to transfer, with reported improvement. Also given levaquin 500mg, approx 10meq IV potassium (given 200-250mL of liter containing 40meq), and ketamine 20mg for pain in flight which he did not tolerate well and began to hallucinate.    Arrives to ED sitting upright on gurney, speaking full sentences. Chest tube connected to drainage system. Bigeminy on monitor. EKG completed. Chart up for ERP.

## 2020-02-19 ENCOUNTER — APPOINTMENT (OUTPATIENT)
Dept: RADIOLOGY | Facility: MEDICAL CENTER | Age: 60
DRG: 166 | End: 2020-02-19
Attending: SURGERY
Payer: MEDICARE

## 2020-02-19 LAB
ALBUMIN SERPL BCP-MCNC: 4 G/DL (ref 3.2–4.9)
ALBUMIN/GLOB SERPL: 1.1 G/DL
ALP SERPL-CCNC: 110 U/L (ref 30–99)
ALT SERPL-CCNC: 5 U/L (ref 2–50)
ANION GAP SERPL CALC-SCNC: 5 MMOL/L (ref 0–11.9)
AST SERPL-CCNC: 16 U/L (ref 12–45)
BASOPHILS # BLD AUTO: 0.4 % (ref 0–1.8)
BASOPHILS # BLD: 0.05 K/UL (ref 0–0.12)
BILIRUB SERPL-MCNC: 0.6 MG/DL (ref 0.1–1.5)
BUN SERPL-MCNC: 21 MG/DL (ref 8–22)
CALCIUM SERPL-MCNC: 9 MG/DL (ref 8.5–10.5)
CHLORIDE SERPL-SCNC: 97 MMOL/L (ref 96–112)
CHOLEST SERPL-MCNC: 138 MG/DL (ref 100–199)
CO2 SERPL-SCNC: 25 MMOL/L (ref 20–33)
CREAT SERPL-MCNC: 1.07 MG/DL (ref 0.5–1.4)
EOSINOPHIL # BLD AUTO: 0.69 K/UL (ref 0–0.51)
EOSINOPHIL NFR BLD: 5.2 % (ref 0–6.9)
ERYTHROCYTE [DISTWIDTH] IN BLOOD BY AUTOMATED COUNT: 47 FL (ref 35.9–50)
GLOBULIN SER CALC-MCNC: 3.5 G/DL (ref 1.9–3.5)
GLUCOSE SERPL-MCNC: 88 MG/DL (ref 65–99)
HCT VFR BLD AUTO: 38.6 % (ref 42–52)
HDLC SERPL-MCNC: 22 MG/DL
HGB BLD-MCNC: 12.6 G/DL (ref 14–18)
IMM GRANULOCYTES # BLD AUTO: 0.07 K/UL (ref 0–0.11)
IMM GRANULOCYTES NFR BLD AUTO: 0.5 % (ref 0–0.9)
LDLC SERPL CALC-MCNC: 96 MG/DL
LYMPHOCYTES # BLD AUTO: 2.17 K/UL (ref 1–4.8)
LYMPHOCYTES NFR BLD: 16.2 % (ref 22–41)
MAGNESIUM SERPL-MCNC: 2.2 MG/DL (ref 1.5–2.5)
MCH RBC QN AUTO: 27.3 PG (ref 27–33)
MCHC RBC AUTO-ENTMCNC: 32.6 G/DL (ref 33.7–35.3)
MCV RBC AUTO: 83.7 FL (ref 81.4–97.8)
MONOCYTES # BLD AUTO: 1.08 K/UL (ref 0–0.85)
MONOCYTES NFR BLD AUTO: 8.1 % (ref 0–13.4)
NEUTROPHILS # BLD AUTO: 9.32 K/UL (ref 1.82–7.42)
NEUTROPHILS NFR BLD: 69.6 % (ref 44–72)
NRBC # BLD AUTO: 0 K/UL
NRBC BLD-RTO: 0 /100 WBC
PLATELET # BLD AUTO: 292 K/UL (ref 164–446)
PMV BLD AUTO: 10.2 FL (ref 9–12.9)
POTASSIUM SERPL-SCNC: 3.8 MMOL/L (ref 3.6–5.5)
PROCALCITONIN SERPL-MCNC: 0.2 NG/ML
PROT SERPL-MCNC: 7.5 G/DL (ref 6–8.2)
RBC # BLD AUTO: 4.61 M/UL (ref 4.7–6.1)
SODIUM SERPL-SCNC: 127 MMOL/L (ref 135–145)
TRIGL SERPL-MCNC: 102 MG/DL (ref 0–149)
WBC # BLD AUTO: 13.4 K/UL (ref 4.8–10.8)

## 2020-02-19 PROCEDURE — 83735 ASSAY OF MAGNESIUM: CPT

## 2020-02-19 PROCEDURE — 700111 HCHG RX REV CODE 636 W/ 250 OVERRIDE (IP): Performed by: EMERGENCY MEDICINE

## 2020-02-19 PROCEDURE — 700111 HCHG RX REV CODE 636 W/ 250 OVERRIDE (IP): Performed by: INTERNAL MEDICINE

## 2020-02-19 PROCEDURE — 80061 LIPID PANEL: CPT

## 2020-02-19 PROCEDURE — 71046 X-RAY EXAM CHEST 2 VIEWS: CPT

## 2020-02-19 PROCEDURE — 80053 COMPREHEN METABOLIC PANEL: CPT

## 2020-02-19 PROCEDURE — 94669 MECHANICAL CHEST WALL OSCILL: CPT

## 2020-02-19 PROCEDURE — 85025 COMPLETE CBC W/AUTO DIFF WBC: CPT

## 2020-02-19 PROCEDURE — 700105 HCHG RX REV CODE 258: Performed by: INTERNAL MEDICINE

## 2020-02-19 PROCEDURE — 770020 HCHG ROOM/CARE - TELE (206)

## 2020-02-19 PROCEDURE — 94760 N-INVAS EAR/PLS OXIMETRY 1: CPT

## 2020-02-19 PROCEDURE — 99233 SBSQ HOSP IP/OBS HIGH 50: CPT | Performed by: HOSPITALIST

## 2020-02-19 PROCEDURE — 84145 PROCALCITONIN (PCT): CPT

## 2020-02-19 PROCEDURE — A9270 NON-COVERED ITEM OR SERVICE: HCPCS | Performed by: HOSPITALIST

## 2020-02-19 PROCEDURE — 700111 HCHG RX REV CODE 636 W/ 250 OVERRIDE (IP): Performed by: HOSPITALIST

## 2020-02-19 PROCEDURE — 700105 HCHG RX REV CODE 258: Performed by: HOSPITALIST

## 2020-02-19 PROCEDURE — 36415 COLL VENOUS BLD VENIPUNCTURE: CPT

## 2020-02-19 PROCEDURE — 700102 HCHG RX REV CODE 250 W/ 637 OVERRIDE(OP): Performed by: HOSPITALIST

## 2020-02-19 PROCEDURE — 700101 HCHG RX REV CODE 250: Performed by: HOSPITALIST

## 2020-02-19 RX ORDER — METRONIDAZOLE 500 MG/1
500 TABLET ORAL EVERY 8 HOURS
Status: DISCONTINUED | OUTPATIENT
Start: 2020-02-19 | End: 2020-02-21

## 2020-02-19 RX ORDER — ALPRAZOLAM 0.25 MG/1
0.25 TABLET ORAL 4 TIMES DAILY PRN
Status: DISCONTINUED | OUTPATIENT
Start: 2020-02-19 | End: 2020-02-21

## 2020-02-19 RX ORDER — SODIUM CHLORIDE 9 MG/ML
INJECTION, SOLUTION INTRAVENOUS CONTINUOUS
Status: DISCONTINUED | OUTPATIENT
Start: 2020-02-19 | End: 2020-02-19

## 2020-02-19 RX ADMIN — SENNOSIDES AND DOCUSATE SODIUM 2 TABLET: 8.6; 5 TABLET ORAL at 16:57

## 2020-02-19 RX ADMIN — METHADONE HYDROCHLORIDE 30 MG: 10 TABLET ORAL at 05:18

## 2020-02-19 RX ADMIN — SIMVASTATIN 40 MG: 40 TABLET, FILM COATED ORAL at 21:21

## 2020-02-19 RX ADMIN — TRAMADOL HYDROCHLORIDE 50 MG: 50 TABLET, FILM COATED ORAL at 05:19

## 2020-02-19 RX ADMIN — ALPRAZOLAM 0.25 MG: 0.25 TABLET ORAL at 12:11

## 2020-02-19 RX ADMIN — SODIUM CHLORIDE: 9 INJECTION, SOLUTION INTRAVENOUS at 07:40

## 2020-02-19 RX ADMIN — SENNOSIDES AND DOCUSATE SODIUM 2 TABLET: 8.6; 5 TABLET ORAL at 05:20

## 2020-02-19 RX ADMIN — GABAPENTIN 2400 MG: 400 CAPSULE ORAL at 05:20

## 2020-02-19 RX ADMIN — METHADONE HYDROCHLORIDE 30 MG: 10 TABLET ORAL at 14:41

## 2020-02-19 RX ADMIN — GABAPENTIN 1600 MG: 400 CAPSULE ORAL at 16:57

## 2020-02-19 RX ADMIN — TIMOLOL MALEATE 1 DROP: 5 SOLUTION OPHTHALMIC at 16:56

## 2020-02-19 RX ADMIN — ACETAMINOPHEN 650 MG: 325 TABLET, FILM COATED ORAL at 03:48

## 2020-02-19 RX ADMIN — TRAMADOL HYDROCHLORIDE 50 MG: 50 TABLET, FILM COATED ORAL at 16:57

## 2020-02-19 RX ADMIN — METRONIDAZOLE 500 MG: 500 INJECTION, SOLUTION INTRAVENOUS at 05:22

## 2020-02-19 RX ADMIN — ACETAMINOPHEN 650 MG: 325 TABLET, FILM COATED ORAL at 19:33

## 2020-02-19 RX ADMIN — LEVOTHYROXINE SODIUM 125 MCG: 125 TABLET ORAL at 05:19

## 2020-02-19 RX ADMIN — TAMSULOSIN HYDROCHLORIDE 0.4 MG: 0.4 CAPSULE ORAL at 16:57

## 2020-02-19 RX ADMIN — ENOXAPARIN SODIUM 40 MG: 100 INJECTION SUBCUTANEOUS at 05:18

## 2020-02-19 RX ADMIN — OMEPRAZOLE 20 MG: 20 CAPSULE, DELAYED RELEASE ORAL at 16:57

## 2020-02-19 RX ADMIN — METRONIDAZOLE 500 MG: 500 TABLET ORAL at 14:41

## 2020-02-19 RX ADMIN — KETOROLAC TROMETHAMINE 30 MG: 30 INJECTION, SOLUTION INTRAMUSCULAR at 00:01

## 2020-02-19 RX ADMIN — ALPRAZOLAM 0.25 MG: 0.25 TABLET ORAL at 21:21

## 2020-02-19 RX ADMIN — CEFTRIAXONE SODIUM 2 G: 2 INJECTION, POWDER, FOR SOLUTION INTRAMUSCULAR; INTRAVENOUS at 05:22

## 2020-02-19 RX ADMIN — METHADONE HYDROCHLORIDE 10 MG: 10 TABLET ORAL at 21:22

## 2020-02-19 RX ADMIN — ONDANSETRON 4 MG: 2 INJECTION INTRAMUSCULAR; INTRAVENOUS at 14:16

## 2020-02-19 RX ADMIN — METRONIDAZOLE 500 MG: 500 TABLET ORAL at 21:21

## 2020-02-19 ASSESSMENT — ENCOUNTER SYMPTOMS
COUGH: 0
EYES NEGATIVE: 1
BACK PAIN: 1
GASTROINTESTINAL NEGATIVE: 1
NEUROLOGICAL NEGATIVE: 1
SHORTNESS OF BREATH: 1
NERVOUS/ANXIOUS: 1
MYALGIAS: 1

## 2020-02-19 NOTE — DIETARY
Nutrition Services - Poor po and/or weight loss reported on admission. Malnutrition Screening Tool score <2. Nutrition assessment not indicated at this time. RD will monitor per department guidelines. Please consult RD for nutrition concerns.

## 2020-02-19 NOTE — DISCHARGE PLANNING
Patient is a 59-year old  man who lives with his parents and adult child in [place], [place]. Patient is disabled and has SS benefits. Has Medicare insurance.     Patient reports he is getting a new PCP, has appointment scheduled. Reports no DME use. Reports regular pain in feet and tries to do exercises when he feels better. Has [employer] home O2 3L 24/7.     Upon D/C patient reports family is able to come get him.     Pending treatment team recommendations.     Care Transition Team Assessment    Information Source  Orientation : Oriented x 4  Information Given By: Patient  Who is responsible for making decisions for patient? : Patient    Readmission Evaluation  Is this a readmission?: No    Elopement Risk  Legal Hold: No  Ambulatory or Self Mobile in Wheelchair: Yes  Disoriented: No  Psychiatric Symptoms: None  History of Wandering: No  Elopement this Admit: No  Vocalizing Wanting to Leave: No  Displays Behaviors, Body Language Wanting to Leave: No-Not at Risk for Elopement  Elopement Risk: Not at Risk for Elopement    Interdisciplinary Discharge Planning  Patient or legal guardian wants to designate a caregiver (see row info): No    Discharge Preparedness  What is your plan after discharge?: Home with help  What are your discharge supports?: Child, Parent  Prior Functional Level: Ambulatory, Independent with Activities of Daily Living, Independent with Medication Management  Difficulity with ADLs: None  Difficulity with IADLs: None    Functional Assesment  Prior Functional Level: Ambulatory, Independent with Activities of Daily Living, Independent with Medication Management    Finances  Financial Barriers to Discharge: No  Prescription Coverage: Yes    Vision / Hearing Impairment  Vision Impairment : Yes  Right Eye Vision: Impaired, Wears Glasses  Left Eye Vision: Impaired, Wears Glasses  Hearing Impairment : No         Advance Directive  Advance Directive?: None    Domestic Abuse  Have you ever been the victim  of abuse or violence?: No  Physical Abuse or Sexual Abuse: No  Verbal Abuse or Emotional Abuse: No  Possible Abuse Reported to:: Not Applicable    Psychological Assessment  History of Substance Abuse: Prescription opioids  History of Psychiatric Problems: No  Non-compliant with Treatment: No  Newly Diagnosed Illness: No    Discharge Risks or Barriers  Discharge risks or barriers?: No    Anticipated Discharge Information  Anticipated discharge disposition: Home  Discharge Address: [place], [place]  Discharge Contact Phone Number: [phone]

## 2020-02-19 NOTE — PROGRESS NOTES
Received in bed aaox4, on 5 L O2 per NC, left chest dressing CDI, feels better in terms of breathing as verbalized, encouraged use of IS, up to br and chair sba, needs attended.

## 2020-02-19 NOTE — H&P
Hospital Medicine History & Physical Note    Date of Service  2/18/2020    Primary Care Physician  Pcp Not In Computer    Consultants  Chest surgery  Code Status  Full code    Chief Complaint  Shortness of breath and epigastric pain    History of Presenting Illness  59 y.o. male who presented 2/18/2020 with the above complaints an outlying facility, the patient apparently was found on his usual oxygen flow hypoxic at 71%, feels that he has been ill for the last 2 to 3 days with increasing dyspnea, epigastric abdominal pain, gas pain, nausea vomiting.  The patient in the process was found with a left spontaneous pneumothorax and right lower lobe pneumonia per imaging.  Chest tube was placed at an outlying facility, the patient received Levaquin antibiotic coverage.  The patient transferred to [facility] for ongoing specialty care and evaluation by surgery.  The patient has significant polypharmacy including high-dose Neurontin and significant dose of methadone.  Patient after arrival was found to have a dislodged chest tube, chest surgery Dr. Ganser works consulted for possible replacement.  Patient has a history of chronic home oxygen use at 3 L currently, history of pneumonia, pulmonary till hypertension.  The patient denies any recent travel or other sick exposure.  Patient tested negative for influenza.    Review of Systems  Review of Systems   Constitutional: Positive for chills and malaise/fatigue. Negative for fever.   HENT: Negative.    Eyes: Negative.    Respiratory: Positive for cough, sputum production, shortness of breath and wheezing.    Cardiovascular: Positive for chest pain.   Gastrointestinal: Positive for abdominal pain.   Genitourinary: Negative.    Musculoskeletal: Positive for back pain, myalgias and neck pain.   Skin: Negative.    Neurological: Negative.    Endo/Heme/Allergies: Negative.    Psychiatric/Behavioral: The patient is nervous/anxious.    All other systems reviewed and are  negative.      Past Medical History   has a past medical history of Anxiety, ASTHMA, C6 cervical fracture (HCC), Chronic back pain, Chronic pain, COPD, Depression, Diverticulitis, Hypothyroid, Neck pain, Neuropathy (HCC), Pelvic fracture (HCC), Pneumonia, and Unspecified cataract.    Surgical History   has a past surgical history that includes other orthopedic surgery; exploratory laparotomy (N/A, 5/8/2015); colostomy (5/2015); acl reconstruction (Left, 1978); bone spur excision (Left, 1983); retinal detachment repair (Right, 2/2015); colostomy closure (N/A, 8/13/2015); and exploratory laparotomy (8/15/2015).     Family History    Patient denies pertinent family history  Social History   reports that he quit smoking about 5 years ago. His smoking use included cigarettes. He has a 17.00 pack-year smoking history. He has never used smokeless tobacco. He reports that he does not drink alcohol or use drugs.    Allergies  Allergies   Allergen Reactions   • Fentanyl        Medications  Prior to Admission Medications   Prescriptions Last Dose Informant Patient Reported? Taking?   gabapentin (NEURONTIN) 800 MG tablet 2/17/2020 at PM Patient Yes No   Sig: Take 1,600-2,400 mg by mouth 2 times a day. 2400 mg every morning  1600 mg every night   levothyroxine (SYNTHROID) 125 MCG Tab 2/17/2020 at AM Patient Yes No   Sig: Take 125 mcg by mouth every morning before breakfast.   methadone (DOLOPHINE) 10 MG Tab 2/17/2020 at PM Patient Yes No   Sig: Take 10-30 mg by mouth 3 times a day. 30 mg every morning  30 mg every afternoon  10 mg every night   omeprazole (PRILOSEC) 20 MG delayed-release capsule 2/17/2020 at PM Patient Yes No   Sig: Take 20 mg by mouth every evening.   simvastatin (ZOCOR) 40 MG Tab 2/17/2020 at PM Patient Yes No   Sig: Take 40 mg by mouth every evening.   tamsulosin (FLOMAX) 0.4 MG capsule 2/17/2020 at PM Patient Yes No   Sig: Take 0.4 mg by mouth every evening.   timolol (TIMOPTIC) 0.5 % Solution 2/17/2020 at  PM Patient Yes Yes   Sig: Place 1 Drop in both eyes every evening.   tramadol (ULTRAM) 50 MG Tab 2/17/2020 at PM Patient Yes No   Sig: Take 50 mg by mouth 2 Times a Day.      Facility-Administered Medications: None       Physical Exam  Temp:  [36.2 °C (97.1 °F)-37.5 °C (99.5 °F)] 37.1 °C (98.7 °F)  Pulse:  [46-97] 97  Resp:  [17-24] 18  BP: (119-168)/(59-84) 145/83  SpO2:  [89 %-97 %] 90 %    Physical Exam  Vitals signs and nursing note reviewed.   Constitutional:       Appearance: He is well-developed.      Comments: Pt seen and examined.   HENT:      Head: Normocephalic and atraumatic.   Eyes:      Pupils: Pupils are equal, round, and reactive to light.   Neck:      Musculoskeletal: Normal range of motion and neck supple.   Cardiovascular:      Rate and Rhythm: Normal rate.      Heart sounds: Normal heart sounds.   Pulmonary:      Effort: Pulmonary effort is normal. No respiratory distress.      Breath sounds: Rhonchi and rales present.      Comments: Left chest tube lateral  Chest:      Chest wall: Tenderness present.   Abdominal:      General: Bowel sounds are normal.      Palpations: Abdomen is soft.   Genitourinary:     Penis: Normal.    Musculoskeletal: Normal range of motion.   Skin:     General: Skin is warm and dry.   Neurological:      Mental Status: He is alert and oriented to person, place, and time.   Psychiatric:         Behavior: Behavior normal.         Laboratory:  Recent Labs     02/18/20  0840   WBC 14.8*   RBC 4.79   HEMOGLOBIN 12.8*   HEMATOCRIT 40.3*   MCV 84.1   MCH 26.7*   MCHC 31.8*   RDW 47.1   PLATELETCT 226   MPV 10.2     Recent Labs     02/18/20  0840   SODIUM 138   POTASSIUM 3.5*   CHLORIDE 101   CO2 26   GLUCOSE 101*   BUN 15   CREATININE 1.01   CALCIUM 9.4     Recent Labs     02/18/20  0840   GLUCOSE 101*         No results for input(s): NTPROBNP in the last 72 hours.      Recent Labs     02/18/20  0840   TROPONINT 24*       Urinalysis:    No results found      Imaging:  DX-CHEST-LIMITED (1 VIEW)   Final Result      Stable small left pneumothorax. Interval removal of left chest tube.      DX-CHEST-LIMITED (1 VIEW)   Final Result      1.  Left chest tube tip at the origin of the left hemithorax. Advancement is suggested. Stable small left pneumothorax.   2.  Interstitial and airspace opacities, worse on the right which could represent asymmetric edema or infection.      These findings were discussed with nurse Persaud on 2/18/2020 9:16 AM.      OUTSIDE IMAGES-DX CHEST   Final Result      OUTSIDE IMAGES-DX CHEST   Final Result            Assessment/Plan:  I anticipate this patient will require at least two midnights for appropriate medical management, necessitating inpatient admission.    Hypoxia- (present on admission)  Assessment & Plan  As a result from COPD, pneumothorax, pneumonia    Respiratory failure (HCC)- (present on admission)  Assessment & Plan  Secondary to COPD, emphysema and concomitant infection with pneumothorax  Status post chest tube placement, currently improved, respiratory protocol,  Wean oxygen as tolerated    Pneumonia- (present on admission)  Assessment & Plan  Community-acquired, history of extensive tobacco abuse with lung pathology  Parapneumonic's, respiratory cultures    QT prolongation- (present on admission)  Assessment & Plan  History of, currently with some significant bigeminy  Continue with cardiac monitoring    Chronic neck pain- (present on admission)  Assessment & Plan  Patient is on high-dose chronic narcotics in form of methadone, continue, avoid escalation    Pulmonary hypertension (HCC)- (present on admission)  Assessment & Plan  History of, avoid fluid overload    Tobacco abuse- (present on admission)  Assessment & Plan  History of extensive use, the patient quit in 2015    Hypothyroid- (present on admission)  Assessment & Plan  Continue with replacement    Chronic pain- (present on admission)  Assessment & Plan  High-dose  methadone as well as Neurontin    Plan  Chest tube management by surgery, Dr. Ganser consulted  Empiric antibiotics  Respiratory protocol  Await cultures  Telemetry monitoring  See orders  Medically complex high risk  VTE prophylaxis: Lovenox

## 2020-02-19 NOTE — RESPIRATORY CARE
COPD EDUCATION by COPD CLINICAL EDUCATOR  2/19/2020  at  3:52 PM by Alpa Kitchen, RRT     Patient interviewed by COPD education team.  Patient declined to participate in full program, denies COPD diagnosis.  Short intervention has been conducted, discussed what COPD is, how it's diagnosed and how its treated. During discussion patient bounced from subject to subject and did not open eyes. A comprehensive packet including information about COPD, treatments, and smoking cessation given.

## 2020-02-19 NOTE — ASSESSMENT & PLAN NOTE
On his home regimen of methadone 30mg BID and 10mg qhs  Also on gabapentin  Oxycodone and Dilaudid sparingly as needed for breakthrough as well as Ultram

## 2020-02-19 NOTE — ASSESSMENT & PLAN NOTE
Secondary to COPD, emphysema and concomitant infection with pneumothorax  Status post chest tube placement initially for spontaneous pneumothorax  Worsening status with multifactorial etiology  Upgraded to ICU status, intubation, further investigation

## 2020-02-19 NOTE — PROGRESS NOTES
Monitor Summary:    SR 59-82   0.16/0.10/0.44  R-FPVC, ALBERC  Rcoup, malvin/duncan BEATTY coup    Have a great day!

## 2020-02-19 NOTE — CARE PLAN
Problem: Pain Management  Goal: Pain level will decrease to patient's comfort goal  Note: Prn meds per MAR     Problem: Respiratory:  Goal: Respiratory status will improve  Note: Encouraged use of IS, cxray ordered

## 2020-02-19 NOTE — ASSESSMENT & PLAN NOTE
Now s/p completed ceftriaxone, Flagyl   Bactrim is resumed by pulmonary  Pulmonary input is noted  Leukocytosis appears to be 2nd to prednisone

## 2020-02-19 NOTE — PROGRESS NOTES
[facility] Medicine Daily Progress Note    Date of Service  2/19/2020    Chief Complaint  59 y.o. male who presented 2/18/2020 with the above complaints an outlying facility, the patient apparently was found on his usual oxygen flow hypoxic at 71%, feels that he has been ill for the last 2 to 3 days with increasing dyspnea, epigastric abdominal pain, gas pain, nausea vomiting.  The patient in the process was found with a left spontaneous pneumothorax and right lower lobe pneumonia per imaging.  Chest tube was placed at an outlying facility, the patient received Levaquin antibiotic coverage.  The patient transferred to [facility] for ongoing specialty care and evaluation by surgery.  The patient has significant polypharmacy including high-dose Neurontin and significant dose of methadone.  Patient after arrival was found to have a dislodged chest tube, chest surgery Dr. Ganser works consulted for possible replacement.  Patient has a history of chronic home oxygen use at 3 L currently, history of pneumonia, pulmonary till hypertension.  The patient denies any recent travel or other sick exposure.  Patient tested negative for influenza.  Hospital Course    Admitted with shortness of breath, pneumothorax, pneumonia      Interval Problem Update  Patient seen and examined today.    Patient tolerating treatment and therapies.  All Data, Medication data reviewed.  Case discussed with nursing as available.  Plan of Care reviewed with patient and notified of changes.  2/19 patient feels improved, his pneumothorax remains small, no further chest tube required, afebrile, normotensive, heart rate within normal limits, the patient currently on 5 L per nasal cannula, reports anxiety, mild cough, pulmonary exam is overall improved    Consultants/Specialty  Surgery, signed off    Code Status  Full code    Disposition  TBD    Review of Systems  Review of Systems   Constitutional: Positive for malaise/fatigue.   HENT: Negative.    Eyes:  Negative.    Respiratory: Positive for shortness of breath. Negative for cough.    Cardiovascular: Positive for chest pain.   Gastrointestinal: Negative.    Genitourinary: Negative.    Musculoskeletal: Positive for back pain, joint pain and myalgias.   Skin: Negative.    Neurological: Negative.    Endo/Heme/Allergies: Negative.    Psychiatric/Behavioral: The patient is nervous/anxious.    All other systems reviewed and are negative.       Physical Exam  Temp:  [36.2 °C (97.1 °F)-37 °C (98.6 °F)] 37 °C (98.6 °F)  Pulse:  [65-98] 68  Resp:  [13-18] 16  BP: (124-127)/(75-89) 124/89  SpO2:  [89 %-96 %] 91 %    Physical Exam  Vitals signs and nursing note reviewed.   Constitutional:       Appearance: He is well-developed.   HENT:      Head: Normocephalic.   Eyes:      Pupils: Pupils are equal, round, and reactive to light.   Neck:      Musculoskeletal: Normal range of motion.   Cardiovascular:      Rate and Rhythm: Normal rate and regular rhythm.      Heart sounds: Normal heart sounds.   Pulmonary:      Effort: Pulmonary effort is normal.      Breath sounds: Rhonchi and rales present.   Abdominal:      General: Bowel sounds are normal.      Palpations: Abdomen is soft.   Genitourinary:     Penis: Normal.       Rectum: Normal.   Musculoskeletal: Normal range of motion.   Skin:     General: Skin is warm.   Neurological:      Mental Status: He is alert and oriented to person, place, and time.         Fluids  No intake or output data in the 24 hours ending 02/19/20 1559    Laboratory  Recent Labs     02/18/20  0840 02/19/20 0417   WBC 14.8* 13.4*   RBC 4.79 4.61*   HEMOGLOBIN 12.8* 12.6*   HEMATOCRIT 40.3* 38.6*   MCV 84.1 83.7   MCH 26.7* 27.3   MCHC 31.8* 32.6*   RDW 47.1 47.0   PLATELETCT 226 292   MPV 10.2 10.2     Recent Labs     02/18/20  0840 02/19/20 0417   SODIUM 138 127*   POTASSIUM 3.5* 3.8   CHLORIDE 101 97   CO2 26 25   GLUCOSE 101* 88   BUN 15 21   CREATININE 1.01 1.07   CALCIUM 9.4 9.0             Recent  Labs     02/19/20  0417   TRIGLYCERIDE 102   HDL 22*   LDL 96       Imaging  DX-CHEST-2 VIEWS   Final Result      1.  Unchanged small LEFT pneumothorax   2.  Unchanged BILATERAL interstitial and airspace disease and atelectasis      DX-CHEST-LIMITED (1 VIEW)   Final Result      Stable small left pneumothorax. Interval removal of left chest tube.      DX-CHEST-LIMITED (1 VIEW)   Final Result      1.  Left chest tube tip at the origin of the left hemithorax. Advancement is suggested. Stable small left pneumothorax.   2.  Interstitial and airspace opacities, worse on the right which could represent asymmetric edema or infection.      These findings were discussed with nurse Hung on 2/18/2020 9:16 AM.      OUTSIDE IMAGES-DX CHEST   Final Result      OUTSIDE IMAGES-DX CHEST   Final Result           Assessment/Plan  Hypoxia- (present on admission)  Assessment & Plan  As a result from COPD, pneumothorax, pneumonia    Respiratory failure (HCC)- (present on admission)  Assessment & Plan  Secondary to COPD, emphysema and concomitant infection with pneumothorax  Status post chest tube placement, currently improved, respiratory protocol,  Wean oxygen as tolerated    Pneumonia- (present on admission)  Assessment & Plan  Community-acquired, history of extensive tobacco abuse with lung pathology  Parapneumonic's, respiratory cultures    QT prolongation- (present on admission)  Assessment & Plan  History of, currently with some significant bigeminy  Continue with cardiac monitoring    Chronic neck pain- (present on admission)  Assessment & Plan  Patient is on high-dose chronic narcotics in form of methadone, continue, avoid escalation    Pulmonary hypertension (HCC)- (present on admission)  Assessment & Plan  History of, avoid fluid overload    Tobacco abuse- (present on admission)  Assessment & Plan  History of extensive use, the patient quit in 2015    Hypothyroid- (present on admission)  Assessment & Plan  Continue with  replacement    Chronic pain- (present on admission)  Assessment & Plan  High-dose methadone as well as Neurontin    Plan  Continue with current empiric therapy  No further chest tube needed as per surgery, follow-up closely  A.m. labs, continue to trend leukocytosis, low sodium likely lab error, will follow tomorrow  Low procalcitonin and negative influenza and encouraging  See orders  Medically complex high risk      VTE prophylaxis: Lovenox    I have performed a physical exam and reviewed and updated ROS and Plan today . In review of yesterday's note , there are no changes except as documented above.

## 2020-02-19 NOTE — PROGRESS NOTES
Stable small pneumothorax on left  If develops worsened shortness of breath or pneumothorax enlarges have IR place apical chest tube  Signing off  Call if needed

## 2020-02-19 NOTE — PROGRESS NOTES
Bedside report received from nurse. Assumed care of pt. Pt resting comfortably in bed. A/Ox4 but drowsy. VSS,  All needs met. POC reviewed and white board updated. Tele box on. Call light in reach. Bed locked in lowest position with 2 upper   bed rails up. Discomfort at Chest tube dressing site

## 2020-02-20 ENCOUNTER — APPOINTMENT (OUTPATIENT)
Dept: RADIOLOGY | Facility: MEDICAL CENTER | Age: 60
DRG: 166 | End: 2020-02-20
Attending: HOSPITALIST
Payer: MEDICARE

## 2020-02-20 PROBLEM — J96.21 ACUTE ON CHRONIC RESPIRATORY FAILURE WITH HYPOXIA (HCC): Status: ACTIVE | Noted: 2020-02-20

## 2020-02-20 LAB
ANION GAP SERPL CALC-SCNC: 6 MMOL/L (ref 0–11.9)
BUN SERPL-MCNC: 21 MG/DL (ref 8–22)
CALCIUM SERPL-MCNC: 8.5 MG/DL (ref 8.5–10.5)
CHLORIDE SERPL-SCNC: 104 MMOL/L (ref 96–112)
CO2 SERPL-SCNC: 27 MMOL/L (ref 20–33)
CREAT SERPL-MCNC: 1.05 MG/DL (ref 0.5–1.4)
EKG IMPRESSION: NORMAL
ERYTHROCYTE [DISTWIDTH] IN BLOOD BY AUTOMATED COUNT: 47.1 FL (ref 35.9–50)
GLUCOSE SERPL-MCNC: 83 MG/DL (ref 65–99)
HCT VFR BLD AUTO: 37.6 % (ref 42–52)
HGB BLD-MCNC: 11.9 G/DL (ref 14–18)
MAGNESIUM SERPL-MCNC: 2.1 MG/DL (ref 1.5–2.5)
MCH RBC QN AUTO: 26.7 PG (ref 27–33)
MCHC RBC AUTO-ENTMCNC: 31.6 G/DL (ref 33.7–35.3)
MCV RBC AUTO: 84.5 FL (ref 81.4–97.8)
PHOSPHATE SERPL-MCNC: 3.2 MG/DL (ref 2.5–4.5)
PLATELET # BLD AUTO: 241 K/UL (ref 164–446)
PMV BLD AUTO: 9.3 FL (ref 9–12.9)
POTASSIUM SERPL-SCNC: 3.6 MMOL/L (ref 3.6–5.5)
PROCALCITONIN SERPL-MCNC: 0.11 NG/ML
RBC # BLD AUTO: 4.45 M/UL (ref 4.7–6.1)
SODIUM SERPL-SCNC: 137 MMOL/L (ref 135–145)
WBC # BLD AUTO: 8.9 K/UL (ref 4.8–10.8)

## 2020-02-20 PROCEDURE — 700111 HCHG RX REV CODE 636 W/ 250 OVERRIDE (IP): Performed by: HOSPITALIST

## 2020-02-20 PROCEDURE — 84100 ASSAY OF PHOSPHORUS: CPT

## 2020-02-20 PROCEDURE — 93005 ELECTROCARDIOGRAM TRACING: CPT | Performed by: HOSPITALIST

## 2020-02-20 PROCEDURE — 94669 MECHANICAL CHEST WALL OSCILL: CPT

## 2020-02-20 PROCEDURE — 99221 1ST HOSP IP/OBS SF/LOW 40: CPT | Mod: GC | Performed by: INTERNAL MEDICINE

## 2020-02-20 PROCEDURE — A9270 NON-COVERED ITEM OR SERVICE: HCPCS | Performed by: HOSPITALIST

## 2020-02-20 PROCEDURE — 86200 CCP ANTIBODY: CPT

## 2020-02-20 PROCEDURE — 93010 ELECTROCARDIOGRAM REPORT: CPT | Performed by: INTERNAL MEDICINE

## 2020-02-20 PROCEDURE — 94760 N-INVAS EAR/PLS OXIMETRY 1: CPT

## 2020-02-20 PROCEDURE — 86255 FLUORESCENT ANTIBODY SCREEN: CPT

## 2020-02-20 PROCEDURE — 86235 NUCLEAR ANTIGEN ANTIBODY: CPT

## 2020-02-20 PROCEDURE — 80048 BASIC METABOLIC PNL TOTAL CA: CPT

## 2020-02-20 PROCEDURE — 71250 CT THORAX DX C-: CPT

## 2020-02-20 PROCEDURE — 85027 COMPLETE CBC AUTOMATED: CPT

## 2020-02-20 PROCEDURE — 99233 SBSQ HOSP IP/OBS HIGH 50: CPT | Performed by: HOSPITALIST

## 2020-02-20 PROCEDURE — 700105 HCHG RX REV CODE 258: Performed by: HOSPITALIST

## 2020-02-20 PROCEDURE — 700102 HCHG RX REV CODE 250 W/ 637 OVERRIDE(OP): Performed by: HOSPITALIST

## 2020-02-20 PROCEDURE — 83735 ASSAY OF MAGNESIUM: CPT

## 2020-02-20 PROCEDURE — 83516 IMMUNOASSAY NONANTIBODY: CPT

## 2020-02-20 PROCEDURE — 770020 HCHG ROOM/CARE - TELE (206)

## 2020-02-20 PROCEDURE — 71045 X-RAY EXAM CHEST 1 VIEW: CPT

## 2020-02-20 PROCEDURE — 700111 HCHG RX REV CODE 636 W/ 250 OVERRIDE (IP): Performed by: INTERNAL MEDICINE

## 2020-02-20 PROCEDURE — 84145 PROCALCITONIN (PCT): CPT

## 2020-02-20 RX ORDER — PREDNISONE 20 MG/1
40 TABLET ORAL DAILY
Status: DISCONTINUED | OUTPATIENT
Start: 2020-02-20 | End: 2020-02-21

## 2020-02-20 RX ORDER — DILTIAZEM HYDROCHLORIDE 5 MG/ML
0.25 INJECTION INTRAVENOUS ONCE
Status: COMPLETED | OUTPATIENT
Start: 2020-02-20 | End: 2020-02-20

## 2020-02-20 RX ADMIN — OMEPRAZOLE 20 MG: 20 CAPSULE, DELAYED RELEASE ORAL at 17:43

## 2020-02-20 RX ADMIN — DILTIAZEM HYDROCHLORIDE 19.5 MG: 5 INJECTION INTRAVENOUS at 01:48

## 2020-02-20 RX ADMIN — ALPRAZOLAM 0.25 MG: 0.25 TABLET ORAL at 08:19

## 2020-02-20 RX ADMIN — CEFTRIAXONE SODIUM 2 G: 2 INJECTION, POWDER, FOR SOLUTION INTRAMUSCULAR; INTRAVENOUS at 06:25

## 2020-02-20 RX ADMIN — ACETAMINOPHEN 650 MG: 325 TABLET, FILM COATED ORAL at 06:28

## 2020-02-20 RX ADMIN — TRAMADOL HYDROCHLORIDE 50 MG: 50 TABLET, FILM COATED ORAL at 06:28

## 2020-02-20 RX ADMIN — LEVOTHYROXINE SODIUM 125 MCG: 125 TABLET ORAL at 06:27

## 2020-02-20 RX ADMIN — TIMOLOL MALEATE 1 DROP: 5 SOLUTION OPHTHALMIC at 17:43

## 2020-02-20 RX ADMIN — METHADONE HYDROCHLORIDE 10 MG: 10 TABLET ORAL at 21:52

## 2020-02-20 RX ADMIN — ENOXAPARIN SODIUM 40 MG: 100 INJECTION SUBCUTANEOUS at 06:28

## 2020-02-20 RX ADMIN — PROMETHAZINE HYDROCHLORIDE 25 MG: 25 TABLET ORAL at 08:19

## 2020-02-20 RX ADMIN — PREDNISONE 40 MG: 20 TABLET ORAL at 13:58

## 2020-02-20 RX ADMIN — SIMVASTATIN 40 MG: 40 TABLET, FILM COATED ORAL at 21:52

## 2020-02-20 RX ADMIN — METRONIDAZOLE 500 MG: 500 TABLET ORAL at 21:52

## 2020-02-20 RX ADMIN — TRAMADOL HYDROCHLORIDE 50 MG: 50 TABLET, FILM COATED ORAL at 17:43

## 2020-02-20 RX ADMIN — METHADONE HYDROCHLORIDE 30 MG: 10 TABLET ORAL at 06:28

## 2020-02-20 RX ADMIN — METRONIDAZOLE 500 MG: 500 TABLET ORAL at 06:28

## 2020-02-20 RX ADMIN — SENNOSIDES AND DOCUSATE SODIUM 2 TABLET: 8.6; 5 TABLET ORAL at 06:28

## 2020-02-20 RX ADMIN — GABAPENTIN 2400 MG: 400 CAPSULE ORAL at 06:28

## 2020-02-20 RX ADMIN — METHADONE HYDROCHLORIDE 30 MG: 10 TABLET ORAL at 13:58

## 2020-02-20 RX ADMIN — METRONIDAZOLE 500 MG: 500 TABLET ORAL at 13:58

## 2020-02-20 RX ADMIN — TAMSULOSIN HYDROCHLORIDE 0.4 MG: 0.4 CAPSULE ORAL at 17:43

## 2020-02-20 RX ADMIN — GABAPENTIN 1600 MG: 400 CAPSULE ORAL at 17:43

## 2020-02-20 ASSESSMENT — ENCOUNTER SYMPTOMS
BLURRED VISION: 0
SPEECH CHANGE: 0
BACK PAIN: 0
VOMITING: 0
NEUROLOGICAL NEGATIVE: 1
BACK PAIN: 1
COUGH: 0
NAUSEA: 0
MYALGIAS: 1
HEADACHES: 0
STRIDOR: 0
EYES NEGATIVE: 1
SHORTNESS OF BREATH: 1
FEVER: 0
SHORTNESS OF BREATH: 0
SPUTUM PRODUCTION: 0
ABDOMINAL PAIN: 0
GASTROINTESTINAL NEGATIVE: 1
DOUBLE VISION: 0
CHILLS: 0
NECK PAIN: 0
PALPITATIONS: 0
COUGH: 1
NERVOUS/ANXIOUS: 1
WHEEZING: 0
SORE THROAT: 0

## 2020-02-20 NOTE — PROGRESS NOTES
Notified hospitaist that patient converted to Afib around 0100. Heart rate sustaining 120-150, /78. Patient asymptomatic. EKG obtained and provider to order cardizem. Will continue to monitor closely.

## 2020-02-20 NOTE — CARE PLAN
Respiratory Update    Treatment modality: PEP  Frequency:QID  IS Value 1250    Pt tolerating current treatments well with no adverse reactions.

## 2020-02-20 NOTE — PROGRESS NOTES
Patient converted back to SR at 0157. Continues to go in and out of afib up to low 100s but does not sustain.

## 2020-02-20 NOTE — DOCUMENTATION QUERY
[facility]                                                                       Query Response Note      PATIENT:               RENNY RUSHING  ACCT #:                  [id number]  MRN:                     [id number]  :                      1960  ADMIT DATE:       2020 8:12 AM  DISCH DATE:          RESPONDING  PROVIDER #:        [id number]           QUERY TEXT:    Respiratory Failure and hypoxia is documented in the Medical Record. Please specify the type and acuity (includes suspected or probable).    NOTE:  If an appropriate response is not listed below, please respond with a new note.    The patient's Clinical Indicators include:  Chief complaint: SOB  Per H&P: ?Respiratory failure secondary to COPD, emphysema & concomitant infection with pneumothorax; PNA POA; hypoxia, pt found on usual oxygen flow, hypoxic at 71%?  Chronic home O2 use at 3L  Positive for cough, sputum production, SOB & wheezing     Treatments-  CT placement PTA, subsequent removal   Respiratory protocol  Repeat CXRs  Use of IS  Continuous O2 at 5L currently    Risks-   Hx: COPD, home O2, tobacco abuse  Admission for PNA & pneumothorax    Thank you,  Julieth Portillo, [facility] RN  [phone]  Options provided:   -- Acute respiratory failure with hypoxia   -- Acute on chronic respiratory failure with hypoxia   -- Hypoxia   -- Unable to determine      Query created by: Julieth Portillo on 2020 9:55 AM    RESPONSE TEXT:    Acute respiratory failure with hypoxia       QUERY TEXT:    Please clarify the pattern of use of patient's substance usage.    NOTE:  If an appropriate response is not listed below, please respond with a new note.    The patient's Clinical Indicators include:  Clinical Indicators-  Per H&P: High-dose narcotics; Methadone & Neurontin    Treatments-  Methadone, Neurontin, avoid escalation    Risks-  Chronic neck & back pain    Thank  you,  VALENTIN Menezes RN  [phone]  Options provided:   -- Opioid Abuse   -- Opioid Dependence   -- Opioid Use only   -- Unable to determine      Query created by: Julieth Portillo on 2/20/2020 10:03 AM    RESPONSE TEXT:    Opioid Dependence       QUERY TEXT:    Pneumonia is documented in the Medical Record. Please specify the type of pneumonia (includes probable or suspected).     NOTE:  If an appropriate response is not listed below, please respond with a new note.    The patient's Clinical Indicators include:  Clinical Indicators-  Per ED PN: Right PNA and left PTX; acute SOB after vomiting    Per H&P: CAP; increasing dyspnea, epigastric ABD pain, N&V    CXR: 2/18- Interstitial/airspace opacities, worse on the Right, could represent asymmetric edema or infection    Treatments-  IV ABX  Repeat CXR  Respiratory cx's  Respiratory protocol    Risks-  Vomiting, tobacco abuse with lung pathology, COPD, emphysema, chronic O2 use    Thank you,  VALENTIN Menezes RN  [phone]  Options provided:   -- Simple Pneumonia   -- Aspiration Pneumonia   -- Other type of Pneumonia   -- Unable to determine      Query created by: Julieth Portillo on 2/20/2020 10:05 AM    RESPONSE TEXT:    Unable to determine          Electronically signed by:  ESSIE VARELA MD 2/20/2020 10:43 AM

## 2020-02-20 NOTE — PROGRESS NOTES
[facility] Medicine Daily Progress Note    Date of Service  2/20/2020    Chief Complaint  59 y.o. male who presented 2/18/2020 with the above complaints an outlying facility, the patient apparently was found on his usual oxygen flow hypoxic at 71%, feels that he has been ill for the last 2 to 3 days with increasing dyspnea, epigastric abdominal pain, gas pain, nausea vomiting.  The patient in the process was found with a left spontaneous pneumothorax and right lower lobe pneumonia per imaging.  Chest tube was placed at an outlying facility, the patient received Levaquin antibiotic coverage.  The patient transferred to [facility] for ongoing specialty care and evaluation by surgery.  The patient has significant polypharmacy including high-dose Neurontin and significant dose of methadone.  Patient after arrival was found to have a dislodged chest tube, chest surgery Dr. Ganser works consulted for possible replacement.  Patient has a history of chronic home oxygen use at 3 L currently, history of pneumonia, pulmonary till hypertension.  The patient denies any recent travel or other sick exposure.  Patient tested negative for influenza.  Hospital Course    Admitted with shortness of breath, pneumothorax, pneumonia      Interval Problem Update  Patient seen and examined today.    Patient tolerating treatment and therapies.  All Data, Medication data reviewed.  Case discussed with nursing as available.  Plan of Care reviewed with patient and notified of changes.  2/19 patient feels improved, his pneumothorax remains small, no further chest tube required, afebrile, normotensive, heart rate within normal limits, the patient currently on 5 L per nasal cannula, reports anxiety, mild cough, pulmonary exam is overall improved  2/20 the patient feels better overall, less cough, on 5 L oxygen, no fevers, no chills, appears lethargic, chest x-ray follow-up with decreased pneumothorax.  The patient on discharge in September 2019 did not  follow-up with pulmonary as suggested at the time.  Significant abnormal CT findings at the time.  Discussed with pulmonary for consultation and recommendations in terms of the patient's further management.  The patient apparently lives in between [facility].    Consultants/Specialty  Surgery, signed off  Pulmonary medicine  Code Status  Full code    Disposition  TBD    Review of Systems  Review of Systems   Constitutional: Positive for malaise/fatigue.   HENT: Negative.    Eyes: Negative.    Respiratory: Positive for shortness of breath. Negative for cough.    Cardiovascular: Positive for chest pain.   Gastrointestinal: Negative.    Genitourinary: Negative.    Musculoskeletal: Positive for back pain, joint pain and myalgias.   Skin: Negative.    Neurological: Negative.    Endo/Heme/Allergies: Negative.    Psychiatric/Behavioral: The patient is nervous/anxious.    All other systems reviewed and are negative.       Physical Exam  Temp:  [36.2 °C (97.1 °F)-37.4 °C (99.4 °F)] 36.4 °C (97.5 °F)  Pulse:  [] 66  Resp:  [16-21] 18  BP: (103-159)/(56-91) 118/68  SpO2:  [90 %-95 %] 95 %    Physical Exam  Vitals signs and nursing note reviewed.   Constitutional:       Appearance: He is well-developed.   HENT:      Head: Normocephalic.   Eyes:      Pupils: Pupils are equal, round, and reactive to light.   Neck:      Musculoskeletal: Normal range of motion.   Cardiovascular:      Rate and Rhythm: Normal rate and regular rhythm.      Heart sounds: Normal heart sounds.   Pulmonary:      Effort: Pulmonary effort is normal.      Breath sounds: Rhonchi and rales present.   Abdominal:      General: Bowel sounds are normal.      Palpations: Abdomen is soft.   Genitourinary:     Penis: Normal.       Rectum: Normal.   Musculoskeletal: Normal range of motion.   Skin:     General: Skin is warm.   Neurological:      Mental Status: He is alert and oriented to person, place, and time.         Fluids    Intake/Output  Summary (Last 24 hours) at 2/20/2020 1317  Last data filed at 2/20/2020 0800  Gross per 24 hour   Intake 120 ml   Output --   Net 120 ml       Laboratory  Recent Labs     02/18/20  0840 02/19/20 0417 02/20/20 0433   WBC 14.8* 13.4* 8.9   RBC 4.79 4.61* 4.45*   HEMOGLOBIN 12.8* 12.6* 11.9*   HEMATOCRIT 40.3* 38.6* 37.6*   MCV 84.1 83.7 84.5   MCH 26.7* 27.3 26.7*   MCHC 31.8* 32.6* 31.6*   RDW 47.1 47.0 47.1   PLATELETCT 226 292 241   MPV 10.2 10.2 9.3     Recent Labs     02/18/20  0840 02/19/20 0417 02/20/20 0433   SODIUM 138 127* 137   POTASSIUM 3.5* 3.8 3.6   CHLORIDE 101 97 104   CO2 26 25 27   GLUCOSE 101* 88 83   BUN 15 21 21   CREATININE 1.01 1.07 1.05   CALCIUM 9.4 9.0 8.5             Recent Labs     02/19/20 0417   TRIGLYCERIDE 102   HDL 22*   LDL 96       Imaging  DX-CHEST-PORTABLE (1 VIEW)   Final Result         1.  Pulmonary edema and/or infiltrates are identified, which are stable since the prior exam.   2.  Small left pneumothorax, decreased since prior study      DX-CHEST-2 VIEWS   Final Result      1.  Unchanged small LEFT pneumothorax   2.  Unchanged BILATERAL interstitial and airspace disease and atelectasis      DX-CHEST-LIMITED (1 VIEW)   Final Result      Stable small left pneumothorax. Interval removal of left chest tube.      DX-CHEST-LIMITED (1 VIEW)   Final Result      1.  Left chest tube tip at the origin of the left hemithorax. Advancement is suggested. Stable small left pneumothorax.   2.  Interstitial and airspace opacities, worse on the right which could represent asymmetric edema or infection.      These findings were discussed with nurse Hung on 2/18/2020 9:16 AM.      OUTSIDE IMAGES-DX CHEST   Final Result      OUTSIDE IMAGES-DX CHEST   Final Result      CT-CHEST, HIGH RESOLUTION LUNG    (Results Pending)        Assessment/Plan  Hypoxia- (present on admission)  Assessment & Plan  As a result from COPD, pneumothorax, pneumonia    Respiratory failure (HCC)- (present on  admission)  Assessment & Plan  Secondary to COPD, emphysema and concomitant infection with pneumothorax  Status post chest tube placement, currently improved, respiratory protocol,  Wean oxygen as tolerated    Pneumonia- (present on admission)  Assessment & Plan  Community-acquired, history of extensive tobacco abuse with lung pathology  History of significantly abnormal CT with interstitial abnormalities and multiple bullae  Question of alternative/additional lung pathology/bullectomy  Pulmonary medicine consult  Continue with antibiotics    QT prolongation- (present on admission)  Assessment & Plan  History of, currently with some significant bigeminy  Continue with cardiac monitoring    Chronic neck pain- (present on admission)  Assessment & Plan  Patient is on high-dose chronic narcotics in form of methadone, continue, avoid escalation    Pulmonary hypertension (HCC)- (present on admission)  Assessment & Plan  History of, avoid fluid overload    Tobacco abuse- (present on admission)  Assessment & Plan  History of extensive use, the patient quit in 2015    Hypothyroid- (present on admission)  Assessment & Plan  Continue with replacement    Chronic pain- (present on admission)  Assessment & Plan  High-dose methadone as well as Neurontin  Plan  Continue with current empiric therapy, cultures being followed  No further chest tube needed as per surgery, follow-up closely  Pulmonary medicine consult, high resolution CT scanning  Steroids  A.m. labs, continue to trend leukocytosis, low sodium likely lab error, will follow tomorrow  Low procalcitonin and negative influenza and encouraging  See orders  Medically complex high risk      VTE prophylaxis: Lovenox    I have performed a physical exam and reviewed and updated ROS and Plan today . In review of yesterday's note , there are no changes except as documented above.

## 2020-02-20 NOTE — PROGRESS NOTES
Report received from night shift RN, assumed care of pt. Pt A&O4, in no apparent distress, assisted to the bathroom, steady on feet with SBA. Plan of care discussed with pt, labs and chart reviewed. All needs met at this time. Tele box on, rhythm verified. Call light within reach, bed locked and in lowest position. All fall precautions and hourly rounding in place. Will continue to monitor.

## 2020-02-20 NOTE — PROGRESS NOTES
Monitor summary: SR 65 - 73, AL .16, QRS .08, QT .40 with Coup, PVC,  per strip from monitor room.

## 2020-02-20 NOTE — CONSULTS
Date of Consultation:  2/20/2020    Patient: : Pancho Martinez Jr.  MRN: [id number]    Referring Physician: Dr. Bustos     Reason for Consultation: Acute on chronic hypoxic respiratory failure    History of Present Illness: 59 year old male with a history of chronic hypoxic respiratory failure ( on 2.5-3L oxygen at home), paraseptal emphysema, pulmonary hypertension, chronic pain on methadone who presented as a transfer from [facility] for left pneumothorax. Patient presented to the outside hospital with shortness of breath of 2 days duration that worsened after an episode of vomiting. Patient reports some cough as well, denies fever or chills. He had a chest tube placed and was given levaquin for CAP prior to transfer. Following transfer here, he was noted to have a dislodged chest tube with improved pneumothorax, the chest tube was subsequently removed per thoracic surgery recommendations. Pneumothorax has remained stable since then. He is being treated with ceftriaxone and flagyl for CAP. He is currently on 5 L nasal canula.  He is a former smoker and quit smoking in 2015, has a 25-30 pack year smoking history. He was first noted to have ground glass opacities on CT in 2011 which was again seen on CT in 09/2019. He denies any autoimmune history or family history of lung diseases.         Past Medical History:   Diagnosis Date   • Anxiety    • ASTHMA    • C6 cervical fracture (HCC)     c5-7   • Chronic back pain    • Chronic pain    • COPD    • Depression    • Diverticulitis    • Hypothyroid    • Neck pain    • Neuropathy (HCC)    • Pelvic fracture (HCC)    • Pneumonia    • Unspecified cataract          Past Surgical History:   Procedure Laterality Date   • EXPLORATORY LAPAROTOMY  8/15/2015    Procedure: EXPLORATORY LAPAROTOMY, abdominal washout;  Surgeon: Ad Raman M.D.;  Location: SURGERY [facility];  Service:    • COLOSTOMY CLOSURE N/A 8/13/2015    Procedure: COLOSTOMY CLOSURE;  Surgeon: Nataliia MARSHALL  ARLINE Maldonado;  Location: SURGERY [facility];  Service:    • EXPLORATORY LAPAROTOMY N/A 2015    Procedure: EXPLORATORY LAPAROTOMY;  Surgeon: Nataliia Maldonado M.D.;  Location: SURGERY [facility];  Service:    • COLOSTOMY  2015   • RETINAL DETACHMENT REPAIR Right 2015   • BONE SPUR EXCISION Left    • ACL RECONSTRUCTION Left    • OTHER ORTHOPEDIC SURGERY      spine fx       No family history on file.    Social History     Socioeconomic History   • Marital status:      Spouse name: Not on file   • Number of children: Not on file   • Years of education: Not on file   • Highest education level: Not on file   Occupational History   • Not on file   Social Needs   • Financial resource strain: Not on file   • Food insecurity     Worry: Not on file     Inability: Not on file   • Transportation needs     Medical: Not on file     Non-medical: Not on file   Tobacco Use   • Smoking status: Former Smoker     Packs/day: 1.00     Years: 17.00     Pack years: 17.00     Types: Cigarettes     Last attempt to quit: 2015     Years since quittin.1   • Smokeless tobacco: Never Used   Substance and Sexual Activity   • Alcohol use: No   • Drug use: No   • Sexual activity: Not on file   Lifestyle   • Physical activity     Days per week: Not on file     Minutes per session: Not on file   • Stress: Not on file   Relationships   • Social connections     Talks on phone: Not on file     Gets together: Not on file     Attends Yazidi service: Not on file     Active member of club or organization: Not on file     Attends meetings of clubs or organizations: Not on file     Relationship status: Not on file   • Intimate partner violence     Fear of current or ex partner: Not on file     Emotionally abused: Not on file     Physically abused: Not on file     Forced sexual activity: Not on file   Other Topics Concern   • Not on file   Social History Narrative    ** Merged History Encounter **            Review of  Systems   Constitutional: Negative for chills, fever and malaise/fatigue.   HENT: Negative for sore throat.    Eyes: Negative for blurred vision and double vision.   Respiratory: Positive for cough. Negative for sputum production, shortness of breath, wheezing and stridor.    Cardiovascular: Negative for chest pain, palpitations and leg swelling.   Gastrointestinal: Negative for abdominal pain, nausea and vomiting.   Genitourinary: Negative for dysuria and urgency.   Musculoskeletal: Negative for back pain and neck pain.   Skin: Negative for rash.   Neurological: Negative for speech change and headaches.   Psychiatric/Behavioral: Negative for suicidal ideas.         Physical Exam:  Vitals:    02/20/20 0643 02/20/20 0725 02/20/20 1026 02/20/20 1200   BP:  121/79  118/68   Pulse: 70 68 69 66   Resp: 16 20 20 18   Temp:  36.3 °C (97.4 °F)  36.4 °C (97.5 °F)   TempSrc:  Temporal  Temporal   SpO2: 90% 94% 91% 95%   Weight:       Height:           Physical Exam   Constitutional: He is oriented to person, place, and time and well-developed, well-nourished, and in no distress.   HENT:   Head: Normocephalic and atraumatic.   Eyes: Pupils are equal, round, and reactive to light. EOM are normal.   Neck: Normal range of motion.   Cardiovascular: Normal rate, regular rhythm and normal heart sounds.   No murmur heard.  Pulmonary/Chest: Effort normal and breath sounds normal. No respiratory distress.   Inspiratory crackles noted bilaterally   Abdominal: Soft. Bowel sounds are normal. He exhibits no distension. There is no abdominal tenderness.   Musculoskeletal:         General: No edema.   Neurological: He is alert and oriented to person, place, and time.         Labs:  Recent Labs     02/18/20  0840 02/19/20  0417 02/20/20  0433   WBC 14.8* 13.4* 8.9   RBC 4.79 4.61* 4.45*   HEMOGLOBIN 12.8* 12.6* 11.9*   HEMATOCRIT 40.3* 38.6* 37.6*   MCV 84.1 83.7 84.5   MCH 26.7* 27.3 26.7*   MCHC 31.8* 32.6* 31.6*   RDW 47.1 47.0 47.1    PLATELETCT 226 292 241   MPV 10.2 10.2 9.3     Recent Labs     02/18/20  0840 02/19/20  0417 02/20/20  0433   SODIUM 138 127* 137   POTASSIUM 3.5* 3.8 3.6   CHLORIDE 101 97 104   CO2 26 25 27   GLUCOSE 101* 88 83   BUN 15 21 21             Imaging:  Chest X ray shows elevated right ana cristina diaphragm with diffuse parenchymal opacities right more than left        Acute on chronic respiratory failure with hypoxia (HCC)  Assessment & Plan  Previous CT images reviewed. Patient has changes of emphysema along with pattern that may be consistent with NSIP. Chest X ray on this admission appears worse than ones in September, likely also has superimposed pneumonia. Pneumothorax improving.    Recommendations:  HRCT   Connective tissue workup sent  Continue antibiotics, bronchodilators    Thank you for the consult.

## 2020-02-20 NOTE — CARE PLAN
Problem: Safety  Goal: Will remain free from falls  Outcome: PROGRESSING AS EXPECTED  Intervention: Implement fall precautions  Flowsheets (Taken 2/18/2020 2000 by Nabil Ang, R.N.)  Environmental Precautions:   Treaded Slipper Socks on Patient   Personal Belongings, Wastebasket, Call Bell etc. in Easy Reach   Bed in Low Position     Problem: Infection  Goal: Will remain free from infection  Outcome: PROGRESSING AS EXPECTED  Intervention: Implement standard precautions and perform hand washing before and after patient contact  Note: Hand hygiene performed before and after all patient care.

## 2020-02-20 NOTE — CARE PLAN
Problem: Infection  Goal: Will remain free from infection  Outcome: PROGRESSING AS EXPECTED  Note: Hand hygiene completed before and after patient care. Pt educated about infection prevention and verbalized understanding. RN follows all protocols for infection prevention.     Problem: Knowledge Deficit  Goal: Knowledge of disease process/condition, treatment plan, diagnostic tests, and medications will improve  Outcome: PROGRESSING AS EXPECTED  Note: Pt educated on disease process, treatment plan, and prescribed medications. Pt asks questions and participates in plan of care

## 2020-02-21 ENCOUNTER — APPOINTMENT (OUTPATIENT)
Dept: RADIOLOGY | Facility: MEDICAL CENTER | Age: 60
DRG: 166 | End: 2020-02-21
Attending: INTERNAL MEDICINE
Payer: MEDICARE

## 2020-02-21 ENCOUNTER — APPOINTMENT (OUTPATIENT)
Dept: RADIOLOGY | Facility: MEDICAL CENTER | Age: 60
DRG: 166 | End: 2020-02-21
Attending: HOSPITALIST
Payer: MEDICARE

## 2020-02-21 PROBLEM — J93.9 PNEUMOTHORAX: Status: ACTIVE | Noted: 2020-02-21

## 2020-02-21 LAB
ACTION RANGE TRIGGERED IACRT: NO
ALBUMIN SERPL BCP-MCNC: 3.8 G/DL (ref 3.2–4.9)
ALBUMIN/GLOB SERPL: 1 G/DL
ALP SERPL-CCNC: 92 U/L (ref 30–99)
ALT SERPL-CCNC: <5 U/L (ref 2–50)
ANION GAP SERPL CALC-SCNC: 7 MMOL/L (ref 0–11.9)
APTT PPP: 35.8 SEC (ref 24.7–36)
AST SERPL-CCNC: 11 U/L (ref 12–45)
BASE EXCESS BLDA CALC-SCNC: 0 MMOL/L (ref -4–3)
BASE EXCESS BLDA CALC-SCNC: 1 MMOL/L (ref -4–3)
BASOPHILS # BLD AUTO: 0.1 % (ref 0–1.8)
BASOPHILS # BLD: 0.02 K/UL (ref 0–0.12)
BILIRUB SERPL-MCNC: 0.4 MG/DL (ref 0.1–1.5)
BODY TEMPERATURE: ABNORMAL CENTIGRADE
BODY TEMPERATURE: ABNORMAL DEGREES
BUN SERPL-MCNC: 19 MG/DL (ref 8–22)
CALCIUM SERPL-MCNC: 9.2 MG/DL (ref 8.5–10.5)
CHLORIDE SERPL-SCNC: 104 MMOL/L (ref 96–112)
CO2 BLDA-SCNC: 25 MMOL/L (ref 20–33)
CO2 SERPL-SCNC: 29 MMOL/L (ref 20–33)
CREAT SERPL-MCNC: 0.95 MG/DL (ref 0.5–1.4)
EKG IMPRESSION: NORMAL
EOSINOPHIL # BLD AUTO: 0.01 K/UL (ref 0–0.51)
EOSINOPHIL NFR BLD: 0.1 % (ref 0–6.9)
ERYTHROCYTE [DISTWIDTH] IN BLOOD BY AUTOMATED COUNT: 42.9 FL (ref 35.9–50)
GLOBULIN SER CALC-MCNC: 3.7 G/DL (ref 1.9–3.5)
GLUCOSE SERPL-MCNC: 98 MG/DL (ref 65–99)
HCO3 BLDA-SCNC: 24 MMOL/L (ref 17–25)
HCO3 BLDA-SCNC: 24 MMOL/L (ref 17–25)
HCT VFR BLD AUTO: 38.2 % (ref 42–52)
HGB BLD-MCNC: 13 G/DL (ref 14–18)
HOROWITZ INDEX BLDA+IHG-RTO: 158 MM[HG]
IMM GRANULOCYTES # BLD AUTO: 0.06 K/UL (ref 0–0.11)
IMM GRANULOCYTES NFR BLD AUTO: 0.4 % (ref 0–0.9)
INR PPP: 1.28 (ref 0.87–1.13)
INST. QUALIFIED PATIENT IIQPT: YES
LACTATE BLD-SCNC: 2.1 MMOL/L (ref 0.5–2)
LYMPHOCYTES # BLD AUTO: 1.48 K/UL (ref 1–4.8)
LYMPHOCYTES NFR BLD: 10 % (ref 22–41)
MAGNESIUM SERPL-MCNC: 2.1 MG/DL (ref 1.5–2.5)
MCH RBC QN AUTO: 27.4 PG (ref 27–33)
MCHC RBC AUTO-ENTMCNC: 34 G/DL (ref 33.7–35.3)
MCV RBC AUTO: 80.4 FL (ref 81.4–97.8)
MONOCYTES # BLD AUTO: 1.22 K/UL (ref 0–0.85)
MONOCYTES NFR BLD AUTO: 8.3 % (ref 0–13.4)
NEUTROPHILS # BLD AUTO: 11.95 K/UL (ref 1.82–7.42)
NEUTROPHILS NFR BLD: 81.1 % (ref 44–72)
NRBC # BLD AUTO: 0 K/UL
NRBC BLD-RTO: 0 /100 WBC
O2/TOTAL GAS SETTING VFR VENT: 40 %
PCO2 BLDA: 30.7 MMHG (ref 26–37)
PCO2 BLDA: 37.1 MMHG (ref 26–37)
PCO2 TEMP ADJ BLDA: 36.5 MMHG (ref 26–37)
PH BLDA: 7.42 [PH] (ref 7.4–7.5)
PH BLDA: 7.5 [PH] (ref 7.4–7.5)
PH TEMP ADJ BLDA: 7.42 [PH] (ref 7.4–7.5)
PHOSPHATE SERPL-MCNC: 2.4 MG/DL (ref 2.5–4.5)
PLATELET # BLD AUTO: 276 K/UL (ref 164–446)
PMV BLD AUTO: 9.2 FL (ref 9–12.9)
PO2 BLDA: 56 MMHG (ref 64–87)
PO2 BLDA: 63 MMHG (ref 64–87)
PO2 TEMP ADJ BLDA: 61 MMHG (ref 64–87)
POTASSIUM SERPL-SCNC: 3.9 MMOL/L (ref 3.6–5.5)
PROT SERPL-MCNC: 7.5 G/DL (ref 6–8.2)
PROTHROMBIN TIME: 16.3 SEC (ref 12–14.6)
RBC # BLD AUTO: 4.75 M/UL (ref 4.7–6.1)
SAO2 % BLDA: 90.4 % (ref 93–99)
SAO2 % BLDA: 92 % (ref 93–99)
SODIUM SERPL-SCNC: 140 MMOL/L (ref 135–145)
SPECIMEN DRAWN FROM PATIENT: ABNORMAL
TRIGL SERPL-MCNC: 99 MG/DL (ref 0–149)
TROPONIN T SERPL-MCNC: 19 NG/L (ref 6–19)
WBC # BLD AUTO: 14.7 K/UL (ref 4.8–10.8)

## 2020-02-21 PROCEDURE — 5A1955Z RESPIRATORY VENTILATION, GREATER THAN 96 CONSECUTIVE HOURS: ICD-10-PCS | Performed by: INTERNAL MEDICINE

## 2020-02-21 PROCEDURE — 700111 HCHG RX REV CODE 636 W/ 250 OVERRIDE (IP): Performed by: HOSPITALIST

## 2020-02-21 PROCEDURE — 36556 INSERT NON-TUNNEL CV CATH: CPT

## 2020-02-21 PROCEDURE — 700111 HCHG RX REV CODE 636 W/ 250 OVERRIDE (IP): Performed by: INTERNAL MEDICINE

## 2020-02-21 PROCEDURE — 80053 COMPREHEN METABOLIC PANEL: CPT

## 2020-02-21 PROCEDURE — 700101 HCHG RX REV CODE 250: Performed by: INTERNAL MEDICINE

## 2020-02-21 PROCEDURE — A9270 NON-COVERED ITEM OR SERVICE: HCPCS | Performed by: HOSPITALIST

## 2020-02-21 PROCEDURE — 71045 X-RAY EXAM CHEST 1 VIEW: CPT

## 2020-02-21 PROCEDURE — 94003 VENT MGMT INPAT SUBQ DAY: CPT

## 2020-02-21 PROCEDURE — 85610 PROTHROMBIN TIME: CPT

## 2020-02-21 PROCEDURE — 700105 HCHG RX REV CODE 258: Performed by: INTERNAL MEDICINE

## 2020-02-21 PROCEDURE — 93010 ELECTROCARDIOGRAM REPORT: CPT | Performed by: INTERNAL MEDICINE

## 2020-02-21 PROCEDURE — 31500 INSERT EMERGENCY AIRWAY: CPT

## 2020-02-21 PROCEDURE — 94640 AIRWAY INHALATION TREATMENT: CPT

## 2020-02-21 PROCEDURE — C1751 CATH, INF, PER/CENT/MIDLINE: HCPCS

## 2020-02-21 PROCEDURE — 31500 INSERT EMERGENCY AIRWAY: CPT | Mod: 59 | Performed by: INTERNAL MEDICINE

## 2020-02-21 PROCEDURE — 94002 VENT MGMT INPAT INIT DAY: CPT

## 2020-02-21 PROCEDURE — 700105 HCHG RX REV CODE 258: Performed by: HOSPITALIST

## 2020-02-21 PROCEDURE — 99292 CRITICAL CARE ADDL 30 MIN: CPT | Mod: 25 | Performed by: INTERNAL MEDICINE

## 2020-02-21 PROCEDURE — 85730 THROMBOPLASTIN TIME PARTIAL: CPT

## 2020-02-21 PROCEDURE — 85025 COMPLETE CBC W/AUTO DIFF WBC: CPT

## 2020-02-21 PROCEDURE — 51798 US URINE CAPACITY MEASURE: CPT

## 2020-02-21 PROCEDURE — 700111 HCHG RX REV CODE 636 W/ 250 OVERRIDE (IP)

## 2020-02-21 PROCEDURE — 700102 HCHG RX REV CODE 250 W/ 637 OVERRIDE(OP): Performed by: HOSPITALIST

## 2020-02-21 PROCEDURE — 99152 MOD SED SAME PHYS/QHP 5/>YRS: CPT

## 2020-02-21 PROCEDURE — A4314 CATH W/DRAINAGE 2-WAY LATEX: HCPCS | Performed by: INTERNAL MEDICINE

## 2020-02-21 PROCEDURE — A9270 NON-COVERED ITEM OR SERVICE: HCPCS | Performed by: INTERNAL MEDICINE

## 2020-02-21 PROCEDURE — 302214 INTUBATION BOX: Performed by: INTERNAL MEDICINE

## 2020-02-21 PROCEDURE — 93005 ELECTROCARDIOGRAM TRACING: CPT | Performed by: HOSPITALIST

## 2020-02-21 PROCEDURE — 84478 ASSAY OF TRIGLYCERIDES: CPT

## 2020-02-21 PROCEDURE — 770022 HCHG ROOM/CARE - ICU (200)

## 2020-02-21 PROCEDURE — 84100 ASSAY OF PHOSPHORUS: CPT

## 2020-02-21 PROCEDURE — 92950 HEART/LUNG RESUSCITATION CPR: CPT

## 2020-02-21 PROCEDURE — 99153 MOD SED SAME PHYS/QHP EA: CPT

## 2020-02-21 PROCEDURE — 82803 BLOOD GASES ANY COMBINATION: CPT

## 2020-02-21 PROCEDURE — 0BH17EZ INSERTION OF ENDOTRACHEAL AIRWAY INTO TRACHEA, VIA NATURAL OR ARTIFICIAL OPENING: ICD-10-PCS | Performed by: INTERNAL MEDICINE

## 2020-02-21 PROCEDURE — 99291 CRITICAL CARE FIRST HOUR: CPT | Mod: 25 | Performed by: INTERNAL MEDICINE

## 2020-02-21 PROCEDURE — 99233 SBSQ HOSP IP/OBS HIGH 50: CPT | Performed by: HOSPITALIST

## 2020-02-21 PROCEDURE — 71250 CT THORAX DX C-: CPT

## 2020-02-21 PROCEDURE — 700102 HCHG RX REV CODE 250 W/ 637 OVERRIDE(OP): Performed by: INTERNAL MEDICINE

## 2020-02-21 PROCEDURE — 83605 ASSAY OF LACTIC ACID: CPT

## 2020-02-21 PROCEDURE — 84484 ASSAY OF TROPONIN QUANT: CPT

## 2020-02-21 PROCEDURE — 83735 ASSAY OF MAGNESIUM: CPT

## 2020-02-21 RX ORDER — PROPOFOL 10 MG/ML
10 INJECTION, EMULSION INTRAVENOUS ONCE
Status: COMPLETED | OUTPATIENT
Start: 2020-02-21 | End: 2020-02-21

## 2020-02-21 RX ORDER — PHENYLEPHRINE HCL IN 0.9% NACL 0.5 MG/5ML
SYRINGE (ML) INTRAVENOUS
Status: DISCONTINUED
Start: 2020-02-21 | End: 2020-02-22

## 2020-02-21 RX ORDER — IPRATROPIUM BROMIDE AND ALBUTEROL SULFATE 2.5; .5 MG/3ML; MG/3ML
3 SOLUTION RESPIRATORY (INHALATION)
Status: DISCONTINUED | OUTPATIENT
Start: 2020-02-21 | End: 2020-03-11 | Stop reason: HOSPADM

## 2020-02-21 RX ORDER — ACETAMINOPHEN 325 MG/1
650 TABLET ORAL EVERY 6 HOURS PRN
Status: DISCONTINUED | OUTPATIENT
Start: 2020-02-21 | End: 2020-03-03

## 2020-02-21 RX ORDER — BISACODYL 10 MG
10 SUPPOSITORY, RECTAL RECTAL
Status: DISCONTINUED | OUTPATIENT
Start: 2020-02-21 | End: 2020-02-23

## 2020-02-21 RX ORDER — LORAZEPAM 2 MG/ML
0.5 INJECTION INTRAMUSCULAR ONCE
Status: COMPLETED | OUTPATIENT
Start: 2020-02-21 | End: 2020-02-21

## 2020-02-21 RX ORDER — GABAPENTIN 400 MG/1
2400 CAPSULE ORAL EVERY MORNING
Status: DISCONTINUED | OUTPATIENT
Start: 2020-02-22 | End: 2020-03-03

## 2020-02-21 RX ORDER — FAMOTIDINE 20 MG/1
20 TABLET, FILM COATED ORAL EVERY 12 HOURS
Status: DISCONTINUED | OUTPATIENT
Start: 2020-02-21 | End: 2020-02-21 | Stop reason: ALTCHOICE

## 2020-02-21 RX ORDER — PROPOFOL 10 MG/ML
20 INJECTION, EMULSION INTRAVENOUS ONCE
Status: COMPLETED | OUTPATIENT
Start: 2020-02-21 | End: 2020-02-21

## 2020-02-21 RX ORDER — PREDNISONE 20 MG/1
40 TABLET ORAL DAILY
Status: DISCONTINUED | OUTPATIENT
Start: 2020-02-22 | End: 2020-02-22

## 2020-02-21 RX ORDER — METHADONE HYDROCHLORIDE 10 MG/1
30 TABLET ORAL 2 TIMES DAILY
Status: DISCONTINUED | OUTPATIENT
Start: 2020-02-21 | End: 2020-03-03

## 2020-02-21 RX ORDER — GUAIFENESIN/DEXTROMETHORPHAN 100-10MG/5
10 SYRUP ORAL EVERY 6 HOURS PRN
Status: DISCONTINUED | OUTPATIENT
Start: 2020-02-21 | End: 2020-03-03

## 2020-02-21 RX ORDER — VECURONIUM BROMIDE 1 MG/ML
10 INJECTION, POWDER, LYOPHILIZED, FOR SOLUTION INTRAVENOUS ONCE
Status: COMPLETED | OUTPATIENT
Start: 2020-02-21 | End: 2020-02-21

## 2020-02-21 RX ORDER — PROMETHAZINE HYDROCHLORIDE 25 MG/1
12.5-25 TABLET ORAL EVERY 4 HOURS PRN
Status: DISCONTINUED | OUTPATIENT
Start: 2020-02-21 | End: 2020-03-03

## 2020-02-21 RX ORDER — METRONIDAZOLE 500 MG/1
500 TABLET ORAL EVERY 8 HOURS
Status: DISPENSED | OUTPATIENT
Start: 2020-02-21 | End: 2020-02-23

## 2020-02-21 RX ORDER — SIMVASTATIN 40 MG
40 TABLET ORAL NIGHTLY
Status: DISCONTINUED | OUTPATIENT
Start: 2020-02-21 | End: 2020-03-03

## 2020-02-21 RX ORDER — IPRATROPIUM BROMIDE AND ALBUTEROL SULFATE 2.5; .5 MG/3ML; MG/3ML
3 SOLUTION RESPIRATORY (INHALATION)
Status: DISCONTINUED | OUTPATIENT
Start: 2020-02-21 | End: 2020-02-26

## 2020-02-21 RX ORDER — MIDAZOLAM HYDROCHLORIDE 1 MG/ML
INJECTION INTRAMUSCULAR; INTRAVENOUS
Status: COMPLETED
Start: 2020-02-21 | End: 2020-02-21

## 2020-02-21 RX ORDER — ETOMIDATE 2 MG/ML
10 INJECTION INTRAVENOUS ONCE
Status: COMPLETED | OUTPATIENT
Start: 2020-02-21 | End: 2020-02-21

## 2020-02-21 RX ORDER — LEVOTHYROXINE SODIUM 0.12 MG/1
125 TABLET ORAL
Status: DISCONTINUED | OUTPATIENT
Start: 2020-02-22 | End: 2020-03-03

## 2020-02-21 RX ORDER — ALPRAZOLAM 0.25 MG/1
0.25 TABLET ORAL 4 TIMES DAILY PRN
Status: DISCONTINUED | OUTPATIENT
Start: 2020-02-21 | End: 2020-03-03

## 2020-02-21 RX ORDER — GABAPENTIN 400 MG/1
1600 CAPSULE ORAL EVERY EVENING
Status: DISCONTINUED | OUTPATIENT
Start: 2020-02-21 | End: 2020-03-03

## 2020-02-21 RX ORDER — HYDROMORPHONE HYDROCHLORIDE 1 MG/ML
0.5 INJECTION, SOLUTION INTRAMUSCULAR; INTRAVENOUS; SUBCUTANEOUS
Status: DISCONTINUED | OUTPATIENT
Start: 2020-02-21 | End: 2020-02-23

## 2020-02-21 RX ORDER — SODIUM CHLORIDE, SODIUM LACTATE, POTASSIUM CHLORIDE, CALCIUM CHLORIDE 600; 310; 30; 20 MG/100ML; MG/100ML; MG/100ML; MG/100ML
1000 INJECTION, SOLUTION INTRAVENOUS CONTINUOUS
Status: ACTIVE | OUTPATIENT
Start: 2020-02-21 | End: 2020-02-22

## 2020-02-21 RX ORDER — ROCURONIUM BROMIDE 10 MG/ML
100 INJECTION, SOLUTION INTRAVENOUS ONCE
Status: COMPLETED | OUTPATIENT
Start: 2020-02-21 | End: 2020-02-21

## 2020-02-21 RX ORDER — SODIUM CHLORIDE 9 MG/ML
1000 INJECTION, SOLUTION INTRAVENOUS ONCE
Status: COMPLETED | OUTPATIENT
Start: 2020-02-21 | End: 2020-02-21

## 2020-02-21 RX ORDER — METHADONE HYDROCHLORIDE 10 MG/1
10 TABLET ORAL
Status: DISCONTINUED | OUTPATIENT
Start: 2020-02-21 | End: 2020-03-03

## 2020-02-21 RX ORDER — POLYETHYLENE GLYCOL 3350 17 G/17G
1 POWDER, FOR SOLUTION ORAL
Status: DISCONTINUED | OUTPATIENT
Start: 2020-02-21 | End: 2020-02-23

## 2020-02-21 RX ORDER — AMOXICILLIN 250 MG
2 CAPSULE ORAL 2 TIMES DAILY
Status: DISCONTINUED | OUTPATIENT
Start: 2020-02-21 | End: 2020-02-23

## 2020-02-21 RX ORDER — LORAZEPAM 2 MG/ML
INJECTION INTRAMUSCULAR
Status: COMPLETED
Start: 2020-02-21 | End: 2020-02-21

## 2020-02-21 RX ORDER — TRAMADOL HYDROCHLORIDE 50 MG/1
50 TABLET ORAL 2 TIMES DAILY
Status: DISCONTINUED | OUTPATIENT
Start: 2020-02-21 | End: 2020-03-03

## 2020-02-21 RX ORDER — MIDAZOLAM HYDROCHLORIDE 1 MG/ML
2 INJECTION INTRAMUSCULAR; INTRAVENOUS ONCE
Status: COMPLETED | OUTPATIENT
Start: 2020-02-21 | End: 2020-02-21

## 2020-02-21 RX ADMIN — ROCURONIUM BROMIDE 100 MG: 10 INJECTION, SOLUTION INTRAVENOUS at 08:41

## 2020-02-21 RX ADMIN — TIMOLOL MALEATE 1 DROP: 5 SOLUTION OPHTHALMIC at 18:08

## 2020-02-21 RX ADMIN — SODIUM CHLORIDE, POTASSIUM CHLORIDE, SODIUM LACTATE AND CALCIUM CHLORIDE 1000 ML: 600; 310; 30; 20 INJECTION, SOLUTION INTRAVENOUS at 23:05

## 2020-02-21 RX ADMIN — METHADONE HYDROCHLORIDE 10 MG: 10 TABLET ORAL at 21:43

## 2020-02-21 RX ADMIN — TRAMADOL HYDROCHLORIDE 50 MG: 50 TABLET, FILM COATED ORAL at 18:03

## 2020-02-21 RX ADMIN — GABAPENTIN 1600 MG: 400 CAPSULE ORAL at 18:08

## 2020-02-21 RX ADMIN — LORAZEPAM 0.5 MG: 2 INJECTION INTRAMUSCULAR; INTRAVENOUS at 07:46

## 2020-02-21 RX ADMIN — METHADONE HYDROCHLORIDE 30 MG: 10 TABLET ORAL at 18:05

## 2020-02-21 RX ADMIN — SODIUM CHLORIDE 1000 ML: 9 INJECTION, SOLUTION INTRAVENOUS at 12:28

## 2020-02-21 RX ADMIN — HYDROMORPHONE HYDROCHLORIDE 0.5 MG: 1 INJECTION, SOLUTION INTRAMUSCULAR; INTRAVENOUS; SUBCUTANEOUS at 17:10

## 2020-02-21 RX ADMIN — METRONIDAZOLE 500 MG: 500 TABLET ORAL at 21:44

## 2020-02-21 RX ADMIN — PROPOFOL 30 MCG/KG/MIN: 10 INJECTION, EMULSION INTRAVENOUS at 19:54

## 2020-02-21 RX ADMIN — SIMVASTATIN 40 MG: 40 TABLET, FILM COATED ORAL at 21:44

## 2020-02-21 RX ADMIN — TRAMADOL HYDROCHLORIDE 50 MG: 50 TABLET, FILM COATED ORAL at 07:03

## 2020-02-21 RX ADMIN — LORAZEPAM 0.5 MG: 2 INJECTION INTRAMUSCULAR at 07:46

## 2020-02-21 RX ADMIN — HYDROMORPHONE HYDROCHLORIDE 1 MG: 1 INJECTION, SOLUTION INTRAMUSCULAR; INTRAVENOUS; SUBCUTANEOUS at 23:58

## 2020-02-21 RX ADMIN — ALPRAZOLAM 0.25 MG: 0.25 TABLET ORAL at 04:25

## 2020-02-21 RX ADMIN — HYDROMORPHONE HYDROCHLORIDE 0.5 MG: 1 INJECTION, SOLUTION INTRAMUSCULAR; INTRAVENOUS; SUBCUTANEOUS at 23:38

## 2020-02-21 RX ADMIN — PROMETHAZINE HYDROCHLORIDE 25 MG: 25 TABLET ORAL at 03:10

## 2020-02-21 RX ADMIN — METRONIDAZOLE 500 MG: 500 TABLET ORAL at 07:02

## 2020-02-21 RX ADMIN — MIDAZOLAM HYDROCHLORIDE 2 MG: 1 INJECTION INTRAMUSCULAR; INTRAVENOUS at 18:00

## 2020-02-21 RX ADMIN — HYDROMORPHONE HYDROCHLORIDE 0.5 MG: 1 INJECTION, SOLUTION INTRAMUSCULAR; INTRAVENOUS; SUBCUTANEOUS at 14:29

## 2020-02-21 RX ADMIN — ETOMIDATE 10 MG: 2 INJECTION INTRAVENOUS at 09:02

## 2020-02-21 RX ADMIN — PROPOFOL 30 MCG/KG/MIN: 10 INJECTION, EMULSION INTRAVENOUS at 09:13

## 2020-02-21 RX ADMIN — LEVOTHYROXINE SODIUM 125 MCG: 125 TABLET ORAL at 07:00

## 2020-02-21 RX ADMIN — VECURONIUM BROMIDE 10 MG: 10 INJECTION, POWDER, LYOPHILIZED, FOR SOLUTION INTRAVENOUS at 18:00

## 2020-02-21 RX ADMIN — MIDAZOLAM HYDROCHLORIDE 2 MG: 1 INJECTION, SOLUTION INTRAMUSCULAR; INTRAVENOUS at 18:00

## 2020-02-21 RX ADMIN — METHADONE HYDROCHLORIDE 30 MG: 10 TABLET ORAL at 07:02

## 2020-02-21 RX ADMIN — PROPOFOL 20 MG: 10 INJECTION, EMULSION INTRAVENOUS at 08:46

## 2020-02-21 RX ADMIN — POTASSIUM PHOSPHATE, MONOBASIC AND POTASSIUM PHOSPHATE, DIBASIC 15 MMOL: 224; 236 INJECTION, SOLUTION, CONCENTRATE INTRAVENOUS at 11:31

## 2020-02-21 RX ADMIN — PROPOFOL 10 MG: 10 INJECTION, EMULSION INTRAVENOUS at 09:00

## 2020-02-21 RX ADMIN — OMEPRAZOLE 40 MG: KIT at 18:02

## 2020-02-21 RX ADMIN — SENNOSIDES AND DOCUSATE SODIUM 2 TABLET: 8.6; 5 TABLET ORAL at 18:05

## 2020-02-21 RX ADMIN — PROPOFOL 35 MCG/KG/MIN: 10 INJECTION, EMULSION INTRAVENOUS at 15:33

## 2020-02-21 RX ADMIN — CEFTRIAXONE SODIUM 2 G: 2 INJECTION, POWDER, FOR SOLUTION INTRAMUSCULAR; INTRAVENOUS at 07:04

## 2020-02-21 RX ADMIN — IPRATROPIUM BROMIDE AND ALBUTEROL SULFATE 3 ML: .5; 3 SOLUTION RESPIRATORY (INHALATION) at 23:11

## 2020-02-21 RX ADMIN — ENOXAPARIN SODIUM 40 MG: 100 INJECTION SUBCUTANEOUS at 07:03

## 2020-02-21 RX ADMIN — PREDNISONE 40 MG: 20 TABLET ORAL at 07:03

## 2020-02-21 RX ADMIN — GABAPENTIN 2400 MG: 400 CAPSULE ORAL at 06:00

## 2020-02-21 ASSESSMENT — ENCOUNTER SYMPTOMS
EYES NEGATIVE: 1
GASTROINTESTINAL NEGATIVE: 1
SPUTUM PRODUCTION: 1
NERVOUS/ANXIOUS: 1
MUSCULOSKELETAL NEGATIVE: 1
ABDOMINAL PAIN: 0
NAUSEA: 0
BACK PAIN: 1
FEVER: 0
COUGH: 1
SHORTNESS OF BREATH: 1
NEUROLOGICAL NEGATIVE: 1
PSYCHIATRIC NEGATIVE: 1
MYALGIAS: 1
DIZZINESS: 0
VOMITING: 0

## 2020-02-21 NOTE — ASSESSMENT & PLAN NOTE
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   Extubated 2/28 - tolerating  Pulmonary biopsy on 2/22, alveolar granulation tissue  CTD unremarkable  Changed prednisone taper to scheduled 60 mg daily for 30 days, then re-evaluate  Trial steroids for NSIP.    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

## 2020-02-21 NOTE — CARE PLAN
Problem: Hyperinflation:  Goal: Prevent or improve atelectasis  Intervention: Perform hyperinflation therapy as indicated by assessment  Note: PEP QID

## 2020-02-21 NOTE — CODE DOCUMENTATION
Patient transported from [facility] to [facility] via bed accompanied by RRT team.  Notified Dr. Bustos of new room assignment.    Labs collected

## 2020-02-21 NOTE — PROGRESS NOTES
[facility] Medicine Daily Progress Note    Date of Service  2/21/2020    Chief Complaint  59 y.o. male who presented 2/18/2020 with the above complaints an outlying facility, the patient apparently was found on his usual oxygen flow hypoxic at 71%, feels that he has been ill for the last 2 to 3 days with increasing dyspnea, epigastric abdominal pain, gas pain, nausea vomiting.  The patient in the process was found with a left spontaneous pneumothorax and right lower lobe pneumonia per imaging.  Chest tube was placed at an outlying facility, the patient received Levaquin antibiotic coverage.  The patient transferred to [facility] for ongoing specialty care and evaluation by surgery.  The patient has significant polypharmacy including high-dose Neurontin and significant dose of methadone.  Patient after arrival was found to have a dislodged chest tube, chest surgery Dr. Ganser works consulted for possible replacement.  Patient has a history of chronic home oxygen use at 3 L currently, history of pneumonia, pulmonary till hypertension.  The patient denies any recent travel or other sick exposure.  Patient tested negative for influenza.  Hospital Course    Admitted with shortness of breath, pneumothorax, pneumonia      Interval Problem Update  Patient seen and examined today.    Patient tolerating treatment and therapies.  All Data, Medication data reviewed.  Case discussed with nursing as available.  Plan of Care reviewed with patient and notified of changes.  2/19 patient feels improved, his pneumothorax remains small, no further chest tube required, afebrile, normotensive, heart rate within normal limits, the patient currently on 5 L per nasal cannula, reports anxiety, mild cough, pulmonary exam is overall improved  2/20 the patient feels better overall, less cough, on 5 L oxygen, no fevers, no chills, appears lethargic, chest x-ray follow-up with decreased pneumothorax.  The patient on discharge in September 2019 did not  follow-up with pulmonary as suggested at the time.  Significant abnormal CT findings at the time.  Discussed with pulmonary for consultation and recommendations in terms of the patient's further management.  The patient apparently lives in between formerly [facility].  2/21 the patient with worsening status overnight, increase in oxygen demand currently being seen by the rapid response team, on 15 L nonrebreather mask, severely anxious, complaining of upper back pain, monitor shows bigeminy with relative pulse deficit, discussed with pulmonary medicine, stat chest x-ray did not show significant increase in pneumothorax, plan for transfer to ICU, intubation, possible lung biopsy bronchoscopy.  Patient is in agreement.  Anxiolytics provided.  Patient critically ill today  Consultants/Specialty  Surgery, signed off  Pulmonary medicine  Code Status  Full code    Disposition  Transfer to ICU    Review of Systems  Review of Systems   Constitutional: Positive for malaise/fatigue.   HENT: Negative.    Eyes: Negative.    Respiratory: Positive for cough, sputum production and shortness of breath.    Cardiovascular: Positive for chest pain.   Gastrointestinal: Negative.    Genitourinary: Negative.    Musculoskeletal: Positive for back pain, joint pain and myalgias.   Skin: Negative.    Neurological: Negative.    Endo/Heme/Allergies: Negative.    Psychiatric/Behavioral: The patient is nervous/anxious.    All other systems reviewed and are negative.       Physical Exam  Temp:  [35.8 °C (96.4 °F)-36.5 °C (97.7 °F)] 35.8 °C (96.4 °F)  Pulse:  [42-88] 70  Resp:  [16-27] 20  BP: ()/() 87/59  SpO2:  [86 %-100 %] 100 %    Physical Exam  Vitals signs and nursing note reviewed.   Constitutional:       Appearance: He is well-developed. He is ill-appearing.      Interventions: Face mask in place.   HENT:      Head: Normocephalic.   Eyes:      Pupils: Pupils are equal, round, and reactive to light.   Neck:       Musculoskeletal: Normal range of motion.   Cardiovascular:      Rate and Rhythm: Regular rhythm. Bradycardia present.      Heart sounds: Normal heart sounds.   Pulmonary:      Effort: Pulmonary effort is normal.      Breath sounds: Rhonchi and rales present.   Abdominal:      General: Bowel sounds are normal.      Palpations: Abdomen is soft.   Genitourinary:     Penis: Normal.       Rectum: Normal.   Musculoskeletal: Normal range of motion.   Skin:     General: Skin is warm.   Neurological:      Mental Status: He is alert and oriented to person, place, and time.   Psychiatric:         Mood and Affect: Mood is anxious.         Speech: Speech is rapid and pressured.         Fluids    Intake/Output Summary (Last 24 hours) at 2/21/2020 1430  Last data filed at 2/21/2020 1200  Gross per 24 hour   Intake 282.51 ml   Output 750 ml   Net -467.49 ml       Laboratory  Recent Labs     02/19/20 0417 02/20/20  0433 02/21/20  0546   WBC 13.4* 8.9 14.7*   RBC 4.61* 4.45* 4.75   HEMOGLOBIN 12.6* 11.9* 13.0*   HEMATOCRIT 38.6* 37.6* 38.2*   MCV 83.7 84.5 80.4*   MCH 27.3 26.7* 27.4   MCHC 32.6* 31.6* 34.0   RDW 47.0 47.1 42.9   PLATELETCT 292 241 276   MPV 10.2 9.3 9.2     Recent Labs     02/19/20 0417 02/20/20  0433 02/21/20  0546   SODIUM 127* 137 140   POTASSIUM 3.8 3.6 3.9   CHLORIDE 97 104 104   CO2 25 27 29   GLUCOSE 88 83 98   BUN 21 21 19   CREATININE 1.07 1.05 0.95   CALCIUM 9.0 8.5 9.2     Recent Labs     02/21/20  0856   APTT 35.8   INR 1.28*         Recent Labs     02/19/20 0417   TRIGLYCERIDE 102   HDL 22*   LDL 96       Imaging  DX-CHEST-PORTABLE (1 VIEW)   Final Result      1.  interval placement of an endotracheal tube which terminates in satisfactory position at the level of the aortic arch.   2.  Interval insertion of a central venous catheter which terminates with the tip projecting over the expected region of the mid to distal superior vena cava.   3.  Previously identified trace left pneumothorax is no  longer visualized.   4.  Increased diffuse, bilateral, right greater than left interstitial and airspace opacities.      DX-CHEST-PORTABLE (1 VIEW)   Final Result         1.  Pulmonary edema and/or infiltrates are identified, which are stable since the prior exam.   2.  Trace left pneumothorax, decreased since prior study      CT-CHEST, HIGH RESOLUTION LUNG   Final Result      1.  Emphysematous changes again seen, similar to prior exam.   2.  Plan loss of RIGHT hemithorax with diffuse interstitial opacities suggesting pneumonitis/pneumonia.   3.  Multifocal ill-defined groundglass opacities in the LEFT upper and lower lobes also suggesting multifocal pneumonitis.   4.  Small LEFT anterior pneumothorax as seen on prior chest x-ray.   5.  Mild mediastinal adenopathy, nonspecific.               DX-CHEST-PORTABLE (1 VIEW)   Final Result         1.  Pulmonary edema and/or infiltrates are identified, which are stable since the prior exam.   2.  Small left pneumothorax, decreased since prior study      DX-CHEST-2 VIEWS   Final Result      1.  Unchanged small LEFT pneumothorax   2.  Unchanged BILATERAL interstitial and airspace disease and atelectasis      DX-CHEST-LIMITED (1 VIEW)   Final Result      Stable small left pneumothorax. Interval removal of left chest tube.      DX-CHEST-LIMITED (1 VIEW)   Final Result      1.  Left chest tube tip at the origin of the left hemithorax. Advancement is suggested. Stable small left pneumothorax.   2.  Interstitial and airspace opacities, worse on the right which could represent asymmetric edema or infection.      These findings were discussed with nurse Hung on 2/18/2020 9:16 AM.      OUTSIDE IMAGES-DX CHEST   Final Result      OUTSIDE IMAGES-DX CHEST   Final Result      CT-CHEST TUBE FOR PNEUMO    (Results Pending)        Assessment/Plan  Acute on chronic respiratory failure with hypoxia (HCC)- (present on admission)  Assessment & Plan  Worsening status, intensivist consulted,  follow-up chest x-ray noted, check ABG    Hypoxia- (present on admission)  Assessment & Plan  As a result from COPD, pneumothorax, pneumonia    Respiratory failure (HCC)- (present on admission)  Assessment & Plan  Secondary to COPD, emphysema and concomitant infection with pneumothorax  Status post chest tube placement initially for spontaneous pneumothorax  Worsening status with multifactorial etiology  Upgraded to ICU status, intubation, further investigation    Pneumonia- (present on admission)  Assessment & Plan  Community-acquired, history of extensive tobacco abuse with lung pathology  History of significantly abnormal CT with interstitial abnormalities and multiple bullae  Question of alternative/additional lung pathology/bullectomy  Pulmonary medicine consult  Continue with antibiotics    QT prolongation- (present on admission)  Assessment & Plan  History of, currently with some significant bigeminy  Continue with cardiac monitoring    Chronic neck pain- (present on admission)  Assessment & Plan  Patient is on high-dose chronic narcotics in form of methadone, continue, avoid escalation    Pulmonary hypertension (HCC)- (present on admission)  Assessment & Plan  History of, avoid fluid overload    Tobacco abuse- (present on admission)  Assessment & Plan  History of extensive use, the patient quit in 2015    Hypothyroid- (present on admission)  Assessment & Plan  Continue with replacement    Chronic pain- (present on admission)  Assessment & Plan  High-dose methadone as well as Neurontin  Plan  CT chest noted, follow-up chest x-ray noted,  Transfer patient to ICU, discussed with pulmonary and intensivist  Continue with antibiotic coverage  Steroids  Proceed with intubation, bronchoscopy and lung biopsy possibly  Additional laboratory testing is currently pending  Patient is critically ill  See orders  Medically complex high risk      VTE prophylaxis: Lovenox    I have performed a physical exam and reviewed and  updated ROS and Plan today . In review of yesterday's note , there are no changes except as documented above.

## 2020-02-21 NOTE — CARE PLAN
Problem: Venous Thromboembolism (VTW)/Deep Vein Thrombosis (DVT) Prevention:  Goal: Patient will participate in Venous Thrombosis (VTE)/Deep Vein Thrombosis (DVT)Prevention Measures  Outcome: PROGRESSING AS EXPECTED  Note: SCDs in place and on     Problem: Safety - Medical Restraint  Goal: Remains free of injury from restraints (Restraint for Interference with Medical Device)  Description: INTERVENTIONS:  1. Determine that other, less restrictive measures have been tried or would not be effective before applying the restraint  2. Evaluate the patient's condition at the time of restraint application  3. Inform patient/family regarding the reason for restraint  4. Q2H: Monitor safety, psychosocial status, comfort, nutrition and hydration  Outcome: PROGRESSING AS EXPECTED  Note: Monitoring of patient and restraint check with ROM every 2 hours     Problem: Communication  Goal: The ability to communicate needs accurately and effectively will improve  Outcome: PROGRESSING SLOWER THAN EXPECTED     Problem: Respiratory:  Goal: Respiratory status will improve  Outcome: PROGRESSING SLOWER THAN EXPECTED  Note: Patient transferred to ICU and intubated

## 2020-02-21 NOTE — CARE PLAN
Problem: Ventilation Defect:  Goal: Ability to achieve and maintain unassisted ventilation or tolerate decreased levels of ventilator support  Outcome: PROGRESSING SLOWER THAN EXPECTED   Adult Ventilation Update    Total Vent Days: 1    Patient Lines/Drains/Airways Status      Active Airway       Name: Placement date: Placement time: Site: Days:    Airway ETT 8.0  02/21/20   0845   --  less than 1                  Patient failed trials because of Barriers to Wean: No Order (02/21/20 1102)    Sputum/Suction   Cough: Productive (02/21/20 1105)  Sputum Amount: Scant (02/21/20 1105)  Sputum Color: Yellow (02/21/20 1105)  Sputum Consistency: Thick (02/21/20 1105)      Events/Summary/Plan: PT intubated this am, sue well

## 2020-02-21 NOTE — DIETARY
"Nutrition Support Assessment:  Day 3 of admit.  Pancho Martinez Jr. is a 59 y.o. male with admitting DX of Pneumothorax, Pneumonia, Respiratory failure, ILD (interstitial lung disease), COPD (chronic obstructive pulmonary disease).     Current problem list:  1. Pneumonia  2. Respiratory failure  3. Hypoxia  4. QT prolongation  5. Chronic neck pain  6. Pulmonary hypertension  7. Acute on chronic respiratory failure with hypoxia  8. Tobacco abuse  9. Hypothyroid  10. Chronic pain     Assessment:  Estimated Nutritional Needs based on:   Height: 185.4 cm (6' 0.99\")  Weight: 78.6 kg (173 lb 4.5 oz)  Weight to Use in Calculations: 78 kg (171 lb 15.3 oz) - stand up scale wt from admit, likely pt's estimated dry wt  Ideal Body Weight: 83.5 kg (184 lb)  Percent Ideal Body Weight: 93.5  Body mass index is 22.87 kg/m²., BMI classification: normal    Calculation/Equation: MSJ x 1.2 = 1974 kcals/day; RMR per [employer] 2003b (vent L/min: 9.0, Tmax/24 hrs: 36.5) = 1738 kcals/day  Total Calories / day: 1974 - 2262  (Calories / k - 29)  Total Grams Protein / day: 94 - 109  (Grams Protein / k.2 - 1.4)     Evaluation:   1. Admitted to outside facility for PNA and left pneumothorax.  2. Transferred from the floor to the ICU this morning for increasing shortness of breath, rapid response called.  3. Pt is currently intubated and sedated.  4. Cortrak to be placed.  5. Propofol currently running @ 25 mcg/min (11.8 mL/hr) = 311 kcals/day  6. Rocephin, Synthroid, Flagyl, Omeprazole, KPhos, Pericolace  7. Phos 2.4  8. Specialized tube feeding formula indicated to best meet pt's kcal and protein needs.     Malnutrition Risk: Unable to identify at this time.     Recommendations/Plan:  1. On propofol, start Impact Peptide @ 25 mL/hr and advance per protocol to goal rate of 45 mL/hr, which provides 1620 kcals (+kcals from propofol), 102 grams of protein and 832 mL of free water per day.   2. When propofol d/c'd, change TF to Diabetisource " AC. Start @ 25 mL/hr (d/t introduction of fiber) and advance per protocol to goal rate of 65 ml/hr, which provides 1872 kcals, 94 grams protein, and 1279 mL free water per day.   3. Fluids per MD.  4. PO diet when safe/appropriate per SLP/MD and pt can adequately consume.    RD following.

## 2020-02-21 NOTE — CODE DOCUMENTATION
Bladder scan completed with >700 cc.  Tele RN attempted to straight cath, unable to pass the catheter.

## 2020-02-21 NOTE — PROGRESS NOTES
Patient arrived to R109 via hospital bed with RRT on 15L NRB. Transferred to ICU bed and on monitor. Dr. Javier notified of arrival. Patient to be intubated per Dr. Javier. Patient updated on plan of care. Patient consented to intubation and chest tube insertion.

## 2020-02-21 NOTE — PROGRESS NOTES
Report received from nightshift RN. At change of shift pt noted to have increasing SOB and anxiety, RT present at bedside, pt now requiring 15L non-rebreather. Rapid response called. Pt noted to be SB on monitor and hypertensive. CXR and EKG ordered by rapid. MD Bustos at bedside. Bladder scan results with 730mL, attempted to straight cath pt however unable to advance past prostate. Pt transferred to R109, report called to BEBA Langley. Tele box removed, all belongings accounted for.

## 2020-02-21 NOTE — ASSESSMENT & PLAN NOTE
Likely secondary to nonspecific interstitial pneumonia and pneumothoraces. Baseline uses 2-3 L via NC  - Continuing long steroid taper   - pulmonary following, appreciate recs  - Status post treatment for CAP  -  RT protocol for COPD  - Continue to mobilize and continue IS

## 2020-02-21 NOTE — CARE PLAN
Problem: Communication  Goal: The ability to communicate needs accurately and effectively will improve  Outcome: PROGRESSING AS EXPECTED     Problem: Safety  Goal: Will remain free from injury  Outcome: PROGRESSING AS EXPECTED  Goal: Will remain free from falls  Outcome: PROGRESSING AS EXPECTED     Problem: Infection  Goal: Will remain free from infection  Outcome: PROGRESSING AS EXPECTED     Problem: Venous Thromboembolism (VTW)/Deep Vein Thrombosis (DVT) Prevention:  Goal: Patient will participate in Venous Thrombosis (VTE)/Deep Vein Thrombosis (DVT)Prevention Measures  Outcome: PROGRESSING AS EXPECTED     Problem: Bowel/Gastric:  Goal: Normal bowel function is maintained or improved  Outcome: PROGRESSING AS EXPECTED  Goal: Will not experience complications related to bowel motility  Outcome: PROGRESSING AS EXPECTED     Problem: Knowledge Deficit  Goal: Knowledge of disease process/condition, treatment plan, diagnostic tests, and medications will improve  Outcome: PROGRESSING AS EXPECTED  Goal: Knowledge of the prescribed therapeutic regimen will improve  Outcome: PROGRESSING AS EXPECTED     Problem: Discharge Barriers/Planning  Goal: Patient's continuum of care needs will be met  Outcome: PROGRESSING AS EXPECTED     Problem: Pain Management  Goal: Pain level will decrease to patient's comfort goal  Outcome: PROGRESSING AS EXPECTED     Problem: Respiratory:  Goal: Respiratory status will improve  Outcome: PROGRESSING AS EXPECTED     Problem: Fluid Volume:  Goal: Will maintain balanced intake and output  Outcome: PROGRESSING AS EXPECTED     Problem: Psychosocial Needs:  Goal: Level of anxiety will decrease  Outcome: PROGRESSING AS EXPECTED

## 2020-02-22 ENCOUNTER — APPOINTMENT (OUTPATIENT)
Dept: RADIOLOGY | Facility: MEDICAL CENTER | Age: 60
DRG: 166 | End: 2020-02-22
Attending: INTERNAL MEDICINE
Payer: MEDICARE

## 2020-02-22 ENCOUNTER — APPOINTMENT (OUTPATIENT)
Dept: RADIOLOGY | Facility: MEDICAL CENTER | Age: 60
DRG: 166 | End: 2020-02-22
Attending: SURGERY
Payer: MEDICARE

## 2020-02-22 LAB
ACTION RANGE TRIGGERED IACRT: NO
ALBUMIN SERPL BCP-MCNC: 3.1 G/DL (ref 3.2–4.9)
ALBUMIN/GLOB SERPL: 1.1 G/DL
ALP SERPL-CCNC: 81 U/L (ref 30–99)
ALT SERPL-CCNC: 6 U/L (ref 2–50)
ANCA IGG TITR SER IF: NORMAL {TITER}
ANION GAP SERPL CALC-SCNC: 7 MMOL/L (ref 0–11.9)
AST SERPL-CCNC: 20 U/L (ref 12–45)
BASE EXCESS BLDA CALC-SCNC: 1 MMOL/L (ref -4–3)
BASOPHILS # BLD AUTO: 0.4 % (ref 0–1.8)
BASOPHILS # BLD: 0.06 K/UL (ref 0–0.12)
BILIRUB SERPL-MCNC: 0.3 MG/DL (ref 0.1–1.5)
BODY TEMPERATURE: NORMAL DEGREES
BUN SERPL-MCNC: 22 MG/DL (ref 8–22)
CALCIUM SERPL-MCNC: 8.3 MG/DL (ref 8.5–10.5)
CCP IGG SERPL-ACNC: 4 UNITS (ref 0–19)
CENTROMERE IGG TITR SER IF: 2 AU/ML (ref 0–40)
CHLORIDE SERPL-SCNC: 104 MMOL/L (ref 96–112)
CO2 BLDA-SCNC: 26 MMOL/L (ref 20–33)
CO2 SERPL-SCNC: 28 MMOL/L (ref 20–33)
CREAT SERPL-MCNC: 0.79 MG/DL (ref 0.5–1.4)
ENA JO1 AB TITR SER: 0 AU/ML (ref 0–40)
ENA SCL70 IGG SER QL: 2 AU/ML (ref 0–40)
ENA SM IGG SER-ACNC: 0 AU/ML (ref 0–40)
ENA SS-B IGG SER IA-ACNC: 1 AU/ML (ref 0–40)
EOSINOPHIL # BLD AUTO: 0.11 K/UL (ref 0–0.51)
EOSINOPHIL NFR BLD: 0.7 % (ref 0–6.9)
ERYTHROCYTE [DISTWIDTH] IN BLOOD BY AUTOMATED COUNT: 45.8 FL (ref 35.9–50)
FUNGUS SPEC FUNGUS STN: NORMAL
GLOBULIN SER CALC-MCNC: 2.9 G/DL (ref 1.9–3.5)
GLUCOSE SERPL-MCNC: 120 MG/DL (ref 65–99)
GRAM STN SPEC: NORMAL
HCO3 BLDA-SCNC: 24.8 MMOL/L (ref 17–25)
HCT VFR BLD AUTO: 35.2 % (ref 42–52)
HGB BLD-MCNC: 11.1 G/DL (ref 14–18)
HOROWITZ INDEX BLDA+IHG-RTO: 178 MM[HG]
IMM GRANULOCYTES # BLD AUTO: 0.08 K/UL (ref 0–0.11)
IMM GRANULOCYTES NFR BLD AUTO: 0.5 % (ref 0–0.9)
INST. QUALIFIED PATIENT IIQPT: YES
LYMPHOCYTES # BLD AUTO: 1.27 K/UL (ref 1–4.8)
LYMPHOCYTES NFR BLD: 8.5 % (ref 22–41)
MAGNESIUM SERPL-MCNC: 2 MG/DL (ref 1.5–2.5)
MCH RBC QN AUTO: 26.5 PG (ref 27–33)
MCHC RBC AUTO-ENTMCNC: 31.5 G/DL (ref 33.7–35.3)
MCV RBC AUTO: 84 FL (ref 81.4–97.8)
MONOCYTES # BLD AUTO: 1.27 K/UL (ref 0–0.85)
MONOCYTES NFR BLD AUTO: 8.5 % (ref 0–13.4)
NEUTROPHILS # BLD AUTO: 12.18 K/UL (ref 1.82–7.42)
NEUTROPHILS NFR BLD: 81.4 % (ref 44–72)
NRBC # BLD AUTO: 0 K/UL
NRBC BLD-RTO: 0 /100 WBC
O2/TOTAL GAS SETTING VFR VENT: 40 %
PATHOLOGY CONSULT NOTE: NORMAL
PCO2 BLDA: 36.3 MMHG (ref 26–37)
PCO2 TEMP ADJ BLDA: 36.3 MMHG (ref 26–37)
PH BLDA: 7.44 [PH] (ref 7.4–7.5)
PH TEMP ADJ BLDA: 7.44 [PH] (ref 7.4–7.5)
PHOSPHATE SERPL-MCNC: 2.9 MG/DL (ref 2.5–4.5)
PLATELET # BLD AUTO: 280 K/UL (ref 164–446)
PMV BLD AUTO: 9.4 FL (ref 9–12.9)
PO2 BLDA: 71 MMHG (ref 64–87)
PO2 TEMP ADJ BLDA: 71 MMHG (ref 64–87)
POTASSIUM SERPL-SCNC: 3.9 MMOL/L (ref 3.6–5.5)
PROCALCITONIN SERPL-MCNC: 0.24 NG/ML
PROT SERPL-MCNC: 6 G/DL (ref 6–8.2)
RBC # BLD AUTO: 4.19 M/UL (ref 4.7–6.1)
RIBOSOMAL P AB SER-ACNC: 6 AU/ML (ref 0–40)
SAO2 % BLDA: 95 % (ref 93–99)
SIGNIFICANT IND 70042: NORMAL
SIGNIFICANT IND 70042: NORMAL
SITE SITE: NORMAL
SITE SITE: NORMAL
SODIUM SERPL-SCNC: 139 MMOL/L (ref 135–145)
SOURCE SOURCE: NORMAL
SOURCE SOURCE: NORMAL
SPECIMEN DRAWN FROM PATIENT: NORMAL
SSA52 R0ENA AB IGG Q0420: 1 AU/ML (ref 0–40)
SSA60 R0ENA AB IGG Q0419: 2 AU/ML (ref 0–40)
U1 SNRNP IGG SER QL: 1 AU/ML (ref 0–40)
WBC # BLD AUTO: 15 K/UL (ref 4.8–10.8)

## 2020-02-22 PROCEDURE — 0B9D7ZX DRAINAGE OF RIGHT MIDDLE LUNG LOBE, VIA NATURAL OR ARTIFICIAL OPENING, DIAGNOSTIC: ICD-10-PCS | Performed by: INTERNAL MEDICINE

## 2020-02-22 PROCEDURE — 36600 WITHDRAWAL OF ARTERIAL BLOOD: CPT

## 2020-02-22 PROCEDURE — 700105 HCHG RX REV CODE 258: Performed by: HOSPITALIST

## 2020-02-22 PROCEDURE — 86360 T CELL ABSOLUTE COUNT/RATIO: CPT

## 2020-02-22 PROCEDURE — 82803 BLOOD GASES ANY COMBINATION: CPT

## 2020-02-22 PROCEDURE — 94003 VENT MGMT INPAT SUBQ DAY: CPT

## 2020-02-22 PROCEDURE — 99152 MOD SED SAME PHYS/QHP 5/>YRS: CPT

## 2020-02-22 PROCEDURE — 99291 CRITICAL CARE FIRST HOUR: CPT | Performed by: INTERNAL MEDICINE

## 2020-02-22 PROCEDURE — 31628 BRONCHOSCOPY/LUNG BX EACH: CPT | Performed by: INTERNAL MEDICINE

## 2020-02-22 PROCEDURE — 32556 INSERT CATH PLEURA W/O IMAGE: CPT

## 2020-02-22 PROCEDURE — 700111 HCHG RX REV CODE 636 W/ 250 OVERRIDE (IP): Performed by: INTERNAL MEDICINE

## 2020-02-22 PROCEDURE — 302220 CHEST TUBE TRAY: Performed by: INTERNAL MEDICINE

## 2020-02-22 PROCEDURE — 700102 HCHG RX REV CODE 250 W/ 637 OVERRIDE(OP): Performed by: INTERNAL MEDICINE

## 2020-02-22 PROCEDURE — 700111 HCHG RX REV CODE 636 W/ 250 OVERRIDE (IP)

## 2020-02-22 PROCEDURE — 87070 CULTURE OTHR SPECIMN AEROBIC: CPT

## 2020-02-22 PROCEDURE — 88305 TISSUE EXAM BY PATHOLOGIST: CPT

## 2020-02-22 PROCEDURE — 0BBD8ZX EXCISION OF RIGHT MIDDLE LUNG LOBE, VIA NATURAL OR ARTIFICIAL OPENING ENDOSCOPIC, DIAGNOSTIC: ICD-10-PCS | Performed by: INTERNAL MEDICINE

## 2020-02-22 PROCEDURE — A9270 NON-COVERED ITEM OR SERVICE: HCPCS | Performed by: INTERNAL MEDICINE

## 2020-02-22 PROCEDURE — 0W9B30Z DRAINAGE OF LEFT PLEURAL CAVITY WITH DRAINAGE DEVICE, PERCUTANEOUS APPROACH: ICD-10-PCS | Performed by: SURGERY

## 2020-02-22 PROCEDURE — 84100 ASSAY OF PHOSPHORUS: CPT

## 2020-02-22 PROCEDURE — 87015 SPECIMEN INFECT AGNT CONCNTJ: CPT

## 2020-02-22 PROCEDURE — 700111 HCHG RX REV CODE 636 W/ 250 OVERRIDE (IP): Performed by: HOSPITALIST

## 2020-02-22 PROCEDURE — 83735 ASSAY OF MAGNESIUM: CPT

## 2020-02-22 PROCEDURE — 71045 X-RAY EXAM CHEST 1 VIEW: CPT

## 2020-02-22 PROCEDURE — 87102 FUNGUS ISOLATION CULTURE: CPT

## 2020-02-22 PROCEDURE — 94640 AIRWAY INHALATION TREATMENT: CPT

## 2020-02-22 PROCEDURE — 84145 PROCALCITONIN (PCT): CPT

## 2020-02-22 PROCEDURE — 31624 DX BRONCHOSCOPE/LAVAGE: CPT | Performed by: INTERNAL MEDICINE

## 2020-02-22 PROCEDURE — 87116 MYCOBACTERIA CULTURE: CPT

## 2020-02-22 PROCEDURE — 88323 CONSLTJ&REPRT MATRL PREP SLD: CPT

## 2020-02-22 PROCEDURE — 86359 T CELLS TOTAL COUNT: CPT

## 2020-02-22 PROCEDURE — C1729 CATH, DRAINAGE: HCPCS | Performed by: INTERNAL MEDICINE

## 2020-02-22 PROCEDURE — 770022 HCHG ROOM/CARE - ICU (200)

## 2020-02-22 PROCEDURE — 87205 SMEAR GRAM STAIN: CPT

## 2020-02-22 PROCEDURE — 88312 SPECIAL STAINS GROUP 1: CPT

## 2020-02-22 PROCEDURE — 700105 HCHG RX REV CODE 258: Performed by: INTERNAL MEDICINE

## 2020-02-22 PROCEDURE — 700101 HCHG RX REV CODE 250: Performed by: INTERNAL MEDICINE

## 2020-02-22 PROCEDURE — 85025 COMPLETE CBC W/AUTO DIFF WBC: CPT

## 2020-02-22 PROCEDURE — 302978 HCHG BRONCHOSCOPY-DIAGNOSTIC

## 2020-02-22 PROCEDURE — 80053 COMPREHEN METABOLIC PANEL: CPT

## 2020-02-22 PROCEDURE — 87206 SMEAR FLUORESCENT/ACID STAI: CPT

## 2020-02-22 PROCEDURE — C1729 CATH, DRAINAGE: HCPCS

## 2020-02-22 RX ORDER — SULFAMETHOXAZOLE AND TRIMETHOPRIM 800; 160 MG/1; MG/1
1 TABLET ORAL DAILY
Status: COMPLETED | OUTPATIENT
Start: 2020-02-22 | End: 2020-02-28

## 2020-02-22 RX ORDER — PREDNISONE 20 MG/1
40 TABLET ORAL ONCE
Status: COMPLETED | OUTPATIENT
Start: 2020-02-22 | End: 2020-02-22

## 2020-02-22 RX ORDER — ROCURONIUM BROMIDE 10 MG/ML
50 INJECTION, SOLUTION INTRAVENOUS ONCE
Status: COMPLETED | OUTPATIENT
Start: 2020-02-22 | End: 2020-02-22

## 2020-02-22 RX ORDER — SODIUM CHLORIDE, SODIUM LACTATE, POTASSIUM CHLORIDE, AND CALCIUM CHLORIDE .6; .31; .03; .02 G/100ML; G/100ML; G/100ML; G/100ML
500 INJECTION, SOLUTION INTRAVENOUS ONCE
Status: COMPLETED | OUTPATIENT
Start: 2020-02-22 | End: 2020-02-22

## 2020-02-22 RX ORDER — VECURONIUM BROMIDE 1 MG/ML
10 INJECTION, POWDER, LYOPHILIZED, FOR SOLUTION INTRAVENOUS ONCE
Status: COMPLETED | OUTPATIENT
Start: 2020-02-22 | End: 2020-02-22

## 2020-02-22 RX ORDER — PREDNISONE 20 MG/1
80 TABLET ORAL DAILY
Status: DISCONTINUED | OUTPATIENT
Start: 2020-02-23 | End: 2020-02-29

## 2020-02-22 RX ORDER — HYDROMORPHONE HYDROCHLORIDE 1 MG/ML
1 INJECTION, SOLUTION INTRAMUSCULAR; INTRAVENOUS; SUBCUTANEOUS ONCE
Status: COMPLETED | OUTPATIENT
Start: 2020-02-22 | End: 2020-02-21

## 2020-02-22 RX ADMIN — LEVOTHYROXINE SODIUM 125 MCG: 125 TABLET ORAL at 06:00

## 2020-02-22 RX ADMIN — IPRATROPIUM BROMIDE AND ALBUTEROL SULFATE 3 ML: .5; 3 SOLUTION RESPIRATORY (INHALATION) at 06:35

## 2020-02-22 RX ADMIN — PROPOFOL 20 MCG/KG/MIN: 10 INJECTION, EMULSION INTRAVENOUS at 07:52

## 2020-02-22 RX ADMIN — TRAMADOL HYDROCHLORIDE 50 MG: 50 TABLET, FILM COATED ORAL at 17:02

## 2020-02-22 RX ADMIN — PROPOFOL 40 MCG/KG/MIN: 10 INJECTION, EMULSION INTRAVENOUS at 20:00

## 2020-02-22 RX ADMIN — CEFTRIAXONE SODIUM 2 G: 2 INJECTION, POWDER, FOR SOLUTION INTRAMUSCULAR; INTRAVENOUS at 05:30

## 2020-02-22 RX ADMIN — POTASSIUM BICARBONATE 25 MEQ: 978 TABLET, EFFERVESCENT ORAL at 14:20

## 2020-02-22 RX ADMIN — METRONIDAZOLE 500 MG: 500 TABLET ORAL at 05:30

## 2020-02-22 RX ADMIN — IPRATROPIUM BROMIDE AND ALBUTEROL SULFATE 3 ML: .5; 3 SOLUTION RESPIRATORY (INHALATION) at 02:56

## 2020-02-22 RX ADMIN — GABAPENTIN 1600 MG: 400 CAPSULE ORAL at 17:02

## 2020-02-22 RX ADMIN — SODIUM CHLORIDE, POTASSIUM CHLORIDE, SODIUM LACTATE AND CALCIUM CHLORIDE 500 ML: 600; 310; 30; 20 INJECTION, SOLUTION INTRAVENOUS at 04:09

## 2020-02-22 RX ADMIN — ROCURONIUM BROMIDE 50 MG: 10 INJECTION, SOLUTION INTRAVENOUS at 00:00

## 2020-02-22 RX ADMIN — IPRATROPIUM BROMIDE AND ALBUTEROL SULFATE 3 ML: .5; 3 SOLUTION RESPIRATORY (INHALATION) at 22:16

## 2020-02-22 RX ADMIN — SODIUM CHLORIDE, POTASSIUM CHLORIDE, SODIUM LACTATE AND CALCIUM CHLORIDE 1000 ML: 600; 310; 30; 20 INJECTION, SOLUTION INTRAVENOUS at 09:45

## 2020-02-22 RX ADMIN — METRONIDAZOLE 500 MG: 500 TABLET ORAL at 14:20

## 2020-02-22 RX ADMIN — IPRATROPIUM BROMIDE AND ALBUTEROL SULFATE 3 ML: .5; 3 SOLUTION RESPIRATORY (INHALATION) at 09:32

## 2020-02-22 RX ADMIN — METHADONE HYDROCHLORIDE 30 MG: 10 TABLET ORAL at 05:16

## 2020-02-22 RX ADMIN — HYDROMORPHONE HYDROCHLORIDE 0.5 MG: 1 INJECTION, SOLUTION INTRAMUSCULAR; INTRAVENOUS; SUBCUTANEOUS at 23:19

## 2020-02-22 RX ADMIN — HYDROMORPHONE HYDROCHLORIDE 0.5 MG: 1 INJECTION, SOLUTION INTRAMUSCULAR; INTRAVENOUS; SUBCUTANEOUS at 04:30

## 2020-02-22 RX ADMIN — METRONIDAZOLE 500 MG: 500 TABLET ORAL at 21:51

## 2020-02-22 RX ADMIN — PROPOFOL 30 MCG/KG/MIN: 10 INJECTION, EMULSION INTRAVENOUS at 12:09

## 2020-02-22 RX ADMIN — GABAPENTIN 2400 MG: 400 CAPSULE ORAL at 05:18

## 2020-02-22 RX ADMIN — SODIUM CHLORIDE, POTASSIUM CHLORIDE, SODIUM LACTATE AND CALCIUM CHLORIDE 500 ML: 600; 310; 30; 20 INJECTION, SOLUTION INTRAVENOUS at 04:11

## 2020-02-22 RX ADMIN — TIMOLOL MALEATE 1 DROP: 5 SOLUTION OPHTHALMIC at 17:02

## 2020-02-22 RX ADMIN — TRAMADOL HYDROCHLORIDE 50 MG: 50 TABLET, FILM COATED ORAL at 05:16

## 2020-02-22 RX ADMIN — SIMVASTATIN 40 MG: 40 TABLET, FILM COATED ORAL at 21:51

## 2020-02-22 RX ADMIN — VECURONIUM BROMIDE 10 MG: 10 INJECTION, POWDER, LYOPHILIZED, FOR SOLUTION INTRAVENOUS at 11:49

## 2020-02-22 RX ADMIN — HYDROMORPHONE HYDROCHLORIDE 0.5 MG: 1 INJECTION, SOLUTION INTRAMUSCULAR; INTRAVENOUS; SUBCUTANEOUS at 11:45

## 2020-02-22 RX ADMIN — HYDROMORPHONE HYDROCHLORIDE 0.5 MG: 1 INJECTION, SOLUTION INTRAMUSCULAR; INTRAVENOUS; SUBCUTANEOUS at 19:56

## 2020-02-22 RX ADMIN — Medication 500 MCG: at 00:00

## 2020-02-22 RX ADMIN — SENNOSIDES AND DOCUSATE SODIUM 2 TABLET: 8.6; 5 TABLET ORAL at 17:02

## 2020-02-22 RX ADMIN — PREDNISONE 40 MG: 20 TABLET ORAL at 05:16

## 2020-02-22 RX ADMIN — IPRATROPIUM BROMIDE AND ALBUTEROL SULFATE 3 ML: .5; 3 SOLUTION RESPIRATORY (INHALATION) at 14:18

## 2020-02-22 RX ADMIN — METHADONE HYDROCHLORIDE 10 MG: 10 TABLET ORAL at 21:52

## 2020-02-22 RX ADMIN — PREDNISONE 40 MG: 20 TABLET ORAL at 14:23

## 2020-02-22 RX ADMIN — OMEPRAZOLE 40 MG: KIT at 17:02

## 2020-02-22 RX ADMIN — HYDROMORPHONE HYDROCHLORIDE 0.5 MG: 1 INJECTION, SOLUTION INTRAMUSCULAR; INTRAVENOUS; SUBCUTANEOUS at 02:30

## 2020-02-22 RX ADMIN — IPRATROPIUM BROMIDE AND ALBUTEROL SULFATE 3 ML: .5; 3 SOLUTION RESPIRATORY (INHALATION) at 18:44

## 2020-02-22 RX ADMIN — SENNOSIDES AND DOCUSATE SODIUM 2 TABLET: 8.6; 5 TABLET ORAL at 05:16

## 2020-02-22 RX ADMIN — METHADONE HYDROCHLORIDE 30 MG: 10 TABLET ORAL at 14:20

## 2020-02-22 RX ADMIN — SULFAMETHOXAZOLE AND TRIMETHOPRIM 1 TABLET: 800; 160 TABLET ORAL at 14:20

## 2020-02-22 NOTE — PROCEDURES
Bronchoscopy    Indication:interstitial lung disease    Informed consent obtained including and not limited to pneumothorax, bleeding, infection, arrythmias, cardiac arrest and non diagnostic procedure  Reviewed as he is ventilated high risk of pneumothorax    Medication: propofol 30 mcgs/hr dilaudid (see MARS) and 10 mg of vecuronium    Time out obtained before beginning of procedure    Procedure: bronchoscopy    Bronchoscope was passed via ET tube  Vocal cords: not visualized  Anuradha:sharp  Trachea:normal  Mucosa is normal throughout the bronchial tree and trachea  Left lung: MARCEL, lingula and LLL all level one subsegments visualized with no endobronchial lesions  Right lung: RUL, RML. RLL all level one subsegments visualized no endobronchial lesions  Bronchoscope was wedged in the RML medial segment with 40 cc normal saline aloquot with 15 cc cloudy return  4 forcep bx taken from RML medial segment    EBL: < 2 cc    Complications: none immediate    CXR pending    Specimen:  1. BAL: cell count with diff, CD4/CD8 ratio, resp cx, AFb and fungal cx  2. Forcep bx - path    Microbiology called to inform forcep bx for pathology     Impression/plan  Interstitial lung disease since 2011  Hard to compare how much worse he is as it is his emphysema that has progressed  Pattern of ILD very nonspecific - c/w NSIP  ANGIE negative  Awaiting the other tests of autoimmune  Start prednisone 1 mg/kg with bactrim for PJP prophylaxis  D/w Dr. Javier

## 2020-02-22 NOTE — PROGRESS NOTES
Low urine output x 2 hours. MD Mackey Notified. Verbal order w/ readback for 100 ml/hr lactated ringers IV maintenance fluids.

## 2020-02-22 NOTE — ASSESSMENT & PLAN NOTE
Prednisone taper  Bactrim PPX started prior  ppi px  On scheduled 20 days taper, changed to 60 mg daily for 30 days, pulmonary will follow up at that point  ? NSIP on ct imaging, difficulty with overall impression due to superimposed emphysema and pneumonia  Will need repeat ct chest imaging in 6-8 weeks with follow up with pulmonary  Biopsy with alveolar granulation tissue, possible sarcoid  Negative autoimmune work up  No significant exposures  Improving clinically from pneumonia

## 2020-02-22 NOTE — PROGRESS NOTES
Contacted MD Mackey to update on pt condition. Pt SpO2 saturation persistently at 91% with 100% FiO2. Unable to obtain accurate neuro exam without pausing sedation. Expressed concern for ventilatory compliance without sedation.     Per MD xray ordered, sedation paused to obtain accurate neuro exam.    Post xray pt able to follow commands and move all extremities. Pt then experienced acute SpO2 desaturation into the 70s. Respiratory called to bedside and pt manually ventilated with BVM.    MD Anderson called to bedside. Pt prepared for chest tube placement.    Upon arrival of MD Curry, timeout called and sterile procedure commenced at approx 2358.    Persons present:  MD EMILY Curry  RT Dorothy  Primary RN Helga Bowers RN Ani    0005 Chest tube in place .    Pt subsequently experienced hypotension for which Phenylephrine and LR were given per MD orders.

## 2020-02-22 NOTE — PROGRESS NOTES
CT scan done prior to chest tube placement showed improvement in pneumothorax. Procedure cancelled per Dr. Boyle, pt returned to [facility] ICU accompanied by RN x 2 and RT. Care transferred to ICU RN

## 2020-02-22 NOTE — CARE PLAN
Problem: Safety  Goal: Will remain free from injury  Outcome: PROGRESSING AS EXPECTED  Goal: Will remain free from falls  Outcome: PROGRESSING AS EXPECTED     Problem: Infection  Goal: Will remain free from infection  Outcome: PROGRESSING AS EXPECTED     Problem: Venous Thromboembolism (VTW)/Deep Vein Thrombosis (DVT) Prevention:  Goal: Patient will participate in Venous Thrombosis (VTE)/Deep Vein Thrombosis (DVT)Prevention Measures  Outcome: PROGRESSING AS EXPECTED     Problem: Bowel/Gastric:  Goal: Normal bowel function is maintained or improved  Outcome: PROGRESSING AS EXPECTED  Goal: Will not experience complications related to bowel motility  Outcome: PROGRESSING AS EXPECTED     Problem: Communication  Goal: The ability to communicate needs accurately and effectively will improve  Outcome: PROGRESSING SLOWER THAN EXPECTED

## 2020-02-22 NOTE — CARE PLAN
Problem: Communication  Goal: The ability to communicate needs accurately and effectively will improve  Outcome: PROGRESSING AS EXPECTED  Patient white board updated. Patient updated on plan of care. All questions and concerns addressed.      Problem: Safety  Goal: Will remain free from injury  Outcome: PROGRESSING AS EXPECTED  Goal: Will remain free from falls  Outcome: PROGRESSING AS EXPECTED  Appropriate Fall precautions in place. Patient oriented to room and call light. Patient educated regarding safety and fall precautions    Problem: Venous Thromboembolism (VTW)/Deep Vein Thrombosis (DVT) Prevention:  Goal: Patient will participate in Venous Thrombosis (VTE)/Deep Vein Thrombosis (DVT)Prevention Measures  Outcome: PROGRESSING AS EXPECTED   DVT prophylaxis in place. Ambulation, mobility, and frequent repositioning encouraged. Patient educated about DVT precautions and prevention.

## 2020-02-22 NOTE — ASSESSMENT & PLAN NOTE
Continue gabapentin regimen  Continue methadone regimen  Continue tramadol  Added prn oxycodone  Through the enteral tube  Dilaudid 1 mg IV every 2 hours as needed breakthrough pain

## 2020-02-22 NOTE — PROGRESS NOTES
Pt's RR 30-35, O2 sat 80's w/ increase in FiO2 to 70%. RT at bedside. Dr Javier notified and at bedside.

## 2020-02-22 NOTE — CARE PLAN
Problem: Ventilation Defect:  Goal: Ability to achieve and maintain unassisted ventilation or tolerate decreased levels of ventilator support  Outcome: PROGRESSING SLOWER THAN EXPECTED   Adult Ventilation Update    Total Vent Days: 2    Patient Lines/Drains/Airways Status      Active Airway       Name: Placement date: Placement time: Site: Days:    Airway ETT 8.0  02/21/20   0845   --  2                  Patient failed trials because of Barriers to Wean: No Order (02/21/20 1102)    Sputum/Suction   Cough: Non Productive (02/22/20 1200)  Sputum Amount: Small (02/22/20 1200)  Sputum Color: Tan;Clear (02/22/20 1200)  Sputum Consistency: Thick;Thin (02/22/20 1200)    Mobility  Activity Performed: Unable to mobilize (02/22/20 0800)  Reason Not Mobilized: Unstable condition (02/22/20 0800)    Events/Summary/Plan: no vent changes made

## 2020-02-22 NOTE — CONSULTS
Critical Care Consultation    Date of consult: 2/21/2020    Referring Physician  Marck Javier M.D.    Reason for Consultation  I was asked to provide critical care consultation for acute respiratory failure    History of Presenting Illness  59 y.o. male who presented 2/18/2020 with history of oxygen dependent chronic hypoxic respiratory failure, requiring 2.5-3 L home oxygen, emphysema, pulmonary hypertension and chronic pain who is on methadone.  He was transferred from [place] for pulmonary specialty care with a left lower lobe pneumonia and pneumothorax on 2/18.  Influenza screening was negative.  Shortly after arriving at [facility] he was noted to have a dislodged chest tube.  A replacement chest tube was not necessary.  He was requiring 5 L of oxygen via nasal cannula on 2/19/2/20.  A pulmonology consultation requested a high resolution CT scan, the initiated a connective tissue work-up and continued broad-spectrum antibiotics.  On the morning of 2/21 patient had increasing work of breathing and hypoxic respiratory failure.  Critical care consultation was requested for further evaluation and management.        Code Status  Full Code    Review of Systems  Review of Systems   Constitutional: Negative for fever.   HENT: Negative for congestion.    Eyes: Negative.    Respiratory: Positive for cough and shortness of breath.    Cardiovascular: Negative for chest pain.   Gastrointestinal: Negative for abdominal pain, nausea and vomiting.   Genitourinary: Negative.    Musculoskeletal: Negative.    Neurological: Negative for dizziness.   Psychiatric/Behavioral: Negative.    All other systems reviewed and are negative.      Past Medical History   has a past medical history of Anxiety, ASTHMA, C6 cervical fracture (HCC), Chronic back pain, Chronic pain, COPD, Depression, Diverticulitis, Hypothyroid, Neck pain, Neuropathy (HCC), Pelvic fracture (HCC), Pneumonia, and Unspecified  cataract.    Surgical History   has a past surgical history that includes other orthopedic surgery; exploratory laparotomy (N/A, 5/8/2015); colostomy (5/2015); acl reconstruction (Left, 1978); bone spur excision (Left, 1983); retinal detachment repair (Right, 2/2015); colostomy closure (N/A, 8/13/2015); and exploratory laparotomy (8/15/2015).    Family History  family history is not on file.    Social History   reports that he quit smoking about 5 years ago. His smoking use included cigarettes. He has a 17.00 pack-year smoking history. He has never used smokeless tobacco. He reports that he does not drink alcohol or use drugs.    Medications  Home Medications     Reviewed by Gaby Dasilva (Pharmacy Tech) on 02/18/20 at 0904  Med List Status: Complete   Medication Last Dose Status   gabapentin (NEURONTIN) 800 MG tablet 2/17/2020 Active   levothyroxine (SYNTHROID) 125 MCG Tab 2/17/2020 Active   methadone (DOLOPHINE) 10 MG Tab 2/17/2020 Active   omeprazole (PRILOSEC) 20 MG delayed-release capsule 2/17/2020 Active   simvastatin (ZOCOR) 40 MG Tab 2/17/2020 Active   tamsulosin (FLOMAX) 0.4 MG capsule 2/17/2020 Active   timolol (TIMOPTIC) 0.5 % Solution 2/17/2020 Active   tramadol (ULTRAM) 50 MG Tab 2/17/2020 Active              Current Facility-Administered Medications   Medication Dose Route Frequency Provider Last Rate Last Dose   • propofol (DIPRIVAN) injection  0-80 mcg/kg/min Intravenous Continuous Marck Javier M.D. 37.7 mL/hr at 02/21/20 1737 80 mcg/kg/min at 02/21/20 1737   • ipratropium-albuterol (DUONEB) nebulizer solution  3 mL Nebulization Q2HRS PRN (RT) Marck Javier M.D.       • senna-docusate (PERICOLACE or SENOKOT S) 8.6-50 MG per tablet 2 Tab  2 Tab Enteral Tube BID Marck Javier M.D.        And   • polyethylene glycol/lytes (MIRALAX) PACKET 1 Packet  1 Packet Enteral Tube QDAY PRN Marck Javier M.D.        And   • magnesium hydroxide (MILK OF MAGNESIA) suspension 30 mL  30 mL Enteral Tube  QDAY PRN Marck Javier M.D.        And   • bisacodyl (DULCOLAX) suppository 10 mg  10 mg Rectal QDAY PRN Marck Javier M.D.       • MD Alert...ICU Electrolyte Replacement per Pharmacy   Other PHARMACY TO DOSE Marck Javier M.D.       • lidocaine (XYLOCAINE) 1 % injection 1-2 mL  1-2 mL Tracheal Tube Q30 MIN PRN Marck Javier M.D.       • [START ON 2/22/2020] gabapentin (NEURONTIN) capsule 2,400 mg  2,400 mg Enteral Tube QAM Marck Javier M.D.        And   • gabapentin (NEURONTIN) capsule 1,600 mg  1,600 mg Enteral Tube Q EVENING Marck Javier M.D.       • [START ON 2/22/2020] levothyroxine (SYNTHROID) tablet 125 mcg  125 mcg Enteral Tube QAM AC Marck Javier M.D.       • methadone (DOLOPHINE) tablet 30 mg  30 mg Enteral Tube BID Marck Javier M.D.        And   • methadone (DOLOPHINE) tablet 10 mg  10 mg Enteral Tube QHS Marck Javier M.D.       • metroNIDAZOLE (FLAGYL) tablet 500 mg  500 mg Enteral Tube Q8HRS Marck Javier M.D.   Stopped at 02/21/20 1400   • omeprazole (FIRST-OMEPRAZOLE) 2 mg/mL oral susp 40 mg  40 mg Enteral Tube Q EVENING Marck Javier M.D.       • [START ON 2/22/2020] predniSONE (DELTASONE) tablet 40 mg  40 mg Enteral Tube DAILY Marck Javier M.D.       • simvastatin (ZOCOR) tablet 40 mg  40 mg Enteral Tube Nightly Marck Javier M.D.       • tramadol (ULTRAM) 50 MG tablet 50 mg  50 mg Enteral Tube BID Marck Javier M.D.       • acetaminophen (TYLENOL) tablet 650 mg  650 mg Enteral Tube Q6HRS PRN Marck Javier M.D.       • ALPRAZolam (XANAX) tablet 0.25 mg  0.25 mg Enteral Tube 4X/DAY PRN Marck Javier M.D.       • guaiFENesin dextromethorphan (ROBITUSSIN DM) 100-10 MG/5ML syrup 10 mL  10 mL Enteral Tube Q6HRS PRN Marck Javier M.D.       • promethazine (PHENERGAN) tablet 12.5-25 mg  12.5-25 mg Enteral Tube Q4HRS PRN Marck Javier M.D.       • HYDROmorphone pf (DILAUDID) injection 0.5 mg  0.5 mg Intravenous Q2HRS PRN Marck Javier M.D.   0.5 mg at 02/21/20  1710   • MIDAZOLAM HCL 2 MG/2ML INJ SOLN            • vecuronium (NORCURON) injection 10 mg  10 mg Intravenous Once Marck Javier M.D.       • midazolam (VERSED) 2 MG/2ML injection 2 mg  2 mg Intravenous Once Marck Javier M.D.       • timolol (TIMOPTIC) 0.5 % ophthalmic solution 1 Drop  1 Drop Both Eyes Q EVENING Tim Bustos M.D.   1 Drop at 02/20/20 1743   • Respiratory Therapy Consult   Nebulization Continuous RT Tim Bustos M.D.       • cefTRIAXone (ROCEPHIN) 2 g in  mL IVPB  2 g Intravenous Q24HRS Tim Bustos M.D.   Stopped at 02/21/20 0734   • promethazine (PHENERGAN) suppository 12.5-25 mg  12.5-25 mg Rectal Q4HRS PRN Tim Bustos M.D.       • prochlorperazine (COMPAZINE) injection 5-10 mg  5-10 mg Intravenous Q4HRS PRN Tim Bustos M.D.       • albuterol (PROVENTIL) 2.5mg/0.5ml nebulizer solution 2.5 mg  2.5 mg Nebulization Q2HRS PRN (RT) Tim Bustos M.D.           Allergies  Allergies   Allergen Reactions   • Fentanyl        Vital Signs last 24 hours  Temp:  [35.8 °C (96.4 °F)-36.4 °C (97.6 °F)] 35.8 °C (96.4 °F)  Pulse:  [42-88] 72  Resp:  [16-32] 32  BP: ()/() 104/70  SpO2:  [86 %-100 %] 98 %    Physical Exam  Physical Exam  Constitutional:       General: He is in acute distress.      Appearance: He is ill-appearing. He is not toxic-appearing.      Comments: Thin man in moderate respiratory distress speaking 2-3 word sentences   HENT:      Mouth/Throat:      Mouth: Mucous membranes are dry.   Eyes:      Pupils: Pupils are equal, round, and reactive to light.   Neck:      Musculoskeletal: Neck supple.   Cardiovascular:      Rate and Rhythm: Tachycardia present.      Heart sounds: No friction rub. No gallop.    Pulmonary:      Comments: Scattered crackles breath sounds diminished throughout  Abdominal:      General: Abdomen is flat.      Palpations: Abdomen is soft.   Musculoskeletal:         General: No swelling.   Skin:     General: Skin  is warm and dry.   Neurological:      General: No focal deficit present.      Mental Status: He is alert and oriented to person, place, and time.      Cranial Nerves: No cranial nerve deficit.   Psychiatric:         Mood and Affect: Mood normal.         Behavior: Behavior normal.         Fluids    Intake/Output Summary (Last 24 hours) at 2020 1800  Last data filed at 2020 1600  Gross per 24 hour   Intake 1542.02 ml   Output 850 ml   Net 692.02 ml       Laboratory  Recent Results (from the past 48 hour(s))   EKG    Collection Time: 20  1:05 AM   Result Value Ref Range    Report       [facility]    Test Date:  2020  Pt Name:    RENNY RUSHING              Department: [facility]  MRN:        [id number]                      Room:       T736  Gender:     Male                         Technician: SOPHIE  :        1960                   Requested By:ESSIE VARELA  Order #:    [id number]                    Reading MD: Gaviota Kumari MD    Measurements  Intervals                                Axis  Rate:       127                          P:  OH:                                      QRS:        -61  QRSD:       103                          T:          214  QT:         371  QTc:    Interpretive Statements  ATRIAL FIBRILLATION  VENTRICULAR PREMATURE COMPLEXES  Electronically Signed On 2020 1:14:46 PST by Gaviota Kumari MD     CBC WITHOUT DIFFERENTIAL    Collection Time: 20  4:33 AM   Result Value Ref Range    WBC 8.9 4.8 - 10.8 K/uL    RBC 4.45 (L) 4.70 - 6.10 M/uL    Hemoglobin 11.9 (L) 14.0 - 18.0 g/dL    Hematocrit 37.6 (L) 42.0 - 52.0 %    MCV 84.5 81.4 - 97.8 fL    MCH 26.7 (L) 27.0 - 33.0 pg    MCHC 31.6 (L) 33.7 - 35.3 g/dL    RDW 47.1 35.9 - 50.0 fL    Platelet Count 241 164 - 446 K/uL    MPV 9.3 9.0 - 12.9 fL   Basic Metabolic Panel    Collection Time: 20  4:33 AM   Result Value Ref Range    Sodium 137 135 - 145 mmol/L    Potassium 3.6 3.6 - 5.5 mmol/L    Chloride 104 96 - 112  mmol/L    Co2 27 20 - 33 mmol/L    Glucose 83 65 - 99 mg/dL    Bun 21 8 - 22 mg/dL    Creatinine 1.05 0.50 - 1.40 mg/dL    Calcium 8.5 8.5 - 10.5 mg/dL    Anion Gap 6.0 0.0 - 11.9   MAGNESIUM    Collection Time: 02/20/20  4:33 AM   Result Value Ref Range    Magnesium 2.1 1.5 - 2.5 mg/dL   PHOSPHORUS    Collection Time: 02/20/20  4:33 AM   Result Value Ref Range    Phosphorus 3.2 2.5 - 4.5 mg/dL   ESTIMATED GFR    Collection Time: 02/20/20  4:33 AM   Result Value Ref Range    GFR If African American >60 >60 mL/min/1.73 m 2    GFR If Non African American >60 >60 mL/min/1.73 m 2   Procalcitonin    Collection Time: 02/20/20  9:52 AM   Result Value Ref Range    Procalcitonin 0.11 <0.25 ng/mL   CBC WITH DIFFERENTIAL    Collection Time: 02/21/20  5:46 AM   Result Value Ref Range    WBC 14.7 (H) 4.8 - 10.8 K/uL    RBC 4.75 4.70 - 6.10 M/uL    Hemoglobin 13.0 (L) 14.0 - 18.0 g/dL    Hematocrit 38.2 (L) 42.0 - 52.0 %    MCV 80.4 (L) 81.4 - 97.8 fL    MCH 27.4 27.0 - 33.0 pg    MCHC 34.0 33.7 - 35.3 g/dL    RDW 42.9 35.9 - 50.0 fL    Platelet Count 276 164 - 446 K/uL    MPV 9.2 9.0 - 12.9 fL    Neutrophils-Polys 81.10 (H) 44.00 - 72.00 %    Lymphocytes 10.00 (L) 22.00 - 41.00 %    Monocytes 8.30 0.00 - 13.40 %    Eosinophils 0.10 0.00 - 6.90 %    Basophils 0.10 0.00 - 1.80 %    Immature Granulocytes 0.40 0.00 - 0.90 %    Nucleated RBC 0.00 /100 WBC    Neutrophils (Absolute) 11.95 (H) 1.82 - 7.42 K/uL    Lymphs (Absolute) 1.48 1.00 - 4.80 K/uL    Monos (Absolute) 1.22 (H) 0.00 - 0.85 K/uL    Eos (Absolute) 0.01 0.00 - 0.51 K/uL    Baso (Absolute) 0.02 0.00 - 0.12 K/uL    Immature Granulocytes (abs) 0.06 0.00 - 0.11 K/uL    NRBC (Absolute) 0.00 K/uL   Comp Metabolic Panel    Collection Time: 02/21/20  5:46 AM   Result Value Ref Range    Sodium 140 135 - 145 mmol/L    Potassium 3.9 3.6 - 5.5 mmol/L    Chloride 104 96 - 112 mmol/L    Co2 29 20 - 33 mmol/L    Anion Gap 7.0 0.0 - 11.9    Glucose 98 65 - 99 mg/dL    Bun 19 8 - 22  mg/dL    Creatinine 0.95 0.50 - 1.40 mg/dL    Calcium 9.2 8.5 - 10.5 mg/dL    AST(SGOT) 11 (L) 12 - 45 U/L    ALT(SGPT) <5 2 - 50 U/L    Alkaline Phosphatase 92 30 - 99 U/L    Total Bilirubin 0.4 0.1 - 1.5 mg/dL    Albumin 3.8 3.2 - 4.9 g/dL    Total Protein 7.5 6.0 - 8.2 g/dL    Globulin 3.7 (H) 1.9 - 3.5 g/dL    A-G Ratio 1.0 g/dL   MAGNESIUM    Collection Time: 20  5:46 AM   Result Value Ref Range    Magnesium 2.1 1.5 - 2.5 mg/dL   PHOSPHORUS    Collection Time: 20  5:46 AM   Result Value Ref Range    Phosphorus 2.4 (L) 2.5 - 4.5 mg/dL   ESTIMATED GFR    Collection Time: 20  5:46 AM   Result Value Ref Range    GFR If African American >60 >60 mL/min/1.73 m 2    GFR If Non African American >60 >60 mL/min/1.73 m 2   EKG    Collection Time: 20  7:53 AM   Result Value Ref Range    Report       [facility]    Test Date:  2020  Pt Name:    RENNY RUSHING              Department: 171  MRN:        [id number]                      Room:       [facility]  Gender:     Male                         Technician: [employer] [place]  :        1960                   Requested By:ESSIE VARELA  Order #:    [id number]                    Reading MD: Aman Sloan MD    Measurements  Intervals                                Axis  Rate:       79                           P:          15  OH:         152                          QRS:        -47  QRSD:       108                          T:          36  QT:         408  QTc:        468    Interpretive Statements  SINUS RHYTHM  VENTRICULAR BIGEMINY  LEFT ANTERIOR FASCICULAR BLOCK  LATE PRECORDIAL R/S TRANSITION  Electronically Signed On 2020 9:16:47 PST by Aman Sloan MD     TROPONIN    Collection Time: 20  8:01 AM   Result Value Ref Range    Troponin T 19 6 - 19 ng/L   LACTIC ACID    Collection Time: 20  8:01 AM   Result Value Ref Range    Lactic Acid 2.1 (H) 0.5 - 2.0 mmol/L   ARTERIAL BLOOD GAS    Collection Time: 20  8:01 AM    Result Value Ref Range    Ph 7.50 7.40 - 7.50    Pco2 30.7 26.0 - 37.0 mmHg    Po2 56.0 (L) 64.0 - 87.0 mmHg    O2 Saturation 90.4 (L) 93.0 - 99.0 %    Hco3 24 17 - 25 mmol/L    Base Excess 1 -4 - 3 mmol/L    Body Temp see below Centigrade   APTT    Collection Time: 02/21/20  8:56 AM   Result Value Ref Range    APTT 35.8 24.7 - 36.0 sec   Prothrombin Time    Collection Time: 02/21/20  8:56 AM   Result Value Ref Range    PT 16.3 (H) 12.0 - 14.6 sec    INR 1.28 (H) 0.87 - 1.13   Triglycerides Starting now and then Every 3 Days    Collection Time: 02/21/20  8:56 AM   Result Value Ref Range    Triglycerides 99 0 - 149 mg/dL   ISTAT ARTERIAL BLOOD GAS    Collection Time: 02/21/20  3:00 PM   Result Value Ref Range    Ph 7.419 7.400 - 7.500    Pco2 37.1 (H) 26.0 - 37.0 mmHg    Po2 63 (L) 64 - 87 mmHg    Tco2 25 20 - 33 mmol/L    S02 92 (L) 93 - 99 %    Hco3 24.0 17.0 - 25.0 mmol/L    BE 0 -4 - 3 mmol/L    Body Temp 97.9 F degrees    O2 Therapy 40 %    iPF Ratio 158     Ph Temp Chu 7.425 7.400 - 7.500    Pco2 Temp Co 36.5 26.0 - 37.0 mmHg    Po2 Temp Cor 61 (L) 64 - 87 mmHg    Specimen Arterial     Action Range Triggered NO     Inst. Qualified Patient YES        Imaging  DX-ABDOMEN FOR TUBE PLACEMENT   Final Result         Feeding tube with tip projecting over the expected area of the stomach.      DX-CHEST-PORTABLE (1 VIEW)   Final Result      1.  interval placement of an endotracheal tube which terminates in satisfactory position at the level of the aortic arch.   2.  Interval insertion of a central venous catheter which terminates with the tip projecting over the expected region of the mid to distal superior vena cava.   3.  Previously identified trace left pneumothorax is no longer visualized.   4.  Increased diffuse, bilateral, right greater than left interstitial and airspace opacities.      DX-CHEST-PORTABLE (1 VIEW)   Final Result         1.  Pulmonary edema and/or infiltrates are identified, which are stable  since the prior exam.   2.  Trace left pneumothorax, decreased since prior study      CT-CHEST, HIGH RESOLUTION LUNG   Final Result      1.  Emphysematous changes again seen, similar to prior exam.   2.  Plan loss of RIGHT hemithorax with diffuse interstitial opacities suggesting pneumonitis/pneumonia.   3.  Multifocal ill-defined groundglass opacities in the LEFT upper and lower lobes also suggesting multifocal pneumonitis.   4.  Small LEFT anterior pneumothorax as seen on prior chest x-ray.   5.  Mild mediastinal adenopathy, nonspecific.               DX-CHEST-PORTABLE (1 VIEW)   Final Result         1.  Pulmonary edema and/or infiltrates are identified, which are stable since the prior exam.   2.  Small left pneumothorax, decreased since prior study      DX-CHEST-2 VIEWS   Final Result      1.  Unchanged small LEFT pneumothorax   2.  Unchanged BILATERAL interstitial and airspace disease and atelectasis      DX-CHEST-LIMITED (1 VIEW)   Final Result      Stable small left pneumothorax. Interval removal of left chest tube.      DX-CHEST-LIMITED (1 VIEW)   Final Result      1.  Left chest tube tip at the origin of the left hemithorax. Advancement is suggested. Stable small left pneumothorax.   2.  Interstitial and airspace opacities, worse on the right which could represent asymmetric edema or infection.      These findings were discussed with nurse Hung on 2/18/2020 9:16 AM.      OUTSIDE IMAGES-DX CHEST   Final Result      OUTSIDE IMAGES-DX CHEST   Final Result      CT-CHEST (THORAX) W/O    (Results Pending)       Assessment/Plan  Acute on chronic respiratory failure with hypoxia (HCC)- (present on admission)  Assessment & Plan  Acute hypoxic respiratory failure  Attributed to evolving NSIP  Complicated by underlying emphysematous disease.   Resolving pneumothorax  Lung protective ventilation strategies  Titrate ventilator prescription to optimize oxygenation, ventilation, and acid base balance.    Case d/w   Iqra.  Pulmonary biopsy on 2/22 am  Follow up connective tissue w/u  Prednisone 40 mg daily  Continue Abx    Pneumonia- (present on admission)  Assessment & Plan  Continue broad-spectrum antibiotics until cultures and sensitivities can guide de-escalation    Pneumothorax  Assessment & Plan  Resolving, small anterior  Discussed with IR, anterior pigtail catheter will be required if pneumothorax worsens.    Chronic pain- (present on admission)  Assessment & Plan  Continue gabapentin regimen  Continue methadone regimen  Continue tramadol          Discussed patient condition and risk of morbidity and/or mortality with Hospitalist, RN, RT, Pharmacy, Charge nurse / hot rounds, Patient and Pulmonology, interventional radiology.        The patient remains critically ill.  I have assessed and reassessed the respiratory status and made ventilator adjustments based upon arterial blood gas analysis, ventilator waveforms and airway mechanics.  I have assessed and reassessed the blood pressure, hemodynamics, cardiovascular status. This patient remains at high risk for worsening cardiopulmonary dysfunction and death without the above critical care interventions.    Critical care time 90 minutes in directly providing and coordinating critical care and extensive data review.  No time overlap and excludes procedures.

## 2020-02-22 NOTE — ASSESSMENT & PLAN NOTE
Clamped again on 3/2  Repeat chest xray reviewed  Chest xray in am, pull if no pneumothorax  Large bleb/interstitial changes, high risk for bronchoalveolar fistula  If continued stability by tomorrow then will consider additional clamping trial  May require surgical intervention vs small chest tube insertion, surgery consulted for surgical evaluation  Due to underlying lung disease pleurodesis less likely to be successful  Bronchoscopic intervention may eventually be necessary if not a surgical candidate

## 2020-02-22 NOTE — PROGRESS NOTES
Critical Care Progress Note    Date of admission  2/18/2020    Chief Complaint  59 y.o. male who presented 2/18/2020 with history of oxygen dependent chronic hypoxic respiratory failure, requiring 2.5-3 L home oxygen, emphysema, pulmonary hypertension and chronic pain who is on methadone.  He was transferred from [place] for pulmonary specialty care with a left lower lobe pneumonia and pneumothorax on 2/18.  Influenza screening was negative.  Shortly after arriving at [facility] he was noted to have a dislodged chest tube.  A replacement chest tube was not necessary.  He was requiring 5 L of oxygen via nasal cannula on 2/19/2/20.  A pulmonology consultation requested a high resolution CT scan, the initiated a connective tissue work-up and continued broad-spectrum antibiotics.  On the morning of 2/21 patient had increasing work of breathing and hypoxic respiratory failure.  Critical care consultation was requested for further evaluation and management.    Hospital Course    2/21-patient was evaluated in the IR suite for placement of a pigtail catheter.  At the time the residual pneumothorax was felt to be too small to benefit from catheter placement.  At 2335 patient became hypoxic, tachycardic and hypotensive.  Chest x-ray revealed a large pneumothorax.  An emergent chest tube was placed with improvement in hemodynamic and oxygenation parameters.  2/22- Pulmonary biopsy performed.  Prednisone 1 mg/kilogram/day initiated      Interval Problem Update  Reviewed last 24 hour events:              - Recurrent left pneumothorax with chest tube placement              - Tm: Afebrile              - HR: 60s              - SBP: 100-1 20s              - Neuro: Follows, eyes disconjugate, MAEW              - GI: NPO              - UOP: 1.3              - Bell: Yes              - Lines: CVC, chest tube              - PPx: Home PPI, VTE on hold for biopsy              - CXR (personally reviewed):                - Antibiotic C3, Flagyl, Bactrim      Review of Systems  Review of Systems   Unable to perform ROS: Intubated        Vital Signs for last 24 hours   Pulse:  [53-84] 71  Resp:  [0-40] 20  BP: ()/() 129/86  SpO2:  [76 %-100 %] 99 %    Hemodynamic parameters for last 24 hours       Respiratory Information for the last 24 hours  Vent Mode: APVCMV  Rate (breaths/min): 20  Vt Target (mL): 450  PEEP/CPAP: 5  MAP: 7.2  Control VTE (exp VT): 538    Physical Exam   Physical Exam  Constitutional:       Comments: Intubated   HENT:      Mouth/Throat:      Mouth: Mucous membranes are moist.   Eyes:      Pupils: Pupils are equal, round, and reactive to light.      Comments: Disconjugate   Neck:      Musculoskeletal: Neck supple.   Cardiovascular:      Rate and Rhythm: Regular rhythm.      Heart sounds: No murmur. No friction rub. No gallop.    Pulmonary:      Comments: Scattered crackles and wheezes  Left chest tube in good position  Persistent grade 1 air leak  Abdominal:      General: There is no distension.      Palpations: Abdomen is soft.      Tenderness: There is no abdominal tenderness.   Musculoskeletal:         General: No swelling.      Right lower leg: No edema.      Left lower leg: No edema.   Skin:     General: Skin is warm and dry.   Neurological:      Comments: Opens eyes to voice follows commands.   strength equally with both hands.         Medications  Current Facility-Administered Medications   Medication Dose Route Frequency Provider Last Rate Last Dose   • Pharmacy Consult: Enteral tube insertion - review meds/change route/product selection  1 Each Other PHARMACY TO DOSE Marck Javier M.D.       • potassium bicarbonate (KLYTE) effervescent tablet 25 mEq  25 mEq Enteral Tube Once Marck Javier M.D.       • [START ON 2/23/2020] predniSONE (DELTASONE) tablet 80 mg  80 mg Enteral Tube DAILY Marck Javier M.D.       • predniSONE (DELTASONE) tablet 40 mg  40 mg Enteral Tube Once Marck Javier  M.D.       • sulfamethoxazole-trimethoprim (BACTRIM DS) 800-160 MG tablet 1 Tab  1 Tab Enteral Tube DAILY Marck Javier M.D.       • propofol (DIPRIVAN) injection  0-80 mcg/kg/min Intravenous Continuous Marck Javier M.D. 14.1 mL/hr at 02/22/20 1209 30 mcg/kg/min at 02/22/20 1209   • ipratropium-albuterol (DUONEB) nebulizer solution  3 mL Nebulization Q2HRS PRN (RT) Marck Javier M.D.       • senna-docusate (PERICOLACE or SENOKOT S) 8.6-50 MG per tablet 2 Tab  2 Tab Enteral Tube BID Marck Javier M.D.   2 Tab at 02/22/20 0516    And   • polyethylene glycol/lytes (MIRALAX) PACKET 1 Packet  1 Packet Enteral Tube QDAY PRN Marck Javier M.D.        And   • magnesium hydroxide (MILK OF MAGNESIA) suspension 30 mL  30 mL Enteral Tube QDAY PRN Marck Javier M.D.        And   • bisacodyl (DULCOLAX) suppository 10 mg  10 mg Rectal QDAY PRN Marck Javier M.D.       • MD Alert...ICU Electrolyte Replacement per Pharmacy   Other PHARMACY TO DOSE Marck aJvier M.D.       • lidocaine (XYLOCAINE) 1 % injection 1-2 mL  1-2 mL Tracheal Tube Q30 MIN PRN Marck Javier M.D.       • gabapentin (NEURONTIN) capsule 2,400 mg  2,400 mg Enteral Tube QAM Marck Javier M.D.   2,400 mg at 02/22/20 0518    And   • gabapentin (NEURONTIN) capsule 1,600 mg  1,600 mg Enteral Tube Q EVENING Marck Javier M.D.   1,600 mg at 02/21/20 1808   • levothyroxine (SYNTHROID) tablet 125 mcg  125 mcg Enteral Tube QAM AC Marck Javier M.D.   125 mcg at 02/22/20 0600   • methadone (DOLOPHINE) tablet 30 mg  30 mg Enteral Tube BID Marck Javier M.D.   30 mg at 02/22/20 0516    And   • methadone (DOLOPHINE) tablet 10 mg  10 mg Enteral Tube QHS Marck Javier M.D.   10 mg at 02/21/20 2143   • metroNIDAZOLE (FLAGYL) tablet 500 mg  500 mg Enteral Tube Q8HRS Marck Javier M.D.   500 mg at 02/22/20 0530   • omeprazole (FIRST-OMEPRAZOLE) 2 mg/mL oral susp 40 mg  40 mg Enteral Tube Q EVENING Marck Javier M.D.   40 mg at 02/21/20 5911   •  simvastatin (ZOCOR) tablet 40 mg  40 mg Enteral Tube Nightly Marck Javier M.D.   40 mg at 02/21/20 2144   • tramadol (ULTRAM) 50 MG tablet 50 mg  50 mg Enteral Tube BID Marck Javier M.D.   50 mg at 02/22/20 0516   • acetaminophen (TYLENOL) tablet 650 mg  650 mg Enteral Tube Q6HRS PRN Marck Javier M.D.       • ALPRAZolam (XANAX) tablet 0.25 mg  0.25 mg Enteral Tube 4X/DAY PRN Marck Javier M.D.       • guaiFENesin dextromethorphan (ROBITUSSIN DM) 100-10 MG/5ML syrup 10 mL  10 mL Enteral Tube Q6HRS PRN Marck Javier M.D.       • promethazine (PHENERGAN) tablet 12.5-25 mg  12.5-25 mg Enteral Tube Q4HRS PRN Marck Javier M.D.       • HYDROmorphone pf (DILAUDID) injection 0.5 mg  0.5 mg Intravenous Q2HRS PRN Marck Javier M.D.   0.5 mg at 02/22/20 1145   • influenza vaccine quad injection 0.5 mL  0.5 mL Intramuscular Once PRN Eulogio Lopes M.D.       • lactated ringers infusion  1,000 mL Intravenous Continuous Gypsy Mackey M.D. 100 mL/hr at 02/22/20 0945 1,000 mL at 02/22/20 0945   • ipratropium-albuterol (DUONEB) nebulizer solution  3 mL Nebulization Q4HRS (RT) Gypsy Mackey M.D.   3 mL at 02/22/20 0932   • timolol (TIMOPTIC) 0.5 % ophthalmic solution 1 Drop  1 Drop Both Eyes Q EVENING Tim Bustos M.D.   1 Drop at 02/21/20 1808   • Respiratory Therapy Consult   Nebulization Continuous RT Tim Bustos M.D.       • promethazine (PHENERGAN) suppository 12.5-25 mg  12.5-25 mg Rectal Q4HRS PRN Tim Bustos M.D.       • prochlorperazine (COMPAZINE) injection 5-10 mg  5-10 mg Intravenous Q4HRS PRN Tim Bustos M.D.       • albuterol (PROVENTIL) 2.5mg/0.5ml nebulizer solution 2.5 mg  2.5 mg Nebulization Q2HRS PRN (RT) Tim Bustos M.D.           Fluids    Intake/Output Summary (Last 24 hours) at 2/22/2020 1417  Last data filed at 2/22/2020 1400  Gross per 24 hour   Intake 3292.7 ml   Output 1052 ml   Net 2240.7 ml       Laboratory  Recent Labs      02/21/20  0801 02/21/20  1500 02/22/20  0421   FFDPK12X 7.50  --   --    UQTSJJ189E 30.7  --   --    KUNNV922P 56.0*  --   --    MDKZ9JUN 90.4*  --   --    ARTHCO3 24  --   --    ARTBE 1  --   --    ISTATAPH  --  7.419 7.442   ISTATAPCO2  --  37.1* 36.3   ISTATAPO2  --  63* 71   ISTATATCO2  --  25 26   TKTXLHZ9UTZ  --  92* 95   ISTATARTHCO3  --  24.0 24.8   ISTATARTBE  --  0 1   ISTATTEMP  --  97.9 F 98.6 F   ISTATFIO2  --  40 40   ISTATSPEC  --  Arterial Arterial   ISTATAPHTC  --  7.425 7.442   DYEUCEXZ8SU  --  61* 71         Recent Labs     02/20/20  0433 02/21/20  0546 02/22/20  0237   SODIUM 137 140 139   POTASSIUM 3.6 3.9 3.9   CHLORIDE 104 104 104   CO2 27 29 28   BUN 21 19 22   CREATININE 1.05 0.95 0.79   MAGNESIUM 2.1 2.1 2.0   PHOSPHORUS 3.2 2.4* 2.9   CALCIUM 8.5 9.2 8.3*     Recent Labs     02/20/20  0433 02/21/20  0546 02/22/20  0237   ALTSGPT  --  <5 6   ASTSGOT  --  11* 20   ALKPHOSPHAT  --  92 81   TBILIRUBIN  --  0.4 0.3   GLUCOSE 83 98 120*     Recent Labs     02/20/20  0433 02/21/20  0546 02/22/20  0237   WBC 8.9 14.7* 15.0*   NEUTSPOLYS  --  81.10* 81.40*   LYMPHOCYTES  --  10.00* 8.50*   MONOCYTES  --  8.30 8.50   EOSINOPHILS  --  0.10 0.70   BASOPHILS  --  0.10 0.40   ASTSGOT  --  11* 20   ALTSGPT  --  <5 6   ALKPHOSPHAT  --  92 81   TBILIRUBIN  --  0.4 0.3     Recent Labs     02/20/20  0433 02/21/20  0546 02/21/20  0856 02/22/20  0237   RBC 4.45* 4.75  --  4.19*   HEMOGLOBIN 11.9* 13.0*  --  11.1*   HEMATOCRIT 37.6* 38.2*  --  35.2*   PLATELETCT 241 276  --  280   PROTHROMBTM  --   --  16.3*  --    APTT  --   --  35.8  --    INR  --   --  1.28*  --        Imaging  X-Ray:  I have personally reviewed the images and compared with prior images. and My impression is: Endotracheal tube is 2 cm above the myrtle.  There is a right IJ central venous catheter with the tip near the cavoatrial junction.  There is a left chest tube in place.  Feeding tube descends below the level of the diaphragm.   Pneumothorax has been decompressed lung markings extend to the edge of the thorax.  Underlying interstitial lung disease is not significantly changed.  No effusion.    Assessment/Plan  Acute on chronic respiratory failure with hypoxia (HCC)- (present on admission)  Assessment & Plan  Acute hypoxic respiratory failure  Attributed to evolving NSIP  Complicated by underlying emphysematous disease.   Lung protective ventilation strategies  Titrate ventilator prescription to optimize oxygenation, ventilation, and acid base balance.    Pulmonary biopsy on 2/22 am  Follow up connective tissue w/u  Prednisone 1 mg/KG/day  Add Bactrim prophylaxis    Pneumonia- (present on admission)  Assessment & Plan  Continue broad-spectrum antibiotics until cultures and sensitivities can guide de-escalation    Pneumothorax  Assessment & Plan  Status post left tension pneumothorax  Pneumothorax improved with chest tube in place  Chest tube in good position  Continue chest tube to suction    Will be at high risk for pneumothorax status post pulmonary biopsy  Serial chest x-rays  Pleural tube placement as clinically indicated      Chronic pain- (present on admission)  Assessment & Plan  Continue gabapentin regimen  Continue methadone regimen  Continue tramadol           VTE:  On hold for pulmonary biopsy  Ulcer: PPI  Lines: Central Line  Ongoing indication addressed    I have performed a physical exam and reviewed and updated ROS and Plan today (2/22/2020). In review of yesterday's note (2/21/2020), there are no changes except as documented above.     Discussed patient condition and risk of morbidity and/or mortality with RN, RT, Pharmacy, Code status disscussed, Patient and Pulmonary       The patient remains critically ill.  I have assessed and reassessed the respiratory status and made ventilator adjustments based upon arterial blood gas analysis, ventilator waveforms and airway mechanics.  I have assessed and reassessed the blood  pressure, hemodynamics, cardiovascular status. This patient remains at high risk for worsening cardiopulmonary dysfunction and death without the above critical care interventions.'     Critical care time 40 minutes in directly providing and coordinating critical care and extensive data review.  No time overlap and excludes procedures.

## 2020-02-22 NOTE — PROGRESS NOTES
Upon arrival, patient's chest tube drainage columns noted to be off, chest tube must have fallen over, old cannister switched for new one. Noted to be a small leak in chest tube.     Patient's pupils also noted to be disconjugate and L>R. L pupil 4 mm perlla, R pupil 2mm and nonreactive/spasmodic. Dr. Javier notified at this time.    Patient is still following all commands, moving all extremities, corneal gag and cough all intact.

## 2020-02-22 NOTE — PROCEDURES
DATE OF OPERATION: 2/22/2020     PREOPERATIVE DIAGNOSES: Tension pneumothorax.    POSTOPERATIVE DIAGNOSES: Tension pneumothorax.     PROCEDURES PERFORMED:   1. Left tube thoracostomy.     SURGEON: Fernando Curry M.D.     INDICATIONS: The patient is a 59 year-old man with .tension pneumothorax left    FINDINGS: release air improved saturation    WOUND CLASSIFICATION: Class II, Clean Contaminated..    PROCEDURE: Following implied emergent consent, the patient was properly identified and optimally positioned.. The left chest wall was widely prepped and draped into a sterile field.      A 1-cm transverse incision was made and the subcutaneous tissue spread bluntly. The thoracic cavity was accessed bluntly over the rib and a 32 Fr chest tube placed in a  secured to the skin with a 0-Ethibond vertical mattress suture.     The chest tube was connected to closed suction drainage and a sterile dressing was applied. The patient tolerated the procedure well. There were no apparent complications.   ____________________________________   Fernando Curry M.D.    DD: 2/22/2020  12:15 AM

## 2020-02-23 ENCOUNTER — APPOINTMENT (OUTPATIENT)
Dept: RADIOLOGY | Facility: MEDICAL CENTER | Age: 60
DRG: 166 | End: 2020-02-23
Attending: INTERNAL MEDICINE
Payer: MEDICARE

## 2020-02-23 LAB
ACTION RANGE TRIGGERED IACRT: NO
ANION GAP SERPL CALC-SCNC: 8 MMOL/L (ref 0–11.9)
BACTERIA BLD CULT: NORMAL
BACTERIA BLD CULT: NORMAL
BASE EXCESS BLDA CALC-SCNC: 2 MMOL/L (ref -4–3)
BASOPHILS # BLD AUTO: 0.2 % (ref 0–1.8)
BASOPHILS # BLD: 0.03 K/UL (ref 0–0.12)
BODY TEMPERATURE: ABNORMAL DEGREES
BUN SERPL-MCNC: 20 MG/DL (ref 8–22)
CALCIUM SERPL-MCNC: 8 MG/DL (ref 8.5–10.5)
CHLORIDE SERPL-SCNC: 107 MMOL/L (ref 96–112)
CO2 BLDA-SCNC: 27 MMOL/L (ref 20–33)
CO2 SERPL-SCNC: 29 MMOL/L (ref 20–33)
CREAT SERPL-MCNC: 0.71 MG/DL (ref 0.5–1.4)
CRP SERPL HS-MCNC: 5.88 MG/DL (ref 0–0.75)
EOSINOPHIL # BLD AUTO: 0.03 K/UL (ref 0–0.51)
EOSINOPHIL NFR BLD: 0.2 % (ref 0–6.9)
ERYTHROCYTE [DISTWIDTH] IN BLOOD BY AUTOMATED COUNT: 44.1 FL (ref 35.9–50)
GLUCOSE SERPL-MCNC: 121 MG/DL (ref 65–99)
HCO3 BLDA-SCNC: 25.6 MMOL/L (ref 17–25)
HCT VFR BLD AUTO: 32.7 % (ref 42–52)
HGB BLD-MCNC: 10.5 G/DL (ref 14–18)
HOROWITZ INDEX BLDA+IHG-RTO: 165 MM[HG]
IMM GRANULOCYTES # BLD AUTO: 0.12 K/UL (ref 0–0.11)
IMM GRANULOCYTES NFR BLD AUTO: 0.9 % (ref 0–0.9)
INST. QUALIFIED PATIENT IIQPT: YES
LYMPHOCYTES # BLD AUTO: 1.86 K/UL (ref 1–4.8)
LYMPHOCYTES NFR BLD: 13.7 % (ref 22–41)
MAGNESIUM SERPL-MCNC: 2.2 MG/DL (ref 1.5–2.5)
MCH RBC QN AUTO: 26.3 PG (ref 27–33)
MCHC RBC AUTO-ENTMCNC: 32.1 G/DL (ref 33.7–35.3)
MCV RBC AUTO: 82 FL (ref 81.4–97.8)
MONOCYTES # BLD AUTO: 1.26 K/UL (ref 0–0.85)
MONOCYTES NFR BLD AUTO: 9.3 % (ref 0–13.4)
NEUTROPHILS # BLD AUTO: 10.28 K/UL (ref 1.82–7.42)
NEUTROPHILS NFR BLD: 75.7 % (ref 44–72)
NRBC # BLD AUTO: 0 K/UL
NRBC BLD-RTO: 0 /100 WBC
O2/TOTAL GAS SETTING VFR VENT: 40 %
PCO2 BLDA: 34.3 MMHG (ref 26–37)
PCO2 TEMP ADJ BLDA: 33.9 MMHG (ref 26–37)
PH BLDA: 7.48 [PH] (ref 7.4–7.5)
PH TEMP ADJ BLDA: 7.49 [PH] (ref 7.4–7.5)
PHOSPHATE SERPL-MCNC: 2.2 MG/DL (ref 2.5–4.5)
PLATELET # BLD AUTO: 256 K/UL (ref 164–446)
PMV BLD AUTO: 9.5 FL (ref 9–12.9)
PO2 BLDA: 66 MMHG (ref 64–87)
PO2 TEMP ADJ BLDA: 65 MMHG (ref 64–87)
POTASSIUM SERPL-SCNC: 3.5 MMOL/L (ref 3.6–5.5)
PREALB SERPL-MCNC: 7 MG/DL (ref 18–38)
RBC # BLD AUTO: 3.99 M/UL (ref 4.7–6.1)
RHODAMINE-AURAMINE STN SPEC: NORMAL
SAO2 % BLDA: 94 % (ref 93–99)
SIGNIFICANT IND 70042: NORMAL
SITE SITE: NORMAL
SODIUM SERPL-SCNC: 144 MMOL/L (ref 135–145)
SOURCE SOURCE: NORMAL
SPECIMEN DRAWN FROM PATIENT: ABNORMAL
TRIGL SERPL-MCNC: 159 MG/DL (ref 0–149)
WBC # BLD AUTO: 13.6 K/UL (ref 4.8–10.8)

## 2020-02-23 PROCEDURE — 84478 ASSAY OF TRIGLYCERIDES: CPT

## 2020-02-23 PROCEDURE — 82803 BLOOD GASES ANY COMBINATION: CPT

## 2020-02-23 PROCEDURE — 84100 ASSAY OF PHOSPHORUS: CPT

## 2020-02-23 PROCEDURE — 700105 HCHG RX REV CODE 258: Performed by: INTERNAL MEDICINE

## 2020-02-23 PROCEDURE — 700101 HCHG RX REV CODE 250: Performed by: INTERNAL MEDICINE

## 2020-02-23 PROCEDURE — 94003 VENT MGMT INPAT SUBQ DAY: CPT

## 2020-02-23 PROCEDURE — 99291 CRITICAL CARE FIRST HOUR: CPT | Performed by: INTERNAL MEDICINE

## 2020-02-23 PROCEDURE — 71045 X-RAY EXAM CHEST 1 VIEW: CPT

## 2020-02-23 PROCEDURE — 86140 C-REACTIVE PROTEIN: CPT

## 2020-02-23 PROCEDURE — 83735 ASSAY OF MAGNESIUM: CPT

## 2020-02-23 PROCEDURE — 94640 AIRWAY INHALATION TREATMENT: CPT

## 2020-02-23 PROCEDURE — 99233 SBSQ HOSP IP/OBS HIGH 50: CPT | Performed by: INTERNAL MEDICINE

## 2020-02-23 PROCEDURE — 700111 HCHG RX REV CODE 636 W/ 250 OVERRIDE (IP): Performed by: INTERNAL MEDICINE

## 2020-02-23 PROCEDURE — 302220 CHEST TUBE TRAY: Performed by: INTERNAL MEDICINE

## 2020-02-23 PROCEDURE — 84134 ASSAY OF PREALBUMIN: CPT

## 2020-02-23 PROCEDURE — 36600 WITHDRAWAL OF ARTERIAL BLOOD: CPT

## 2020-02-23 PROCEDURE — 700102 HCHG RX REV CODE 250 W/ 637 OVERRIDE(OP): Performed by: INTERNAL MEDICINE

## 2020-02-23 PROCEDURE — A9270 NON-COVERED ITEM OR SERVICE: HCPCS | Performed by: INTERNAL MEDICINE

## 2020-02-23 PROCEDURE — 85025 COMPLETE CBC W/AUTO DIFF WBC: CPT

## 2020-02-23 PROCEDURE — 770022 HCHG ROOM/CARE - ICU (200)

## 2020-02-23 PROCEDURE — C1729 CATH, DRAINAGE: HCPCS

## 2020-02-23 PROCEDURE — 80048 BASIC METABOLIC PNL TOTAL CA: CPT

## 2020-02-23 RX ORDER — POLYETHYLENE GLYCOL 3350 17 G/17G
1 POWDER, FOR SOLUTION ORAL 2 TIMES DAILY
Status: DISCONTINUED | OUTPATIENT
Start: 2020-02-23 | End: 2020-03-03

## 2020-02-23 RX ORDER — BISACODYL 10 MG
10 SUPPOSITORY, RECTAL RECTAL
Status: DISCONTINUED | OUTPATIENT
Start: 2020-02-23 | End: 2020-03-03

## 2020-02-23 RX ORDER — AMOXICILLIN 250 MG
2 CAPSULE ORAL 2 TIMES DAILY
Status: DISCONTINUED | OUTPATIENT
Start: 2020-02-23 | End: 2020-03-03

## 2020-02-23 RX ORDER — HYDROMORPHONE HYDROCHLORIDE 1 MG/ML
1 INJECTION, SOLUTION INTRAMUSCULAR; INTRAVENOUS; SUBCUTANEOUS
Status: DISCONTINUED | OUTPATIENT
Start: 2020-02-23 | End: 2020-03-01

## 2020-02-23 RX ADMIN — TIMOLOL MALEATE 1 DROP: 5 SOLUTION OPHTHALMIC at 18:39

## 2020-02-23 RX ADMIN — METHADONE HYDROCHLORIDE 30 MG: 10 TABLET ORAL at 14:50

## 2020-02-23 RX ADMIN — POTASSIUM PHOSPHATE, MONOBASIC AND POTASSIUM PHOSPHATE, DIBASIC 15 MMOL: 224; 236 INJECTION, SOLUTION, CONCENTRATE INTRAVENOUS at 09:09

## 2020-02-23 RX ADMIN — IPRATROPIUM BROMIDE AND ALBUTEROL SULFATE 3 ML: .5; 3 SOLUTION RESPIRATORY (INHALATION) at 03:29

## 2020-02-23 RX ADMIN — HYDROMORPHONE HYDROCHLORIDE 0.5 MG: 1 INJECTION, SOLUTION INTRAMUSCULAR; INTRAVENOUS; SUBCUTANEOUS at 04:18

## 2020-02-23 RX ADMIN — TRAMADOL HYDROCHLORIDE 50 MG: 50 TABLET, FILM COATED ORAL at 18:39

## 2020-02-23 RX ADMIN — OMEPRAZOLE 40 MG: KIT at 18:39

## 2020-02-23 RX ADMIN — METHADONE HYDROCHLORIDE 10 MG: 10 TABLET ORAL at 21:48

## 2020-02-23 RX ADMIN — IPRATROPIUM BROMIDE AND ALBUTEROL SULFATE 3 ML: .5; 3 SOLUTION RESPIRATORY (INHALATION) at 19:13

## 2020-02-23 RX ADMIN — PROPOFOL 70 MCG/KG/MIN: 10 INJECTION, EMULSION INTRAVENOUS at 15:36

## 2020-02-23 RX ADMIN — HYDROMORPHONE HYDROCHLORIDE 0.5 MG: 1 INJECTION, SOLUTION INTRAMUSCULAR; INTRAVENOUS; SUBCUTANEOUS at 06:48

## 2020-02-23 RX ADMIN — HYDROMORPHONE HYDROCHLORIDE 0.5 MG: 1 INJECTION, SOLUTION INTRAMUSCULAR; INTRAVENOUS; SUBCUTANEOUS at 01:34

## 2020-02-23 RX ADMIN — HYDROMORPHONE HYDROCHLORIDE 1 MG: 1 INJECTION, SOLUTION INTRAMUSCULAR; INTRAVENOUS; SUBCUTANEOUS at 15:19

## 2020-02-23 RX ADMIN — PROPOFOL 60 MCG/KG/MIN: 10 INJECTION, EMULSION INTRAVENOUS at 18:31

## 2020-02-23 RX ADMIN — POLYETHYLENE GLYCOL 3350 1 PACKET: 17 POWDER, FOR SOLUTION ORAL at 18:39

## 2020-02-23 RX ADMIN — TRAMADOL HYDROCHLORIDE 50 MG: 50 TABLET, FILM COATED ORAL at 05:52

## 2020-02-23 RX ADMIN — LEVOTHYROXINE SODIUM 125 MCG: 125 TABLET ORAL at 05:53

## 2020-02-23 RX ADMIN — IPRATROPIUM BROMIDE AND ALBUTEROL SULFATE 3 ML: .5; 3 SOLUTION RESPIRATORY (INHALATION) at 10:32

## 2020-02-23 RX ADMIN — ENOXAPARIN SODIUM 40 MG: 100 INJECTION SUBCUTANEOUS at 13:33

## 2020-02-23 RX ADMIN — SULFAMETHOXAZOLE AND TRIMETHOPRIM 1 TABLET: 800; 160 TABLET ORAL at 05:52

## 2020-02-23 RX ADMIN — SIMVASTATIN 40 MG: 40 TABLET, FILM COATED ORAL at 21:48

## 2020-02-23 RX ADMIN — HYDROMORPHONE HYDROCHLORIDE 0.5 MG: 1 INJECTION, SOLUTION INTRAMUSCULAR; INTRAVENOUS; SUBCUTANEOUS at 10:43

## 2020-02-23 RX ADMIN — PREDNISONE 80 MG: 20 TABLET ORAL at 05:52

## 2020-02-23 RX ADMIN — SENNOSIDES AND DOCUSATE SODIUM 2 TABLET: 8.6; 5 TABLET ORAL at 18:39

## 2020-02-23 RX ADMIN — IPRATROPIUM BROMIDE AND ALBUTEROL SULFATE 3 ML: .5; 3 SOLUTION RESPIRATORY (INHALATION) at 22:38

## 2020-02-23 RX ADMIN — IPRATROPIUM BROMIDE AND ALBUTEROL SULFATE 3 ML: .5; 3 SOLUTION RESPIRATORY (INHALATION) at 14:11

## 2020-02-23 RX ADMIN — PROPOFOL 50 MCG/KG/MIN: 10 INJECTION, EMULSION INTRAVENOUS at 11:25

## 2020-02-23 RX ADMIN — GABAPENTIN 1600 MG: 400 CAPSULE ORAL at 18:39

## 2020-02-23 RX ADMIN — METHADONE HYDROCHLORIDE 30 MG: 10 TABLET ORAL at 05:52

## 2020-02-23 RX ADMIN — METRONIDAZOLE 500 MG: 500 TABLET ORAL at 05:52

## 2020-02-23 RX ADMIN — PROPOFOL 40 MCG/KG/MIN: 10 INJECTION, EMULSION INTRAVENOUS at 01:45

## 2020-02-23 RX ADMIN — PROPOFOL 50 MCG/KG/MIN: 10 INJECTION, EMULSION INTRAVENOUS at 07:14

## 2020-02-23 RX ADMIN — SENNOSIDES AND DOCUSATE SODIUM 2 TABLET: 8.6; 5 TABLET ORAL at 05:52

## 2020-02-23 RX ADMIN — IPRATROPIUM BROMIDE AND ALBUTEROL SULFATE 3 ML: .5; 3 SOLUTION RESPIRATORY (INHALATION) at 06:23

## 2020-02-23 RX ADMIN — GABAPENTIN 2400 MG: 400 CAPSULE ORAL at 05:53

## 2020-02-23 NOTE — CARE PLAN
Problem: Ventilation Defect:  Goal: Ability to achieve and maintain unassisted ventilation or tolerate decreased levels of ventilator support  Outcome: PROGRESSING SLOWER THAN EXPECTED   Adult Ventilation Update    Total Vent Days: 3    Patient Lines/Drains/Airways Status      Active Airway       Name: Placement date: Placement time: Site: Days:    Airway ETT 8.0  02/21/20 0845   --  3                  $ FVC / Vital Capacity (liters) : (Not following) (02/23/20 0509)  NIF (cm H2O) : (Not following) (02/23/20 0509)  Rapid Shallow Breathing Index (RR/VT): 19 (02/23/20 0509)    Barriers to SBT Weaning Trial Stopped due to:: Pt weaned for 1 hour and returned to rest settings per protocol (02/23/20 0509)    Sputum/Suction   Cough: Non Productive (02/23/20 1200)  Sputum Amount: Small (02/23/20 1200)  Sputum Color: Tan;Clear (02/23/20 1200)  Sputum Consistency: Thick;Thin (02/23/20 1200)    Mobility    Activity Performed: Unable to mobilize (02/23/20 0800)  Reason Not Mobilized: Bed rest;Unstable condition(chest tube leaking/ desat/ will f/u with mobility order w md) (02/23/20 0800)  Mobilization Comments: new chest tube insertion (02/22/20 0800)    Events/Summary/Plan: no vent changes made

## 2020-02-23 NOTE — PROGRESS NOTES
Critical Care Progress Note    Date of admission  2/18/2020    Chief Complaint  59 y.o. male who presented 2/18/2020 with history of oxygen dependent chronic hypoxic respiratory failure, requiring 2.5-3 L home oxygen, emphysema, pulmonary hypertension and chronic pain who is on methadone.  He was transferred from [place] for pulmonary specialty care with a left lower lobe pneumonia and pneumothorax on 2/18.  Influenza screening was negative.  Shortly after arriving at [facility] he was noted to have a dislodged chest tube.  A replacement chest tube was not necessary.  He was requiring 5 L of oxygen via nasal cannula on 2/19/2/20.  A pulmonology consultation requested a high resolution CT scan, the initiated a connective tissue work-up and continued broad-spectrum antibiotics.  On the morning of 2/21 patient had increasing work of breathing and hypoxic respiratory failure.  Critical care consultation was requested for further evaluation and management.    Hospital Course    2/21-patient was evaluated in the IR suite for placement of a pigtail catheter.  At the time the residual pneumothorax was felt to be too small to benefit from catheter placement.  At 2335 patient became hypoxic, tachycardic and hypotensive.  Chest x-ray revealed a large pneumothorax.  An emergent chest tube was placed with improvement in hemodynamic and oxygenation parameters.  2/22- Pulmonary biopsy performed.  Prednisone 1 mg/kilogram/day initiated      Interval Problem Update  Reviewed last 24 hour events:              - Tm: Afebrile              - HR: 60s              - SBP: 100-1 20s              - Neuro: Follows, eyes disconjugate, MAEW              - GI: NPO              - UOP: 2.1              - Bell: Yes              - Lines: CVC, chest tube              - PPx: Home PPI, SCD              - CXR (personally reviewed):               - Antibiotic C3, Flagyl, Bactrim    INFUSIONS:  Propofol      Review of  Systems  Review of Systems   Unable to perform ROS: Intubated        Vital Signs for last 24 hours   Pulse:  [57-79] 61  Resp:  [16-40] 24  BP: ()/(54-92) 111/66  SpO2:  [95 %-100 %] 95 %    Hemodynamic parameters for last 24 hours       Respiratory Information for the last 24 hours  Vent Mode: APVCMV  Rate (breaths/min): 20  Vt Target (mL): 450  PEEP/CPAP: 5  P Support: 5  MAP: 6  Length of Weaning Trial (Hours): 1.0  Control VTE (exp VT): 435    Physical Exam   Physical Exam  Constitutional:       Comments: Intubated   HENT:      Mouth/Throat:      Mouth: Mucous membranes are moist.   Eyes:      Pupils: Pupils are equal, round, and reactive to light.      Comments: Disconjugate   Neck:      Musculoskeletal: Neck supple.   Cardiovascular:      Rate and Rhythm: Regular rhythm.      Heart sounds: No murmur. No friction rub. No gallop.    Pulmonary:      Comments: Scattered crackles and wheezes  Left chest tube in good position  Persistent grade 1 air leak  Abdominal:      General: There is no distension.      Palpations: Abdomen is soft.      Tenderness: There is no abdominal tenderness.   Musculoskeletal:         General: No swelling.      Right lower leg: No edema.      Left lower leg: No edema.   Skin:     General: Skin is warm and dry.   Neurological:      Comments: Opens eyes to voice follows commands.   strength equally with both hands.         Medications  Current Facility-Administered Medications   Medication Dose Route Frequency Provider Last Rate Last Dose   • potassium phosphates 15 mmol in  mL ivpb  15 mmol Intravenous Once Marck Javier M.D.       • Pharmacy Consult: Enteral tube insertion - review meds/change route/product selection  1 Each Other PHARMACY TO DOSE Marck Javier M.D.       • predniSONE (DELTASONE) tablet 80 mg  80 mg Enteral Tube DAILY Marck Javier M.D.   80 mg at 02/23/20 0552   • sulfamethoxazole-trimethoprim (BACTRIM DS) 800-160 MG tablet 1 Tab  1 Tab Enteral  Tube DAILY Marck Javier M.D.   1 Tab at 02/23/20 0552   • propofol (DIPRIVAN) injection  0-80 mcg/kg/min Intravenous Continuous Marck Javier M.D. 23.6 mL/hr at 02/23/20 0718 50 mcg/kg/min at 02/23/20 0718   • ipratropium-albuterol (DUONEB) nebulizer solution  3 mL Nebulization Q2HRS PRN (RT) Marck Javier M.D.       • senna-docusate (PERICOLACE or SENOKOT S) 8.6-50 MG per tablet 2 Tab  2 Tab Enteral Tube BID Marck Javier M.D.   2 Tab at 02/23/20 0552    And   • polyethylene glycol/lytes (MIRALAX) PACKET 1 Packet  1 Packet Enteral Tube QDAY PRN Marck Javier M.D.        And   • magnesium hydroxide (MILK OF MAGNESIA) suspension 30 mL  30 mL Enteral Tube QDAY PRN Marck Javier M.D.        And   • bisacodyl (DULCOLAX) suppository 10 mg  10 mg Rectal QDAY PRN Marck Javier M.D.       • MD Alert...ICU Electrolyte Replacement per Pharmacy   Other PHARMACY TO DOSE Marck Javier M.D.       • lidocaine (XYLOCAINE) 1 % injection 1-2 mL  1-2 mL Tracheal Tube Q30 MIN PRN Marck Javier M.D.       • gabapentin (NEURONTIN) capsule 2,400 mg  2,400 mg Enteral Tube QAM Marck Javier M.D.   2,400 mg at 02/23/20 0553    And   • gabapentin (NEURONTIN) capsule 1,600 mg  1,600 mg Enteral Tube Q EVENING Marck Javier M.D.   1,600 mg at 02/22/20 1702   • levothyroxine (SYNTHROID) tablet 125 mcg  125 mcg Enteral Tube QAM AC Marck Javier M.D.   125 mcg at 02/23/20 0553   • methadone (DOLOPHINE) tablet 30 mg  30 mg Enteral Tube BID Marck Javier M.D.   30 mg at 02/23/20 0552    And   • methadone (DOLOPHINE) tablet 10 mg  10 mg Enteral Tube QHS Marck Javier M.D.   10 mg at 02/22/20 2152   • metroNIDAZOLE (FLAGYL) tablet 500 mg  500 mg Enteral Tube Q8HRS Marck Javier M.D.   500 mg at 02/23/20 0552   • omeprazole (FIRST-OMEPRAZOLE) 2 mg/mL oral susp 40 mg  40 mg Enteral Tube Q EVENING Marck Javier M.D.   40 mg at 02/22/20 1702   • simvastatin (ZOCOR) tablet 40 mg  40 mg Enteral Tube Nightly Marck Javier,  M.D.   40 mg at 02/22/20 2151   • tramadol (ULTRAM) 50 MG tablet 50 mg  50 mg Enteral Tube BID Marck Javier M.D.   50 mg at 02/23/20 0552   • acetaminophen (TYLENOL) tablet 650 mg  650 mg Enteral Tube Q6HRS PRN Marck Javier M.D.       • ALPRAZolam (XANAX) tablet 0.25 mg  0.25 mg Enteral Tube 4X/DAY PRN Marck Javier M.D.       • guaiFENesin dextromethorphan (ROBITUSSIN DM) 100-10 MG/5ML syrup 10 mL  10 mL Enteral Tube Q6HRS PRN Marck Javier M.D.       • promethazine (PHENERGAN) tablet 12.5-25 mg  12.5-25 mg Enteral Tube Q4HRS PRN Marck Javier M.D.       • HYDROmorphone pf (DILAUDID) injection 0.5 mg  0.5 mg Intravenous Q2HRS PRN Marck Javier M.D.   0.5 mg at 02/23/20 0648   • influenza vaccine quad injection 0.5 mL  0.5 mL Intramuscular Once PRN Eulogio Lopes M.D.       • ipratropium-albuterol (DUONEB) nebulizer solution  3 mL Nebulization Q4HRS (RT) Gypsy Mackey M.D.   3 mL at 02/23/20 0623   • timolol (TIMOPTIC) 0.5 % ophthalmic solution 1 Drop  1 Drop Both Eyes Q EVENING Tim Bustos M.D.   1 Drop at 02/22/20 1702   • Respiratory Therapy Consult   Nebulization Continuous RT Tim Bustos M.D.       • promethazine (PHENERGAN) suppository 12.5-25 mg  12.5-25 mg Rectal Q4HRS PRN Tim Bustos M.D.       • prochlorperazine (COMPAZINE) injection 5-10 mg  5-10 mg Intravenous Q4HRS PRN Tim Bustos M.D.       • albuterol (PROVENTIL) 2.5mg/0.5ml nebulizer solution 2.5 mg  2.5 mg Nebulization Q2HRS PRN (RT) Tim Schmidhuber, M.D.           Fluids    Intake/Output Summary (Last 24 hours) at 2/23/2020 0835  Last data filed at 2/23/2020 0800  Gross per 24 hour   Intake 3367.18 ml   Output 2573 ml   Net 794.18 ml       Laboratory  Recent Labs     02/21/20  0801 02/21/20  1500 02/22/20  0421 02/23/20  0420   DMMHP89I 7.50  --   --   --    CMPLUI989B 30.7  --   --   --    CXIOM064I 56.0*  --   --   --    DUZV3NWW 90.4*  --   --   --    ARTHCO3 24  --   --   --    ARTBE  1  --   --   --    ISTATAPH  --  7.419 7.442 7.481   ISTATAPCO2  --  37.1* 36.3 34.3   ISTATAPO2  --  63* 71 66   ISTATATCO2  --  25 26 27   [id number]  --  92* 95 94   ISTATARTHCO3  --  24.0 24.8 25.6*   ISTATARTBE  --  0 1 2   ISTATTEMP  --  97.9 F 98.6 F 98.1 F   ISTATFIO2  --  40 40 40   ISTATSPEC  --  Arterial Arterial Arterial   ISTATAPHTC  --  7.425 7.442 7.485   SSFAUWXV0VB  --  61* 71 65         Recent Labs     02/21/20  0546 02/22/20 0237 02/23/20  0415   SODIUM 140 139 144   POTASSIUM 3.9 3.9 3.5*   CHLORIDE 104 104 107   CO2 29 28 29   BUN 19 22 20   CREATININE 0.95 0.79 0.71   MAGNESIUM 2.1 2.0 2.2   PHOSPHORUS 2.4* 2.9 2.2*   CALCIUM 9.2 8.3* 8.0*     Recent Labs     02/21/20  0546 02/22/20 0237 02/23/20  0415   ALTSGPT <5 6  --    ASTSGOT 11* 20  --    ALKPHOSPHAT 92 81  --    TBILIRUBIN 0.4 0.3  --    PREALBUMIN  --   --  7.0*   GLUCOSE 98 120* 121*     Recent Labs     02/21/20  0546 02/22/20 0237 02/23/20  0415   WBC 14.7* 15.0* 13.6*   NEUTSPOLYS 81.10* 81.40* 75.70*   LYMPHOCYTES 10.00* 8.50* 13.70*   MONOCYTES 8.30 8.50 9.30   EOSINOPHILS 0.10 0.70 0.20   BASOPHILS 0.10 0.40 0.20   ASTSGOT 11* 20  --    ALTSGPT <5 6  --    ALKPHOSPHAT 92 81  --    TBILIRUBIN 0.4 0.3  --      Recent Labs     02/21/20  0546 02/21/20  0856 02/22/20 0237 02/23/20  0415   RBC 4.75  --  4.19* 3.99*   HEMOGLOBIN 13.0*  --  11.1* 10.5*   HEMATOCRIT 38.2*  --  35.2* 32.7*   PLATELETCT 276  --  280 256   PROTHROMBTM  --  16.3*  --   --    APTT  --  35.8  --   --    INR  --  1.28*  --   --        Imaging  X-Ray:  I have personally reviewed the images and compared with prior images. and My impression is: Endotracheal tube is 2 cm above the myrtle.  There is a right IJ central venous catheter with the tip near the cavoatrial junction.  There is a left chest tube in place.  Feeding tube descends below the level of the diaphragm.  Pneumothorax remains nearly completely decompressed lung markings extend to the edge of the  thorax.  Underlying interstitial lung disease is not significantly changed.  No effusion.    Assessment/Plan  Acute on chronic respiratory failure with hypoxia (HCC)- (present on admission)  Assessment & Plan  Acute hypoxic respiratory failure  Attributed to evolving NSIP  Complicated by underlying emphysematous disease.   Lung protective ventilation strategies  Titrate ventilator prescription to optimize oxygenation, ventilation, and acid base balance.    Pulmonary biopsy on 2/22 am  Follow up connective tissue w/u  Prednisone 1 mg/KG/day  Add Bactrim prophylaxis    Pneumonia- (present on admission)  Assessment & Plan  Continue broad-spectrum antibiotics until cultures and sensitivities can guide de-escalation    Pneumothorax  Assessment & Plan  Status post left tension pneumothorax  Pneumothorax improved with chest tube in place  Chest tube in good position  Continue chest tube to suction    Will be at high risk for pneumothorax status post pulmonary biopsy  Serial chest x-rays  Pleural tube placement as clinically indicated      Chronic pain- (present on admission)  Assessment & Plan  Continue gabapentin regimen  Continue methadone regimen  Continue tramadol           VTE:  Lovenox  Ulcer: PPI  Lines: Central Line  Ongoing indication addressed    I have performed a physical exam and reviewed and updated ROS and Plan today (2/23/2020). In review of yesterday's note (2/22/2020), there are no changes except as documented above.     Discussed patient condition and risk of morbidity and/or mortality with RN, RT, Pharmacy, Code status disscussed, Patient and Pulmonary       The patient remains critically ill.  I have assessed and reassessed the respiratory status and made ventilator adjustments based upon arterial blood gas analysis, ventilator waveforms and airway mechanics.  I have assessed and reassessed the blood pressure, hemodynamics, cardiovascular status. This patient remains at high risk for worsening  cardiopulmonary dysfunction and death without the above critical care interventions.'     Critical care time 35 minutes in directly providing and coordinating critical care and extensive data review.  No time overlap and excludes procedures.

## 2020-02-23 NOTE — CARE PLAN
Adult Ventilation Update    Total Vent Days: 3  Vent: 20/450/+5/40%    Patient Lines/Drains/Airways Status      Active Airway       Name: Placement date: Placement time: Site: Days:    Airway ETT 8.0  02/21/20 0845   --  3                    In the last 24 hours, the patient tolerated SBT for 2.0 on settings of 5/.    $ FVC / Vital Capacity (liters) : (Not following) (02/23/20 0509)  NIF (cm H2O) : (Not following) (02/23/20 0509)  Rapid Shallow Breathing Index (RR/VT): 19 (02/23/20 0509)  Plateau Pressure: 13 (02/22/20 2218)  Static Compliance (ml / cm H2O): 40.4 (02/23/20 0330)    Patient failed trials because of Barriers to Wean:   Barriers to SBT Weaning Trial Stopped due to:: Pt weaned for 1 hour and returned to rest settings per protocol (02/23/20 0509)  Length of Weaning Trial Length of Weaning Trial (Hours): 1.0 (02/23/20 0509)      Cough: Non Productive (02/23/20 0400)  Sputum Amount: Small (02/23/20 0400)  Sputum Color: Tan;Clear (02/23/20 0400)  Sputum Consistency: Thick;Thin (02/23/20 0400)    Mobility  Level of Mobility: Level IV (02/22/20 2000)  Activity Performed: Unable to mobilize (02/22/20 2000)  Time Activity Tolerated: 5 min (02/20/20 0800)  Distance Per Occurrence (ft.): 10 feet (02/20/20 0800)  # of Times Distance was Traveled: 2 (02/20/20 0800)  Assistance: Assistance of Two or More (02/21/20 2000)  Ambulation Tolerance: Tolerates Well (02/20/20 2015)  Pt Calls for Assistance: No (02/21/20 2000)  Staff Present for Mobilization: RN;CNA (02/20/20 2015)  Gait: Shuffle;Steady (02/20/20 2015)  Assistive Devices: None (02/20/20 2015)  Reason Not Mobilized: Bed rest;Unstable condition (02/22/20 2000)  Mobilization Comments: new chest tube insertion (02/22/20 0800)    Events/Summary/Plan: Sno vent changes made this shift. Pt currently stable on vent.

## 2020-02-23 NOTE — CARE PLAN
Problem: Safety  Goal: Will remain free from injury  Outcome: PROGRESSING AS EXPECTED  Goal: Will remain free from falls  Outcome: PROGRESSING AS EXPECTED     Problem: Venous Thromboembolism (VTW)/Deep Vein Thrombosis (DVT) Prevention:  Goal: Patient will participate in Venous Thrombosis (VTE)/Deep Vein Thrombosis (DVT)Prevention Measures  Outcome: PROGRESSING AS EXPECTED     Problem: Pain Management  Goal: Pain level will decrease to patient's comfort goal  Outcome: PROGRESSING AS EXPECTED     Problem: Skin Integrity  Goal: Risk for impaired skin integrity will decrease  Outcome: PROGRESSING AS EXPECTED

## 2020-02-23 NOTE — CARE PLAN
Problem: Communication  Goal: The ability to communicate needs accurately and effectively will improve  Outcome: PROGRESSING AS EXPECTED  Patient white board updated. Patient updated on plan of care. All questions and concerns addressed.      Problem: Safety  Goal: Will remain free from injury  Outcome: PROGRESSING AS EXPECTED  Goal: Will remain free from falls  Outcome: PROGRESSING AS EXPECTED  Appropriate Fall precautions in place. Patient oriented to room and call light. Patient educated regarding safety and fall precautions      Problem: Venous Thromboembolism (VTW)/Deep Vein Thrombosis (DVT) Prevention:  Goal: Patient will participate in Venous Thrombosis (VTE)/Deep Vein Thrombosis (DVT)Prevention Measures  Outcome: PROGRESSING AS EXPECTED  Appropriate Fall precautions in place. Patient oriented to room and call light. Patient educated regarding safety and fall precautions

## 2020-02-23 NOTE — PROGRESS NOTES
Bedside report received from NOC RN. Plan of care discussed. Lines labs med's and orders reviewed. Neuro assessment completed with NOC RN.

## 2020-02-23 NOTE — PROGRESS NOTES
"Pulmonary Progress Note    Date of admission  2/18/2020    Chief Complaint  59 y.o. male admitted 2/18/2020 with left sided pneumothorax    Hospital Course    59 year old male with a history of chronic hypoxic respiratory failure ( on 2.5-3L oxygen at home), paraseptal emphysema, pulmonary hypertension, chronic pain on methadone who presented as a transfer from [facility] for left pneumothorax. Patient presented to the outside hospital with shortness of breath of 2 days duration that worsened after an episode of vomiting. Patient reports some cough as well, denies fever or chills. He had a chest tube placed and was given levaquin for CAP prior to transfer. Following transfer here, he was noted to have a dislodged chest tube with improved pneumothorax, the chest tube was subsequently removed per thoracic surgery recommendations. Pneumothorax has remained stable since then. He is being treated with ceftriaxone and flagyl for CAP. He is currently on 5 L nasal canula.  He is a former smoker and quit smoking in 2015, has a 25-30 pack year smoking history. He was first noted to have ground glass opacities on CT in 2011 which was again seen on CT in 09/2019. He denies any autoimmune history or family history of lung diseases.\" from Dr. Cervantes's note    Autoimmune work up see below is negative 2/20  Patient progressed with worsening resp failure 2/21and required intubation  On 2/21 night developed tension pneumothorax on the right needing large chest tube  2/22 underwent a bronchoscopy with BAL of RML and forcep bx. Started on 80 mg of prednisone   \"    Results for СЕРГЕЙ RENNY TONE SAN (MRN [id number]) as of 2/23/2020 17:51   Ref. Range 2/20/2020 15:54   Anti-Scl-70 Latest Ref Range: 0 - 40 AU/mL 2   Charlene-1 Antibody, IgG Latest Ref Range: 0 - 40 AU/mL 0   Anti-Centromere B Ab Latest Ref Range: 0 - 40 AU/mL 2   Ribosome P Ab, IgG Latest Ref Range: 0 - 40 AU/mL 6   Ccp Antibodies Latest Ref Range: 0 - 19 Units 4   Smith Antibodies " Latest Ref Range: 0 - 40 AU/mL 0   SSA 52 (R0)(SONALI) Ab, IgG Latest Ref Range: 0 - 40 AU/mL 1   SSA 60 (R0)(SONALI) Ab, IgG Latest Ref Range: 0 - 40 AU/mL 2   Sjogren'S Anti-Ss-B Latest Ref Range: 0 - 40 AU/mL 1   ANCA IgG Latest Ref Range: <1:20  <1:20     Interval Problem Update  Reviewed last 24 hour events:  As above  Review of Systems  Review of Systems   Unable to perform ROS: Acuity of condition        Vital Signs for last 24 hours   Pulse:  [57-79] 66  Resp:  [16-40] 28  BP: ()/(54-92) 105/57  SpO2:  [95 %-100 %] 95 %         Respiratory Information for the last 24 hours  Vent Mode: APVCMV  Rate (breaths/min): 20  Vt Target (mL): 450  PEEP/CPAP: 5  P Support: 5  MAP: 6.5  Length of Weaning Trial (Hours): 1.0  Control VTE (exp VT): 566    Physical Exam     Physical Exam  Constitutional:       Comments: Intubated and sedated   HENT:      Head: Normocephalic and atraumatic.      Mouth/Throat:      Mouth: Mucous membranes are dry.   Eyes:      Extraocular Movements: Extraocular movements intact.      Pupils: Pupils are equal, round, and reactive to light.   Cardiovascular:      Rate and Rhythm: Normal rate and regular rhythm.   Pulmonary:      Comments: Chest tube on the right with intermittent leak  BS = CTA ant  Abdominal:      General: Abdomen is flat. There is no distension.      Palpations: Abdomen is soft.   Musculoskeletal:      Right lower leg: No edema.      Left lower leg: No edema.   Skin:     General: Skin is warm and dry.   Neurological:      Comments: Moving all 4 extremities   Psychiatric:      Comments: Unable to assess         Medications  Current Facility-Administered Medications   Medication Dose Route Frequency Provider Last Rate Last Dose   • polyethylene glycol/lytes (MIRALAX) PACKET 1 Packet  1 Packet Enteral Tube BID Marck Javier M.D.        And   • senna-docusate (PERICOLACE or SENOKOT S) 8.6-50 MG per tablet 2 Tab  2 Tab Enteral Tube BID Marck Javier M.D.        And   •  magnesium hydroxide (MILK OF MAGNESIA) suspension 30 mL  30 mL Enteral Tube QDAY PRN Marck Javier M.D.        And   • bisacodyl (DULCOLAX) suppository 10 mg  10 mg Rectal QDAY PRN Marck Javier M.D.       • enoxaparin (LOVENOX) inj 40 mg  40 mg Subcutaneous DAILY Marck Javier M.D.       • Pharmacy Consult: Enteral tube insertion - review meds/change route/product selection  1 Each Other PHARMACY TO DOSE Marck Javier M.D.       • predniSONE (DELTASONE) tablet 80 mg  80 mg Enteral Tube DAILY Marck Javier M.D.   80 mg at 02/23/20 0552   • sulfamethoxazole-trimethoprim (BACTRIM DS) 800-160 MG tablet 1 Tab  1 Tab Enteral Tube DAILY Marck Javier M.D.   1 Tab at 02/23/20 0552   • propofol (DIPRIVAN) injection  0-80 mcg/kg/min Intravenous Continuous Marck Javier M.D. 23.6 mL/hr at 02/23/20 1125 50 mcg/kg/min at 02/23/20 1125   • ipratropium-albuterol (DUONEB) nebulizer solution  3 mL Nebulization Q2HRS PRN (RT) Marck Javier M.D.       • MD Alert...ICU Electrolyte Replacement per Pharmacy   Other PHARMACY TO DOSE Marck Javier M.D.       • lidocaine (XYLOCAINE) 1 % injection 1-2 mL  1-2 mL Tracheal Tube Q30 MIN PRN Marck Javier M.D.       • gabapentin (NEURONTIN) capsule 2,400 mg  2,400 mg Enteral Tube QAM Marck Javier M.D.   2,400 mg at 02/23/20 0553    And   • gabapentin (NEURONTIN) capsule 1,600 mg  1,600 mg Enteral Tube Q EVENING Marck Javier M.D.   1,600 mg at 02/22/20 1702   • levothyroxine (SYNTHROID) tablet 125 mcg  125 mcg Enteral Tube QAM AC Marck Javier M.D.   125 mcg at 02/23/20 0553   • methadone (DOLOPHINE) tablet 30 mg  30 mg Enteral Tube BID Marck Javier M.D.   30 mg at 02/23/20 0552    And   • methadone (DOLOPHINE) tablet 10 mg  10 mg Enteral Tube QHS Marck Javier M.D.   10 mg at 02/22/20 2152   • metroNIDAZOLE (FLAGYL) tablet 500 mg  500 mg Enteral Tube Q8HRS Marck Javier M.D.   500 mg at 02/23/20 0552   • omeprazole (FIRST-OMEPRAZOLE) 2 mg/mL oral susp 40 mg   40 mg Enteral Tube Q EVENING Marck Javier M.D.   40 mg at 02/22/20 1702   • simvastatin (ZOCOR) tablet 40 mg  40 mg Enteral Tube Nightly Marck Javier M.D.   40 mg at 02/22/20 2151   • tramadol (ULTRAM) 50 MG tablet 50 mg  50 mg Enteral Tube BID Marck Javier M.D.   50 mg at 02/23/20 0552   • acetaminophen (TYLENOL) tablet 650 mg  650 mg Enteral Tube Q6HRS PRN Marck Javier M.D.       • ALPRAZolam (XANAX) tablet 0.25 mg  0.25 mg Enteral Tube 4X/DAY PRN Marck Javier M.D.       • guaiFENesin dextromethorphan (ROBITUSSIN DM) 100-10 MG/5ML syrup 10 mL  10 mL Enteral Tube Q6HRS PRN Marck Javier M.D.       • promethazine (PHENERGAN) tablet 12.5-25 mg  12.5-25 mg Enteral Tube Q4HRS PRN Marck Javier M.D.       • HYDROmorphone pf (DILAUDID) injection 0.5 mg  0.5 mg Intravenous Q2HRS PRN Marck Javier M.D.   0.5 mg at 02/23/20 1043   • influenza vaccine quad injection 0.5 mL  0.5 mL Intramuscular Once PRN Eulogio Lopes M.D.       • ipratropium-albuterol (DUONEB) nebulizer solution  3 mL Nebulization Q4HRS (RT) Gypsy Mackey M.D.   3 mL at 02/23/20 1032   • timolol (TIMOPTIC) 0.5 % ophthalmic solution 1 Drop  1 Drop Both Eyes Q EVENING Tim Bustos M.D.   1 Drop at 02/22/20 1702   • Respiratory Therapy Consult   Nebulization Continuous RT Tim Bustos M.D.       • promethazine (PHENERGAN) suppository 12.5-25 mg  12.5-25 mg Rectal Q4HRS PRN Tim Bustos M.D.       • prochlorperazine (COMPAZINE) injection 5-10 mg  5-10 mg Intravenous Q4HRS PRN Tim Bustos M.D.       • albuterol (PROVENTIL) 2.5mg/0.5ml nebulizer solution 2.5 mg  2.5 mg Nebulization Q2HRS PRN (RT) Tim Bustos M.D.           Fluids    Intake/Output Summary (Last 24 hours) at 2/23/2020 1310  Last data filed at 2/23/2020 1209  Gross per 24 hour   Intake 3359.4 ml   Output 2616 ml   Net 743.4 ml       Laboratory  Recent Labs     02/21/20  0801 02/21/20  1500 02/22/20  0421 02/23/20  0420   [id number]  7.50  --   --   --    [id number] 30.7  --   --   --    [id number] 56.0*  --   --   --    [id number] 90.4*  --   --   --    ARTHCO3 24  --   --   --    ARTBE 1  --   --   --    ISTATAPH  --  7.419 7.442 7.481   ISTATAPCO2  --  37.1* 36.3 34.3   ISTATAPO2  --  63* 71 66   ISTATATCO2  --  25 26 27   SVESERG2EDZ  --  92* 95 94   ISTATARTHCO3  --  24.0 24.8 25.6*   ISTATARTBE  --  0 1 2   ISTATTEMP  --  97.9 F 98.6 F 98.1 F   ISTATFIO2  --  40 40 40   ISTATSPEC  --  Arterial Arterial Arterial   ISTATAPHTC  --  7.425 7.442 7.485   [id number]  --  61* 71 65         Recent Labs     02/21/20  0546 02/22/20 0237 02/23/20  0415   SODIUM 140 139 144   POTASSIUM 3.9 3.9 3.5*   CHLORIDE 104 104 107   CO2 29 28 29   BUN 19 22 20   CREATININE 0.95 0.79 0.71   MAGNESIUM 2.1 2.0 2.2   PHOSPHORUS 2.4* 2.9 2.2*   CALCIUM 9.2 8.3* 8.0*     Recent Labs     02/21/20  0546 02/22/20 0237 02/23/20  0415   ALTSGPT <5 6  --    ASTSGOT 11* 20  --    ALKPHOSPHAT 92 81  --    TBILIRUBIN 0.4 0.3  --    PREALBUMIN  --   --  7.0*   GLUCOSE 98 120* 121*     Recent Labs     02/21/20  0546 02/22/20 0237 02/23/20  0415   WBC 14.7* 15.0* 13.6*   NEUTSPOLYS 81.10* 81.40* 75.70*   LYMPHOCYTES 10.00* 8.50* 13.70*   MONOCYTES 8.30 8.50 9.30   EOSINOPHILS 0.10 0.70 0.20   BASOPHILS 0.10 0.40 0.20   ASTSGOT 11* 20  --    ALTSGPT <5 6  --    ALKPHOSPHAT 92 81  --    TBILIRUBIN 0.4 0.3  --      Recent Labs     02/21/20  0546 02/21/20  0856 02/22/20  0237 02/23/20  0415   RBC 4.75  --  4.19* 3.99*   HEMOGLOBIN 13.0*  --  11.1* 10.5*   HEMATOCRIT 38.2*  --  35.2* 32.7*   PLATELETCT 276  --  280 256   PROTHROMBTM  --  16.3*  --   --    APTT  --  35.8  --   --    INR  --  1.28*  --   --        Imaging  CXR today viewed personally shows ET tube and chest tube in good position and the right sided reticular pattern is improved    Assessment/Plan  Acute on chronic respiratory failure with hypoxia (HCC)- (present on admission)  Assessment & Plan  Acute hypoxic  respiratory failure  Attributed to evolving NSIP  Complicated by underlying emphysematous changes that have progressed since 2011  Started on prednisone on 2/22 after brochoscopy  CTD work up negative  Ventilator settings decreased and Fio2 40% Peep 5  CXR some improvement on the right reticular changes  D/w Dr. Javier - tentative plan for extubation violeta.          I have performed a physical exam and reviewed and updated ROS and Plan today (2/23/2020). In review of yesterday's note (2/22/2020), there are no changes except as documented above.

## 2020-02-24 ENCOUNTER — APPOINTMENT (OUTPATIENT)
Dept: RADIOLOGY | Facility: MEDICAL CENTER | Age: 60
DRG: 166 | End: 2020-02-24
Attending: INTERNAL MEDICINE
Payer: MEDICARE

## 2020-02-24 LAB
ACTION RANGE TRIGGERED IACRT: NO
ALBUMIN SERPL BCP-MCNC: 3.1 G/DL (ref 3.2–4.9)
ALBUMIN/GLOB SERPL: 1.1 G/DL
ALP SERPL-CCNC: 67 U/L (ref 30–99)
ALT SERPL-CCNC: 7 U/L (ref 2–50)
ANION GAP SERPL CALC-SCNC: 8 MMOL/L (ref 0–11.9)
ANNOTATION COMMENT IMP: ABNORMAL
AST SERPL-CCNC: 15 U/L (ref 12–45)
BACTERIA BRONCH AEROBE CULT: NORMAL
BASE EXCESS BLDA CALC-SCNC: 3 MMOL/L (ref -4–3)
BASOPHILS # BLD AUTO: 0.2 % (ref 0–1.8)
BASOPHILS # BLD: 0.04 K/UL (ref 0–0.12)
BILIRUB SERPL-MCNC: 0.3 MG/DL (ref 0.1–1.5)
BODY TEMPERATURE: ABNORMAL DEGREES
BUN SERPL-MCNC: 25 MG/DL (ref 8–22)
CALCIUM SERPL-MCNC: 8.1 MG/DL (ref 8.5–10.5)
CD3 CELLS # BLD: 599 CELLS/UL (ref 570–2400)
CD3+CD4+ CELLS # BLD: 516 CELLS/UL (ref 430–1800)
CD3+CD4+ CELLS/CD3+CD8+ CLL BLD: 5.8 RATIO (ref 0.8–3.9)
CD3+CD8+ CELLS # BLD: 89 CELLS/UL (ref 210–1200)
CHLORIDE SERPL-SCNC: 107 MMOL/L (ref 96–112)
CO2 BLDA-SCNC: 27 MMOL/L (ref 20–33)
CO2 SERPL-SCNC: 26 MMOL/L (ref 20–33)
CREAT SERPL-MCNC: 0.8 MG/DL (ref 0.5–1.4)
EOSINOPHIL # BLD AUTO: 0.23 K/UL (ref 0–0.51)
EOSINOPHIL NFR BLD: 1.4 % (ref 0–6.9)
ERYTHROCYTE [DISTWIDTH] IN BLOOD BY AUTOMATED COUNT: 45 FL (ref 35.9–50)
GLOBULIN SER CALC-MCNC: 2.9 G/DL (ref 1.9–3.5)
GLUCOSE SERPL-MCNC: 114 MG/DL (ref 65–99)
GRAM STN SPEC: NORMAL
HCO3 BLDA-SCNC: 26.2 MMOL/L (ref 17–25)
HCT VFR BLD AUTO: 34.9 % (ref 42–52)
HGB BLD-MCNC: 11.4 G/DL (ref 14–18)
HOROWITZ INDEX BLDA+IHG-RTO: 177 MM[HG]
IMM GRANULOCYTES # BLD AUTO: 0.07 K/UL (ref 0–0.11)
IMM GRANULOCYTES NFR BLD AUTO: 0.4 % (ref 0–0.9)
INST. QUALIFIED PATIENT IIQPT: YES
LYMPHOCYTES # BLD AUTO: 3.13 K/UL (ref 1–4.8)
LYMPHOCYTES NFR BLD: 19.3 % (ref 22–41)
MAGNESIUM SERPL-MCNC: 2.1 MG/DL (ref 1.5–2.5)
MCH RBC QN AUTO: 26.6 PG (ref 27–33)
MCHC RBC AUTO-ENTMCNC: 32.7 G/DL (ref 33.7–35.3)
MCV RBC AUTO: 81.4 FL (ref 81.4–97.8)
MONOCYTES # BLD AUTO: 1.38 K/UL (ref 0–0.85)
MONOCYTES NFR BLD AUTO: 8.5 % (ref 0–13.4)
NEUTROPHILS # BLD AUTO: 11.37 K/UL (ref 1.82–7.42)
NEUTROPHILS NFR BLD: 70.2 % (ref 44–72)
NRBC # BLD AUTO: 0 K/UL
NRBC BLD-RTO: 0 /100 WBC
O2/TOTAL GAS SETTING VFR VENT: 30 %
PATHOLOGY CONSULT NOTE: NORMAL
PCO2 BLDA: 35.1 MMHG (ref 26–37)
PCO2 TEMP ADJ BLDA: 34.8 MMHG (ref 26–37)
PH BLDA: 7.48 [PH] (ref 7.4–7.5)
PH TEMP ADJ BLDA: 7.49 [PH] (ref 7.4–7.5)
PHOSPHATE SERPL-MCNC: 2.3 MG/DL (ref 2.5–4.5)
PLATELET # BLD AUTO: 269 K/UL (ref 164–446)
PMV BLD AUTO: 9.3 FL (ref 9–12.9)
PO2 BLDA: 53 MMHG (ref 64–87)
PO2 TEMP ADJ BLDA: 52 MMHG (ref 64–87)
POTASSIUM SERPL-SCNC: 3.5 MMOL/L (ref 3.6–5.5)
PROT SERPL-MCNC: 6 G/DL (ref 6–8.2)
RBC # BLD AUTO: 4.29 M/UL (ref 4.7–6.1)
SAO2 % BLDA: 89 % (ref 93–99)
SIGNIFICANT IND 70042: NORMAL
SITE SITE: NORMAL
SODIUM SERPL-SCNC: 141 MMOL/L (ref 135–145)
SOURCE SOURCE: NORMAL
SPECIMEN DRAWN FROM PATIENT: ABNORMAL
WBC # BLD AUTO: 16.2 K/UL (ref 4.8–10.8)

## 2020-02-24 PROCEDURE — 82803 BLOOD GASES ANY COMBINATION: CPT

## 2020-02-24 PROCEDURE — 80053 COMPREHEN METABOLIC PANEL: CPT

## 2020-02-24 PROCEDURE — 85025 COMPLETE CBC W/AUTO DIFF WBC: CPT

## 2020-02-24 PROCEDURE — A9270 NON-COVERED ITEM OR SERVICE: HCPCS | Performed by: INTERNAL MEDICINE

## 2020-02-24 PROCEDURE — 770022 HCHG ROOM/CARE - ICU (200)

## 2020-02-24 PROCEDURE — 94003 VENT MGMT INPAT SUBQ DAY: CPT

## 2020-02-24 PROCEDURE — 99291 CRITICAL CARE FIRST HOUR: CPT | Performed by: PSYCHIATRY & NEUROLOGY

## 2020-02-24 PROCEDURE — 94640 AIRWAY INHALATION TREATMENT: CPT

## 2020-02-24 PROCEDURE — 700101 HCHG RX REV CODE 250: Performed by: INTERNAL MEDICINE

## 2020-02-24 PROCEDURE — 700101 HCHG RX REV CODE 250: Performed by: PSYCHIATRY & NEUROLOGY

## 2020-02-24 PROCEDURE — 700111 HCHG RX REV CODE 636 W/ 250 OVERRIDE (IP): Performed by: INTERNAL MEDICINE

## 2020-02-24 PROCEDURE — 71045 X-RAY EXAM CHEST 1 VIEW: CPT

## 2020-02-24 PROCEDURE — 700102 HCHG RX REV CODE 250 W/ 637 OVERRIDE(OP): Performed by: INTERNAL MEDICINE

## 2020-02-24 PROCEDURE — 700105 HCHG RX REV CODE 258: Performed by: PSYCHIATRY & NEUROLOGY

## 2020-02-24 PROCEDURE — 84100 ASSAY OF PHOSPHORUS: CPT

## 2020-02-24 PROCEDURE — 83735 ASSAY OF MAGNESIUM: CPT

## 2020-02-24 PROCEDURE — 36600 WITHDRAWAL OF ARTERIAL BLOOD: CPT

## 2020-02-24 RX ADMIN — HYDROMORPHONE HYDROCHLORIDE 1 MG: 1 INJECTION, SOLUTION INTRAMUSCULAR; INTRAVENOUS; SUBCUTANEOUS at 13:43

## 2020-02-24 RX ADMIN — GABAPENTIN 1600 MG: 400 CAPSULE ORAL at 17:39

## 2020-02-24 RX ADMIN — ENOXAPARIN SODIUM 40 MG: 100 INJECTION SUBCUTANEOUS at 05:39

## 2020-02-24 RX ADMIN — IPRATROPIUM BROMIDE AND ALBUTEROL SULFATE 3 ML: .5; 3 SOLUTION RESPIRATORY (INHALATION) at 11:06

## 2020-02-24 RX ADMIN — PREDNISONE 80 MG: 20 TABLET ORAL at 05:39

## 2020-02-24 RX ADMIN — METHADONE HYDROCHLORIDE 30 MG: 10 TABLET ORAL at 05:39

## 2020-02-24 RX ADMIN — PROPOFOL 50 MCG/KG/MIN: 10 INJECTION, EMULSION INTRAVENOUS at 17:16

## 2020-02-24 RX ADMIN — GABAPENTIN 2400 MG: 400 CAPSULE ORAL at 05:38

## 2020-02-24 RX ADMIN — HYDROMORPHONE HYDROCHLORIDE 1 MG: 1 INJECTION, SOLUTION INTRAMUSCULAR; INTRAVENOUS; SUBCUTANEOUS at 00:28

## 2020-02-24 RX ADMIN — PROPOFOL 50 MCG/KG/MIN: 10 INJECTION, EMULSION INTRAVENOUS at 12:41

## 2020-02-24 RX ADMIN — MAGNESIUM HYDROXIDE 30 ML: 400 SUSPENSION ORAL at 15:56

## 2020-02-24 RX ADMIN — SIMVASTATIN 40 MG: 40 TABLET, FILM COATED ORAL at 21:07

## 2020-02-24 RX ADMIN — METHADONE HYDROCHLORIDE 30 MG: 10 TABLET ORAL at 15:51

## 2020-02-24 RX ADMIN — HYDROMORPHONE HYDROCHLORIDE 1 MG: 1 INJECTION, SOLUTION INTRAMUSCULAR; INTRAVENOUS; SUBCUTANEOUS at 23:39

## 2020-02-24 RX ADMIN — IPRATROPIUM BROMIDE AND ALBUTEROL SULFATE 3 ML: .5; 3 SOLUTION RESPIRATORY (INHALATION) at 02:04

## 2020-02-24 RX ADMIN — METHADONE HYDROCHLORIDE 10 MG: 10 TABLET ORAL at 21:07

## 2020-02-24 RX ADMIN — PROPOFOL 50 MCG/KG/MIN: 10 INJECTION, EMULSION INTRAVENOUS at 09:10

## 2020-02-24 RX ADMIN — IPRATROPIUM BROMIDE AND ALBUTEROL SULFATE 3 ML: .5; 3 SOLUTION RESPIRATORY (INHALATION) at 07:29

## 2020-02-24 RX ADMIN — POLYETHYLENE GLYCOL 3350 1 PACKET: 17 POWDER, FOR SOLUTION ORAL at 05:39

## 2020-02-24 RX ADMIN — HYDROMORPHONE HYDROCHLORIDE 1 MG: 1 INJECTION, SOLUTION INTRAMUSCULAR; INTRAVENOUS; SUBCUTANEOUS at 02:45

## 2020-02-24 RX ADMIN — TIMOLOL MALEATE 1 DROP: 5 SOLUTION OPHTHALMIC at 17:39

## 2020-02-24 RX ADMIN — POLYETHYLENE GLYCOL 3350 1 PACKET: 17 POWDER, FOR SOLUTION ORAL at 17:39

## 2020-02-24 RX ADMIN — TRAMADOL HYDROCHLORIDE 50 MG: 50 TABLET, FILM COATED ORAL at 05:39

## 2020-02-24 RX ADMIN — IPRATROPIUM BROMIDE AND ALBUTEROL SULFATE 3 ML: .5; 3 SOLUTION RESPIRATORY (INHALATION) at 18:51

## 2020-02-24 RX ADMIN — TRAMADOL HYDROCHLORIDE 50 MG: 50 TABLET, FILM COATED ORAL at 17:39

## 2020-02-24 RX ADMIN — SENNOSIDES AND DOCUSATE SODIUM 2 TABLET: 8.6; 5 TABLET ORAL at 05:38

## 2020-02-24 RX ADMIN — IPRATROPIUM BROMIDE AND ALBUTEROL SULFATE 3 ML: .5; 3 SOLUTION RESPIRATORY (INHALATION) at 14:44

## 2020-02-24 RX ADMIN — IPRATROPIUM BROMIDE AND ALBUTEROL SULFATE 3 ML: .5; 3 SOLUTION RESPIRATORY (INHALATION) at 22:30

## 2020-02-24 RX ADMIN — POTASSIUM PHOSPHATE, MONOBASIC AND POTASSIUM PHOSPHATE, DIBASIC 15 MMOL: 224; 236 INJECTION, SOLUTION, CONCENTRATE INTRAVENOUS at 08:07

## 2020-02-24 RX ADMIN — PROPOFOL 40 MCG/KG/MIN: 10 INJECTION, EMULSION INTRAVENOUS at 00:09

## 2020-02-24 RX ADMIN — PROPOFOL 50 MCG/KG/MIN: 10 INJECTION, EMULSION INTRAVENOUS at 04:30

## 2020-02-24 RX ADMIN — PROPOFOL 50 MCG/KG/MIN: 10 INJECTION, EMULSION INTRAVENOUS at 22:30

## 2020-02-24 RX ADMIN — SULFAMETHOXAZOLE AND TRIMETHOPRIM 1 TABLET: 800; 160 TABLET ORAL at 05:39

## 2020-02-24 RX ADMIN — OMEPRAZOLE 40 MG: KIT at 17:39

## 2020-02-24 RX ADMIN — LEVOTHYROXINE SODIUM 125 MCG: 125 TABLET ORAL at 07:00

## 2020-02-24 RX ADMIN — SENNOSIDES AND DOCUSATE SODIUM 2 TABLET: 8.6; 5 TABLET ORAL at 17:39

## 2020-02-24 NOTE — PROGRESS NOTES
Critical Care Progress Note    Date of admission  2/18/2020    Chief Complaint  59 y.o. male who presented 2/18/2020 with history of oxygen dependent chronic hypoxic respiratory failure, requiring 2.5-3 L home oxygen, emphysema, pulmonary hypertension and chronic pain who is on methadone.  He was transferred from [place] for pulmonary specialty care with a left lower lobe pneumonia and pneumothorax on 2/18.  Influenza screening was negative.  Shortly after arriving at [facility] he was noted to have a dislodged chest tube.  A replacement chest tube was not necessary.  He was requiring 5 L of oxygen via nasal cannula on 2/19/2/20.  A pulmonology consultation requested a high resolution CT scan, the initiated a connective tissue work-up and continued broad-spectrum antibiotics.  On the morning of 2/21 patient had increasing work of breathing and hypoxic respiratory failure.  Critical care consultation was requested for further evaluation and management.    Hospital Course    2/21-patient was evaluated in the IR suite for placement of a pigtail catheter.  At the time the residual pneumothorax was felt to be too small to benefit from catheter placement.  At 2335 patient became hypoxic, tachycardic and hypotensive.  Chest x-ray revealed a large pneumothorax.  An emergent chest tube was placed with improvement in hemodynamic and oxygenation parameters.  2/22- Pulmonary biopsy performed.  Prednisone 1 mg/kilogram/day initiated      Interval Problem Update  Reviewed last 24 hour events:              - Tm: Afebrile              - HR: 50-60s              - SBP: 100-150s              - Neuro: Follows, eyes disconjugate, MAEW              - GI: NPO              - UOP: 2.5L              - Bell: Yes              - Lines: CVC, chest tube              - PPx: Home PPI, Lovenox              - CXR (personally reviewed):               - Antibiotic C3, Flagyl, Bactrim    INFUSIONS:  Propofol      Review of  Systems  Review of Systems   Unable to perform ROS: Intubated        Vital Signs for last 24 hours   Pulse:  [53-80] 59  Resp:  [19-54] 28  BP: ()/(52-92) 151/88  SpO2:  [93 %-97 %] 94 %    Hemodynamic parameters for last 24 hours       Respiratory Information for the last 24 hours  Vent Mode: APVCMV  Rate (breaths/min): 20  Vt Target (mL): 450  PEEP/CPAP: 5  P Support: 5  MAP: 7.3  Length of Weaning Trial (Hours): 0.7  Control VTE (exp VT): 569    Physical Exam   Physical Exam  Constitutional:       Comments: Intubated   HENT:      Head: Normocephalic and atraumatic.      Mouth/Throat:      Mouth: Mucous membranes are moist.   Eyes:      Pupils: Pupils are equal, round, and reactive to light.      Comments: Disconjugate   Neck:      Musculoskeletal: Neck supple.   Cardiovascular:      Rate and Rhythm: Regular rhythm.      Heart sounds: No murmur. No friction rub. No gallop.    Pulmonary:      Comments: Scattered crackles and wheezes  Left chest tube in good position  Persistent  air leak  Abdominal:      General: There is no distension.      Palpations: Abdomen is soft.      Tenderness: There is no abdominal tenderness.   Musculoskeletal:         General: No swelling.      Right lower leg: No edema.      Left lower leg: No edema.   Skin:     General: Skin is warm and dry.   Neurological:      Comments: Opens eyes to voice follows commands.   strength equally with both hands.         Medications  Current Facility-Administered Medications   Medication Dose Route Frequency Provider Last Rate Last Dose   • potassium phosphates 15 mmol in  mL ivpb  15 mmol Intravenous Once Srinath Sauceda M.D.       • polyethylene glycol/lytes (MIRALAX) PACKET 1 Packet  1 Packet Enteral Tube BID Marck Javier M.D.   1 Packet at 02/24/20 0539    And   • senna-docusate (PERICOLACE or SENOKOT S) 8.6-50 MG per tablet 2 Tab  2 Tab Enteral Tube BID Marck Javier M.D.   2 Tab at 02/24/20 0538    And   • magnesium hydroxide  (MILK OF MAGNESIA) suspension 30 mL  30 mL Enteral Tube QDAY PRN Marck Javier M.D.        And   • bisacodyl (DULCOLAX) suppository 10 mg  10 mg Rectal QDAY PRN Marck Javier M.D.       • enoxaparin (LOVENOX) inj 40 mg  40 mg Subcutaneous DAILY Marck Javier M.D.   40 mg at 02/24/20 0539   • HYDROmorphone pf (DILAUDID) injection 1 mg  1 mg Intravenous Q2HRS PRN Marck Javier M.D.   1 mg at 02/24/20 0245   • Pharmacy Consult: Enteral tube insertion - review meds/change route/product selection  1 Each Other PHARMACY TO DOSE Marck Jvaier M.D.       • predniSONE (DELTASONE) tablet 80 mg  80 mg Enteral Tube DAILY Marck Javier M.D.   80 mg at 02/24/20 0539   • sulfamethoxazole-trimethoprim (BACTRIM DS) 800-160 MG tablet 1 Tab  1 Tab Enteral Tube DAILY Marck Javier M.D.   1 Tab at 02/24/20 0539   • propofol (DIPRIVAN) injection  0-80 mcg/kg/min Intravenous Continuous Marck Javier M.D. 23.6 mL/hr at 02/24/20 0731 50 mcg/kg/min at 02/24/20 0731   • ipratropium-albuterol (DUONEB) nebulizer solution  3 mL Nebulization Q2HRS PRN (RT) Marck Javier M.D.       • MD Alert...ICU Electrolyte Replacement per Pharmacy   Other PHARMACY TO DOSE Marck Javier M.D.       • lidocaine (XYLOCAINE) 1 % injection 1-2 mL  1-2 mL Tracheal Tube Q30 MIN PRN Marck Javier M.D.       • gabapentin (NEURONTIN) capsule 2,400 mg  2,400 mg Enteral Tube QAM Marck Javier M.D.   2,400 mg at 02/24/20 0538    And   • gabapentin (NEURONTIN) capsule 1,600 mg  1,600 mg Enteral Tube Q EVENING Marck Javier M.D.   1,600 mg at 02/23/20 1839   • levothyroxine (SYNTHROID) tablet 125 mcg  125 mcg Enteral Tube QAM  Marck Javier M.D.   125 mcg at 02/24/20 0700   • methadone (DOLOPHINE) tablet 30 mg  30 mg Enteral Tube BID Marck Javier M.D.   30 mg at 02/24/20 0539    And   • methadone (DOLOPHINE) tablet 10 mg  10 mg Enteral Tube QHS Marck Javier M.D.   10 mg at 02/23/20 2148   • omeprazole (FIRST-OMEPRAZOLE) 2 mg/mL oral susp 40  mg  40 mg Enteral Tube Q EVENING Marck Javier M.D.   40 mg at 02/23/20 1839   • simvastatin (ZOCOR) tablet 40 mg  40 mg Enteral Tube Nightly Marck Javier M.D.   40 mg at 02/23/20 2148   • tramadol (ULTRAM) 50 MG tablet 50 mg  50 mg Enteral Tube BID Marck Javier M.D.   50 mg at 02/24/20 0539   • acetaminophen (TYLENOL) tablet 650 mg  650 mg Enteral Tube Q6HRS PRN Marck Javier M.D.       • ALPRAZolam (XANAX) tablet 0.25 mg  0.25 mg Enteral Tube 4X/DAY PRN Marck Javier M.D.       • guaiFENesin dextromethorphan (ROBITUSSIN DM) 100-10 MG/5ML syrup 10 mL  10 mL Enteral Tube Q6HRS PRN Marck Javier M.D.       • promethazine (PHENERGAN) tablet 12.5-25 mg  12.5-25 mg Enteral Tube Q4HRS PRN Marck Javier M.D.       • influenza vaccine quad injection 0.5 mL  0.5 mL Intramuscular Once PRN Eulogio Lopes M.D.       • ipratropium-albuterol (DUONEB) nebulizer solution  3 mL Nebulization Q4HRS (RT) Gypsy Mackey M.D.   3 mL at 02/24/20 0729   • timolol (TIMOPTIC) 0.5 % ophthalmic solution 1 Drop  1 Drop Both Eyes Q EVENING Tim Bustos M.D.   1 Drop at 02/23/20 1839   • Respiratory Therapy Consult   Nebulization Continuous RT Tim Bustos M.D.       • promethazine (PHENERGAN) suppository 12.5-25 mg  12.5-25 mg Rectal Q4HRS PRN Tim Bustos M.D.       • prochlorperazine (COMPAZINE) injection 5-10 mg  5-10 mg Intravenous Q4HRS PRN Tim Bustos M.D.       • albuterol (PROVENTIL) 2.5mg/0.5ml nebulizer solution 2.5 mg  2.5 mg Nebulization Q2HRS PRN (RT) Tim Bustos M.D.           Fluids    Intake/Output Summary (Last 24 hours) at 2/24/2020 0738  Last data filed at 2/24/2020 0600  Gross per 24 hour   Intake 2541.21 ml   Output 2715 ml   Net -173.79 ml       Laboratory  Recent Labs     02/21/20  0801  02/22/20  0421 02/23/20  0420 02/24/20  0400   [id number] 7.50  --   --   --   --    [id number] 30.7  --   --   --   --    [id number] 56.0*  --   --   --   --    [id number] 90.4*   --   --   --   --    ARTHCO3 24  --   --   --   --    ARTBE 1  --   --   --   --    ISTATAPH  --    < > 7.442 7.481 7.482   ISTATAPCO2  --    < > 36.3 34.3 35.1   ISTATAPO2  --    < > 71 66 53*   ISTATATCO2  --    < > 26 27 27   [id number]  --    < > 95 94 89*   ISTATARTHCO3  --    < > 24.8 25.6* 26.2*   ISTATARTBE  --    < > 1 2 3   ISTATTEMP  --    < > 98.6 F 98.1 F 98.2 F   ISTATFIO2  --    < > 40 40 30   ISTATSPEC  --    < > Arterial Arterial Arterial   ISTATAPHTC  --    < > 7.442 7.485 7.485   [id number]  --    < > 71 65 52*    < > = values in this interval not displayed.         Recent Labs     02/22/20 0237 02/23/20 0415 02/24/20  0245   SODIUM 139 144 141   POTASSIUM 3.9 3.5* 3.5*   CHLORIDE 104 107 107   CO2 28 29 26   BUN 22 20 25*   CREATININE 0.79 0.71 0.80   MAGNESIUM 2.0 2.2 2.1   PHOSPHORUS 2.9 2.2* 2.3*   CALCIUM 8.3* 8.0* 8.1*     Recent Labs     02/22/20 0237 02/23/20 0415 02/24/20  0245   ALTSGPT 6  --  7   ASTSGOT 20  --  15   ALKPHOSPHAT 81  --  67   TBILIRUBIN 0.3  --  0.3   PREALBUMIN  --  7.0*  --    GLUCOSE 120* 121* 114*     Recent Labs     02/22/20 0237 02/23/20 0415 02/24/20  0245   WBC 15.0* 13.6* 16.2*   NEUTSPOLYS 81.40* 75.70* 70.20   LYMPHOCYTES 8.50* 13.70* 19.30*   MONOCYTES 8.50 9.30 8.50   EOSINOPHILS 0.70 0.20 1.40   BASOPHILS 0.40 0.20 0.20   ASTSGOT 20  --  15   ALTSGPT 6  --  7   ALKPHOSPHAT 81  --  67   TBILIRUBIN 0.3  --  0.3     Recent Labs     02/21/20  0856 02/22/20  0237 02/23/20  0415 02/24/20  0245   RBC  --  4.19* 3.99* 4.29*   HEMOGLOBIN  --  11.1* 10.5* 11.4*   HEMATOCRIT  --  35.2* 32.7* 34.9*   PLATELETCT  --  280 256 269   PROTHROMBTM 16.3*  --   --   --    APTT 35.8  --   --   --    INR 1.28*  --   --   --        Imaging  X-Ray:  I have personally reviewed the images and compared with prior images.    Assessment/Plan  Acute on chronic respiratory failure with hypoxia (HCC)- (present on admission)  Assessment & Plan  Acute hypoxic respiratory  failure  Attributed to evolving NSIP  Complicated by underlying emphysematous disease.   Lung protective ventilation strategies  Titrate ventilator prescription to optimize oxygenation, ventilation, and acid base balance.  Daily ABC trials    Pulmonary biopsy on 2/22 am  Follow up connective tissue w/u  Prednisone 1 mg/KG/day  Bactrim prophylaxis    Pneumonia- (present on admission)  Assessment & Plan  NSIP  Cont prednisone    Pneumothorax  Assessment & Plan  Status post left tension pneumothorax  Pneumothorax improved with chest tube in place  Chest tube in good position  Continue chest tube to suction    He has proven to be high risk for pneumothorax   status post pulmonary biopsy  Serial chest x-rays  Pleural tube placement as clinically indicated      Chronic pain- (present on admission)  Assessment & Plan  Continue gabapentin regimen  Continue methadone regimen  Continue tramadol  Dilaudid 1 mg IV every 2 hours as needed breakthrough pain           VTE:  Lovenox  Ulcer: PPI  Lines: Central Line  Ongoing indication addressed    I have performed a physical exam and reviewed and updated ROS and Plan today (2/24/2020). In review of yesterday's note (2/23/2020), there are no changes except as documented above.     Discussed patient condition and risk of morbidity and/or mortality with RN, RT, Pharmacy, Code status disscussed, Patient and Pulmonary       The patient remains critically ill.  I have assessed and reassessed the respiratory status and made ventilator adjustments based upon arterial blood gas analysis, ventilator waveforms and airway mechanics.  I have assessed and reassessed the blood pressure, hemodynamics, cardiovascular status. This patient remains at high risk for worsening cardiopulmonary dysfunction and death without the above critical care interventions.'     Critical care time 38 minutes in directly providing and coordinating critical care and extensive data review.  No time overlap and excludes  procedures.

## 2020-02-24 NOTE — CARE PLAN
Problem: Safety  Goal: Will remain free from injury  Outcome: PROGRESSING AS EXPECTED  Goal: Will remain free from falls  Outcome: PROGRESSING AS EXPECTED     Problem: Venous Thromboembolism (VTW)/Deep Vein Thrombosis (DVT) Prevention:  Goal: Patient will participate in Venous Thrombosis (VTE)/Deep Vein Thrombosis (DVT)Prevention Measures  Outcome: PROGRESSING AS EXPECTED     Problem: Bowel/Gastric:  Goal: Normal bowel function is maintained or improved  Outcome: PROGRESSING AS EXPECTED  Goal: Will not experience complications related to bowel motility  Outcome: PROGRESSING AS EXPECTED     Problem: Pain Management  Goal: Pain level will decrease to patient's comfort goal  Outcome: PROGRESSING AS EXPECTED     Problem: Safety - Medical Restraint  Goal: Remains free of injury from restraints (Restraint for Interference with Medical Device)  Description: INTERVENTIONS:  1. Determine that other, less restrictive measures have been tried or would not be effective before applying the restraint  2. Evaluate the patient's condition at the time of restraint application  3. Inform patient/family regarding the reason for restraint  4. Q2H: Monitor safety, psychosocial status, comfort, nutrition and hydration  Outcome: PROGRESSING AS EXPECTED  Goal: Free from restraint(s) (Restraint for Interference with Medical Device)  Description: INTERVENTIONS:  1. ONCE/SHIFT or MINIMUM Q12H: Assess and document the continuing need for restraints  2. Q24H: Continued use of restraint requires LIP to perform face to face examination and written order  3. Identify and implement measures to help patient regain control  Outcome: PROGRESSING AS EXPECTED

## 2020-02-24 NOTE — CARE PLAN
Problem: Nutritional:  Goal: Nutrition support tolerated and meeting greater than 85% of estimated needs  Outcome: PROGRESSING AS EXPECTED  TF @ goal with propofol.

## 2020-02-24 NOTE — CARE PLAN
Adult Ventilation Update    Total Vent Days: 4  Vent: 20/450/+5/30%    Patient Lines/Drains/Airways Status      Active Airway       Name: Placement date: Placement time: Site: Days:    Airway ETT 8.0  02/21/20 0845   --  4                    In the last 24 hours, the patient tolerated SBT for 1.0 on settings of 5/5.    $ FVC / Vital Capacity (liters) : (Not following) (02/23/20 0509)  NIF (cm H2O) : (Not following) (02/23/20 0509)  Rapid Shallow Breathing Index (RR/VT): 19 (02/23/20 0509)  Plateau Pressure: 16 (02/23/20 2232)  Static Compliance (ml / cm H2O): 57.2 (02/24/20 0206)    Patient failed trials because of Barriers to Wean: No Order (02/21/20 1102)  Barriers to SBT Weaning Trial Stopped due to:: Pt weaned for 1 hour and returned to rest settings per protocol (02/23/20 0509)  Length of Weaning Trial Length of Weaning Trial (Hours): 1.0 (02/23/20 0509)    Cough: Productive (02/24/20 0204)  Sputum Amount: Small (02/24/20 0204)  Sputum Color: Tan;Clear (02/24/20 0204)  Sputum Consistency: Thick;Thin (02/24/20 0204)    Mobility  Level of Mobility: Level IV (02/23/20 2000)  Activity Performed: Unable to mobilize (02/23/20 2000)  Time Activity Tolerated: 5 min (02/20/20 0800)  Distance Per Occurrence (ft.): 10 feet (02/20/20 0800)  # of Times Distance was Traveled: 2 (02/20/20 0800)  Assistance: Assistance of Two or More (02/21/20 2000)  Ambulation Tolerance: Tolerates Well (02/20/20 2015)  Pt Calls for Assistance: No (02/21/20 2000)  Staff Present for Mobilization: RN;CNA (02/20/20 2015)  Gait: Shuffle;Steady (02/20/20 2015)  Assistive Devices: None (02/20/20 2015)  Reason Not Mobilized: Unstable condition(High risk w/extubation) (02/23/20 2000)  Mobilization Comments: new chest tube insertion (02/22/20 0800)    Events/Summary/Plan: FiO2 to 30% (02/24/20 0204). No other vent changes made this shift. Pt currently stable on vent.

## 2020-02-24 NOTE — PROGRESS NOTES
AM bedside report received from NOC RN. Plan of care discussed. Lines labs med's and orders reviewed. Neuro assessment completed with NOC RN.

## 2020-02-25 ENCOUNTER — APPOINTMENT (OUTPATIENT)
Dept: RADIOLOGY | Facility: MEDICAL CENTER | Age: 60
DRG: 166 | End: 2020-02-25
Attending: INTERNAL MEDICINE
Payer: MEDICARE

## 2020-02-25 LAB
ACTION RANGE TRIGGERED IACRT: NO
ANION GAP SERPL CALC-SCNC: 6 MMOL/L (ref 0–11.9)
ANION GAP SERPL CALC-SCNC: 8 MMOL/L (ref 0–11.9)
BASE EXCESS BLDA CALC-SCNC: 2 MMOL/L (ref -4–3)
BASOPHILS # BLD AUTO: 0.3 % (ref 0–1.8)
BASOPHILS # BLD: 0.05 K/UL (ref 0–0.12)
BODY TEMPERATURE: ABNORMAL DEGREES
BUN SERPL-MCNC: 25 MG/DL (ref 8–22)
BUN SERPL-MCNC: 25 MG/DL (ref 8–22)
CALCIUM SERPL-MCNC: 8.5 MG/DL (ref 8.5–10.5)
CALCIUM SERPL-MCNC: 9.1 MG/DL (ref 8.5–10.5)
CHLORIDE SERPL-SCNC: 102 MMOL/L (ref 96–112)
CHLORIDE SERPL-SCNC: 104 MMOL/L (ref 96–112)
CO2 BLDA-SCNC: 26 MMOL/L (ref 20–33)
CO2 SERPL-SCNC: 27 MMOL/L (ref 20–33)
CO2 SERPL-SCNC: 28 MMOL/L (ref 20–33)
CREAT SERPL-MCNC: 0.79 MG/DL (ref 0.5–1.4)
CREAT SERPL-MCNC: 0.82 MG/DL (ref 0.5–1.4)
EOSINOPHIL # BLD AUTO: 0.44 K/UL (ref 0–0.51)
EOSINOPHIL NFR BLD: 2.8 % (ref 0–6.9)
ERYTHROCYTE [DISTWIDTH] IN BLOOD BY AUTOMATED COUNT: 46 FL (ref 35.9–50)
GLUCOSE SERPL-MCNC: 108 MG/DL (ref 65–99)
GLUCOSE SERPL-MCNC: 159 MG/DL (ref 65–99)
HCO3 BLDA-SCNC: 25.4 MMOL/L (ref 17–25)
HCT VFR BLD AUTO: 37.9 % (ref 42–52)
HGB BLD-MCNC: 12.1 G/DL (ref 14–18)
HOROWITZ INDEX BLDA+IHG-RTO: 145 MM[HG]
IMM GRANULOCYTES # BLD AUTO: 0.13 K/UL (ref 0–0.11)
IMM GRANULOCYTES NFR BLD AUTO: 0.8 % (ref 0–0.9)
INST. QUALIFIED PATIENT IIQPT: YES
LYMPHOCYTES # BLD AUTO: 3.13 K/UL (ref 1–4.8)
LYMPHOCYTES NFR BLD: 20.2 % (ref 22–41)
MCH RBC QN AUTO: 26.2 PG (ref 27–33)
MCHC RBC AUTO-ENTMCNC: 31.9 G/DL (ref 33.7–35.3)
MCV RBC AUTO: 82.2 FL (ref 81.4–97.8)
MONOCYTES # BLD AUTO: 1.24 K/UL (ref 0–0.85)
MONOCYTES NFR BLD AUTO: 8 % (ref 0–13.4)
NEUTROPHILS # BLD AUTO: 10.48 K/UL (ref 1.82–7.42)
NEUTROPHILS NFR BLD: 67.9 % (ref 44–72)
NRBC # BLD AUTO: 0 K/UL
NRBC BLD-RTO: 0 /100 WBC
O2/TOTAL GAS SETTING VFR VENT: 40 %
PCO2 BLDA: 34.2 MMHG (ref 26–37)
PCO2 TEMP ADJ BLDA: 34 MMHG (ref 26–37)
PH BLDA: 7.48 [PH] (ref 7.4–7.5)
PH TEMP ADJ BLDA: 7.48 [PH] (ref 7.4–7.5)
PHOSPHATE SERPL-MCNC: 2.8 MG/DL (ref 2.5–4.5)
PHOSPHATE SERPL-MCNC: 3.5 MG/DL (ref 2.5–4.5)
PLATELET # BLD AUTO: 294 K/UL (ref 164–446)
PMV BLD AUTO: 9.2 FL (ref 9–12.9)
PO2 BLDA: 58 MMHG (ref 64–87)
PO2 TEMP ADJ BLDA: 58 MMHG (ref 64–87)
POTASSIUM SERPL-SCNC: 3.6 MMOL/L (ref 3.6–5.5)
POTASSIUM SERPL-SCNC: 4.6 MMOL/L (ref 3.6–5.5)
RBC # BLD AUTO: 4.61 M/UL (ref 4.7–6.1)
SAO2 % BLDA: 92 % (ref 93–99)
SODIUM SERPL-SCNC: 137 MMOL/L (ref 135–145)
SODIUM SERPL-SCNC: 138 MMOL/L (ref 135–145)
SPECIMEN DRAWN FROM PATIENT: ABNORMAL
WBC # BLD AUTO: 15.5 K/UL (ref 4.8–10.8)

## 2020-02-25 PROCEDURE — 82803 BLOOD GASES ANY COMBINATION: CPT

## 2020-02-25 PROCEDURE — 36600 WITHDRAWAL OF ARTERIAL BLOOD: CPT

## 2020-02-25 PROCEDURE — 71045 X-RAY EXAM CHEST 1 VIEW: CPT

## 2020-02-25 PROCEDURE — 84100 ASSAY OF PHOSPHORUS: CPT | Mod: 91

## 2020-02-25 PROCEDURE — 700102 HCHG RX REV CODE 250 W/ 637 OVERRIDE(OP): Performed by: INTERNAL MEDICINE

## 2020-02-25 PROCEDURE — A9270 NON-COVERED ITEM OR SERVICE: HCPCS | Performed by: INTERNAL MEDICINE

## 2020-02-25 PROCEDURE — A9270 NON-COVERED ITEM OR SERVICE: HCPCS | Performed by: PSYCHIATRY & NEUROLOGY

## 2020-02-25 PROCEDURE — 99291 CRITICAL CARE FIRST HOUR: CPT | Performed by: PSYCHIATRY & NEUROLOGY

## 2020-02-25 PROCEDURE — 700102 HCHG RX REV CODE 250 W/ 637 OVERRIDE(OP): Performed by: PSYCHIATRY & NEUROLOGY

## 2020-02-25 PROCEDURE — 700111 HCHG RX REV CODE 636 W/ 250 OVERRIDE (IP): Performed by: INTERNAL MEDICINE

## 2020-02-25 PROCEDURE — 700101 HCHG RX REV CODE 250: Performed by: INTERNAL MEDICINE

## 2020-02-25 PROCEDURE — 80048 BASIC METABOLIC PNL TOTAL CA: CPT | Mod: 91

## 2020-02-25 PROCEDURE — 94640 AIRWAY INHALATION TREATMENT: CPT

## 2020-02-25 PROCEDURE — 85025 COMPLETE CBC W/AUTO DIFF WBC: CPT

## 2020-02-25 PROCEDURE — 94003 VENT MGMT INPAT SUBQ DAY: CPT

## 2020-02-25 PROCEDURE — 770022 HCHG ROOM/CARE - ICU (200)

## 2020-02-25 RX ORDER — OXYCODONE HYDROCHLORIDE 5 MG/1
5 TABLET ORAL EVERY 4 HOURS PRN
Status: DISCONTINUED | OUTPATIENT
Start: 2020-02-25 | End: 2020-03-03

## 2020-02-25 RX ORDER — OXYCODONE HYDROCHLORIDE 10 MG/1
10 TABLET ORAL EVERY 4 HOURS PRN
Status: DISCONTINUED | OUTPATIENT
Start: 2020-02-25 | End: 2020-03-03

## 2020-02-25 RX ADMIN — TRAMADOL HYDROCHLORIDE 50 MG: 50 TABLET, FILM COATED ORAL at 05:00

## 2020-02-25 RX ADMIN — PROPOFOL 50 MCG/KG/MIN: 10 INJECTION, EMULSION INTRAVENOUS at 05:54

## 2020-02-25 RX ADMIN — METHADONE HYDROCHLORIDE 30 MG: 10 TABLET ORAL at 15:27

## 2020-02-25 RX ADMIN — IPRATROPIUM BROMIDE AND ALBUTEROL SULFATE 3 ML: .5; 3 SOLUTION RESPIRATORY (INHALATION) at 22:14

## 2020-02-25 RX ADMIN — PREDNISONE 80 MG: 20 TABLET ORAL at 05:00

## 2020-02-25 RX ADMIN — POLYETHYLENE GLYCOL 3350 1 PACKET: 17 POWDER, FOR SOLUTION ORAL at 05:00

## 2020-02-25 RX ADMIN — HYDROMORPHONE HYDROCHLORIDE 1 MG: 1 INJECTION, SOLUTION INTRAMUSCULAR; INTRAVENOUS; SUBCUTANEOUS at 07:58

## 2020-02-25 RX ADMIN — IPRATROPIUM BROMIDE AND ALBUTEROL SULFATE 3 ML: .5; 3 SOLUTION RESPIRATORY (INHALATION) at 18:58

## 2020-02-25 RX ADMIN — PROPOFOL 40 MCG/KG/MIN: 10 INJECTION, EMULSION INTRAVENOUS at 20:24

## 2020-02-25 RX ADMIN — ENOXAPARIN SODIUM 40 MG: 100 INJECTION SUBCUTANEOUS at 05:00

## 2020-02-25 RX ADMIN — IPRATROPIUM BROMIDE AND ALBUTEROL SULFATE 3 ML: .5; 3 SOLUTION RESPIRATORY (INHALATION) at 05:40

## 2020-02-25 RX ADMIN — SENNOSIDES AND DOCUSATE SODIUM 2 TABLET: 8.6; 5 TABLET ORAL at 18:29

## 2020-02-25 RX ADMIN — OMEPRAZOLE 40 MG: KIT at 18:28

## 2020-02-25 RX ADMIN — METHADONE HYDROCHLORIDE 10 MG: 10 TABLET ORAL at 20:23

## 2020-02-25 RX ADMIN — IPRATROPIUM BROMIDE AND ALBUTEROL SULFATE 3 ML: .5; 3 SOLUTION RESPIRATORY (INHALATION) at 10:51

## 2020-02-25 RX ADMIN — SULFAMETHOXAZOLE AND TRIMETHOPRIM 1 TABLET: 800; 160 TABLET ORAL at 05:00

## 2020-02-25 RX ADMIN — METHADONE HYDROCHLORIDE 30 MG: 10 TABLET ORAL at 05:00

## 2020-02-25 RX ADMIN — IPRATROPIUM BROMIDE AND ALBUTEROL SULFATE 3 ML: .5; 3 SOLUTION RESPIRATORY (INHALATION) at 15:41

## 2020-02-25 RX ADMIN — GABAPENTIN 2400 MG: 400 CAPSULE ORAL at 05:01

## 2020-02-25 RX ADMIN — TRAMADOL HYDROCHLORIDE 50 MG: 50 TABLET, FILM COATED ORAL at 18:29

## 2020-02-25 RX ADMIN — POLYETHYLENE GLYCOL 3350 1 PACKET: 17 POWDER, FOR SOLUTION ORAL at 18:28

## 2020-02-25 RX ADMIN — TIMOLOL MALEATE 1 DROP: 5 SOLUTION OPHTHALMIC at 19:03

## 2020-02-25 RX ADMIN — POTASSIUM BICARBONATE 50 MEQ: 978 TABLET, EFFERVESCENT ORAL at 08:50

## 2020-02-25 RX ADMIN — PROPOFOL 50 MCG/KG/MIN: 10 INJECTION, EMULSION INTRAVENOUS at 16:31

## 2020-02-25 RX ADMIN — GABAPENTIN 1600 MG: 400 CAPSULE ORAL at 18:28

## 2020-02-25 RX ADMIN — BISACODYL 10 MG: 10 SUPPOSITORY RECTAL at 11:54

## 2020-02-25 RX ADMIN — LEVOTHYROXINE SODIUM 125 MCG: 125 TABLET ORAL at 06:00

## 2020-02-25 RX ADMIN — SIMVASTATIN 40 MG: 40 TABLET, FILM COATED ORAL at 20:24

## 2020-02-25 RX ADMIN — IPRATROPIUM BROMIDE AND ALBUTEROL SULFATE 3 ML: .5; 3 SOLUTION RESPIRATORY (INHALATION) at 02:37

## 2020-02-25 RX ADMIN — PROPOFOL 50 MCG/KG/MIN: 10 INJECTION, EMULSION INTRAVENOUS at 01:39

## 2020-02-25 RX ADMIN — OXYCODONE HYDROCHLORIDE 5 MG: 5 TABLET ORAL at 14:02

## 2020-02-25 RX ADMIN — PROPOFOL 50 MCG/KG/MIN: 10 INJECTION, EMULSION INTRAVENOUS at 11:44

## 2020-02-25 RX ADMIN — SENNOSIDES AND DOCUSATE SODIUM 2 TABLET: 8.6; 5 TABLET ORAL at 05:00

## 2020-02-25 NOTE — PROGRESS NOTES
Lab called the unit to notify RN that green top for morning labs that was sent at 0245 did not contain enough blood, RN will redraw sample.

## 2020-02-25 NOTE — PROGRESS NOTES
Dr. Gonda paged to discuss this morning's CXR result. Per MD, pneumothorax looks slightly larger than yesterday. MD wants RN to continue to monitor for now, and notify MD with any changes. No new orders received at this time.

## 2020-02-25 NOTE — CARE PLAN
Adult Ventilation Update    Total Vent Days: 5  Vent: 20/450/+5/40%  Duo q4    Patient Lines/Drains/Airways Status      Active Airway       Name: Placement date: Placement time: Site: Days:    Airway ETT 8.0  02/21/20 0845   --  5                    In the last 24 hours, the patient tolerated SBT for 0.7 on settings of 5/5.    $ FVC / Vital Capacity (liters) : (Not following) (02/24/20 0528)  NIF (cm H2O) : (Not following) (02/24/20 0528)  Rapid Shallow Breathing Index (RR/VT): 15 (02/24/20 0528)  Plateau Pressure: 21 (02/24/20 1852)  Static Compliance (ml / cm H2O): 26.6 (02/24/20 2232)    Patient failed trials because of Barriers to Wean: No Order (02/21/20 1102)  Barriers to SBT Weaning Trial Stopped due to:: Pt weaned for 1 hour and returned to rest settings per protocol (02/24/20 0528)  Length of Weaning Trial Length of Weaning Trial (Hours): 0.7 (02/24/20 0528)    Cough: Non Productive (02/25/20 0000)  Sputum Amount: Unable to Evaluate (02/25/20 0000)  Sputum Color: Unable to Evaluate (02/25/20 0000)  Sputum Consistency: Unable to Evaluate (02/25/20 0000)    Mobility  Level of Mobility: Level IV (02/25/20 0000)  Activity Performed: Edge of bed (02/25/20 0000)  Time Activity Tolerated: 5 min (02/25/20 0000)  Distance Per Occurrence (ft.): 10 feet (02/20/20 0800)  # of Times Distance was Traveled: 2 (02/20/20 0800)  Assistance: Assistance of Two or More (02/25/20 0000)  Ambulation Tolerance: Tolerates Well;Tires Quickly (02/25/20 0000)  Pt Calls for Assistance: No (02/25/20 0000)  Staff Present for Mobilization: RN;RT (02/25/20 0000)  Gait: Unable to Ambulate (02/25/20 0000)  Assistive Devices: Hand held assist (02/25/20 0000)  Reason Not Mobilized: Unstable condition(High risk w/extubation) (02/23/20 2000)  Mobilization Comments: new chest tube insertion (02/22/20 0800)    Events/Summary/Plan: No vent changes made this shift. Pt currently stable on vent.

## 2020-02-25 NOTE — PROGRESS NOTES
Critical Care Progress Note    Date of admission  2/18/2020    Chief Complaint  59 y.o. male who presented 2/18/2020 with history of oxygen dependent chronic hypoxic respiratory failure, requiring 2.5-3 L home oxygen, emphysema, pulmonary hypertension and chronic pain who is on methadone.  He was transferred from [place] for pulmonary specialty care with a left lower lobe pneumonia and pneumothorax on 2/18.  Influenza screening was negative.  Shortly after arriving at [facility] he was noted to have a dislodged chest tube.  A replacement chest tube was not necessary.  He was requiring 5 L of oxygen via nasal cannula on 2/19/2/20.  A pulmonology consultation requested a high resolution CT scan, the initiated a connective tissue work-up and continued broad-spectrum antibiotics.  On the morning of 2/21 patient had increasing work of breathing and hypoxic respiratory failure.  Critical care consultation was requested for further evaluation and management.    Hospital Course    2/21-patient was evaluated in the IR suite for placement of a pigtail catheter.  At the time the residual pneumothorax was felt to be too small to benefit from catheter placement.  At 2335 patient became hypoxic, tachycardic and hypotensive.  Chest x-ray revealed a large pneumothorax.  An emergent chest tube was placed with improvement in hemodynamic and oxygenation parameters.  2/22- Pulmonary biopsy performed.  Prednisone 1 mg/kilogram/day initiated      Interval Problem Update  Reviewed last 24 hour events:              - Tm: 37.3              - HR: 60-70s              - SBP: 100-130s              - Neuro: Follows, eyes disconjugate, MAEW              - GI: NPO              - UOP: 2.9L              - Bell: Yes              - Lines: CVC, chest tube              - PPx: Home PPI, Lovenox              - CXR (personally reviewed): small apical pneumo w/ left sided chest tube in place              - Antibiotic C3, Flagyl,  Bactrim    INFUSIONS:  Propofol      Review of Systems  Review of Systems   Unable to perform ROS: Intubated        Vital Signs for last 24 hours   Pulse:  [58-79] 71  Resp:  [14-33] 25  BP: (100-147)/(60-91) 119/75  SpO2:  [95 %-100 %] 95 %    Hemodynamic parameters for last 24 hours       Respiratory Information for the last 24 hours  Vent Mode: APVCMV  Rate (breaths/min): 30  Vt Target (mL): 450  PEEP/CPAP: 8  MAP: 6.5  Control VTE (exp VT): 615    Physical Exam   Physical Exam  Constitutional:       Comments: Intubated   HENT:      Head: Normocephalic and atraumatic.      Mouth/Throat:      Mouth: Mucous membranes are moist.   Eyes:      Pupils: Pupils are equal, round, and reactive to light.      Comments: Disconjugate   Neck:      Musculoskeletal: Neck supple.   Cardiovascular:      Rate and Rhythm: Regular rhythm.      Heart sounds: No murmur. No friction rub. No gallop.    Pulmonary:      Comments: Scattered crackles and wheezes  Left chest tube in good position  Persistent  air leak  Abdominal:      General: There is no distension.      Palpations: Abdomen is soft.      Tenderness: There is no abdominal tenderness.   Musculoskeletal:         General: No swelling.      Right lower leg: No edema.      Left lower leg: No edema.   Skin:     General: Skin is warm and dry.   Neurological:      Comments: Opens eyes to voice follows commands.   strength equally with both hands.         Medications  Current Facility-Administered Medications   Medication Dose Route Frequency Provider Last Rate Last Dose   • potassium bicarbonate (KLYTE) effervescent tablet 50 mEq  50 mEq Enteral Tube Once Srinath Sauceda M.D.       • polyethylene glycol/lytes (MIRALAX) PACKET 1 Packet  1 Packet Enteral Tube BID Marck Javier M.D.   1 Packet at 02/25/20 0500    And   • senna-docusate (PERICOLACE or SENOKOT S) 8.6-50 MG per tablet 2 Tab  2 Tab Enteral Tube BID Marck Javier M.D.   2 Tab at 02/25/20 0500    And   • magnesium  hydroxide (MILK OF MAGNESIA) suspension 30 mL  30 mL Enteral Tube QDAY PRN Marck Javier M.D.   30 mL at 02/24/20 1556    And   • bisacodyl (DULCOLAX) suppository 10 mg  10 mg Rectal QDAY PRN Marck Javier M.D.       • enoxaparin (LOVENOX) inj 40 mg  40 mg Subcutaneous DAILY Marck Javier M.D.   40 mg at 02/25/20 0500   • HYDROmorphone pf (DILAUDID) injection 1 mg  1 mg Intravenous Q2HRS PRN Marck Javier M.D.   1 mg at 02/24/20 2339   • Pharmacy Consult: Enteral tube insertion - review meds/change route/product selection  1 Each Other PHARMACY TO DOSE Marck Javier M.D.       • predniSONE (DELTASONE) tablet 80 mg  80 mg Enteral Tube DAILY Marck Javier M.D.   80 mg at 02/25/20 0500   • sulfamethoxazole-trimethoprim (BACTRIM DS) 800-160 MG tablet 1 Tab  1 Tab Enteral Tube DAILY Marck Javier M.D.   1 Tab at 02/25/20 0500   • propofol (DIPRIVAN) injection  0-80 mcg/kg/min Intravenous Continuous Marck Javier M.D. 14.1 mL/hr at 02/25/20 0730 30 mcg/kg/min at 02/25/20 0730   • ipratropium-albuterol (DUONEB) nebulizer solution  3 mL Nebulization Q2HRS PRN (RT) Marck Javier M.D.       • MD Alert...ICU Electrolyte Replacement per Pharmacy   Other PHARMACY TO DOSE Marck Javier M.D.       • lidocaine (XYLOCAINE) 1 % injection 1-2 mL  1-2 mL Tracheal Tube Q30 MIN PRN Marck Javier M.D.       • gabapentin (NEURONTIN) capsule 2,400 mg  2,400 mg Enteral Tube QAM Marck Javier M.D.   2,400 mg at 02/25/20 0501    And   • gabapentin (NEURONTIN) capsule 1,600 mg  1,600 mg Enteral Tube Q EVENING Marck Javier M.D.   1,600 mg at 02/24/20 1739   • levothyroxine (SYNTHROID) tablet 125 mcg  125 mcg Enteral Tube QAM AC Marck Javier M.D.   125 mcg at 02/25/20 0600   • methadone (DOLOPHINE) tablet 30 mg  30 mg Enteral Tube BID Marck Javier M.D.   30 mg at 02/25/20 0500    And   • methadone (DOLOPHINE) tablet 10 mg  10 mg Enteral Tube QHS Marck Javier M.D.   10 mg at 02/24/20 2107   • omeprazole  (FIRST-OMEPRAZOLE) 2 mg/mL oral susp 40 mg  40 mg Enteral Tube Q EVENING Marck Javier M.D.   40 mg at 02/24/20 1739   • simvastatin (ZOCOR) tablet 40 mg  40 mg Enteral Tube Nightly Marck Javier M.D.   40 mg at 02/24/20 2107   • tramadol (ULTRAM) 50 MG tablet 50 mg  50 mg Enteral Tube BID Marck Javier M.D.   50 mg at 02/25/20 0500   • acetaminophen (TYLENOL) tablet 650 mg  650 mg Enteral Tube Q6HRS PRN Marck Javier M.D.       • ALPRAZolam (XANAX) tablet 0.25 mg  0.25 mg Enteral Tube 4X/DAY PRN Marck Javier M.D.       • guaiFENesin dextromethorphan (ROBITUSSIN DM) 100-10 MG/5ML syrup 10 mL  10 mL Enteral Tube Q6HRS PRN Marck Javier M.D.       • promethazine (PHENERGAN) tablet 12.5-25 mg  12.5-25 mg Enteral Tube Q4HRS PRN Marck Javier M.D.       • influenza vaccine quad injection 0.5 mL  0.5 mL Intramuscular Once PRN Eulogio Lopes M.D.       • ipratropium-albuterol (DUONEB) nebulizer solution  3 mL Nebulization Q4HRS (RT) Gypsy Mackey M.D.   3 mL at 02/25/20 0540   • timolol (TIMOPTIC) 0.5 % ophthalmic solution 1 Drop  1 Drop Both Eyes Q EVENING Tim Bustos M.D.   1 Drop at 02/24/20 1739   • Respiratory Therapy Consult   Nebulization Continuous RT Tim Bustos M.D.       • promethazine (PHENERGAN) suppository 12.5-25 mg  12.5-25 mg Rectal Q4HRS PRN Tim Bustos M.D.       • prochlorperazine (COMPAZINE) injection 5-10 mg  5-10 mg Intravenous Q4HRS PRN Tim Bustos M.D.       • albuterol (PROVENTIL) 2.5mg/0.5ml nebulizer solution 2.5 mg  2.5 mg Nebulization Q2HRS PRN (RT) Tim Bustos M.D.           Fluids    Intake/Output Summary (Last 24 hours) at 2/25/2020 0745  Last data filed at 2/25/2020 0700  Gross per 24 hour   Intake 2643.03 ml   Output 3275 ml   Net -631.97 ml       Laboratory  Recent Labs     02/23/20  0420 02/24/20  0400 02/25/20  0358   ISTATAPH 7.481 7.482 7.480   ISTATAPCO2 34.3 35.1 34.2   ISTATAPO2 66 53* 58*   ISTATATCO2 27 27 26    VAOKOYQ9OLQ 94 89* 92*   ISTATARTHCO3 25.6* 26.2* 25.4*   ISTATARTBE 2 3 2   ISTATTEMP 98.1 F 98.2 F 98.4 F   ISTATFIO2 40 30 40   ISTATSPEC Arterial Arterial Arterial   ISTATAPHTC 7.485 7.485 7.481   [id number] 65 52* 58*         Recent Labs     02/23/20 0415 02/24/20 0245 02/25/20  0635   SODIUM 144 141 138   POTASSIUM 3.5* 3.5* 3.6   CHLORIDE 107 107 104   CO2 29 26 28   BUN 20 25* 25*   CREATININE 0.71 0.80 0.82   MAGNESIUM 2.2 2.1  --    PHOSPHORUS 2.2* 2.3* 2.8   CALCIUM 8.0* 8.1* 8.5     Recent Labs     02/23/20 0415 02/24/20 0245 02/25/20  0635   ALTSGPT  --  7  --    ASTSGOT  --  15  --    ALKPHOSPHAT  --  67  --    TBILIRUBIN  --  0.3  --    PREALBUMIN 7.0*  --   --    GLUCOSE 121* 114* 108*     Recent Labs     02/23/20 0415 02/24/20 0245 02/25/20  0245   WBC 13.6* 16.2* 15.5*   NEUTSPOLYS 75.70* 70.20 67.90   LYMPHOCYTES 13.70* 19.30* 20.20*   MONOCYTES 9.30 8.50 8.00   EOSINOPHILS 0.20 1.40 2.80   BASOPHILS 0.20 0.20 0.30   ASTSGOT  --  15  --    ALTSGPT  --  7  --    ALKPHOSPHAT  --  67  --    TBILIRUBIN  --  0.3  --      Recent Labs     02/23/20 0415 02/24/20 0245 02/25/20  0245   RBC 3.99* 4.29* 4.61*   HEMOGLOBIN 10.5* 11.4* 12.1*   HEMATOCRIT 32.7* 34.9* 37.9*   PLATELETCT 256 269 294       Imaging  X-Ray:  I have personally reviewed the images and compared with prior images.    Assessment/Plan  Acute on chronic respiratory failure with hypoxia (HCC)- (present on admission)  Assessment & Plan  Acute hypoxic respiratory failure  Attributed to evolving NSIP  Complicated by underlying emphysematous disease.   Lung protective ventilation strategies  Titrate ventilator prescription to optimize oxygenation, ventilation, and acid base balance.  Daily ABC trials    Pulmonary biopsy on 2/22 am  Follow up connective tissue w/u  Prednisone 1 mg/KG/day  Bactrim prophylaxis    Pneumonia- (present on admission)  Assessment & Plan  NSIP  Cont prednisone    Pneumothorax  Assessment & Plan  Status post  left tension pneumothorax  Pneumothorax improved with chest tube in place  Chest tube in good position  Continue chest tube to suction    He has proven to be high risk for pneumothorax   status post pulmonary biopsy  Serial chest x-rays  Pleural tube placement as clinically indicated      Chronic pain- (present on admission)  Assessment & Plan  Continue gabapentin regimen  Continue methadone regimen  Continue tramadol  Added prn oxycodone  Through the enteral tube  Dilaudid 1 mg IV every 2 hours as needed breakthrough pain           VTE:  Lovenox  Ulcer: PPI  Lines: Central Line  Ongoing indication addressed    I have performed a physical exam and reviewed and updated ROS and Plan today (2/25/2020). In review of yesterday's note (2/24/2020), there are no changes except as documented above.     Discussed patient condition and risk of morbidity and/or mortality with RN, RT, Pharmacy, Code status disscussed, Patient and Pulmonary       The patient remains critically ill.  I have assessed and reassessed the respiratory status and made ventilator adjustments based upon arterial blood gas analysis, ventilator waveforms and airway mechanics.  I have assessed and reassessed the blood pressure, hemodynamics, cardiovascular status. This patient remains at high risk for worsening cardiopulmonary dysfunction and death without the above critical care interventions.'     Critical care time 35 minutes in directly providing and coordinating critical care and extensive data review.  No time overlap and excludes procedures.

## 2020-02-25 NOTE — CARE PLAN
Adult Ventilation Update    Total Vent Days: 4  Vent:  x 20, 40% +5    Patient Lines/Drains/Airways Status      Active Airway       Name: Placement date: Placement time: Site: Days:    Airway ETT 8.0  02/21/20   0845   --  4                  Mobility  Activity Performed: Edge of bed     Events/Summary/Plan: Patient stable on vent. No changes.

## 2020-02-26 ENCOUNTER — APPOINTMENT (OUTPATIENT)
Dept: RADIOLOGY | Facility: MEDICAL CENTER | Age: 60
DRG: 166 | End: 2020-02-26
Attending: INTERNAL MEDICINE
Payer: MEDICARE

## 2020-02-26 PROBLEM — I48.91 ATRIAL FIBRILLATION WITH RAPID VENTRICULAR RESPONSE (HCC): Status: ACTIVE | Noted: 2020-02-26

## 2020-02-26 LAB
ACTION RANGE TRIGGERED IACRT: NO
ANION GAP SERPL CALC-SCNC: 7 MMOL/L (ref 0–11.9)
BASE EXCESS BLDA CALC-SCNC: 2 MMOL/L (ref -4–3)
BASOPHILS # BLD AUTO: 0.4 % (ref 0–1.8)
BASOPHILS # BLD: 0.09 K/UL (ref 0–0.12)
BODY TEMPERATURE: ABNORMAL DEGREES
BUN SERPL-MCNC: 30 MG/DL (ref 8–22)
CALCIUM SERPL-MCNC: 9 MG/DL (ref 8.5–10.5)
CHLORIDE SERPL-SCNC: 102 MMOL/L (ref 96–112)
CO2 BLDA-SCNC: 26 MMOL/L (ref 20–33)
CO2 SERPL-SCNC: 28 MMOL/L (ref 20–33)
CREAT SERPL-MCNC: 0.83 MG/DL (ref 0.5–1.4)
EKG IMPRESSION: NORMAL
EOSINOPHIL # BLD AUTO: 0.39 K/UL (ref 0–0.51)
EOSINOPHIL NFR BLD: 1.7 % (ref 0–6.9)
ERYTHROCYTE [DISTWIDTH] IN BLOOD BY AUTOMATED COUNT: 45 FL (ref 35.9–50)
GLUCOSE SERPL-MCNC: 127 MG/DL (ref 65–99)
HCO3 BLDA-SCNC: 25.2 MMOL/L (ref 17–25)
HCT VFR BLD AUTO: 42.3 % (ref 42–52)
HGB BLD-MCNC: 13.8 G/DL (ref 14–18)
HOROWITZ INDEX BLDA+IHG-RTO: 158 MM[HG]
IMM GRANULOCYTES # BLD AUTO: 0.21 K/UL (ref 0–0.11)
IMM GRANULOCYTES NFR BLD AUTO: 0.9 % (ref 0–0.9)
INST. QUALIFIED PATIENT IIQPT: YES
LYMPHOCYTES # BLD AUTO: 3.13 K/UL (ref 1–4.8)
LYMPHOCYTES NFR BLD: 14 % (ref 22–41)
MCH RBC QN AUTO: 26.5 PG (ref 27–33)
MCHC RBC AUTO-ENTMCNC: 32.6 G/DL (ref 33.7–35.3)
MCV RBC AUTO: 81.2 FL (ref 81.4–97.8)
MONOCYTES # BLD AUTO: 1.81 K/UL (ref 0–0.85)
MONOCYTES NFR BLD AUTO: 8.1 % (ref 0–13.4)
NEUTROPHILS # BLD AUTO: 16.7 K/UL (ref 1.82–7.42)
NEUTROPHILS NFR BLD: 74.9 % (ref 44–72)
NRBC # BLD AUTO: 0 K/UL
NRBC BLD-RTO: 0 /100 WBC
O2/TOTAL GAS SETTING VFR VENT: 40 %
PCO2 BLDA: 34.9 MMHG (ref 26–37)
PCO2 TEMP ADJ BLDA: 35.5 MMHG (ref 26–37)
PH BLDA: 7.47 [PH] (ref 7.4–7.5)
PH TEMP ADJ BLDA: 7.46 [PH] (ref 7.4–7.5)
PLATELET # BLD AUTO: 343 K/UL (ref 164–446)
PMV BLD AUTO: 9.3 FL (ref 9–12.9)
PO2 BLDA: 63 MMHG (ref 64–87)
PO2 TEMP ADJ BLDA: 65 MMHG (ref 64–87)
POTASSIUM SERPL-SCNC: 3.8 MMOL/L (ref 3.6–5.5)
RBC # BLD AUTO: 5.21 M/UL (ref 4.7–6.1)
SAO2 % BLDA: 93 % (ref 93–99)
SODIUM SERPL-SCNC: 137 MMOL/L (ref 135–145)
SPECIMEN DRAWN FROM PATIENT: ABNORMAL
TRIGL SERPL-MCNC: 211 MG/DL (ref 0–149)
WBC # BLD AUTO: 22.3 K/UL (ref 4.8–10.8)

## 2020-02-26 PROCEDURE — 700111 HCHG RX REV CODE 636 W/ 250 OVERRIDE (IP): Performed by: INTERNAL MEDICINE

## 2020-02-26 PROCEDURE — 93005 ELECTROCARDIOGRAM TRACING: CPT | Performed by: INTERNAL MEDICINE

## 2020-02-26 PROCEDURE — 85025 COMPLETE CBC W/AUTO DIFF WBC: CPT

## 2020-02-26 PROCEDURE — 700105 HCHG RX REV CODE 258: Performed by: INTERNAL MEDICINE

## 2020-02-26 PROCEDURE — 94003 VENT MGMT INPAT SUBQ DAY: CPT

## 2020-02-26 PROCEDURE — A9270 NON-COVERED ITEM OR SERVICE: HCPCS | Performed by: PSYCHIATRY & NEUROLOGY

## 2020-02-26 PROCEDURE — 82803 BLOOD GASES ANY COMBINATION: CPT

## 2020-02-26 PROCEDURE — 700102 HCHG RX REV CODE 250 W/ 637 OVERRIDE(OP): Performed by: PSYCHIATRY & NEUROLOGY

## 2020-02-26 PROCEDURE — 700102 HCHG RX REV CODE 250 W/ 637 OVERRIDE(OP): Performed by: INTERNAL MEDICINE

## 2020-02-26 PROCEDURE — 700101 HCHG RX REV CODE 250: Performed by: INTERNAL MEDICINE

## 2020-02-26 PROCEDURE — 99291 CRITICAL CARE FIRST HOUR: CPT | Performed by: PSYCHIATRY & NEUROLOGY

## 2020-02-26 PROCEDURE — 84478 ASSAY OF TRIGLYCERIDES: CPT

## 2020-02-26 PROCEDURE — 94640 AIRWAY INHALATION TREATMENT: CPT

## 2020-02-26 PROCEDURE — 770022 HCHG ROOM/CARE - ICU (200)

## 2020-02-26 PROCEDURE — 93010 ELECTROCARDIOGRAM REPORT: CPT | Performed by: INTERNAL MEDICINE

## 2020-02-26 PROCEDURE — 80048 BASIC METABOLIC PNL TOTAL CA: CPT

## 2020-02-26 PROCEDURE — A9270 NON-COVERED ITEM OR SERVICE: HCPCS | Performed by: INTERNAL MEDICINE

## 2020-02-26 PROCEDURE — 71045 X-RAY EXAM CHEST 1 VIEW: CPT

## 2020-02-26 PROCEDURE — 36600 WITHDRAWAL OF ARTERIAL BLOOD: CPT

## 2020-02-26 RX ORDER — POTASSIUM CHLORIDE 14.9 MG/ML
20 INJECTION INTRAVENOUS ONCE
Status: COMPLETED | OUTPATIENT
Start: 2020-02-26 | End: 2020-02-26

## 2020-02-26 RX ADMIN — LEVOTHYROXINE SODIUM 125 MCG: 125 TABLET ORAL at 07:59

## 2020-02-26 RX ADMIN — TRAMADOL HYDROCHLORIDE 50 MG: 50 TABLET, FILM COATED ORAL at 17:36

## 2020-02-26 RX ADMIN — AMIODARONE HYDROCHLORIDE 150 MG: 1.5 INJECTION, SOLUTION INTRAVENOUS at 04:39

## 2020-02-26 RX ADMIN — POLYETHYLENE GLYCOL 3350 1 PACKET: 17 POWDER, FOR SOLUTION ORAL at 05:19

## 2020-02-26 RX ADMIN — GABAPENTIN 1600 MG: 400 CAPSULE ORAL at 17:34

## 2020-02-26 RX ADMIN — TRAMADOL HYDROCHLORIDE 50 MG: 50 TABLET, FILM COATED ORAL at 05:18

## 2020-02-26 RX ADMIN — PROPOFOL 30 MCG/KG/MIN: 10 INJECTION, EMULSION INTRAVENOUS at 18:35

## 2020-02-26 RX ADMIN — PROPOFOL 30 MCG/KG/MIN: 10 INJECTION, EMULSION INTRAVENOUS at 12:07

## 2020-02-26 RX ADMIN — SIMVASTATIN 40 MG: 40 TABLET, FILM COATED ORAL at 20:13

## 2020-02-26 RX ADMIN — IPRATROPIUM BROMIDE AND ALBUTEROL SULFATE 3 ML: .5; 3 SOLUTION RESPIRATORY (INHALATION) at 07:16

## 2020-02-26 RX ADMIN — OMEPRAZOLE 40 MG: KIT at 17:35

## 2020-02-26 RX ADMIN — PROPOFOL 40 MCG/KG/MIN: 10 INJECTION, EMULSION INTRAVENOUS at 00:38

## 2020-02-26 RX ADMIN — IPRATROPIUM BROMIDE AND ALBUTEROL SULFATE 3 ML: .5; 3 SOLUTION RESPIRATORY (INHALATION) at 02:38

## 2020-02-26 RX ADMIN — TIMOLOL MALEATE 1 DROP: 5 SOLUTION OPHTHALMIC at 17:36

## 2020-02-26 RX ADMIN — GABAPENTIN 2400 MG: 400 CAPSULE ORAL at 05:17

## 2020-02-26 RX ADMIN — SENNOSIDES AND DOCUSATE SODIUM 2 TABLET: 8.6; 5 TABLET ORAL at 17:35

## 2020-02-26 RX ADMIN — POTASSIUM CHLORIDE 20 MEQ: 14.9 INJECTION, SOLUTION INTRAVENOUS at 04:35

## 2020-02-26 RX ADMIN — METHADONE HYDROCHLORIDE 10 MG: 10 TABLET ORAL at 20:13

## 2020-02-26 RX ADMIN — OXYCODONE HYDROCHLORIDE 10 MG: 10 TABLET ORAL at 05:18

## 2020-02-26 RX ADMIN — SULFAMETHOXAZOLE AND TRIMETHOPRIM 1 TABLET: 800; 160 TABLET ORAL at 05:18

## 2020-02-26 RX ADMIN — METHADONE HYDROCHLORIDE 30 MG: 10 TABLET ORAL at 05:18

## 2020-02-26 RX ADMIN — PREDNISONE 80 MG: 20 TABLET ORAL at 05:18

## 2020-02-26 RX ADMIN — ENOXAPARIN SODIUM 40 MG: 100 INJECTION SUBCUTANEOUS at 05:18

## 2020-02-26 RX ADMIN — AMIODARONE HYDROCHLORIDE 1 MG/MIN: 50 INJECTION, SOLUTION INTRAVENOUS at 05:08

## 2020-02-26 RX ADMIN — SENNOSIDES AND DOCUSATE SODIUM 2 TABLET: 8.6; 5 TABLET ORAL at 05:18

## 2020-02-26 RX ADMIN — PROPOFOL 40 MCG/KG/MIN: 10 INJECTION, EMULSION INTRAVENOUS at 06:07

## 2020-02-26 RX ADMIN — METHADONE HYDROCHLORIDE 30 MG: 10 TABLET ORAL at 14:49

## 2020-02-26 NOTE — PROGRESS NOTES
Critical Care Progress Note    Date of admission  2/18/2020    Chief Complaint  59 y.o. male who presented 2/18/2020 with history of oxygen dependent chronic hypoxic respiratory failure, requiring 2.5-3 L home oxygen, emphysema, pulmonary hypertension and chronic pain who is on methadone.  He was transferred from [place] for pulmonary specialty care with a left lower lobe pneumonia and pneumothorax on 2/18.  Influenza screening was negative.  Shortly after arriving at [facility] he was noted to have a dislodged chest tube.  A replacement chest tube was not necessary.  He was requiring 5 L of oxygen via nasal cannula on 2/19/2/20.  A pulmonology consultation requested a high resolution CT scan, the initiated a connective tissue work-up and continued broad-spectrum antibiotics.  On the morning of 2/21 patient had increasing work of breathing and hypoxic respiratory failure.  Critical care consultation was requested for further evaluation and management.    Hospital Course    2/21-patient was evaluated in the IR suite for placement of a pigtail catheter.  At the time the residual pneumothorax was felt to be too small to benefit from catheter placement.  At 2335 patient became hypoxic, tachycardic and hypotensive.  Chest x-ray revealed a large pneumothorax.  An emergent chest tube was placed with improvement in hemodynamic and oxygenation parameters.  2/22- Pulmonary biopsy performed.  Prednisone 1 mg/kilogram/day initiated      Interval Problem Update  Reviewed last 24 hour events:   - afib with RVR overnight; started on amiodarone gtt              - Tm: 37.5              - HR: 50-70s              - SBP: 100-120s               - Neuro: Follows, eyes disconjugate, MAEW              - GI: NPO              - UOP: 3.9L              - Bell: Yes              - Lines: CVC, chest tube              - PPx: Home PPI, Lovenox              - CXR (personally reviewed): small apical pneumo w/ left sided chest  tube in place              - Antibiotic Bactrim    INFUSIONS:  Active titration of Propofol        Review of Systems  Review of Systems   Unable to perform ROS: Intubated        Vital Signs for last 24 hours   Pulse:  [] 76  Resp:  [12-43] 28  BP: ()/(66-92) 106/72  SpO2:  [93 %-98 %] 94 %    Hemodynamic parameters for last 24 hours       Respiratory Information for the last 24 hours  Vent Mode: APVCMV  Rate (breaths/min): 20  Vt Target (mL): 460  PEEP/CPAP: 5  P Support: 5  MAP: 7.8  Control VTE (exp VT): 648    Physical Exam   Physical Exam  Constitutional:       Comments: Intubated   HENT:      Head: Normocephalic and atraumatic.      Mouth/Throat:      Mouth: Mucous membranes are moist.   Eyes:      Pupils: Pupils are equal, round, and reactive to light.      Comments: Disconjugate   Neck:      Musculoskeletal: Neck supple.   Cardiovascular:      Rate and Rhythm: Regular rhythm.      Heart sounds: No murmur. No friction rub. No gallop.    Pulmonary:      Comments: Scattered crackles and wheezes  Left chest tube in good position  Persistent  air leak  Abdominal:      General: There is no distension.      Palpations: Abdomen is soft.      Tenderness: There is no abdominal tenderness.   Musculoskeletal:         General: No swelling.      Right lower leg: No edema.      Left lower leg: No edema.   Skin:     General: Skin is warm and dry.   Neurological:      Comments: Opens eyes to voice follows commands.  Squeezes hands.         Medications  Current Facility-Administered Medications   Medication Dose Route Frequency Provider Last Rate Last Dose   • amiodarone (CORDARONE) 450 mg in D5W 250 mL Infusion  0.5-1 mg/min Intravenous Continuous Jeremy M Gonda, M.D. 33 mL/hr at 02/26/20 0508 1 mg/min at 02/26/20 0508   • oxyCODONE immediate-release (ROXICODONE) tablet 5 mg  5 mg Enteral Tube Q4HRS PRN Srinath Sauceda M.D.   5 mg at 02/25/20 1402    Or   • oxyCODONE immediate release (ROXICODONE) tablet 10 mg  10  mg Enteral Tube Q4HRS PRN Srinath Sauceda M.D.   10 mg at 02/26/20 0518   • polyethylene glycol/lytes (MIRALAX) PACKET 1 Packet  1 Packet Enteral Tube BID Marck Javier M.D.   1 Packet at 02/26/20 0519    And   • senna-docusate (PERICOLACE or SENOKOT S) 8.6-50 MG per tablet 2 Tab  2 Tab Enteral Tube BID Marck Javier M.D.   2 Tab at 02/26/20 0518    And   • magnesium hydroxide (MILK OF MAGNESIA) suspension 30 mL  30 mL Enteral Tube QDAY PRN Marck Javier M.D.   30 mL at 02/24/20 1556    And   • bisacodyl (DULCOLAX) suppository 10 mg  10 mg Rectal QDAY PRN Marck Javier M.D.   10 mg at 02/25/20 1154   • enoxaparin (LOVENOX) inj 40 mg  40 mg Subcutaneous DAILY Marck Javier M.D.   40 mg at 02/26/20 0518   • HYDROmorphone pf (DILAUDID) injection 1 mg  1 mg Intravenous Q2HRS PRN Marck Javier M.D.   1 mg at 02/25/20 0758   • Pharmacy Consult: Enteral tube insertion - review meds/change route/product selection  1 Each Other PHARMACY TO DOSE Marck Javier M.D.       • predniSONE (DELTASONE) tablet 80 mg  80 mg Enteral Tube DAILY Marck Javier M.D.   80 mg at 02/26/20 0518   • sulfamethoxazole-trimethoprim (BACTRIM DS) 800-160 MG tablet 1 Tab  1 Tab Enteral Tube DAILY Marck Javier M.D.   1 Tab at 02/26/20 0518   • propofol (DIPRIVAN) injection  0-80 mcg/kg/min Intravenous Continuous Marck Javier M.D. 18.9 mL/hr at 02/26/20 0607 40 mcg/kg/min at 02/26/20 0607   • ipratropium-albuterol (DUONEB) nebulizer solution  3 mL Nebulization Q2HRS PRN (RT) Marck Javier M.D.       • MD Alert...ICU Electrolyte Replacement per Pharmacy   Other PHARMACY TO DOSE Marck Javier M.D.       • lidocaine (XYLOCAINE) 1 % injection 1-2 mL  1-2 mL Tracheal Tube Q30 MIN PRN Marck Javier M.D.       • gabapentin (NEURONTIN) capsule 2,400 mg  2,400 mg Enteral Tube QAM Marck Javier M.D.   2,400 mg at 02/26/20 0517    And   • gabapentin (NEURONTIN) capsule 1,600 mg  1,600 mg Enteral Tube Q EVENING Marck Javier M.D.    1,600 mg at 02/25/20 1828   • levothyroxine (SYNTHROID) tablet 125 mcg  125 mcg Enteral Tube QAM AC Marck Javier M.D.   125 mcg at 02/25/20 0600   • methadone (DOLOPHINE) tablet 30 mg  30 mg Enteral Tube BID Marck Javier M.D.   30 mg at 02/26/20 0518    And   • methadone (DOLOPHINE) tablet 10 mg  10 mg Enteral Tube QHS Marck Javier M.D.   10 mg at 02/25/20 2023   • omeprazole (FIRST-OMEPRAZOLE) 2 mg/mL oral susp 40 mg  40 mg Enteral Tube Q EVENING Marck Javier M.D.   40 mg at 02/25/20 1828   • simvastatin (ZOCOR) tablet 40 mg  40 mg Enteral Tube Nightly Marck Javier M.D.   40 mg at 02/25/20 2024   • tramadol (ULTRAM) 50 MG tablet 50 mg  50 mg Enteral Tube BID Marck Javier M.D.   50 mg at 02/26/20 0518   • acetaminophen (TYLENOL) tablet 650 mg  650 mg Enteral Tube Q6HRS PRN Marck Javier M.D.       • ALPRAZolam (XANAX) tablet 0.25 mg  0.25 mg Enteral Tube 4X/DAY PRN Marck Javier M.D.       • guaiFENesin dextromethorphan (ROBITUSSIN DM) 100-10 MG/5ML syrup 10 mL  10 mL Enteral Tube Q6HRS PRN Marck Javier M.D.       • promethazine (PHENERGAN) tablet 12.5-25 mg  12.5-25 mg Enteral Tube Q4HRS PRN Marck Javier M.D.       • influenza vaccine quad injection 0.5 mL  0.5 mL Intramuscular Once PRN Eulogio Lopes M.D.       • ipratropium-albuterol (DUONEB) nebulizer solution  3 mL Nebulization Q4HRS (RT) Gypsy Mackey M.D.   3 mL at 02/26/20 0716   • timolol (TIMOPTIC) 0.5 % ophthalmic solution 1 Drop  1 Drop Both Eyes Q EVENING Tim Bustos M.D.   1 Drop at 02/25/20 1903   • Respiratory Therapy Consult   Nebulization Continuous RT Tim Bustos M.D.       • promethazine (PHENERGAN) suppository 12.5-25 mg  12.5-25 mg Rectal Q4HRS PRN Tim Bustos M.D.       • prochlorperazine (COMPAZINE) injection 5-10 mg  5-10 mg Intravenous Q4HRS PRN Tim Bustos M.D.       • albuterol (PROVENTIL) 2.5mg/0.5ml nebulizer solution 2.5 mg  2.5 mg Nebulization Q2HRS PRN (RT)  Tim Bustos M.D.           Fluids    Intake/Output Summary (Last 24 hours) at 2/26/2020 0756  Last data filed at 2/26/2020 0635  Gross per 24 hour   Intake 2584.59 ml   Output 3690 ml   Net -1105.41 ml       Laboratory  Recent Labs     02/24/20  0400 02/25/20  0358 02/26/20  0441   ISTATAPH 7.482 7.480 7.467   ISTATAPCO2 35.1 34.2 34.9   ISTATAPO2 53* 58* 63*   ISTATATCO2 27 26 26   [id number] 89* 92* 93   ISTATARTHCO3 26.2* 25.4* 25.2*   ISTATARTBE 3 2 2   ISTATTEMP 98.2 F 98.4 F 99.3 F   ISTATFIO2 30 40 40   ISTATSPEC Arterial Arterial Arterial   ISTATAPHTC 7.485 7.481 7.461   [id number] 52* 58* 65         Recent Labs     02/24/20  0245 02/25/20  0635 02/25/20  1407 02/26/20  0249   SODIUM 141 138 137 137   POTASSIUM 3.5* 3.6 4.6 3.8   CHLORIDE 107 104 102 102   CO2 26 28 27 28   BUN 25* 25* 25* 30*   CREATININE 0.80 0.82 0.79 0.83   MAGNESIUM 2.1  --   --   --    PHOSPHORUS 2.3* 2.8 3.5  --    CALCIUM 8.1* 8.5 9.1 9.0     Recent Labs     02/24/20  0245 02/25/20  0635 02/25/20  1407 02/26/20  0249   ALTSGPT 7  --   --   --    ASTSGOT 15  --   --   --    ALKPHOSPHAT 67  --   --   --    TBILIRUBIN 0.3  --   --   --    GLUCOSE 114* 108* 159* 127*     Recent Labs     02/24/20 0245 02/25/20  0245 02/26/20  0249   WBC 16.2* 15.5* 22.3*   NEUTSPOLYS 70.20 67.90 74.90*   LYMPHOCYTES 19.30* 20.20* 14.00*   MONOCYTES 8.50 8.00 8.10   EOSINOPHILS 1.40 2.80 1.70   BASOPHILS 0.20 0.30 0.40   ASTSGOT 15  --   --    ALTSGPT 7  --   --    ALKPHOSPHAT 67  --   --    TBILIRUBIN 0.3  --   --      Recent Labs     02/24/20  0245 02/25/20  0245 02/26/20 0249   RBC 4.29* 4.61* 5.21   HEMOGLOBIN 11.4* 12.1* 13.8*   HEMATOCRIT 34.9* 37.9* 42.3   PLATELETCT 269 287 768       Imaging  X-Ray:  I have personally reviewed the images and compared with prior images.    Assessment/Plan  Acute on chronic respiratory failure with hypoxia (HCC)- (present on admission)  Assessment & Plan  Acute hypoxic respiratory failure  Attributed to  evolving NSIP  Complicated by underlying emphysematous disease.   Lung protective ventilation strategies  Titrate ventilator prescription to optimize oxygenation, ventilation, and acid base balance.  Daily ABC trials    Pulmonary biopsy on 2/22 am  CTD unremarkable  Prednisone 1 mg/KG/day  Bactrim prophylaxis    Pneumonia- (present on admission)  Assessment & Plan  NSIP  Cont prednisone    Pneumothorax  Assessment & Plan  Status post left tension pneumothorax  Pneumothorax improved with chest tube in place  Chest tube in good position  Continue chest tube to suction    He has proven to be high risk for pneumothorax   status post pulmonary biopsy  Serial chest x-rays  Pleural tube placement as clinically indicated      Atrial fibrillation with rapid ventricular response (HCC)  Assessment & Plan  Amio bolus overnight started on gtt  Now nsr  Optimize lytes  monitor    Chronic pain- (present on admission)  Assessment & Plan  Continue gabapentin regimen  Continue methadone regimen  Continue tramadol  Added prn oxycodone  Through the enteral tube  Dilaudid 1 mg IV every 2 hours as needed breakthrough pain           VTE:  Lovenox  Ulcer: PPI  Lines: Central Line  Ongoing indication addressed    I have performed a physical exam and reviewed and updated ROS and Plan today (2/26/2020). In review of yesterday's note (2/25/2020), there are no changes except as documented above.     Discussed patient condition and risk of morbidity and/or mortality with RN, RT, Pharmacy, Code status disscussed, Patient and Pulmonary     Critical care time =35 minutes excluding procedures.      The patient remains critically ill.  I have assessed and reassessed the respiratory status and made ventilator adjustments based upon arterial blood gas analysis, ventilator waveforms and airway mechanics.  I have assessed and reassessed the blood pressure, hemodynamics, cardiovascular status. This patient remains at high risk for worsening  cardiopulmonary dysfunction and death without the above critical care interventions.'     Critical care time 35 minutes in directly providing and coordinating critical care and extensive data review.  No time overlap and excludes procedures.

## 2020-02-26 NOTE — CARE PLAN
Adult Ventilation Update    Total Vent Days: 6  Vent: 20/450/+5/40%  Duo q4    Patient Lines/Drains/Airways Status      Active Airway       Name: Placement date: Placement time: Site: Days:    Airway ETT 8.0  02/21/20 0845   --  6                    In the last 24 hours, the patient tolerated SBT for 0 on settings of 5/5.    $ FVC / Vital Capacity (liters) : (Not following) (02/24/20 0528)  NIF (cm H2O) : (Not following) (02/24/20 0528)  Rapid Shallow Breathing Index (RR/VT): 15 (02/24/20 0528)  Plateau Pressure: 14 (02/25/20 1902)  Static Compliance (ml / cm H2O): 26.5 (02/25/20 2217)    Patient failed trials because of Barriers to Wean: Pneumothorax  Barriers to SBT Weaning Trial Stopped due to::   Length of Weaning Trial Length of Weaning Trial (Hours): 0.7 (02/24/20 0528)    Cough: Non Productive (02/26/20 0000)  Sputum Amount: Unable to Evaluate (02/26/20 0000)  Sputum Color: Unable to Evaluate (02/26/20 0000)  Sputum Consistency: Unable to Evaluate (02/26/20 0000)    Mobility  Level of Mobility: Level IV (02/25/20 1500)  Activity Performed: Edge of bed (02/25/20 1500)  Time Activity Tolerated: 10 min (02/25/20 1500)  Distance Per Occurrence (ft.): 10 feet (02/20/20 0800)  # of Times Distance was Traveled: 2 (02/20/20 0800)  Assistance: Assistance of Two or More (02/25/20 0000)  Ambulation Tolerance: Tires Quickly (02/25/20 1500)  Pt Calls for Assistance: No (02/25/20 0000)  Staff Present for Mobilization: RN;CNA(x4) (02/25/20 1500)  Gait: Unable to Ambulate (02/26/20 0000)  Assistive Devices: Hand held assist (02/26/20 0000)  Reason Not Mobilized: Unstable condition(High risk w/extubation) (02/23/20 2000)  Mobilization Comments: new chest tube insertion (02/22/20 0800)    Events/Summary/Plan: No vent changes made this shift. Pt currently stable on vent.

## 2020-02-26 NOTE — CARE PLAN
Adult Ventilation Update    Total Vent Days: 5  Vent:  x 20, 40% +5    Patient Lines/Drains/Airways Status      Active Airway       Name: Placement date: Placement time: Site: Days:    Airway ETT 8.0  02/21/20   0845   --  5                  Mobility  Activity Performed: Edge of bed     Events/Summary/Plan: Patient stable on vent. No changes. Tolerated SBT fair.

## 2020-02-26 NOTE — CARE PLAN
Problem: Communication  Goal: The ability to communicate needs accurately and effectively will improve  Outcome: PROGRESSING AS EXPECTED   Patient white board updated. Patient updated on plan of care. All questions and concerns addressed.   Problem: Safety  Goal: Will remain free from injury  Outcome: PROGRESSING AS EXPECTED   Appropriate Fall precautions in place. Patient oriented to room and call light. Patient educated regarding safety and fall precautions   Problem: Venous Thromboembolism (VTW)/Deep Vein Thrombosis (DVT) Prevention:  Goal: Patient will participate in Venous Thrombosis (VTE)/Deep Vein Thrombosis (DVT)Prevention Measures  Outcome: PROGRESSING AS EXPECTED   DVT prophylaxis in place. Ambulation, mobility, and frequent repositioning encouraged. Patient educated about DVT precautions and prevention.

## 2020-02-26 NOTE — PROGRESS NOTES
Dr. Gonda notified patient HR in 150s, irregular, 12 lead EKG ordered, showed A-fib. AM Labs reviewed with MD. Telephone orders entered into Epic for Amiodarone loading dose, gtt, and KCL replacement.

## 2020-02-27 ENCOUNTER — APPOINTMENT (OUTPATIENT)
Dept: RADIOLOGY | Facility: MEDICAL CENTER | Age: 60
DRG: 166 | End: 2020-02-27
Attending: INTERNAL MEDICINE
Payer: MEDICARE

## 2020-02-27 ENCOUNTER — APPOINTMENT (OUTPATIENT)
Dept: RADIOLOGY | Facility: MEDICAL CENTER | Age: 60
DRG: 166 | End: 2020-02-27
Attending: PSYCHIATRY & NEUROLOGY
Payer: MEDICARE

## 2020-02-27 LAB
ACTION RANGE TRIGGERED IACRT: NO
ANION GAP SERPL CALC-SCNC: 5 MMOL/L (ref 0–11.9)
BASE EXCESS BLDA CALC-SCNC: 1 MMOL/L (ref -4–3)
BASOPHILS # BLD AUTO: 0.5 % (ref 0–1.8)
BASOPHILS # BLD: 0.15 K/UL (ref 0–0.12)
BODY TEMPERATURE: ABNORMAL DEGREES
BUN SERPL-MCNC: 37 MG/DL (ref 8–22)
CALCIUM SERPL-MCNC: 9 MG/DL (ref 8.5–10.5)
CHLORIDE SERPL-SCNC: 102 MMOL/L (ref 96–112)
CO2 BLDA-SCNC: 28 MMOL/L (ref 20–33)
CO2 SERPL-SCNC: 26 MMOL/L (ref 20–33)
CREAT SERPL-MCNC: 0.82 MG/DL (ref 0.5–1.4)
EOSINOPHIL # BLD AUTO: 0.57 K/UL (ref 0–0.51)
EOSINOPHIL NFR BLD: 2.1 % (ref 0–6.9)
ERYTHROCYTE [DISTWIDTH] IN BLOOD BY AUTOMATED COUNT: 46.7 FL (ref 35.9–50)
GLUCOSE SERPL-MCNC: 138 MG/DL (ref 65–99)
HCO3 BLDA-SCNC: 26.6 MMOL/L (ref 17–25)
HCT VFR BLD AUTO: 43.6 % (ref 42–52)
HGB BLD-MCNC: 14 G/DL (ref 14–18)
HOROWITZ INDEX BLDA+IHG-RTO: 185 MM[HG]
IMM GRANULOCYTES # BLD AUTO: 0.51 K/UL (ref 0–0.11)
IMM GRANULOCYTES NFR BLD AUTO: 1.9 % (ref 0–0.9)
INST. QUALIFIED PATIENT IIQPT: YES
LYMPHOCYTES # BLD AUTO: 3.35 K/UL (ref 1–4.8)
LYMPHOCYTES NFR BLD: 12.2 % (ref 22–41)
MCH RBC QN AUTO: 26.9 PG (ref 27–33)
MCHC RBC AUTO-ENTMCNC: 32.1 G/DL (ref 33.7–35.3)
MCV RBC AUTO: 83.7 FL (ref 81.4–97.8)
MONOCYTES # BLD AUTO: 1.82 K/UL (ref 0–0.85)
MONOCYTES NFR BLD AUTO: 6.6 % (ref 0–13.4)
NEUTROPHILS # BLD AUTO: 21.13 K/UL (ref 1.82–7.42)
NEUTROPHILS NFR BLD: 76.7 % (ref 44–72)
NRBC # BLD AUTO: 0 K/UL
NRBC BLD-RTO: 0 /100 WBC
O2/TOTAL GAS SETTING VFR VENT: 40 %
PCO2 BLDA: 43.3 MMHG (ref 26–37)
PCO2 TEMP ADJ BLDA: 42.6 MMHG (ref 26–37)
PH BLDA: 7.4 [PH] (ref 7.4–7.5)
PH TEMP ADJ BLDA: 7.4 [PH] (ref 7.4–7.5)
PLATELET # BLD AUTO: 355 K/UL (ref 164–446)
PMV BLD AUTO: 9.6 FL (ref 9–12.9)
PO2 BLDA: 74 MMHG (ref 64–87)
PO2 TEMP ADJ BLDA: 72 MMHG (ref 64–87)
POTASSIUM SERPL-SCNC: 3.8 MMOL/L (ref 3.6–5.5)
RBC # BLD AUTO: 5.21 M/UL (ref 4.7–6.1)
SAO2 % BLDA: 95 % (ref 93–99)
SODIUM SERPL-SCNC: 133 MMOL/L (ref 135–145)
SPECIMEN DRAWN FROM PATIENT: ABNORMAL
WBC # BLD AUTO: 27.5 K/UL (ref 4.8–10.8)

## 2020-02-27 PROCEDURE — 700105 HCHG RX REV CODE 258: Performed by: INTERNAL MEDICINE

## 2020-02-27 PROCEDURE — 700102 HCHG RX REV CODE 250 W/ 637 OVERRIDE(OP): Performed by: INTERNAL MEDICINE

## 2020-02-27 PROCEDURE — 700111 HCHG RX REV CODE 636 W/ 250 OVERRIDE (IP): Performed by: INTERNAL MEDICINE

## 2020-02-27 PROCEDURE — 71045 X-RAY EXAM CHEST 1 VIEW: CPT

## 2020-02-27 PROCEDURE — 770022 HCHG ROOM/CARE - ICU (200)

## 2020-02-27 PROCEDURE — 36600 WITHDRAWAL OF ARTERIAL BLOOD: CPT

## 2020-02-27 PROCEDURE — A9270 NON-COVERED ITEM OR SERVICE: HCPCS | Performed by: INTERNAL MEDICINE

## 2020-02-27 PROCEDURE — 82803 BLOOD GASES ANY COMBINATION: CPT

## 2020-02-27 PROCEDURE — A9270 NON-COVERED ITEM OR SERVICE: HCPCS | Performed by: PSYCHIATRY & NEUROLOGY

## 2020-02-27 PROCEDURE — 700102 HCHG RX REV CODE 250 W/ 637 OVERRIDE(OP): Performed by: PSYCHIATRY & NEUROLOGY

## 2020-02-27 PROCEDURE — 302146: Performed by: INTERNAL MEDICINE

## 2020-02-27 PROCEDURE — 94003 VENT MGMT INPAT SUBQ DAY: CPT

## 2020-02-27 PROCEDURE — 71250 CT THORAX DX C-: CPT

## 2020-02-27 PROCEDURE — 80048 BASIC METABOLIC PNL TOTAL CA: CPT

## 2020-02-27 PROCEDURE — 99291 CRITICAL CARE FIRST HOUR: CPT | Performed by: PSYCHIATRY & NEUROLOGY

## 2020-02-27 PROCEDURE — 85025 COMPLETE CBC W/AUTO DIFF WBC: CPT

## 2020-02-27 RX ADMIN — GABAPENTIN 1600 MG: 400 CAPSULE ORAL at 17:13

## 2020-02-27 RX ADMIN — PROPOFOL 30 MCG/KG/MIN: 10 INJECTION, EMULSION INTRAVENOUS at 20:53

## 2020-02-27 RX ADMIN — TIMOLOL MALEATE 1 DROP: 5 SOLUTION OPHTHALMIC at 17:14

## 2020-02-27 RX ADMIN — SULFAMETHOXAZOLE AND TRIMETHOPRIM 1 TABLET: 800; 160 TABLET ORAL at 06:07

## 2020-02-27 RX ADMIN — LEVOTHYROXINE SODIUM 125 MCG: 125 TABLET ORAL at 06:08

## 2020-02-27 RX ADMIN — TRAMADOL HYDROCHLORIDE 50 MG: 50 TABLET, FILM COATED ORAL at 06:07

## 2020-02-27 RX ADMIN — SIMVASTATIN 40 MG: 40 TABLET, FILM COATED ORAL at 20:06

## 2020-02-27 RX ADMIN — AMIODARONE HYDROCHLORIDE 0.5 MG/MIN: 50 INJECTION, SOLUTION INTRAVENOUS at 06:06

## 2020-02-27 RX ADMIN — METHADONE HYDROCHLORIDE 30 MG: 10 TABLET ORAL at 17:14

## 2020-02-27 RX ADMIN — METHADONE HYDROCHLORIDE 10 MG: 10 TABLET ORAL at 20:06

## 2020-02-27 RX ADMIN — PROPOFOL 40 MCG/KG/MIN: 10 INJECTION, EMULSION INTRAVENOUS at 02:32

## 2020-02-27 RX ADMIN — PROPOFOL 40 MCG/KG/MIN: 10 INJECTION, EMULSION INTRAVENOUS at 06:49

## 2020-02-27 RX ADMIN — SENNOSIDES AND DOCUSATE SODIUM 2 TABLET: 8.6; 5 TABLET ORAL at 17:13

## 2020-02-27 RX ADMIN — PREDNISONE 80 MG: 20 TABLET ORAL at 06:07

## 2020-02-27 RX ADMIN — AMIODARONE HYDROCHLORIDE 0.5 MG/MIN: 50 INJECTION, SOLUTION INTRAVENOUS at 22:46

## 2020-02-27 RX ADMIN — GABAPENTIN 2400 MG: 400 CAPSULE ORAL at 06:07

## 2020-02-27 RX ADMIN — OMEPRAZOLE 40 MG: KIT at 17:13

## 2020-02-27 RX ADMIN — METHADONE HYDROCHLORIDE 30 MG: 10 TABLET ORAL at 06:08

## 2020-02-27 RX ADMIN — PROPOFOL 40 MCG/KG/MIN: 10 INJECTION, EMULSION INTRAVENOUS at 14:53

## 2020-02-27 RX ADMIN — POTASSIUM BICARBONATE 25 MEQ: 978 TABLET, EFFERVESCENT ORAL at 08:40

## 2020-02-27 RX ADMIN — POLYETHYLENE GLYCOL 3350 1 PACKET: 17 POWDER, FOR SOLUTION ORAL at 17:13

## 2020-02-27 RX ADMIN — ENOXAPARIN SODIUM 40 MG: 100 INJECTION SUBCUTANEOUS at 06:08

## 2020-02-27 NOTE — CARE PLAN
Ventilator Daily Summary    Vent Day # 7   8.0 @ 26 robert changed this shift    Ventilator settings changed this shift: none     Weaning trials: none at this time     Respiratory Procedures: none at this time    Plan: Continue current ventilator settings and wean mechanical ventilation as tolerated per physician orders.

## 2020-02-27 NOTE — CARE PLAN
Problem: Ventilation Defect:  Goal: Ability to achieve and maintain unassisted ventilation or tolerate decreased levels of ventilator support  Intervention: Support and monitor invasive and noninvasive mechanical ventilation  Note:    Ventilator Daily Summary    Vent Day # 6  ASV:   110  5  40%    Plan: Vent mode switched to ASV. Holding SBT's at this time per MD order.

## 2020-02-27 NOTE — PROGRESS NOTES
Critical Care Progress Note    Date of admission  2/18/2020    Chief Complaint  59 y.o. male who presented 2/18/2020 with history of oxygen dependent chronic hypoxic respiratory failure, requiring 2.5-3 L home oxygen, emphysema, pulmonary hypertension and chronic pain who is on methadone.  He was transferred from [place] for pulmonary specialty care with a left lower lobe pneumonia and pneumothorax on 2/18.  Influenza screening was negative.  Shortly after arriving at [facility] he was noted to have a dislodged chest tube.  A replacement chest tube was not necessary.  He was requiring 5 L of oxygen via nasal cannula on 2/19/2/20.  A pulmonology consultation requested a high resolution CT scan, the initiated a connective tissue work-up and continued broad-spectrum antibiotics.  On the morning of 2/21 patient had increasing work of breathing and hypoxic respiratory failure.  Critical care consultation was requested for further evaluation and management.    Hospital Course    2/21-patient was evaluated in the IR suite for placement of a pigtail catheter.  At the time the residual pneumothorax was felt to be too small to benefit from catheter placement.  At 2335 patient became hypoxic, tachycardic and hypotensive.  Chest x-ray revealed a large pneumothorax.  An emergent chest tube was placed with improvement in hemodynamic and oxygenation parameters.  2/22- Pulmonary biopsy performed.  Prednisone 1 mg/kilogram/day initiated      Interval Problem Update  Reviewed last 24 hour events:   - no new issues              - Tm: 37.2              - HR: 50-60s              - SBP: 100-108s               - Neuro: Follows, eyes disconjugate, MAEW              - GI: NPO              - UOP: 2.2L              - Bell: Yes              - Lines: CVC, chest tube              - PPx: Home PPI, Lovenox              - CXR (personally reviewed): small apical pneumo w/ left sided chest tube in place              -  Antibiotic Bactrim   - CT stable infiltrated pneumothorax persists    INFUSIONS:  Active titration of Propofol        Review of Systems  Review of Systems   Unable to perform ROS: Intubated        Vital Signs for last 24 hours   Pulse:  [57-71] 67  Resp:  [7-23] 14  BP: ()/(56-92) 106/58  SpO2:  [93 %-97 %] 94 %    Hemodynamic parameters for last 24 hours       Respiratory Information for the last 24 hours  Vent Mode: ASV  PEEP/CPAP: 5  MAP: 6.6  Control VTE (exp VT): 773    Physical Exam   Physical Exam  Constitutional:       Comments: Intubated   HENT:      Head: Normocephalic and atraumatic.      Mouth/Throat:      Mouth: Mucous membranes are moist.   Eyes:      Pupils: Pupils are equal, round, and reactive to light.      Comments: Disconjugate   Neck:      Musculoskeletal: Neck supple.   Cardiovascular:      Rate and Rhythm: Regular rhythm.      Heart sounds: No murmur. No friction rub. No gallop.    Pulmonary:      Comments: Scattered crackles and wheezes  Left chest tube in good position  Persistent  air leak  Abdominal:      General: There is no distension.      Palpations: Abdomen is soft.      Tenderness: There is no abdominal tenderness.   Musculoskeletal:         General: No swelling.      Right lower leg: No edema.      Left lower leg: No edema.   Skin:     General: Skin is warm and dry.   Neurological:      Comments: Opens eyes to voice follows commands.  Squeezes hands. Becomes intermittently agitated.         Medications  Current Facility-Administered Medications   Medication Dose Route Frequency Provider Last Rate Last Dose   • potassium bicarbonate (KLYTE) effervescent tablet 25 mEq  25 mEq Enteral Tube Once Srinath Sauceda M.D.       • amiodarone (CORDARONE) 450 mg in D5W 250 mL Infusion  0.5-1 mg/min Intravenous Continuous Jeremy M Gonda, M.D. 17 mL/hr at 02/27/20 0606 0.5 mg/min at 02/27/20 0606   • oxyCODONE immediate-release (ROXICODONE) tablet 5 mg  5 mg Enteral Tube Q4HRS PRN Srinath MAHONEY  ARLINE Sauceda   5 mg at 02/25/20 1402    Or   • oxyCODONE immediate release (ROXICODONE) tablet 10 mg  10 mg Enteral Tube Q4HRS PRN Srinath Sauceda M.D.   10 mg at 02/26/20 0518   • polyethylene glycol/lytes (MIRALAX) PACKET 1 Packet  1 Packet Enteral Tube BID Marck Javier M.D.   Stopped at 02/26/20 1800    And   • senna-docusate (PERICOLACE or SENOKOT S) 8.6-50 MG per tablet 2 Tab  2 Tab Enteral Tube BID Marck Javier M.D.   Stopped at 02/27/20 0600    And   • magnesium hydroxide (MILK OF MAGNESIA) suspension 30 mL  30 mL Enteral Tube QDAY PRN Marck Javier M.D.   30 mL at 02/24/20 1556    And   • bisacodyl (DULCOLAX) suppository 10 mg  10 mg Rectal QDAY PRN Marck Javier M.D.   10 mg at 02/25/20 1154   • enoxaparin (LOVENOX) inj 40 mg  40 mg Subcutaneous DAILY Marck Javier M.D.   40 mg at 02/27/20 0608   • HYDROmorphone pf (DILAUDID) injection 1 mg  1 mg Intravenous Q2HRS PRN Marck Javier M.D.   1 mg at 02/25/20 0758   • Pharmacy Consult: Enteral tube insertion - review meds/change route/product selection  1 Each Other PHARMACY TO DOSE Marck Javier M.D.       • predniSONE (DELTASONE) tablet 80 mg  80 mg Enteral Tube DAILY Marck Javier M.D.   80 mg at 02/27/20 0607   • sulfamethoxazole-trimethoprim (BACTRIM DS) 800-160 MG tablet 1 Tab  1 Tab Enteral Tube DAILY Marck Javier M.D.   1 Tab at 02/27/20 0607   • propofol (DIPRIVAN) injection  0-80 mcg/kg/min Intravenous Continuous Marck Javier M.D. 18.9 mL/hr at 02/27/20 0649 40 mcg/kg/min at 02/27/20 0649   • ipratropium-albuterol (DUONEB) nebulizer solution  3 mL Nebulization Q2HRS PRN (RT) Marck Javier M.D.       • MD Alert...ICU Electrolyte Replacement per Pharmacy   Other PHARMACY TO DOSE Marck Javier M.D.       • lidocaine (XYLOCAINE) 1 % injection 1-2 mL  1-2 mL Tracheal Tube Q30 MIN PRN Marck Javier M.D.       • gabapentin (NEURONTIN) capsule 2,400 mg  2,400 mg Enteral Tube QAM Marck Javier M.D.   2,400 mg at 02/27/20 0607     And   • gabapentin (NEURONTIN) capsule 1,600 mg  1,600 mg Enteral Tube Q EVENING Marck Javier M.D.   1,600 mg at 02/26/20 1734   • levothyroxine (SYNTHROID) tablet 125 mcg  125 mcg Enteral Tube QAM AC Marck Javier M.D.   125 mcg at 02/27/20 0608   • methadone (DOLOPHINE) tablet 30 mg  30 mg Enteral Tube BID Marck Javier M.D.   30 mg at 02/27/20 0608    And   • methadone (DOLOPHINE) tablet 10 mg  10 mg Enteral Tube QHS Marck Javier M.D.   10 mg at 02/26/20 2013   • omeprazole (FIRST-OMEPRAZOLE) 2 mg/mL oral susp 40 mg  40 mg Enteral Tube Q EVENING Marck Javier M.D.   40 mg at 02/26/20 1735   • simvastatin (ZOCOR) tablet 40 mg  40 mg Enteral Tube Nightly Marck Javier M.D.   40 mg at 02/26/20 2013   • tramadol (ULTRAM) 50 MG tablet 50 mg  50 mg Enteral Tube BID Marck Javier M.D.   50 mg at 02/27/20 0607   • acetaminophen (TYLENOL) tablet 650 mg  650 mg Enteral Tube Q6HRS PRN Marck Javier M.D.       • ALPRAZolam (XANAX) tablet 0.25 mg  0.25 mg Enteral Tube 4X/DAY PRN Marck Javier M.D.       • guaiFENesin dextromethorphan (ROBITUSSIN DM) 100-10 MG/5ML syrup 10 mL  10 mL Enteral Tube Q6HRS PRN Marck Javier M.D.       • promethazine (PHENERGAN) tablet 12.5-25 mg  12.5-25 mg Enteral Tube Q4HRS PRN Marck Javier M.D.       • influenza vaccine quad injection 0.5 mL  0.5 mL Intramuscular Once PRN Eulogio Lopes M.D.       • timolol (TIMOPTIC) 0.5 % ophthalmic solution 1 Drop  1 Drop Both Eyes Q EVENING Tim Bustos M.D.   1 Drop at 02/26/20 1736   • Respiratory Therapy Consult   Nebulization Continuous RT Tim Bustos M.D.       • promethazine (PHENERGAN) suppository 12.5-25 mg  12.5-25 mg Rectal Q4HRS PRN Tim Bustos M.D.       • prochlorperazine (COMPAZINE) injection 5-10 mg  5-10 mg Intravenous Q4HRS PRN Tim Bustos M.D.       • albuterol (PROVENTIL) 2.5mg/0.5ml nebulizer solution 2.5 mg  2.5 mg Nebulization Q2HRS PRN (RT) Tim Bustos M.D.            Fluids    Intake/Output Summary (Last 24 hours) at 2/27/2020 0838  Last data filed at 2/27/2020 0800  Gross per 24 hour   Intake 2225.5 ml   Output 2125 ml   Net 100.5 ml       Laboratory  Recent Labs     02/25/20  0358 02/26/20  0441 02/27/20  0330   ISTATAPH 7.480 7.467 7.397*   ISTATAPCO2 34.2 34.9 43.3*   ISTATAPO2 58* 63* 74   ISTATATCO2 26 26 28   [id number] 92* 93 95   ISTATARTHCO3 25.4* 25.2* 26.6*   ISTATARTBE 2 2 1   ISTATTEMP 98.4 F 99.3 F 97.9 F   ISTATFIO2 40 40 40   ISTATSPEC Arterial Arterial Arterial   ISTATAPHTC 7.481 7.461 7.403   [id number] 58* 65 72         Recent Labs     02/25/20  0635 02/25/20  1407 02/26/20  0249 02/27/20  0310   SODIUM 138 137 137 133*   POTASSIUM 3.6 4.6 3.8 3.8   CHLORIDE 104 102 102 102   CO2 28 27 28 26   BUN 25* 25* 30* 37*   CREATININE 0.82 0.79 0.83 0.82   PHOSPHORUS 2.8 3.5  --   --    CALCIUM 8.5 9.1 9.0 9.0     Recent Labs     02/25/20  1407 02/26/20  0249 02/27/20  0310   GLUCOSE 159* 127* 138*     Recent Labs     02/25/20  0245 02/26/20  0249 02/27/20  0310   WBC 15.5* 22.3* 27.5*   NEUTSPOLYS 67.90 74.90* 76.70*   LYMPHOCYTES 20.20* 14.00* 12.20*   MONOCYTES 8.00 8.10 6.60   EOSINOPHILS 2.80 1.70 2.10   BASOPHILS 0.30 0.40 0.50     Recent Labs     02/25/20  0245 02/26/20  0249 02/27/20  0310   RBC 4.61* 5.21 5.21   HEMOGLOBIN 12.1* 13.8* 14.0   HEMATOCRIT 37.9* 42.3 43.6   PLATELETCT 294 343 355       Imaging  X-Ray:  I have personally reviewed the images and compared with prior images.  CT:    Reviewed    Assessment/Plan  Acute on chronic respiratory failure with hypoxia (HCC)- (present on admission)  Assessment & Plan  Acute hypoxic respiratory failure  Attributed to evolving NSIP  Complicated by underlying emphysematous disease.   Lung protective ventilation strategies  Titrate ventilator prescription to optimize oxygenation, ventilation, and acid base balance.  Daily ABC trials    Pulmonary biopsy on 2/22 am  CTD unremarkable  Prednisone 1  mg/KG/day  Bactrim prophylaxis    Pneumonia- (present on admission)  Assessment & Plan  NSIP  Cont prednisone  Bactrim PPX    Pneumothorax  Assessment & Plan  Status post left tension pneumothorax  Pneumothorax improved with chest tube in place  Chest tube in good position  Continue chest tube to suction    He has proven to be high risk for pneumothorax   status post pulmonary biopsy  Serial chest x-rays  Pleural tube placement as clinically indicated      Atrial fibrillation with rapid ventricular response (HCC)  Assessment & Plan  Continue amio  Now nsr  Optimize lytes  monitor    Chronic pain- (present on admission)  Assessment & Plan  Continue gabapentin regimen  Continue methadone regimen  Continue tramadol  Added prn oxycodone  Through the enteral tube  Dilaudid 1 mg IV every 2 hours as needed breakthrough pain           VTE:  Lovenox  Ulcer: PPI  Lines: Central Line  Ongoing indication addressed    I have performed a physical exam and reviewed and updated ROS and Plan today (2/27/2020). In review of yesterday's note (2/26/2020), there are no changes except as documented above.     Discussed patient condition and risk of morbidity and/or mortality with RN, RT, Pharmacy, Code status disscussed, Patient and Pulmonary     The patient remains critically ill.  I have assessed and reassessed the respiratory status and made ventilator adjustments based upon arterial blood gas analysis, ventilator waveforms and airway mechanics.  I have assessed and reassessed the blood pressure, hemodynamics, cardiovascular status. This patient remains at high risk for worsening cardiopulmonary dysfunction and death without the above critical care interventions.    Critical care time  34 minutes in directly providing and coordinating critical care and extensive data review.  No time overlap and excludes procedures.

## 2020-02-27 NOTE — PROGRESS NOTES
Patient chest tube air leak much more pronounced upon auscultation, MD notified.  X-ray reviewed, no intervention necessary at this time.  Will continue to monitor.

## 2020-02-27 NOTE — CARE PLAN
Problem: Safety  Goal: Will remain free from injury  Outcome: PROGRESSING AS EXPECTED  Goal: Will remain free from falls  Outcome: PROGRESSING AS EXPECTED     Problem: Venous Thromboembolism (VTW)/Deep Vein Thrombosis (DVT) Prevention:  Goal: Patient will participate in Venous Thrombosis (VTE)/Deep Vein Thrombosis (DVT)Prevention Measures  Outcome: PROGRESSING AS EXPECTED     Problem: Bowel/Gastric:  Goal: Normal bowel function is maintained or improved  Outcome: PROGRESSING AS EXPECTED     Problem: Pain Management  Goal: Pain level will decrease to patient's comfort goal  Outcome: PROGRESSING AS EXPECTED

## 2020-02-27 NOTE — CARE PLAN
Problem: Safety  Goal: Will remain free from injury  Outcome: PROGRESSING AS EXPECTED  Goal: Will remain free from falls  Outcome: PROGRESSING AS EXPECTED     Problem: Infection  Goal: Will remain free from infection  Outcome: PROGRESSING AS EXPECTED     Problem: Venous Thromboembolism (VTW)/Deep Vein Thrombosis (DVT) Prevention:  Goal: Patient will participate in Venous Thrombosis (VTE)/Deep Vein Thrombosis (DVT)Prevention Measures  Outcome: PROGRESSING AS EXPECTED     Problem: Bowel/Gastric:  Goal: Normal bowel function is maintained or improved  Outcome: PROGRESSING AS EXPECTED  Goal: Will not experience complications related to bowel motility  Outcome: PROGRESSING AS EXPECTED     Problem: Knowledge Deficit  Goal: Knowledge of disease process/condition, treatment plan, diagnostic tests, and medications will improve  Outcome: PROGRESSING AS EXPECTED     Problem: Communication  Goal: The ability to communicate needs accurately and effectively will improve  Outcome: PROGRESSING SLOWER THAN EXPECTED

## 2020-02-28 ENCOUNTER — APPOINTMENT (OUTPATIENT)
Dept: RADIOLOGY | Facility: MEDICAL CENTER | Age: 60
DRG: 166 | End: 2020-02-28
Attending: INTERNAL MEDICINE
Payer: MEDICARE

## 2020-02-28 LAB
ACTION RANGE TRIGGERED IACRT: NO
ANION GAP SERPL CALC-SCNC: 6 MMOL/L (ref 0–11.9)
BASE EXCESS BLDA CALC-SCNC: 2 MMOL/L (ref -4–3)
BASOPHILS # BLD AUTO: 0.7 % (ref 0–1.8)
BASOPHILS # BLD: 0.19 K/UL (ref 0–0.12)
BODY TEMPERATURE: ABNORMAL DEGREES
BUN SERPL-MCNC: 40 MG/DL (ref 8–22)
CALCIUM SERPL-MCNC: 8.9 MG/DL (ref 8.5–10.5)
CHLORIDE SERPL-SCNC: 99 MMOL/L (ref 96–112)
CO2 BLDA-SCNC: 27 MMOL/L (ref 20–33)
CO2 SERPL-SCNC: 29 MMOL/L (ref 20–33)
CREAT SERPL-MCNC: 0.92 MG/DL (ref 0.5–1.4)
EOSINOPHIL # BLD AUTO: 0.58 K/UL (ref 0–0.51)
EOSINOPHIL NFR BLD: 2.1 % (ref 0–6.9)
ERYTHROCYTE [DISTWIDTH] IN BLOOD BY AUTOMATED COUNT: 46.9 FL (ref 35.9–50)
GLUCOSE SERPL-MCNC: 112 MG/DL (ref 65–99)
HCO3 BLDA-SCNC: 25.7 MMOL/L (ref 17–25)
HCT VFR BLD AUTO: 43.6 % (ref 42–52)
HGB BLD-MCNC: 13.7 G/DL (ref 14–18)
HOROWITZ INDEX BLDA+IHG-RTO: 173 MM[HG]
IMM GRANULOCYTES # BLD AUTO: 0.5 K/UL (ref 0–0.11)
IMM GRANULOCYTES NFR BLD AUTO: 1.8 % (ref 0–0.9)
INST. QUALIFIED PATIENT IIQPT: YES
LYMPHOCYTES # BLD AUTO: 3.84 K/UL (ref 1–4.8)
LYMPHOCYTES NFR BLD: 13.6 % (ref 22–41)
MAGNESIUM SERPL-MCNC: 2.2 MG/DL (ref 1.5–2.5)
MCH RBC QN AUTO: 26 PG (ref 27–33)
MCHC RBC AUTO-ENTMCNC: 31.4 G/DL (ref 33.7–35.3)
MCV RBC AUTO: 82.7 FL (ref 81.4–97.8)
MONOCYTES # BLD AUTO: 2.47 K/UL (ref 0–0.85)
MONOCYTES NFR BLD AUTO: 8.7 % (ref 0–13.4)
NEUTROPHILS # BLD AUTO: 20.65 K/UL (ref 1.82–7.42)
NEUTROPHILS NFR BLD: 73.1 % (ref 44–72)
NRBC # BLD AUTO: 0 K/UL
NRBC BLD-RTO: 0 /100 WBC
O2/TOTAL GAS SETTING VFR VENT: 40 %
PCO2 BLDA: 37.9 MMHG (ref 26–37)
PCO2 TEMP ADJ BLDA: 37.5 MMHG (ref 26–37)
PH BLDA: 7.44 [PH] (ref 7.4–7.5)
PH TEMP ADJ BLDA: 7.44 [PH] (ref 7.4–7.5)
PHOSPHATE SERPL-MCNC: 2.9 MG/DL (ref 2.5–4.5)
PLATELET # BLD AUTO: 398 K/UL (ref 164–446)
PMV BLD AUTO: 9.5 FL (ref 9–12.9)
PO2 BLDA: 69 MMHG (ref 64–87)
PO2 TEMP ADJ BLDA: 68 MMHG (ref 64–87)
POTASSIUM SERPL-SCNC: 4 MMOL/L (ref 3.6–5.5)
RBC # BLD AUTO: 5.27 M/UL (ref 4.7–6.1)
SAO2 % BLDA: 94 % (ref 93–99)
SODIUM SERPL-SCNC: 134 MMOL/L (ref 135–145)
SPECIMEN DRAWN FROM PATIENT: ABNORMAL
WBC # BLD AUTO: 28.2 K/UL (ref 4.8–10.8)

## 2020-02-28 PROCEDURE — 700111 HCHG RX REV CODE 636 W/ 250 OVERRIDE (IP): Performed by: PSYCHIATRY & NEUROLOGY

## 2020-02-28 PROCEDURE — A9270 NON-COVERED ITEM OR SERVICE: HCPCS | Performed by: PSYCHIATRY & NEUROLOGY

## 2020-02-28 PROCEDURE — 700105 HCHG RX REV CODE 258: Performed by: PSYCHIATRY & NEUROLOGY

## 2020-02-28 PROCEDURE — 36600 WITHDRAWAL OF ARTERIAL BLOOD: CPT

## 2020-02-28 PROCEDURE — 85025 COMPLETE CBC W/AUTO DIFF WBC: CPT

## 2020-02-28 PROCEDURE — 82803 BLOOD GASES ANY COMBINATION: CPT

## 2020-02-28 PROCEDURE — 94003 VENT MGMT INPAT SUBQ DAY: CPT

## 2020-02-28 PROCEDURE — A9270 NON-COVERED ITEM OR SERVICE: HCPCS | Performed by: INTERNAL MEDICINE

## 2020-02-28 PROCEDURE — 770022 HCHG ROOM/CARE - ICU (200)

## 2020-02-28 PROCEDURE — 700102 HCHG RX REV CODE 250 W/ 637 OVERRIDE(OP): Performed by: PSYCHIATRY & NEUROLOGY

## 2020-02-28 PROCEDURE — 700111 HCHG RX REV CODE 636 W/ 250 OVERRIDE (IP): Performed by: INTERNAL MEDICINE

## 2020-02-28 PROCEDURE — 84100 ASSAY OF PHOSPHORUS: CPT

## 2020-02-28 PROCEDURE — 99291 CRITICAL CARE FIRST HOUR: CPT | Performed by: PSYCHIATRY & NEUROLOGY

## 2020-02-28 PROCEDURE — 71045 X-RAY EXAM CHEST 1 VIEW: CPT

## 2020-02-28 PROCEDURE — 80048 BASIC METABOLIC PNL TOTAL CA: CPT

## 2020-02-28 PROCEDURE — 700102 HCHG RX REV CODE 250 W/ 637 OVERRIDE(OP): Performed by: INTERNAL MEDICINE

## 2020-02-28 PROCEDURE — 83735 ASSAY OF MAGNESIUM: CPT

## 2020-02-28 PROCEDURE — 94150 VITAL CAPACITY TEST: CPT

## 2020-02-28 RX ORDER — QUETIAPINE FUMARATE 25 MG/1
25 TABLET, FILM COATED ORAL 2 TIMES DAILY
Status: DISCONTINUED | OUTPATIENT
Start: 2020-02-28 | End: 2020-03-02

## 2020-02-28 RX ORDER — AMIODARONE HYDROCHLORIDE 150 MG/3ML
150 INJECTION, SOLUTION INTRAVENOUS ONCE
Status: DISCONTINUED | OUTPATIENT
Start: 2020-02-28 | End: 2020-02-28

## 2020-02-28 RX ORDER — SODIUM CHLORIDE, SODIUM LACTATE, POTASSIUM CHLORIDE, CALCIUM CHLORIDE 600; 310; 30; 20 MG/100ML; MG/100ML; MG/100ML; MG/100ML
INJECTION, SOLUTION INTRAVENOUS CONTINUOUS
Status: DISCONTINUED | OUTPATIENT
Start: 2020-02-28 | End: 2020-02-28

## 2020-02-28 RX ADMIN — SULFAMETHOXAZOLE AND TRIMETHOPRIM 1 TABLET: 800; 160 TABLET ORAL at 05:57

## 2020-02-28 RX ADMIN — ALPRAZOLAM 0.25 MG: 0.25 TABLET ORAL at 08:38

## 2020-02-28 RX ADMIN — QUETIAPINE FUMARATE 25 MG: 25 TABLET ORAL at 17:03

## 2020-02-28 RX ADMIN — GABAPENTIN 1600 MG: 400 CAPSULE ORAL at 17:03

## 2020-02-28 RX ADMIN — GABAPENTIN 2400 MG: 400 CAPSULE ORAL at 05:58

## 2020-02-28 RX ADMIN — METHADONE HYDROCHLORIDE 10 MG: 10 TABLET ORAL at 20:32

## 2020-02-28 RX ADMIN — AMIODARONE HYDROCHLORIDE 150 MG: 1.5 INJECTION, SOLUTION INTRAVENOUS at 20:32

## 2020-02-28 RX ADMIN — METHADONE HYDROCHLORIDE 30 MG: 10 TABLET ORAL at 15:00

## 2020-02-28 RX ADMIN — HYDROMORPHONE HYDROCHLORIDE 1 MG: 1 INJECTION, SOLUTION INTRAMUSCULAR; INTRAVENOUS; SUBCUTANEOUS at 02:15

## 2020-02-28 RX ADMIN — HYDROMORPHONE HYDROCHLORIDE 1 MG: 1 INJECTION, SOLUTION INTRAMUSCULAR; INTRAVENOUS; SUBCUTANEOUS at 15:00

## 2020-02-28 RX ADMIN — PROPOFOL 35 MCG/KG/MIN: 10 INJECTION, EMULSION INTRAVENOUS at 02:35

## 2020-02-28 RX ADMIN — HYDROMORPHONE HYDROCHLORIDE 1 MG: 1 INJECTION, SOLUTION INTRAMUSCULAR; INTRAVENOUS; SUBCUTANEOUS at 11:14

## 2020-02-28 RX ADMIN — METHADONE HYDROCHLORIDE 30 MG: 10 TABLET ORAL at 05:57

## 2020-02-28 RX ADMIN — SIMVASTATIN 40 MG: 40 TABLET, FILM COATED ORAL at 20:32

## 2020-02-28 RX ADMIN — HYDROMORPHONE HYDROCHLORIDE 1 MG: 1 INJECTION, SOLUTION INTRAMUSCULAR; INTRAVENOUS; SUBCUTANEOUS at 09:07

## 2020-02-28 RX ADMIN — LEVOTHYROXINE SODIUM 125 MCG: 125 TABLET ORAL at 05:57

## 2020-02-28 RX ADMIN — OMEPRAZOLE 40 MG: KIT at 17:03

## 2020-02-28 RX ADMIN — PREDNISONE 80 MG: 20 TABLET ORAL at 05:57

## 2020-02-28 RX ADMIN — AMIODARONE HYDROCHLORIDE 1 MG/MIN: 50 INJECTION, SOLUTION INTRAVENOUS at 17:47

## 2020-02-28 RX ADMIN — HYDROMORPHONE HYDROCHLORIDE 1 MG: 1 INJECTION, SOLUTION INTRAMUSCULAR; INTRAVENOUS; SUBCUTANEOUS at 17:03

## 2020-02-28 RX ADMIN — ALPRAZOLAM 0.25 MG: 0.25 TABLET ORAL at 13:06

## 2020-02-28 RX ADMIN — PROPOFOL 35 MCG/KG/MIN: 10 INJECTION, EMULSION INTRAVENOUS at 08:52

## 2020-02-28 RX ADMIN — HYDROMORPHONE HYDROCHLORIDE 1 MG: 1 INJECTION, SOLUTION INTRAMUSCULAR; INTRAVENOUS; SUBCUTANEOUS at 07:16

## 2020-02-28 RX ADMIN — ENOXAPARIN SODIUM 40 MG: 100 INJECTION SUBCUTANEOUS at 05:58

## 2020-02-28 RX ADMIN — TRAMADOL HYDROCHLORIDE 50 MG: 50 TABLET, FILM COATED ORAL at 17:03

## 2020-02-28 RX ADMIN — QUETIAPINE FUMARATE 25 MG: 25 TABLET ORAL at 10:29

## 2020-02-28 RX ADMIN — TRAMADOL HYDROCHLORIDE 50 MG: 50 TABLET, FILM COATED ORAL at 06:21

## 2020-02-28 RX ADMIN — HYDROMORPHONE HYDROCHLORIDE 1 MG: 1 INJECTION, SOLUTION INTRAMUSCULAR; INTRAVENOUS; SUBCUTANEOUS at 13:06

## 2020-02-28 RX ADMIN — TIMOLOL MALEATE 1 DROP: 5 SOLUTION OPHTHALMIC at 17:45

## 2020-02-28 ASSESSMENT — PULMONARY FUNCTION TESTS
FVC: .9
FVC: .8

## 2020-02-28 NOTE — CARE PLAN
Problem: Mechanical Ventilation  Goal: Safe management of artificial airway and ventilation  Intervention: Suctioning and care of ET/Trach tube  Note: Pt. Remains vented/sedated, on ASV, RT involved.      Problem: Hemodynamic Status  Goal: Vital Signs and Fluid Balance Management  Intervention: Hemodynamic Monitoring  Note: Pt. Has L chest tube in place, has serous drainage, chest CT done today.

## 2020-02-28 NOTE — PROGRESS NOTES
Critical Care Progress Note    Date of admission  2/18/2020    Chief Complaint  59 y.o. male who presented 2/18/2020 with history of oxygen dependent chronic hypoxic respiratory failure, requiring 2.5-3 L home oxygen, emphysema, pulmonary hypertension and chronic pain who is on methadone.  He was transferred from [place] for pulmonary specialty care with a left lower lobe pneumonia and pneumothorax on 2/18.  Influenza screening was negative.  Shortly after arriving at [facility] he was noted to have a dislodged chest tube.  A replacement chest tube was not necessary.  He was requiring 5 L of oxygen via nasal cannula on 2/19/2/20.  A pulmonology consultation requested a high resolution CT scan, the initiated a connective tissue work-up and continued broad-spectrum antibiotics.  On the morning of 2/21 patient had increasing work of breathing and hypoxic respiratory failure.  Critical care consultation was requested for further evaluation and management.    Hospital Course    2/21-patient was evaluated in the IR suite for placement of a pigtail catheter.  At the time the residual pneumothorax was felt to be too small to benefit from catheter placement.  At 2335 patient became hypoxic, tachycardic and hypotensive.  Chest x-ray revealed a large pneumothorax.  An emergent chest tube was placed with improvement in hemodynamic and oxygenation parameters.  2/22- Pulmonary biopsy performed.  Prednisone 1 mg/kilogram/day initiated      Interval Problem Update  Reviewed last 24 hour events:   - no new issues              - Tm: 37.1              - HR: 50-60s              - SBP: 100-110s              - Neuro: Follow commands              - GI: TF at goal              - UOP: 2L              - Bell: Yes              - Lines: CVC, chest tube              - PPx: Home PPI, Lovenox              - CXR (personally reviewed): small apical pneumo w/ left sided chest tube in place              - Antibiotic Bactrim   -  CT stable infiltrated pneumothorax persists    INFUSIONS:  Active titration of Propofol        Review of Systems  Review of Systems   Unable to perform ROS: Intubated        Vital Signs for last 24 hours   Pulse:  [54-76] 56  Resp:  [10-31] 14  BP: ()/(54-90) 119/65  SpO2:  [92 %-98 %] 97 %    Hemodynamic parameters for last 24 hours       Respiratory Information for the last 24 hours  Vent Mode: APVCMV  PEEP/CPAP: 5  P Support: 5  MAP: 7.1  Length of Weaning Trial (Hours): 0.7  Control VTE (exp VT): 415    Physical Exam   Physical Exam  Constitutional:       Comments: Intubated   HENT:      Head: Normocephalic and atraumatic.      Mouth/Throat:      Mouth: Mucous membranes are moist.   Eyes:      Pupils: Pupils are equal, round, and reactive to light.      Comments: Disconjugate   Neck:      Musculoskeletal: Neck supple.   Cardiovascular:      Rate and Rhythm: Regular rhythm.      Heart sounds: No murmur. No friction rub. No gallop.    Pulmonary:      Comments: Scattered crackles and wheezes  Left chest tube in good position  Persistent  air leak  Abdominal:      General: There is no distension.      Palpations: Abdomen is soft.      Tenderness: There is no abdominal tenderness.   Musculoskeletal:         General: No swelling.      Right lower leg: No edema.      Left lower leg: No edema.   Skin:     General: Skin is warm and dry.   Neurological:      Comments: Opens eyes to voice follows commands.  Squeezes hands. Becomes intermittently agitated.         Medications  Current Facility-Administered Medications   Medication Dose Route Frequency Provider Last Rate Last Dose   • amiodarone (CORDARONE) 450 mg in D5W 250 mL Infusion  0.5-1 mg/min Intravenous Continuous Jeremy M Gonda, M.D. 17 mL/hr at 02/27/20 2246 0.5 mg/min at 02/27/20 2246   • oxyCODONE immediate-release (ROXICODONE) tablet 5 mg  5 mg Enteral Tube Q4HRS PRN Srinath Sauceda M.D.   5 mg at 02/25/20 1402    Or   • oxyCODONE immediate release  (ROXICODONE) tablet 10 mg  10 mg Enteral Tube Q4HRS PRN Srinath Sauceda M.D.   10 mg at 02/26/20 0518   • polyethylene glycol/lytes (MIRALAX) PACKET 1 Packet  1 Packet Enteral Tube BID Marck Javier M.D.   Stopped at 02/28/20 0500    And   • senna-docusate (PERICOLACE or SENOKOT S) 8.6-50 MG per tablet 2 Tab  2 Tab Enteral Tube BID Marck Javier M.D.   Stopped at 02/28/20 0500    And   • magnesium hydroxide (MILK OF MAGNESIA) suspension 30 mL  30 mL Enteral Tube QDAY PRN Marck Javier M.D.   30 mL at 02/24/20 1556    And   • bisacodyl (DULCOLAX) suppository 10 mg  10 mg Rectal QDAY PRN Marck Javier M.D.   10 mg at 02/25/20 1154   • enoxaparin (LOVENOX) inj 40 mg  40 mg Subcutaneous DAILY Marck Javier M.D.   40 mg at 02/28/20 0558   • HYDROmorphone pf (DILAUDID) injection 1 mg  1 mg Intravenous Q2HRS PRN Marck Javier M.D.   1 mg at 02/28/20 0716   • Pharmacy Consult: Enteral tube insertion - review meds/change route/product selection  1 Each Other PHARMACY TO DOSE Marck Javier M.D.       • predniSONE (DELTASONE) tablet 80 mg  80 mg Enteral Tube DAILY Marck Javier M.D.   80 mg at 02/28/20 0557   • propofol (DIPRIVAN) injection  0-80 mcg/kg/min Intravenous Continuous Marck Javier M.D. 18.9 mL/hr at 02/28/20 0433 40 mcg/kg/min at 02/28/20 0433   • ipratropium-albuterol (DUONEB) nebulizer solution  3 mL Nebulization Q2HRS PRN (RT) Marck Javier M.D.       • MD Alert...ICU Electrolyte Replacement per Pharmacy   Other PHARMACY TO DOSE Marck Javier M.D.       • lidocaine (XYLOCAINE) 1 % injection 1-2 mL  1-2 mL Tracheal Tube Q30 MIN PRN Marck Javier M.D.       • gabapentin (NEURONTIN) capsule 2,400 mg  2,400 mg Enteral Tube QAM Marck D Yaya, M.D.   2,400 mg at 02/28/20 0558    And   • gabapentin (NEURONTIN) capsule 1,600 mg  1,600 mg Enteral Tube Q EVENING Marck Javier M.D.   1,600 mg at 02/27/20 1713   • levothyroxine (SYNTHROID) tablet 125 mcg  125 mcg Enteral Tube ALANNAH HINES  ARLINE Javier   125 mcg at 02/28/20 0557   • methadone (DOLOPHINE) tablet 30 mg  30 mg Enteral Tube BID Marck Javier M.D.   30 mg at 02/28/20 0557    And   • methadone (DOLOPHINE) tablet 10 mg  10 mg Enteral Tube QHS Marck Javier M.D.   10 mg at 02/27/20 2006   • omeprazole (FIRST-OMEPRAZOLE) 2 mg/mL oral susp 40 mg  40 mg Enteral Tube Q EVENING Marck Javier M.D.   40 mg at 02/27/20 1713   • simvastatin (ZOCOR) tablet 40 mg  40 mg Enteral Tube Nightly Marck Javier M.D.   40 mg at 02/27/20 2006   • tramadol (ULTRAM) 50 MG tablet 50 mg  50 mg Enteral Tube BID Marck Javier M.D.   50 mg at 02/28/20 0621   • acetaminophen (TYLENOL) tablet 650 mg  650 mg Enteral Tube Q6HRS PRN Marck Javier M.D.       • ALPRAZolam (XANAX) tablet 0.25 mg  0.25 mg Enteral Tube 4X/DAY PRN Marck Javier M.D.       • guaiFENesin dextromethorphan (ROBITUSSIN DM) 100-10 MG/5ML syrup 10 mL  10 mL Enteral Tube Q6HRS PRN Marck Javier M.D.       • promethazine (PHENERGAN) tablet 12.5-25 mg  12.5-25 mg Enteral Tube Q4HRS PRN Marck Javier M.D.       • influenza vaccine quad injection 0.5 mL  0.5 mL Intramuscular Once PRN Eulogio Lopes M.D.       • timolol (TIMOPTIC) 0.5 % ophthalmic solution 1 Drop  1 Drop Both Eyes Q EVENING Tim Bustos M.D.   1 Drop at 02/27/20 1714   • Respiratory Therapy Consult   Nebulization Continuous RT Tim Bustos M.D.       • promethazine (PHENERGAN) suppository 12.5-25 mg  12.5-25 mg Rectal Q4HRS PRN Tim Bustos M.D.       • prochlorperazine (COMPAZINE) injection 5-10 mg  5-10 mg Intravenous Q4HRS PRN Tim Schmidhuber, M.D.       • albuterol (PROVENTIL) 2.5mg/0.5ml nebulizer solution 2.5 mg  2.5 mg Nebulization Q2HRS PRN (RT) Tim Bustos M.D.           Fluids    Intake/Output Summary (Last 24 hours) at 2/28/2020 0739  Last data filed at 2/28/2020 0600  Gross per 24 hour   Intake 2066.57 ml   Output 2110 ml   Net -43.43 ml       Laboratory  Recent Labs      02/26/20  0441 02/27/20  0330 02/28/20  0446   ISTATAPH 7.467 7.397* 7.440   ISTATAPCO2 34.9 43.3* 37.9*   ISTATAPO2 63* 74 69   ISTATATCO2 26 28 27   [id number] 93 95 94   ISTATARTHCO3 25.2* 26.6* 25.7*   ISTATARTBE 2 1 2   ISTATTEMP 99.3 F 97.9 F 98.2 F   ISTATFIO2 40 40 40   ISTATSPEC Arterial Arterial Arterial   ISTATAPHTC 7.461 7.403 7.443   [id number] 65 72 68         Recent Labs     02/25/20  1407 02/26/20  0249 02/27/20  0310 02/28/20  0409   SODIUM 137 137 133* 134*   POTASSIUM 4.6 3.8 3.8 4.0   CHLORIDE 102 102 102 99   CO2 27 28 26 29   BUN 25* 30* 37* 40*   CREATININE 0.79 0.83 0.82 0.92   MAGNESIUM  --   --   --  2.2   PHOSPHORUS 3.5  --   --  2.9   CALCIUM 9.1 9.0 9.0 8.9     Recent Labs     02/26/20  0249 02/27/20  0310 02/28/20  0409   GLUCOSE 127* 138* 112*     Recent Labs     02/26/20  0249 02/27/20  0310 02/28/20  0409   WBC 22.3* 27.5* 28.2*   NEUTSPOLYS 74.90* 76.70* 73.10*   LYMPHOCYTES 14.00* 12.20* 13.60*   MONOCYTES 8.10 6.60 8.70   EOSINOPHILS 1.70 2.10 2.10   BASOPHILS 0.40 0.50 0.70     Recent Labs     02/26/20  0249 02/27/20  0310 02/28/20  0409   RBC 5.21 5.21 5.27   HEMOGLOBIN 13.8* 14.0 13.7*   HEMATOCRIT 42.3 43.6 43.6   PLATELETCT 343 355 398       Imaging  X-Ray:  I have personally reviewed the images and compared with prior images.  CT:    Reviewed    Assessment/Plan  Acute on chronic respiratory failure with hypoxia (HCC)- (present on admission)  Assessment & Plan  Acute hypoxic respiratory failure  Attributed to evolving NSIP  Complicated by underlying emphysematous disease.   Lung protective ventilation strategies  Titrate ventilator prescription to optimize oxygenation, ventilation, and acid base balance.  Daily ABC trials    Pulmonary biopsy on 2/22 am  CTD unremarkable  Prednisone taper  Bactrim prophylaxis    Pneumonia- (present on admission)  Assessment & Plan  NSIP  Cont prednisone  Bactrim PPX    Pneumothorax  Assessment & Plan  Status post left tension  pneumothorax  Pneumothorax improved with chest tube in place  Chest tube in good position  Continue chest tube to suction    He has proven to be high risk for pneumothorax   status post pulmonary biopsy  Serial chest x-rays  Pleural tube placement as clinically indicated      Atrial fibrillation with rapid ventricular response (HCC)  Assessment & Plan  Continue amio  Now nsr  Optimize lytes  monitor    Chronic pain- (present on admission)  Assessment & Plan  Continue gabapentin regimen  Continue methadone regimen  Continue tramadol  Added prn oxycodone  Through the enteral tube  Dilaudid 1 mg IV every 2 hours as needed breakthrough pain        Update. Extubated this afternoon doing well thus far. Initially stopped amio now back in afib and resumed amio gtt     VTE:  Lovenox  Ulcer: PPI  Lines: Central Line  Ongoing indication addressed    I have performed a physical exam and reviewed and updated ROS and Plan today (2/28/2020). In review of yesterday's note (2/27/2020), there are no changes except as documented above.     Discussed patient condition and risk of morbidity and/or mortality with RN, RT, Pharmacy, Code status disscussed, Patient and Pulmonary     The patient remains critically ill.  I have assessed and reassessed the respiratory status and made ventilator adjustments based upon arterial blood gas analysis, ventilator waveforms and airway mechanics.  I have assessed and reassessed the blood pressure, hemodynamics, cardiovascular status. This patient remains at high risk for worsening cardiopulmonary dysfunction and death without the above critical care interventions.    Critical care time  35 minutes in directly providing and coordinating critical care and extensive data review.  No time overlap and excludes procedures.

## 2020-02-28 NOTE — CARE PLAN
Adult Ventilation Update    Total Vent Days: 8  Vent /+5/40% (8.5)    Patient Lines/Drains/Airways Status      Active Airway       Name: Placement date: Placement time: Site: Days:    Airway ETT 8.0  02/21/20 0845   --  8                    In the last 24 hours, the patient tolerated SBT for 0.    $ FVC / Vital Capacity (liters) : (Not following) (02/24/20 0528)  NIF (cm H2O) : (Not following) (02/24/20 0528)  Rapid Shallow Breathing Index (RR/VT): 15 (02/24/20 0528)  Plateau Pressure: 10 (02/27/20 0213)  Static Compliance (ml / cm H2O): 118 (02/27/20 2221)    Patient failed trials because of Barriers to Wean: Evidence of ongoing myocardial ischemia such as Chest Pain, Vent Dysrhythmia, ST segment changes on EKG(and agitation off of sedation) (02/27/20 0332)  Barriers to SBT Weaning Trial Stopped due to::   Length of Weaning Trial Length of Weaning Trial (Hours):     Cough: Productive (02/28/20 0000)  Sputum Amount: Unable to Evaluate (02/28/20 0000)  Sputum Color: White;Tan (02/27/20 2220)  Sputum Consistency: Thick;Thin (02/27/20 2220)    Mobility  Level of Mobility: Level IV (02/27/20 0400)  Activity Performed: Unable to mobilize (02/27/20 2000)  Time Activity Tolerated: 5 min (02/27/20 1400)  Distance Per Occurrence (ft.): 0 feet (02/27/20 1400)  # of Times Distance was Traveled: 1 (02/27/20 1400)  Assistance: Assistance of Two or More (02/27/20 1400)  Ambulation Tolerance: Tolerates Well (02/27/20 1400)  Pt Calls for Assistance: No (02/27/20 1400)  Staff Present for Mobilization: RN;CNA (02/27/20 1400)  Gait: Unable to Ambulate (02/27/20 1400)  Assistive Devices: Hand held assist (02/27/20 1400)  Reason Not Mobilized: Unstable condition(Pt. agitated, kicking, hypotensive with increased sedation) (02/27/20 2000)  Mobilization Comments: air leak increasing, pt agitated with stimulation (02/27/20 0400)    Events/Summary/Plan: Pt switched to ASV per MD  (02/26/20 1005). No vent changes made this shift. Pt  currently stable on vent.

## 2020-02-28 NOTE — CARE PLAN
Problem: Ventilation Defect:  Goal: Ability to achieve and maintain unassisted ventilation or tolerate decreased levels of ventilator support  Intervention: Support and monitor invasive and noninvasive mechanical ventilation  Note:    Ventilator Daily Summary    Vent Day #7  ASV:  110  +5  40%    Plan: Continue current ventilator settings and wean mechanical ventilation as tolerated per physician orders.

## 2020-02-28 NOTE — CARE PLAN
Problem: Safety  Goal: Will remain free from injury  Intervention:  Bed/chair alarm remains on and audible  Outcome: PROGRESSING AS EXPECTED     Problem: Pain Management  Goal: Pain level will decrease to patient's comfort goal  Intervention:  Assess and medicate as needed for signs/symptoms of acute pain  Outcome: PROGRESSING AS EXPECTED

## 2020-02-29 ENCOUNTER — APPOINTMENT (OUTPATIENT)
Dept: RADIOLOGY | Facility: MEDICAL CENTER | Age: 60
DRG: 166 | End: 2020-02-29
Attending: INTERNAL MEDICINE
Payer: MEDICARE

## 2020-02-29 LAB
ANION GAP SERPL CALC-SCNC: 10 MMOL/L (ref 0–11.9)
BASOPHILS # BLD AUTO: 0.9 % (ref 0–1.8)
BASOPHILS # BLD: 0.26 K/UL (ref 0–0.12)
BUN SERPL-MCNC: 36 MG/DL (ref 8–22)
CALCIUM SERPL-MCNC: 9.6 MG/DL (ref 8.5–10.5)
CHLORIDE SERPL-SCNC: 100 MMOL/L (ref 96–112)
CO2 SERPL-SCNC: 27 MMOL/L (ref 20–33)
CREAT SERPL-MCNC: 0.83 MG/DL (ref 0.5–1.4)
EOSINOPHIL # BLD AUTO: 0.26 K/UL (ref 0–0.51)
EOSINOPHIL NFR BLD: 0.9 % (ref 0–6.9)
ERYTHROCYTE [DISTWIDTH] IN BLOOD BY AUTOMATED COUNT: 45.1 FL (ref 35.9–50)
GLUCOSE SERPL-MCNC: 127 MG/DL (ref 65–99)
HCT VFR BLD AUTO: 47.4 % (ref 42–52)
HGB BLD-MCNC: 15.3 G/DL (ref 14–18)
LYMPHOCYTES # BLD AUTO: 3.53 K/UL (ref 1–4.8)
LYMPHOCYTES NFR BLD: 12.2 % (ref 22–41)
MANUAL DIFF BLD: NORMAL
MCH RBC QN AUTO: 26.2 PG (ref 27–33)
MCHC RBC AUTO-ENTMCNC: 32.3 G/DL (ref 33.7–35.3)
MCV RBC AUTO: 81.3 FL (ref 81.4–97.8)
MONOCYTES # BLD AUTO: 1.99 K/UL (ref 0–0.85)
MONOCYTES NFR BLD AUTO: 6.9 % (ref 0–13.4)
MORPHOLOGY BLD-IMP: NORMAL
NEUTROPHILS # BLD AUTO: 22.86 K/UL (ref 1.82–7.42)
NEUTROPHILS NFR BLD: 79.1 % (ref 44–72)
NRBC # BLD AUTO: 0 K/UL
NRBC BLD-RTO: 0 /100 WBC
OVALOCYTES BLD QL SMEAR: NORMAL
PLATELET # BLD AUTO: 488 K/UL (ref 164–446)
PLATELET BLD QL SMEAR: NORMAL
PMV BLD AUTO: 9.4 FL (ref 9–12.9)
POIKILOCYTOSIS BLD QL SMEAR: NORMAL
POTASSIUM SERPL-SCNC: 3.9 MMOL/L (ref 3.6–5.5)
RBC # BLD AUTO: 5.83 M/UL (ref 4.7–6.1)
RBC BLD AUTO: PRESENT
SODIUM SERPL-SCNC: 137 MMOL/L (ref 135–145)
WBC # BLD AUTO: 28.9 K/UL (ref 4.8–10.8)

## 2020-02-29 PROCEDURE — 85007 BL SMEAR W/DIFF WBC COUNT: CPT

## 2020-02-29 PROCEDURE — 700111 HCHG RX REV CODE 636 W/ 250 OVERRIDE (IP): Performed by: PSYCHIATRY & NEUROLOGY

## 2020-02-29 PROCEDURE — 770022 HCHG ROOM/CARE - ICU (200)

## 2020-02-29 PROCEDURE — 700102 HCHG RX REV CODE 250 W/ 637 OVERRIDE(OP): Performed by: PSYCHIATRY & NEUROLOGY

## 2020-02-29 PROCEDURE — A9270 NON-COVERED ITEM OR SERVICE: HCPCS | Performed by: PSYCHIATRY & NEUROLOGY

## 2020-02-29 PROCEDURE — 71045 X-RAY EXAM CHEST 1 VIEW: CPT

## 2020-02-29 PROCEDURE — 99233 SBSQ HOSP IP/OBS HIGH 50: CPT | Performed by: PSYCHIATRY & NEUROLOGY

## 2020-02-29 PROCEDURE — 80048 BASIC METABOLIC PNL TOTAL CA: CPT

## 2020-02-29 PROCEDURE — 85027 COMPLETE CBC AUTOMATED: CPT

## 2020-02-29 PROCEDURE — A9270 NON-COVERED ITEM OR SERVICE: HCPCS | Performed by: INTERNAL MEDICINE

## 2020-02-29 PROCEDURE — 700111 HCHG RX REV CODE 636 W/ 250 OVERRIDE (IP): Performed by: HOSPITALIST

## 2020-02-29 PROCEDURE — 700111 HCHG RX REV CODE 636 W/ 250 OVERRIDE (IP): Performed by: INTERNAL MEDICINE

## 2020-02-29 PROCEDURE — 700105 HCHG RX REV CODE 258: Performed by: PSYCHIATRY & NEUROLOGY

## 2020-02-29 PROCEDURE — 700102 HCHG RX REV CODE 250 W/ 637 OVERRIDE(OP): Performed by: INTERNAL MEDICINE

## 2020-02-29 RX ADMIN — TIMOLOL MALEATE 1 DROP: 5 SOLUTION OPHTHALMIC at 16:34

## 2020-02-29 RX ADMIN — HYDROMORPHONE HYDROCHLORIDE 1 MG: 1 INJECTION, SOLUTION INTRAMUSCULAR; INTRAVENOUS; SUBCUTANEOUS at 07:09

## 2020-02-29 RX ADMIN — PROCHLORPERAZINE EDISYLATE 10 MG: 5 INJECTION INTRAMUSCULAR; INTRAVENOUS at 10:25

## 2020-02-29 RX ADMIN — GABAPENTIN 1600 MG: 400 CAPSULE ORAL at 16:33

## 2020-02-29 RX ADMIN — LEVOTHYROXINE SODIUM 125 MCG: 125 TABLET ORAL at 07:00

## 2020-02-29 RX ADMIN — POTASSIUM BICARBONATE 25 MEQ: 978 TABLET, EFFERVESCENT ORAL at 07:09

## 2020-02-29 RX ADMIN — HYDROMORPHONE HYDROCHLORIDE 1 MG: 1 INJECTION, SOLUTION INTRAMUSCULAR; INTRAVENOUS; SUBCUTANEOUS at 13:48

## 2020-02-29 RX ADMIN — METHADONE HYDROCHLORIDE 30 MG: 10 TABLET ORAL at 15:35

## 2020-02-29 RX ADMIN — HYDROMORPHONE HYDROCHLORIDE 1 MG: 1 INJECTION, SOLUTION INTRAMUSCULAR; INTRAVENOUS; SUBCUTANEOUS at 04:50

## 2020-02-29 RX ADMIN — PREDNISONE 80 MG: 20 TABLET ORAL at 05:05

## 2020-02-29 RX ADMIN — AMIODARONE HYDROCHLORIDE 0.5 MG/MIN: 50 INJECTION, SOLUTION INTRAVENOUS at 13:47

## 2020-02-29 RX ADMIN — OMEPRAZOLE 40 MG: KIT at 16:33

## 2020-02-29 RX ADMIN — QUETIAPINE FUMARATE 25 MG: 25 TABLET ORAL at 05:05

## 2020-02-29 RX ADMIN — QUETIAPINE FUMARATE 25 MG: 25 TABLET ORAL at 16:33

## 2020-02-29 RX ADMIN — ENOXAPARIN SODIUM 40 MG: 100 INJECTION SUBCUTANEOUS at 05:04

## 2020-02-29 RX ADMIN — GABAPENTIN 2400 MG: 400 CAPSULE ORAL at 05:04

## 2020-02-29 RX ADMIN — METHADONE HYDROCHLORIDE 10 MG: 10 TABLET ORAL at 21:04

## 2020-02-29 RX ADMIN — ALPRAZOLAM 0.25 MG: 0.25 TABLET ORAL at 08:19

## 2020-02-29 RX ADMIN — HYDROMORPHONE HYDROCHLORIDE 1 MG: 1 INJECTION, SOLUTION INTRAMUSCULAR; INTRAVENOUS; SUBCUTANEOUS at 16:33

## 2020-02-29 RX ADMIN — GUAIFENESIN AND DEXTROMETHORPHAN 10 ML: 100; 10 SYRUP ORAL at 07:18

## 2020-02-29 RX ADMIN — AMIODARONE HYDROCHLORIDE 0.51 MG/MIN: 50 INJECTION, SOLUTION INTRAVENOUS at 00:17

## 2020-02-29 RX ADMIN — SIMVASTATIN 40 MG: 40 TABLET, FILM COATED ORAL at 21:05

## 2020-02-29 RX ADMIN — METHADONE HYDROCHLORIDE 30 MG: 10 TABLET ORAL at 05:04

## 2020-02-29 RX ADMIN — HYDROMORPHONE HYDROCHLORIDE 1 MG: 1 INJECTION, SOLUTION INTRAMUSCULAR; INTRAVENOUS; SUBCUTANEOUS at 23:47

## 2020-02-29 RX ADMIN — HYDROMORPHONE HYDROCHLORIDE 1 MG: 1 INJECTION, SOLUTION INTRAMUSCULAR; INTRAVENOUS; SUBCUTANEOUS at 09:17

## 2020-02-29 RX ADMIN — HYDROMORPHONE HYDROCHLORIDE 1 MG: 1 INJECTION, SOLUTION INTRAMUSCULAR; INTRAVENOUS; SUBCUTANEOUS at 11:26

## 2020-02-29 ASSESSMENT — ENCOUNTER SYMPTOMS
WEAKNESS: 1
VOMITING: 0
ABDOMINAL PAIN: 0
WHEEZING: 0
MUSCULOSKELETAL NEGATIVE: 1
NAUSEA: 0
HEADACHES: 0
PSYCHIATRIC NEGATIVE: 1
STRIDOR: 0
SORE THROAT: 0
FEVER: 0
CHILLS: 0
SHORTNESS OF BREATH: 0
FOCAL WEAKNESS: 0

## 2020-02-29 ASSESSMENT — COGNITIVE AND FUNCTIONAL STATUS - GENERAL
MOVING TO AND FROM BED TO CHAIR: A LOT
STANDING UP FROM CHAIR USING ARMS: TOTAL
SUGGESTED CMS G CODE MODIFIER MOBILITY: CL
EATING MEALS: TOTAL
DRESSING REGULAR LOWER BODY CLOTHING: TOTAL
PERSONAL GROOMING: TOTAL
WALKING IN HOSPITAL ROOM: TOTAL
HELP NEEDED FOR BATHING: TOTAL
DRESSING REGULAR UPPER BODY CLOTHING: TOTAL
CLIMB 3 TO 5 STEPS WITH RAILING: TOTAL
TURNING FROM BACK TO SIDE WHILE IN FLAT BAD: A LITTLE
TOILETING: TOTAL
SUGGESTED CMS G CODE MODIFIER DAILY ACTIVITY: CN
MOBILITY SCORE: 10
MOVING FROM LYING ON BACK TO SITTING ON SIDE OF FLAT BED: A LOT
DAILY ACTIVITIY SCORE: 6

## 2020-02-29 ASSESSMENT — FIBROSIS 4 INDEX: FIB4 SCORE: 0.85

## 2020-02-29 NOTE — PROGRESS NOTES
Critical Care Progress Note    Date of admission  2/18/2020    Chief Complaint  59 y.o. male who presented 2/18/2020 with history of oxygen dependent chronic hypoxic respiratory failure, requiring 2.5-3 L home oxygen, emphysema, pulmonary hypertension and chronic pain who is on methadone.  He was transferred from [place] for pulmonary specialty care with a left lower lobe pneumonia and pneumothorax on 2/18.  Influenza screening was negative.  Shortly after arriving at [facility] he was noted to have a dislodged chest tube.  A replacement chest tube was not necessary.  He was requiring 5 L of oxygen via nasal cannula on 2/19/2/20.  A pulmonology consultation requested a high resolution CT scan, the initiated a connective tissue work-up and continued broad-spectrum antibiotics.  On the morning of 2/21 patient had increasing work of breathing and hypoxic respiratory failure.  Critical care consultation was requested for further evaluation and management.    Hospital Course    2/21-patient was evaluated in the IR suite for placement of a pigtail catheter.  At the time the residual pneumothorax was felt to be too small to benefit from catheter placement.  At 2335 patient became hypoxic, tachycardic and hypotensive.  Chest x-ray revealed a large pneumothorax.  An emergent chest tube was placed with improvement in hemodynamic and oxygenation parameters.  2/22- Pulmonary biopsy performed.  Prednisone 1 mg/kilogram/day initiated      Interval Problem Update  Reviewed last 24 hour events:   - no new issues              - Tm: 37.7              - HR: 560-140s              - SBP: 110s-140s              - Neuro: Follow commands              - GI: TF at goal              - UOP: 2L              - Bell: Yes              - Lines: CVC, chest tube              - PPx: Home PPI, Lovenox              - CXR (personally reviewed): The cardiomediastinal silhouettes are unchanged. Minimal interstitial opacities are  present throughout both lungs, but with interval improvement. A left chest tube remains in place, with no pneumothorax. Endotracheal tube is no longer present. Right   central venous line is unchanged in position. A feeding tube is present, the tip of which projects in the left upper quadrant              - Antibiotic Bactrim   - CT stable infiltrated pneumothorax persists   - Prednisone taper    INFUSIONS:  Amiodarone gtt      Review of Systems  Review of Systems   Constitutional: Negative for chills and fever.   HENT: Negative for sore throat.    Respiratory: Negative for shortness of breath, wheezing and stridor.    Cardiovascular: Negative for chest pain.   Gastrointestinal: Negative for abdominal pain, nausea and vomiting.   Genitourinary: Negative.    Musculoskeletal: Negative.    Skin: Negative for rash.   Neurological: Positive for weakness. Negative for focal weakness and headaches.   Psychiatric/Behavioral: Negative.         Vital Signs for last 24 hours   Pulse:  [] 98  Resp:  [11-36] 20  BP: (104-149)/(62-96) 118/67  SpO2:  [88 %-97 %] 95 %    Hemodynamic parameters for last 24 hours       Respiratory Information for the last 24 hours  Vent Mode: Spont  PEEP/CPAP: 5  P Support: 5  MAP: 7.3  Length of Weaning Trial (Hours): 1  Control VTE (exp VT): 592    Physical Exam   Physical Exam  Constitutional:       General: He is not in acute distress.  HENT:      Head: Normocephalic and atraumatic.      Right Ear: External ear normal.      Left Ear: External ear normal.      Nose: Nose normal.      Mouth/Throat:      Mouth: Mucous membranes are moist.   Eyes:      Extraocular Movements: Extraocular movements intact.      Pupils: Pupils are equal, round, and reactive to light.      Comments: Disconjugate   Neck:      Musculoskeletal: Neck supple.   Cardiovascular:      Rate and Rhythm: Normal rate. Rhythm irregular.      Heart sounds: No murmur. No friction rub. No gallop.    Pulmonary:      Effort:  Pulmonary effort is normal.      Comments: Scattered crackles and wheezes  Left chest tube in good position  Persistent  air leak  Abdominal:      General: There is no distension.      Palpations: Abdomen is soft.      Tenderness: There is no abdominal tenderness.   Musculoskeletal:         General: No swelling.      Right lower leg: No edema.      Left lower leg: No edema.   Skin:     General: Skin is warm and dry.   Neurological:      General: No focal deficit present.      Mental Status: He is alert and oriented to person, place, and time.      Comments: GCS 15. Antigravity throughout.   Psychiatric:         Mood and Affect: Mood normal.         Behavior: Behavior normal.         Medications  Current Facility-Administered Medications   Medication Dose Route Frequency Provider Last Rate Last Dose   • QUEtiapine (SEROQUEL) tablet 25 mg  25 mg Enteral Tube BID Srinath Sauceda M.D.   25 mg at 02/29/20 0505   • amiodarone (CORDARONE) 450 mg in D5W 250 mL Infusion  0.5-1 mg/min Intravenous Continuous Srinath Sauceda M.D. 17 mL/hr at 02/29/20 0017 0.51 mg/min at 02/29/20 0017   • oxyCODONE immediate-release (ROXICODONE) tablet 5 mg  5 mg Enteral Tube Q4HRS PRN Srinath Sauceda M.D.   5 mg at 02/25/20 1402    Or   • oxyCODONE immediate release (ROXICODONE) tablet 10 mg  10 mg Enteral Tube Q4HRS PRN Srinath Sauceda M.D.   10 mg at 02/26/20 0518   • polyethylene glycol/lytes (MIRALAX) PACKET 1 Packet  1 Packet Enteral Tube BID Marck Javier M.D.   Stopped at 02/28/20 0500    And   • senna-docusate (PERICOLACE or SENOKOT S) 8.6-50 MG per tablet 2 Tab  2 Tab Enteral Tube BID Marck Javier M.D.   Stopped at 02/28/20 0500    And   • magnesium hydroxide (MILK OF MAGNESIA) suspension 30 mL  30 mL Enteral Tube QDAY PRN Marck Javier M.D.   30 mL at 02/24/20 1556    And   • bisacodyl (DULCOLAX) suppository 10 mg  10 mg Rectal QDAY PRN Marck Javier M.D.   10 mg at 02/25/20 1154   • enoxaparin (LOVENOX) inj 40 mg  40 mg  Subcutaneous DAILY Marck Javier M.D.   40 mg at 02/29/20 0504   • HYDROmorphone pf (DILAUDID) injection 1 mg  1 mg Intravenous Q2HRS PRN Marck Javier M.D.   1 mg at 02/29/20 0450   • Pharmacy Consult: Enteral tube insertion - review meds/change route/product selection  1 Each Other PHARMACY TO DOSE Marck Javier M.D.       • predniSONE (DELTASONE) tablet 80 mg  80 mg Enteral Tube DAILY Marck Javier M.D.   80 mg at 02/29/20 0505   • ipratropium-albuterol (DUONEB) nebulizer solution  3 mL Nebulization Q2HRS PRN (RT) Marck Javier M.D.       • MD Alert...ICU Electrolyte Replacement per Pharmacy   Other PHARMACY TO DOSE Marck Javier M.D.       • gabapentin (NEURONTIN) capsule 2,400 mg  2,400 mg Enteral Tube QAM Marck Javier M.D.   2,400 mg at 02/29/20 0504    And   • gabapentin (NEURONTIN) capsule 1,600 mg  1,600 mg Enteral Tube Q EVENING Marck Javier M.D.   1,600 mg at 02/28/20 1703   • levothyroxine (SYNTHROID) tablet 125 mcg  125 mcg Enteral Tube QAM AC Marck Javier M.D.   125 mcg at 02/29/20 0700   • methadone (DOLOPHINE) tablet 30 mg  30 mg Enteral Tube BID Marck Javier M.D.   30 mg at 02/29/20 0504    And   • methadone (DOLOPHINE) tablet 10 mg  10 mg Enteral Tube QHS Marck Javier M.D.   10 mg at 02/28/20 2032   • omeprazole (FIRST-OMEPRAZOLE) 2 mg/mL oral susp 40 mg  40 mg Enteral Tube Q EVENING Marck Javier M.D.   40 mg at 02/28/20 1703   • simvastatin (ZOCOR) tablet 40 mg  40 mg Enteral Tube Nightly Marck Javier M.D.   40 mg at 02/28/20 2032   • tramadol (ULTRAM) 50 MG tablet 50 mg  50 mg Enteral Tube BID Marck Javier M.D.   50 mg at 02/28/20 1703   • acetaminophen (TYLENOL) tablet 650 mg  650 mg Enteral Tube Q6HRS PRN Marck Javier M.D.       • ALPRAZolam (XANAX) tablet 0.25 mg  0.25 mg Enteral Tube 4X/DAY PRN Marck Javier M.D.   0.25 mg at 02/28/20 1306   • guaiFENesin dextromethorphan (ROBITUSSIN DM) 100-10 MG/5ML syrup 10 mL  10 mL Enteral Tube Q6HRS PRN Marck HINES  ARLINE Javier       • promethazine (PHENERGAN) tablet 12.5-25 mg  12.5-25 mg Enteral Tube Q4HRS PRN Marck Javier M.D.       • influenza vaccine quad injection 0.5 mL  0.5 mL Intramuscular Once PRN Eulogio Lopes M.D.       • timolol (TIMOPTIC) 0.5 % ophthalmic solution 1 Drop  1 Drop Both Eyes Q EVENING Tim Bustos M.D.   1 Drop at 02/28/20 1745   • Respiratory Therapy Consult   Nebulization Continuous RT Tim Bustos M.D.       • promethazine (PHENERGAN) suppository 12.5-25 mg  12.5-25 mg Rectal Q4HRS PRN Tim Bustos M.D.       • prochlorperazine (COMPAZINE) injection 5-10 mg  5-10 mg Intravenous Q4HRS PRN Tim Bustos M.D.       • albuterol (PROVENTIL) 2.5mg/0.5ml nebulizer solution 2.5 mg  2.5 mg Nebulization Q2HRS PRN (RT) Tim Bustos M.D.           Fluids    Intake/Output Summary (Last 24 hours) at 2/29/2020 0704  Last data filed at 2/29/2020 0600  Gross per 24 hour   Intake 2119.02 ml   Output 2750 ml   Net -630.98 ml       Laboratory  Recent Labs     02/27/20  0330 02/28/20  0446   ISTATAPH 7.397* 7.440   ISTATAPCO2 43.3* 37.9*   ISTATAPO2 74 69   ISTATATCO2 28 27   [id number] 95 94   ISTATARTHCO3 26.6* 25.7*   ISTATARTBE 1 2   ISTATTEMP 97.9 F 98.2 F   ISTATFIO2 40 40   ISTATSPEC Arterial Arterial   ISTATAPHTC 7.403 7.443   [id number] 72 68         Recent Labs     02/27/20  0310 02/28/20  0409 02/29/20  0414   SODIUM 133* 134* 137   POTASSIUM 3.8 4.0 3.9   CHLORIDE 102 99 100   CO2 26 29 27   BUN 37* 40* 36*   CREATININE 0.82 0.92 0.83   MAGNESIUM  --  2.2  --    PHOSPHORUS  --  2.9  --    CALCIUM 9.0 8.9 9.6     Recent Labs     02/27/20 0310 02/28/20  0409 02/29/20  0414   GLUCOSE 138* 112* 127*     Recent Labs     02/27/20 0310 02/28/20 0409 02/29/20 0414   WBC 27.5* 28.2* 28.9*   NEUTSPOLYS 76.70* 73.10* 79.10*   LYMPHOCYTES 12.20* 13.60* 12.20*   MONOCYTES 6.60 8.70 6.90   EOSINOPHILS 2.10 2.10 0.90   BASOPHILS 0.50 0.70 0.90     Recent Labs      02/27/20  0310 02/28/20  0409 02/29/20  0414   RBC 5.21 5.27 5.83   HEMOGLOBIN 14.0 13.7* 15.3   HEMATOCRIT 43.6 43.6 47.4   PLATELETCT 355 398 488*       Imaging  X-Ray:  I have personally reviewed the images and compared with prior images. and My impression is: Improved bilateral infiltrates    Assessment/Plan  Acute on chronic respiratory failure with hypoxia (HCC)- (present on admission)  Assessment & Plan  Acute hypoxic respiratory failure  Attributed to evolving NSIP  Complicated by underlying emphysematous disease.     Extubated 2/28 - doing well    Pulmonary biopsy on 2/22 am  CTD unremarkable  Prednisone taper  Bactrim prophylaxis    Pneumonia- (present on admission)  Assessment & Plan  NSIP  Prednisone taper  Bactrim PPX    Pneumothorax  Assessment & Plan  Status post left tension pneumothorax  Pneumothorax improved with chest tube in place  Chest tube in good position  No air leak  Placed on water seal.      Atrial fibrillation with rapid ventricular response (HCC)  Assessment & Plan  Continue amio  Now nsr  Optimize lytes  monitor    Chronic pain- (present on admission)  Assessment & Plan  Continue gabapentin regimen  Continue methadone regimen  Continue tramadol  Added prn oxycodone  Through the enteral tube  Dilaudid 1 mg IV every 2 hours as needed breakthrough pain            VTE:  Lovenox  Ulcer: PPI  Lines: Central Line  Ongoing indication addressed    I have performed a physical exam and reviewed and updated ROS and Plan today (2/29/2020). In review of yesterday's note (2/28/2020), there are no changes except as documented above.     Discussed patient condition and risk of morbidity and/or mortality with RN, RT, Pharmacy, Code status disscussed, Patient and Pulmonary

## 2020-02-29 NOTE — CARE PLAN
Problem: Communication  Goal: The ability to communicate needs accurately and effectively will improve: Patient encouraged to verbalize feelings about hospital stay, cell phone given to patient to facebook message his friend Aldair.   Outcome: PROGRESSING SLOWER THAN EXPECTED     Problem: Safety  Goal: Will remain free from injury: hospital clothing on, bed alarm set and bed at lowest position.  Outcome: PROGRESSING SLOWER THAN EXPECTED

## 2020-02-29 NOTE — CARE PLAN
Problem: Hyperinflation:  Goal: Prevent or improve atelectasis  Outcome: PROGRESSING AS EXPECTED     IS QID

## 2020-03-01 ENCOUNTER — APPOINTMENT (OUTPATIENT)
Dept: RADIOLOGY | Facility: MEDICAL CENTER | Age: 60
DRG: 166 | End: 2020-03-01
Attending: PSYCHIATRY & NEUROLOGY
Payer: MEDICARE

## 2020-03-01 ENCOUNTER — APPOINTMENT (OUTPATIENT)
Dept: RADIOLOGY | Facility: MEDICAL CENTER | Age: 60
DRG: 166 | End: 2020-03-01
Attending: INTERNAL MEDICINE
Payer: MEDICARE

## 2020-03-01 LAB
ANION GAP SERPL CALC-SCNC: 7 MMOL/L (ref 0–11.9)
BASOPHILS # BLD AUTO: 0 % (ref 0–1.8)
BASOPHILS # BLD: 0 K/UL (ref 0–0.12)
BUN SERPL-MCNC: 39 MG/DL (ref 8–22)
CALCIUM SERPL-MCNC: 9.3 MG/DL (ref 8.5–10.5)
CHLORIDE SERPL-SCNC: 99 MMOL/L (ref 96–112)
CO2 SERPL-SCNC: 31 MMOL/L (ref 20–33)
CREAT SERPL-MCNC: 0.86 MG/DL (ref 0.5–1.4)
EOSINOPHIL # BLD AUTO: 0.53 K/UL (ref 0–0.51)
EOSINOPHIL NFR BLD: 1.8 % (ref 0–6.9)
ERYTHROCYTE [DISTWIDTH] IN BLOOD BY AUTOMATED COUNT: 45.6 FL (ref 35.9–50)
GLUCOSE SERPL-MCNC: 111 MG/DL (ref 65–99)
HCT VFR BLD AUTO: 48.4 % (ref 42–52)
HGB BLD-MCNC: 15.5 G/DL (ref 14–18)
LYMPHOCYTES # BLD AUTO: 3.69 K/UL (ref 1–4.8)
LYMPHOCYTES NFR BLD: 12.6 % (ref 22–41)
MANUAL DIFF BLD: NORMAL
MCH RBC QN AUTO: 26.3 PG (ref 27–33)
MCHC RBC AUTO-ENTMCNC: 32 G/DL (ref 33.7–35.3)
MCV RBC AUTO: 82.2 FL (ref 81.4–97.8)
METAMYELOCYTES NFR BLD MANUAL: 0.9 %
MONOCYTES # BLD AUTO: 3.96 K/UL (ref 0–0.85)
MONOCYTES NFR BLD AUTO: 13.5 % (ref 0–13.4)
MORPHOLOGY BLD-IMP: NORMAL
NEUTROPHILS # BLD AUTO: 20.86 K/UL (ref 1.82–7.42)
NEUTROPHILS NFR BLD: 71.2 % (ref 44–72)
NRBC # BLD AUTO: 0 K/UL
NRBC BLD-RTO: 0 /100 WBC
OVALOCYTES BLD QL SMEAR: NORMAL
PLATELET # BLD AUTO: 485 K/UL (ref 164–446)
PLATELET BLD QL SMEAR: NORMAL
PMV BLD AUTO: 9.3 FL (ref 9–12.9)
POIKILOCYTOSIS BLD QL SMEAR: NORMAL
POTASSIUM SERPL-SCNC: 4.1 MMOL/L (ref 3.6–5.5)
RBC # BLD AUTO: 5.89 M/UL (ref 4.7–6.1)
RBC BLD AUTO: PRESENT
SODIUM SERPL-SCNC: 137 MMOL/L (ref 135–145)
WBC # BLD AUTO: 29.3 K/UL (ref 4.8–10.8)

## 2020-03-01 PROCEDURE — 700102 HCHG RX REV CODE 250 W/ 637 OVERRIDE(OP): Performed by: PSYCHIATRY & NEUROLOGY

## 2020-03-01 PROCEDURE — 85007 BL SMEAR W/DIFF WBC COUNT: CPT

## 2020-03-01 PROCEDURE — 99233 SBSQ HOSP IP/OBS HIGH 50: CPT | Performed by: PSYCHIATRY & NEUROLOGY

## 2020-03-01 PROCEDURE — 700102 HCHG RX REV CODE 250 W/ 637 OVERRIDE(OP): Performed by: INTERNAL MEDICINE

## 2020-03-01 PROCEDURE — 700111 HCHG RX REV CODE 636 W/ 250 OVERRIDE (IP): Performed by: INTERNAL MEDICINE

## 2020-03-01 PROCEDURE — A9270 NON-COVERED ITEM OR SERVICE: HCPCS | Performed by: INTERNAL MEDICINE

## 2020-03-01 PROCEDURE — 700111 HCHG RX REV CODE 636 W/ 250 OVERRIDE (IP): Performed by: HOSPITALIST

## 2020-03-01 PROCEDURE — 770020 HCHG ROOM/CARE - TELE (206)

## 2020-03-01 PROCEDURE — 85027 COMPLETE CBC AUTOMATED: CPT

## 2020-03-01 PROCEDURE — A9270 NON-COVERED ITEM OR SERVICE: HCPCS | Performed by: PSYCHIATRY & NEUROLOGY

## 2020-03-01 PROCEDURE — 71045 X-RAY EXAM CHEST 1 VIEW: CPT

## 2020-03-01 PROCEDURE — 05H933Z INSERTION OF INFUSION DEVICE INTO RIGHT BRACHIAL VEIN, PERCUTANEOUS APPROACH: ICD-10-PCS | Performed by: PSYCHIATRY & NEUROLOGY

## 2020-03-01 PROCEDURE — 92610 EVALUATE SWALLOWING FUNCTION: CPT

## 2020-03-01 PROCEDURE — 700111 HCHG RX REV CODE 636 W/ 250 OVERRIDE (IP): Performed by: PSYCHIATRY & NEUROLOGY

## 2020-03-01 PROCEDURE — 99232 SBSQ HOSP IP/OBS MODERATE 35: CPT | Performed by: HOSPITALIST

## 2020-03-01 PROCEDURE — 700105 HCHG RX REV CODE 258: Performed by: PSYCHIATRY & NEUROLOGY

## 2020-03-01 PROCEDURE — 76937 US GUIDE VASCULAR ACCESS: CPT

## 2020-03-01 PROCEDURE — 80048 BASIC METABOLIC PNL TOTAL CA: CPT

## 2020-03-01 RX ORDER — PREDNISONE 10 MG/1
10 TABLET ORAL DAILY
Status: DISCONTINUED | OUTPATIENT
Start: 2020-03-16 | End: 2020-03-02

## 2020-03-01 RX ORDER — HYDROMORPHONE HYDROCHLORIDE 1 MG/ML
1 INJECTION, SOLUTION INTRAMUSCULAR; INTRAVENOUS; SUBCUTANEOUS EVERY 4 HOURS PRN
Status: DISCONTINUED | OUTPATIENT
Start: 2020-03-01 | End: 2020-03-11 | Stop reason: HOSPADM

## 2020-03-01 RX ADMIN — OMEPRAZOLE 40 MG: KIT at 16:55

## 2020-03-01 RX ADMIN — HYDROMORPHONE HYDROCHLORIDE 1 MG: 1 INJECTION, SOLUTION INTRAMUSCULAR; INTRAVENOUS; SUBCUTANEOUS at 16:55

## 2020-03-01 RX ADMIN — ACETAMINOPHEN 650 MG: 325 TABLET, FILM COATED ORAL at 04:18

## 2020-03-01 RX ADMIN — PROCHLORPERAZINE EDISYLATE 5 MG: 5 INJECTION INTRAMUSCULAR; INTRAVENOUS at 07:25

## 2020-03-01 RX ADMIN — HYDROMORPHONE HYDROCHLORIDE 1 MG: 1 INJECTION, SOLUTION INTRAMUSCULAR; INTRAVENOUS; SUBCUTANEOUS at 01:55

## 2020-03-01 RX ADMIN — PREDNISONE 70 MG: 10 TABLET ORAL at 05:01

## 2020-03-01 RX ADMIN — METHADONE HYDROCHLORIDE 10 MG: 10 TABLET ORAL at 21:22

## 2020-03-01 RX ADMIN — SIMVASTATIN 40 MG: 40 TABLET, FILM COATED ORAL at 21:22

## 2020-03-01 RX ADMIN — METHADONE HYDROCHLORIDE 30 MG: 10 TABLET ORAL at 14:34

## 2020-03-01 RX ADMIN — PROCHLORPERAZINE EDISYLATE 10 MG: 5 INJECTION INTRAMUSCULAR; INTRAVENOUS at 14:09

## 2020-03-01 RX ADMIN — HYDROMORPHONE HYDROCHLORIDE 1 MG: 1 INJECTION, SOLUTION INTRAMUSCULAR; INTRAVENOUS; SUBCUTANEOUS at 09:29

## 2020-03-01 RX ADMIN — GABAPENTIN 2400 MG: 400 CAPSULE ORAL at 05:00

## 2020-03-01 RX ADMIN — HYDROMORPHONE HYDROCHLORIDE 1 MG: 1 INJECTION, SOLUTION INTRAMUSCULAR; INTRAVENOUS; SUBCUTANEOUS at 07:24

## 2020-03-01 RX ADMIN — TIMOLOL MALEATE 1 DROP: 5 SOLUTION OPHTHALMIC at 16:55

## 2020-03-01 RX ADMIN — ALPRAZOLAM 0.25 MG: 0.25 TABLET ORAL at 00:48

## 2020-03-01 RX ADMIN — HYDROMORPHONE HYDROCHLORIDE 1 MG: 1 INJECTION, SOLUTION INTRAMUSCULAR; INTRAVENOUS; SUBCUTANEOUS at 12:29

## 2020-03-01 RX ADMIN — HYDROMORPHONE HYDROCHLORIDE 1 MG: 1 INJECTION, SOLUTION INTRAMUSCULAR; INTRAVENOUS; SUBCUTANEOUS at 04:19

## 2020-03-01 RX ADMIN — GUAIFENESIN AND DEXTROMETHORPHAN 10 ML: 100; 10 SYRUP ORAL at 12:29

## 2020-03-01 RX ADMIN — METHADONE HYDROCHLORIDE 30 MG: 10 TABLET ORAL at 05:00

## 2020-03-01 RX ADMIN — LEVOTHYROXINE SODIUM 125 MCG: 125 TABLET ORAL at 07:00

## 2020-03-01 RX ADMIN — ENOXAPARIN SODIUM 40 MG: 100 INJECTION SUBCUTANEOUS at 04:57

## 2020-03-01 RX ADMIN — GABAPENTIN 1600 MG: 400 CAPSULE ORAL at 16:54

## 2020-03-01 RX ADMIN — QUETIAPINE FUMARATE 25 MG: 25 TABLET ORAL at 16:54

## 2020-03-01 RX ADMIN — ALPRAZOLAM 0.25 MG: 0.25 TABLET ORAL at 14:03

## 2020-03-01 RX ADMIN — AMIODARONE HYDROCHLORIDE 0.5 MG/MIN: 50 INJECTION, SOLUTION INTRAVENOUS at 04:38

## 2020-03-01 RX ADMIN — QUETIAPINE FUMARATE 25 MG: 25 TABLET ORAL at 05:01

## 2020-03-01 ASSESSMENT — ENCOUNTER SYMPTOMS
SORE THROAT: 0
CHILLS: 0
DIARRHEA: 0
MUSCULOSKELETAL NEGATIVE: 1
STRIDOR: 0
DOUBLE VISION: 0
NAUSEA: 0
ABDOMINAL PAIN: 0
FEVER: 0
LOSS OF CONSCIOUSNESS: 0
PSYCHIATRIC NEGATIVE: 1
DIZZINESS: 1
SHORTNESS OF BREATH: 0
BACK PAIN: 0
HEADACHES: 0
PALPITATIONS: 0
WEAKNESS: 1
COUGH: 0
FOCAL WEAKNESS: 0
BLURRED VISION: 0
WHEEZING: 0
VOMITING: 0

## 2020-03-01 ASSESSMENT — FIBROSIS 4 INDEX: FIB4 SCORE: 0.7

## 2020-03-01 NOTE — PROGRESS NOTES
Hospital Medicine Daily Progress Note    Date of Service  3/1/2020    Chief Complaint  59 y.o. male admitted 2/18/2020 with SOB    Hospital Course    PMHx asthma, chronic back pain, COPD on 3 LNC at home, depression, hypothyroid, peripheral neuropathy.  Presented with several days of feeling ill and abd pain, N/V.  Hypoxic at 71% on RA on presentation and found to have spontaneous PNMTX and RLLL. After arrival here CT was dislodged and there for removed.   On 2/22 having increased work of breathing and noted to have tension PNMTX, chest tube was placed and pt intubated, and pt came to the ICU. Bronch and Bx 2/22 done for possible ILD.  While in ICU went into AFib RVR and started on amiodarone       Interval Problem Update  Pt states that the chest tube is still quite painful at times especially when he moves the wrong way or coughs.  Got up today, felt weak but does feel like he's getting stronger    Consultants/Specialty  Gen Srgry    Code Status  full    Disposition  OK to floor    Review of Systems  Review of Systems   Constitutional: Negative for chills and fever.   HENT: Negative for nosebleeds and sore throat.    Eyes: Negative for blurred vision and double vision.   Respiratory: Negative for cough and shortness of breath.    Cardiovascular: Positive for chest pain. Negative for palpitations and leg swelling.   Gastrointestinal: Negative for abdominal pain, diarrhea, nausea and vomiting.   Genitourinary: Negative for dysuria and urgency.   Musculoskeletal: Negative for back pain.   Skin: Negative for rash.   Neurological: Positive for dizziness and weakness. Negative for loss of consciousness and headaches.        Physical Exam  Pulse:  [55-85] 68  Resp:  [11-23] 22  BP: (100-166)/() 125/79  SpO2:  [90 %-98 %] 93 %    Physical Exam  Constitutional:       General: He is not in acute distress.     Appearance: He is well-developed. He is not diaphoretic.   HENT:      Head: Normocephalic and atraumatic.    Eyes:      Conjunctiva/sclera: Conjunctivae normal.   Neck:      Vascular: No JVD.   Cardiovascular:      Rate and Rhythm: Normal rate.      Heart sounds: No murmur. No gallop.    Pulmonary:      Effort: Pulmonary effort is normal. No respiratory distress.      Breath sounds: No stridor. Rhonchi present. No wheezing or rales.   Abdominal:      Palpations: Abdomen is soft.      Tenderness: There is no abdominal tenderness. There is no guarding or rebound.   Musculoskeletal:         General: No tenderness.      Right lower leg: Edema present.      Left lower leg: Edema present.   Skin:     General: Skin is warm and dry.      Findings: No rash.   Neurological:      General: No focal deficit present.      Mental Status: He is alert and oriented to person, place, and time.   Psychiatric:         Mood and Affect: Mood normal.         Thought Content: Thought content normal.         Fluids    Intake/Output Summary (Last 24 hours) at 3/1/2020 1052  Last data filed at 3/1/2020 0600  Gross per 24 hour   Intake 1908 ml   Output 1030 ml   Net 878 ml       Laboratory  Recent Labs     02/28/20  0409 02/29/20  0414 03/01/20  0304   WBC 28.2* 28.9* 29.3*   RBC 5.27 5.83 5.89   HEMOGLOBIN 13.7* 15.3 15.5   HEMATOCRIT 43.6 47.4 48.4   MCV 82.7 81.3* 82.2   MCH 26.0* 26.2* 26.3*   MCHC 31.4* 32.3* 32.0*   RDW 46.9 45.1 45.6   PLATELETCT 398 488* 485*   MPV 9.5 9.4 9.3     Recent Labs     02/28/20  0409 02/29/20  0414 03/01/20  0304   SODIUM 134* 137 137   POTASSIUM 4.0 3.9 4.1   CHLORIDE 99 100 99   CO2 29 27 31   GLUCOSE 112* 127* 111*   BUN 40* 36* 39*   CREATININE 0.92 0.83 0.86   CALCIUM 8.9 9.6 9.3                   Imaging  IR-MIDLINE CATHETER INSERTION WO GUIDANCE > AGE 3   Final Result                  Ultrasound-guided midline placement performed by qualified nursing staff    as above.          DX-CHEST-LIMITED (1 VIEW)   Final Result         Tiny left lateral pneumothorax, similar to prior. Left apical chest tube is  redemonstrated.      DX-CHEST-PORTABLE (1 VIEW)   Final Result      Improving pulmonary edema.      DX-CHEST-PORTABLE (1 VIEW)   Final Result         1.  Pulmonary edema and/or infiltrates are identified, which are stable since the prior exam.   2.  Trace left apical pneumothorax, new since prior study, thoracostomy tube remains in place.                        CT-CHEST (THORAX) W/O   Final Result      1.  Emphysema      2.  Subpleural bleb      3.  Bilateral lower lobe consolidation      4.  Assessment for interstitial lung disease is limited given the multiple superimposed processes      5.  Small left pneumothorax      6.  Dilated bowel identified in the upper abdomen               DX-CHEST-PORTABLE (1 VIEW)   Final Result         1.  Pulmonary edema and/or infiltrates are identified, which are stable since the prior exam.                     DX-CHEST-PORTABLE (1 VIEW)   Final Result         1.  Pulmonary edema and/or infiltrates are identified, which are stable since the prior exam.                  DX-CHEST-PORTABLE (1 VIEW)   Final Result         1.  Pulmonary edema and/or infiltrates are identified, which are stable since the prior exam.   2.  Small left apical recurrent pneumothorax, thoracostomy tube remains in place.               DX-CHEST-PORTABLE (1 VIEW)   Final Result         1.  Pulmonary edema and/or infiltrates are identified, which are stable since the prior exam.            DX-CHEST-PORTABLE (1 VIEW)   Final Result      Stable bilateral infiltrates, right greater than left.      DX-CHEST-PORTABLE (1 VIEW)   Final Result      Resolved small left apical pneumothorax      Otherwise stable chest with right hemidiaphragm elevation, reticular greater than consolidative opacity. Edema More likely than pneumonia      DX-CHEST-PORTABLE (1 VIEW)   Final Result      1.  Stable small left apical pneumothorax.      2.  Stable bilateral infiltrates.      DX-CHEST-LIMITED (1 VIEW)   Final Result      1.  No  significant change      2.  Bilateral pulmonary airspace process      3.  Lines and tubes appear appropriately located      4.  Unchanged small left apical pneumothorax      DX-CHEST-PORTABLE (1 VIEW)   Final Result         1.  Pulmonary edema and/or infiltrates are identified, which are stable since the prior exam. Interval resolution of left pneumothorax status post thoracostomy tube placement.         DX-CHEST-PORTABLE (1 VIEW)   Final Result         1.  New large left pneumothorax with rightward shift of mediastinum and hyperexpansion of the left thorax, appearance compatible with tension pneumothorax.   2.  Hazy interstitial pulmonary infiltrates, similar to prior study given difference of technique.      These findings were discussed with the patient's clinician, Gypsy Mackey, on 2/21/2020 11:44 PM.      DX-ABDOMEN FOR TUBE PLACEMENT   Final Result         Feeding tube with tip projecting over the expected area of the stomach.      CT-CHEST (THORAX) W/O   Final Result      Decreased size of LEFT pneumothorax. LEFT chest tube placement deferred. Findings and decisions were discussed with Dr. Marck Javier via [employer] while the patient was on the table.            DX-CHEST-PORTABLE (1 VIEW)   Final Result      1.  interval placement of an endotracheal tube which terminates in satisfactory position at the level of the aortic arch.   2.  Interval insertion of a central venous catheter which terminates with the tip projecting over the expected region of the mid to distal superior vena cava.   3.  Previously identified trace left pneumothorax is no longer visualized.   4.  Increased diffuse, bilateral, right greater than left interstitial and airspace opacities.      DX-CHEST-PORTABLE (1 VIEW)   Final Result         1.  Pulmonary edema and/or infiltrates are identified, which are stable since the prior exam.   2.  Trace left pneumothorax, decreased since prior study      CT-CHEST, HIGH RESOLUTION LUNG    Final Result      1.  Emphysematous changes again seen, similar to prior exam.   2.  Plan loss of RIGHT hemithorax with diffuse interstitial opacities suggesting pneumonitis/pneumonia.   3.  Multifocal ill-defined groundglass opacities in the LEFT upper and lower lobes also suggesting multifocal pneumonitis.   4.  Small LEFT anterior pneumothorax as seen on prior chest x-ray.   5.  Mild mediastinal adenopathy, nonspecific.               DX-CHEST-PORTABLE (1 VIEW)   Final Result         1.  Pulmonary edema and/or infiltrates are identified, which are stable since the prior exam.   2.  Small left pneumothorax, decreased since prior study      DX-CHEST-2 VIEWS   Final Result      1.  Unchanged small LEFT pneumothorax   2.  Unchanged BILATERAL interstitial and airspace disease and atelectasis      DX-CHEST-LIMITED (1 VIEW)   Final Result      Stable small left pneumothorax. Interval removal of left chest tube.      DX-CHEST-LIMITED (1 VIEW)   Final Result      1.  Left chest tube tip at the origin of the left hemithorax. Advancement is suggested. Stable small left pneumothorax.   2.  Interstitial and airspace opacities, worse on the right which could represent asymmetric edema or infection.      These findings were discussed with nurse Hung on 2/18/2020 9:16 AM.      OUTSIDE IMAGES-DX CHEST   Final Result      OUTSIDE IMAGES-DX CHEST   Final Result      DX-CHEST-PORTABLE (1 VIEW)    (Results Pending)        Assessment/Plan  Acute on chronic respiratory failure with hypoxia (HCC)- (present on admission)  Assessment & Plan  ILD, now treated CAP, PNMTx  mobilize    Hypoxia- (present on admission)  Assessment & Plan  As a result from COPD, pneumothorax, pneumonia    Respiratory failure (HCC)- (present on admission)  Assessment & Plan  Secondary to COPD, emphysema and concomitant infection with pneumothorax  Status post chest tube placement initially for spontaneous pneumothorax  Worsening status with multifactorial  etiology  Upgraded to ICU status, intubation, further investigation    Pneumonia- (present on admission)  Assessment & Plan  Now s/p completed course of Abx's    Pneumothorax  Assessment & Plan  L chest tube  CT surgery managing  Daily CXR    QT prolongation- (present on admission)  Assessment & Plan  History of, currently with some significant bigeminy  Continue with cardiac monitoring  On methadone    Chronic neck pain- (present on admission)  Assessment & Plan  Back on his home regimen of [id number] TID    Pulmonary hypertension (HCC)- (present on admission)  Assessment & Plan  History of  Stable presently on no medications    Atrial fibrillation with rapid ventricular response (HCC)  Assessment & Plan  Has converted back to sinus  Cont Tele    Tobacco abuse- (present on admission)  Assessment & Plan  History of extensive use, the patient quit in 2015    Hypothyroid- (present on admission)  Assessment & Plan  Continue with replacement    Chronic pain- (present on admission)  Assessment & Plan  High-dose methadone as well as Neurontin       VTE prophylaxis: enoxaparin

## 2020-03-01 NOTE — CARE PLAN
Problem: Communication  Goal: The ability to communicate needs accurately and effectively will improve: Patient encouraged to verbalize feelings about hospital stay   Outcome: PROGRESSING SLOWER THAN EXPECTED     Problem: Safety  Goal: Will remain free from injury: bed alarm on and at lowest position, hospital clothing on.   Outcome: PROGRESSING SLOWER THAN EXPECTED

## 2020-03-01 NOTE — PROGRESS NOTES
Critical Care Progress Note    Date of admission  2/18/2020    Chief Complaint  59 y.o. male who presented 2/18/2020 with history of oxygen dependent chronic hypoxic respiratory failure, requiring 2.5-3 L home oxygen, emphysema, pulmonary hypertension and chronic pain who is on methadone.  He was transferred from [place] for pulmonary specialty care with a left lower lobe pneumonia and pneumothorax on 2/18.  Influenza screening was negative.  Shortly after arriving at [facility] he was noted to have a dislodged chest tube.  A replacement chest tube was not necessary.  He was requiring 5 L of oxygen via nasal cannula on 2/19/2/20.  A pulmonology consultation requested a high resolution CT scan, the initiated a connective tissue work-up and continued broad-spectrum antibiotics.  On the morning of 2/21 patient had increasing work of breathing and hypoxic respiratory failure.  Critical care consultation was requested for further evaluation and management.    Hospital Course    2/21-patient was evaluated in the IR suite for placement of a pigtail catheter.  At the time the residual pneumothorax was felt to be too small to benefit from catheter placement.  At 2335 patient became hypoxic, tachycardic and hypotensive.  Chest x-ray revealed a large pneumothorax.  An emergent chest tube was placed with improvement in hemodynamic and oxygenation parameters.  2/22- Pulmonary biopsy performed.  Prednisone 1 mg/kilogram/day initiated  2/28- Extubated; afib with RVR placed on amio gtt  2/29 -Improved mentation. Did well s/p extubation chest placed on water seal   3/1-  Left apical pneumo seen and chest tube returned to suction        Interval Problem Update  Reviewed last 24 hour events:   - no new issues              - Tm: 37.5              - HR: 60-70s              - SBP: 100s-120s              - Neuro: GCS 15 Follow commands              - GI: TF at goal              - UOP: 2L              - Bell: Yes               - Lines: CVC, chest tube              - PPx: Home PPI, Lovenox              - CXR (personally reviewed): Hazy opacities in the lung bases. Small left apical pneumothorax.              - Antibiotic Bactrim   - CT stable infiltrated pneumothorax persists   - Prednisone taper        Review of Systems  Review of Systems   Constitutional: Negative for chills and fever.   HENT: Negative for sore throat.    Respiratory: Negative for shortness of breath, wheezing and stridor.    Cardiovascular: Negative for chest pain.   Gastrointestinal: Negative for abdominal pain, nausea and vomiting.   Genitourinary: Negative.    Musculoskeletal: Negative.    Skin: Negative for rash.   Neurological: Positive for weakness. Negative for focal weakness and headaches.   Psychiatric/Behavioral: Negative.         Vital Signs for last 24 hours   Pulse:  [55-85] 69  Resp:  [11-23] 11  BP: (100-166)/() 100/68  SpO2:  [90 %-98 %] 92 %    Hemodynamic parameters for last 24 hours       Respiratory Information for the last 24 hours       Physical Exam   Physical Exam  Constitutional:       General: He is not in acute distress.  HENT:      Head: Normocephalic and atraumatic.      Right Ear: External ear normal.      Left Ear: External ear normal.      Nose: Nose normal.      Mouth/Throat:      Mouth: Mucous membranes are moist.   Eyes:      Extraocular Movements: Extraocular movements intact.      Pupils: Pupils are equal, round, and reactive to light.      Comments: Disconjugate   Neck:      Musculoskeletal: Neck supple.   Cardiovascular:      Rate and Rhythm: Normal rate and regular rhythm.      Heart sounds: No murmur. No friction rub. No gallop.    Pulmonary:      Effort: Pulmonary effort is normal.      Breath sounds: No stridor. No wheezing.      Comments: Scattered crackles   Abdominal:      General: Bowel sounds are normal. There is no distension.      Palpations: Abdomen is soft.      Tenderness: There is no abdominal  tenderness.   Musculoskeletal:         General: No swelling.      Right lower leg: No edema.      Left lower leg: No edema.   Skin:     General: Skin is warm and dry.      Capillary Refill: Capillary refill takes less than 2 seconds.   Neurological:      General: No focal deficit present.      Mental Status: He is alert and oriented to person, place, and time.      Comments: GCS 15. Antigravity throughout.   Psychiatric:         Mood and Affect: Mood normal.         Behavior: Behavior normal.         Medications  Current Facility-Administered Medications   Medication Dose Route Frequency Provider Last Rate Last Dose   • predniSONE (DELTASONE) tablet 70 mg  70 mg Enteral Tube DAILY Srinath Sauceda M.D.   70 mg at 03/01/20 0501   • QUEtiapine (SEROQUEL) tablet 25 mg  25 mg Enteral Tube BID Srinath Sauceda M.D.   25 mg at 03/01/20 0501   • amiodarone (CORDARONE) 450 mg in D5W 250 mL Infusion  0.5-1 mg/min Intravenous Continuous Srinath Sauceda M.D. 17 mL/hr at 03/01/20 0438 0.5 mg/min at 03/01/20 0438   • oxyCODONE immediate-release (ROXICODONE) tablet 5 mg  5 mg Enteral Tube Q4HRS PRN Srinath Sauceda M.D.   5 mg at 02/25/20 1402    Or   • oxyCODONE immediate release (ROXICODONE) tablet 10 mg  10 mg Enteral Tube Q4HRS PRN Srinath Sauceda M.D.   10 mg at 02/26/20 0518   • polyethylene glycol/lytes (MIRALAX) PACKET 1 Packet  1 Packet Enteral Tube BID Marck Javier M.D.   Stopped at 02/28/20 0500    And   • senna-docusate (PERICOLACE or SENOKOT S) 8.6-50 MG per tablet 2 Tab  2 Tab Enteral Tube BID Macrk Javier M.D.   Stopped at 02/28/20 0500    And   • magnesium hydroxide (MILK OF MAGNESIA) suspension 30 mL  30 mL Enteral Tube QDAY PRN Marck Javier M.D.   30 mL at 02/24/20 1556    And   • bisacodyl (DULCOLAX) suppository 10 mg  10 mg Rectal QDAY PRN Marck Javier M.D.   10 mg at 02/25/20 1154   • enoxaparin (LOVENOX) inj 40 mg  40 mg Subcutaneous DAILY Marck Javier M.D.   40 mg at 03/01/20 0457   •  HYDROmorphone pf (DILAUDID) injection 1 mg  1 mg Intravenous Q2HRS PRN Marck Javier M.D.   1 mg at 03/01/20 0724   • Pharmacy Consult: Enteral tube insertion - review meds/change route/product selection  1 Each Other PHARMACY TO DOSE Marck Javier M.D.       • ipratropium-albuterol (DUONEB) nebulizer solution  3 mL Nebulization Q2HRS PRN (RT) Marck Javier M.D.       • MD Alert...ICU Electrolyte Replacement per Pharmacy   Other PHARMACY TO DOSE Marck Javier M.D.       • gabapentin (NEURONTIN) capsule 2,400 mg  2,400 mg Enteral Tube QAM Marck Javier M.D.   2,400 mg at 03/01/20 0500    And   • gabapentin (NEURONTIN) capsule 1,600 mg  1,600 mg Enteral Tube Q EVENING Marck Javier M.D.   1,600 mg at 02/29/20 1633   • levothyroxine (SYNTHROID) tablet 125 mcg  125 mcg Enteral Tube QAM AC Marck Javier M.D.   125 mcg at 03/01/20 0700   • methadone (DOLOPHINE) tablet 30 mg  30 mg Enteral Tube BID Marck Javier M.D.   30 mg at 03/01/20 0500    And   • methadone (DOLOPHINE) tablet 10 mg  10 mg Enteral Tube QHS Marck Javier M.D.   10 mg at 02/29/20 2104   • omeprazole (FIRST-OMEPRAZOLE) 2 mg/mL oral susp 40 mg  40 mg Enteral Tube Q EVENING Marck Javier M.D.   40 mg at 02/29/20 1633   • simvastatin (ZOCOR) tablet 40 mg  40 mg Enteral Tube Nightly Marck Javier M.D.   40 mg at 02/29/20 2105   • tramadol (ULTRAM) 50 MG tablet 50 mg  50 mg Enteral Tube BID Marck Javier M.D.   50 mg at 02/28/20 1703   • acetaminophen (TYLENOL) tablet 650 mg  650 mg Enteral Tube Q6HRS PRN Marck Javier M.D.   650 mg at 03/01/20 0418   • ALPRAZolam (XANAX) tablet 0.25 mg  0.25 mg Enteral Tube 4X/DAY PRN Marck Javier M.D.   0.25 mg at 03/01/20 0048   • guaiFENesin dextromethorphan (ROBITUSSIN DM) 100-10 MG/5ML syrup 10 mL  10 mL Enteral Tube Q6HRS PRN Marck Javier M.D.   10 mL at 02/29/20 0718   • promethazine (PHENERGAN) tablet 12.5-25 mg  12.5-25 mg Enteral Tube Q4HRS PRN Marck Javier M.D.       • influenza  vaccine quad injection 0.5 mL  0.5 mL Intramuscular Once PRN Eulogio Lopes M.D.       • timolol (TIMOPTIC) 0.5 % ophthalmic solution 1 Drop  1 Drop Both Eyes Q EVENING Tim Bustos M.D.   1 Drop at 02/29/20 1634   • Respiratory Therapy Consult   Nebulization Continuous RT Tim Bustos M.D.       • promethazine (PHENERGAN) suppository 12.5-25 mg  12.5-25 mg Rectal Q4HRS PRN Tim Bustos M.D.       • prochlorperazine (COMPAZINE) injection 5-10 mg  5-10 mg Intravenous Q4HRS PRN Tim Bustos M.D.   5 mg at 03/01/20 0725   • albuterol (PROVENTIL) 2.5mg/0.5ml nebulizer solution 2.5 mg  2.5 mg Nebulization Q2HRS PRN (RT) Tim Bustos M.D.           Fluids    Intake/Output Summary (Last 24 hours) at 3/1/2020 0817  Last data filed at 3/1/2020 0600  Gross per 24 hour   Intake 2038 ml   Output 1130 ml   Net 908 ml       Laboratory  Recent Labs     02/28/20  0446   ISTATAPH 7.440   ISTATAPCO2 37.9*   ISTATAPO2 69   ISTATATCO2 27   RBRWCYK2NHJ 94   ISTATARTHCO3 25.7*   ISTATARTBE 2   ISTATTEMP 98.2 F   ISTATFIO2 40   ISTATSPEC Arterial   ISTATAPHTC 7.443   EEMHQAHH0ZX 68         Recent Labs     02/28/20  0409 02/29/20  0414 03/01/20  0304   SODIUM 134* 137 137   POTASSIUM 4.0 3.9 4.1   CHLORIDE 99 100 99   CO2 29 27 31   BUN 40* 36* 39*   CREATININE 0.92 0.83 0.86   MAGNESIUM 2.2  --   --    PHOSPHORUS 2.9  --   --    CALCIUM 8.9 9.6 9.3     Recent Labs     02/28/20  0409 02/29/20  0414 03/01/20  0304   GLUCOSE 112* 127* 111*     Recent Labs     02/28/20  0409 02/29/20  0414 03/01/20  0304   WBC 28.2* 28.9* 29.3*   NEUTSPOLYS 73.10* 79.10* 71.20   LYMPHOCYTES 13.60* 12.20* 12.60*   MONOCYTES 8.70 6.90 13.50*   EOSINOPHILS 2.10 0.90 1.80   BASOPHILS 0.70 0.90 0.00     Recent Labs     02/28/20  0409 02/29/20  0414 03/01/20  0304   RBC 5.27 5.83 5.89   HEMOGLOBIN 13.7* 15.3 15.5   HEMATOCRIT 43.6 47.4 48.4   PLATELETCT 398 488* 485*       Imaging  X-Ray:  I have personally reviewed the  images and compared with prior images. and My impression is: Small bilateral hazy opacities at the bases. Small residual left apical pneumothorax.    Assessment/Plan  Acute on chronic respiratory failure with hypoxia (HCC)- (present on admission)  Assessment & Plan  Acute hypoxic respiratory failure  Attributed to  NSIP  Complicated by underlying emphysematous disease.     Extubated 2/28 - doing well    Pulmonary biopsy on 2/22 am  CTD unremarkable  Prednisone taper  Bactrim prophylaxis    Pneumonia- (present on admission)  Assessment & Plan  NSIP  Prednisone taper  Bactrim PPX    Pneumothorax  Assessment & Plan  Status post left tension pneumothorax  Pneumothorax improved with chest tube in place but still small left apical pneumo.  Chest tube in good position  No air leak  Placed back on suction for residual pneumo. Anticipate removal in the next 24-48 hours.      Atrial fibrillation with rapid ventricular response (HCC)  Assessment & Plan  Hold amio as this was likely stress related  Metoprolol pushes is needed  Now nsr  Optimize lytes  monitor    Chronic pain- (present on admission)  Assessment & Plan  Continue gabapentin regimen  Continue methadone regimen  Continue tramadol  Added prn oxycodone  Through the enteral tube  Dilaudid 1 mg IV every 2 hours as needed breakthrough pain      Ok for floor. Will have pulmonary follow along when leaves the ICU. Critical Care to sign off when he leaves the ICU.      VTE:  Lovenox  Ulcer: PPI  Lines: Central Line  Ongoing indication addressed Pull Central prior to tx. Midline to be placed prior to leaving ICU    I have performed a physical exam and reviewed and updated ROS and Plan today (3/1/2020). In review of yesterday's note (2/29/2020), there are no changes except as documented above.     Discussed patient condition and risk of morbidity and/or mortality with RN, RT, Pharmacy, Code status disscussed, Patient and Pulmonary

## 2020-03-01 NOTE — THERAPY
"  Speech Language Therapy Clinical Swallow Evaluation completed.  Functional Status: Pt seen on this date for a clinical swallow evaluation. Pt A&Ox4 and agreeable to the evaluation. Vocal quality slightly reduced, however, pt reported voice is almost at baseline. No gross deficits appreciated during the oral motor evaluation. Cough x1 appreciated with sips of thin liquids, however, no overt s/sx of aspiration appreciated with NTL via teaspoon and cup sip and purees. Pt c/o globus with single ice chips, but did not c/o globus with any other texture. Vocal quality clear following trials of NTL and purees,however, intermittent wet vocal quality appreciated with sips of thins. Upon palpation, laryngeal elevation was complete and swallow trigger was timely. At this time, recommend that pt begin a NTFL diet with direct supervision during meals and implementation of swallowing strategies (small bites/sips, up at 90* during meals, slow rate). Would recommend keeping cortrak for at least 2-3 to ensure tolerance before d/c. Dysphagia education and recommendations provided to pt and he verbalized understanding. SLP to follow.    Recommendations - Diet:  NTFL with direct supervision, recommend keeping cortrak in for at least 2-3 meals before d/c                          Strategies: Direct supervision during meals, No Straws and Head of Bed at 90 Degrees                          Medication Administration:  Crush in puree  Plan of Care: Will benefit from Speech Therapy 3 times per week  Post-Acute Therapy: Recommend inpatient transitional care services for continued speech therapy services.        See \"Rehab Therapy-Acute\" Patient Summary Report for complete documentation.   "

## 2020-03-01 NOTE — CARE PLAN
Problem: Safety  Goal: Will remain free from injury  Intervention:  Patient frequently reminded to call for assistance before attempting to get up out of bed.  Outcome: PROGRESSING AS EXPECTED     Problem: Pain Management  Goal: Pain level will decrease to patient's comfort goal  Intervention:  Assess and medicate as needed for signs/symptoms/or complaints of chronic pain.  Outcome: PROGRESSING AS EXPECTED

## 2020-03-01 NOTE — THERAPY
Speech Therapy Contact Note:  Attempted to see pt for clinical swallow evaluation, however, RN reported that pt just given pain medications and requested to hold evaluation at this time. SLP will attempt to follow as appropriate.

## 2020-03-01 NOTE — CARE PLAN
Problem: Communication  Goal: The ability to communicate needs accurately and effectively will improve  Intervention:  plan of care for today reviewed with patient  Outcome: PROGRESSING AS EXPECTED     Problem: Pain Management  Goal: Pain level will decrease to patient's comfort goal  Intervention:  assess and medicate as needed for complaints of chronic pain.  Outcome: PROGRESSING AS EXPECTED

## 2020-03-01 NOTE — PROCEDURES
Vascular Access Team    Date of Insertion: 3/1/2020  Arm Circumference: n/a  Line Length: 10cm  Line Size: 18g  Vein Occupancy %: 32  Reason for Midline: access  Labs: WBC 29.3, , BUN 39, Cr 0.86, GFR >60, INR 1.28    Orders confirmed, vessel patency confirmed with ultrasound. Risks and benefits of procedure explained to patient and education regarding line associated bloodstream infections provided. Questions answered.     PowerGlide Midline placed in RUE per licensed provider order with ultrasound guidance. 18g, 10 cm line placed in brachial vein after 1 attempt(s).  Catheter inserted with brisk blood return. Secured with 0cm external from insertion site.  Line flushed without resistance with 10 mL 0.9% normal saline.  Midline secured with Biopatch and Tegaderm.     Midline placement is confirmed by nurse using ultrasound and ability to flush and draw blood. Midline is appropriate for use at this time.  No X-ray is needed for placement confirmation. Pt tolerated procedure well.  Patient condition relayed to unit RN or ordering physician via this post procedure note in the EMR.    Ultrasound images uploaded to PACS and viewable in the EMR - yes  Ultrasound imaged printed and placed in paper chart - no      BARD PowerGlide Midline ref # [id number], Lot # [id number], Expiration Date 12/31/2020

## 2020-03-02 ENCOUNTER — APPOINTMENT (OUTPATIENT)
Dept: RADIOLOGY | Facility: MEDICAL CENTER | Age: 60
DRG: 166 | End: 2020-03-02
Attending: INTERNAL MEDICINE
Payer: MEDICARE

## 2020-03-02 PROBLEM — J84.89 NSIP (NONSPECIFIC INTERSTITIAL PNEUMONIA) (HCC): Status: ACTIVE | Noted: 2020-03-02

## 2020-03-02 LAB
ALBUMIN SERPL BCP-MCNC: 3.6 G/DL (ref 3.2–4.9)
ALBUMIN/GLOB SERPL: 1 G/DL
ALP SERPL-CCNC: 73 U/L (ref 30–99)
ALT SERPL-CCNC: 62 U/L (ref 2–50)
ANION GAP SERPL CALC-SCNC: 5 MMOL/L (ref 0–11.9)
ANISOCYTOSIS BLD QL SMEAR: ABNORMAL
AST SERPL-CCNC: 35 U/L (ref 12–45)
BASOPHILS # BLD AUTO: 0 % (ref 0–1.8)
BASOPHILS # BLD: 0 K/UL (ref 0–0.12)
BILIRUB SERPL-MCNC: 0.4 MG/DL (ref 0.1–1.5)
BUN SERPL-MCNC: 41 MG/DL (ref 8–22)
CALCIUM SERPL-MCNC: 9.7 MG/DL (ref 8.5–10.5)
CHLORIDE SERPL-SCNC: 98 MMOL/L (ref 96–112)
CO2 SERPL-SCNC: 34 MMOL/L (ref 20–33)
CREAT SERPL-MCNC: 1.01 MG/DL (ref 0.5–1.4)
EKG IMPRESSION: NORMAL
EOSINOPHIL # BLD AUTO: 1.1 K/UL (ref 0–0.51)
EOSINOPHIL NFR BLD: 4.4 % (ref 0–6.9)
ERYTHROCYTE [DISTWIDTH] IN BLOOD BY AUTOMATED COUNT: 46.6 FL (ref 35.9–50)
GLOBULIN SER CALC-MCNC: 3.5 G/DL (ref 1.9–3.5)
GLUCOSE SERPL-MCNC: 106 MG/DL (ref 65–99)
HCT VFR BLD AUTO: 48.3 % (ref 42–52)
HGB BLD-MCNC: 14.9 G/DL (ref 14–18)
LYMPHOCYTES # BLD AUTO: 6 K/UL (ref 1–4.8)
LYMPHOCYTES NFR BLD: 23.9 % (ref 22–41)
MANUAL DIFF BLD: NORMAL
MCH RBC QN AUTO: 26.1 PG (ref 27–33)
MCHC RBC AUTO-ENTMCNC: 30.8 G/DL (ref 33.7–35.3)
MCV RBC AUTO: 84.6 FL (ref 81.4–97.8)
MONOCYTES # BLD AUTO: 1.78 K/UL (ref 0–0.85)
MONOCYTES NFR BLD AUTO: 7.1 % (ref 0–13.4)
MORPHOLOGY BLD-IMP: NORMAL
NEUTROPHILS # BLD AUTO: 16.21 K/UL (ref 1.82–7.42)
NEUTROPHILS NFR BLD: 63.7 % (ref 44–72)
NEUTS BAND NFR BLD MANUAL: 0.9 % (ref 0–10)
NRBC # BLD AUTO: 0 K/UL
NRBC BLD-RTO: 0 /100 WBC
PLATELET # BLD AUTO: 473 K/UL (ref 164–446)
PLATELET BLD QL SMEAR: NORMAL
PMV BLD AUTO: 9.4 FL (ref 9–12.9)
POIKILOCYTOSIS BLD QL SMEAR: NORMAL
POTASSIUM SERPL-SCNC: 4 MMOL/L (ref 3.6–5.5)
PROT SERPL-MCNC: 7.1 G/DL (ref 6–8.2)
RBC # BLD AUTO: 5.71 M/UL (ref 4.7–6.1)
RBC BLD AUTO: PRESENT
SODIUM SERPL-SCNC: 137 MMOL/L (ref 135–145)
WBC # BLD AUTO: 25.1 K/UL (ref 4.8–10.8)

## 2020-03-02 PROCEDURE — 85007 BL SMEAR W/DIFF WBC COUNT: CPT

## 2020-03-02 PROCEDURE — 85027 COMPLETE CBC AUTOMATED: CPT

## 2020-03-02 PROCEDURE — 71045 X-RAY EXAM CHEST 1 VIEW: CPT

## 2020-03-02 PROCEDURE — 770020 HCHG ROOM/CARE - TELE (206)

## 2020-03-02 PROCEDURE — 99232 SBSQ HOSP IP/OBS MODERATE 35: CPT | Performed by: HOSPITALIST

## 2020-03-02 PROCEDURE — 700111 HCHG RX REV CODE 636 W/ 250 OVERRIDE (IP): Performed by: INTERNAL MEDICINE

## 2020-03-02 PROCEDURE — 99233 SBSQ HOSP IP/OBS HIGH 50: CPT | Performed by: INTERNAL MEDICINE

## 2020-03-02 PROCEDURE — 80053 COMPREHEN METABOLIC PANEL: CPT

## 2020-03-02 PROCEDURE — 51798 US URINE CAPACITY MEASURE: CPT

## 2020-03-02 PROCEDURE — 700111 HCHG RX REV CODE 636 W/ 250 OVERRIDE (IP): Performed by: HOSPITALIST

## 2020-03-02 PROCEDURE — A9270 NON-COVERED ITEM OR SERVICE: HCPCS | Performed by: HOSPITALIST

## 2020-03-02 PROCEDURE — 700111 HCHG RX REV CODE 636 W/ 250 OVERRIDE (IP): Performed by: PSYCHIATRY & NEUROLOGY

## 2020-03-02 PROCEDURE — 93005 ELECTROCARDIOGRAM TRACING: CPT | Performed by: INTERNAL MEDICINE

## 2020-03-02 PROCEDURE — 700102 HCHG RX REV CODE 250 W/ 637 OVERRIDE(OP): Performed by: HOSPITALIST

## 2020-03-02 PROCEDURE — 93010 ELECTROCARDIOGRAM REPORT: CPT | Performed by: INTERNAL MEDICINE

## 2020-03-02 PROCEDURE — C1729 CATH, DRAINAGE: HCPCS

## 2020-03-02 PROCEDURE — 700102 HCHG RX REV CODE 250 W/ 637 OVERRIDE(OP): Performed by: PSYCHIATRY & NEUROLOGY

## 2020-03-02 PROCEDURE — A9270 NON-COVERED ITEM OR SERVICE: HCPCS | Performed by: INTERNAL MEDICINE

## 2020-03-02 PROCEDURE — A9270 NON-COVERED ITEM OR SERVICE: HCPCS | Performed by: PSYCHIATRY & NEUROLOGY

## 2020-03-02 PROCEDURE — 92526 ORAL FUNCTION THERAPY: CPT

## 2020-03-02 PROCEDURE — 700102 HCHG RX REV CODE 250 W/ 637 OVERRIDE(OP): Performed by: INTERNAL MEDICINE

## 2020-03-02 RX ORDER — DILTIAZEM HYDROCHLORIDE 5 MG/ML
0.25 INJECTION INTRAVENOUS EVERY 4 HOURS PRN
Status: DISCONTINUED | OUTPATIENT
Start: 2020-03-02 | End: 2020-03-11 | Stop reason: HOSPADM

## 2020-03-02 RX ORDER — PREDNISONE 20 MG/1
60 TABLET ORAL DAILY
Status: DISCONTINUED | OUTPATIENT
Start: 2020-03-03 | End: 2020-03-03

## 2020-03-02 RX ORDER — PREDNISONE 20 MG/1
60 TABLET ORAL DAILY
Status: DISCONTINUED | OUTPATIENT
Start: 2020-03-03 | End: 2020-03-02

## 2020-03-02 RX ADMIN — DILTIAZEM HYDROCHLORIDE 30 MG: 30 TABLET, FILM COATED ORAL at 16:25

## 2020-03-02 RX ADMIN — HYDROMORPHONE HYDROCHLORIDE 1 MG: 1 INJECTION, SOLUTION INTRAMUSCULAR; INTRAVENOUS; SUBCUTANEOUS at 11:45

## 2020-03-02 RX ADMIN — ENOXAPARIN SODIUM 40 MG: 100 INJECTION SUBCUTANEOUS at 05:04

## 2020-03-02 RX ADMIN — POLYETHYLENE GLYCOL 3350 1 PACKET: 17 POWDER, FOR SOLUTION ORAL at 17:10

## 2020-03-02 RX ADMIN — GABAPENTIN 1600 MG: 400 CAPSULE ORAL at 17:01

## 2020-03-02 RX ADMIN — TRAMADOL HYDROCHLORIDE 50 MG: 50 TABLET, FILM COATED ORAL at 17:00

## 2020-03-02 RX ADMIN — SENNOSIDES AND DOCUSATE SODIUM 2 TABLET: 8.6; 5 TABLET ORAL at 17:00

## 2020-03-02 RX ADMIN — PREDNISONE 70 MG: 10 TABLET ORAL at 05:03

## 2020-03-02 RX ADMIN — HYDROMORPHONE HYDROCHLORIDE 1 MG: 1 INJECTION, SOLUTION INTRAMUSCULAR; INTRAVENOUS; SUBCUTANEOUS at 01:44

## 2020-03-02 RX ADMIN — SIMVASTATIN 40 MG: 40 TABLET, FILM COATED ORAL at 21:58

## 2020-03-02 RX ADMIN — HYDROMORPHONE HYDROCHLORIDE 1 MG: 1 INJECTION, SOLUTION INTRAMUSCULAR; INTRAVENOUS; SUBCUTANEOUS at 17:14

## 2020-03-02 RX ADMIN — TRAMADOL HYDROCHLORIDE 50 MG: 50 TABLET, FILM COATED ORAL at 05:03

## 2020-03-02 RX ADMIN — METHADONE HYDROCHLORIDE 30 MG: 10 TABLET ORAL at 05:03

## 2020-03-02 RX ADMIN — TIMOLOL MALEATE 1 DROP: 5 SOLUTION OPHTHALMIC at 17:17

## 2020-03-02 RX ADMIN — OMEPRAZOLE 40 MG: KIT at 17:10

## 2020-03-02 RX ADMIN — ALPRAZOLAM 0.25 MG: 0.25 TABLET ORAL at 01:44

## 2020-03-02 RX ADMIN — LEVOTHYROXINE SODIUM 125 MCG: 125 TABLET ORAL at 07:00

## 2020-03-02 RX ADMIN — ALPRAZOLAM 0.25 MG: 0.25 TABLET ORAL at 21:58

## 2020-03-02 RX ADMIN — GABAPENTIN 2400 MG: 400 CAPSULE ORAL at 05:04

## 2020-03-02 RX ADMIN — QUETIAPINE FUMARATE 25 MG: 25 TABLET ORAL at 05:04

## 2020-03-02 RX ADMIN — METHADONE HYDROCHLORIDE 30 MG: 10 TABLET ORAL at 15:10

## 2020-03-02 RX ADMIN — DILTIAZEM HYDROCHLORIDE 18.4 MG: 5 INJECTION INTRAVENOUS at 15:53

## 2020-03-02 RX ADMIN — METHADONE HYDROCHLORIDE 10 MG: 10 TABLET ORAL at 21:58

## 2020-03-02 RX ADMIN — ALPRAZOLAM 0.25 MG: 0.25 TABLET ORAL at 07:40

## 2020-03-02 RX ADMIN — HYDROMORPHONE HYDROCHLORIDE 1 MG: 1 INJECTION, SOLUTION INTRAMUSCULAR; INTRAVENOUS; SUBCUTANEOUS at 21:57

## 2020-03-02 RX ADMIN — ALPRAZOLAM 0.25 MG: 0.25 TABLET ORAL at 15:28

## 2020-03-02 ASSESSMENT — ENCOUNTER SYMPTOMS
BRUISES/BLEEDS EASILY: 0
SHORTNESS OF BREATH: 1
NERVOUS/ANXIOUS: 0
DIAPHORESIS: 0
WEIGHT LOSS: 0
PSYCHIATRIC NEGATIVE: 1
DIARRHEA: 0
POLYDIPSIA: 0
VOMITING: 0
BLOOD IN STOOL: 0
TINGLING: 0
DIZZINESS: 0
PHOTOPHOBIA: 0
FOCAL WEAKNESS: 0
ABDOMINAL PAIN: 0
LOSS OF CONSCIOUSNESS: 0
PALPITATIONS: 0
FEVER: 0
SORE THROAT: 0
BACK PAIN: 0
SPUTUM PRODUCTION: 0
NECK PAIN: 0
BLURRED VISION: 0
HEMOPTYSIS: 0
CHILLS: 0
COUGH: 1
WEAKNESS: 1
MUSCULOSKELETAL NEGATIVE: 1
STRIDOR: 0
MYALGIAS: 0
DIZZINESS: 1
SPEECH CHANGE: 0
COUGH: 0
NAUSEA: 0
HEADACHES: 0
WHEEZING: 0
SENSORY CHANGE: 0
DEPRESSION: 0
DOUBLE VISION: 0
HEARTBURN: 0
SINUS PAIN: 0

## 2020-03-02 NOTE — CARE PLAN
Problem: Communication  Goal: The ability to communicate needs accurately and effectively will improve: Patient encouraged to verbalize needs and feelings.  Outcome: PROGRESSING SLOWER THAN EXPECTED     Problem: Safety  Goal: Will remain free from injury: hospital clothing on and bed alarm on .  Outcome: PROGRESSING SLOWER THAN EXPECTED

## 2020-03-02 NOTE — ASSESSMENT & PLAN NOTE
Secondary to interstitial lung disease and copd/smoking hx  Optimize copd medications  symbicort bid  duonebs prn  spiriva daily  Steroids for nsip  Diuresis as tolerated and needed  Currently adequately diuresed

## 2020-03-02 NOTE — PROGRESS NOTES
Pulmonary and Critical Care Progress Note    Date of admission  2/18/2020    Chief Complaint  59 y.o. male who presented 2/18/2020 with history of oxygen dependent chronic hypoxic respiratory failure, requiring 2.5-3 L home oxygen, emphysema, pulmonary hypertension and chronic pain who is on methadone.  He was transferred from [place] for pulmonary specialty care with a left lower lobe pneumonia and pneumothorax on 2/18.  Influenza screening was negative.  Shortly after arriving at [facility] he was noted to have a dislodged chest tube.  A replacement chest tube was not necessary.  He was requiring 5 L of oxygen via nasal cannula on 2/19/2/20.  A pulmonology consultation requested a high resolution CT scan, the initiated a connective tissue work-up and continued broad-spectrum antibiotics.  On the morning of 2/21 patient had increasing work of breathing and hypoxic respiratory failure.  Critical care consultation was requested for further evaluation and management.    Hospital Course    2/21-patient was evaluated in the IR suite for placement of a pigtail catheter.  At the time the residual pneumothorax was felt to be too small to benefit from catheter placement.  At 2335 patient became hypoxic, tachycardic and hypotensive.  Chest x-ray revealed a large pneumothorax.  An emergent chest tube was placed with improvement in hemodynamic and oxygenation parameters.  2/22- Pulmonary biopsy performed.  Prednisone 1 mg/kilogram/day initiated  2/28- Extubated; afib with RVR placed on amio gtt  2/29 -Improved mentation. Did well s/p extubation chest placed on water seal   3/1-  Left apical pneumo seen and chest tube returned to suction   3/2, chest xray no significant pneumothorax (tiny), chest tube clamped per myself.  Repeat chest xray.          Interval Problem Update  Reviewed last 24 hour events:  chest xray no significant pneumothorax, chest tube clamped per myself.  Repeat chest xray.  Patient  comfortable in bed, continued sob with exertion  On 3 L nc  Hr 50-70  sbp 100-130 systolic  Wbc down trending, 25  Chem adequate, alt mild elevation  On prednisone taper,changed to 60 mg for at least 30 days, will follow up outpatient with pulmonary    Addendum  Chest xray enlarging pneumothorax  afib rvr to 140  Unclamped chest tube, air leak present  Repeat chest xray this evening  High probability bronchoalveolar fistula  May need ct surgery consult if continued difficulty weaning chest tube    Addendum  Repeat chest xray nearly resolved pneumothorax.    Chest tube to low suction  afib treated with 10 mg IV diltiazem and started on PO medications.  Patient ok to transfer to [facility] medicine    Review of Systems  Review of Systems   Constitutional: Positive for malaise/fatigue. Negative for chills, diaphoresis, fever and weight loss.   HENT: Negative for congestion, sinus pain and sore throat.    Eyes: Negative for blurred vision, double vision and photophobia.   Respiratory: Positive for cough and shortness of breath. Negative for hemoptysis, sputum production, wheezing and stridor.    Cardiovascular: Negative for chest pain, palpitations and leg swelling.   Gastrointestinal: Negative for abdominal pain, blood in stool, heartburn, melena, nausea and vomiting.   Genitourinary: Negative.  Negative for dysuria and urgency.   Musculoskeletal: Negative.  Negative for back pain, myalgias and neck pain.   Skin: Negative for itching and rash.   Neurological: Positive for weakness. Negative for dizziness, tingling, sensory change, speech change, focal weakness and headaches.   Endo/Heme/Allergies: Negative for polydipsia. Does not bruise/bleed easily.   Psychiatric/Behavioral: Negative.  Negative for depression. The patient is not nervous/anxious.         Vital Signs for last 24 hours   Temp:  [37.1 °C (98.7 °F)-37.1 °C (98.8 °F)] 37.1 °C (98.7 °F)  Pulse:  [54-81] 69  Resp:  [8-22] 12  BP: ()/(63-79) 117/76  SpO2:  [0  %-100 %] 97 %    Hemodynamic parameters for last 24 hours       Respiratory Information for the last 24 hours       Physical Exam   Physical Exam  Vitals signs and nursing note reviewed.   Constitutional:       General: He is not in acute distress.     Appearance: Normal appearance. He is ill-appearing. He is not toxic-appearing or diaphoretic.   HENT:      Head: Normocephalic and atraumatic.      Right Ear: Tympanic membrane and external ear normal.      Left Ear: Tympanic membrane and external ear normal.      Nose: No congestion or rhinorrhea.      Mouth/Throat:      Mouth: Mucous membranes are moist.      Pharynx: Oropharynx is clear. No oropharyngeal exudate or posterior oropharyngeal erythema.   Eyes:      General: No scleral icterus.        Right eye: No discharge.         Left eye: No discharge.      Extraocular Movements: Extraocular movements intact.      Conjunctiva/sclera: Conjunctivae normal.      Pupils: Pupils are equal, round, and reactive to light.   Neck:      Musculoskeletal: Normal range of motion. No neck rigidity or muscular tenderness.   Cardiovascular:      Rate and Rhythm: Normal rate and regular rhythm.      Pulses: Normal pulses.      Heart sounds: Normal heart sounds. No murmur.   Pulmonary:      Effort: Respiratory distress present.      Breath sounds: No stridor. Wheezing present. No rhonchi or rales.      Comments: Reduced air flow  Chest:      Chest wall: No tenderness.   Abdominal:      General: There is no distension.      Palpations: There is no mass.      Tenderness: There is no abdominal tenderness. There is no guarding.   Musculoskeletal:         General: No swelling, tenderness or deformity.      Right lower leg: No edema.      Left lower leg: No edema.   Lymphadenopathy:      Cervical: No cervical adenopathy.   Skin:     Coloration: Skin is not jaundiced or pale.      Findings: No bruising, erythema, lesion or rash.   Neurological:      General: No focal deficit present.       Mental Status: He is alert and oriented to person, place, and time. Mental status is at baseline.      Cranial Nerves: No cranial nerve deficit.      Sensory: No sensory deficit.      Motor: No weakness.      Coordination: Coordination normal.      Gait: Gait normal.   Psychiatric:         Mood and Affect: Mood normal.         Behavior: Behavior normal.         Thought Content: Thought content normal.         Judgment: Judgment normal.         Medications  Current Facility-Administered Medications   Medication Dose Route Frequency Provider Last Rate Last Dose   • predniSONE (DELTASONE) tablet 70 mg  70 mg Enteral Tube DAILY Srinath Sauceda M.D.   70 mg at 03/02/20 0503    Followed by   • [START ON 3/6/2020] predniSONE (DELTASONE) tablet 50 mg  50 mg Enteral Tube DAILY Srinath Sauceda M.D.        Followed by   • [START ON 3/11/2020] predniSONE (DELTASONE) tablet 30 mg  30 mg Enteral Tube DAILY Srinath Sauceda M.D.        Followed by   • [START ON 3/16/2020] predniSONE (DELTASONE) tablet 10 mg  10 mg Enteral Tube DAILY Srinath Sauceda M.D.       • HYDROmorphone pf (DILAUDID) injection 1 mg  1 mg Intravenous Q4HRS PRN Srinath Sauceda M.D.   1 mg at 03/02/20 0144   • QUEtiapine (SEROQUEL) tablet 25 mg  25 mg Enteral Tube BID Srinath Sauceda M.D.   25 mg at 03/02/20 0504   • oxyCODONE immediate-release (ROXICODONE) tablet 5 mg  5 mg Enteral Tube Q4HRS PRN Srinath Sauceda M.D.   5 mg at 02/25/20 1402    Or   • oxyCODONE immediate release (ROXICODONE) tablet 10 mg  10 mg Enteral Tube Q4HRS PRN Srinath Sauceda M.D.   10 mg at 02/26/20 0518   • polyethylene glycol/lytes (MIRALAX) PACKET 1 Packet  1 Packet Enteral Tube BID Marck Javier M.D.   Stopped at 02/28/20 0500    And   • senna-docusate (PERICOLACE or SENOKOT S) 8.6-50 MG per tablet 2 Tab  2 Tab Enteral Tube BID Marck Javier M.D.   Stopped at 02/28/20 0500    And   • magnesium hydroxide (MILK OF MAGNESIA) suspension 30 mL  30 mL Enteral Tube QDAY PRN Marck HINES  ARLINE Javier   30 mL at 02/24/20 1556    And   • bisacodyl (DULCOLAX) suppository 10 mg  10 mg Rectal QDAY PRN Marck Javier M.D.   10 mg at 02/25/20 1154   • enoxaparin (LOVENOX) inj 40 mg  40 mg Subcutaneous DAILY Marck Javier M.D.   40 mg at 03/02/20 0504   • Pharmacy Consult: Enteral tube insertion - review meds/change route/product selection  1 Each Other PHARMACY TO DOSE Marck Javier M.D.       • ipratropium-albuterol (DUONEB) nebulizer solution  3 mL Nebulization Q2HRS PRN (RT) Marck Javier M.D.       • MD Alert...ICU Electrolyte Replacement per Pharmacy   Other PHARMACY TO DOSE Marck Javier M.D.       • gabapentin (NEURONTIN) capsule 2,400 mg  2,400 mg Enteral Tube QAM Marck Javier M.D.   2,400 mg at 03/02/20 0504    And   • gabapentin (NEURONTIN) capsule 1,600 mg  1,600 mg Enteral Tube Q EVENING Marck Javier M.D.   1,600 mg at 03/01/20 1654   • levothyroxine (SYNTHROID) tablet 125 mcg  125 mcg Enteral Tube QAM AC Marck Javier M.D.   125 mcg at 03/02/20 0700   • methadone (DOLOPHINE) tablet 30 mg  30 mg Enteral Tube BID Marck Javier M.D.   30 mg at 03/02/20 0503    And   • methadone (DOLOPHINE) tablet 10 mg  10 mg Enteral Tube QHS Marck Javier M.D.   10 mg at 03/01/20 2122   • omeprazole (FIRST-OMEPRAZOLE) 2 mg/mL oral susp 40 mg  40 mg Enteral Tube Q EVENING Marck Javier M.D.   40 mg at 03/01/20 1655   • simvastatin (ZOCOR) tablet 40 mg  40 mg Enteral Tube Nightly Marck Javier M.D.   40 mg at 03/01/20 2122   • tramadol (ULTRAM) 50 MG tablet 50 mg  50 mg Enteral Tube BID Marck Javier M.D.   50 mg at 03/02/20 0503   • acetaminophen (TYLENOL) tablet 650 mg  650 mg Enteral Tube Q6HRS PRN Marck Javier M.D.   650 mg at 03/01/20 0418   • ALPRAZolam (XANAX) tablet 0.25 mg  0.25 mg Enteral Tube 4X/DAY PRN Marck Javier M.D.   0.25 mg at 03/02/20 0144   • guaiFENesin dextromethorphan (ROBITUSSIN DM) 100-10 MG/5ML syrup 10 mL  10 mL Enteral Tube Q6HRS PRN Marck Javier M.D.    10 mL at 03/01/20 1229   • promethazine (PHENERGAN) tablet 12.5-25 mg  12.5-25 mg Enteral Tube Q4HRS PRN Marck Javier M.D.       • influenza vaccine quad injection 0.5 mL  0.5 mL Intramuscular Once PRN Eulogio Lopes M.D.       • timolol (TIMOPTIC) 0.5 % ophthalmic solution 1 Drop  1 Drop Both Eyes Q EVENING Tim Bustos M.D.   1 Drop at 03/01/20 1655   • Respiratory Therapy Consult   Nebulization Continuous RT Tim Bustos M.D.       • promethazine (PHENERGAN) suppository 12.5-25 mg  12.5-25 mg Rectal Q4HRS PRN Tim Bustos M.D.       • prochlorperazine (COMPAZINE) injection 5-10 mg  5-10 mg Intravenous Q4HRS PRN Tim Bustos M.D.   10 mg at 03/01/20 1409   • albuterol (PROVENTIL) 2.5mg/0.5ml nebulizer solution 2.5 mg  2.5 mg Nebulization Q2HRS PRN (RT) Tim Bustos M.D.           Fluids    Intake/Output Summary (Last 24 hours) at 3/2/2020 0713  Last data filed at 3/2/2020 0000  Gross per 24 hour   Intake 262.25 ml   Output 0 ml   Net 262.25 ml       Laboratory          Recent Labs     02/29/20 0414 03/01/20 0304 03/02/20 0257   SODIUM 137 137 137   POTASSIUM 3.9 4.1 4.0   CHLORIDE 100 99 98   CO2 27 31 34*   BUN 36* 39* 41*   CREATININE 0.83 0.86 1.01   CALCIUM 9.6 9.3 9.7     Recent Labs     02/29/20 0414 03/01/20  0304 03/02/20  0257   ALTSGPT  --   --  62*   ASTSGOT  --   --  35   ALKPHOSPHAT  --   --  73   TBILIRUBIN  --   --  0.4   GLUCOSE 127* 111* 106*     Recent Labs     02/29/20 0414 03/01/20  0304 03/02/20  0257   WBC 28.9* 29.3* 25.1*   NEUTSPOLYS 79.10* 71.20 63.70   LYMPHOCYTES 12.20* 12.60* 23.90   MONOCYTES 6.90 13.50* 7.10   EOSINOPHILS 0.90 1.80 4.40   BASOPHILS 0.90 0.00 0.00   ASTSGOT  --   --  35   ALTSGPT  --   --  62*   ALKPHOSPHAT  --   --  73   TBILIRUBIN  --   --  0.4     Recent Labs     02/29/20  0414 03/01/20  0304 03/02/20  0257   RBC 5.83 5.89 5.71   HEMOGLOBIN 15.3 15.5 14.9   HEMATOCRIT 47.4 48.4 48.3   PLATELETCT 488* 485* 473*        Imaging  X-Ray:  I have personally reviewed the images and compared with prior images. and My impression is: small left apical pneumothorax. Chest tube in place.    EKG:  I have personally reviewed the images and compared with prior images. and My impression is: afib rvr, no ekg today.  QTC mild elevation  CT:    Reviewed    Assessment/Plan  Acute on chronic respiratory failure with hypoxia (HCC)- (present on admission)  Assessment & Plan  Acute hypoxic respiratory failure  Attributed to  NSIP  Complicated by underlying emphysematous disease.     Extubated 2/28 - doing well    Pulmonary biopsy on 2/22, alveolar granulation tissue  CTD unremarkable  Changed prednisone taper to scheduled 60 mg daily for 30 days, then re-evaluate  Trial steroids for NSIP.    Bactrim prophylaxis  ppi gi px  Treated for pna    Pneumonia- (present on admission)  Assessment & Plan  Prednisone taper  Bactrim PPX started prior  ppi px  On scheduled 20 days taper, changed to 60 mg daily for 30 days, pulmonary will follow up at that point  ? NSIP on ct imaging, difficulty with overall impression due to superimposed emphysema and pneumonia  Will need repeat ct chest imaging in 6-8 weeks with follow up with pulmonary  Biopsy with alveolar granulation tissue, possible sarcoid  Negative autoimmune work up  No significant exposures    Pneumothorax  Assessment & Plan  Clamped again on 3/2  Repeat chest xray reviewed  Chest xray in am, pull if no pneumothorax  Large bleb/interstitial changes, high risk for bronchoalveolar fistula      QT prolongation- (present on admission)  Assessment & Plan  Continue to monitor  Repeat ekg    Chronic neck pain- (present on admission)  Assessment & Plan  On methadone  Avoid over sedation with narcotics    Pulmonary hypertension (HCC)- (present on admission)  Assessment & Plan  Secondary to interstitial lung disease and copd/smoking hx  Optimize copd medications  symbicort bid  duonebs prn  spiriva  daily  Steroids for nsip      NSIP (nonspecific interstitial pneumonia) ([facility])  Assessment & Plan  Trial steroids  Recommendation is 1 mg/kg  Change taper to 60 mg daily for 30 days, then will need reevaluation in pulmonary clinic  Complicated by emphysema/copd changes  On 70 mg pred currently  Bactrim px  ppi px  S/p biopsy  Some granulation tissue of alveoli, possible component of sarcoid  May eventually need lung biopsy      Atrial fibrillation with rapid ventricular response (HCC)  Assessment & Plan  Rate controlled  Repeat ekg      Tobacco abuse- (present on admission)  Assessment & Plan  Counseled on avoiding especially in setting of interstitial lung disease    Hypothyroid- (present on admission)  Assessment & Plan  Levothyroxine      Chronic pain- (present on admission)  Assessment & Plan  Continue gabapentin regimen  Continue methadone regimen  Continue tramadol  Added prn oxycodone  Through the enteral tube  Dilaudid 1 mg IV every 2 hours as needed breakthrough pain      Ok for floor. Will have pulmonary follow along when leaves the ICU. Critical Care to sign off when he leaves the ICU.      VTE:  Lovenox  Ulcer: PPI  Lines: None     I have performed a physical exam and reviewed and updated ROS and Plan today (3/2/2020). In review of yesterday's note (3/1/2020), there are no changes except as documented above.     Discussed patient condition and risk of morbidity and/or mortality with Hospitalist, RN, RT, Therapies, Pharmacy, , Code status disscussed, Patient and Pulmonary ct surgery

## 2020-03-02 NOTE — CARE PLAN
Problem: Nutritional:  Goal: Achieve adequate nutritional intake  Description: Patient will consume >50% of meals   Outcome: NOT MET

## 2020-03-02 NOTE — PROGRESS NOTES
[facility] Medicine Daily Progress Note    Date of Service  3/2/2020    Chief Complaint  59 y.o. male admitted 2/18/2020 with SOB    Hospital Course    PMHx asthma, chronic back pain, COPD on 3 LNC at home, depression, hypothyroid, peripheral neuropathy.  Presented with several days of feeling ill and abd pain, N/V.  Hypoxic at 71% on RA on presentation and found to have spontaneous PNMTX and RLLL. After arrival here CT was dislodged and there for removed.   On 2/22 having increased work of breathing and noted to have tension PNMTX, chest tube was placed and pt intubated, and pt came to the ICU. Bronch and Bx 2/22 done for possible ILD.  While in ICU went into AFib RVR and started on amiodarone       Interval Problem Update  Chest tube cont's to be quite painful at the site of entry and also in his back around the shoulder blade area.  Worse with movement.    No air leak  No CT output    Consultants/Specialty  Gen Srgry    Code Status  full    Disposition  OK to floor    Review of Systems  Review of Systems   Constitutional: Negative for chills and fever.   HENT: Negative for nosebleeds and sore throat.    Eyes: Negative for blurred vision and double vision.   Respiratory: Positive for shortness of breath. Negative for cough.    Cardiovascular: Positive for chest pain. Negative for palpitations and leg swelling.   Gastrointestinal: Negative for abdominal pain, diarrhea, nausea and vomiting.   Genitourinary: Negative for dysuria and urgency.   Musculoskeletal: Negative for back pain.   Skin: Negative for rash.   Neurological: Positive for dizziness and weakness. Negative for loss of consciousness and headaches.        Physical Exam  Temp:  [37.1 °C (98.7 °F)-37.1 °C (98.8 °F)] 37.1 °C (98.7 °F)  Pulse:  [54-81] 65  Resp:  [8-22] 10  BP: ()/(63-79) 117/76  SpO2:  [0 %-100 %] 96 %    Physical Exam  Constitutional:       General: He is not in acute distress.     Appearance: He is well-developed. He is not  diaphoretic.   HENT:      Head: Normocephalic and atraumatic.   Eyes:      General: No scleral icterus.        Right eye: No discharge.         Left eye: No discharge.      Conjunctiva/sclera: Conjunctivae normal.   Neck:      Vascular: No JVD.   Cardiovascular:      Rate and Rhythm: Normal rate.      Heart sounds: No murmur. No gallop.    Pulmonary:      Effort: Pulmonary effort is normal. No respiratory distress.      Breath sounds: No stridor. Rhonchi present. No wheezing or rales.   Abdominal:      Palpations: Abdomen is soft.      Tenderness: There is no abdominal tenderness. There is no guarding or rebound.   Musculoskeletal:         General: No tenderness.      Right lower leg: Edema present.      Left lower leg: Edema present.   Skin:     General: Skin is warm and dry.      Findings: No rash.   Neurological:      General: No focal deficit present.      Mental Status: He is alert and oriented to person, place, and time.   Psychiatric:         Mood and Affect: Mood normal.         Thought Content: Thought content normal.         Fluids    Intake/Output Summary (Last 24 hours) at 3/2/2020 0530  Last data filed at 3/2/2020 0000  Gross per 24 hour   Intake 546.25 ml   Output 120 ml   Net 426.25 ml       Laboratory  Recent Labs     02/29/20  0414 03/01/20  0304 03/02/20  0257   WBC 28.9* 29.3* 25.1*   RBC 5.83 5.89 5.71   HEMOGLOBIN 15.3 15.5 14.9   HEMATOCRIT 47.4 48.4 48.3   MCV 81.3* 82.2 84.6   MCH 26.2* 26.3* 26.1*   MCHC 32.3* 32.0* 30.8*   RDW 45.1 45.6 46.6   PLATELETCT 488* 485* 473*   MPV 9.4 9.3 9.4     Recent Labs     02/29/20  0414 03/01/20  0304 03/02/20  0257   SODIUM 137 137 137   POTASSIUM 3.9 4.1 4.0   CHLORIDE 100 99 98   CO2 27 31 34*   GLUCOSE 127* 111* 106*   BUN 36* 39* 41*   CREATININE 0.83 0.86 1.01   CALCIUM 9.6 9.3 9.7                   Imaging  IR-MIDLINE CATHETER INSERTION WO GUIDANCE > AGE 3   Final Result                  Ultrasound-guided midline placement performed by qualified  nursing staff    as above.          DX-CHEST-LIMITED (1 VIEW)   Final Result         Tiny left lateral pneumothorax, similar to prior. Left apical chest tube is redemonstrated.      DX-CHEST-PORTABLE (1 VIEW)   Final Result      Improving pulmonary edema.      DX-CHEST-PORTABLE (1 VIEW)   Final Result         1.  Pulmonary edema and/or infiltrates are identified, which are stable since the prior exam.   2.  Trace left apical pneumothorax, new since prior study, thoracostomy tube remains in place.                        CT-CHEST (THORAX) W/O   Final Result      1.  Emphysema      2.  Subpleural bleb      3.  Bilateral lower lobe consolidation      4.  Assessment for interstitial lung disease is limited given the multiple superimposed processes      5.  Small left pneumothorax      6.  Dilated bowel identified in the upper abdomen               DX-CHEST-PORTABLE (1 VIEW)   Final Result         1.  Pulmonary edema and/or infiltrates are identified, which are stable since the prior exam.                     DX-CHEST-PORTABLE (1 VIEW)   Final Result         1.  Pulmonary edema and/or infiltrates are identified, which are stable since the prior exam.                  DX-CHEST-PORTABLE (1 VIEW)   Final Result         1.  Pulmonary edema and/or infiltrates are identified, which are stable since the prior exam.   2.  Small left apical recurrent pneumothorax, thoracostomy tube remains in place.               DX-CHEST-PORTABLE (1 VIEW)   Final Result         1.  Pulmonary edema and/or infiltrates are identified, which are stable since the prior exam.            DX-CHEST-PORTABLE (1 VIEW)   Final Result      Stable bilateral infiltrates, right greater than left.      DX-CHEST-PORTABLE (1 VIEW)   Final Result      Resolved small left apical pneumothorax      Otherwise stable chest with right hemidiaphragm elevation, reticular greater than consolidative opacity. Edema More likely than pneumonia      DX-CHEST-PORTABLE (1 VIEW)    Final Result      1.  Stable small left apical pneumothorax.      2.  Stable bilateral infiltrates.      DX-CHEST-LIMITED (1 VIEW)   Final Result      1.  No significant change      2.  Bilateral pulmonary airspace process      3.  Lines and tubes appear appropriately located      4.  Unchanged small left apical pneumothorax      DX-CHEST-PORTABLE (1 VIEW)   Final Result         1.  Pulmonary edema and/or infiltrates are identified, which are stable since the prior exam. Interval resolution of left pneumothorax status post thoracostomy tube placement.         DX-CHEST-PORTABLE (1 VIEW)   Final Result         1.  New large left pneumothorax with rightward shift of mediastinum and hyperexpansion of the left thorax, appearance compatible with tension pneumothorax.   2.  Hazy interstitial pulmonary infiltrates, similar to prior study given difference of technique.      These findings were discussed with the patient's clinician, Gypsy Mackey, on 2/21/2020 11:44 PM.      DX-ABDOMEN FOR TUBE PLACEMENT   Final Result         Feeding tube with tip projecting over the expected area of the stomach.      CT-CHEST (THORAX) W/O   Final Result      Decreased size of LEFT pneumothorax. LEFT chest tube placement deferred. Findings and decisions were discussed with Dr. Marck Javier via [employer] while the patient was on the table.            DX-CHEST-PORTABLE (1 VIEW)   Final Result      1.  interval placement of an endotracheal tube which terminates in satisfactory position at the level of the aortic arch.   2.  Interval insertion of a central venous catheter which terminates with the tip projecting over the expected region of the mid to distal superior vena cava.   3.  Previously identified trace left pneumothorax is no longer visualized.   4.  Increased diffuse, bilateral, right greater than left interstitial and airspace opacities.      DX-CHEST-PORTABLE (1 VIEW)   Final Result         1.  Pulmonary edema and/or  infiltrates are identified, which are stable since the prior exam.   2.  Trace left pneumothorax, decreased since prior study      CT-CHEST, HIGH RESOLUTION LUNG   Final Result      1.  Emphysematous changes again seen, similar to prior exam.   2.  Plan loss of RIGHT hemithorax with diffuse interstitial opacities suggesting pneumonitis/pneumonia.   3.  Multifocal ill-defined groundglass opacities in the LEFT upper and lower lobes also suggesting multifocal pneumonitis.   4.  Small LEFT anterior pneumothorax as seen on prior chest x-ray.   5.  Mild mediastinal adenopathy, nonspecific.               DX-CHEST-PORTABLE (1 VIEW)   Final Result         1.  Pulmonary edema and/or infiltrates are identified, which are stable since the prior exam.   2.  Small left pneumothorax, decreased since prior study      DX-CHEST-2 VIEWS   Final Result      1.  Unchanged small LEFT pneumothorax   2.  Unchanged BILATERAL interstitial and airspace disease and atelectasis      DX-CHEST-LIMITED (1 VIEW)   Final Result      Stable small left pneumothorax. Interval removal of left chest tube.      DX-CHEST-LIMITED (1 VIEW)   Final Result      1.  Left chest tube tip at the origin of the left hemithorax. Advancement is suggested. Stable small left pneumothorax.   2.  Interstitial and airspace opacities, worse on the right which could represent asymmetric edema or infection.      These findings were discussed with nurse Hung on 2/18/2020 9:16 AM.      OUTSIDE IMAGES-DX CHEST   Final Result      OUTSIDE IMAGES-DX CHEST   Final Result           Assessment/Plan  Acute on chronic respiratory failure with hypoxia (HCC)- (present on admission)  Assessment & Plan  ILD, now treated CAP, PNMTx  Mobilize  pleruritic pain from CT is limiting resp effort: cont methadone, ptdeclines escalation in his narcotics    Hypoxia  Assessment & Plan  As a result from COPD, pneumothorax, pneumonia    Respiratory failure (HCC)- (present on admission)  Assessment &  Plan  Secondary to COPD, emphysema and concomitant infection with pneumothorax  Status post chest tube placement initially for spontaneous pneumothorax  Worsening status with multifactorial etiology  Upgraded to ICU status, intubation, further investigation    Pneumonia- (present on admission)  Assessment & Plan  Now s/p completed course of Abx's    Pneumothorax  Assessment & Plan  L chest tube  CT surgery managing  Daily CXR    QT prolongation- (present on admission)  Assessment & Plan  History of, currently with some significant bigeminy  Continue with cardiac monitoring  On methadone    Chronic neck pain- (present on admission)  Assessment & Plan  Back on his home regimen of methadone 30mg BID and 10mg qhs  Pt has declined further narcotics    Pulmonary hypertension (HCC)- (present on admission)  Assessment & Plan  History of  Stable presently on no medications    Atrial fibrillation with rapid ventricular response (HCC)  Assessment & Plan  Has converted back to sinus  Cont Tele    Tobacco abuse- (present on admission)  Assessment & Plan  History of extensive use, the patient quit in 2015    Hypothyroid- (present on admission)  Assessment & Plan  Continue with replacement    Chronic pain- (present on admission)  Assessment & Plan  High-dose methadone as well as Neurontin       VTE prophylaxis: enoxaparin

## 2020-03-02 NOTE — ASSESSMENT & PLAN NOTE
On methadone  Avoid over sedation with narcotics  Oxycodone prn  Dilaudid prn  Gabapentin scheduled

## 2020-03-02 NOTE — THERAPY
"Speech Language Therapy dysphagia treatment completed.   Functional Status: Patient currently on Level 3/Level 2 (NTFL) diet with 1:1 supervision and per RN and patient, patient strongly dislikes current diet and is only eating pudding cups. Patient still with Cortrak in place for supplemental nutrition. Patient awake, alert, and motivated for upgraded trials. Patient with strong, clear vocal quality. Patient consumed PO trials of mildly thick liquids via cup sip, pudding, 4 oz. soft solids, and thins via cup sip. Patient consumed PO trials of thins with coughing on approximately 50% of trials, which is concerning for penetration/aspiration. Patient had wet/gurgly vocal quality x1 on mildly thick liquids via cup sip, but this cleared with verbal cues for strong throat clear. No other overt s/sx of aspiration were noted on any other consistencies trialed. No fatigue was noted. At this time, recommend patient upgrade to Level 5/Level 2 liquids (Dys2/NTL) with intermittent supervision. Float meds whole in puree. No straws. RN aware. SLP is following.     Recommendations: At this time, recommend patient upgrade to Level 5/Level 2 liquids (Dys2/NTL) with intermittent supervision. Float meds whole in puree. No straws. Please encourage PO intake.   Plan of Care: Will benefit from Speech Therapy 3 times per week  Post-Acute Therapy: Recommend inpatient transitional care services for continued speech therapy services.    See \"Rehab Therapy-Acute\" Patient Summary Report for complete documentation.     "

## 2020-03-02 NOTE — WOUND TEAM
[facility]  Inpatient Services  Initial Wound and Skin Care Evaluation    Admission Date: 2/18/2020     Last order of IP CONSULT TO WOUND CARE was found on 2/26/2020 from Hospital Encounter on 2/18/2020       HPI, PMH, SH: Reviewed    Unit where seen by Wound Team: [id number]     WOUND CONSULT RELATED TO:  coccyx     Self Report / Pain Level:  C/o pain L chest with turning      OBJECTIVE: on JESSE bed, heels floated off pillows, pt able to assist with turn    WOUND TYPE, LOCATION, CHARACTERISTICS (Pressure Injuries: location, stage, POA or date identified)  Wound 02/26/20 Other (comment) Coccyx Midline fissure (Active)      2/26/2020 12:00 PM   Site Assessment Pink    Periwound Assessment Intact;Pink    Margins Defined edges    Closure Open to air    Drainage Amount None    Wound Cleansing Soap and Water    Periwound Protectant Barrier Paste    Dressing Options Open to Air    NEXT Weekly Photo (Inpatient Only) 03/04/20    Non-staged Wound Description Partial thickness    Wound Length (cm) 3 cm 3/1/2020 10:30 AM   Wound Width (cm) 0.3 cm 3/1/2020 10:30 AM   Wound Depth (cm) 0.1 cm 3/1/2020 10:30 AM   Wound Surface Area (cm^2) 0.9 cm^2 3/1/2020 10:30 AM   Wound Volume (cm^3) 0.09 cm^3 3/1/2020 10:30 AM   Tunneling (cm) 0 cm 3/1/2020 10:30 AM   Undermining (cm) 0 cm 3/1/2020 10:30 AM   Shape linear    Wound Odor None    Exposed Structures None    Number of days: 4      Vascular:    LALA:   No results found.      Lab Values:    Lab Results   Component Value Date/Time    WBC 29.3 (H) 03/01/2020 03:04 AM    RBC 5.89 03/01/2020 03:04 AM    HEMOGLOBIN 15.5 03/01/2020 03:04 AM    HEMATOCRIT 48.4 03/01/2020 03:04 AM    CREACTPROT 5.88 (H) 02/23/2020 04:15 AM    SEDRATEWES 29 (H) 01/03/2015 04:42 PM          Culture: na  Culture Results show:  No results found for this or any previous visit (from the past 720 hour(s)).      INTERVENTIONS BY WOUND TEAM: pt turned to R side, assessed wound. Cleaned with NS, dried.  Discussed fissure with pt, he verbalized understanding.  Pt then repositioned.     Interdisciplinary consultation: Patient, Bedside RN     EVALUATION: pt has moisture fissure sacrococcygeal area, he was able to turn when asked. Barrier paste ordered.     Goals: Steady decrease in wound area and depth weekly.    NURSING PLAN OF CARE ORDERS (X):    Dressing changes: See Dressing Care orders:  Skin care: See Skin Care orders: x  Rectal tube care: See Rectal Tube Care orders:   Other orders:    RSKIN:   CURRENTLY IN PLACE (X), APPLIED THIS VISIT (A), ORDERED (O):   Q shift Chris:  x  Q shift pressure point assessments:  x  Pressure redistribution mattress            Low Airloss  x        Bariatric JESSE         Bariatric foam           Heel float boots     Heel Silicone dressing        Float Heels off Bed with Pillows   x            Barrier wipes         Barrier Cream         Barrier paste   o       Sacral silicone dressing         Silicone O2 tubing   x      Anchorfast         Cannula fixation Device (Tender )          Gray Foam Ear protectors           Trach with Optifoam split foam                 Waffle cushion        Waffle Overlay         Rectal tube or BMS    Purwick/Condom Cath          Antifungal tx      Interdry          Reposition q 2 hours   x   Remind pt  Up to chair        Ambulate      PT/OT        Dietician        Diabetes Education      PO     TF  x   TPN     NPO   # days   Other        WOUND TEAM PLAN OF CARE   Dressing changes by wound team:          Follow up 1-2 times weekly:               Follow up 3 times weekly:                NPWT change 3 times weekly:     Follow up as needed:  If wound worsens     Other (explain):     Anticipated discharge plans:  LTACH:        SNF/Rehab:                  Home Care:           Outpatient Wound Center:            Self Care:            Other: Unsure of needs @ this time

## 2020-03-02 NOTE — PROGRESS NOTES
Hospitalist, Dr. Watson, notified about patient's refusal to be straight-cathed. Bladder scan indicates 415 mls. She has no concerns at this time but would like him to be re-scanned at 5 am and if above 700 to encourage patient to allow the straight cath.

## 2020-03-02 NOTE — ASSESSMENT & PLAN NOTE
Trial steroids  Recommendation is 1 mg/kg  Change taper to 60 mg daily for 30 days, then will need reevaluation in pulmonary clinic  Complicated by emphysema/copd changes  On 70 mg pred currently  Bactrim px  ppi px  S/p biopsy  Some granulation tissue of alveoli, possible component of sarcoid  May eventually need lung biopsy  Thoracic surgery following  Complicating removal of chest tube

## 2020-03-02 NOTE — ASSESSMENT & PLAN NOTE
Improved. [id number] score zero? TSH normal  - No need for anticoagulation at this time with pneumothorax  - Increased cardizam

## 2020-03-02 NOTE — CARE PLAN
Problem: Hyperinflation:  Goal: Prevent or improve atelectasis  Outcome: MET     IS to Nursing   Pt achieves 2000ml

## 2020-03-02 NOTE — ASSESSMENT & PLAN NOTE
- s/p left chest tube, multiple tension pneumothoraces when chest tube clamped  - CT surgery evaluated, Dr. Ganser trying to avoid surgery  - monitor clinically

## 2020-03-03 ENCOUNTER — APPOINTMENT (OUTPATIENT)
Dept: RADIOLOGY | Facility: MEDICAL CENTER | Age: 60
DRG: 166 | End: 2020-03-03
Attending: INTERNAL MEDICINE
Payer: MEDICARE

## 2020-03-03 PROBLEM — J43.9 PULMONARY EMPHYSEMA (HCC): Status: ACTIVE | Noted: 2020-03-03

## 2020-03-03 LAB
ANION GAP SERPL CALC-SCNC: 2 MMOL/L (ref 0–11.9)
BASOPHILS # BLD AUTO: 1.7 % (ref 0–1.8)
BASOPHILS # BLD: 0.38 K/UL (ref 0–0.12)
BUN SERPL-MCNC: 35 MG/DL (ref 8–22)
CALCIUM SERPL-MCNC: 9.3 MG/DL (ref 8.5–10.5)
CHLORIDE SERPL-SCNC: 99 MMOL/L (ref 96–112)
CO2 SERPL-SCNC: 37 MMOL/L (ref 20–33)
CREAT SERPL-MCNC: 1.05 MG/DL (ref 0.5–1.4)
EOSINOPHIL # BLD AUTO: 1.2 K/UL (ref 0–0.51)
EOSINOPHIL NFR BLD: 5.3 % (ref 0–6.9)
ERYTHROCYTE [DISTWIDTH] IN BLOOD BY AUTOMATED COUNT: 47.8 FL (ref 35.9–50)
GLUCOSE SERPL-MCNC: 82 MG/DL (ref 65–99)
HCT VFR BLD AUTO: 44.3 % (ref 42–52)
HGB BLD-MCNC: 13.7 G/DL (ref 14–18)
HYPOCHROMIA BLD QL SMEAR: ABNORMAL
LYMPHOCYTES # BLD AUTO: 4.36 K/UL (ref 1–4.8)
LYMPHOCYTES NFR BLD: 19.3 % (ref 22–41)
MANUAL DIFF BLD: NORMAL
MCH RBC QN AUTO: 26.3 PG (ref 27–33)
MCHC RBC AUTO-ENTMCNC: 30.9 G/DL (ref 33.7–35.3)
MCV RBC AUTO: 85 FL (ref 81.4–97.8)
MONOCYTES # BLD AUTO: 1.2 K/UL (ref 0–0.85)
MONOCYTES NFR BLD AUTO: 5.3 % (ref 0–13.4)
MORPHOLOGY BLD-IMP: NORMAL
NEUTROPHILS # BLD AUTO: 15.46 K/UL (ref 1.82–7.42)
NEUTROPHILS NFR BLD: 67.5 % (ref 44–72)
NEUTS BAND NFR BLD MANUAL: 0.9 % (ref 0–10)
NRBC # BLD AUTO: 0 K/UL
NRBC BLD-RTO: 0 /100 WBC
PLATELET # BLD AUTO: 439 K/UL (ref 164–446)
PLATELET BLD QL SMEAR: NORMAL
PMV BLD AUTO: 10 FL (ref 9–12.9)
POTASSIUM SERPL-SCNC: 4.4 MMOL/L (ref 3.6–5.5)
RBC # BLD AUTO: 5.21 M/UL (ref 4.7–6.1)
RBC BLD AUTO: PRESENT
SODIUM SERPL-SCNC: 138 MMOL/L (ref 135–145)
WBC # BLD AUTO: 22.6 K/UL (ref 4.8–10.8)

## 2020-03-03 PROCEDURE — 700102 HCHG RX REV CODE 250 W/ 637 OVERRIDE(OP): Performed by: PSYCHIATRY & NEUROLOGY

## 2020-03-03 PROCEDURE — 700102 HCHG RX REV CODE 250 W/ 637 OVERRIDE(OP): Performed by: HOSPITALIST

## 2020-03-03 PROCEDURE — 700102 HCHG RX REV CODE 250 W/ 637 OVERRIDE(OP): Performed by: INTERNAL MEDICINE

## 2020-03-03 PROCEDURE — A9270 NON-COVERED ITEM OR SERVICE: HCPCS | Performed by: INTERNAL MEDICINE

## 2020-03-03 PROCEDURE — A9270 NON-COVERED ITEM OR SERVICE: HCPCS | Performed by: PSYCHIATRY & NEUROLOGY

## 2020-03-03 PROCEDURE — 700111 HCHG RX REV CODE 636 W/ 250 OVERRIDE (IP): Performed by: PSYCHIATRY & NEUROLOGY

## 2020-03-03 PROCEDURE — 770020 HCHG ROOM/CARE - TELE (206)

## 2020-03-03 PROCEDURE — 700111 HCHG RX REV CODE 636 W/ 250 OVERRIDE (IP): Performed by: INTERNAL MEDICINE

## 2020-03-03 PROCEDURE — 71045 X-RAY EXAM CHEST 1 VIEW: CPT

## 2020-03-03 PROCEDURE — A9270 NON-COVERED ITEM OR SERVICE: HCPCS | Performed by: HOSPITALIST

## 2020-03-03 PROCEDURE — 80048 BASIC METABOLIC PNL TOTAL CA: CPT

## 2020-03-03 PROCEDURE — 85007 BL SMEAR W/DIFF WBC COUNT: CPT

## 2020-03-03 PROCEDURE — 99233 SBSQ HOSP IP/OBS HIGH 50: CPT | Performed by: INTERNAL MEDICINE

## 2020-03-03 PROCEDURE — 85027 COMPLETE CBC AUTOMATED: CPT

## 2020-03-03 PROCEDURE — 99233 SBSQ HOSP IP/OBS HIGH 50: CPT | Performed by: HOSPITALIST

## 2020-03-03 RX ORDER — METHADONE HYDROCHLORIDE 10 MG/1
10 TABLET ORAL
Status: DISCONTINUED | OUTPATIENT
Start: 2020-03-03 | End: 2020-03-11 | Stop reason: HOSPADM

## 2020-03-03 RX ORDER — ALPRAZOLAM 0.25 MG/1
0.25 TABLET ORAL 4 TIMES DAILY PRN
Status: DISCONTINUED | OUTPATIENT
Start: 2020-03-03 | End: 2020-03-07

## 2020-03-03 RX ORDER — PROMETHAZINE HYDROCHLORIDE 25 MG/1
12.5-25 TABLET ORAL EVERY 4 HOURS PRN
Status: DISCONTINUED | OUTPATIENT
Start: 2020-03-03 | End: 2020-03-11 | Stop reason: HOSPADM

## 2020-03-03 RX ORDER — TAMSULOSIN HYDROCHLORIDE 0.4 MG/1
0.4 CAPSULE ORAL
Status: DISCONTINUED | OUTPATIENT
Start: 2020-03-03 | End: 2020-03-11 | Stop reason: HOSPADM

## 2020-03-03 RX ORDER — ACETAMINOPHEN 325 MG/1
650 TABLET ORAL EVERY 6 HOURS PRN
Status: DISCONTINUED | OUTPATIENT
Start: 2020-03-03 | End: 2020-03-11 | Stop reason: HOSPADM

## 2020-03-03 RX ORDER — LEVOTHYROXINE SODIUM 0.12 MG/1
125 TABLET ORAL
Status: DISCONTINUED | OUTPATIENT
Start: 2020-03-04 | End: 2020-03-11 | Stop reason: HOSPADM

## 2020-03-03 RX ORDER — OXYCODONE HYDROCHLORIDE 5 MG/1
5 TABLET ORAL EVERY 4 HOURS PRN
Status: DISCONTINUED | OUTPATIENT
Start: 2020-03-03 | End: 2020-03-11 | Stop reason: HOSPADM

## 2020-03-03 RX ORDER — OXYCODONE HYDROCHLORIDE 10 MG/1
10 TABLET ORAL EVERY 4 HOURS PRN
Status: DISCONTINUED | OUTPATIENT
Start: 2020-03-03 | End: 2020-03-11 | Stop reason: HOSPADM

## 2020-03-03 RX ORDER — POLYETHYLENE GLYCOL 3350 17 G/17G
1 POWDER, FOR SOLUTION ORAL 2 TIMES DAILY
Status: DISCONTINUED | OUTPATIENT
Start: 2020-03-03 | End: 2020-03-11 | Stop reason: HOSPADM

## 2020-03-03 RX ORDER — OMEPRAZOLE 20 MG/1
20 CAPSULE, DELAYED RELEASE ORAL DAILY
Status: DISCONTINUED | OUTPATIENT
Start: 2020-03-04 | End: 2020-03-11 | Stop reason: HOSPADM

## 2020-03-03 RX ORDER — BISACODYL 10 MG
10 SUPPOSITORY, RECTAL RECTAL
Status: DISCONTINUED | OUTPATIENT
Start: 2020-03-03 | End: 2020-03-11 | Stop reason: HOSPADM

## 2020-03-03 RX ORDER — SIMVASTATIN 40 MG
40 TABLET ORAL NIGHTLY
Status: DISCONTINUED | OUTPATIENT
Start: 2020-03-03 | End: 2020-03-11 | Stop reason: HOSPADM

## 2020-03-03 RX ORDER — AMOXICILLIN 250 MG
2 CAPSULE ORAL 2 TIMES DAILY
Status: DISCONTINUED | OUTPATIENT
Start: 2020-03-03 | End: 2020-03-11 | Stop reason: HOSPADM

## 2020-03-03 RX ORDER — TRAMADOL HYDROCHLORIDE 50 MG/1
50 TABLET ORAL 2 TIMES DAILY
Status: DISCONTINUED | OUTPATIENT
Start: 2020-03-03 | End: 2020-03-11 | Stop reason: HOSPADM

## 2020-03-03 RX ORDER — GABAPENTIN 400 MG/1
1600 CAPSULE ORAL EVERY EVENING
Status: DISCONTINUED | OUTPATIENT
Start: 2020-03-03 | End: 2020-03-11 | Stop reason: HOSPADM

## 2020-03-03 RX ORDER — METHADONE HYDROCHLORIDE 10 MG/1
30 TABLET ORAL 2 TIMES DAILY
Status: DISCONTINUED | OUTPATIENT
Start: 2020-03-03 | End: 2020-03-11 | Stop reason: HOSPADM

## 2020-03-03 RX ORDER — GABAPENTIN 400 MG/1
2400 CAPSULE ORAL EVERY MORNING
Status: DISCONTINUED | OUTPATIENT
Start: 2020-03-04 | End: 2020-03-11 | Stop reason: HOSPADM

## 2020-03-03 RX ORDER — METHADONE HYDROCHLORIDE 10 MG/1
10 TABLET ORAL ONCE
Status: COMPLETED | OUTPATIENT
Start: 2020-03-03 | End: 2020-03-03

## 2020-03-03 RX ORDER — GUAIFENESIN/DEXTROMETHORPHAN 100-10MG/5
10 SYRUP ORAL EVERY 6 HOURS PRN
Status: DISCONTINUED | OUTPATIENT
Start: 2020-03-03 | End: 2020-03-11 | Stop reason: HOSPADM

## 2020-03-03 RX ADMIN — LEVOTHYROXINE SODIUM 125 MCG: 125 TABLET ORAL at 06:18

## 2020-03-03 RX ADMIN — GABAPENTIN 2400 MG: 400 CAPSULE ORAL at 06:17

## 2020-03-03 RX ADMIN — ENOXAPARIN SODIUM 40 MG: 100 INJECTION SUBCUTANEOUS at 06:16

## 2020-03-03 RX ADMIN — HYDROMORPHONE HYDROCHLORIDE 1 MG: 1 INJECTION, SOLUTION INTRAMUSCULAR; INTRAVENOUS; SUBCUTANEOUS at 03:55

## 2020-03-03 RX ADMIN — ALPRAZOLAM 0.25 MG: 0.25 TABLET ORAL at 08:58

## 2020-03-03 RX ADMIN — METHADONE HYDROCHLORIDE 10 MG: 10 TABLET ORAL at 21:08

## 2020-03-03 RX ADMIN — SIMVASTATIN 40 MG: 40 TABLET, FILM COATED ORAL at 21:08

## 2020-03-03 RX ADMIN — OXYCODONE HYDROCHLORIDE 10 MG: 10 TABLET ORAL at 23:54

## 2020-03-03 RX ADMIN — OXYCODONE HYDROCHLORIDE 5 MG: 5 TABLET ORAL at 08:58

## 2020-03-03 RX ADMIN — TRAMADOL HYDROCHLORIDE 50 MG: 50 TABLET, FILM COATED ORAL at 06:17

## 2020-03-03 RX ADMIN — TIMOLOL MALEATE 1 DROP: 5 SOLUTION OPHTHALMIC at 17:37

## 2020-03-03 RX ADMIN — GABAPENTIN 1600 MG: 400 CAPSULE ORAL at 17:36

## 2020-03-03 RX ADMIN — OXYCODONE HYDROCHLORIDE 10 MG: 10 TABLET ORAL at 01:32

## 2020-03-03 RX ADMIN — HYDROMORPHONE HYDROCHLORIDE 1 MG: 1 INJECTION, SOLUTION INTRAMUSCULAR; INTRAVENOUS; SUBCUTANEOUS at 11:09

## 2020-03-03 RX ADMIN — METHADONE HYDROCHLORIDE 30 MG: 10 TABLET ORAL at 14:37

## 2020-03-03 RX ADMIN — OXYCODONE HYDROCHLORIDE 10 MG: 10 TABLET ORAL at 17:42

## 2020-03-03 RX ADMIN — METHADONE HYDROCHLORIDE 10 MG: 10 TABLET ORAL at 06:35

## 2020-03-03 RX ADMIN — DILTIAZEM HYDROCHLORIDE 30 MG: 30 TABLET, FILM COATED ORAL at 17:36

## 2020-03-03 RX ADMIN — ALPRAZOLAM 0.25 MG: 0.25 TABLET ORAL at 23:54

## 2020-03-03 RX ADMIN — TAMSULOSIN HYDROCHLORIDE 0.4 MG: 0.4 CAPSULE ORAL at 13:30

## 2020-03-03 RX ADMIN — ALPRAZOLAM 0.25 MG: 0.25 TABLET ORAL at 17:36

## 2020-03-03 RX ADMIN — METHADONE HYDROCHLORIDE 20 MG: 10 TABLET ORAL at 06:17

## 2020-03-03 RX ADMIN — PREDNISONE 60 MG: 20 TABLET ORAL at 06:17

## 2020-03-03 RX ADMIN — TRAMADOL HYDROCHLORIDE 50 MG: 50 TABLET, FILM COATED ORAL at 17:37

## 2020-03-03 ASSESSMENT — ENCOUNTER SYMPTOMS
NERVOUS/ANXIOUS: 0
FOCAL WEAKNESS: 0
DOUBLE VISION: 0
SORE THROAT: 0
WEIGHT LOSS: 0
BRUISES/BLEEDS EASILY: 0
NECK PAIN: 0
SHORTNESS OF BREATH: 1
PSYCHIATRIC NEGATIVE: 1
WHEEZING: 0
VOMITING: 0
NAUSEA: 0
PHOTOPHOBIA: 0
SHORTNESS OF BREATH: 0
DEPRESSION: 0
ABDOMINAL PAIN: 0
COUGH: 0
BACK PAIN: 0
SPUTUM PRODUCTION: 0
DIZZINESS: 0
HEADACHES: 0
BLURRED VISION: 0
HEMOPTYSIS: 0
MUSCULOSKELETAL NEGATIVE: 1
WEAKNESS: 1
TINGLING: 0
PALPITATIONS: 0
DIAPHORESIS: 0
MYALGIAS: 0
BLOOD IN STOOL: 0
SPEECH CHANGE: 0
CHILLS: 0
HEARTBURN: 0
STRIDOR: 0
SINUS PAIN: 0
POLYDIPSIA: 0
SENSORY CHANGE: 0
FEVER: 0

## 2020-03-03 NOTE — ASSESSMENT & PLAN NOTE
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

## 2020-03-03 NOTE — PROGRESS NOTES
Progress Note:    S: Stable overnight  Pain controlled    O:  Recent Labs     03/01/20  0304 03/02/20 0257 03/03/20  0400   WBC 29.3* 25.1* 22.6*   RBC 5.89 5.71 5.21   HEMOGLOBIN 15.5 14.9 13.7*   HEMATOCRIT 48.4 48.3 44.3   MCV 82.2 84.6 85.0   MCH 26.3* 26.1* 26.3*   MCHC 32.0* 30.8* 30.9*   RDW 45.6 46.6 47.8   PLATELETCT 485* 473* 439   MPV 9.3 9.4 10.0     Recent Labs     03/01/20 0304 03/02/20 0257 03/03/20  0400   SODIUM 137 137 138   POTASSIUM 4.1 4.0 4.4   CHLORIDE 99 98 99   CO2 31 34* 37*   GLUCOSE 111* 106* 82   BUN 39* 41* 35*   CREATININE 0.86 1.01 1.05   CALCIUM 9.3 9.7 9.3         Current Facility-Administered Medications   Medication Dose   • [START ON 3/4/2020] omeprazole (PRILOSEC) capsule 20 mg  20 mg   • acetaminophen (TYLENOL) tablet 650 mg  650 mg   • methadone (DOLOPHINE) tablet 30 mg  30 mg    And   • methadone (DOLOPHINE) tablet 10 mg  10 mg   • oxyCODONE immediate-release (ROXICODONE) tablet 5 mg  5 mg    Or   • oxyCODONE immediate release (ROXICODONE) tablet 10 mg  10 mg   • tramadol (ULTRAM) 50 MG tablet 50 mg  50 mg   • simvastatin (ZOCOR) tablet 40 mg  40 mg   • ALPRAZolam (XANAX) tablet 0.25 mg  0.25 mg   • [START ON 3/4/2020] levothyroxine (SYNTHROID) tablet 125 mcg  125 mcg   • [START ON 3/4/2020] predniSONE (DELTASONE) tablet 60 mg  60 mg   • senna-docusate (PERICOLACE or SENOKOT S) 8.6-50 MG per tablet 2 Tab  2 Tab    And   • polyethylene glycol/lytes (MIRALAX) PACKET 1 Packet  1 Packet    And   • magnesium hydroxide (MILK OF MAGNESIA) suspension 30 mL  30 mL    And   • bisacodyl (DULCOLAX) suppository 10 mg  10 mg   • [START ON 3/4/2020] gabapentin (NEURONTIN) capsule 2,400 mg  2,400 mg    And   • gabapentin (NEURONTIN) capsule 1,600 mg  1,600 mg   • guaiFENesin dextromethorphan (ROBITUSSIN DM) 100-10 MG/5ML syrup 10 mL  10 mL   • promethazine (PHENERGAN) tablet 12.5-25 mg  12.5-25 mg   • tamsulosin (FLOMAX) capsule 0.4 mg  0.4 mg   • DILTIAZem (CARDIZEM) tablet 30 mg  30 mg  "  • DILTIAZem (CARDIZEM) injection 18.4 mg  0.25 mg/kg   • HYDROmorphone pf (DILAUDID) injection 1 mg  1 mg   • enoxaparin (LOVENOX) inj 40 mg  40 mg   • ipratropium-albuterol (DUONEB) nebulizer solution  3 mL   • timolol (TIMOPTIC) 0.5 % ophthalmic solution 1 Drop  1 Drop   • Respiratory Therapy Consult     • promethazine (PHENERGAN) suppository 12.5-25 mg  12.5-25 mg   • prochlorperazine (COMPAZINE) injection 5-10 mg  5-10 mg   • albuterol (PROVENTIL) 2.5mg/0.5ml nebulizer solution 2.5 mg  2.5 mg       PE:  /57   Pulse 87   Temp 36.6 °C (97.8 °F) (Temporal)   Resp (!) 52   Ht 1.854 m (6' 0.99\")   Wt 73.5 kg (162 lb 0.6 oz)   SpO2 93%     Intake/Output Summary (Last 24 hours) at 3/3/2020 1504  Last data filed at 3/3/2020 1400  Gross per 24 hour   Intake 75 ml   Output 1690 ml   Net -1615 ml       Chest tube: small air leak    Rads:  DX-CHEST-PORTABLE (1 VIEW)   Final Result      Stable chest appearance compared with 3/2. Previous tiny left apical pneumothorax however is not seen today.      DX-CHEST-LIMITED (1 VIEW)   Final Result         Near complete resolution of left-sided pneumothorax.      DX-CHEST-PORTABLE (1 VIEW)   Final Result      1.  Interval increase in size of left pneumothorax, with left chest tube in place.   2.  Removal of right IJ central venous catheter.      IR-MIDLINE CATHETER INSERTION WO GUIDANCE > AGE 3   Final Result                  Ultrasound-guided midline placement performed by qualified nursing staff    as above.          DX-CHEST-LIMITED (1 VIEW)   Final Result         Tiny left lateral pneumothorax, similar to prior. Left apical chest tube is redemonstrated.      DX-CHEST-PORTABLE (1 VIEW)   Final Result      Improving pulmonary edema.      DX-CHEST-PORTABLE (1 VIEW)   Final Result         1.  Pulmonary edema and/or infiltrates are identified, which are stable since the prior exam.   2.  Trace left apical pneumothorax, new since prior study, thoracostomy tube remains in " place.                        CT-CHEST (THORAX) W/O   Final Result      1.  Emphysema      2.  Subpleural bleb      3.  Bilateral lower lobe consolidation      4.  Assessment for interstitial lung disease is limited given the multiple superimposed processes      5.  Small left pneumothorax      6.  Dilated bowel identified in the upper abdomen               DX-CHEST-PORTABLE (1 VIEW)   Final Result         1.  Pulmonary edema and/or infiltrates are identified, which are stable since the prior exam.                     DX-CHEST-PORTABLE (1 VIEW)   Final Result         1.  Pulmonary edema and/or infiltrates are identified, which are stable since the prior exam.                  DX-CHEST-PORTABLE (1 VIEW)   Final Result         1.  Pulmonary edema and/or infiltrates are identified, which are stable since the prior exam.   2.  Small left apical recurrent pneumothorax, thoracostomy tube remains in place.               DX-CHEST-PORTABLE (1 VIEW)   Final Result         1.  Pulmonary edema and/or infiltrates are identified, which are stable since the prior exam.            DX-CHEST-PORTABLE (1 VIEW)   Final Result      Stable bilateral infiltrates, right greater than left.      DX-CHEST-PORTABLE (1 VIEW)   Final Result      Resolved small left apical pneumothorax      Otherwise stable chest with right hemidiaphragm elevation, reticular greater than consolidative opacity. Edema More likely than pneumonia      DX-CHEST-PORTABLE (1 VIEW)   Final Result      1.  Stable small left apical pneumothorax.      2.  Stable bilateral infiltrates.      DX-CHEST-LIMITED (1 VIEW)   Final Result      1.  No significant change      2.  Bilateral pulmonary airspace process      3.  Lines and tubes appear appropriately located      4.  Unchanged small left apical pneumothorax      DX-CHEST-PORTABLE (1 VIEW)   Final Result         1.  Pulmonary edema and/or infiltrates are identified, which are stable since the prior exam. Interval resolution  of left pneumothorax status post thoracostomy tube placement.         DX-CHEST-PORTABLE (1 VIEW)   Final Result         1.  New large left pneumothorax with rightward shift of mediastinum and hyperexpansion of the left thorax, appearance compatible with tension pneumothorax.   2.  Hazy interstitial pulmonary infiltrates, similar to prior study given difference of technique.      These findings were discussed with the patient's clinician, Gypsy Mackey, on 2/21/2020 11:44 PM.      DX-ABDOMEN FOR TUBE PLACEMENT   Final Result         Feeding tube with tip projecting over the expected area of the stomach.      CT-CHEST (THORAX) W/O   Final Result      Decreased size of LEFT pneumothorax. LEFT chest tube placement deferred. Findings and decisions were discussed with Dr. Marck Javier via [employer] while the patient was on the table.            DX-CHEST-PORTABLE (1 VIEW)   Final Result      1.  interval placement of an endotracheal tube which terminates in satisfactory position at the level of the aortic arch.   2.  Interval insertion of a central venous catheter which terminates with the tip projecting over the expected region of the mid to distal superior vena cava.   3.  Previously identified trace left pneumothorax is no longer visualized.   4.  Increased diffuse, bilateral, right greater than left interstitial and airspace opacities.      DX-CHEST-PORTABLE (1 VIEW)   Final Result         1.  Pulmonary edema and/or infiltrates are identified, which are stable since the prior exam.   2.  Trace left pneumothorax, decreased since prior study      CT-CHEST, HIGH RESOLUTION LUNG   Final Result      1.  Emphysematous changes again seen, similar to prior exam.   2.  Plan loss of RIGHT hemithorax with diffuse interstitial opacities suggesting pneumonitis/pneumonia.   3.  Multifocal ill-defined groundglass opacities in the LEFT upper and lower lobes also suggesting multifocal pneumonitis.   4.  Small LEFT anterior  pneumothorax as seen on prior chest x-ray.   5.  Mild mediastinal adenopathy, nonspecific.               DX-CHEST-PORTABLE (1 VIEW)   Final Result         1.  Pulmonary edema and/or infiltrates are identified, which are stable since the prior exam.   2.  Small left pneumothorax, decreased since prior study      DX-CHEST-2 VIEWS   Final Result      1.  Unchanged small LEFT pneumothorax   2.  Unchanged BILATERAL interstitial and airspace disease and atelectasis      DX-CHEST-LIMITED (1 VIEW)   Final Result      Stable small left pneumothorax. Interval removal of left chest tube.      DX-CHEST-LIMITED (1 VIEW)   Final Result      1.  Left chest tube tip at the origin of the left hemithorax. Advancement is suggested. Stable small left pneumothorax.   2.  Interstitial and airspace opacities, worse on the right which could represent asymmetric edema or infection.      These findings were discussed with nurse Hung on 2/18/2020 9:16 AM.      OUTSIDE IMAGES-DX CHEST   Final Result      OUTSIDE IMAGES-DX CHEST   Final Result          A:   Active Hospital Problems    Diagnosis   • Acute on chronic respiratory failure with hypoxia (HCC) [J96.21]     Priority: High   • Pneumothorax [J93.9]     Priority: Medium   • Interstitial lung disease (HCC) [J84.9]     Priority: Medium   • QT prolongation [R94.31]     Priority: Medium   • Chronic neck pain [M54.2, G89.29]     Priority: Medium     Premorbid treated with methadone, Xanax and Neurontin.  Satisfactory analgesia with current PCA settings.  Resume maintenance medications when tolerating diet     • Pulmonary hypertension (HCC) [I27.20]     Priority: Medium   • Pneumonia due to infectious organism [J18.9]     Priority: Medium   • Hypothyroid [E03.9]     Priority: Low     Premorbid  On levothyroxine     • Pulmonary emphysema (HCC) [J43.9]   • NSIP (nonspecific interstitial pneumonia) (HCC) [J84.89]   • Atrial fibrillation with rapid ventricular response (HCC) [I48.91]   • Tobacco  abuse [Z72.0]         P: Decrease CT suction to 10  Will trial water seal tomorrow      John Ganser M.D.  [facility]

## 2020-03-03 NOTE — DIETARY
Nutrition Services: Update   Day 14 of admit.  Pancho Martinez Jr. is a 59 y.o. male with admitting DX of Pneumothorax, Pneumonia, Respiratory failure, ILD (interstitial lung disease), COPD (chronic obstructive pulmonary disease)     Pt was previously receiving TF. Cortrak has been removed. Pt is currently on regular, minced & moist, mildly thick diet with 1:1 supervision. Pt is receiving thickened Boost Plus TID with meals. Per chart pt PO 0-100% of meals and 25-50% 1 supplement documented.Wt 3/1: 73.5 kg via bed scale - wt decreased since admit suspect related to fluids. Wt loss percent is 5.8% in ~2 weeks, which is severe     Malnutrition Risk: Pt with severe 5.8% wt loss in ~2 weeks, no other criteria noted at this time.      Recommendations/Plan:  1. Diet upgrades per SLP  2. Encourage intake of meals  3. Document intake of all meals as % taken in ADL's to provide interdisciplinary communication across all shifts.   4. Monitor weight.  5. Nutrition rep will continue to see patient for ongoing meal and snack preferences.    RD following

## 2020-03-03 NOTE — CARE PLAN
Problem: Safety  Goal: Will remain free from injury  Outcome: PROGRESSING AS EXPECTED  Goal: Will remain free from falls  Outcome: PROGRESSING AS EXPECTED  Bed in lowest position, call light within reach, bed alarm set. Hourly rounding completed.      Problem: Bowel/Gastric:  Goal: Normal bowel function is maintained or improved  Outcome: PROGRESSING AS EXPECTED  Goal: Will not experience complications related to bowel motility  Outcome: PROGRESSING AS EXPECTED     Problem: Skin Integrity  Goal: Risk for impaired skin integrity will decrease  Outcome: PROGRESSING AS EXPECTED     Problem: Pain Management  Goal: Pain level will decrease to patient's comfort goal  Outcome: PROGRESSING SLOWER THAN EXPECTED  Prn pain medication regimen reviewed with patient, given as ordered as needed.      Problem: Urinary Elimination:  Goal: Ability to reestablish a normal urinary elimination pattern will improve  Outcome: PROGRESSING SLOWER THAN EXPECTED  Bell catheter placed d/t continued retention.

## 2020-03-03 NOTE — PROGRESS NOTES
Pulmonary and Critical Care Progress Note    Date of admission  2/18/2020    Chief Complaint  59 y.o. male who presented 2/18/2020 with history of oxygen dependent chronic hypoxic respiratory failure, requiring 2.5-3 L home oxygen, emphysema, pulmonary hypertension and chronic pain who is on methadone.  He was transferred from [place] for pulmonary specialty care with a left lower lobe pneumonia and pneumothorax on 2/18.  Influenza screening was negative.  Shortly after arriving at [facility] he was noted to have a dislodged chest tube.  A replacement chest tube was not necessary.  He was requiring 5 L of oxygen via nasal cannula on 2/19/2/20.  A pulmonology consultation requested a high resolution CT scan, the initiated a connective tissue work-up and continued broad-spectrum antibiotics.  On the morning of 2/21 patient had increasing work of breathing and hypoxic respiratory failure.  Critical care consultation was requested for further evaluation and management.    Hospital Course    2/21-patient was evaluated in the IR suite for placement of a pigtail catheter.  At the time the residual pneumothorax was felt to be too small to benefit from catheter placement.  At 2335 patient became hypoxic, tachycardic and hypotensive.  Chest x-ray revealed a large pneumothorax.  An emergent chest tube was placed with improvement in hemodynamic and oxygenation parameters.  2/22- Pulmonary biopsy performed.  Prednisone 1 mg/kilogram/day initiated  2/28- Extubated; afib with RVR placed on amio gtt  2/29 -Improved mentation. Did well s/p extubation chest placed on water seal   3/1-  Left apical pneumo seen and chest tube returned to suction   3/2, chest xray no significant pneumothorax (tiny), chest tube clamped per myself.  Repeat chest xray.          Interval Problem Update  Reviewed last 24 hour events:  Chest xray overnight and this morning improved left apical pneumothorax  Continue chest tube to  suction  If continued stability by tomorrow then will consider additional clamping trial  May require surgical intervention vs small chest tube insertion  Due to underlying lung disease pleurodesis less likely to be successful  Bronchoscopic intervention may eventually be necessary if not a surgical candidate  Chem co2 up to 37  -1.8 L out prior day  Mobilize  IS continued  On oxygen nc    Review of Systems  Review of Systems   Constitutional: Positive for malaise/fatigue. Negative for chills, diaphoresis, fever and weight loss.   HENT: Negative for congestion, sinus pain and sore throat.    Eyes: Negative for blurred vision, double vision and photophobia.   Respiratory: Positive for shortness of breath. Negative for cough, hemoptysis, sputum production, wheezing and stridor.         Some musculoskeletal pain at insert site, ongoing, mild   Cardiovascular: Negative for chest pain, palpitations and leg swelling.   Gastrointestinal: Negative for abdominal pain, blood in stool, heartburn, melena, nausea and vomiting.   Genitourinary: Negative.  Negative for dysuria and urgency.   Musculoskeletal: Negative.  Negative for back pain, myalgias and neck pain.   Skin: Negative for itching and rash.   Neurological: Positive for weakness. Negative for dizziness, tingling, sensory change, speech change, focal weakness and headaches.        Generalized weakness form deconditioning   Endo/Heme/Allergies: Negative for polydipsia. Does not bruise/bleed easily.   Psychiatric/Behavioral: Negative.  Negative for depression. The patient is not nervous/anxious.         Vital Signs for last 24 hours   Temp:  [35.8 °C (96.5 °F)-36.4 °C (97.5 °F)] 36.1 °C (96.9 °F)  Pulse:  [] 53  Resp:  [9-51] 23  BP: (106-122)/(58-84) 111/69  SpO2:  [90 %-100 %] 100 %    Hemodynamic parameters for last 24 hours       Respiratory Information for the last 24 hours       Physical Exam   Physical Exam  Vitals signs and nursing note reviewed.    Constitutional:       General: He is not in acute distress.     Appearance: Normal appearance. He is ill-appearing. He is not toxic-appearing or diaphoretic.      Comments: Chronically ill appearance, deconditioned   HENT:      Head: Normocephalic and atraumatic.      Right Ear: Tympanic membrane and external ear normal.      Left Ear: Tympanic membrane and external ear normal.      Nose: No congestion or rhinorrhea.      Mouth/Throat:      Mouth: Mucous membranes are moist.      Pharynx: Oropharynx is clear. No oropharyngeal exudate or posterior oropharyngeal erythema.   Eyes:      General: No scleral icterus.        Right eye: No discharge.         Left eye: No discharge.      Extraocular Movements: Extraocular movements intact.      Conjunctiva/sclera: Conjunctivae normal.      Pupils: Pupils are equal, round, and reactive to light.   Neck:      Musculoskeletal: Normal range of motion. No neck rigidity or muscular tenderness.   Cardiovascular:      Rate and Rhythm: Normal rate and regular rhythm.      Pulses: Normal pulses.      Heart sounds: Normal heart sounds. No murmur.   Pulmonary:      Effort: Respiratory distress present.      Breath sounds: No stridor. No wheezing, rhonchi or rales.      Comments: Reduced air flow, improved from prior day  Chest:      Chest wall: No tenderness.   Abdominal:      General: There is no distension.      Palpations: There is no mass.      Tenderness: There is no abdominal tenderness. There is no guarding.   Musculoskeletal:         General: No swelling, tenderness or deformity.      Right lower leg: No edema.      Left lower leg: No edema.   Lymphadenopathy:      Cervical: No cervical adenopathy.   Skin:     Coloration: Skin is not jaundiced or pale.      Findings: No bruising, erythema, lesion or rash.   Neurological:      General: No focal deficit present.      Mental Status: He is alert and oriented to person, place, and time. Mental status is at baseline.      Cranial  Nerves: No cranial nerve deficit.      Sensory: No sensory deficit.      Motor: No weakness.      Coordination: Coordination normal.      Gait: Gait normal.   Psychiatric:         Mood and Affect: Mood normal.         Behavior: Behavior normal.         Thought Content: Thought content normal.         Judgment: Judgment normal.         Medications  Current Facility-Administered Medications   Medication Dose Route Frequency Provider Last Rate Last Dose   • DILTIAZem (CARDIZEM) tablet 30 mg  30 mg Oral TWICE DAILY Perry Paige D.O.   Stopped at 03/03/20 0548   • DILTIAZem (CARDIZEM) injection 18.4 mg  0.25 mg/kg Intravenous Q4HRS PRN FLORA Carter.OKhadra   18.4 mg at 03/02/20 1553   • predniSONE (DELTASONE) tablet 60 mg  60 mg Enteral Tube DAILY Ganesh Montesinos M.D.   60 mg at 03/03/20 0617   • HYDROmorphone pf (DILAUDID) injection 1 mg  1 mg Intravenous Q4HRS PRN Srinath Sauceda M.D.   1 mg at 03/03/20 0355   • oxyCODONE immediate-release (ROXICODONE) tablet 5 mg  5 mg Enteral Tube Q4HRS PRN Srinath Sauceda M.D.   5 mg at 02/25/20 1402    Or   • oxyCODONE immediate release (ROXICODONE) tablet 10 mg  10 mg Enteral Tube Q4HRS PRN Srinath Sauceda M.D.   10 mg at 03/03/20 0132   • polyethylene glycol/lytes (MIRALAX) PACKET 1 Packet  1 Packet Enteral Tube BID Marck Javier M.D.   1 Packet at 03/02/20 1710    And   • senna-docusate (PERICOLACE or SENOKOT S) 8.6-50 MG per tablet 2 Tab  2 Tab Enteral Tube BID Marck Javier M.D.   2 Tab at 03/02/20 1700    And   • magnesium hydroxide (MILK OF MAGNESIA) suspension 30 mL  30 mL Enteral Tube QDAY PRN Marck Javier M.D.   30 mL at 02/24/20 1556    And   • bisacodyl (DULCOLAX) suppository 10 mg  10 mg Rectal QDAY PRN Marck Javier M.D.   10 mg at 02/25/20 1154   • enoxaparin (LOVENOX) inj 40 mg  40 mg Subcutaneous DAILY Marck Javier M.D.   40 mg at 03/03/20 0616   • Pharmacy Consult: Enteral tube insertion - review meds/change route/product selection  1  Each Other PHARMACY TO DOSE Marck Javier M.D.       • ipratropium-albuterol (DUONEB) nebulizer solution  3 mL Nebulization Q2HRS PRN (RT) Marck Javier M.D.       • MD Alert...ICU Electrolyte Replacement per Pharmacy   Other PHARMACY TO DOSE Marck Javier M.D.       • gabapentin (NEURONTIN) capsule 2,400 mg  2,400 mg Enteral Tube QAM Marck Javier M.D.   2,400 mg at 03/03/20 0617    And   • gabapentin (NEURONTIN) capsule 1,600 mg  1,600 mg Enteral Tube Q EVENING Marck Javier M.D.   1,600 mg at 03/02/20 1701   • levothyroxine (SYNTHROID) tablet 125 mcg  125 mcg Enteral Tube QAM AC Marck Javier M.D.   125 mcg at 03/03/20 0618   • methadone (DOLOPHINE) tablet 30 mg  30 mg Enteral Tube BID Marck Javier M.D.   20 mg at 03/03/20 0617    And   • methadone (DOLOPHINE) tablet 10 mg  10 mg Enteral Tube QHS Marck Javier M.D.   10 mg at 03/02/20 2158   • omeprazole (FIRST-OMEPRAZOLE) 2 mg/mL oral susp 40 mg  40 mg Enteral Tube Q EVENING Marck Javier M.D.   40 mg at 03/02/20 1710   • simvastatin (ZOCOR) tablet 40 mg  40 mg Enteral Tube Nightly Marck Javier M.D.   40 mg at 03/02/20 2158   • tramadol (ULTRAM) 50 MG tablet 50 mg  50 mg Enteral Tube BID Marck Javier M.D.   50 mg at 03/03/20 0617   • acetaminophen (TYLENOL) tablet 650 mg  650 mg Enteral Tube Q6HRS PRN Marck Javier M.D.   650 mg at 03/01/20 0418   • ALPRAZolam (XANAX) tablet 0.25 mg  0.25 mg Enteral Tube 4X/DAY PRN Marck Javier M.D.   0.25 mg at 03/02/20 2158   • guaiFENesin dextromethorphan (ROBITUSSIN DM) 100-10 MG/5ML syrup 10 mL  10 mL Enteral Tube Q6HRS PRN Marck Javier M.D.   10 mL at 03/01/20 1229   • promethazine (PHENERGAN) tablet 12.5-25 mg  12.5-25 mg Enteral Tube Q4HRS PRN Marck Javier M.D.       • timolol (TIMOPTIC) 0.5 % ophthalmic solution 1 Drop  1 Drop Both Eyes Q EVENING Tim Bustos M.D.   1 Drop at 03/02/20 1717   • Respiratory Therapy Consult   Nebulization Continuous RT Tim Bustos M.D.        • promethazine (PHENERGAN) suppository 12.5-25 mg  12.5-25 mg Rectal Q4HRS PRN Tim Bustos M.D.       • prochlorperazine (COMPAZINE) injection 5-10 mg  5-10 mg Intravenous Q4HRS PRN Tim Bustos M.D.   10 mg at 03/01/20 1409   • albuterol (PROVENTIL) 2.5mg/0.5ml nebulizer solution 2.5 mg  2.5 mg Nebulization Q2HRS PRN (RT) Tim Bustos M.D.           Fluids    Intake/Output Summary (Last 24 hours) at 3/3/2020 0726  Last data filed at 3/3/2020 0400  Gross per 24 hour   Intake 140 ml   Output 2025 ml   Net -1885 ml       Laboratory          Recent Labs     03/01/20 0304 03/02/20  0257 03/03/20  0400   SODIUM 137 137 138   POTASSIUM 4.1 4.0 4.4   CHLORIDE 99 98 99   CO2 31 34* 37*   BUN 39* 41* 35*   CREATININE 0.86 1.01 1.05   CALCIUM 9.3 9.7 9.3     Recent Labs     03/01/20 0304 03/02/20  0257 03/03/20  0400   ALTSGPT  --  62*  --    ASTSGOT  --  35  --    ALKPHOSPHAT  --  73  --    TBILIRUBIN  --  0.4  --    GLUCOSE 111* 106* 82     Recent Labs     03/01/20 0304 03/02/20  0257 03/03/20  0400   WBC 29.3* 25.1* 22.6*   NEUTSPOLYS 71.20 63.70 67.50   LYMPHOCYTES 12.60* 23.90 19.30*   MONOCYTES 13.50* 7.10 5.30   EOSINOPHILS 1.80 4.40 5.30   BASOPHILS 0.00 0.00 1.70   ASTSGOT  --  35  --    ALTSGPT  --  62*  --    ALKPHOSPHAT  --  73  --    TBILIRUBIN  --  0.4  --      Recent Labs     03/01/20 0304 03/02/20  0257 03/03/20  0400   RBC 5.89 5.71 5.21   HEMOGLOBIN 15.5 14.9 13.7*   HEMATOCRIT 48.4 48.3 44.3   PLATELETCT 485* 473* 439       Imaging  X-Ray:  I have personally reviewed the images and compared with prior images. and My impression is: left apical pneumothorax, small to tiny in appearance, chest tube adequately placed, no significant edema  EKG:  I have personally reviewed the images and compared with prior images. and My impression is: qtc normal, sinus rhythm  CT:    Reviewed    Assessment/Plan  Acute on chronic respiratory failure with hypoxia (HCC)- (present on  admission)  Assessment & Plan  Acute hypoxic respiratory failure  Attributed to NSIP  Complicated by underlying emphysematous disease.   Extubated 2/28 - tolerating  Pulmonary biopsy on 2/22, alveolar granulation tissue  CTD unremarkable  Changed prednisone taper to scheduled 60 mg daily for 30 days, then re-evaluate  Trial steroids for NSIP.    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      Pneumothorax  Assessment & Plan  Clamped again on 3/2  Repeat chest xray reviewed  Chest xray in am, pull if no pneumothorax  Large bleb/interstitial changes, high risk for bronchoalveolar fistula  If continued stability by tomorrow then will consider additional clamping trial  May require surgical intervention vs small chest tube insertion, surgery consulted for surgical evaluation  Due to underlying lung disease pleurodesis less likely to be successful  Bronchoscopic intervention may eventually be necessary if not a surgical candidate      Interstitial lung disease (HCC)- (present on admission)  Assessment & Plan  Likely nsip  Complicated by pneumonia, emphysema and copd  Likely will need biopsy at some point  Long steroid trial, 30 days at 60 mg, started 70 mg daily on 3/22.    QT prolongation- (present on admission)  Assessment & Plan  Continue to monitor  Repeat ekg on 3/2 normal, 376    Chronic neck pain- (present on admission)  Assessment & Plan  On methadone  Avoid over sedation with narcotics  Oxycodone prn  Dilaudid prn  Gabapentin scheduled    Pulmonary hypertension (HCC)- (present on admission)  Assessment & Plan  Secondary to interstitial lung disease and copd/smoking hx  Optimize copd medications  symbicort bid  duonebs prn  spiriva daily  Steroids for nsip  Diuresis as tolerated and needed  Currently adequately diuresed      Pneumonia due to infectious organism- (present on  admission)  Assessment & Plan  Prednisone taper  Bactrim PPX started prior  ppi px  On scheduled 20 days taper, changed to 60 mg daily for 30 days, pulmonary will follow up at that point  ? 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  Chest tube in place  duonebs every 2 hour prn  Start on spiriva daily  Will need followup outpatient with pft  Steroid trial for NSIP      NSIP (nonspecific interstitial pneumonia) (HCC)- (present on admission)  Assessment & Plan  Trial steroids  Recommendation is 1 mg/kg  Change taper to 60 mg daily for 30 days, then will need reevaluation in pulmonary clinic  Complicated by emphysema/copd changes  On 70 mg pred currently  Bactrim px  ppi px  S/p biopsy  Some granulation tissue of alveoli, possible component of sarcoid  May eventually need lung biopsy  Thoracic surgery following  Complicating removal of chest tube      Atrial fibrillation with rapid ventricular response (HCC)  Assessment & Plan  Rate controlled  Repeat ekg sinus after diltiazem therapy  Diltiazem po scheduled and IV prn    Tobacco abuse- (present on admission)  Assessment & Plan  Counseled on avoiding especially in setting of interstitial lung disease    Hypothyroid- (present on admission)  Assessment & Plan  Levothyroxine    Ok for floor. Will have pulmonary follow along when leaves the ICU. Critical Care to sign off when he leaves the ICU.      VTE:  Lovenox  Ulcer: PPI  Lines: None     I have performed a physical exam and reviewed and updated ROS and Plan today (3/3/2020). In review of yesterday's  note (3/2/2020), there are no changes except as documented above.     Discussed patient condition and risk of morbidity and/or mortality with Hospitalist, RN, RT, Therapies, Pharmacy, , Code status disscussed, Patient and general surgery Thoracic surgery

## 2020-03-03 NOTE — PROGRESS NOTES
Progress Note:    S: Alert, comfortable    O:  Recent Labs     02/29/20 0414 03/01/20 0304 03/02/20 0257   WBC 28.9* 29.3* 25.1*   RBC 5.83 5.89 5.71   HEMOGLOBIN 15.3 15.5 14.9   HEMATOCRIT 47.4 48.4 48.3   MCV 81.3* 82.2 84.6   MCH 26.2* 26.3* 26.1*   MCHC 32.3* 32.0* 30.8*   RDW 45.1 45.6 46.6   PLATELETCT 488* 485* 473*   MPV 9.4 9.3 9.4     Recent Labs     02/29/20 0414 03/01/20 0304 03/02/20 0257   SODIUM 137 137 137   POTASSIUM 3.9 4.1 4.0   CHLORIDE 100 99 98   CO2 27 31 34*   GLUCOSE 127* 111* 106*   BUN 36* 39* 41*   CREATININE 0.83 0.86 1.01   CALCIUM 9.6 9.3 9.7         Current Facility-Administered Medications   Medication Dose   • DILTIAZem (CARDIZEM) tablet 30 mg  30 mg   • DILTIAZem (CARDIZEM) injection 18.4 mg  0.25 mg/kg   • [START ON 3/3/2020] predniSONE (DELTASONE) tablet 60 mg  60 mg   • HYDROmorphone pf (DILAUDID) injection 1 mg  1 mg   • oxyCODONE immediate-release (ROXICODONE) tablet 5 mg  5 mg    Or   • oxyCODONE immediate release (ROXICODONE) tablet 10 mg  10 mg   • polyethylene glycol/lytes (MIRALAX) PACKET 1 Packet  1 Packet    And   • senna-docusate (PERICOLACE or SENOKOT S) 8.6-50 MG per tablet 2 Tab  2 Tab    And   • magnesium hydroxide (MILK OF MAGNESIA) suspension 30 mL  30 mL    And   • bisacodyl (DULCOLAX) suppository 10 mg  10 mg   • enoxaparin (LOVENOX) inj 40 mg  40 mg   • Pharmacy Consult: Enteral tube insertion - review meds/change route/product selection  1 Each   • ipratropium-albuterol (DUONEB) nebulizer solution  3 mL   • MD Alert...ICU Electrolyte Replacement per Pharmacy     • gabapentin (NEURONTIN) capsule 2,400 mg  2,400 mg    And   • gabapentin (NEURONTIN) capsule 1,600 mg  1,600 mg   • levothyroxine (SYNTHROID) tablet 125 mcg  125 mcg   • methadone (DOLOPHINE) tablet 30 mg  30 mg    And   • methadone (DOLOPHINE) tablet 10 mg  10 mg   • omeprazole (FIRST-OMEPRAZOLE) 2 mg/mL oral susp 40 mg  40 mg   • simvastatin (ZOCOR) tablet 40 mg  40 mg   • tramadol (ULTRAM)  "50 MG tablet 50 mg  50 mg   • acetaminophen (TYLENOL) tablet 650 mg  650 mg   • ALPRAZolam (XANAX) tablet 0.25 mg  0.25 mg   • guaiFENesin dextromethorphan (ROBITUSSIN DM) 100-10 MG/5ML syrup 10 mL  10 mL   • promethazine (PHENERGAN) tablet 12.5-25 mg  12.5-25 mg   • timolol (TIMOPTIC) 0.5 % ophthalmic solution 1 Drop  1 Drop   • Respiratory Therapy Consult     • promethazine (PHENERGAN) suppository 12.5-25 mg  12.5-25 mg   • prochlorperazine (COMPAZINE) injection 5-10 mg  5-10 mg   • albuterol (PROVENTIL) 2.5mg/0.5ml nebulizer solution 2.5 mg  2.5 mg       PE:  /84   Pulse (!) 117   Temp 36.4 °C (97.5 °F) (Temporal)   Resp (!) 25   Ht 1.854 m (6' 0.99\")   Wt 73.5 kg (162 lb 0.6 oz)   SpO2 97%     Intake/Output Summary (Last 24 hours) at 3/2/2020 1616  Last data filed at 3/2/2020 1200  Gross per 24 hour   Intake 327.25 ml   Output 900 ml   Net -572.75 ml       Chest tube: no water in water seal chamber    Rads:  DX-CHEST-PORTABLE (1 VIEW)   Final Result      1.  Interval increase in size of left pneumothorax, with left chest tube in place.   2.  Removal of right IJ central venous catheter.      IR-MIDLINE CATHETER INSERTION WO GUIDANCE > AGE 3   Final Result                  Ultrasound-guided midline placement performed by qualified nursing staff    as above.          DX-CHEST-LIMITED (1 VIEW)   Final Result         Tiny left lateral pneumothorax, similar to prior. Left apical chest tube is redemonstrated.      DX-CHEST-PORTABLE (1 VIEW)   Final Result      Improving pulmonary edema.      DX-CHEST-PORTABLE (1 VIEW)   Final Result         1.  Pulmonary edema and/or infiltrates are identified, which are stable since the prior exam.   2.  Trace left apical pneumothorax, new since prior study, thoracostomy tube remains in place.                        CT-CHEST (THORAX) W/O   Final Result      1.  Emphysema      2.  Subpleural bleb      3.  Bilateral lower lobe consolidation      4.  Assessment for " interstitial lung disease is limited given the multiple superimposed processes      5.  Small left pneumothorax      6.  Dilated bowel identified in the upper abdomen               DX-CHEST-PORTABLE (1 VIEW)   Final Result         1.  Pulmonary edema and/or infiltrates are identified, which are stable since the prior exam.                     DX-CHEST-PORTABLE (1 VIEW)   Final Result         1.  Pulmonary edema and/or infiltrates are identified, which are stable since the prior exam.                  DX-CHEST-PORTABLE (1 VIEW)   Final Result         1.  Pulmonary edema and/or infiltrates are identified, which are stable since the prior exam.   2.  Small left apical recurrent pneumothorax, thoracostomy tube remains in place.               DX-CHEST-PORTABLE (1 VIEW)   Final Result         1.  Pulmonary edema and/or infiltrates are identified, which are stable since the prior exam.            DX-CHEST-PORTABLE (1 VIEW)   Final Result      Stable bilateral infiltrates, right greater than left.      DX-CHEST-PORTABLE (1 VIEW)   Final Result      Resolved small left apical pneumothorax      Otherwise stable chest with right hemidiaphragm elevation, reticular greater than consolidative opacity. Edema More likely than pneumonia      DX-CHEST-PORTABLE (1 VIEW)   Final Result      1.  Stable small left apical pneumothorax.      2.  Stable bilateral infiltrates.      DX-CHEST-LIMITED (1 VIEW)   Final Result      1.  No significant change      2.  Bilateral pulmonary airspace process      3.  Lines and tubes appear appropriately located      4.  Unchanged small left apical pneumothorax      DX-CHEST-PORTABLE (1 VIEW)   Final Result         1.  Pulmonary edema and/or infiltrates are identified, which are stable since the prior exam. Interval resolution of left pneumothorax status post thoracostomy tube placement.         DX-CHEST-PORTABLE (1 VIEW)   Final Result         1.  New large left pneumothorax with rightward shift of  mediastinum and hyperexpansion of the left thorax, appearance compatible with tension pneumothorax.   2.  Hazy interstitial pulmonary infiltrates, similar to prior study given difference of technique.      These findings were discussed with the patient's clinician, Gypsy Mackey, on 2/21/2020 11:44 PM.      DX-ABDOMEN FOR TUBE PLACEMENT   Final Result         Feeding tube with tip projecting over the expected area of the stomach.      CT-CHEST (THORAX) W/O   Final Result      Decreased size of LEFT pneumothorax. LEFT chest tube placement deferred. Findings and decisions were discussed with Dr. Marck Javier via [employer] while the patient was on the table.            DX-CHEST-PORTABLE (1 VIEW)   Final Result      1.  interval placement of an endotracheal tube which terminates in satisfactory position at the level of the aortic arch.   2.  Interval insertion of a central venous catheter which terminates with the tip projecting over the expected region of the mid to distal superior vena cava.   3.  Previously identified trace left pneumothorax is no longer visualized.   4.  Increased diffuse, bilateral, right greater than left interstitial and airspace opacities.      DX-CHEST-PORTABLE (1 VIEW)   Final Result         1.  Pulmonary edema and/or infiltrates are identified, which are stable since the prior exam.   2.  Trace left pneumothorax, decreased since prior study      CT-CHEST, HIGH RESOLUTION LUNG   Final Result      1.  Emphysematous changes again seen, similar to prior exam.   2.  Plan loss of RIGHT hemithorax with diffuse interstitial opacities suggesting pneumonitis/pneumonia.   3.  Multifocal ill-defined groundglass opacities in the LEFT upper and lower lobes also suggesting multifocal pneumonitis.   4.  Small LEFT anterior pneumothorax as seen on prior chest x-ray.   5.  Mild mediastinal adenopathy, nonspecific.               DX-CHEST-PORTABLE (1 VIEW)   Final Result         1.  Pulmonary edema and/or  infiltrates are identified, which are stable since the prior exam.   2.  Small left pneumothorax, decreased since prior study      DX-CHEST-2 VIEWS   Final Result      1.  Unchanged small LEFT pneumothorax   2.  Unchanged BILATERAL interstitial and airspace disease and atelectasis      DX-CHEST-LIMITED (1 VIEW)   Final Result      Stable small left pneumothorax. Interval removal of left chest tube.      DX-CHEST-LIMITED (1 VIEW)   Final Result      1.  Left chest tube tip at the origin of the left hemithorax. Advancement is suggested. Stable small left pneumothorax.   2.  Interstitial and airspace opacities, worse on the right which could represent asymmetric edema or infection.      These findings were discussed with nurse Hung on 2/18/2020 9:16 AM.      OUTSIDE IMAGES-DX CHEST   Final Result      OUTSIDE IMAGES-DX CHEST   Final Result          A:   Active Hospital Problems    Diagnosis   • Acute on chronic respiratory failure with hypoxia (HCC) [J96.21]     Priority: High   • Respiratory failure (HCC) [J96.90]     Priority: High   • Pneumonia [J18.9]     Priority: High   • Pneumothorax [J93.9]     Priority: Medium   • QT prolongation [R94.31]     Priority: Medium   • Chronic neck pain [M54.2, G89.29]     Priority: Medium     Premorbid treated with methadone, Xanax and Neurontin.  Satisfactory analgesia with current PCA settings.  Resume maintenance medications when tolerating diet     • Pulmonary hypertension (HCC) [I27.20]     Priority: Medium   • Hypothyroid [E03.9]     Priority: Low     Premorbid  On levothyroxine     • Chronic pain [G89.29]     Priority: Low   • NSIP (nonspecific interstitial pneumonia) (HCC) [J84.89]   • Atrial fibrillation with rapid ventricular response (HCC) [I48.91]   • Tobacco abuse [Z72.0]         P: Left pneumothorax, developed recurrence with tube clamped.  Change Pleur evac cannister. I will follow. We discussed the possible need for surgery if air leak persists, but hopefully can  avoid John Ganser M.D.  [facility]

## 2020-03-03 NOTE — PROGRESS NOTES
Hospital Medicine Daily Progress Note    Date of Service  3/3/2020    Chief Complaint  59 y.o. male admitted 2/18/2020 with SOB    Hospital Course    This is a 59-year-old male with past medical history positive for asthma, chronic back pain, COPD on 3 LNC at home, depression, hypothyroid, peripheral neuropathy. He presented on 2/18/2020 with several days of feeling ill and abdominal pain, nausea and vomiting.  He was hypoxic at 71% on room air on presentation and was found to have spontaneous pneumothorax and right lower lobe pneumonia. After arrival here his chest tube became dislodged and therefore was removed.     On 2/22 he began having increased work of breathing and was noted to have a tension pneumothorax, so chest tube was replaced and patient was intubated, and transferred to the ICU.  Bronchoscopy and biopsy was done on 2/22 for possible ILD and he was started on steroids.  While in ICU he went into AFib RVR and was started on amiodarone.  He was extubated on 2/28 and was doing well; his chest tube was placed on waterseal but a left apical pneumothorax returned and the chest tube was returned to suction.  On 3/2 the chest tube was clamped again and repeat chest x-ray again showed an enlarging pneumothorax.  The chest tube was unclamped and the pneumothorax again resolved per chest x-ray.  During this encounter he went into rapid atrial fibrillation which was treated with IV diltiazem and he was started on p.o. medications for A. fib.      Interval Problem Update  Still having pain at chest tube site but it is manageable; chest tube remains to suction  He continues to use his incentive spirometer and gets from [id number]  Was unable to urinate yesterday twice with high residuals so Bell replaced  Making good urine    Consultants/Specialty  General surgery    Code Status  Full    Disposition  Tele floor, orders in 3/2    D/W tx team on rounds    Review of Systems  Review of Systems   Constitutional:  Negative for chills and fever.   Eyes: Negative for blurred vision.   Respiratory: Negative for cough and shortness of breath (Doing well on incentive spirometer).    Cardiovascular: Positive for chest pain (Chest wall left side at insertion site). Negative for palpitations.   Gastrointestinal: Negative for abdominal pain, nausea and vomiting.   Genitourinary: Negative for dysuria.        Unable to void though thinks he will be able to when he has a regular bathroom   Musculoskeletal: Negative for myalgias.   Neurological: Negative for dizziness and headaches.   All other systems reviewed and are negative.       Physical Exam  Temp:  [35.8 °C (96.5 °F)-36.6 °C (97.8 °F)] 36.6 °C (97.8 °F)  Pulse:  [] 56  Resp:  [9-51] 15  BP: (106-122)/(58-84) 112/73  SpO2:  [90 %-100 %] 97 %    Physical Exam  Vitals signs and nursing note reviewed.   Constitutional:       Appearance: He is well-developed.   HENT:      Head: Normocephalic and atraumatic.   Cardiovascular:      Rate and Rhythm: Normal rate and regular rhythm.      Heart sounds: Normal heart sounds. No murmur.      Comments: Sinus rhythm in 60s   Pulmonary:      Effort: Pulmonary effort is normal. No respiratory distress.      Breath sounds: Wheezing (Mild) and rales (Dry crackles throughout right side; suction heard on left) present.      Comments: 2 L nasal cannula  Chest tube to suction on the left side  Clubbing of fingers  Chest:      Chest wall: Tenderness (Right side) present.   Abdominal:      General: Bowel sounds are normal. There is no distension.      Palpations: Abdomen is soft.      Tenderness: There is no abdominal tenderness.   Genitourinary:     Comments: Bell in place  Musculoskeletal: Normal range of motion.   Skin:     General: Skin is warm and dry.      Findings: No erythema.   Neurological:      Mental Status: He is alert and oriented to person, place, and time.         Fluids    Intake/Output Summary (Last 24 hours) at 3/3/2020 9785  Last  data filed at 3/3/2020 0800  Gross per 24 hour   Intake 75 ml   Output 2120 ml   Net -2045 ml       Laboratory  Recent Labs     03/01/20  0304 03/02/20  0257 03/03/20  0400   WBC 29.3* 25.1* 22.6*   RBC 5.89 5.71 5.21   HEMOGLOBIN 15.5 14.9 13.7*   HEMATOCRIT 48.4 48.3 44.3   MCV 82.2 84.6 85.0   MCH 26.3* 26.1* 26.3*   MCHC 32.0* 30.8* 30.9*   RDW 45.6 46.6 47.8   PLATELETCT 485* 473* 439   MPV 9.3 9.4 10.0     Recent Labs     03/01/20  0304 03/02/20  0257 03/03/20  0400   SODIUM 137 137 138   POTASSIUM 4.1 4.0 4.4   CHLORIDE 99 98 99   CO2 31 34* 37*   GLUCOSE 111* 106* 82   BUN 39* 41* 35*   CREATININE 0.86 1.01 1.05   CALCIUM 9.3 9.7 9.3                   Imaging  DX-CHEST-PORTABLE (1 VIEW)   Final Result      Stable chest appearance compared with 3/2. Previous tiny left apical pneumothorax however is not seen today.      DX-CHEST-LIMITED (1 VIEW)   Final Result         Near complete resolution of left-sided pneumothorax.      DX-CHEST-PORTABLE (1 VIEW)   Final Result      1.  Interval increase in size of left pneumothorax, with left chest tube in place.   2.  Removal of right IJ central venous catheter.      IR-MIDLINE CATHETER INSERTION WO GUIDANCE > AGE 3   Final Result                  Ultrasound-guided midline placement performed by qualified nursing staff    as above.          DX-CHEST-LIMITED (1 VIEW)   Final Result         Tiny left lateral pneumothorax, similar to prior. Left apical chest tube is redemonstrated.      DX-CHEST-PORTABLE (1 VIEW)   Final Result      Improving pulmonary edema.      DX-CHEST-PORTABLE (1 VIEW)   Final Result         1.  Pulmonary edema and/or infiltrates are identified, which are stable since the prior exam.   2.  Trace left apical pneumothorax, new since prior study, thoracostomy tube remains in place.                        CT-CHEST (THORAX) W/O   Final Result      1.  Emphysema      2.  Subpleural bleb      3.  Bilateral lower lobe consolidation      4.  Assessment for  interstitial lung disease is limited given the multiple superimposed processes      5.  Small left pneumothorax      6.  Dilated bowel identified in the upper abdomen               DX-CHEST-PORTABLE (1 VIEW)   Final Result         1.  Pulmonary edema and/or infiltrates are identified, which are stable since the prior exam.                     DX-CHEST-PORTABLE (1 VIEW)   Final Result         1.  Pulmonary edema and/or infiltrates are identified, which are stable since the prior exam.                  DX-CHEST-PORTABLE (1 VIEW)   Final Result         1.  Pulmonary edema and/or infiltrates are identified, which are stable since the prior exam.   2.  Small left apical recurrent pneumothorax, thoracostomy tube remains in place.               DX-CHEST-PORTABLE (1 VIEW)   Final Result         1.  Pulmonary edema and/or infiltrates are identified, which are stable since the prior exam.            DX-CHEST-PORTABLE (1 VIEW)   Final Result      Stable bilateral infiltrates, right greater than left.      DX-CHEST-PORTABLE (1 VIEW)   Final Result      Resolved small left apical pneumothorax      Otherwise stable chest with right hemidiaphragm elevation, reticular greater than consolidative opacity. Edema More likely than pneumonia      DX-CHEST-PORTABLE (1 VIEW)   Final Result      1.  Stable small left apical pneumothorax.      2.  Stable bilateral infiltrates.      DX-CHEST-LIMITED (1 VIEW)   Final Result      1.  No significant change      2.  Bilateral pulmonary airspace process      3.  Lines and tubes appear appropriately located      4.  Unchanged small left apical pneumothorax      DX-CHEST-PORTABLE (1 VIEW)   Final Result         1.  Pulmonary edema and/or infiltrates are identified, which are stable since the prior exam. Interval resolution of left pneumothorax status post thoracostomy tube placement.         DX-CHEST-PORTABLE (1 VIEW)   Final Result         1.  New large left pneumothorax with rightward shift of  mediastinum and hyperexpansion of the left thorax, appearance compatible with tension pneumothorax.   2.  Hazy interstitial pulmonary infiltrates, similar to prior study given difference of technique.      These findings were discussed with the patient's clinician, Gypsy Makcey, on 2/21/2020 11:44 PM.      DX-ABDOMEN FOR TUBE PLACEMENT   Final Result         Feeding tube with tip projecting over the expected area of the stomach.      CT-CHEST (THORAX) W/O   Final Result      Decreased size of LEFT pneumothorax. LEFT chest tube placement deferred. Findings and decisions were discussed with Dr. Marck Javier via [employer] while the patient was on the table.            DX-CHEST-PORTABLE (1 VIEW)   Final Result      1.  interval placement of an endotracheal tube which terminates in satisfactory position at the level of the aortic arch.   2.  Interval insertion of a central venous catheter which terminates with the tip projecting over the expected region of the mid to distal superior vena cava.   3.  Previously identified trace left pneumothorax is no longer visualized.   4.  Increased diffuse, bilateral, right greater than left interstitial and airspace opacities.      DX-CHEST-PORTABLE (1 VIEW)   Final Result         1.  Pulmonary edema and/or infiltrates are identified, which are stable since the prior exam.   2.  Trace left pneumothorax, decreased since prior study      CT-CHEST, HIGH RESOLUTION LUNG   Final Result      1.  Emphysematous changes again seen, similar to prior exam.   2.  Plan loss of RIGHT hemithorax with diffuse interstitial opacities suggesting pneumonitis/pneumonia.   3.  Multifocal ill-defined groundglass opacities in the LEFT upper and lower lobes also suggesting multifocal pneumonitis.   4.  Small LEFT anterior pneumothorax as seen on prior chest x-ray.   5.  Mild mediastinal adenopathy, nonspecific.               DX-CHEST-PORTABLE (1 VIEW)   Final Result         1.  Pulmonary edema and/or  infiltrates are identified, which are stable since the prior exam.   2.  Small left pneumothorax, decreased since prior study      DX-CHEST-2 VIEWS   Final Result      1.  Unchanged small LEFT pneumothorax   2.  Unchanged BILATERAL interstitial and airspace disease and atelectasis      DX-CHEST-LIMITED (1 VIEW)   Final Result      Stable small left pneumothorax. Interval removal of left chest tube.      DX-CHEST-LIMITED (1 VIEW)   Final Result      1.  Left chest tube tip at the origin of the left hemithorax. Advancement is suggested. Stable small left pneumothorax.   2.  Interstitial and airspace opacities, worse on the right which could represent asymmetric edema or infection.      These findings were discussed with nurse Hung on 2/18/2020 9:16 AM.      OUTSIDE IMAGES-DX CHEST   Final Result      OUTSIDE IMAGES-DX CHEST   Final Result           Assessment/Plan  Acute on chronic respiratory failure with hypoxia (HCC)- (present on admission)  Assessment & Plan  Likely secondary to nonspecific interstitial pneumonia and pneumothoraces  Continuing very long steroid taper as well as chest tube  Status post treatment for CAP, on RT protocol for COPD  Continue to mobilize and continue IS  On 2 L nasal cannula  Pulmonary will continue to follow    Hypoxia  Assessment & Plan  As a result from COPD, pneumothorax, pneumonia    Pneumothorax  Assessment & Plan  Continues with left chest tube, multiple tension pneumothoraces when chest tube clamped  CT surgery managing, Dr. Ganser trying to avoid surgery  Continue daily CXRs    QT prolongation- (present on admission)  Assessment & Plan  History of, currently with some significant bigeminy  Continue with cardiac monitoring  On methadone    Chronic neck pain- (present on admission)  Assessment & Plan  On his home regimen of methadone 30mg BID and 10mg qhs  Also on gabapentin  Oxycodone and Dilaudid sparingly as needed for breakthrough as well as Ultram    Pulmonary hypertension  ([facility])- (present on admission)  Assessment & Plan  History of  Stable presently on no medications    Pneumonia due to infectious organism- (present on admission)  Assessment & Plan  Now s/p completed ceftriaxone, Flagyl and Bactrim    NSIP (nonspecific interstitial pneumonia) ([facility])- (present on admission)  Assessment & Plan  Pulmonary following  On long prednisone taper    Atrial fibrillation with rapid ventricular response ([facility])  Assessment & Plan  Converted back to sinus  Continue telemetry monitoring and diltiazem     Tobacco abuse- (present on admission)  Assessment & Plan  History of extensive use, the patient quit in 2015    Hypothyroid- (present on admission)  Assessment & Plan  Continue with replacement       VTE prophylaxis: Lovenox

## 2020-03-04 ENCOUNTER — APPOINTMENT (OUTPATIENT)
Dept: RADIOLOGY | Facility: MEDICAL CENTER | Age: 60
DRG: 166 | End: 2020-03-04
Attending: INTERNAL MEDICINE
Payer: MEDICARE

## 2020-03-04 LAB
ANION GAP SERPL CALC-SCNC: 5 MMOL/L (ref 0–11.9)
BUN SERPL-MCNC: 32 MG/DL (ref 8–22)
CALCIUM SERPL-MCNC: 8.7 MG/DL (ref 8.5–10.5)
CHLORIDE SERPL-SCNC: 99 MMOL/L (ref 96–112)
CO2 SERPL-SCNC: 32 MMOL/L (ref 20–33)
CREAT SERPL-MCNC: 1.08 MG/DL (ref 0.5–1.4)
ERYTHROCYTE [DISTWIDTH] IN BLOOD BY AUTOMATED COUNT: 46 FL (ref 35.9–50)
GLUCOSE SERPL-MCNC: 91 MG/DL (ref 65–99)
HCT VFR BLD AUTO: 38.6 % (ref 42–52)
HGB BLD-MCNC: 12.3 G/DL (ref 14–18)
MCH RBC QN AUTO: 26.9 PG (ref 27–33)
MCHC RBC AUTO-ENTMCNC: 31.9 G/DL (ref 33.7–35.3)
MCV RBC AUTO: 84.5 FL (ref 81.4–97.8)
PLATELET # BLD AUTO: 399 K/UL (ref 164–446)
PMV BLD AUTO: 9.8 FL (ref 9–12.9)
POTASSIUM SERPL-SCNC: 3.9 MMOL/L (ref 3.6–5.5)
RBC # BLD AUTO: 4.57 M/UL (ref 4.7–6.1)
SODIUM SERPL-SCNC: 136 MMOL/L (ref 135–145)
WBC # BLD AUTO: 23.7 K/UL (ref 4.8–10.8)

## 2020-03-04 PROCEDURE — 700111 HCHG RX REV CODE 636 W/ 250 OVERRIDE (IP): Performed by: HOSPITALIST

## 2020-03-04 PROCEDURE — 99233 SBSQ HOSP IP/OBS HIGH 50: CPT | Performed by: INTERNAL MEDICINE

## 2020-03-04 PROCEDURE — 85027 COMPLETE CBC AUTOMATED: CPT

## 2020-03-04 PROCEDURE — 700102 HCHG RX REV CODE 250 W/ 637 OVERRIDE(OP): Performed by: HOSPITALIST

## 2020-03-04 PROCEDURE — A9270 NON-COVERED ITEM OR SERVICE: HCPCS | Performed by: HOSPITALIST

## 2020-03-04 PROCEDURE — 80048 BASIC METABOLIC PNL TOTAL CA: CPT

## 2020-03-04 PROCEDURE — 36415 COLL VENOUS BLD VENIPUNCTURE: CPT

## 2020-03-04 PROCEDURE — A9270 NON-COVERED ITEM OR SERVICE: HCPCS | Performed by: INTERNAL MEDICINE

## 2020-03-04 PROCEDURE — 99232 SBSQ HOSP IP/OBS MODERATE 35: CPT | Performed by: FAMILY MEDICINE

## 2020-03-04 PROCEDURE — 700102 HCHG RX REV CODE 250 W/ 637 OVERRIDE(OP): Performed by: INTERNAL MEDICINE

## 2020-03-04 PROCEDURE — 700111 HCHG RX REV CODE 636 W/ 250 OVERRIDE (IP): Performed by: PSYCHIATRY & NEUROLOGY

## 2020-03-04 PROCEDURE — 770020 HCHG ROOM/CARE - TELE (206)

## 2020-03-04 PROCEDURE — 700111 HCHG RX REV CODE 636 W/ 250 OVERRIDE (IP): Performed by: INTERNAL MEDICINE

## 2020-03-04 RX ORDER — SULFAMETHOXAZOLE AND TRIMETHOPRIM 800; 160 MG/1; MG/1
1 TABLET ORAL
Status: DISCONTINUED | OUTPATIENT
Start: 2020-03-04 | End: 2020-03-11 | Stop reason: HOSPADM

## 2020-03-04 RX ADMIN — PREDNISONE 60 MG: 50 TABLET ORAL at 06:38

## 2020-03-04 RX ADMIN — GABAPENTIN 1600 MG: 400 CAPSULE ORAL at 18:10

## 2020-03-04 RX ADMIN — OMEPRAZOLE 20 MG: 20 CAPSULE, DELAYED RELEASE ORAL at 06:39

## 2020-03-04 RX ADMIN — METHADONE HYDROCHLORIDE 10 MG: 10 TABLET ORAL at 21:44

## 2020-03-04 RX ADMIN — METHADONE HYDROCHLORIDE 30 MG: 10 TABLET ORAL at 06:36

## 2020-03-04 RX ADMIN — HYDROMORPHONE HYDROCHLORIDE 1 MG: 1 INJECTION, SOLUTION INTRAMUSCULAR; INTRAVENOUS; SUBCUTANEOUS at 23:37

## 2020-03-04 RX ADMIN — OXYCODONE HYDROCHLORIDE 10 MG: 10 TABLET ORAL at 18:26

## 2020-03-04 RX ADMIN — GABAPENTIN 2400 MG: 400 CAPSULE ORAL at 06:40

## 2020-03-04 RX ADMIN — SIMVASTATIN 40 MG: 40 TABLET, FILM COATED ORAL at 20:15

## 2020-03-04 RX ADMIN — SULFAMETHOXAZOLE AND TRIMETHOPRIM 1 TABLET: 800; 160 TABLET ORAL at 21:44

## 2020-03-04 RX ADMIN — SENNOSIDES AND DOCUSATE SODIUM 2 TABLET: 8.6; 5 TABLET ORAL at 06:40

## 2020-03-04 RX ADMIN — LEVOTHYROXINE SODIUM 125 MCG: 125 TABLET ORAL at 06:35

## 2020-03-04 RX ADMIN — TIMOLOL MALEATE 1 DROP: 5 SOLUTION OPHTHALMIC at 18:11

## 2020-03-04 RX ADMIN — ENOXAPARIN SODIUM 40 MG: 100 INJECTION SUBCUTANEOUS at 06:42

## 2020-03-04 RX ADMIN — TRAMADOL HYDROCHLORIDE 50 MG: 50 TABLET, FILM COATED ORAL at 06:38

## 2020-03-04 RX ADMIN — ALPRAZOLAM 0.25 MG: 0.25 TABLET ORAL at 09:12

## 2020-03-04 RX ADMIN — DILTIAZEM HYDROCHLORIDE 30 MG: 30 TABLET, FILM COATED ORAL at 06:39

## 2020-03-04 RX ADMIN — METHADONE HYDROCHLORIDE 30 MG: 10 TABLET ORAL at 15:22

## 2020-03-04 RX ADMIN — TAMSULOSIN HYDROCHLORIDE 0.4 MG: 0.4 CAPSULE ORAL at 09:08

## 2020-03-04 RX ADMIN — DILTIAZEM HYDROCHLORIDE 30 MG: 30 TABLET, FILM COATED ORAL at 18:11

## 2020-03-04 RX ADMIN — ALPRAZOLAM 0.25 MG: 0.25 TABLET ORAL at 18:10

## 2020-03-04 RX ADMIN — TRAMADOL HYDROCHLORIDE 50 MG: 50 TABLET, FILM COATED ORAL at 18:10

## 2020-03-04 ASSESSMENT — ENCOUNTER SYMPTOMS
SHORTNESS OF BREATH: 1
VOMITING: 0
WEIGHT LOSS: 0
CHILLS: 0
DOUBLE VISION: 0
SPUTUM PRODUCTION: 0
HEMOPTYSIS: 0
FEVER: 0
NAUSEA: 0
HEARTBURN: 0
BRUISES/BLEEDS EASILY: 0
DIZZINESS: 0
HEADACHES: 0
PALPITATIONS: 0
SENSORY CHANGE: 0
NERVOUS/ANXIOUS: 0
COUGH: 0
POLYDIPSIA: 0
NECK PAIN: 0
STRIDOR: 0
PSYCHIATRIC NEGATIVE: 1
BACK PAIN: 0
MYALGIAS: 0
DIAPHORESIS: 0
PHOTOPHOBIA: 0
BLOOD IN STOOL: 0
FOCAL WEAKNESS: 0
WHEEZING: 0
DEPRESSION: 0
MUSCULOSKELETAL NEGATIVE: 1
CLAUDICATION: 0
WEAKNESS: 1
ABDOMINAL PAIN: 0
SORE THROAT: 0
BLURRED VISION: 0
SINUS PAIN: 0
SPEECH CHANGE: 0
TINGLING: 0

## 2020-03-04 ASSESSMENT — COGNITIVE AND FUNCTIONAL STATUS - GENERAL
WALKING IN HOSPITAL ROOM: A LITTLE
MOBILITY SCORE: 19
CLIMB 3 TO 5 STEPS WITH RAILING: A LOT
DRESSING REGULAR UPPER BODY CLOTHING: A LITTLE
EATING MEALS: A LOT
STANDING UP FROM CHAIR USING ARMS: A LITTLE
TOILETING: A LITTLE
DAILY ACTIVITIY SCORE: 19
SUGGESTED CMS G CODE MODIFIER MOBILITY: CK
SUGGESTED CMS G CODE MODIFIER DAILY ACTIVITY: CK
MOVING FROM LYING ON BACK TO SITTING ON SIDE OF FLAT BED: A LITTLE
HELP NEEDED FOR BATHING: A LITTLE

## 2020-03-04 ASSESSMENT — FIBROSIS 4 INDEX: FIB4 SCORE: 0.61

## 2020-03-04 NOTE — PROGRESS NOTES
Assumed care of patient, bedside report received from Harper YODER. Updated on POC, call light within reach and fall precautions in place. Bed locked and in lowest position. Patient instructed to call for assistance before getting out of bed. All questions answered, no other needs at this time.

## 2020-03-04 NOTE — PROGRESS NOTES
Patient transferred to tele 8 via wheelchair with all belongings and chart. VSS during transfer. Report given to receiving RN Harper, no concerns at this time.

## 2020-03-04 NOTE — PROGRESS NOTES
Progress Note:    S: Sleepy    O:  Recent Labs     03/02/20 0257 03/03/20 0400 03/04/20  0356   WBC 25.1* 22.6* 23.7*   RBC 5.71 5.21 4.57*   HEMOGLOBIN 14.9 13.7* 12.3*   HEMATOCRIT 48.3 44.3 38.6*   MCV 84.6 85.0 84.5   MCH 26.1* 26.3* 26.9*   MCHC 30.8* 30.9* 31.9*   RDW 46.6 47.8 46.0   PLATELETCT 473* 439 399   MPV 9.4 10.0 9.8     Recent Labs     03/02/20 0257 03/03/20 0400 03/04/20  0356   SODIUM 137 138 136   POTASSIUM 4.0 4.4 3.9   CHLORIDE 98 99 99   CO2 34* 37* 32   GLUCOSE 106* 82 91   BUN 41* 35* 32*   CREATININE 1.01 1.05 1.08   CALCIUM 9.7 9.3 8.7         Current Facility-Administered Medications   Medication Dose   • omeprazole (PRILOSEC) capsule 20 mg  20 mg   • acetaminophen (TYLENOL) tablet 650 mg  650 mg   • methadone (DOLOPHINE) tablet 30 mg  30 mg    And   • methadone (DOLOPHINE) tablet 10 mg  10 mg   • oxyCODONE immediate-release (ROXICODONE) tablet 5 mg  5 mg    Or   • oxyCODONE immediate release (ROXICODONE) tablet 10 mg  10 mg   • tramadol (ULTRAM) 50 MG tablet 50 mg  50 mg   • simvastatin (ZOCOR) tablet 40 mg  40 mg   • ALPRAZolam (XANAX) tablet 0.25 mg  0.25 mg   • levothyroxine (SYNTHROID) tablet 125 mcg  125 mcg   • predniSONE (DELTASONE) tablet 60 mg  60 mg   • senna-docusate (PERICOLACE or SENOKOT S) 8.6-50 MG per tablet 2 Tab  2 Tab    And   • polyethylene glycol/lytes (MIRALAX) PACKET 1 Packet  1 Packet    And   • magnesium hydroxide (MILK OF MAGNESIA) suspension 30 mL  30 mL    And   • bisacodyl (DULCOLAX) suppository 10 mg  10 mg   • gabapentin (NEURONTIN) capsule 2,400 mg  2,400 mg    And   • gabapentin (NEURONTIN) capsule 1,600 mg  1,600 mg   • guaiFENesin dextromethorphan (ROBITUSSIN DM) 100-10 MG/5ML syrup 10 mL  10 mL   • promethazine (PHENERGAN) tablet 12.5-25 mg  12.5-25 mg   • tamsulosin (FLOMAX) capsule 0.4 mg  0.4 mg   • DILTIAZem (CARDIZEM) tablet 30 mg  30 mg   • DILTIAZem (CARDIZEM) injection 18.4 mg  0.25 mg/kg   • HYDROmorphone pf (DILAUDID) injection 1 mg  1 mg  "  • enoxaparin (LOVENOX) inj 40 mg  40 mg   • ipratropium-albuterol (DUONEB) nebulizer solution  3 mL   • timolol (TIMOPTIC) 0.5 % ophthalmic solution 1 Drop  1 Drop   • Respiratory Therapy Consult     • promethazine (PHENERGAN) suppository 12.5-25 mg  12.5-25 mg   • prochlorperazine (COMPAZINE) injection 5-10 mg  5-10 mg   • albuterol (PROVENTIL) 2.5mg/0.5ml nebulizer solution 2.5 mg  2.5 mg       PE:  /86   Pulse 60   Temp 36 °C (96.8 °F) (Temporal)   Resp 17   Ht 1.854 m (6' 0.99\")   Wt 74.2 kg (163 lb 9.3 oz)   SpO2 96%     Intake/Output Summary (Last 24 hours) at 3/4/2020 1350  Last data filed at 3/4/2020 1040  Gross per 24 hour   Intake 240 ml   Output 1509 ml   Net -1269 ml       Chest tube: continued small air leak    Rads:  DX-CHEST-PORTABLE (1 VIEW)   Final Result      Stable chest appearance compared with 3/2. Previous tiny left apical pneumothorax however is not seen today.      DX-CHEST-LIMITED (1 VIEW)   Final Result         Near complete resolution of left-sided pneumothorax.      DX-CHEST-PORTABLE (1 VIEW)   Final Result      1.  Interval increase in size of left pneumothorax, with left chest tube in place.   2.  Removal of right IJ central venous catheter.      IR-MIDLINE CATHETER INSERTION WO GUIDANCE > AGE 3   Final Result                  Ultrasound-guided midline placement performed by qualified nursing staff    as above.          DX-CHEST-LIMITED (1 VIEW)   Final Result         Tiny left lateral pneumothorax, similar to prior. Left apical chest tube is redemonstrated.      DX-CHEST-PORTABLE (1 VIEW)   Final Result      Improving pulmonary edema.      DX-CHEST-PORTABLE (1 VIEW)   Final Result         1.  Pulmonary edema and/or infiltrates are identified, which are stable since the prior exam.   2.  Trace left apical pneumothorax, new since prior study, thoracostomy tube remains in place.                        CT-CHEST (THORAX) W/O   Final Result      1.  Emphysema      2.  " Subpleural bleb      3.  Bilateral lower lobe consolidation      4.  Assessment for interstitial lung disease is limited given the multiple superimposed processes      5.  Small left pneumothorax      6.  Dilated bowel identified in the upper abdomen               DX-CHEST-PORTABLE (1 VIEW)   Final Result         1.  Pulmonary edema and/or infiltrates are identified, which are stable since the prior exam.                     DX-CHEST-PORTABLE (1 VIEW)   Final Result         1.  Pulmonary edema and/or infiltrates are identified, which are stable since the prior exam.                  DX-CHEST-PORTABLE (1 VIEW)   Final Result         1.  Pulmonary edema and/or infiltrates are identified, which are stable since the prior exam.   2.  Small left apical recurrent pneumothorax, thoracostomy tube remains in place.               DX-CHEST-PORTABLE (1 VIEW)   Final Result         1.  Pulmonary edema and/or infiltrates are identified, which are stable since the prior exam.            DX-CHEST-PORTABLE (1 VIEW)   Final Result      Stable bilateral infiltrates, right greater than left.      DX-CHEST-PORTABLE (1 VIEW)   Final Result      Resolved small left apical pneumothorax      Otherwise stable chest with right hemidiaphragm elevation, reticular greater than consolidative opacity. Edema More likely than pneumonia      DX-CHEST-PORTABLE (1 VIEW)   Final Result      1.  Stable small left apical pneumothorax.      2.  Stable bilateral infiltrates.      DX-CHEST-LIMITED (1 VIEW)   Final Result      1.  No significant change      2.  Bilateral pulmonary airspace process      3.  Lines and tubes appear appropriately located      4.  Unchanged small left apical pneumothorax      DX-CHEST-PORTABLE (1 VIEW)   Final Result         1.  Pulmonary edema and/or infiltrates are identified, which are stable since the prior exam. Interval resolution of left pneumothorax status post thoracostomy tube placement.         DX-CHEST-PORTABLE (1  VIEW)   Final Result         1.  New large left pneumothorax with rightward shift of mediastinum and hyperexpansion of the left thorax, appearance compatible with tension pneumothorax.   2.  Hazy interstitial pulmonary infiltrates, similar to prior study given difference of technique.      These findings were discussed with the patient's clinician, Gypsy Mackey, on 2/21/2020 11:44 PM.      DX-ABDOMEN FOR TUBE PLACEMENT   Final Result         Feeding tube with tip projecting over the expected area of the stomach.      CT-CHEST (THORAX) W/O   Final Result      Decreased size of LEFT pneumothorax. LEFT chest tube placement deferred. Findings and decisions were discussed with Dr. Marck Javier via [employer] while the patient was on the table.            DX-CHEST-PORTABLE (1 VIEW)   Final Result      1.  interval placement of an endotracheal tube which terminates in satisfactory position at the level of the aortic arch.   2.  Interval insertion of a central venous catheter which terminates with the tip projecting over the expected region of the mid to distal superior vena cava.   3.  Previously identified trace left pneumothorax is no longer visualized.   4.  Increased diffuse, bilateral, right greater than left interstitial and airspace opacities.      DX-CHEST-PORTABLE (1 VIEW)   Final Result         1.  Pulmonary edema and/or infiltrates are identified, which are stable since the prior exam.   2.  Trace left pneumothorax, decreased since prior study      CT-CHEST, HIGH RESOLUTION LUNG   Final Result      1.  Emphysematous changes again seen, similar to prior exam.   2.  Plan loss of RIGHT hemithorax with diffuse interstitial opacities suggesting pneumonitis/pneumonia.   3.  Multifocal ill-defined groundglass opacities in the LEFT upper and lower lobes also suggesting multifocal pneumonitis.   4.  Small LEFT anterior pneumothorax as seen on prior chest x-ray.   5.  Mild mediastinal adenopathy, nonspecific.                DX-CHEST-PORTABLE (1 VIEW)   Final Result         1.  Pulmonary edema and/or infiltrates are identified, which are stable since the prior exam.   2.  Small left pneumothorax, decreased since prior study      DX-CHEST-2 VIEWS   Final Result      1.  Unchanged small LEFT pneumothorax   2.  Unchanged BILATERAL interstitial and airspace disease and atelectasis      DX-CHEST-LIMITED (1 VIEW)   Final Result      Stable small left pneumothorax. Interval removal of left chest tube.      DX-CHEST-LIMITED (1 VIEW)   Final Result      1.  Left chest tube tip at the origin of the left hemithorax. Advancement is suggested. Stable small left pneumothorax.   2.  Interstitial and airspace opacities, worse on the right which could represent asymmetric edema or infection.      These findings were discussed with nurse Hung on 2/18/2020 9:16 AM.      OUTSIDE IMAGES-DX CHEST   Final Result      OUTSIDE IMAGES-DX CHEST   Final Result      DX-CHEST-LIMITED (1 VIEW)    (Results Pending)       A:   Active Hospital Problems    Diagnosis   • Acute on chronic respiratory failure with hypoxia (HCC) [J96.21]     Priority: High   • Pneumothorax [J93.9]     Priority: Medium   • Interstitial lung disease (HCC) [J84.9]     Priority: Medium   • QT prolongation [R94.31]     Priority: Medium   • Chronic neck pain [M54.2, G89.29]     Priority: Medium     Premorbid treated with methadone, Xanax and Neurontin.  Satisfactory analgesia with current PCA settings.  Resume maintenance medications when tolerating diet     • Pulmonary hypertension (HCC) [I27.20]     Priority: Medium   • Pneumonia due to infectious organism [J18.9]     Priority: Medium   • Hypothyroid [E03.9]     Priority: Low     Premorbid  On levothyroxine     • Pulmonary emphysema (HCC) [J43.9]   • NSIP (nonspecific interstitial pneumonia) (HCC) [J84.89]   • Atrial fibrillation with rapid ventricular response (HCC) [I48.91]   • Tobacco abuse [Z72.0]         P: Chest tube to water  seal  Return to suction if SOB    John Ganser M.D.  [facility]

## 2020-03-04 NOTE — CARE PLAN
Problem: Communication  Goal: The ability to communicate needs accurately and effectively will improve  Outcome: PROGRESSING AS EXPECTED  Intervention: [place] patient and significant other/support system to call light to alert staff of needs  Oriented to:: All of the Following : Location of Bathroom, Visiting Policy, Unit Routine, Call Light and Bedside Controls, Bedside Rail Policy, Smoking Policy, Rights and Responsibilities, Bedside Report, and Patient Education Notebook     Problem: Safety  Goal: Will remain free from falls  Outcome: PROGRESSING AS EXPECTED  Intervention: Implement fall precautions  Flowsheets  Environmental Precautions:   Treaded Slipper Socks on Patient   Personal Belongings, Wastebasket, Call Bell etc. in Easy Reach   Report Given to Other Health Care Providers Regarding Fall Risk   Bed in Low Position  Chair/Bed Strip Alarm: Yes - Alarm On

## 2020-03-04 NOTE — PROGRESS NOTES
[facility] Medicine Daily Progress Note    Date of Service  3/4/2020    Chief Complaint  59 y.o. male admitted 2/18/2020 with SOB    Hospital Course    This is a 59-year-old male with past medical history positive for asthma, chronic back pain, COPD on 3 LNC at home, depression, hypothyroid, peripheral neuropathy. He presented on 2/18/2020 with several days of feeling ill and abdominal pain, nausea and vomiting.  He was hypoxic at 71% on room air on presentation and was found to have spontaneous pneumothorax and right lower lobe pneumonia. After arrival here his chest tube became dislodged and therefore was removed.      On 2/22 he began having increased work of breathing and was noted to have a tension pneumothorax, so chest tube was replaced and patient was intubated, and transferred to the ICU.  Bronchoscopy and biopsy was done on 2/22 for possible ILD and he was started on steroids.  While in ICU he went into AFib RVR and was started on amiodarone.  He was extubated on 2/28 and was doing well; his chest tube was placed on waterseal but a left apical pneumothorax returned and the chest tube was returned to suction.  On 3/2 the chest tube was clamped again and repeat chest x-ray again showed an enlarging pneumothorax.  The chest tube was unclamped and the pneumothorax again resolved per chest x-ray.  During this encounter he went into rapid atrial fibrillation which was treated with IV diltiazem and he was started on p.o. medications for A. fib.      Interval Problem Update  Feels better today  No acute complaint    Consultants/Specialty  Surgery  pulmonary    Code Status  full    Disposition  pending    Review of Systems  Review of Systems   Constitutional: Negative for chills, diaphoresis and fever.   HENT: Negative for ear discharge, ear pain and tinnitus.    Eyes: Negative for blurred vision, double vision and photophobia.   Respiratory: Negative for hemoptysis, sputum production and wheezing.    Cardiovascular:  Negative for chest pain, palpitations and claudication.   Gastrointestinal: Negative for heartburn, nausea and vomiting.   Genitourinary: Negative for dysuria, frequency and urgency.   Musculoskeletal: Negative for joint pain and myalgias.   Skin: Negative for itching and rash.   Neurological: Negative for dizziness, tingling and headaches.        Physical Exam  Temp:  [36 °C (96.8 °F)-36.6 °C (97.8 °F)] 36.4 °C (97.5 °F)  Pulse:  [58-93] 66  Resp:  [17-57] 17  BP: ()/(55-91) 110/91  SpO2:  [92 %-98 %] 95 %    Physical Exam  Constitutional:       General: He is not in acute distress.     Appearance: He is not toxic-appearing.   HENT:      Head: Normocephalic and atraumatic.      Nose: Nose normal.      Mouth/Throat:      Mouth: Mucous membranes are dry.   Eyes:      Extraocular Movements: Extraocular movements intact.      Pupils: Pupils are equal, round, and reactive to light.   Neck:      Musculoskeletal: Normal range of motion and neck supple.   Cardiovascular:      Rate and Rhythm: Normal rate and regular rhythm.      Pulses: Normal pulses.      Heart sounds: Normal heart sounds.   Pulmonary:      Comments: Decreased breath sounds  bilaterally  Abdominal:      General: Abdomen is flat.      Palpations: Abdomen is soft.   Musculoskeletal: Normal range of motion.   Skin:     General: Skin is warm and dry.   Neurological:      General: No focal deficit present.      Mental Status: He is alert and oriented to person, place, and time.   Psychiatric:         Mood and Affect: Mood normal.         Fluids    Intake/Output Summary (Last 24 hours) at 3/4/2020 1005  Last data filed at 3/4/2020 0921  Gross per 24 hour   Intake 240 ml   Output 1510 ml   Net -1270 ml       Laboratory  Recent Labs     03/02/20  0257 03/03/20  0400 03/04/20  0356   WBC 25.1* 22.6* 23.7*   RBC 5.71 5.21 4.57*   HEMOGLOBIN 14.9 13.7* 12.3*   HEMATOCRIT 48.3 44.3 38.6*   MCV 84.6 85.0 84.5   MCH 26.1* 26.3* 26.9*   MCHC 30.8* 30.9* 31.9*    RDW 46.6 47.8 46.0   PLATELETCT 473* 439 399   MPV 9.4 10.0 9.8     Recent Labs     03/02/20  0257 03/03/20  0400 03/04/20  0356   SODIUM 137 138 136   POTASSIUM 4.0 4.4 3.9   CHLORIDE 98 99 99   CO2 34* 37* 32   GLUCOSE 106* 82 91   BUN 41* 35* 32*   CREATININE 1.01 1.05 1.08   CALCIUM 9.7 9.3 8.7                   Imaging       Assessment/Plan  Acute on chronic respiratory failure with hypoxia (HCC)- (present on admission)  Assessment & Plan  Likely secondary to nonspecific interstitial pneumonia and pneumothoraces  Continuing very long steroid taper as well as chest tube  Status post treatment for CAP, on RT protocol for COPD  Continue to mobilize and continue IS  On 2 L nasal cannula  Pulmonary input is noted  Feels better    Hypoxia  Assessment & Plan  As a result from COPD, pneumothorax, pneumonia    Pneumothorax  Assessment & Plan  Continues with left chest tube, multiple tension pneumothoraces when chest tube clamped  CT surgery managing, Dr. Ganser trying to avoid surgery  Continue daily CXRs    QT prolongation- (present on admission)  Assessment & Plan  Has resolved  qtc 376    Chronic neck pain- (present on admission)  Assessment & Plan  On his home regimen of methadone 30mg BID and 10mg qhs  Also on gabapentin  Oxycodone and Dilaudid sparingly as needed for breakthrough as well as Ultram    Pulmonary hypertension (HCC)- (present on admission)  Assessment & Plan  History of  Stable presently on no medications    Pneumonia due to infectious organism- (present on admission)  Assessment & Plan  Now s/p completed ceftriaxone, Flagyl and Bactrim    NSIP (nonspecific interstitial pneumonia) (HCC)- (present on admission)  Assessment & Plan  Pulmonary input is noted  On long prednisone taper    Atrial fibrillation with rapid ventricular response (HCC)  Assessment & Plan  Converted back to sinus  Continue telemetry monitoring and diltiazem     Tobacco abuse- (present on admission)  Assessment & Plan  History of  extensive use, the patient quit in 2015    Hypothyroid- (present on admission)  Assessment & Plan  Continue with replacement       VTE prophylaxis: lovenox

## 2020-03-04 NOTE — PROGRESS NOTES
2 RN Skin Check    2 RN skin check complete.   Devices in place: Oxygen Tubing, Chest Tube, Cardiac Monitor.  Skin assessed under devices: yes.  Confirmed pressure ulcers found on: N/A.  New potential pressure ulcers noted on bilateral ears red and non-blanching. Wound consult placed Yes.  The following interventions in place Pillows and silicone O2 tubing, grey oxygen pads.      Elbows red and blanching

## 2020-03-04 NOTE — PROGRESS NOTES
Bedside report received from night shift nurse Denisse YODER. POC discussed, patient appears calm, call light in reach, bed locked and in lowest position, no reports of pain, white board updated, will check MAR for interventions. Patient is stable at this moment, will continue to monitor.

## 2020-03-04 NOTE — CARE PLAN
Problem: Communication  Goal: The ability to communicate needs accurately and effectively will improve  3/4/2020 1206 by Marybeth Mackey R.N.  Outcome: PROGRESSING AS EXPECTED  3/4/2020 1206 by Marybeth Mackey R.N.  Outcome: PROGRESSING AS EXPECTED     Problem: Safety  Goal: Will remain free from injury  3/4/2020 1206 by Marybeth Mackey R.N.  Outcome: PROGRESSING AS EXPECTED  3/4/2020 1206 by Marybeth Mackey R.N.  Outcome: PROGRESSING AS EXPECTED  Goal: Will remain free from falls  3/4/2020 1206 by Marybeth Mackey R.N.  Outcome: PROGRESSING AS EXPECTED  3/4/2020 1206 by Marybeth Mackey R.N.  Outcome: PROGRESSING AS EXPECTED     Problem: Infection  Goal: Will remain free from infection  3/4/2020 1206 by Marybeth Mackey R.N.  Outcome: PROGRESSING AS EXPECTED  3/4/2020 1206 by Marybeth Mackey R.N.  Outcome: PROGRESSING AS EXPECTED     Problem: Knowledge Deficit  Goal: Knowledge of disease process/condition, treatment plan, diagnostic tests, and medications will improve  Outcome: PROGRESSING AS EXPECTED  Goal: Knowledge of the prescribed therapeutic regimen will improve  Outcome: PROGRESSING AS EXPECTED     Problem: Pain Management  Goal: Pain level will decrease to patient's comfort goal  Outcome: PROGRESSING AS EXPECTED     Problem: Respiratory:  Goal: Respiratory status will improve  Outcome: PROGRESSING AS EXPECTED     Problem: Psychosocial Needs:  Goal: Level of anxiety will decrease  Outcome: PROGRESSING AS EXPECTED    Problem: Safety  Goal: Free from self harm  Outcome: PROGRESSING AS EXPECTED     Problem: Risk for Infection, Impaired Wound Healing  Goal: Remain free from signs and symptoms of infection  Outcome: PROGRESSING AS EXPECTED  Goal: Promotion of Wound Healing and Drain Management  Outcome: PROGRESSING AS EXPECTED     Problem: Pain  Goal: Alleviation of Pain or a reduction in pain to the patient's comfort goal  Outcome: PROGRESSING AS EXPECTED     Problem: Risk for Deep Vein  Thrombosis/Venous Thromboembolism  Goal: DVT/VTE Prevention Measures in Place  Outcome: PROGRESSING AS EXPECTED  Goal: Patient participates in DVT/VTE Prevention Measures  Outcome: PROGRESSING AS EXPECTED

## 2020-03-04 NOTE — PROGRESS NOTES
Pt arrived to unit via wheelchair at 1615. Pt oriented to room, unit, and plan of care. Tele-monitor placed and monitor room notified. All questions answered at this time. Call light within reach; fall precautions in place.

## 2020-03-05 ENCOUNTER — APPOINTMENT (OUTPATIENT)
Dept: RADIOLOGY | Facility: MEDICAL CENTER | Age: 60
DRG: 166 | End: 2020-03-05
Attending: INTERNAL MEDICINE
Payer: MEDICARE

## 2020-03-05 PROCEDURE — A9270 NON-COVERED ITEM OR SERVICE: HCPCS | Performed by: HOSPITALIST

## 2020-03-05 PROCEDURE — 700111 HCHG RX REV CODE 636 W/ 250 OVERRIDE (IP): Performed by: HOSPITALIST

## 2020-03-05 PROCEDURE — 99232 SBSQ HOSP IP/OBS MODERATE 35: CPT | Performed by: FAMILY MEDICINE

## 2020-03-05 PROCEDURE — 92526 ORAL FUNCTION THERAPY: CPT

## 2020-03-05 PROCEDURE — 700111 HCHG RX REV CODE 636 W/ 250 OVERRIDE (IP): Performed by: PSYCHIATRY & NEUROLOGY

## 2020-03-05 PROCEDURE — 700102 HCHG RX REV CODE 250 W/ 637 OVERRIDE(OP): Performed by: HOSPITALIST

## 2020-03-05 PROCEDURE — 770020 HCHG ROOM/CARE - TELE (206)

## 2020-03-05 PROCEDURE — 700111 HCHG RX REV CODE 636 W/ 250 OVERRIDE (IP): Performed by: INTERNAL MEDICINE

## 2020-03-05 PROCEDURE — 71045 X-RAY EXAM CHEST 1 VIEW: CPT

## 2020-03-05 RX ADMIN — ALPRAZOLAM 0.25 MG: 0.25 TABLET ORAL at 16:38

## 2020-03-05 RX ADMIN — TRAMADOL HYDROCHLORIDE 50 MG: 50 TABLET, FILM COATED ORAL at 17:41

## 2020-03-05 RX ADMIN — DILTIAZEM HYDROCHLORIDE 30 MG: 30 TABLET, FILM COATED ORAL at 17:41

## 2020-03-05 RX ADMIN — ALPRAZOLAM 0.25 MG: 0.25 TABLET ORAL at 07:49

## 2020-03-05 RX ADMIN — OXYCODONE HYDROCHLORIDE 10 MG: 10 TABLET ORAL at 00:38

## 2020-03-05 RX ADMIN — TIMOLOL MALEATE 1 DROP: 5 SOLUTION OPHTHALMIC at 17:46

## 2020-03-05 RX ADMIN — METHADONE HYDROCHLORIDE 30 MG: 10 TABLET ORAL at 06:18

## 2020-03-05 RX ADMIN — OXYCODONE HYDROCHLORIDE 10 MG: 10 TABLET ORAL at 07:48

## 2020-03-05 RX ADMIN — METHADONE HYDROCHLORIDE 30 MG: 10 TABLET ORAL at 14:57

## 2020-03-05 RX ADMIN — TRAMADOL HYDROCHLORIDE 50 MG: 50 TABLET, FILM COATED ORAL at 06:17

## 2020-03-05 RX ADMIN — SIMVASTATIN 40 MG: 40 TABLET, FILM COATED ORAL at 19:36

## 2020-03-05 RX ADMIN — ALPRAZOLAM 0.25 MG: 0.25 TABLET ORAL at 23:23

## 2020-03-05 RX ADMIN — GABAPENTIN 1600 MG: 400 CAPSULE ORAL at 17:42

## 2020-03-05 RX ADMIN — DILTIAZEM HYDROCHLORIDE 30 MG: 30 TABLET, FILM COATED ORAL at 06:16

## 2020-03-05 RX ADMIN — PREDNISONE 60 MG: 50 TABLET ORAL at 06:17

## 2020-03-05 RX ADMIN — ALPRAZOLAM 0.25 MG: 0.25 TABLET ORAL at 00:38

## 2020-03-05 RX ADMIN — GABAPENTIN 2400 MG: 400 CAPSULE ORAL at 06:16

## 2020-03-05 RX ADMIN — ENOXAPARIN SODIUM 40 MG: 100 INJECTION SUBCUTANEOUS at 06:16

## 2020-03-05 RX ADMIN — LEVOTHYROXINE SODIUM 125 MCG: 125 TABLET ORAL at 06:17

## 2020-03-05 RX ADMIN — HYDROMORPHONE HYDROCHLORIDE 1 MG: 1 INJECTION, SOLUTION INTRAMUSCULAR; INTRAVENOUS; SUBCUTANEOUS at 19:36

## 2020-03-05 RX ADMIN — TAMSULOSIN HYDROCHLORIDE 0.4 MG: 0.4 CAPSULE ORAL at 09:07

## 2020-03-05 RX ADMIN — OXYCODONE HYDROCHLORIDE 10 MG: 10 TABLET ORAL at 16:02

## 2020-03-05 RX ADMIN — HYDROMORPHONE HYDROCHLORIDE 1 MG: 1 INJECTION, SOLUTION INTRAMUSCULAR; INTRAVENOUS; SUBCUTANEOUS at 11:43

## 2020-03-05 RX ADMIN — METHADONE HYDROCHLORIDE 10 MG: 10 TABLET ORAL at 19:37

## 2020-03-05 RX ADMIN — OMEPRAZOLE 20 MG: 20 CAPSULE, DELAYED RELEASE ORAL at 06:17

## 2020-03-05 ASSESSMENT — ENCOUNTER SYMPTOMS
HEADACHES: 0
EYE PAIN: 0
SHORTNESS OF BREATH: 0
NAUSEA: 0
ABDOMINAL PAIN: 0
HEMOPTYSIS: 0
CHILLS: 0
SPUTUM PRODUCTION: 0
DIAPHORESIS: 0
NECK PAIN: 1
PALPITATIONS: 0
MYALGIAS: 0
TINGLING: 0
PHOTOPHOBIA: 0
VOMITING: 0
DOUBLE VISION: 0
TREMORS: 0
FEVER: 0
COUGH: 0
ORTHOPNEA: 0

## 2020-03-05 ASSESSMENT — FIBROSIS 4 INDEX: FIB4 SCORE: 0.67

## 2020-03-05 NOTE — CARE PLAN
Problem: Safety  Goal: Will remain free from falls  Outcome: PROGRESSING AS EXPECTED     Problem: Venous Thromboembolism (VTW)/Deep Vein Thrombosis (DVT) Prevention:  Goal: Patient will participate in Venous Thrombosis (VTE)/Deep Vein Thrombosis (DVT)Prevention Measures  Outcome: PROGRESSING AS EXPECTED     Problem: Pain Management  Goal: Pain level will decrease to patient's comfort goal  Outcome: PROGRESSING AS EXPECTED     Problem: Respiratory:  Goal: Respiratory status will improve  Outcome: PROGRESSING AS EXPECTED     Problem: Psychosocial Needs:  Goal: Level of anxiety will decrease  Outcome: PROGRESSING AS EXPECTED

## 2020-03-05 NOTE — PROGRESS NOTES
[facility] Medicine Daily Progress Note    Date of Service  3/5/2020    Chief Complaint  59 y.o. male admitted 2/18/2020 with SOB    Hospital Course    This is a 59-year-old male with past medical history positive for asthma, chronic back pain, COPD on 3 LNC at home, depression, hypothyroid, peripheral neuropathy. He presented on 2/18/2020 with several days of feeling ill and abdominal pain, nausea and vomiting.  He was hypoxic at 71% on room air on presentation and was found to have spontaneous pneumothorax and right lower lobe pneumonia. After arrival here his chest tube became dislodged and therefore was removed.      On 2/22 he began having increased work of breathing and was noted to have a tension pneumothorax, so chest tube was replaced and patient was intubated, and transferred to the ICU.  Bronchoscopy and biopsy was done on 2/22 for possible ILD and he was started on steroids.  While in ICU he went into AFib RVR and was started on amiodarone.  He was extubated on 2/28 and was doing well; his chest tube was placed on waterseal but a left apical pneumothorax returned and the chest tube was returned to suction.  On 3/2 the chest tube was clamped again and repeat chest x-ray again showed an enlarging pneumothorax.  The chest tube was unclamped and the pneumothorax again resolved per chest x-ray.  During this encounter he went into rapid atrial fibrillation which was treated with IV diltiazem and he was started on p.o. medications for A. fib.      Interval Problem Update  Feels better today  No acute complaint    Consultants/Specialty  Surgery  pulmonary    Code Status  full    Disposition  pending    Review of Systems  Review of Systems   Constitutional: Negative for chills, diaphoresis and malaise/fatigue.   HENT: Negative for ear discharge, ear pain and nosebleeds.    Eyes: Negative for double vision, photophobia and pain.   Respiratory: Negative for cough, hemoptysis and sputum production.    Cardiovascular:  Negative for chest pain, palpitations and orthopnea.   Gastrointestinal: Negative for abdominal pain, nausea and vomiting.   Genitourinary: Negative for frequency, hematuria and urgency.   Musculoskeletal: Positive for neck pain. Negative for joint pain and myalgias.   Skin: Negative for itching and rash.   Neurological: Negative for tingling, tremors and headaches.        Physical Exam  Temp:  [36 °C (96.8 °F)-36.9 °C (98.5 °F)] 36.2 °C (97.2 °F)  Pulse:  [56-70] 62  Resp:  [16-18] 16  BP: ()/(54-86) 99/61  SpO2:  [91 %-96 %] 93 %    Physical Exam  Constitutional:       Appearance: He is not ill-appearing or diaphoretic.   HENT:      Head: Normocephalic and atraumatic.      Nose: No congestion or rhinorrhea.      Mouth/Throat:      Mouth: Mucous membranes are dry.   Eyes:      Conjunctiva/sclera: Conjunctivae normal.   Neck:      Musculoskeletal: No neck rigidity.   Cardiovascular:      Rate and Rhythm: Normal rate and regular rhythm.      Heart sounds: No murmur. No gallop.    Pulmonary:      Effort: No respiratory distress.      Breath sounds: No wheezing or rales.      Comments: Decreased breath sounds  Bilaterally  Chest tube on the left side is noted  Abdominal:      General: Bowel sounds are normal.   Musculoskeletal:         General: No swelling or tenderness.   Skin:     Coloration: Skin is not jaundiced.      Findings: No bruising or lesion.   Neurological:      Mental Status: He is oriented to person, place, and time. Mental status is at baseline.   Psychiatric:         Mood and Affect: Mood normal.         Fluids    Intake/Output Summary (Last 24 hours) at 3/5/2020 1212  Last data filed at 3/5/2020 1100  Gross per 24 hour   Intake 240 ml   Output 1966 ml   Net -1726 ml       Laboratory  Recent Labs     03/03/20  0400 03/04/20  0356   WBC 22.6* 23.7*   RBC 5.21 4.57*   HEMOGLOBIN 13.7* 12.3*   HEMATOCRIT 44.3 38.6*   MCV 85.0 84.5   MCH 26.3* 26.9*   MCHC 30.9* 31.9*   RDW 47.8 46.0   PLATELETCT 439  399   MPV 10.0 9.8     Recent Labs     03/03/20  0400 03/04/20  0356   SODIUM 138 136   POTASSIUM 4.4 3.9   CHLORIDE 99 99   CO2 37* 32   GLUCOSE 82 91   BUN 35* 32*   CREATININE 1.05 1.08   CALCIUM 9.3 8.7                   Imaging       Assessment/Plan  Acute on chronic respiratory failure with hypoxia (HCC)- (present on admission)  Assessment & Plan  Likely secondary to nonspecific interstitial pneumonia and pneumothoraces  Continuing very long steroid taper as well as chest tube  Status post treatment for CAP, on RT protocol for COPD  Continue to mobilize and continue IS  On 2 L nasal cannula  Pulmonary input is noted  No acute change  D/w nurse at the bed side    Hypoxia  Assessment & Plan  As a result from COPD, pneumothorax, pneumonia    Pneumothorax  Assessment & Plan  Continues with left chest tube, multiple tension pneumothoraces when chest tube clamped  CT surgery managing, Dr. Ganser trying to avoid surgery  cxray result on 3/5 is noted  Pulmonary input is noted    QT prolongation- (present on admission)  Assessment & Plan  Has resolved  qtc 376    Chronic neck pain- (present on admission)  Assessment & Plan  On his home regimen of methadone 30mg BID and 10mg qhs  Also on gabapentin  Oxycodone and Dilaudid sparingly as needed for breakthrough as well as Ultram    Pulmonary hypertension (HCC)- (present on admission)  Assessment & Plan  History of  Stable presently on no medications    Pneumonia due to infectious organism- (present on admission)  Assessment & Plan  Now s/p completed ceftriaxone, Flagyl and Bactrim    NSIP (nonspecific interstitial pneumonia) (HCC)- (present on admission)  Assessment & Plan  Pulmonary input is noted  On long prednisone taper    Atrial fibrillation with rapid ventricular response (HCC)  Assessment & Plan  Converted back to sinus  Continue telemetry monitoring and diltiazem     Tobacco abuse- (present on admission)  Assessment & Plan  History of extensive use, the patient  quit in 2015    Hypothyroid- (present on admission)  Assessment & Plan  Continue with replacement       VTE prophylaxis: lovenox

## 2020-03-05 NOTE — PROGRESS NOTES
Pulmonary and Critical Care Progress Note    Date of admission  2/18/2020    Chief Complaint  59 y.o. male who presented 2/18/2020 with history of oxygen dependent chronic hypoxic respiratory failure, requiring 2.5-3 L home oxygen, emphysema, pulmonary hypertension and chronic pain who is on methadone.  He was transferred from [place] for pulmonary specialty care with a left lower lobe pneumonia and pneumothorax on 2/18.  Influenza screening was negative.  Shortly after arriving at [facility] he was noted to have a dislodged chest tube.  A replacement chest tube was not necessary.  He was requiring 5 L of oxygen via nasal cannula on 2/19/2/20.  A pulmonology consultation requested a high resolution CT scan, the initiated a connective tissue work-up and continued broad-spectrum antibiotics.  On the morning of 2/21 patient had increasing work of breathing and hypoxic respiratory failure.  Critical care consultation was requested for further evaluation and management.    Hospital Course    2/21-patient was evaluated in the IR suite for placement of a pigtail catheter.  At the time the residual pneumothorax was felt to be too small to benefit from catheter placement.  At 2335 patient became hypoxic, tachycardic and hypotensive.  Chest x-ray revealed a large pneumothorax.  An emergent chest tube was placed with improvement in hemodynamic and oxygenation parameters.  2/22- Pulmonary biopsy performed.  Prednisone 1 mg/kilogram/day initiated  2/28- Extubated; afib with RVR placed on amio gtt  2/29 -Improved mentation. Did well s/p extubation chest placed on water seal   3/1-  Left apical pneumo seen and chest tube returned to suction   3/2, chest xray no significant pneumothorax (tiny), chest tube clamped per myself.  Repeat chest xray.  3/4: CT on water seal          Interval Problem Update  Reviewed last 24 hour events:  No cxr today  Chest tube on water seal CT surgery following  On 3 l NC  Reviewed  labs: autoimmune work up negative    Review of Systems  Review of Systems   Constitutional: Positive for malaise/fatigue. Negative for chills, diaphoresis, fever and weight loss.        Frustrated about not taking a shower and that his diet was changed   HENT: Negative for congestion, sinus pain and sore throat.    Eyes: Negative for blurred vision, double vision and photophobia.   Respiratory: Positive for shortness of breath. Negative for cough, hemoptysis, sputum production, wheezing and stridor.         Some musculoskeletal pain at insert site, ongoing, mild   Cardiovascular: Negative for chest pain, palpitations and leg swelling.   Gastrointestinal: Negative for abdominal pain, blood in stool, heartburn, melena, nausea and vomiting.   Genitourinary: Negative.  Negative for dysuria and urgency.   Musculoskeletal: Negative.  Negative for back pain, myalgias and neck pain.   Skin: Negative for itching and rash.   Neurological: Positive for weakness. Negative for dizziness, tingling, sensory change, speech change, focal weakness and headaches.        Generalized weakness form deconditioning   Endo/Heme/Allergies: Negative for polydipsia. Does not bruise/bleed easily.   Psychiatric/Behavioral: Negative.  Negative for depression. The patient is not nervous/anxious.         Vital Signs for last 24 hours   Temp:  [36 °C (96.8 °F)-36.4 °C (97.6 °F)] 36.4 °C (97.6 °F)  Pulse:  [60-81] 70  Resp:  [17-18] 17  BP: ()/(55-91) 130/74  SpO2:  [94 %-98 %] 94 %     Physical Exam   Physical Exam  Vitals signs and nursing note reviewed.   Constitutional:       General: He is not in acute distress.     Appearance: Normal appearance. He is ill-appearing. He is not toxic-appearing or diaphoretic.      Comments: Chronically ill appearance, deconditioned   HENT:      Head: Normocephalic and atraumatic.      Right Ear: Tympanic membrane and external ear normal.      Left Ear: Tympanic membrane and external ear normal.      Nose:  No congestion or rhinorrhea.      Mouth/Throat:      Mouth: Mucous membranes are moist.      Pharynx: Oropharynx is clear. No oropharyngeal exudate or posterior oropharyngeal erythema.   Eyes:      General: No scleral icterus.        Right eye: No discharge.         Left eye: No discharge.      Extraocular Movements: Extraocular movements intact.      Conjunctiva/sclera: Conjunctivae normal.      Pupils: Pupils are equal, round, and reactive to light.   Neck:      Musculoskeletal: Normal range of motion. No neck rigidity or muscular tenderness.   Cardiovascular:      Rate and Rhythm: Normal rate and regular rhythm.      Pulses: Normal pulses.      Heart sounds: Normal heart sounds. No murmur.   Pulmonary:      Effort: Respiratory distress present.      Breath sounds: No stridor. No wheezing, rhonchi or rales.      Comments: Reduced air flow, improved from prior day  Chest:      Chest wall: No tenderness.   Abdominal:      General: There is no distension.      Palpations: There is no mass.      Tenderness: There is no abdominal tenderness. There is no guarding.   Musculoskeletal:         General: No swelling, tenderness or deformity.      Right lower leg: No edema.      Left lower leg: No edema.   Lymphadenopathy:      Cervical: No cervical adenopathy.   Skin:     Coloration: Skin is not jaundiced or pale.      Findings: No bruising, erythema, lesion or rash.   Neurological:      General: No focal deficit present.      Mental Status: He is alert and oriented to person, place, and time. Mental status is at baseline.      Cranial Nerves: No cranial nerve deficit.      Sensory: No sensory deficit.      Motor: No weakness.      Coordination: Coordination normal.      Gait: Gait normal.   Psychiatric:         Mood and Affect: Mood normal.         Behavior: Behavior normal.         Thought Content: Thought content normal.         Judgment: Judgment normal.         Medications  Current Facility-Administered Medications    Medication Dose Route Frequency Provider Last Rate Last Dose   • sulfamethoxazole-trimethoprim (BACTRIM DS) 800-160 MG tablet 1 Tab  1 Tab Oral 3x/week Robbie Cates M.D.       • omeprazole (PRILOSEC) capsule 20 mg  20 mg Oral DAILY Deanna Kwan M.D.   20 mg at 03/04/20 0639   • acetaminophen (TYLENOL) tablet 650 mg  650 mg Oral Q6HRS PRN Deanna Kwan M.D.       • methadone (DOLOPHINE) tablet 30 mg  30 mg Oral BID Deanna Kwan M.D.   30 mg at 03/04/20 1522    And   • methadone (DOLOPHINE) tablet 10 mg  10 mg Oral QHS Deanna Kwan M.D.   10 mg at 03/03/20 2108   • oxyCODONE immediate-release (ROXICODONE) tablet 5 mg  5 mg Oral Q4HRS PRN Deanna Kwan M.D.        Or   • oxyCODONE immediate release (ROXICODONE) tablet 10 mg  10 mg Oral Q4HRS PRN Deanna Kwan M.D.   10 mg at 03/04/20 1826   • tramadol (ULTRAM) 50 MG tablet 50 mg  50 mg Oral BID Deanna Kwan M.D.   50 mg at 03/04/20 1810   • simvastatin (ZOCOR) tablet 40 mg  40 mg Oral Nightly Deanna Kwan M.D.   40 mg at 03/04/20 2015   • ALPRAZolam (XANAX) tablet 0.25 mg  0.25 mg Oral 4X/DAY PRN Deanna Kwan M.D.   0.25 mg at 03/04/20 1810   • levothyroxine (SYNTHROID) tablet 125 mcg  125 mcg Oral QAM AC Deanna Kwan M.D.   125 mcg at 03/04/20 0635   • predniSONE (DELTASONE) tablet 60 mg  60 mg Oral DAILY Deanna Kwan M.D.   60 mg at 03/04/20 0638   • senna-docusate (PERICOLACE or SENOKOT S) 8.6-50 MG per tablet 2 Tab  2 Tab Oral BID Deanna Kwan M.D.   2 Tab at 03/04/20 0640    And   • polyethylene glycol/lytes (MIRALAX) PACKET 1 Packet  1 Packet Oral BID Deanna Kwan M.D.        And   • magnesium hydroxide (MILK OF MAGNESIA) suspension 30 mL  30 mL Oral QDAY PRN Deanna Kwan M.D.        And   • bisacodyl (DULCOLAX) suppository 10 mg  10 mg Rectal QDAY PRN Deanna Kwan M.D.       • gabapentin (NEURONTIN) capsule 2,400 mg  2,400 mg Oral QAM Deanna Kwan M.D.   2,400 mg at 03/04/20 0640    And   •  gabapentin (NEURONTIN) capsule 1,600 mg  1,600 mg Oral Q EVENING Deanna Kwan M.D.   1,600 mg at 03/04/20 1810   • guaiFENesin dextromethorphan (ROBITUSSIN DM) 100-10 MG/5ML syrup 10 mL  10 mL Oral Q6HRS PRN Deanna Kwan M.D.       • promethazine (PHENERGAN) tablet 12.5-25 mg  12.5-25 mg Oral Q4HRS PRN Deanna Kwan M.D.       • tamsulosin (FLOMAX) capsule 0.4 mg  0.4 mg Oral AFTER BREAKFAST Deanna Kwan M.D.   0.4 mg at 03/04/20 0908   • DILTIAZem (CARDIZEM) tablet 30 mg  30 mg Oral TWICE DAILY Perry Paige, D.O.   30 mg at 03/04/20 1811   • DILTIAZem (CARDIZEM) injection 18.4 mg  0.25 mg/kg Intravenous Q4HRS PRN Perry Paige, D.O.   18.4 mg at 03/02/20 1553   • HYDROmorphone pf (DILAUDID) injection 1 mg  1 mg Intravenous Q4HRS PRN Srinath Sauceda M.D.   1 mg at 03/03/20 1109   • enoxaparin (LOVENOX) inj 40 mg  40 mg Subcutaneous DAILY Marck Javier M.D.   40 mg at 03/04/20 0642   • ipratropium-albuterol (DUONEB) nebulizer solution  3 mL Nebulization Q2HRS PRN (RT) Marck Javier M.D.       • timolol (TIMOPTIC) 0.5 % ophthalmic solution 1 Drop  1 Drop Both Eyes Q EVENING Tim Bustos M.D.   1 Drop at 03/04/20 1811   • Respiratory Therapy Consult   Nebulization Continuous RT Tim Bustos M.D.       • promethazine (PHENERGAN) suppository 12.5-25 mg  12.5-25 mg Rectal Q4HRS PRN Tim Bustos M.D.       • prochlorperazine (COMPAZINE) injection 5-10 mg  5-10 mg Intravenous Q4HRS PRN Tim Bustos M.D.   10 mg at 03/01/20 1409   • albuterol (PROVENTIL) 2.5mg/0.5ml nebulizer solution 2.5 mg  2.5 mg Nebulization Q2HRS PRN (RT) Tim Bustos M.D.           Fluids    Intake/Output Summary (Last 24 hours) at 3/4/2020 2017  Last data filed at 3/4/2020 1836  Gross per 24 hour   Intake 480 ml   Output 2149 ml   Net -1669 ml       Laboratory          Recent Labs     03/02/20  0257 03/03/20  0400 03/04/20  0356   SODIUM 137 138 136   POTASSIUM 4.0 4.4 3.9    CHLORIDE 98 99 99   CO2 34* 37* 32   BUN 41* 35* 32*   CREATININE 1.01 1.05 1.08   CALCIUM 9.7 9.3 8.7     Recent Labs     03/02/20 0257 03/03/20  0400 03/04/20  0356   ALTSGPT 62*  --   --    ASTSGOT 35  --   --    ALKPHOSPHAT 73  --   --    TBILIRUBIN 0.4  --   --    GLUCOSE 106* 82 91     Recent Labs     03/02/20 0257 03/03/20  0400 03/04/20  0356   WBC 25.1* 22.6* 23.7*   NEUTSPOLYS 63.70 67.50  --    LYMPHOCYTES 23.90 19.30*  --    MONOCYTES 7.10 5.30  --    EOSINOPHILS 4.40 5.30  --    BASOPHILS 0.00 1.70  --    ASTSGOT 35  --   --    ALTSGPT 62*  --   --    ALKPHOSPHAT 73  --   --    TBILIRUBIN 0.4  --   --      Recent Labs     03/02/20 0257 03/03/20 0400 03/04/20  0356   RBC 5.71 5.21 4.57*   HEMOGLOBIN 14.9 13.7* 12.3*   HEMATOCRIT 48.3 44.3 38.6*   PLATELETCT 473* 439 399       Imaging  cxrs reviewed and on 3/3 compared to previous the reticular pattern does appear improved compared to 2/19  Chest tube in the left in good position    Assessment/Plan  NSIP (nonspecific interstitial pneumonia) (HCC)- (present on admission)  Assessment & Plan  Trial steroids  Recommendation is 1 mg/kg  Change taper to 60 mg daily for 30 days, then will need reevaluation in pulmonary clinic  Complicated by emphysema/copd changes  S/p biopsy 2/22 of right lung where majority of GG is present  Some granulation tissue of alveoli, possible component of sarcoid - bx sent to [facility] for evaluation  CTD work up unremarkable  Added back bactrim for PJP prophylaxis  May eventually need lung biopsy  Thoracic surgery following      Acute on chronic respiratory failure with hypoxia (HCC)- (present on admission)  Assessment & Plan  Acute hypoxic respiratory failure  Attributed to NSIP + empysema  Extubated 2/28 - tolerating      Pneumothorax  Assessment & Plan  Chest tube on the left in due to persistent pneumothorax when clamped  May require surgical intervention vs small chest tube insertion, surgery following for surgical  evaluation - current on water seal  Due to underlying lung disease pleurodesis less likely to be successful        Pulmonary hypertension (HCC)- (present on admission)  Assessment & Plan  Secondary to interstitial lung disease and copd/smoking hx  Optimize copd medications  symbicort bid + spiriva  duonebs prn      Pulmonary emphysema (HCC)  Assessment & Plan  Emphysema complicating interstitial disease and pneumothorax  duonebs every 2 hour prn  spiriva daily and symbicort bid  Will need followup outpatient with pft      Tobacco abuse- (present on admission)  Assessment & Plan  Counseled on avoiding especially in setting of interstitial lung disease           I have performed a physical exam and reviewed and updated ROS and Plan today (3/4/2020). In review of yesterday's note (3/3/2020), there are no changes except as documented above.

## 2020-03-05 NOTE — CARE PLAN
Problem: Communication  Goal: The ability to communicate needs accurately and effectively will improve  Outcome: PROGRESSING AS EXPECTED     Problem: Safety  Goal: Will remain free from injury  Outcome: PROGRESSING AS EXPECTED  Goal: Will remain free from falls  Outcome: PROGRESSING AS EXPECTED     Problem: Bowel/Gastric:  Goal: Normal bowel function is maintained or improved  Outcome: PROGRESSING AS EXPECTED  Goal: Will not experience complications related to bowel motility  Outcome: PROGRESSING AS EXPECTED     Problem: Knowledge Deficit  Goal: Knowledge of disease process/condition, treatment plan, diagnostic tests, and medications will improve  Outcome: PROGRESSING AS EXPECTED  Goal: Knowledge of the prescribed therapeutic regimen will improve  Outcome: PROGRESSING AS EXPECTED     Problem: Discharge Barriers/Planning  Goal: Patient's continuum of care needs will be met  Outcome: PROGRESSING AS EXPECTED     Problem: Psychosocial Needs:  Goal: Level of anxiety will decrease  Outcome: PROGRESSING AS EXPECTED     Problem: Skin Integrity  Goal: Risk for impaired skin integrity will decrease  Outcome: PROGRESSING AS EXPECTED     Problem: Urinary Elimination:  Goal: Ability to reestablish a normal urinary elimination pattern will improve  Outcome: PROGRESSING AS EXPECTED     Problem: Safety  Goal: Free from accidental injury  Outcome: PROGRESSING AS EXPECTED     Problem: Knowledge Deficit  Goal: Maintain or improve baseline circulation, motion and sensation  Outcome: PROGRESSING AS EXPECTED  Goal: Patient/Significant other demonstrates understanding of disease process, treatment plan, medications and discharge instructions  Outcome: PROGRESSING AS EXPECTED     Problem: Psychosocial needs  Goal: Anxiety reduction  Outcome: PROGRESSING AS EXPECTED     Problem: Risk for Infection, Impaired Wound Healing  Goal: Promotion of Wound Healing and Drain Management  Outcome: PROGRESSING AS EXPECTED     Problem: Risk of  Aspiration  Goal: Absence of aspiration  Outcome: PROGRESSING AS EXPECTED     Problem: Nutrition Deficit  Goal: Adequate Food and Fluid Intake  Outcome: PROGRESSING AS EXPECTED     Problem: Self Care Deficit  Goal: Ability to bathe, groom and dress independently or with assistance  Outcome: PROGRESSING AS EXPECTED     Problem: Elimination  Goal: Establishment and Maintenance of regular bowel elimination  Outcome: PROGRESSING AS EXPECTED

## 2020-03-05 NOTE — THERAPY
"Speech Language Therapy dysphagia treatment completed.   Functional Status:  Ongoing assessment of oropharyngeal swallow skills and safe swallow strategy training conducted during breakfast meal of Minced and Moist Solids (Dysphagia 2) (L5) and Mildly Thick Liquids (Nectar Thick) (L2) liquids with therapeutic trials of Soft & Bite Sized (Dysphagia 3) (L6) and Thin Liquids (L0). Pt was alert, cooperative, reporting he feels he can swallow well, is tired of thickened beverages and ground up food. Min verbal cues provided for pt to reposition to upright at 90 degrees, reduce bite/sip size and eat slowly. Pt demo'd functional mastication and bolus manipulation for soft solids. No s/sx of aspiration occurred with PO intake and trial textures. Recommend diet upgrade to Soft & Bite Sized (Dysphagia 3) (L6)/Thin Liquids (L0) and D/C direct supervision as pt is not impulsive.       Recommendations: Upgrade diet to Soft & Bite Sized (Dysphagia 3) (L6)/Thin Liquids (L0); pt does not require meal supervision     Plan of Care: Will benefit from Speech Therapy 3 times per week  Post-Acute Therapy: Recommend outpatient or home health transitional care services for continued speech therapy services      See \"Rehab Therapy-Acute\" Patient Summary Report for complete documentation.     "

## 2020-03-05 NOTE — PROGRESS NOTES
Received report from previous nurse, Estefany, regarding prior 12 hours.  POC reviewed with pt, white board updated, pt verbalized understanding, call light within reach.  Pt encouraged to call before getting up.  Bed in lowest position, treaded slippers on.

## 2020-03-05 NOTE — PROGRESS NOTES
Monitor summary:     0.14/0.10/0.38     Sinus Rhythm 60 - 75     with occasional, frequent PACs, rare PVCs

## 2020-03-05 NOTE — PROGRESS NOTES
Surgical Progress Note    Author: KHOI Monique Date & Time created: 3/5/2020   1:33 PM     Interval Events:  Chest tube on water seal overnight, no increased O2 needs or dyspnea reported by pt; pt in NSR    Review of Systems   Constitutional: Negative for chills and fever.   Respiratory: Negative for shortness of breath.    Skin: Negative for itching and rash.     Hemodynamics:  Temp (24hrs), Av.6 °C (97.8 °F), Min:36.2 °C (97.2 °F), Max:36.9 °C (98.5 °F)  Temperature: 36.2 °C (97.2 °F)  Pulse  Av.9  Min: 42  Max: 144   Blood Pressure: (!) 99/61     Respiratory:    Respiration: 16, Pulse Oximetry: 93 %     Work Of Breathing / Effort: Mild  RUL Breath Sounds: Clear, RML Breath Sounds: Diminished;Crackles, RLL Breath Sounds: Crackles, MARCEL Breath Sounds: Clear, LLL Breath Sounds: Clear  Neuro:  GCS Total [place] Coma Score: 15     Fluids:    Intake/Output Summary (Last 24 hours) at 3/5/2020 1333  Last data filed at 3/5/2020 1100  Gross per 24 hour   Intake 240 ml   Output 1966 ml   Net -1726 ml        Current Diet Order   Procedures   • Diet Order Regular     Physical Exam  Vitals signs and nursing note reviewed.   Constitutional:       General: He is not in acute distress.  Pulmonary:      Effort: Pulmonary effort is normal. No respiratory distress.      Comments: Chest tube in place, ~ 20ml serous output/24 hrs  No airleak seen  Wounds c/d/i  Skin:     General: Skin is warm and dry.   Neurological:      Mental Status: He is alert and oriented to person, place, and time.   Psychiatric:         Mood and Affect: Mood normal.       Labs:  No results found for this or any previous visit (from the past 24 hour(s)).  Medical Decision Making, by Problem:  Active Hospital Problems    Diagnosis   • Acute on chronic respiratory failure with hypoxia (HCC) [J96.21]     Priority: High   • Pneumothorax [J93.9]     Priority: Medium   • Interstitial lung disease (HCC) [J84.9]     Priority: Medium   • QT prolongation  [R94.31]     Priority: Medium   • Chronic neck pain [M54.2, G89.29]     Priority: Medium     Premorbid treated with methadone, Xanax and Neurontin.  Satisfactory analgesia with current PCA settings.  Resume maintenance medications when tolerating diet     • Pulmonary hypertension (HCC) [I27.20]     Priority: Medium   • Pneumonia due to infectious organism [J18.9]     Priority: Medium   • Hypothyroid [E03.9]     Priority: Low     Premorbid  On levothyroxine     • Pulmonary emphysema (HCC) [J43.9]   • NSIP (nonspecific interstitial pneumonia) (HCC) [J84.89]   • Atrial fibrillation with rapid ventricular response (HCC) [I48.91]   • Tobacco abuse [Z72.0]     Plan:  CXR : no ptx  Chest tube observed for several minutes, no air leak. Therefore, chest tube removed and Tegaderm dressing placed over both chest tube sites.  Sutures will need to come out next week.  Ok to shower over Tegaderm.  Will sign off on case. Please call if questions or problems ([phone]).    Quality Measures:  Quality-Core Measures   Reviewed items::  Radiology images reviewed  DVT prophylaxis - mechanical:  SCDs      Discussed patient condition with RN, Patient and Dr. Ganser

## 2020-03-06 ENCOUNTER — APPOINTMENT (OUTPATIENT)
Dept: RADIOLOGY | Facility: MEDICAL CENTER | Age: 60
DRG: 166 | End: 2020-03-06
Attending: INTERNAL MEDICINE
Payer: MEDICARE

## 2020-03-06 PROCEDURE — 71045 X-RAY EXAM CHEST 1 VIEW: CPT

## 2020-03-06 PROCEDURE — 99233 SBSQ HOSP IP/OBS HIGH 50: CPT | Performed by: INTERNAL MEDICINE

## 2020-03-06 PROCEDURE — 99232 SBSQ HOSP IP/OBS MODERATE 35: CPT | Performed by: FAMILY MEDICINE

## 2020-03-06 PROCEDURE — 302220 CHEST TUBE TRAY: Performed by: INTERNAL MEDICINE

## 2020-03-06 PROCEDURE — 700111 HCHG RX REV CODE 636 W/ 250 OVERRIDE (IP): Performed by: HOSPITALIST

## 2020-03-06 PROCEDURE — 770020 HCHG ROOM/CARE - TELE (206)

## 2020-03-06 PROCEDURE — 700102 HCHG RX REV CODE 250 W/ 637 OVERRIDE(OP): Performed by: HOSPITALIST

## 2020-03-06 PROCEDURE — A9270 NON-COVERED ITEM OR SERVICE: HCPCS | Performed by: HOSPITALIST

## 2020-03-06 PROCEDURE — 700111 HCHG RX REV CODE 636 W/ 250 OVERRIDE (IP): Performed by: PSYCHIATRY & NEUROLOGY

## 2020-03-06 PROCEDURE — 700111 HCHG RX REV CODE 636 W/ 250 OVERRIDE (IP): Performed by: INTERNAL MEDICINE

## 2020-03-06 RX ADMIN — ENOXAPARIN SODIUM 40 MG: 100 INJECTION SUBCUTANEOUS at 05:05

## 2020-03-06 RX ADMIN — DILTIAZEM HYDROCHLORIDE 18.4 MG: 5 INJECTION INTRAVENOUS at 20:01

## 2020-03-06 RX ADMIN — ALPRAZOLAM 0.25 MG: 0.25 TABLET ORAL at 05:20

## 2020-03-06 RX ADMIN — ALPRAZOLAM 0.25 MG: 0.25 TABLET ORAL at 20:35

## 2020-03-06 RX ADMIN — OMEPRAZOLE 20 MG: 20 CAPSULE, DELAYED RELEASE ORAL at 05:04

## 2020-03-06 RX ADMIN — METHADONE HYDROCHLORIDE 30 MG: 10 TABLET ORAL at 05:04

## 2020-03-06 RX ADMIN — GABAPENTIN 2400 MG: 400 CAPSULE ORAL at 05:04

## 2020-03-06 RX ADMIN — TRAMADOL HYDROCHLORIDE 50 MG: 50 TABLET, FILM COATED ORAL at 05:04

## 2020-03-06 RX ADMIN — LEVOTHYROXINE SODIUM 125 MCG: 125 TABLET ORAL at 07:48

## 2020-03-06 RX ADMIN — TRAMADOL HYDROCHLORIDE 50 MG: 50 TABLET, FILM COATED ORAL at 16:51

## 2020-03-06 RX ADMIN — TIMOLOL MALEATE 1 DROP: 5 SOLUTION OPHTHALMIC at 20:37

## 2020-03-06 RX ADMIN — OXYCODONE HYDROCHLORIDE 10 MG: 10 TABLET ORAL at 14:42

## 2020-03-06 RX ADMIN — ALPRAZOLAM 0.25 MG: 0.25 TABLET ORAL at 14:43

## 2020-03-06 RX ADMIN — METHADONE HYDROCHLORIDE 10 MG: 10 TABLET ORAL at 20:35

## 2020-03-06 RX ADMIN — TAMSULOSIN HYDROCHLORIDE 0.4 MG: 0.4 CAPSULE ORAL at 08:30

## 2020-03-06 RX ADMIN — PREDNISONE 60 MG: 50 TABLET ORAL at 05:04

## 2020-03-06 RX ADMIN — SIMVASTATIN 40 MG: 40 TABLET, FILM COATED ORAL at 20:35

## 2020-03-06 RX ADMIN — METHADONE HYDROCHLORIDE 30 MG: 10 TABLET ORAL at 16:47

## 2020-03-06 RX ADMIN — GABAPENTIN 1600 MG: 400 CAPSULE ORAL at 16:51

## 2020-03-06 RX ADMIN — HYDROMORPHONE HYDROCHLORIDE 1 MG: 1 INJECTION, SOLUTION INTRAMUSCULAR; INTRAVENOUS; SUBCUTANEOUS at 20:35

## 2020-03-06 RX ADMIN — HYDROMORPHONE HYDROCHLORIDE 1 MG: 1 INJECTION, SOLUTION INTRAMUSCULAR; INTRAVENOUS; SUBCUTANEOUS at 16:46

## 2020-03-06 ASSESSMENT — ENCOUNTER SYMPTOMS
BACK PAIN: 0
POLYDIPSIA: 0
NECK PAIN: 0
MUSCULOSKELETAL NEGATIVE: 1
PSYCHIATRIC NEGATIVE: 1
WEAKNESS: 1
CHILLS: 0
MYALGIAS: 0
VOMITING: 0
PALPITATIONS: 0
TINGLING: 0
NAUSEA: 0
WHEEZING: 0
DIZZINESS: 0
HEADACHES: 0
ORTHOPNEA: 0
HEARTBURN: 0
CLAUDICATION: 0
FOCAL WEAKNESS: 0
DOUBLE VISION: 0
PHOTOPHOBIA: 0
SINUS PAIN: 0
BLURRED VISION: 0
SHORTNESS OF BREATH: 1
DIAPHORESIS: 0
BLOOD IN STOOL: 0
FEVER: 0
SORE THROAT: 0
NECK PAIN: 1
WEIGHT LOSS: 0
BRUISES/BLEEDS EASILY: 0
SPEECH CHANGE: 0
HEMOPTYSIS: 0
STRIDOR: 0
COUGH: 0
SPUTUM PRODUCTION: 0
DEPRESSION: 0
SENSORY CHANGE: 0
SHORTNESS OF BREATH: 0
ABDOMINAL PAIN: 0
NERVOUS/ANXIOUS: 0

## 2020-03-06 ASSESSMENT — FIBROSIS 4 INDEX: FIB4 SCORE: 0.67

## 2020-03-06 ASSESSMENT — PAIN SCALES - WONG BAKER: WONGBAKER_NUMERICALRESPONSE: HURTS A WHOLE LOT

## 2020-03-06 NOTE — PROGRESS NOTES
[facility] Medicine Daily Progress Note    Date of Service  3/6/2020    Chief Complaint  59 y.o. male admitted 2/18/2020 with SOB    Hospital Course    This is a 59-year-old male with past medical history positive for asthma, chronic back pain, COPD on 3 LNC at home, depression, hypothyroid, peripheral neuropathy. He presented on 2/18/2020 with several days of feeling ill and abdominal pain, nausea and vomiting.  He was hypoxic at 71% on room air on presentation and was found to have spontaneous pneumothorax and right lower lobe pneumonia. After arrival here his chest tube became dislodged and therefore was removed.      On 2/22 he began having increased work of breathing and was noted to have a tension pneumothorax, so chest tube was replaced and patient was intubated, and transferred to the ICU.  Bronchoscopy and biopsy was done on 2/22 for possible ILD and he was started on steroids.  While in ICU he went into AFib RVR and was started on amiodarone.  He was extubated on 2/28 and was doing well; his chest tube was placed on waterseal but a left apical pneumothorax returned and the chest tube was returned to suction.  On 3/2 the chest tube was clamped again and repeat chest x-ray again showed an enlarging pneumothorax.  The chest tube was unclamped and the pneumothorax again resolved per chest x-ray.  During this encounter he went into rapid atrial fibrillation which was treated with IV diltiazem and he was started on p.o. medications for A. fib.      Interval Problem Update  Feels better today  Re-occurrence of pneumothorax    Consultants/Specialty  Surgery  pulmonary    Code Status  full    Disposition  pending    Review of Systems  Review of Systems   Constitutional: Negative for chills, fever and malaise/fatigue.   HENT: Negative for ear discharge, ear pain and tinnitus.    Eyes: Negative for blurred vision, double vision and photophobia.   Respiratory: Negative for cough, hemoptysis and sputum production.     Cardiovascular: Negative for palpitations, orthopnea and claudication.   Gastrointestinal: Negative for heartburn, nausea and vomiting.   Genitourinary: Negative for dysuria, frequency and urgency.   Musculoskeletal: Positive for neck pain. Negative for joint pain and myalgias.   Skin: Negative for itching and rash.   Neurological: Negative for dizziness, tingling and headaches.        Physical Exam  Temp:  [36.1 °C (96.9 °F)-36.5 °C (97.7 °F)] 36.3 °C (97.4 °F)  Pulse:  [62-85] 66  Resp:  [16-18] 18  BP: ()/() 100/58  SpO2:  [93 %-95 %] 94 %    Physical Exam  Constitutional:       General: He is not in acute distress.     Appearance: He is not toxic-appearing.   HENT:      Head: Normocephalic and atraumatic.      Nose: Nose normal.      Mouth/Throat:      Mouth: Mucous membranes are moist.   Eyes:      Extraocular Movements: Extraocular movements intact.      Pupils: Pupils are equal, round, and reactive to light.   Neck:      Musculoskeletal: Normal range of motion and neck supple.   Cardiovascular:      Rate and Rhythm: Normal rate and regular rhythm.      Heart sounds: No murmur. No friction rub.   Pulmonary:      Effort: Pulmonary effort is normal.      Breath sounds: Normal breath sounds.      Comments: Decreased breath sounds  Bilaterally  Chest tube on the left side is noted  Abdominal:      General: Abdomen is flat.      Palpations: Abdomen is soft.   Musculoskeletal: Normal range of motion.   Skin:     General: Skin is warm and dry.   Neurological:      General: No focal deficit present.      Mental Status: He is alert and oriented to person, place, and time.   Psychiatric:         Mood and Affect: Mood normal.         Fluids    Intake/Output Summary (Last 24 hours) at 3/6/2020 0936  Last data filed at 3/6/2020 0900  Gross per 24 hour   Intake 360 ml   Output 2350 ml   Net -1990 ml       Laboratory  Recent Labs     03/04/20  0356   WBC 23.7*   RBC 4.57*   HEMOGLOBIN 12.3*   HEMATOCRIT 38.6*    MCV 84.5   MCH 26.9*   MCHC 31.9*   RDW 46.0   PLATELETCT 399   MPV 9.8     Recent Labs     03/04/20  0356   SODIUM 136   POTASSIUM 3.9   CHLORIDE 99   CO2 32   GLUCOSE 91   BUN 32*   CREATININE 1.08   CALCIUM 8.7                   Imaging       Assessment/Plan  Acute on chronic respiratory failure with hypoxia (HCC)- (present on admission)  Assessment & Plan  Likely secondary to nonspecific interstitial pneumonia and pneumothoraces  Continuing very long steroid taper as well as chest tube  Status post treatment for CAP, on RT protocol for COPD  Continue to mobilize and continue IS  On 2 L nasal cannula  Pulmonary input is noted  No acute change  D/w nurse at the bed side    Hypoxia  Assessment & Plan  As a result from COPD, pneumothorax, pneumonia    Pneumothorax  Assessment & Plan  Continues with left chest tube, multiple tension pneumothoraces when chest tube clamped  CT surgery managing, Dr. Ganser trying to avoid surgery  Chest tube was dced yesterday and pt has reoccuerence  Of pneumothorax  cxray result on 3/6 is noted with moderate pneumothorax  D/w pulmonary  Surgery is notified  Pt is doing well  Chest tube is at bed side  Reevaluation by surgery in the afternoon to see if he is   Going to need the re-insertion of the chest tube    QT prolongation- (present on admission)  Assessment & Plan  Has resolved  qtc 376  dced cardizam 2nd to bradycaria of mid 40s as  Per nurse's report    Chronic neck pain- (present on admission)  Assessment & Plan  On his home regimen of methadone 30mg BID and 10mg qhs  Also on gabapentin  Oxycodone and Dilaudid sparingly as needed for breakthrough as well as Ultram    Pulmonary hypertension (HCC)- (present on admission)  Assessment & Plan  History of  Stable presently on no medications    Pneumonia due to infectious organism- (present on admission)  Assessment & Plan  Now s/p completed ceftriaxone, Flagyl and Bactrim    NSIP (nonspecific interstitial pneumonia) (HCC)- (present  on admission)  Assessment & Plan  Pulmonary input is noted  On long prednisone taper    Atrial fibrillation with rapid ventricular response (HCC)  Assessment & Plan  Converted back to sinus  dced cardizam 2nd to bradycardia    Tobacco abuse- (present on admission)  Assessment & Plan  History of extensive use, the patient quit in 2015    Hypothyroid- (present on admission)  Assessment & Plan  Continue with replacement       VTE prophylaxis: lovenox

## 2020-03-06 NOTE — CARE PLAN
Problem: Communication  Goal: The ability to communicate needs accurately and effectively will improve  Outcome: PROGRESSING AS EXPECTED  Intervention: Sledge patient and significant other/support system to call light to alert staff of needs  Flowsheets (Taken 3/3/2020 2228 by Denisse Carvalho RKhadraN.)  Oriented to:: All of the Following : Location of Bathroom, Visiting Policy, Unit Routine, Call Light and Bedside Controls, Bedside Rail Policy, Smoking Policy, Rights and Responsibilities, Bedside Report, and Patient Education Notebook  Note: Pt educated on POC and medications. Pt verbalized understanding.      Problem: Safety  Goal: Will remain free from falls  Outcome: PROGRESSING AS EXPECTED  Intervention: Assess risk factors for falls  Flowsheets  Taken 3/5/2020 0800 by Marybeth Mackey RKhadraN.  Pt Calls for Assistance: Yes  Taken 3/5/2020 2057 by Rakesh Russo RSOCORRO  Fall Risk: Risk to Fall -  0 - 1 point  History of fall: 0  Mobility Status Assessment: 1-1 Healthcare Provider Required for Assistance with Ambulation & Transfer  Risk for Injury-Any positive answers results in the pt being at high risk for fall related injury: Not Applicable  Note: Pt bedside table and call-light are within reach, bed in lowest position and locked. Treaded socks on and pt has been educated on call light use and asked to call before getting up.

## 2020-03-06 NOTE — PROGRESS NOTES
Bedside report received from night shift nurse Sara YODER. POC discussed, patient appears anxious, call light in reach, bed locked and in lowest position, no reports of pain, white board updated, will check MAR for interventions. Patient is stable at this moment, will continue to monitor.

## 2020-03-06 NOTE — PROGRESS NOTES
Assumed care of pt, beside report received from BEBA Rueda. Updated on POC, call light within reach all fall precautions within place. Bed locked and lowered. Pt instructed to call for assistance before getting up. All questions answered, no other needs at this time.

## 2020-03-06 NOTE — PROGRESS NOTES
Pulmonary update    CXR review shows left sided pneumothorax this am is moderate  Chest tube removed yesterday  Notified Hospitalist and surgical team PA  The pneumothorax has been nonresolving despite prolonged chest tube placement  May need pleurodesis +/- bullectomy as he does have severe emphysema    Have not seen patient as currently at different hospital

## 2020-03-06 NOTE — PROGRESS NOTES
Surgical Progress Note    Author: KHOI Monique Date & Time created: 3/6/2020   9:08 AM     Interval Events:  Ches tube removed yesterday without difficulty. Patient reports coughing forcefully this AM when he got up to bathroom. CXR form 0730 demonstrates moderate pneumothorax. Patient denies any dyspnea or pain; no increased O2 needs after ambulation.     Review of Systems   Constitutional: Negative for chills and fever.   Respiratory: Negative for shortness of breath.    Skin: Negative for itching and rash.     Hemodynamics:  Temp (24hrs), Av.2 °C (97.2 °F), Min:36.1 °C (96.9 °F), Max:36.5 °C (97.7 °F)  Temperature: 36.3 °C (97.4 °F)  Pulse  Av  Min: 42  Max: 144   Blood Pressure: 100/58     Respiratory:    Respiration: 18, Pulse Oximetry: 94 %     Work Of Breathing / Effort: Mild  RUL Breath Sounds: Clear, RML Breath Sounds: Diminished, RLL Breath Sounds: Diminished, MARCEL Breath Sounds: Clear, LLL Breath Sounds: Clear  Neuro:  GCS       Fluids:    Intake/Output Summary (Last 24 hours) at 3/6/2020 0908  Last data filed at 3/5/2020 2253  Gross per 24 hour   Intake --   Output 2350 ml   Net -2350 ml     Weight: 79.5 kg (175 lb 4.3 oz)  Current Diet Order   Procedures   • Diet Order Regular     Physical Exam  Vitals signs and nursing note reviewed.   Constitutional:       General: He is not in acute distress.  Cardiovascular:      Rate and Rhythm: Normal rate.   Pulmonary:      Effort: Pulmonary effort is normal. No respiratory distress.      Comments: Chest tube bandage sealed.  Abdominal:      Palpations: Abdomen is soft.   Skin:     General: Skin is warm and dry.   Neurological:      Mental Status: He is alert.   Psychiatric:         Mood and Affect: Mood normal.       Labs:  No results found for this or any previous visit (from the past 24 hour(s)).  Medical Decision Making, by Problem:  Active Hospital Problems    Diagnosis   • Acute on chronic respiratory failure with hypoxia (HCC) [J96.21]      Priority: High   • Pneumothorax [J93.9]     Priority: Medium   • Interstitial lung disease (HCC) [J84.9]     Priority: Medium   • QT prolongation [R94.31]     Priority: Medium   • Chronic neck pain [M54.2, G89.29]     Priority: Medium     Premorbid treated with methadone, Xanax and Neurontin.  Satisfactory analgesia with current PCA settings.  Resume maintenance medications when tolerating diet     • Pulmonary hypertension (HCC) [I27.20]     Priority: Medium   • Pneumonia due to infectious organism [J18.9]     Priority: Medium   • Hypothyroid [E03.9]     Priority: Low     Premorbid  On levothyroxine     • Pulmonary emphysema (HCC) [J43.9]   • NSIP (nonspecific interstitial pneumonia) (HCC) [J84.89]   • Atrial fibrillation with rapid ventricular response (HCC) [I48.91]   • Tobacco abuse [Z72.0]     Plan:  Clinically, the patient is stable; not requiring increased oxygen, even after ambulating.  I replaced the bandage and will order a new CXR for this afternoon. If ptx has enlarged, then patient will need new chest tube. D/w patient and his questions answered.  I've discussed case with Dr. Interiano, (covering for Dr. Ganser today) and Dr. Escalona, pulmonology.  Will follow closely.     Quality Measures:  Quality-Core Measures   Reviewed items::  Radiology images reviewed      Discussed patient condition with RN, patient, Dr. Escalona (pulmonology), Dr. Interiano

## 2020-03-06 NOTE — PROGRESS NOTES
Bedside report received. POC discussed with pt; all questions answered at this time. PA at bedside. Increase in pneumo post CT removal. Repeat CXR at 1500 today. CT tray ordered to be at bedside.

## 2020-03-07 ENCOUNTER — APPOINTMENT (OUTPATIENT)
Dept: RADIOLOGY | Facility: MEDICAL CENTER | Age: 60
DRG: 166 | End: 2020-03-07
Attending: INTERNAL MEDICINE
Payer: MEDICARE

## 2020-03-07 ENCOUNTER — APPOINTMENT (OUTPATIENT)
Dept: CARDIOLOGY | Facility: MEDICAL CENTER | Age: 60
DRG: 166 | End: 2020-03-07
Attending: FAMILY MEDICINE
Payer: MEDICARE

## 2020-03-07 ENCOUNTER — APPOINTMENT (OUTPATIENT)
Dept: RADIOLOGY | Facility: MEDICAL CENTER | Age: 60
DRG: 166 | End: 2020-03-07
Attending: FAMILY MEDICINE
Payer: MEDICARE

## 2020-03-07 LAB
ALBUMIN SERPL BCP-MCNC: 3.4 G/DL (ref 3.2–4.9)
ALBUMIN/GLOB SERPL: 1.2 G/DL
ALP SERPL-CCNC: 58 U/L (ref 30–99)
ALT SERPL-CCNC: 38 U/L (ref 2–50)
ANION GAP SERPL CALC-SCNC: 7 MMOL/L (ref 0–11.9)
AST SERPL-CCNC: 25 U/L (ref 12–45)
BASOPHILS # BLD AUTO: 0.7 % (ref 0–1.8)
BASOPHILS # BLD: 0.16 K/UL (ref 0–0.12)
BILIRUB SERPL-MCNC: 0.3 MG/DL (ref 0.1–1.5)
BUN SERPL-MCNC: 21 MG/DL (ref 8–22)
CALCIUM SERPL-MCNC: 9.2 MG/DL (ref 8.5–10.5)
CHLORIDE SERPL-SCNC: 99 MMOL/L (ref 96–112)
CO2 SERPL-SCNC: 29 MMOL/L (ref 20–33)
CREAT SERPL-MCNC: 0.99 MG/DL (ref 0.5–1.4)
EOSINOPHIL # BLD AUTO: 0.44 K/UL (ref 0–0.51)
EOSINOPHIL NFR BLD: 1.9 % (ref 0–6.9)
ERYTHROCYTE [DISTWIDTH] IN BLOOD BY AUTOMATED COUNT: 50.5 FL (ref 35.9–50)
GLOBULIN SER CALC-MCNC: 2.8 G/DL (ref 1.9–3.5)
GLUCOSE SERPL-MCNC: 84 MG/DL (ref 65–99)
HCT VFR BLD AUTO: 41.7 % (ref 42–52)
HGB BLD-MCNC: 12.7 G/DL (ref 14–18)
IMM GRANULOCYTES # BLD AUTO: 0.31 K/UL (ref 0–0.11)
IMM GRANULOCYTES NFR BLD AUTO: 1.3 % (ref 0–0.9)
LYMPHOCYTES # BLD AUTO: 5.76 K/UL (ref 1–4.8)
LYMPHOCYTES NFR BLD: 24.5 % (ref 22–41)
MCH RBC QN AUTO: 26.8 PG (ref 27–33)
MCHC RBC AUTO-ENTMCNC: 30.5 G/DL (ref 33.7–35.3)
MCV RBC AUTO: 88 FL (ref 81.4–97.8)
MONOCYTES # BLD AUTO: 1.7 K/UL (ref 0–0.85)
MONOCYTES NFR BLD AUTO: 7.2 % (ref 0–13.4)
NEUTROPHILS # BLD AUTO: 15.1 K/UL (ref 1.82–7.42)
NEUTROPHILS NFR BLD: 64.4 % (ref 44–72)
NRBC # BLD AUTO: 0 K/UL
NRBC BLD-RTO: 0 /100 WBC
PLATELET # BLD AUTO: 190 K/UL (ref 164–446)
PMV BLD AUTO: 11 FL (ref 9–12.9)
POTASSIUM SERPL-SCNC: 4.2 MMOL/L (ref 3.6–5.5)
PROT SERPL-MCNC: 6.2 G/DL (ref 6–8.2)
RBC # BLD AUTO: 4.74 M/UL (ref 4.7–6.1)
SODIUM SERPL-SCNC: 135 MMOL/L (ref 135–145)
WBC # BLD AUTO: 23.5 K/UL (ref 4.8–10.8)

## 2020-03-07 PROCEDURE — A9270 NON-COVERED ITEM OR SERVICE: HCPCS | Performed by: INTERNAL MEDICINE

## 2020-03-07 PROCEDURE — 99232 SBSQ HOSP IP/OBS MODERATE 35: CPT | Performed by: FAMILY MEDICINE

## 2020-03-07 PROCEDURE — A9270 NON-COVERED ITEM OR SERVICE: HCPCS | Performed by: HOSPITALIST

## 2020-03-07 PROCEDURE — 700102 HCHG RX REV CODE 250 W/ 637 OVERRIDE(OP): Performed by: INTERNAL MEDICINE

## 2020-03-07 PROCEDURE — 93308 TTE F-UP OR LMTD: CPT | Mod: 26 | Performed by: INTERNAL MEDICINE

## 2020-03-07 PROCEDURE — 71045 X-RAY EXAM CHEST 1 VIEW: CPT

## 2020-03-07 PROCEDURE — 85025 COMPLETE CBC W/AUTO DIFF WBC: CPT

## 2020-03-07 PROCEDURE — 700102 HCHG RX REV CODE 250 W/ 637 OVERRIDE(OP): Performed by: FAMILY MEDICINE

## 2020-03-07 PROCEDURE — A9270 NON-COVERED ITEM OR SERVICE: HCPCS | Performed by: FAMILY MEDICINE

## 2020-03-07 PROCEDURE — 51798 US URINE CAPACITY MEASURE: CPT

## 2020-03-07 PROCEDURE — 80053 COMPREHEN METABOLIC PANEL: CPT

## 2020-03-07 PROCEDURE — 93325 DOPPLER ECHO COLOR FLOW MAPG: CPT | Mod: 26 | Performed by: INTERNAL MEDICINE

## 2020-03-07 PROCEDURE — 99232 SBSQ HOSP IP/OBS MODERATE 35: CPT | Performed by: INTERNAL MEDICINE

## 2020-03-07 PROCEDURE — 700102 HCHG RX REV CODE 250 W/ 637 OVERRIDE(OP): Performed by: HOSPITALIST

## 2020-03-07 PROCEDURE — 36415 COLL VENOUS BLD VENIPUNCTURE: CPT

## 2020-03-07 PROCEDURE — 700117 HCHG RX CONTRAST REV CODE 255: Performed by: INTERNAL MEDICINE

## 2020-03-07 PROCEDURE — 93308 TTE F-UP OR LMTD: CPT

## 2020-03-07 PROCEDURE — 770020 HCHG ROOM/CARE - TELE (206)

## 2020-03-07 PROCEDURE — 700111 HCHG RX REV CODE 636 W/ 250 OVERRIDE (IP): Performed by: HOSPITALIST

## 2020-03-07 PROCEDURE — 700111 HCHG RX REV CODE 636 W/ 250 OVERRIDE (IP): Performed by: PSYCHIATRY & NEUROLOGY

## 2020-03-07 RX ORDER — ALPRAZOLAM 0.5 MG/1
0.5 TABLET ORAL 4 TIMES DAILY PRN
Status: DISCONTINUED | OUTPATIENT
Start: 2020-03-07 | End: 2020-03-11 | Stop reason: HOSPADM

## 2020-03-07 RX ADMIN — OXYCODONE HYDROCHLORIDE 10 MG: 10 TABLET ORAL at 07:42

## 2020-03-07 RX ADMIN — ALPRAZOLAM 0.25 MG: 0.25 TABLET ORAL at 09:37

## 2020-03-07 RX ADMIN — LEVOTHYROXINE SODIUM 125 MCG: 125 TABLET ORAL at 04:23

## 2020-03-07 RX ADMIN — GABAPENTIN 2400 MG: 400 CAPSULE ORAL at 04:22

## 2020-03-07 RX ADMIN — TRAMADOL HYDROCHLORIDE 50 MG: 50 TABLET, FILM COATED ORAL at 17:06

## 2020-03-07 RX ADMIN — HYDROMORPHONE HYDROCHLORIDE 1 MG: 1 INJECTION, SOLUTION INTRAMUSCULAR; INTRAVENOUS; SUBCUTANEOUS at 07:41

## 2020-03-07 RX ADMIN — TRAMADOL HYDROCHLORIDE 50 MG: 50 TABLET, FILM COATED ORAL at 04:23

## 2020-03-07 RX ADMIN — GABAPENTIN 1600 MG: 400 CAPSULE ORAL at 17:06

## 2020-03-07 RX ADMIN — SULFAMETHOXAZOLE AND TRIMETHOPRIM 1 TABLET: 800; 160 TABLET ORAL at 04:23

## 2020-03-07 RX ADMIN — DILTIAZEM HYDROCHLORIDE 30 MG: 30 TABLET, FILM COATED ORAL at 10:42

## 2020-03-07 RX ADMIN — TAMSULOSIN HYDROCHLORIDE 0.4 MG: 0.4 CAPSULE ORAL at 07:42

## 2020-03-07 RX ADMIN — OMEPRAZOLE 20 MG: 20 CAPSULE, DELAYED RELEASE ORAL at 04:23

## 2020-03-07 RX ADMIN — ALPRAZOLAM 0.25 MG: 0.25 TABLET ORAL at 01:36

## 2020-03-07 RX ADMIN — HUMAN ALBUMIN MICROSPHERES AND PERFLUTREN 3 ML: 10; .22 INJECTION, SOLUTION INTRAVENOUS at 15:25

## 2020-03-07 RX ADMIN — HYDROMORPHONE HYDROCHLORIDE 1 MG: 1 INJECTION, SOLUTION INTRAMUSCULAR; INTRAVENOUS; SUBCUTANEOUS at 18:28

## 2020-03-07 RX ADMIN — TIMOLOL MALEATE 1 DROP: 5 SOLUTION OPHTHALMIC at 17:07

## 2020-03-07 RX ADMIN — ALPRAZOLAM 0.5 MG: 0.5 TABLET ORAL at 23:52

## 2020-03-07 RX ADMIN — METHADONE HYDROCHLORIDE 30 MG: 10 TABLET ORAL at 14:22

## 2020-03-07 RX ADMIN — METHADONE HYDROCHLORIDE 30 MG: 10 TABLET ORAL at 04:22

## 2020-03-07 RX ADMIN — PREDNISONE 60 MG: 50 TABLET ORAL at 04:23

## 2020-03-07 RX ADMIN — HYDROMORPHONE HYDROCHLORIDE 1 MG: 1 INJECTION, SOLUTION INTRAMUSCULAR; INTRAVENOUS; SUBCUTANEOUS at 01:01

## 2020-03-07 RX ADMIN — ALPRAZOLAM 0.5 MG: 0.5 TABLET ORAL at 17:06

## 2020-03-07 RX ADMIN — HYDROMORPHONE HYDROCHLORIDE 1 MG: 1 INJECTION, SOLUTION INTRAMUSCULAR; INTRAVENOUS; SUBCUTANEOUS at 14:22

## 2020-03-07 RX ADMIN — ALPRAZOLAM 0.5 MG: 0.5 TABLET ORAL at 10:42

## 2020-03-07 RX ADMIN — DILTIAZEM HYDROCHLORIDE 30 MG: 30 TABLET, FILM COATED ORAL at 17:07

## 2020-03-07 ASSESSMENT — FIBROSIS 4 INDEX: FIB4 SCORE: 1.28

## 2020-03-07 ASSESSMENT — ENCOUNTER SYMPTOMS
BLOOD IN STOOL: 0
DIZZINESS: 0
MUSCULOSKELETAL NEGATIVE: 1
STRIDOR: 0
SENSORY CHANGE: 0
NAUSEA: 0
HEARTBURN: 0
EYE PAIN: 0
PHOTOPHOBIA: 0
MYALGIAS: 0
SHORTNESS OF BREATH: 1
WEIGHT LOSS: 0
CHILLS: 0
NERVOUS/ANXIOUS: 0
VOMITING: 0
COUGH: 0
TREMORS: 0
DOUBLE VISION: 0
SPUTUM PRODUCTION: 0
HEADACHES: 0
HEMOPTYSIS: 0
ABDOMINAL PAIN: 0
DIAPHORESIS: 0
TINGLING: 0
NECK PAIN: 1
BRUISES/BLEEDS EASILY: 0
BACK PAIN: 0
PALPITATIONS: 0
SINUS PAIN: 0
FEVER: 0
SPEECH CHANGE: 0
SORE THROAT: 0
POLYDIPSIA: 0
BLURRED VISION: 0
ORTHOPNEA: 0
PSYCHIATRIC NEGATIVE: 1
DEPRESSION: 0
NECK PAIN: 0
WEAKNESS: 1
FOCAL WEAKNESS: 0
SHORTNESS OF BREATH: 0
WHEEZING: 0

## 2020-03-07 NOTE — PROGRESS NOTES
Assumed care of pt, beside report received from BEBA Magaña. Updated on POC, call light within reach all fall precautions within place. Bed locked and lowered. Pt instructed to call for assistance before getting up. All questions answered, no other needs at this time.

## 2020-03-07 NOTE — PROGRESS NOTES
[facility] Medicine Daily Progress Note    Date of Service  3/7/2020    Chief Complaint  59 y.o. male admitted 2/18/2020 with SOB    Hospital Course    This is a 59-year-old male with past medical history positive for asthma, chronic back pain, COPD on 3 LNC at home, depression, hypothyroid, peripheral neuropathy. He presented on 2/18/2020 with several days of feeling ill and abdominal pain, nausea and vomiting.  He was hypoxic at 71% on room air on presentation and was found to have spontaneous pneumothorax and right lower lobe pneumonia. After arrival here his chest tube became dislodged and therefore was removed.      On 2/22 he began having increased work of breathing and was noted to have a tension pneumothorax, so chest tube was replaced and patient was intubated, and transferred to the ICU.  Bronchoscopy and biopsy was done on 2/22 for possible ILD and he was started on steroids.  While in ICU he went into AFib RVR and was started on amiodarone.  He was extubated on 2/28 and was doing well; his chest tube was placed on waterseal but a left apical pneumothorax returned and the chest tube was returned to suction.  On 3/2 the chest tube was clamped again and repeat chest x-ray again showed an enlarging pneumothorax.  The chest tube was unclamped and the pneumothorax again resolved per chest x-ray.  During this encounter he went into rapid atrial fibrillation which was treated with IV diltiazem and he was started on p.o. medications for A. fib.      Interval Problem Update  Feels better today  Re-occurrence of pneumothorax  Increased xanax because of anxiety    Consultants/Specialty  Surgery  pulmonary    Code Status  full    Disposition  pending    Review of Systems  Review of Systems   Constitutional: Negative for chills, diaphoresis and malaise/fatigue.   HENT: Negative for ear discharge, ear pain and nosebleeds.    Eyes: Negative for double vision, photophobia and pain.   Respiratory: Negative for hemoptysis,  sputum production and wheezing.    Cardiovascular: Negative for chest pain, palpitations and orthopnea.   Gastrointestinal: Negative for abdominal pain, nausea and vomiting.   Genitourinary: Negative for frequency, hematuria and urgency.   Musculoskeletal: Positive for neck pain. Negative for joint pain and myalgias.   Skin: Negative for itching and rash.   Neurological: Negative for tingling, tremors and headaches.        Physical Exam  Temp:  [35.8 °C (96.5 °F)-37.1 °C (98.7 °F)] 36.5 °C (97.7 °F)  Pulse:  [60-87] 64  Resp:  [15-18] 18  BP: ()/(59-95) 93/60  SpO2:  [93 %-98 %] 94 %    Physical Exam  Constitutional:       Appearance: He is not toxic-appearing or diaphoretic.   HENT:      Head: Normocephalic and atraumatic.      Nose: Nose normal.      Mouth/Throat:      Pharynx: No oropharyngeal exudate or posterior oropharyngeal erythema.   Eyes:      Conjunctiva/sclera: Conjunctivae normal.   Neck:      Musculoskeletal: No neck rigidity or muscular tenderness.   Cardiovascular:      Rate and Rhythm: Normal rate and regular rhythm.      Pulses: Normal pulses.      Heart sounds: Normal heart sounds.   Pulmonary:      Breath sounds: Normal breath sounds. No stridor. No wheezing or rhonchi.      Comments: Decreased breath sounds  Bilaterally  Chest tube on the left side is noted  Abdominal:      General: There is no distension.      Tenderness: There is no abdominal tenderness. There is no guarding.   Musculoskeletal:         General: No swelling or tenderness.   Skin:     Coloration: Skin is not jaundiced or pale.   Neurological:      Mental Status: He is oriented to person, place, and time. Mental status is at baseline.   Psychiatric:         Mood and Affect: Mood normal.         Fluids    Intake/Output Summary (Last 24 hours) at 3/7/2020 1021  Last data filed at 3/7/2020 1000  Gross per 24 hour   Intake 1270 ml   Output 1700 ml   Net -430 ml       Laboratory  Recent Labs     03/07/20  0340   WBC 23.5*   RBC  4.74   HEMOGLOBIN 12.7*   HEMATOCRIT 41.7*   MCV 88.0   MCH 26.8*   MCHC 30.5*   RDW 50.5*   PLATELETCT 190   MPV 11.0     Recent Labs     03/07/20  0340   SODIUM 135   POTASSIUM 4.2   CHLORIDE 99   CO2 29   GLUCOSE 84   BUN 21   CREATININE 0.99   CALCIUM 9.2                   Imaging       Assessment/Plan  Acute on chronic respiratory failure with hypoxia (HCC)- (present on admission)  Assessment & Plan  Likely secondary to nonspecific interstitial pneumonia and pneumothoraces  Continuing very long steroid taper   Status post treatment for CAP, on RT protocol for COPD  Continue to mobilize and continue IS  On 2 L nasal cannula  Pulmonary input is noted  No acute change  D/w nurse at the bed side    Hypoxia  Assessment & Plan  As a result from COPD, pneumothorax, pneumonia    Pneumothorax  Assessment & Plan  Continues with left chest tube, multiple tension pneumothoraces when chest tube clamped  CT surgery managing, Dr. Ganser trying to avoid surgery  Chest tube was dced yesterday and pt has reoccuerence  Of pneumothorax  cxray result on 3/6 is noted with moderate pneumothorax  D/w pulmonary  Surgery is notified  Pt is doing well  Chest tube is at bed side  Reevaluation by surgery input is noted  No re-insertion of the chest rube so far since the pt is doing well    QT prolongation- (present on admission)  Assessment & Plan  Has resolved  qtc 376        Chronic neck pain- (present on admission)  Assessment & Plan  On his home regimen of methadone 30mg BID and 10mg qhs  Also on gabapentin  Oxycodone and Dilaudid sparingly as needed for breakthrough as well as Ultram    Pulmonary hypertension (HCC)- (present on admission)  Assessment & Plan  History of  Stable presently on no medications    Pneumonia due to infectious organism- (present on admission)  Assessment & Plan  Now s/p completed ceftriaxone, Flagyl   Bactrim is resumed by pulmonary  Pulmonary input is noted    NSIP (nonspecific interstitial pneumonia) (HCC)-  (present on admission)  Assessment & Plan  Pulmonary input is noted  On long prednisone taper    Atrial fibrillation with rapid ventricular response (HCC)  Assessment & Plan  Converted back to sinus  Had a fib with rvr last night  His vascular tyson score is zero  No need for anticoagulation at this time  Will check tsh  Will get cardiac echo    Tobacco abuse- (present on admission)  Assessment & Plan  History of extensive use, the patient quit in 2015    Hypothyroid- (present on admission)  Assessment & Plan  Continue with replacement  Will check tsh       VTE prophylaxis: lovenox

## 2020-03-07 NOTE — PROGRESS NOTES
Surgical Progress Note    Author: KHOI Monique Date & Time created: 3/7/2020   9:45 AM     Interval Events:  Interval improvment in PTX on CXR today. Patient stable clinically    Review of Systems   Constitutional: Negative for chills and fever.   Respiratory: Negative for shortness of breath.    Skin: Negative for rash.     Hemodynamics:  Temp (24hrs), Av.4 °C (97.6 °F), Min:35.8 °C (96.5 °F), Max:37.1 °C (98.7 °F)  Temperature: 36.5 °C (97.7 °F)  Pulse  Av  Min: 42  Max: 144   Blood Pressure: (!) 93/60(nurse notified)     Respiratory:    Respiration: 18, Pulse Oximetry: 94 %     Work Of Breathing / Effort: Mild  RUL Breath Sounds: Clear, RML Breath Sounds: Diminished, RLL Breath Sounds: Diminished, MARCEL Breath Sounds: Clear, LLL Breath Sounds: Clear  Neuro:  GCS       Fluids:    Intake/Output Summary (Last 24 hours) at 3/7/2020 0945  Last data filed at 3/7/2020 0400  Gross per 24 hour   Intake 570 ml   Output 900 ml   Net -330 ml     Weight: 86.8 kg (191 lb 5.8 oz)  Current Diet Order   Procedures   • Diet Order Regular     Physical Exam  Vitals signs and nursing note reviewed.   Constitutional:       General: He is not in acute distress.  Pulmonary:      Effort: Pulmonary effort is normal. No respiratory distress.   Abdominal:      Palpations: Abdomen is soft.   Skin:     General: Skin is warm and dry.   Neurological:      Mental Status: He is alert and oriented to person, place, and time.   Psychiatric:         Mood and Affect: Mood normal.       Labs:  Recent Results (from the past 24 hour(s))   CBC WITH DIFFERENTIAL    Collection Time: 20  3:40 AM   Result Value Ref Range    WBC 23.5 (H) 4.8 - 10.8 K/uL    RBC 4.74 4.70 - 6.10 M/uL    Hemoglobin 12.7 (L) 14.0 - 18.0 g/dL    Hematocrit 41.7 (L) 42.0 - 52.0 %    MCV 88.0 81.4 - 97.8 fL    MCH 26.8 (L) 27.0 - 33.0 pg    MCHC 30.5 (L) 33.7 - 35.3 g/dL    RDW 50.5 (H) 35.9 - 50.0 fL    Platelet Count 190 164 - 446 K/uL    MPV 11.0 9.0 - 12.9  fL    Neutrophils-Polys 64.40 44.00 - 72.00 %    Lymphocytes 24.50 22.00 - 41.00 %    Monocytes 7.20 0.00 - 13.40 %    Eosinophils 1.90 0.00 - 6.90 %    Basophils 0.70 0.00 - 1.80 %    Immature Granulocytes 1.30 (H) 0.00 - 0.90 %    Nucleated RBC 0.00 /100 WBC    Neutrophils (Absolute) 15.10 (H) 1.82 - 7.42 K/uL    Lymphs (Absolute) 5.76 (H) 1.00 - 4.80 K/uL    Monos (Absolute) 1.70 (H) 0.00 - 0.85 K/uL    Eos (Absolute) 0.44 0.00 - 0.51 K/uL    Baso (Absolute) 0.16 (H) 0.00 - 0.12 K/uL    Immature Granulocytes (abs) 0.31 (H) 0.00 - 0.11 K/uL    NRBC (Absolute) 0.00 K/uL   Comp Metabolic Panel    Collection Time: 03/07/20  3:40 AM   Result Value Ref Range    Sodium 135 135 - 145 mmol/L    Potassium 4.2 3.6 - 5.5 mmol/L    Chloride 99 96 - 112 mmol/L    Co2 29 20 - 33 mmol/L    Anion Gap 7.0 0.0 - 11.9    Glucose 84 65 - 99 mg/dL    Bun 21 8 - 22 mg/dL    Creatinine 0.99 0.50 - 1.40 mg/dL    Calcium 9.2 8.5 - 10.5 mg/dL    AST(SGOT) 25 12 - 45 U/L    ALT(SGPT) 38 2 - 50 U/L    Alkaline Phosphatase 58 30 - 99 U/L    Total Bilirubin 0.3 0.1 - 1.5 mg/dL    Albumin 3.4 3.2 - 4.9 g/dL    Total Protein 6.2 6.0 - 8.2 g/dL    Globulin 2.8 1.9 - 3.5 g/dL    A-G Ratio 1.2 g/dL   ESTIMATED GFR    Collection Time: 03/07/20  3:40 AM   Result Value Ref Range    GFR If African American >60 >60 mL/min/1.73 m 2    GFR If Non African American >60 >60 mL/min/1.73 m 2     Medical Decision Making, by Problem:  Active Hospital Problems    Diagnosis   • Acute on chronic respiratory failure with hypoxia (HCC) [J96.21]     Priority: High   • Pneumothorax [J93.9]     Priority: Medium   • Interstitial lung disease (HCC) [J84.9]     Priority: Medium   • QT prolongation [R94.31]     Priority: Medium   • Chronic neck pain [M54.2, G89.29]     Priority: Medium     Premorbid treated with methadone, Xanax and Neurontin.  Satisfactory analgesia with current PCA settings.  Resume maintenance medications when tolerating diet     • Pulmonary hypertension  ([facility]) [I27.20]     Priority: Medium   • Pneumonia due to infectious organism [J18.9]     Priority: Medium   • Hypothyroid [E03.9]     Priority: Low     Premorbid  On levothyroxine     • Pulmonary emphysema ([facility]) [J43.9]   • NSIP (nonspecific interstitial pneumonia) ([facility]) [J84.89]   • Atrial fibrillation with rapid ventricular response ([facility]) [I48.91]   • Tobacco abuse [Z72.0]     Plan:  Patient doing as well as could be expected at this point. No need for chest tube at this time.  Pt counseled re: diet, activity, wound care, home med's.  Ok to shower over tegaderm. Remove dressing in three days. No baths/soaks x 2 weeks.  Will need to f/u with Dr. Ganser in 7-10 days for suture removal.  Will sign off on case.    Quality Measures:  Quality-Core Measures    Discussed patient condition with RN, Patient and Dr. Ganser/Jaydon

## 2020-03-07 NOTE — CARE PLAN
Problem: Communication  Goal: The ability to communicate needs accurately and effectively will improve  Outcome: PROGRESSING AS EXPECTED  Intervention: Leon patient and significant other/support system to call light to alert staff of needs  Flowsheets (Taken 3/3/2020 2228 by Denisse Carvalho RNAYE.)  Oriented to:: All of the Following : Location of Bathroom, Visiting Policy, Unit Routine, Call Light and Bedside Controls, Bedside Rail Policy, Smoking Policy, Rights and Responsibilities, Bedside Report, and Patient Education Notebook  Note: Pt educated on POC and medications. Pt verbalized understanding.      Problem: Safety  Goal: Will remain free from injury  Outcome: PROGRESSING AS EXPECTED  Intervention: Provide assistance with mobility  Flowsheets (Taken 3/6/2020 0858 by Alba Hoff, C.N.A.)  Assistance: Standby Assist  Ambulation Tolerance: Tolerates Well  Note: Pt bedside table and call-light are within reach, bed in lowest position and locked. Treaded socks on and pt has been educated on call light use and asked to call before getting up.

## 2020-03-07 NOTE — PROGRESS NOTES
"Bedside report received. POC discussed with pt; all questions answered at this time. CXR order altered per radiology request d/t to patient being extubated now. Order indication changed to \"follow-up on recurrent pneumothorax\".  "

## 2020-03-07 NOTE — PROGRESS NOTES
Pulmonary and Critical Care Progress Note    Date of admission  2/18/2020    Chief Complaint  59 y.o. male who presented 2/18/2020 with history of oxygen dependent chronic hypoxic respiratory failure, requiring 2.5-3 L home oxygen, emphysema, pulmonary hypertension and chronic pain who is on methadone.  He was transferred from [place] for pulmonary specialty care with a left lower lobe pneumonia and pneumothorax on 2/18.  Influenza screening was negative.  Shortly after arriving at [facility] he was noted to have a dislodged chest tube.  A replacement chest tube was not necessary.  He was requiring 5 L of oxygen via nasal cannula on 2/19/2/20.  A pulmonology consultation requested a high resolution CT scan, the initiated a connective tissue work-up and continued broad-spectrum antibiotics.  On the morning of 2/21 patient had increasing work of breathing and hypoxic respiratory failure.  Critical care consultation was requested for further evaluation and management.    Hospital Course    2/21-patient was evaluated in the IR suite for placement of a pigtail catheter.  At the time the residual pneumothorax was felt to be too small to benefit from catheter placement.  At 2335 patient became hypoxic, tachycardic and hypotensive.  Chest x-ray revealed a large pneumothorax.  An emergent chest tube was placed with improvement in hemodynamic and oxygenation parameters.  2/22- Pulmonary biopsy performed.  Prednisone 1 mg/kilogram/day initiated  2/28- Extubated; afib with RVR placed on amio gtt  2/29 -Improved mentation. Did well s/p extubation chest placed on water seal   3/1-  Left apical pneumo seen and chest tube returned to suction   3/2, chest xray no significant pneumothorax (tiny), chest tube clamped per myself.  Repeat chest xray.  3/4: CT on water seal  3/5: Chest tube removed  3/6: reoccurance of pneumothorax on the left    3/8:  Left pneumothorax improved compared to 3/7      Interval Problem  Update  Reviewed last 24 hour events:  On 3l NC  No SOB    Review of Systems  Review of Systems   Constitutional: Positive for malaise/fatigue. Negative for chills, diaphoresis, fever and weight loss.   HENT: Negative for congestion, sinus pain and sore throat.    Eyes: Negative for blurred vision, double vision and photophobia.   Respiratory: Positive for shortness of breath. Negative for cough, hemoptysis, sputum production, wheezing and stridor.    Cardiovascular: Negative for chest pain, palpitations and leg swelling.   Gastrointestinal: Negative for abdominal pain, blood in stool, heartburn, melena, nausea and vomiting.   Genitourinary: Negative.  Negative for dysuria and urgency.   Musculoskeletal: Negative.  Negative for back pain, myalgias and neck pain.   Skin: Negative for itching and rash.   Neurological: Positive for weakness. Negative for dizziness, tingling, sensory change, speech change, focal weakness and headaches.        Generalized weakness form deconditioning   Endo/Heme/Allergies: Negative for polydipsia. Does not bruise/bleed easily.   Psychiatric/Behavioral: Negative.  Negative for depression. The patient is not nervous/anxious.         Vital Signs for last 24 hours   Temp:  [35.8 °C (96.5 °F)-36.6 °C (97.8 °F)] 36.5 °C (97.7 °F)  Pulse:  [60-87] 64  Resp:  [15-18] 18  BP: ()/(59-95) 93/60  SpO2:  [93 %-98 %] 94 %     Physical Exam   Physical Exam  Vitals signs and nursing note reviewed.   Constitutional:       General: He is not in acute distress.     Appearance: Normal appearance. He is ill-appearing. He is not toxic-appearing or diaphoretic.      Comments: Chronically ill appearance, deconditioned   HENT:      Head: Normocephalic and atraumatic.      Right Ear: Tympanic membrane and external ear normal.      Left Ear: Tympanic membrane and external ear normal.      Nose: No congestion or rhinorrhea.      Mouth/Throat:      Mouth: Mucous membranes are moist.      Pharynx: Oropharynx is  clear. No oropharyngeal exudate or posterior oropharyngeal erythema.   Eyes:      General: No scleral icterus.        Right eye: No discharge.         Left eye: No discharge.      Extraocular Movements: Extraocular movements intact.      Conjunctiva/sclera: Conjunctivae normal.      Pupils: Pupils are equal, round, and reactive to light.   Neck:      Musculoskeletal: Normal range of motion. No neck rigidity or muscular tenderness.   Cardiovascular:      Rate and Rhythm: Normal rate and regular rhythm.      Pulses: Normal pulses.      Heart sounds: Normal heart sounds. No murmur.   Pulmonary:      Breath sounds: No stridor. No wheezing, rhonchi or rales.      Comments: Reduced air flow b/l  No Subc air  Chest:      Chest wall: No tenderness.   Abdominal:      General: There is no distension.      Palpations: There is no mass.      Tenderness: There is no abdominal tenderness. There is no guarding.   Musculoskeletal:         General: No swelling, tenderness or deformity.      Right lower leg: No edema.      Left lower leg: No edema.   Lymphadenopathy:      Cervical: No cervical adenopathy.   Skin:     Coloration: Skin is not jaundiced or pale.      Findings: No bruising, erythema, lesion or rash.   Neurological:      General: No focal deficit present.      Mental Status: He is alert and oriented to person, place, and time. Mental status is at baseline.      Cranial Nerves: No cranial nerve deficit.      Sensory: No sensory deficit.      Motor: No weakness.      Coordination: Coordination normal.      Gait: Gait normal.   Psychiatric:         Mood and Affect: Mood normal.         Behavior: Behavior normal.         Thought Content: Thought content normal.         Judgment: Judgment normal.         Medications  Current Facility-Administered Medications   Medication Dose Route Frequency Provider Last Rate Last Dose   • DILTIAZem (CARDIZEM) tablet 30 mg  30 mg Oral TWICE DAILY Garrett Jackson M.D.   30 mg at 03/07/20  1042   • ALPRAZolam (XANAX) tablet 0.5 mg  0.5 mg Oral 4X/DAY PRN Garrett Jackson M.D.   0.5 mg at 03/07/20 1042   • sulfamethoxazole-trimethoprim (BACTRIM DS) 800-160 MG tablet 1 Tab  1 Tab Oral 3x/week Robbie Cates M.D.   1 Tab at 03/07/20 0423   • omeprazole (PRILOSEC) capsule 20 mg  20 mg Oral DAILY Deanna Kwan M.D.   20 mg at 03/07/20 0423   • acetaminophen (TYLENOL) tablet 650 mg  650 mg Oral Q6HRS PRN Deanna Kwan M.D.       • methadone (DOLOPHINE) tablet 30 mg  30 mg Oral BID Deanna Kwan M.D.   30 mg at 03/07/20 0422    And   • methadone (DOLOPHINE) tablet 10 mg  10 mg Oral QHS Deanna Kwan M.D.   10 mg at 03/06/20 2035   • oxyCODONE immediate-release (ROXICODONE) tablet 5 mg  5 mg Oral Q4HRS PRN Deanna Kwan M.D.        Or   • oxyCODONE immediate release (ROXICODONE) tablet 10 mg  10 mg Oral Q4HRS PRN Deanna Kwan M.D.   10 mg at 03/07/20 0742   • tramadol (ULTRAM) 50 MG tablet 50 mg  50 mg Oral BID Deanna Kwan M.D.   50 mg at 03/07/20 0423   • simvastatin (ZOCOR) tablet 40 mg  40 mg Oral Nightly Deanna Kwan M.D.   40 mg at 03/06/20 2035   • levothyroxine (SYNTHROID) tablet 125 mcg  125 mcg Oral QAM AC Deanna Kwan M.D.   125 mcg at 03/07/20 0423   • predniSONE (DELTASONE) tablet 60 mg  60 mg Oral DAILY Deanna Kwan M.D.   60 mg at 03/07/20 0423   • senna-docusate (PERICOLACE or SENOKOT S) 8.6-50 MG per tablet 2 Tab  2 Tab Oral BID Deanna Kwan M.D.   Stopped at 03/06/20 1800    And   • polyethylene glycol/lytes (MIRALAX) PACKET 1 Packet  1 Packet Oral BID Deanna Kwan M.D.   Stopped at 03/06/20 1800    And   • magnesium hydroxide (MILK OF MAGNESIA) suspension 30 mL  30 mL Oral QDAY PRN Deanna Kwan M.D.        And   • bisacodyl (DULCOLAX) suppository 10 mg  10 mg Rectal QDAY PRN Deanna Kwan M.D.       • gabapentin (NEURONTIN) capsule 2,400 mg  2,400 mg Oral QAM Deanna Kwan M.D.   2,400 mg at 03/07/20 0422    And   • gabapentin  (NEURONTIN) capsule 1,600 mg  1,600 mg Oral Q EVENING Deanna Kwan M.D.   1,600 mg at 03/06/20 1651   • guaiFENesin dextromethorphan (ROBITUSSIN DM) 100-10 MG/5ML syrup 10 mL  10 mL Oral Q6HRS PRN Deanna Kwan M.D.       • promethazine (PHENERGAN) tablet 12.5-25 mg  12.5-25 mg Oral Q4HRS PRN Deanna Kwan M.D.       • tamsulosin (FLOMAX) capsule 0.4 mg  0.4 mg Oral AFTER BREAKFAST Deanna Kwan M.D.   0.4 mg at 03/07/20 0742   • DILTIAZem (CARDIZEM) injection 18.4 mg  0.25 mg/kg Intravenous Q4HRS PRN Perry Paige D.O.   18.4 mg at 03/06/20 2001   • HYDROmorphone pf (DILAUDID) injection 1 mg  1 mg Intravenous Q4HRS PRN Srinath Sauceda M.D.   1 mg at 03/07/20 0741   • ipratropium-albuterol (DUONEB) nebulizer solution  3 mL Nebulization Q2HRS PRN (RT) Marck Javier M.D.       • timolol (TIMOPTIC) 0.5 % ophthalmic solution 1 Drop  1 Drop Both Eyes Q EVENING Tim Bustos M.D.   1 Drop at 03/06/20 2037   • Respiratory Therapy Consult   Nebulization Continuous RT Tim Bustos M.D.       • promethazine (PHENERGAN) suppository 12.5-25 mg  12.5-25 mg Rectal Q4HRS PRN Tim Bustos M.D.       • prochlorperazine (COMPAZINE) injection 5-10 mg  5-10 mg Intravenous Q4HRS PRN Tim Bustos M.D.   10 mg at 03/01/20 1409   • albuterol (PROVENTIL) 2.5mg/0.5ml nebulizer solution 2.5 mg  2.5 mg Nebulization Q2HRS PRN (RT) Tim Bustos M.D.           Fluids    Intake/Output Summary (Last 24 hours) at 3/7/2020 1410  Last data filed at 3/7/2020 1000  Gross per 24 hour   Intake 910 ml   Output 1700 ml   Net -790 ml       Laboratory          Recent Labs     03/07/20  0340   SODIUM 135   POTASSIUM 4.2   CHLORIDE 99   CO2 29   BUN 21   CREATININE 0.99   CALCIUM 9.2     Recent Labs     03/07/20  0340   ALTSGPT 38   ASTSGOT 25   ALKPHOSPHAT 58   TBILIRUBIN 0.3   GLUCOSE 84     Recent Labs     03/07/20  0340   WBC 23.5*   NEUTSPOLYS 64.40   LYMPHOCYTES 24.50   MONOCYTES 7.20    EOSINOPHILS 1.90   BASOPHILS 0.70   ASTSGOT 25   ALTSGPT 38   ALKPHOSPHAT 58   TBILIRUBIN 0.3     Recent Labs     03/07/20  0340   RBC 4.74   HEMOGLOBIN 12.7*   HEMATOCRIT 41.7*   PLATELETCT 190       Imaging  CXR reviewed 3/5, 3/6 am and 3/6 pm  reoccurance of left pneumothorax may be slightly bigger from 7 am to4 pm 3/6  No midline shift    Assessment/Plan  Pneumothorax  Assessment & Plan  Chest tube on the left in due to persistent pneumothorax when clamped  Chest tube removed on 3/5  Surgery has been following the left moderate pneumothorax - plan is follow up with surgery outpatient in 7-10 days. No chest tube  is recommended at this time as pneumothorax is improving and Patient is asymptomatic no change in O2 requirement      NSIP (nonspecific interstitial pneumonia) (HCC)- (present on admission)  Assessment & Plan  Trial steroids  Recommendation is 1 mg/kg  Change taper to 60 mg daily for 30 days, then will need reevaluation in pulmonary clinic  Complicated by emphysema/copd changes  S/p biopsy 2/22 of right lung where majority of GG is present  Some granulation tissue of alveoli, possible component of sarcoid - bx sent to [facility] for evaluation-- pending as of 3/7  CTD work up unremarkable   bactrim for PJP prophylaxis      Acute on chronic respiratory failure with hypoxia (HCC)- (present on admission)  Assessment & Plan  Acute hypoxic respiratory failure  Attributed to NSIP + empysema + pneumothorax on the left  Extubated 2/28     Pulmonary hypertension (HCC)- (present on admission)  Assessment & Plan  Secondary to interstitial lung disease and copd/smoking hx  Optimize copd medications  symbicort bid + spiriva  duonebs prn      Pulmonary emphysema (HCC)  Assessment & Plan  Emphysema complicating interstitial disease and pneumothorax  duonebs every 4 hour prn  spiriva daily and symbicort bid  Will need followup outpatient with pft            I have performed a physical exam and reviewed and updated ROS  and Plan today (3/7/2020). In review of yesterday's note (3/6/2020), there are no changes except as documented above.

## 2020-03-07 NOTE — PROGRESS NOTES
Pulmonary and Critical Care Progress Note    Date of admission  2/18/2020    Chief Complaint  59 y.o. male who presented 2/18/2020 with history of oxygen dependent chronic hypoxic respiratory failure, requiring 2.5-3 L home oxygen, emphysema, pulmonary hypertension and chronic pain who is on methadone.  He was transferred from [place] for pulmonary specialty care with a left lower lobe pneumonia and pneumothorax on 2/18.  Influenza screening was negative.  Shortly after arriving at [facility] he was noted to have a dislodged chest tube.  A replacement chest tube was not necessary.  He was requiring 5 L of oxygen via nasal cannula on 2/19/2/20.  A pulmonology consultation requested a high resolution CT scan, the initiated a connective tissue work-up and continued broad-spectrum antibiotics.  On the morning of 2/21 patient had increasing work of breathing and hypoxic respiratory failure.  Critical care consultation was requested for further evaluation and management.    Hospital Course    2/21-patient was evaluated in the IR suite for placement of a pigtail catheter.  At the time the residual pneumothorax was felt to be too small to benefit from catheter placement.  At 2335 patient became hypoxic, tachycardic and hypotensive.  Chest x-ray revealed a large pneumothorax.  An emergent chest tube was placed with improvement in hemodynamic and oxygenation parameters.  2/22- Pulmonary biopsy performed.  Prednisone 1 mg/kilogram/day initiated  2/28- Extubated; afib with RVR placed on amio gtt  2/29 -Improved mentation. Did well s/p extubation chest placed on water seal   3/1-  Left apical pneumo seen and chest tube returned to suction   3/2, chest xray no significant pneumothorax (tiny), chest tube clamped per myself.  Repeat chest xray.  3/4: CT on water seal  3/5: Chest tube removed  3/6: reoccurance of pneumothorax on the left          Interval Problem Update  Reviewed last 24 hour events:  On 3l  NC  No SOB  Very upset about the recurrent pneumothorax as would like to go home    Review of Systems  Review of Systems   Constitutional: Positive for malaise/fatigue. Negative for chills, diaphoresis, fever and weight loss.   HENT: Negative for congestion, sinus pain and sore throat.    Eyes: Negative for blurred vision, double vision and photophobia.   Respiratory: Positive for shortness of breath. Negative for cough, hemoptysis, sputum production, wheezing and stridor.    Cardiovascular: Negative for chest pain, palpitations and leg swelling.   Gastrointestinal: Negative for abdominal pain, blood in stool, heartburn, melena, nausea and vomiting.   Genitourinary: Negative.  Negative for dysuria and urgency.   Musculoskeletal: Negative.  Negative for back pain, myalgias and neck pain.   Skin: Negative for itching and rash.   Neurological: Positive for weakness. Negative for dizziness, tingling, sensory change, speech change, focal weakness and headaches.        Generalized weakness form deconditioning   Endo/Heme/Allergies: Negative for polydipsia. Does not bruise/bleed easily.   Psychiatric/Behavioral: Negative.  Negative for depression. The patient is not nervous/anxious.         Vital Signs for last 24 hours   Temp:  [36.1 °C (96.9 °F)-37.1 °C (98.7 °F)] 36.6 °C (97.8 °F)  Pulse:  [66-87] 87  Resp:  [16-18] 18  BP: ()/(58-76) 95/65  SpO2:  [93 %-98 %] 98 %     Physical Exam   Physical Exam  Vitals signs and nursing note reviewed.   Constitutional:       General: He is not in acute distress.     Appearance: Normal appearance. He is ill-appearing. He is not toxic-appearing or diaphoretic.      Comments: Chronically ill appearance, deconditioned   HENT:      Head: Normocephalic and atraumatic.      Right Ear: Tympanic membrane and external ear normal.      Left Ear: Tympanic membrane and external ear normal.      Nose: No congestion or rhinorrhea.      Mouth/Throat:      Mouth: Mucous membranes are moist.       Pharynx: Oropharynx is clear. No oropharyngeal exudate or posterior oropharyngeal erythema.   Eyes:      General: No scleral icterus.        Right eye: No discharge.         Left eye: No discharge.      Extraocular Movements: Extraocular movements intact.      Conjunctiva/sclera: Conjunctivae normal.      Pupils: Pupils are equal, round, and reactive to light.   Neck:      Musculoskeletal: Normal range of motion. No neck rigidity or muscular tenderness.   Cardiovascular:      Rate and Rhythm: Normal rate and regular rhythm.      Pulses: Normal pulses.      Heart sounds: Normal heart sounds. No murmur.   Pulmonary:      Breath sounds: No stridor. No wheezing, rhonchi or rales.      Comments: Reduced air flow b/l  No Subc air  Chest:      Chest wall: No tenderness.   Abdominal:      General: There is no distension.      Palpations: There is no mass.      Tenderness: There is no abdominal tenderness. There is no guarding.   Musculoskeletal:         General: No swelling, tenderness or deformity.      Right lower leg: No edema.      Left lower leg: No edema.   Lymphadenopathy:      Cervical: No cervical adenopathy.   Skin:     Coloration: Skin is not jaundiced or pale.      Findings: No bruising, erythema, lesion or rash.   Neurological:      General: No focal deficit present.      Mental Status: He is alert and oriented to person, place, and time. Mental status is at baseline.      Cranial Nerves: No cranial nerve deficit.      Sensory: No sensory deficit.      Motor: No weakness.      Coordination: Coordination normal.      Gait: Gait normal.   Psychiatric:         Mood and Affect: Mood normal.         Behavior: Behavior normal.         Thought Content: Thought content normal.         Judgment: Judgment normal.         Medications  Current Facility-Administered Medications   Medication Dose Route Frequency Provider Last Rate Last Dose   • sulfamethoxazole-trimethoprim (BACTRIM DS) 800-160 MG tablet 1 Tab  1 Tab Oral  3x/week Robbie Cates M.D.   1 Tab at 03/04/20 2144   • omeprazole (PRILOSEC) capsule 20 mg  20 mg Oral DAILY Deanna Kwan M.D.   20 mg at 03/06/20 0504   • acetaminophen (TYLENOL) tablet 650 mg  650 mg Oral Q6HRS PRN Deanna Kwan M.D.       • methadone (DOLOPHINE) tablet 30 mg  30 mg Oral BID Deanna Kwan M.D.   30 mg at 03/06/20 1647    And   • methadone (DOLOPHINE) tablet 10 mg  10 mg Oral QHS Deanna Kwan M.D.   10 mg at 03/05/20 1937   • oxyCODONE immediate-release (ROXICODONE) tablet 5 mg  5 mg Oral Q4HRS PRN Deanna Kwan M.D.        Or   • oxyCODONE immediate release (ROXICODONE) tablet 10 mg  10 mg Oral Q4HRS PRN Deanna Kwan M.D.   10 mg at 03/06/20 1442   • tramadol (ULTRAM) 50 MG tablet 50 mg  50 mg Oral BID Deanna Kwan M.D.   50 mg at 03/06/20 1651   • simvastatin (ZOCOR) tablet 40 mg  40 mg Oral Nightly Deanna Kwan M.D.   40 mg at 03/05/20 1936   • ALPRAZolam (XANAX) tablet 0.25 mg  0.25 mg Oral 4X/DAY PRN Deanna Kwan M.D.   0.25 mg at 03/06/20 1443   • levothyroxine (SYNTHROID) tablet 125 mcg  125 mcg Oral QAM AC Deanna Kwan M.D.   125 mcg at 03/06/20 0748   • predniSONE (DELTASONE) tablet 60 mg  60 mg Oral DAILY Deanna Kwan M.D.   60 mg at 03/06/20 0504   • senna-docusate (PERICOLACE or SENOKOT S) 8.6-50 MG per tablet 2 Tab  2 Tab Oral BID Deanna Kwan M.D.   Stopped at 03/06/20 1800    And   • polyethylene glycol/lytes (MIRALAX) PACKET 1 Packet  1 Packet Oral BID Deanna Kwan M.D.   Stopped at 03/06/20 1800    And   • magnesium hydroxide (MILK OF MAGNESIA) suspension 30 mL  30 mL Oral QDAY PRN Deanna Kwan M.D.        And   • bisacodyl (DULCOLAX) suppository 10 mg  10 mg Rectal QDAY PRN Deanna Kwan M.D.       • gabapentin (NEURONTIN) capsule 2,400 mg  2,400 mg Oral QAM Deanna Kwan M.D.   2,400 mg at 03/06/20 0504    And   • gabapentin (NEURONTIN) capsule 1,600 mg  1,600 mg Oral Q EVENING Deanna Kwan M.D.   1,600 mg at  03/06/20 1651   • guaiFENesin dextromethorphan (ROBITUSSIN DM) 100-10 MG/5ML syrup 10 mL  10 mL Oral Q6HRS PRN Deanna Kwan M.D.       • promethazine (PHENERGAN) tablet 12.5-25 mg  12.5-25 mg Oral Q4HRS PRN Deanna Kwan M.D.       • tamsulosin (FLOMAX) capsule 0.4 mg  0.4 mg Oral AFTER BREAKFAST Deanna Kwan M.D.   0.4 mg at 03/06/20 0830   • DILTIAZem (CARDIZEM) injection 18.4 mg  0.25 mg/kg Intravenous Q4HRS PRN Perry Paige D.O.   18.4 mg at 03/02/20 1553   • HYDROmorphone pf (DILAUDID) injection 1 mg  1 mg Intravenous Q4HRS PRN Srinath Sauceda M.D.   1 mg at 03/06/20 1646   • ipratropium-albuterol (DUONEB) nebulizer solution  3 mL Nebulization Q2HRS PRN (RT) Marck Javier M.D.       • timolol (TIMOPTIC) 0.5 % ophthalmic solution 1 Drop  1 Drop Both Eyes Q EVENING Tim Bustos M.D.   1 Drop at 03/05/20 1746   • Respiratory Therapy Consult   Nebulization Continuous RT Tim Bustos M.D.       • promethazine (PHENERGAN) suppository 12.5-25 mg  12.5-25 mg Rectal Q4HRS PRN Tim Bustos M.D.       • prochlorperazine (COMPAZINE) injection 5-10 mg  5-10 mg Intravenous Q4HRS PRN Tim Bustos M.D.   10 mg at 03/01/20 1409   • albuterol (PROVENTIL) 2.5mg/0.5ml nebulizer solution 2.5 mg  2.5 mg Nebulization Q2HRS PRN (RT) Tim Bustos M.D.           Fluids    Intake/Output Summary (Last 24 hours) at 3/6/2020 1755  Last data filed at 3/6/2020 1338  Gross per 24 hour   Intake 720 ml   Output 1550 ml   Net -830 ml       Laboratory          Recent Labs     03/04/20  0356   SODIUM 136   POTASSIUM 3.9   CHLORIDE 99   CO2 32   BUN 32*   CREATININE 1.08   CALCIUM 8.7     Recent Labs     03/04/20  0356   GLUCOSE 91     Recent Labs     03/04/20  0356   WBC 23.7*     Recent Labs     03/04/20  0356   RBC 4.57*   HEMOGLOBIN 12.3*   HEMATOCRIT 38.6*   PLATELETCT 399       Imaging  CXR reviewed 3/5, 3/6 am and 3/6 pm  reoccurance of left pneumothorax may be slightly bigger from 7  am to4 pm 3/6  No midline shift    Assessment/Plan  Pneumothorax  Assessment & Plan  Chest tube on the left in due to persistent pneumothorax when clamped  Chest tube removed on 3/5  Discussed with Surgery today -- they do not recommend reinserting chest tube unless clinically worsens or increases in size  Patient is asymptomatic  If he clinically worsens than recommendation from surgery is IR guided Chest tube  Informed Dr. Jackson  Informed charge nurse who will notify rapid response team to check on him  Pulse oximeter will be placed      NSIP (nonspecific interstitial pneumonia) (HCC)- (present on admission)  Assessment & Plan  Trial steroids  Recommendation is 1 mg/kg  Change taper to 60 mg daily for 30 days, then will need reevaluation in pulmonary clinic  Complicated by emphysema/copd changes  S/p biopsy 2/22 of right lung where majority of GG is present  Some granulation tissue of alveoli, possible component of sarcoid - bx sent to [facility] for evaluation  CTD work up unremarkable   bactrim for PJP prophylaxis      Acute on chronic respiratory failure with hypoxia (HCC)- (present on admission)  Assessment & Plan  Acute hypoxic respiratory failure  Attributed to NSIP + empysema  Extubated 2/28 -    Pulmonary hypertension (HCC)- (present on admission)  Assessment & Plan  Secondary to interstitial lung disease and copd/smoking hx  Optimize copd medications  symbicort bid + spiriva  duonebs prn      Pulmonary emphysema (HCC)  Assessment & Plan  Emphysema complicating interstitial disease and pneumothorax  duonebs every 2 hour prn  spiriva daily and symbicort bid  Will need followup outpatient with pft            I have performed a physical exam and reviewed and updated ROS and Plan today (3/6/2020). In review of yesterday's note (3/5/2020), there are no changes except as documented above.

## 2020-03-07 NOTE — CARE PLAN
Problem: Safety  Goal: Will remain free from injury  Outcome: PROGRESSING AS EXPECTED  Goal: Will remain free from falls  Outcome: PROGRESSING AS EXPECTED     Problem: Venous Thromboembolism (VTW)/Deep Vein Thrombosis (DVT) Prevention:  Goal: Patient will participate in Venous Thrombosis (VTE)/Deep Vein Thrombosis (DVT)Prevention Measures  Outcome: PROGRESSING AS EXPECTED     Problem: Respiratory:  Goal: Respiratory status will improve  Outcome: PROGRESSING AS EXPECTED     Problem: Infection  Goal: Will remain free from infection  Outcome: MET     CXR shows decrease in pneumo size. No need for CT    Scalpel Size: 15 blade

## 2020-03-08 ENCOUNTER — APPOINTMENT (OUTPATIENT)
Dept: RADIOLOGY | Facility: MEDICAL CENTER | Age: 60
DRG: 166 | End: 2020-03-08
Attending: INTERNAL MEDICINE
Payer: MEDICARE

## 2020-03-08 LAB
ALBUMIN SERPL BCP-MCNC: 3.7 G/DL (ref 3.2–4.9)
ALBUMIN/GLOB SERPL: 1.2 G/DL
ALP SERPL-CCNC: 62 U/L (ref 30–99)
ALT SERPL-CCNC: 47 U/L (ref 2–50)
ANION GAP SERPL CALC-SCNC: 6 MMOL/L (ref 0–11.9)
ANISOCYTOSIS BLD QL SMEAR: ABNORMAL
AST SERPL-CCNC: 27 U/L (ref 12–45)
BASOPHILS # BLD AUTO: 0.9 % (ref 0–1.8)
BASOPHILS # BLD: 0.18 K/UL (ref 0–0.12)
BILIRUB SERPL-MCNC: 0.3 MG/DL (ref 0.1–1.5)
BUN SERPL-MCNC: 22 MG/DL (ref 8–22)
CALCIUM SERPL-MCNC: 9.7 MG/DL (ref 8.5–10.5)
CHLORIDE SERPL-SCNC: 98 MMOL/L (ref 96–112)
CO2 SERPL-SCNC: 34 MMOL/L (ref 20–33)
CREAT SERPL-MCNC: 1.01 MG/DL (ref 0.5–1.4)
EOSINOPHIL # BLD AUTO: 0.7 K/UL (ref 0–0.51)
EOSINOPHIL NFR BLD: 3.5 % (ref 0–6.9)
ERYTHROCYTE [DISTWIDTH] IN BLOOD BY AUTOMATED COUNT: 49.6 FL (ref 35.9–50)
GLOBULIN SER CALC-MCNC: 3.1 G/DL (ref 1.9–3.5)
GLUCOSE SERPL-MCNC: 80 MG/DL (ref 65–99)
HCT VFR BLD AUTO: 41.1 % (ref 42–52)
HGB BLD-MCNC: 12.8 G/DL (ref 14–18)
LYMPHOCYTES # BLD AUTO: 5.51 K/UL (ref 1–4.8)
LYMPHOCYTES NFR BLD: 27.4 % (ref 22–41)
MANUAL DIFF BLD: NORMAL
MCH RBC QN AUTO: 26.8 PG (ref 27–33)
MCHC RBC AUTO-ENTMCNC: 31.1 G/DL (ref 33.7–35.3)
MCV RBC AUTO: 86.2 FL (ref 81.4–97.8)
MICROCYTES BLD QL SMEAR: ABNORMAL
MONOCYTES # BLD AUTO: 1.43 K/UL (ref 0–0.85)
MONOCYTES NFR BLD AUTO: 7.1 % (ref 0–13.4)
MORPHOLOGY BLD-IMP: NORMAL
MYELOCYTES NFR BLD MANUAL: 0.9 %
NEUTROPHILS # BLD AUTO: 12.1 K/UL (ref 1.82–7.42)
NEUTROPHILS NFR BLD: 60.2 % (ref 44–72)
NRBC # BLD AUTO: 0 K/UL
NRBC BLD-RTO: 0 /100 WBC
OVALOCYTES BLD QL SMEAR: NORMAL
PLATELET # BLD AUTO: 336 K/UL (ref 164–446)
PLATELET BLD QL SMEAR: NORMAL
PMV BLD AUTO: 9.8 FL (ref 9–12.9)
POIKILOCYTOSIS BLD QL SMEAR: NORMAL
POTASSIUM SERPL-SCNC: 3.8 MMOL/L (ref 3.6–5.5)
PROT SERPL-MCNC: 6.8 G/DL (ref 6–8.2)
RBC # BLD AUTO: 4.77 M/UL (ref 4.7–6.1)
RBC BLD AUTO: PRESENT
SODIUM SERPL-SCNC: 138 MMOL/L (ref 135–145)
TSH SERPL DL<=0.005 MIU/L-ACNC: 2.53 UIU/ML (ref 0.38–5.33)
WBC # BLD AUTO: 20.1 K/UL (ref 4.8–10.8)

## 2020-03-08 PROCEDURE — 85007 BL SMEAR W/DIFF WBC COUNT: CPT

## 2020-03-08 PROCEDURE — 99233 SBSQ HOSP IP/OBS HIGH 50: CPT | Performed by: INTERNAL MEDICINE

## 2020-03-08 PROCEDURE — 700102 HCHG RX REV CODE 250 W/ 637 OVERRIDE(OP): Performed by: FAMILY MEDICINE

## 2020-03-08 PROCEDURE — 84443 ASSAY THYROID STIM HORMONE: CPT

## 2020-03-08 PROCEDURE — 700111 HCHG RX REV CODE 636 W/ 250 OVERRIDE (IP): Performed by: PSYCHIATRY & NEUROLOGY

## 2020-03-08 PROCEDURE — 71045 X-RAY EXAM CHEST 1 VIEW: CPT

## 2020-03-08 PROCEDURE — 80053 COMPREHEN METABOLIC PANEL: CPT

## 2020-03-08 PROCEDURE — 700111 HCHG RX REV CODE 636 W/ 250 OVERRIDE (IP): Performed by: HOSPITALIST

## 2020-03-08 PROCEDURE — A9270 NON-COVERED ITEM OR SERVICE: HCPCS | Performed by: HOSPITALIST

## 2020-03-08 PROCEDURE — 36415 COLL VENOUS BLD VENIPUNCTURE: CPT

## 2020-03-08 PROCEDURE — 85027 COMPLETE CBC AUTOMATED: CPT

## 2020-03-08 PROCEDURE — 770020 HCHG ROOM/CARE - TELE (206)

## 2020-03-08 PROCEDURE — 700102 HCHG RX REV CODE 250 W/ 637 OVERRIDE(OP): Performed by: HOSPITALIST

## 2020-03-08 PROCEDURE — A9270 NON-COVERED ITEM OR SERVICE: HCPCS | Performed by: FAMILY MEDICINE

## 2020-03-08 PROCEDURE — 99232 SBSQ HOSP IP/OBS MODERATE 35: CPT | Performed by: FAMILY MEDICINE

## 2020-03-08 RX ADMIN — OMEPRAZOLE 20 MG: 20 CAPSULE, DELAYED RELEASE ORAL at 05:28

## 2020-03-08 RX ADMIN — TAMSULOSIN HYDROCHLORIDE 0.4 MG: 0.4 CAPSULE ORAL at 10:03

## 2020-03-08 RX ADMIN — HYDROMORPHONE HYDROCHLORIDE 1 MG: 1 INJECTION, SOLUTION INTRAMUSCULAR; INTRAVENOUS; SUBCUTANEOUS at 04:55

## 2020-03-08 RX ADMIN — DILTIAZEM HYDROCHLORIDE 30 MG: 30 TABLET, FILM COATED ORAL at 17:19

## 2020-03-08 RX ADMIN — GABAPENTIN 2400 MG: 400 CAPSULE ORAL at 05:27

## 2020-03-08 RX ADMIN — SIMVASTATIN 40 MG: 40 TABLET, FILM COATED ORAL at 19:58

## 2020-03-08 RX ADMIN — GABAPENTIN 1600 MG: 400 CAPSULE ORAL at 17:19

## 2020-03-08 RX ADMIN — METHADONE HYDROCHLORIDE 30 MG: 10 TABLET ORAL at 05:27

## 2020-03-08 RX ADMIN — ALPRAZOLAM 0.5 MG: 0.5 TABLET ORAL at 06:11

## 2020-03-08 RX ADMIN — METHADONE HYDROCHLORIDE 10 MG: 10 TABLET ORAL at 21:07

## 2020-03-08 RX ADMIN — METHADONE HYDROCHLORIDE 30 MG: 10 TABLET ORAL at 17:19

## 2020-03-08 RX ADMIN — DILTIAZEM HYDROCHLORIDE 30 MG: 30 TABLET, FILM COATED ORAL at 05:27

## 2020-03-08 RX ADMIN — HYDROMORPHONE HYDROCHLORIDE 1 MG: 1 INJECTION, SOLUTION INTRAMUSCULAR; INTRAVENOUS; SUBCUTANEOUS at 10:58

## 2020-03-08 RX ADMIN — DILTIAZEM HYDROCHLORIDE 18.4 MG: 5 INJECTION INTRAVENOUS at 20:05

## 2020-03-08 RX ADMIN — HYDROMORPHONE HYDROCHLORIDE 1 MG: 1 INJECTION, SOLUTION INTRAMUSCULAR; INTRAVENOUS; SUBCUTANEOUS at 19:58

## 2020-03-08 RX ADMIN — OXYCODONE HYDROCHLORIDE 10 MG: 10 TABLET ORAL at 21:07

## 2020-03-08 RX ADMIN — ALPRAZOLAM 0.5 MG: 0.5 TABLET ORAL at 19:58

## 2020-03-08 RX ADMIN — TRAMADOL HYDROCHLORIDE 50 MG: 50 TABLET, FILM COATED ORAL at 05:27

## 2020-03-08 RX ADMIN — PREDNISONE 60 MG: 50 TABLET ORAL at 05:27

## 2020-03-08 RX ADMIN — TRAMADOL HYDROCHLORIDE 50 MG: 50 TABLET, FILM COATED ORAL at 17:19

## 2020-03-08 RX ADMIN — LEVOTHYROXINE SODIUM 125 MCG: 125 TABLET ORAL at 05:27

## 2020-03-08 RX ADMIN — ALPRAZOLAM 0.5 MG: 0.5 TABLET ORAL at 13:45

## 2020-03-08 ASSESSMENT — ENCOUNTER SYMPTOMS
NERVOUS/ANXIOUS: 0
NECK PAIN: 0
VOMITING: 0
FOCAL WEAKNESS: 0
SINUS PAIN: 0
NAUSEA: 0
SPEECH CHANGE: 0
HEARTBURN: 0
TINGLING: 0
PHOTOPHOBIA: 0
CLAUDICATION: 0
BLURRED VISION: 0
NECK PAIN: 1
WEIGHT LOSS: 0
DIAPHORESIS: 0
POLYDIPSIA: 0
BRUISES/BLEEDS EASILY: 0
ABDOMINAL PAIN: 0
HEADACHES: 0
BLOOD IN STOOL: 0
CHILLS: 0
COUGH: 0
SENSORY CHANGE: 0
PSYCHIATRIC NEGATIVE: 1
SHORTNESS OF BREATH: 1
HEMOPTYSIS: 0
DIZZINESS: 0
ORTHOPNEA: 0
DEPRESSION: 0
MYALGIAS: 0
WHEEZING: 0
SPUTUM PRODUCTION: 0
WEAKNESS: 1
STRIDOR: 0
BACK PAIN: 0
MUSCULOSKELETAL NEGATIVE: 1
DOUBLE VISION: 0
PALPITATIONS: 0
FEVER: 0
SORE THROAT: 0

## 2020-03-08 ASSESSMENT — FIBROSIS 4 INDEX: FIB4 SCORE: 0.7

## 2020-03-08 NOTE — CARE PLAN
Problem: Safety  Goal: Will remain free from injury  Outcome: PROGRESSING AS EXPECTED  Goal: Will remain free from falls  Outcome: PROGRESSING AS EXPECTED   Bed locked in lowest position and call light is within reach; patient calls appropriately.

## 2020-03-08 NOTE — PROGRESS NOTES
[facility] Medicine Daily Progress Note    Date of Service  3/8/2020    Chief Complaint  59 y.o. male admitted 2/18/2020 with SOB    Hospital Course    This is a 59-year-old male with past medical history positive for asthma, chronic back pain, COPD on 3 LNC at home, depression, hypothyroid, peripheral neuropathy. He presented on 2/18/2020 with several days of feeling ill and abdominal pain, nausea and vomiting.  He was hypoxic at 71% on room air on presentation and was found to have spontaneous pneumothorax and right lower lobe pneumonia. After arrival here his chest tube became dislodged and therefore was removed.      On 2/22 he began having increased work of breathing and was noted to have a tension pneumothorax, so chest tube was replaced and patient was intubated, and transferred to the ICU.  Bronchoscopy and biopsy was done on 2/22 for possible ILD and he was started on steroids.  While in ICU he went into AFib RVR and was started on amiodarone.  He was extubated on 2/28 and was doing well; his chest tube was placed on waterseal but a left apical pneumothorax returned and the chest tube was returned to suction.  On 3/2 the chest tube was clamped again and repeat chest x-ray again showed an enlarging pneumothorax.  The chest tube was unclamped and the pneumothorax again resolved per chest x-ray.  During this encounter he went into rapid atrial fibrillation which was treated with IV diltiazem and he was started on p.o. medications for A. fib.      Interval Problem Update  Feels better today  Re-occurrence of pneumothorax  Increased xanax because of anxiety    Consultants/Specialty  Surgery  pulmonary    Code Status  full    Disposition  pending    Review of Systems  Review of Systems   Constitutional: Negative for chills, fever and malaise/fatigue.   HENT: Negative for ear discharge, ear pain and tinnitus.    Eyes: Negative for blurred vision, double vision and photophobia.   Respiratory: Negative for cough,  hemoptysis and wheezing.    Cardiovascular: Negative for palpitations, orthopnea and claudication.   Gastrointestinal: Negative for heartburn, nausea and vomiting.   Genitourinary: Negative for dysuria, frequency and urgency.   Musculoskeletal: Positive for neck pain. Negative for joint pain and myalgias.   Skin: Negative for itching and rash.   Neurological: Negative for dizziness, tingling and headaches.        Physical Exam  Temp:  [36.1 °C (96.9 °F)-37.1 °C (98.7 °F)] 36.1 °C (96.9 °F)  Pulse:  [55-78] 78  Resp:  [14-18] 18  BP: ()/(57-69) 91/63  SpO2:  [96 %-97 %] 97 %    Physical Exam  Constitutional:       General: He is not in acute distress.     Appearance: He is not toxic-appearing.   HENT:      Head: Normocephalic and atraumatic.      Nose: Nose normal.      Mouth/Throat:      Mouth: Mucous membranes are dry.   Eyes:      Extraocular Movements: Extraocular movements intact.      Pupils: Pupils are equal, round, and reactive to light.   Neck:      Musculoskeletal: Normal range of motion and neck supple.   Cardiovascular:      Rate and Rhythm: Normal rate and regular rhythm.      Heart sounds: No murmur. No friction rub.   Pulmonary:      Breath sounds: No wheezing, rhonchi or rales.      Comments: Decreased breath sounds  Bilaterally    Abdominal:      Palpations: Abdomen is soft.      Tenderness: There is no abdominal tenderness. There is no guarding or rebound.   Musculoskeletal: Normal range of motion.   Skin:     General: Skin is warm and dry.   Neurological:      General: No focal deficit present.      Mental Status: He is alert and oriented to person, place, and time.   Psychiatric:         Mood and Affect: Mood normal.         Fluids  No intake or output data in the 24 hours ending 03/08/20 1020    Laboratory  Recent Labs     03/07/20  0340 03/08/20  0528   WBC 23.5* 20.1*   RBC 4.74 4.77   HEMOGLOBIN 12.7* 12.8*   HEMATOCRIT 41.7* 41.1*   MCV 88.0 86.2   MCH 26.8* 26.8*   MCHC 30.5* 31.1*    RDW 50.5* 49.6   PLATELETCT 190 336   MPV 11.0 9.8     Recent Labs     03/07/20  0340 03/08/20  0528   SODIUM 135 138   POTASSIUM 4.2 3.8   CHLORIDE 99 98   CO2 29 34*   GLUCOSE 84 80   BUN 21 22   CREATININE 0.99 1.01   CALCIUM 9.2 9.7                   Imaging       Assessment/Plan  Acute on chronic respiratory failure with hypoxia (HCC)- (present on admission)  Assessment & Plan  Likely secondary to nonspecific interstitial pneumonia and pneumothoraces  Continuing very long steroid taper   Status post treatment for CAP, on RT protocol for COPD  Continue to mobilize and continue IS  On O2 nasal cannula  Pulmonary input is noted  No acute change  D/w nurse at the bed side    Hypoxia  Assessment & Plan  As a result from COPD, pneumothorax, pneumonia    Pneumothorax  Assessment & Plan  Continues with left chest tube, multiple tension pneumothoraces when chest tube clamped  CT surgery managing, Dr. Ganser trying to avoid surgery  Chest tube was dced yesterday and pt has reoccuerence  Of pneumothorax  cxray result on 3/6 is noted with moderate pneumothorax  D/w pulmonary  Surgery input is noted  Pt is doing well  Reevaluation by surgery input is noted  No re-insertion of the chest rube so far since the pt is doing well  cxray on 3/8/20 showed:1.  Stable left pneumothorax.     2.  Unchanged interstitial infiltrates.    QT prolongation- (present on admission)  Assessment & Plan  Has resolved  qtc 376        Chronic neck pain- (present on admission)  Assessment & Plan  On his home regimen of methadone 30mg BID and 10mg qhs  Also on gabapentin  Oxycodone and Dilaudid sparingly as needed for breakthrough as well as Ultram    Pulmonary hypertension (HCC)- (present on admission)  Assessment & Plan  History of  Stable presently on no medications    Pneumonia due to infectious organism- (present on admission)  Assessment & Plan  Now s/p completed ceftriaxone, Flagyl   Bactrim is resumed by pulmonary  Pulmonary input is  noted  Leukocytosis appears to be 2nd to prednisone    NSIP (nonspecific interstitial pneumonia) (HCC)- (present on admission)  Assessment & Plan  Pulmonary input is noted  On long prednisone taper    Atrial fibrillation with rapid ventricular response (HCC)  Assessment & Plan  Converted back to sinus  Had a fib with rvr last night  His vascular tyson score is zero  No need for anticoagulation at this time   tsh is in normal range   cardiac echo on 3/7/20 showed:Prior echo 9/13/19. no significant changes noted.   Grossly normal left ventricular systolic function.  Normal inferior vena cava size and inspiratory collapse.    Tobacco abuse- (present on admission)  Assessment & Plan  History of extensive use, the patient quit in 2015    Hypothyroid- (present on admission)  Assessment & Plan  Continue with replacement  Will check tsh       VTE prophylaxis: lovenox

## 2020-03-08 NOTE — PROGRESS NOTES
Pulmonary and Critical Care Progress Note    Date of admission  2/18/2020    Chief Complaint  59 y.o. male who presented 2/18/2020 with history of oxygen dependent chronic hypoxic respiratory failure, requiring 2.5-3 L home oxygen, emphysema, pulmonary hypertension and chronic pain who is on methadone.  He was transferred from [place] for pulmonary specialty care with a left lower lobe pneumonia and pneumothorax on 2/18.  Influenza screening was negative.  Shortly after arriving at [facility] he was noted to have a dislodged chest tube.  A replacement chest tube was not necessary.  He was requiring 5 L of oxygen via nasal cannula on 2/19/2/20.  A pulmonology consultation requested a high resolution CT scan, the initiated a connective tissue work-up and continued broad-spectrum antibiotics.  On the morning of 2/21 patient had increasing work of breathing and hypoxic respiratory failure.  Critical care consultation was requested for further evaluation and management.    Hospital Course    2/21-patient was evaluated in the IR suite for placement of a pigtail catheter.  At the time the residual pneumothorax was felt to be too small to benefit from catheter placement.  At 2335 patient became hypoxic, tachycardic and hypotensive.  Chest x-ray revealed a large pneumothorax.  An emergent chest tube was placed with improvement in hemodynamic and oxygenation parameters.  2/22- Pulmonary biopsy performed.  Prednisone 1 mg/kilogram/day initiated  2/28- Extubated; afib with RVR placed on amio gtt  2/29 -Improved mentation. Did well s/p extubation chest placed on water seal   3/1-  Left apical pneumo seen and chest tube returned to suction   3/2, chest xray no significant pneumothorax (tiny), chest tube clamped per myself.  Repeat chest xray.  3/4: CT on water seal  3/5: Chest tube removed  3/6: reoccurance of pneumothorax on the left    3/8:  Left pneumothorax improved compared to 3/7       Interval Problem  Update  Reviewed last 24 hour events:  On 3l NC  No SOB    Review of Systems  Review of Systems   Constitutional: Positive for malaise/fatigue. Negative for chills, diaphoresis, fever and weight loss.   HENT: Negative for congestion, sinus pain and sore throat.    Eyes: Negative for blurred vision, double vision and photophobia.   Respiratory: Positive for shortness of breath. Negative for cough, hemoptysis, sputum production, wheezing and stridor.    Cardiovascular: Negative for chest pain, palpitations and leg swelling.   Gastrointestinal: Negative for abdominal pain, blood in stool, heartburn, melena, nausea and vomiting.   Genitourinary: Negative.  Negative for dysuria and urgency.   Musculoskeletal: Negative.  Negative for back pain, myalgias and neck pain.   Skin: Negative for itching and rash.   Neurological: Positive for weakness. Negative for dizziness, tingling, sensory change, speech change, focal weakness and headaches.        Generalized weakness form deconditioning   Endo/Heme/Allergies: Negative for polydipsia. Does not bruise/bleed easily.   Psychiatric/Behavioral: Negative.  Negative for depression. The patient is not nervous/anxious.         Vital Signs for last 24 hours   Temp:  [36.1 °C (96.9 °F)-37.1 °C (98.7 °F)] 36.2 °C (97.2 °F)  Pulse:  [55-85] 85  Resp:  [14-18] 18  BP: ()/(57-84) 128/74  SpO2:  [96 %-98 %] 98 %     Physical Exam   Physical Exam  Vitals signs and nursing note reviewed.   Constitutional:       General: He is not in acute distress.     Appearance: Normal appearance. He is ill-appearing. He is not toxic-appearing or diaphoretic.      Comments: Chronically ill appearance, deconditioned   HENT:      Head: Normocephalic and atraumatic.      Right Ear: Tympanic membrane and external ear normal.      Left Ear: Tympanic membrane and external ear normal.      Nose: No congestion or rhinorrhea.      Mouth/Throat:      Mouth: Mucous membranes are moist.      Pharynx: Oropharynx  is clear. No oropharyngeal exudate or posterior oropharyngeal erythema.   Eyes:      General: No scleral icterus.        Right eye: No discharge.         Left eye: No discharge.      Extraocular Movements: Extraocular movements intact.      Conjunctiva/sclera: Conjunctivae normal.      Pupils: Pupils are equal, round, and reactive to light.   Neck:      Musculoskeletal: Normal range of motion. No neck rigidity or muscular tenderness.   Cardiovascular:      Rate and Rhythm: Normal rate and regular rhythm.      Pulses: Normal pulses.      Heart sounds: Normal heart sounds. No murmur.   Pulmonary:      Breath sounds: No stridor. No wheezing, rhonchi or rales.      Comments: Reduced air flow b/l  No Subc air  Chest:      Chest wall: No tenderness.   Abdominal:      General: There is no distension.      Palpations: There is no mass.      Tenderness: There is no abdominal tenderness. There is no guarding.   Musculoskeletal:         General: No swelling, tenderness or deformity.      Right lower leg: No edema.      Left lower leg: No edema.   Lymphadenopathy:      Cervical: No cervical adenopathy.   Skin:     Coloration: Skin is not jaundiced or pale.      Findings: No bruising, erythema, lesion or rash.   Neurological:      General: No focal deficit present.      Mental Status: He is alert and oriented to person, place, and time. Mental status is at baseline.      Cranial Nerves: No cranial nerve deficit.      Sensory: No sensory deficit.      Motor: No weakness.      Coordination: Coordination normal.      Gait: Gait normal.   Psychiatric:         Mood and Affect: Mood normal.         Behavior: Behavior normal.         Thought Content: Thought content normal.         Judgment: Judgment normal.         Medications  Current Facility-Administered Medications   Medication Dose Route Frequency Provider Last Rate Last Dose   • DILTIAZem (CARDIZEM) tablet 30 mg  30 mg Oral TWICE DAILY Garrett Jackson M.D.   30 mg at  03/08/20 0527   • ALPRAZolam (XANAX) tablet 0.5 mg  0.5 mg Oral 4X/DAY PRN Garrett Jackson M.D.   0.5 mg at 03/08/20 1345   • sulfamethoxazole-trimethoprim (BACTRIM DS) 800-160 MG tablet 1 Tab  1 Tab Oral 3x/week Robbie Cates M.D.   1 Tab at 03/07/20 0423   • omeprazole (PRILOSEC) capsule 20 mg  20 mg Oral DAILY Deanna Kwan M.D.   20 mg at 03/08/20 0528   • acetaminophen (TYLENOL) tablet 650 mg  650 mg Oral Q6HRS PRN Denana Kwan M.D.       • methadone (DOLOPHINE) tablet 30 mg  30 mg Oral BID Deanna Kwan M.D.   30 mg at 03/08/20 0527    And   • methadone (DOLOPHINE) tablet 10 mg  10 mg Oral QHS Deanna Kwan M.D.   10 mg at 03/06/20 2035   • oxyCODONE immediate-release (ROXICODONE) tablet 5 mg  5 mg Oral Q4HRS PRN Deanna Kwan M.D.        Or   • oxyCODONE immediate release (ROXICODONE) tablet 10 mg  10 mg Oral Q4HRS PRN Deanna Kwan M.D.   10 mg at 03/07/20 0742   • tramadol (ULTRAM) 50 MG tablet 50 mg  50 mg Oral BID Deanna Kwan M.D.   50 mg at 03/08/20 0527   • simvastatin (ZOCOR) tablet 40 mg  40 mg Oral Nightly Deanna Kwan M.D.   40 mg at 03/06/20 2035   • levothyroxine (SYNTHROID) tablet 125 mcg  125 mcg Oral QAM AC Deanna Kwan M.D.   125 mcg at 03/08/20 0527   • predniSONE (DELTASONE) tablet 60 mg  60 mg Oral DAILY Deanna Kwan M.D.   60 mg at 03/08/20 0527   • senna-docusate (PERICOLACE or SENOKOT S) 8.6-50 MG per tablet 2 Tab  2 Tab Oral BID Deanna Kwan M.D.   Stopped at 03/06/20 1800    And   • polyethylene glycol/lytes (MIRALAX) PACKET 1 Packet  1 Packet Oral BID Deanna Kwan M.D.   Stopped at 03/06/20 1800    And   • magnesium hydroxide (MILK OF MAGNESIA) suspension 30 mL  30 mL Oral QDAY PRN Deanna Kwan M.D.        And   • bisacodyl (DULCOLAX) suppository 10 mg  10 mg Rectal QDAY PRN Deanna Kwan M.D.       • gabapentin (NEURONTIN) capsule 2,400 mg  2,400 mg Oral QAM Deanna Kwan M.D.   2,400 mg at 03/08/20 0527    And   •  gabapentin (NEURONTIN) capsule 1,600 mg  1,600 mg Oral Q EVENING Deanna Kwan M.D.   1,600 mg at 03/07/20 1706   • guaiFENesin dextromethorphan (ROBITUSSIN DM) 100-10 MG/5ML syrup 10 mL  10 mL Oral Q6HRS PRN Deanna Kwan M.D.       • promethazine (PHENERGAN) tablet 12.5-25 mg  12.5-25 mg Oral Q4HRS PRN Deanna Kwan M.D.       • tamsulosin (FLOMAX) capsule 0.4 mg  0.4 mg Oral AFTER BREAKFAST Deanna Kwan M.D.   0.4 mg at 03/08/20 1003   • DILTIAZem (CARDIZEM) injection 18.4 mg  0.25 mg/kg Intravenous Q4HRS PRN Perry Paige D.O.   18.4 mg at 03/06/20 2001   • HYDROmorphone pf (DILAUDID) injection 1 mg  1 mg Intravenous Q4HRS PRN Srinath Sauceda M.D.   1 mg at 03/08/20 1058   • ipratropium-albuterol (DUONEB) nebulizer solution  3 mL Nebulization Q2HRS PRN (RT) Marck Javier M.D.       • timolol (TIMOPTIC) 0.5 % ophthalmic solution 1 Drop  1 Drop Both Eyes Q EVENING Tim Bustos M.D.   1 Drop at 03/07/20 1707   • Respiratory Therapy Consult   Nebulization Continuous RT Tim Bustos M.D.       • promethazine (PHENERGAN) suppository 12.5-25 mg  12.5-25 mg Rectal Q4HRS PRN Tim Bustos M.D.       • prochlorperazine (COMPAZINE) injection 5-10 mg  5-10 mg Intravenous Q4HRS PRN Tim Bustos M.D.   10 mg at 03/01/20 1409   • albuterol (PROVENTIL) 2.5mg/0.5ml nebulizer solution 2.5 mg  2.5 mg Nebulization Q2HRS PRN (RT) Tim Bustos M.D.           Fluids  No intake or output data in the 24 hours ending 03/08/20 1630    Laboratory          Recent Labs     03/07/20  0340 03/08/20  0528   SODIUM 135 138   POTASSIUM 4.2 3.8   CHLORIDE 99 98   CO2 29 34*   BUN 21 22   CREATININE 0.99 1.01   CALCIUM 9.2 9.7     Recent Labs     03/07/20 0340 03/08/20  0528   ALTSGPT 38 47   ASTSGOT 25 27   ALKPHOSPHAT 58 62   TBILIRUBIN 0.3 0.3   GLUCOSE 84 80     Recent Labs     03/07/20 0340 03/08/20  0528   WBC 23.5* 20.1*   NEUTSPOLYS 64.40 60.20   LYMPHOCYTES 24.50 27.40    MONOCYTES 7.20 7.10   EOSINOPHILS 1.90 3.50   BASOPHILS 0.70 0.90   ASTSGOT 25 27   ALTSGPT 38 47   ALKPHOSPHAT 58 62   TBILIRUBIN 0.3 0.3     Recent Labs     03/07/20  0340 03/08/20  0528   RBC 4.74 4.77   HEMOGLOBIN 12.7* 12.8*   HEMATOCRIT 41.7* 41.1*   PLATELETCT 190 336       Imaging  CXR reviewed 3/6 adnd 3/7and compared to 3/8     No midline shift    Assessment/Plan  Pneumothorax  Assessment & Plan  Chest tube on the left in due to persistent pneumothorax when clamped  Chest tube removed on 3/5  Surgery has been following the left moderate pneumothorax - plan is follow up with surgery outpatient in 7-10 days. No chest tube  is recommended at this time as pneumothorax is improving and Patient is asymptomatic no change in O2 requirement      NSIP (nonspecific interstitial pneumonia) (HCC)- (present on admission)  Assessment & Plan  Trial steroids  Recommendation is 1 mg/kg  Change taper to 60 mg daily for 30 days, then will need reevaluation in pulmonary clinic  Complicated by emphysema/copd changes  S/p biopsy 2/22 of right lung where majority of GG is present  Some granulation tissue of alveoli, possible component of sarcoid - bx sent to [facility] for evaluation-- pending as of 3/7  CTD work up unremarkable   bactrim for PJP prophylaxis      Acute on chronic respiratory failure with hypoxia (HCC)- (present on admission)  Assessment & Plan  Acute hypoxic respiratory failure  Attributed to NSIP + empysema + pneumothorax on the left  Extubated 2/28     Pulmonary hypertension (HCC)- (present on admission)  Assessment & Plan  Secondary to interstitial lung disease and copd/smoking hx  Optimize copd medications  symbicort bid + spiriva  duonebs prn      Pulmonary emphysema (HCC)  Assessment & Plan  Emphysema complicating interstitial disease and pneumothorax  duonebs every 4 hour prn  spiriva daily and symbicort bid  Will need followup outpatient with pft            I have performed a physical exam and reviewed  and updated ROS and Plan today (3/8/2020). In review of yesterday's note (3/7/2020), there are no changes except as documented above.

## 2020-03-09 ENCOUNTER — APPOINTMENT (OUTPATIENT)
Dept: RADIOLOGY | Facility: MEDICAL CENTER | Age: 60
DRG: 166 | End: 2020-03-09
Attending: INTERNAL MEDICINE
Payer: MEDICARE

## 2020-03-09 PROCEDURE — A9270 NON-COVERED ITEM OR SERVICE: HCPCS | Performed by: FAMILY MEDICINE

## 2020-03-09 PROCEDURE — 700102 HCHG RX REV CODE 250 W/ 637 OVERRIDE(OP): Performed by: INTERNAL MEDICINE

## 2020-03-09 PROCEDURE — 770020 HCHG ROOM/CARE - TELE (206)

## 2020-03-09 PROCEDURE — A9270 NON-COVERED ITEM OR SERVICE: HCPCS | Performed by: INTERNAL MEDICINE

## 2020-03-09 PROCEDURE — 700111 HCHG RX REV CODE 636 W/ 250 OVERRIDE (IP): Performed by: HOSPITALIST

## 2020-03-09 PROCEDURE — 700102 HCHG RX REV CODE 250 W/ 637 OVERRIDE(OP): Performed by: FAMILY MEDICINE

## 2020-03-09 PROCEDURE — 99232 SBSQ HOSP IP/OBS MODERATE 35: CPT | Performed by: INTERNAL MEDICINE

## 2020-03-09 PROCEDURE — 700102 HCHG RX REV CODE 250 W/ 637 OVERRIDE(OP): Performed by: HOSPITALIST

## 2020-03-09 PROCEDURE — A9270 NON-COVERED ITEM OR SERVICE: HCPCS | Performed by: HOSPITALIST

## 2020-03-09 PROCEDURE — 700111 HCHG RX REV CODE 636 W/ 250 OVERRIDE (IP): Performed by: PSYCHIATRY & NEUROLOGY

## 2020-03-09 PROCEDURE — 700101 HCHG RX REV CODE 250: Performed by: HOSPITALIST

## 2020-03-09 PROCEDURE — 71045 X-RAY EXAM CHEST 1 VIEW: CPT

## 2020-03-09 PROCEDURE — 99232 SBSQ HOSP IP/OBS MODERATE 35: CPT | Performed by: FAMILY MEDICINE

## 2020-03-09 RX ADMIN — ALPRAZOLAM 0.5 MG: 0.5 TABLET ORAL at 02:15

## 2020-03-09 RX ADMIN — TAMSULOSIN HYDROCHLORIDE 0.4 MG: 0.4 CAPSULE ORAL at 08:52

## 2020-03-09 RX ADMIN — GABAPENTIN 1600 MG: 400 CAPSULE ORAL at 17:38

## 2020-03-09 RX ADMIN — SULFAMETHOXAZOLE AND TRIMETHOPRIM 1 TABLET: 800; 160 TABLET ORAL at 14:26

## 2020-03-09 RX ADMIN — DILTIAZEM HYDROCHLORIDE 30 MG: 30 TABLET, FILM COATED ORAL at 05:20

## 2020-03-09 RX ADMIN — METHADONE HYDROCHLORIDE 10 MG: 10 TABLET ORAL at 20:40

## 2020-03-09 RX ADMIN — OMEPRAZOLE 20 MG: 20 CAPSULE, DELAYED RELEASE ORAL at 05:19

## 2020-03-09 RX ADMIN — SIMVASTATIN 40 MG: 40 TABLET, FILM COATED ORAL at 20:40

## 2020-03-09 RX ADMIN — HYDROMORPHONE HYDROCHLORIDE 1 MG: 1 INJECTION, SOLUTION INTRAMUSCULAR; INTRAVENOUS; SUBCUTANEOUS at 10:32

## 2020-03-09 RX ADMIN — DILTIAZEM HYDROCHLORIDE 30 MG: 30 TABLET, FILM COATED ORAL at 20:40

## 2020-03-09 RX ADMIN — LEVOTHYROXINE SODIUM 125 MCG: 125 TABLET ORAL at 05:19

## 2020-03-09 RX ADMIN — TRAMADOL HYDROCHLORIDE 50 MG: 50 TABLET, FILM COATED ORAL at 05:20

## 2020-03-09 RX ADMIN — HYDROMORPHONE HYDROCHLORIDE 1 MG: 1 INJECTION, SOLUTION INTRAMUSCULAR; INTRAVENOUS; SUBCUTANEOUS at 18:48

## 2020-03-09 RX ADMIN — ALPRAZOLAM 0.5 MG: 0.5 TABLET ORAL at 08:51

## 2020-03-09 RX ADMIN — ALPRAZOLAM 0.5 MG: 0.5 TABLET ORAL at 14:30

## 2020-03-09 RX ADMIN — METHADONE HYDROCHLORIDE 30 MG: 10 TABLET ORAL at 14:26

## 2020-03-09 RX ADMIN — HYDROMORPHONE HYDROCHLORIDE 1 MG: 1 INJECTION, SOLUTION INTRAMUSCULAR; INTRAVENOUS; SUBCUTANEOUS at 05:20

## 2020-03-09 RX ADMIN — TRAMADOL HYDROCHLORIDE 50 MG: 50 TABLET, FILM COATED ORAL at 17:39

## 2020-03-09 RX ADMIN — ALPRAZOLAM 0.5 MG: 0.5 TABLET ORAL at 20:40

## 2020-03-09 RX ADMIN — TIMOLOL MALEATE 1 DROP: 5 SOLUTION OPHTHALMIC at 17:40

## 2020-03-09 RX ADMIN — DILTIAZEM HYDROCHLORIDE 30 MG: 30 TABLET, FILM COATED ORAL at 14:26

## 2020-03-09 RX ADMIN — GABAPENTIN 2400 MG: 400 CAPSULE ORAL at 05:19

## 2020-03-09 RX ADMIN — HYDROMORPHONE HYDROCHLORIDE 1 MG: 1 INJECTION, SOLUTION INTRAMUSCULAR; INTRAVENOUS; SUBCUTANEOUS at 00:12

## 2020-03-09 RX ADMIN — METHADONE HYDROCHLORIDE 30 MG: 10 TABLET ORAL at 05:20

## 2020-03-09 RX ADMIN — PREDNISONE 60 MG: 50 TABLET ORAL at 05:19

## 2020-03-09 RX ADMIN — OXYCODONE HYDROCHLORIDE 10 MG: 10 TABLET ORAL at 14:27

## 2020-03-09 ASSESSMENT — ENCOUNTER SYMPTOMS
SHORTNESS OF BREATH: 1
SPUTUM PRODUCTION: 0
PALPITATIONS: 0
NECK PAIN: 0
BACK PAIN: 0
POLYDIPSIA: 0
WEAKNESS: 1
EYE PAIN: 0
STRIDOR: 0
FOCAL WEAKNESS: 0
HEADACHES: 0
BLOOD IN STOOL: 0
TREMORS: 0
PSYCHIATRIC NEGATIVE: 1
DEPRESSION: 0
SINUS PAIN: 0
BRUISES/BLEEDS EASILY: 0
SPEECH CHANGE: 0
NAUSEA: 0
HEMOPTYSIS: 0
SENSORY CHANGE: 0
CHILLS: 0
BLURRED VISION: 0
DIZZINESS: 0
VOMITING: 0
FEVER: 0
SORE THROAT: 0
COUGH: 0
PHOTOPHOBIA: 0
NERVOUS/ANXIOUS: 0
MUSCULOSKELETAL NEGATIVE: 1
TINGLING: 0
MYALGIAS: 0
ORTHOPNEA: 0
HEARTBURN: 0
WHEEZING: 0
WEIGHT LOSS: 0
DIAPHORESIS: 0
ABDOMINAL PAIN: 0
NECK PAIN: 1
DOUBLE VISION: 0

## 2020-03-09 ASSESSMENT — FIBROSIS 4 INDEX: FIB4 SCORE: 0.7

## 2020-03-09 NOTE — PROGRESS NOTES
[facility] Medicine Daily Progress Note    Date of Service  3/9/2020    Chief Complaint  59 y.o. male admitted 2/18/2020 with SOB    Hospital Course    This is a 59-year-old male with past medical history positive for asthma, chronic back pain, COPD on 3 LNC at home, depression, hypothyroid, peripheral neuropathy. He presented on 2/18/2020 with several days of feeling ill and abdominal pain, nausea and vomiting.  He was hypoxic at 71% on room air on presentation and was found to have spontaneous pneumothorax and right lower lobe pneumonia. After arrival here his chest tube became dislodged and therefore was removed.      On 2/22 he began having increased work of breathing and was noted to have a tension pneumothorax, so chest tube was replaced and patient was intubated, and transferred to the ICU.  Bronchoscopy and biopsy was done on 2/22 for possible ILD and he was started on steroids.  While in ICU he went into AFib RVR and was started on amiodarone.  He was extubated on 2/28 and was doing well; his chest tube was placed on waterseal but a left apical pneumothorax returned and the chest tube was returned to suction.  On 3/2 the chest tube was clamped again and repeat chest x-ray again showed an enlarging pneumothorax.  The chest tube was unclamped and the pneumothorax again resolved per chest x-ray.  During this encounter he went into rapid atrial fibrillation which was treated with IV diltiazem and he was started on p.o. medications for A. fib.      Interval Problem Update  Feels better today  Re-occurrence of pneumothorax  Increased xanax because of anxiety    Consultants/Specialty  Surgery  pulmonary    Code Status  full    Disposition  pending    Review of Systems  Review of Systems   Constitutional: Negative for chills, diaphoresis and malaise/fatigue.   HENT: Negative for ear discharge, ear pain and nosebleeds.    Eyes: Negative for double vision, photophobia and pain.   Respiratory: Negative for hemoptysis,  sputum production and wheezing.    Cardiovascular: Negative for chest pain, palpitations and orthopnea.   Gastrointestinal: Negative for abdominal pain, nausea and vomiting.   Genitourinary: Negative for frequency, hematuria and urgency.   Musculoskeletal: Positive for neck pain. Negative for joint pain and myalgias.   Skin: Negative for itching and rash.   Neurological: Negative for tingling, tremors and headaches.        Physical Exam  Temp:  [36.1 °C (96.9 °F)-36.4 °C (97.5 °F)] 36.4 °C (97.5 °F)  Pulse:  [74-85] 77  Resp:  [16-20] 16  BP: ()/(68-84) 131/75  SpO2:  [97 %-98 %] 98 %    Physical Exam  Constitutional:       Appearance: Normal appearance.   HENT:      Head: Normocephalic and atraumatic.      Nose: No congestion or rhinorrhea.      Mouth/Throat:      Mouth: Mucous membranes are dry.   Eyes:      Conjunctiva/sclera: Conjunctivae normal.   Neck:      Musculoskeletal: Neck supple. No neck rigidity.   Cardiovascular:      Rate and Rhythm: Normal rate and regular rhythm.      Pulses: Normal pulses.      Heart sounds: Normal heart sounds.   Pulmonary:      Breath sounds: No stridor. No wheezing or rales.      Comments: Decreased breath sounds  Bilaterally and more on the left side.    Abdominal:      General: There is no distension.      Tenderness: There is no abdominal tenderness. There is no guarding.   Musculoskeletal:         General: No swelling or deformity.   Skin:     Coloration: Skin is not jaundiced.      Findings: No bruising.   Neurological:      Mental Status: He is alert and oriented to person, place, and time. Mental status is at baseline.   Psychiatric:         Mood and Affect: Mood normal.         Fluids  No intake or output data in the 24 hours ending 03/09/20 1037    Laboratory  Recent Labs     03/07/20  0340 03/08/20  0528   WBC 23.5* 20.1*   RBC 4.74 4.77   HEMOGLOBIN 12.7* 12.8*   HEMATOCRIT 41.7* 41.1*   MCV 88.0 86.2   MCH 26.8* 26.8*   MCHC 30.5* 31.1*   RDW 50.5* 49.6    PLATELETCT 190 336   MPV 11.0 9.8     Recent Labs     03/07/20  0340 03/08/20  0528   SODIUM 135 138   POTASSIUM 4.2 3.8   CHLORIDE 99 98   CO2 29 34*   GLUCOSE 84 80   BUN 21 22   CREATININE 0.99 1.01   CALCIUM 9.2 9.7                   Imaging       Assessment/Plan  Acute on chronic respiratory failure with hypoxia (HCC)- (present on admission)  Assessment & Plan  Likely secondary to nonspecific interstitial pneumonia and pneumothoraces  Continuing very long steroid taper   Status post treatment for CAP, on RT protocol for COPD  Continue to mobilize and continue IS  On O2 nasal cannula  Pulmonary input is noted  No acute change  D/w nurse at the bed side    Hypoxia  Assessment & Plan  As a result from COPD, pneumothorax, pneumonia    Pneumothorax  Assessment & Plan  Continues with left chest tube, multiple tension pneumothoraces when chest tube clamped  CT surgery managing, Dr. Ganser trying to avoid surgery  Chest tube was dced yesterday and pt has reoccuerence  Of pneumothorax  cxray result on 3/6 is noted with moderate pneumothorax  D/w pulmonary  Surgery input is noted  Pt is doing well  Reevaluation by surgery input is noted  No re-insertion of the chest rube so far since the pt is doing well  cxray on 3/9/20 showed:Mild interval enlargement of small left pneumothorax    QT prolongation- (present on admission)  Assessment & Plan  Has resolved  qtc [id number]        Chronic neck pain- (present on admission)  Assessment & Plan  On his home regimen of methadone 30mg BID and 10mg qhs  Also on gabapentin  Oxycodone and Dilaudid sparingly as needed for breakthrough as well as Ultram    Pulmonary hypertension (HCC)- (present on admission)  Assessment & Plan  History of  Stable presently on no medications    Pneumonia due to infectious organism- (present on admission)  Assessment & Plan  Now s/p completed ceftriaxone, Flagyl   Bactrim is resumed by pulmonary  Pulmonary input is noted  Leukocytosis appears to be 2nd to  prednisone    NSIP (nonspecific interstitial pneumonia) (HCC)- (present on admission)  Assessment & Plan  Pulmonary input is noted  On long prednisone taper    Atrial fibrillation with rapid ventricular response (HCC)  Assessment & Plan  Rate was elevated yesterday  His vascular tyson score is zero  No need for anticoagulation at this time with pneumothorax  Increased cardizam   tsh is in normal range   cardiac echo on 3/7/20 showed:Prior echo 9/13/19. no significant changes noted.   Grossly normal left ventricular systolic function.  Normal inferior vena cava size and inspiratory collapse.    Tobacco abuse- (present on admission)  Assessment & Plan  History of extensive use, the patient quit in 2015    Hypothyroid- (present on admission)  Assessment & Plan  Continue with replacement  Will check tsh       VTE prophylaxis: lovenox

## 2020-03-09 NOTE — PROGRESS NOTES
Pulmonary and Critical Care Progress Note    Date of admission  2/18/2020    Chief Complaint  59 y.o. male who presented 2/18/2020 with history of oxygen dependent chronic hypoxic respiratory failure, requiring 2.5-3 L home oxygen, emphysema, pulmonary hypertension and chronic pain who is on methadone.  He was transferred from [place] for pulmonary specialty care with a left lower lobe pneumonia and pneumothorax on 2/18.  Influenza screening was negative.  Shortly after arriving at [facility] he was noted to have a dislodged chest tube.  A replacement chest tube was not necessary.  He was requiring 5 L of oxygen via nasal cannula on 2/19/2/20.  A pulmonology consultation requested a high resolution CT scan, the initiated a connective tissue work-up and continued broad-spectrum antibiotics.  On the morning of 2/21 patient had increasing work of breathing and hypoxic respiratory failure.  Critical care consultation was requested for further evaluation and management.    Hospital Course    2/21-patient was evaluated in the IR suite for placement of a pigtail catheter.  At the time the residual pneumothorax was felt to be too small to benefit from catheter placement.  At 2335 patient became hypoxic, tachycardic and hypotensive.  Chest x-ray revealed a large pneumothorax.  An emergent chest tube was placed with improvement in hemodynamic and oxygenation parameters.  2/22- Pulmonary biopsy performed.  Prednisone 1 mg/kilogram/day initiated  2/28- Extubated; afib with RVR placed on amio gtt  2/29 -Improved mentation. Did well s/p extubation chest placed on water seal   3/1-  Left apical pneumo seen and chest tube returned to suction   3/2, chest xray no significant pneumothorax (tiny), chest tube clamped per myself.  Repeat chest xray.  3/4: CT on water seal  3/5: Chest tube removed  3/6: reoccurance of pneumothorax on the left    3/8:  Left pneumothorax improved compared to 3/7     3/9,Chart review  from the past 24 hours includes imaging, laboratory studies, vital signs and notes available.  Pertinent data for today's visit includes home on prednisone soon, Pulm f.u 2-4 weeks; L ptx again noted, sl larger      Review of Systems  Review of Systems   Constitutional: Positive for malaise/fatigue. Negative for chills, diaphoresis, fever and weight loss.   HENT: Negative for congestion, sinus pain and sore throat.    Eyes: Negative for blurred vision, double vision and photophobia.   Respiratory: Positive for shortness of breath. Negative for cough, hemoptysis, sputum production, wheezing and stridor.    Cardiovascular: Negative for chest pain, palpitations and leg swelling.   Gastrointestinal: Negative for abdominal pain, blood in stool, heartburn, melena, nausea and vomiting.   Genitourinary: Negative.  Negative for dysuria and urgency.   Musculoskeletal: Negative.  Negative for back pain, myalgias and neck pain.   Skin: Negative for itching and rash.   Neurological: Positive for weakness. Negative for dizziness, tingling, sensory change, speech change, focal weakness and headaches.        Generalized weakness form deconditioning   Endo/Heme/Allergies: Negative for polydipsia. Does not bruise/bleed easily.   Psychiatric/Behavioral: Negative.  Negative for depression. The patient is not nervous/anxious.         Vital Signs for last 24 hours   Temp:  [36.1 °C (96.9 °F)-36.4 °C (97.5 °F)] 36.4 °C (97.5 °F)  Pulse:  [74-85] 77  Resp:  [16-20] 16  BP: ()/(68-84) 131/75  SpO2:  [97 %-98 %] 98 %     Physical Exam    Constitutional: Alert, cooperative, not in acute distress.  HEENT: Normocephalic, Atraumatic.  Eyes: PERRLA, EOMI, Conjunctiva not injected.  No scleral icterus    Neck: Trachea is midline;  no thyromegaly   Lymphatic: No lymphadenopathy noted cervical or supraclavicular  Cardiovascular:  Regular rate and rhythm, no distinct murmur or gallop; neck veins flat  Chest & Lungs:   Abdomen: Soft non-tender,  no rebound, no guarding.    Skin: Warm, Dry, No erythema, No rash.   Neurologic: Alert & oriented , cranial nervesgrossly intact, Normal motor function;  No focal deficits noted.   Psychiatric: Affect normal,  Mood normal.   Extremities: Well-perfused, no mottling, no asymmetric edema  Joints: No new swelling or acute arthritis in visible joints  Medications  Current Facility-Administered Medications   Medication Dose Route Frequency Provider Last Rate Last Dose   • DILTIAZem (CARDIZEM) tablet 30 mg  30 mg Oral TWICE DAILY Garrett Jackson M.D.   30 mg at 03/09/20 0520   • ALPRAZolam (XANAX) tablet 0.5 mg  0.5 mg Oral 4X/DAY PRN Garrett Jackson M.D.   0.5 mg at 03/09/20 0851   • sulfamethoxazole-trimethoprim (BACTRIM DS) 800-160 MG tablet 1 Tab  1 Tab Oral 3x/week Robbie Cates M.D.   1 Tab at 03/07/20 0423   • omeprazole (PRILOSEC) capsule 20 mg  20 mg Oral DAILY Deanna Kwan M.D.   20 mg at 03/09/20 0519   • acetaminophen (TYLENOL) tablet 650 mg  650 mg Oral Q6HRS PRN Deanna Kwan M.D.       • methadone (DOLOPHINE) tablet 30 mg  30 mg Oral BID Deanna Kwan M.D.   30 mg at 03/09/20 0520    And   • methadone (DOLOPHINE) tablet 10 mg  10 mg Oral QHS Deanna Kwan M.D.   10 mg at 03/08/20 2107   • oxyCODONE immediate-release (ROXICODONE) tablet 5 mg  5 mg Oral Q4HRS PRN Deanna Kwan M.D.        Or   • oxyCODONE immediate release (ROXICODONE) tablet 10 mg  10 mg Oral Q4HRS PRN Deanna Kwan M.D.   10 mg at 03/08/20 2107   • tramadol (ULTRAM) 50 MG tablet 50 mg  50 mg Oral BID Deanna Kwan M.D.   50 mg at 03/09/20 0520   • simvastatin (ZOCOR) tablet 40 mg  40 mg Oral Nightly Deanna Kwan M.D.   40 mg at 03/08/20 1958   • levothyroxine (SYNTHROID) tablet 125 mcg  125 mcg Oral [facility] AC Deanna Kwan M.D.   125 mcg at 03/09/20 0519   • predniSONE (DELTASONE) tablet 60 mg  60 mg Oral DAILY Deanna Kwan M.D.   60 mg at 03/09/20 0519   • senna-docusate (PERICOLACE or  SENOKOT S) 8.6-50 MG per tablet 2 Tab  2 Tab Oral BID Deanna Kwan M.D.   Stopped at 03/06/20 1800    And   • polyethylene glycol/lytes (MIRALAX) PACKET 1 Packet  1 Packet Oral BID Deanna Kwan M.D.   Stopped at 03/06/20 1800    And   • magnesium hydroxide (MILK OF MAGNESIA) suspension 30 mL  30 mL Oral QDAY PRN Deanna Kwan M.D.        And   • bisacodyl (DULCOLAX) suppository 10 mg  10 mg Rectal QDAY PRN Deanna Kwan M.D.       • gabapentin (NEURONTIN) capsule 2,400 mg  2,400 mg Oral QAM Deanna Kwan M.D.   2,400 mg at 03/09/20 0519    And   • gabapentin (NEURONTIN) capsule 1,600 mg  1,600 mg Oral Q EVENING Deanna Kwan M.D.   1,600 mg at 03/08/20 1719   • guaiFENesin dextromethorphan (ROBITUSSIN DM) 100-10 MG/5ML syrup 10 mL  10 mL Oral Q6HRS PRN Deanna Kwan M.D.       • promethazine (PHENERGAN) tablet 12.5-25 mg  12.5-25 mg Oral Q4HRS PRN Deanna Kwan M.D.       • tamsulosin (FLOMAX) capsule 0.4 mg  0.4 mg Oral AFTER BREAKFAST Deanna Kwan M.D.   0.4 mg at 03/09/20 0852   • DILTIAZem (CARDIZEM) injection 18.4 mg  0.25 mg/kg Intravenous Q4HRS PRN Perry Paige D.O.   18.4 mg at 03/08/20 2005   • HYDROmorphone pf (DILAUDID) injection 1 mg  1 mg Intravenous Q4HRS PRN Srinath Sauceda M.D.   1 mg at 03/09/20 0520   • ipratropium-albuterol (DUONEB) nebulizer solution  3 mL Nebulization Q2HRS PRN (RT) Marck Javier M.D.       • timolol (TIMOPTIC) 0.5 % ophthalmic solution 1 Drop  1 Drop Both Eyes Q EVENING Tim Bustos M.D.   Stopped at 03/08/20 1800   • Respiratory Therapy Consult   Nebulization Continuous RT Tim Bsutos M.D.       • promethazine (PHENERGAN) suppository 12.5-25 mg  12.5-25 mg Rectal Q4HRS PRN Tim Bustos M.D.       • prochlorperazine (COMPAZINE) injection 5-10 mg  5-10 mg Intravenous Q4HRS PRN Tim Bustos M.D.   10 mg at 03/01/20 1409   • albuterol (PROVENTIL) 2.5mg/0.5ml nebulizer solution 2.5 mg  2.5 mg Nebulization Q2HRS  PRN (RT) Tim Bustos M.D.           Fluids  No intake or output data in the 24 hours ending 03/09/20 0934    Laboratory          Recent Labs     03/07/20 0340 03/08/20  0528   SODIUM 135 138   POTASSIUM 4.2 3.8   CHLORIDE 99 98   CO2 29 34*   BUN 21 22   CREATININE 0.99 1.01   CALCIUM 9.2 9.7     Recent Labs     03/07/20 0340 03/08/20  0528   ALTSGPT 38 47   ASTSGOT 25 27   ALKPHOSPHAT 58 62   TBILIRUBIN 0.3 0.3   GLUCOSE 84 80     Recent Labs     03/07/20 0340 03/08/20  0528   WBC 23.5* 20.1*   NEUTSPOLYS 64.40 60.20   LYMPHOCYTES 24.50 27.40   MONOCYTES 7.20 7.10   EOSINOPHILS 1.90 3.50   BASOPHILS 0.70 0.90   ASTSGOT 25 27   ALTSGPT 38 47   ALKPHOSPHAT 58 62   TBILIRUBIN 0.3 0.3     Recent Labs     03/07/20 0340 03/08/20  0528   RBC 4.74 4.77   HEMOGLOBIN 12.7* 12.8*   HEMATOCRIT 41.7* 41.1*   PLATELETCT 190 336       Imaging  CXR reviewed 3/6 adnd 3/7and compared to 3/8     No midline shift    Assessment/Plan  Pneumothorax  Assessment & Plan  Chest tube on the left in due to persistent pneumothorax when clamped  Chest tube removed on 3/5  Surgery has been following the left moderate pneumothorax - plan is follow up with surgery outpatient in 7-10 days. No chest tube  is recommended at this time as pneumothorax is improving and Patient is asymptomatic no change in O2 requirement      NSIP (nonspecific interstitial pneumonia) (HCC)- (present on admission)  Assessment & Plan  Trial steroids  Recommendation is 1 mg/kg  Change taper to 60 mg daily for 30 days, then will need reevaluation in pulmonary clinic  Complicated by emphysema/copd changes  S/p biopsy 2/22 of right lung where majority of GG is present  Some granulation tissue of alveoli, possible component of sarcoid - bx sent to [facility] for evaluation-- pending as of 3/7  CTD work up unremarkable   bactrim for PJP prophylaxis      Acute on chronic respiratory failure with hypoxia (HCC)- (present on admission)  Assessment & Plan  Acute hypoxic  respiratory failure  Attributed to NSIP + empysema + pneumothorax on the left  Extubated 2/28     Pulmonary hypertension (HCC)- (present on admission)  Assessment & Plan  Secondary to interstitial lung disease and copd/smoking hx  Optimize copd medications  symbicort bid + spiriva  duonebs prn      Pulmonary emphysema (HCC)  Assessment & Plan  Emphysema complicating interstitial disease and pneumothorax  duonebs every 4 hour prn  spiriva daily and symbicort bid  Will need followup outpatient with pft       I have performed a physical exam and reviewed and updated ROS and Plan today (3/9/2020). In review of yesterday's note (3/8/2020), there are no changes except as documented above.       Romy Moulton MD , FCCP, Pulmonary Service

## 2020-03-09 NOTE — CARE PLAN
Problem: Communication  Goal: The ability to communicate needs accurately and effectively will improve  Outcome: PROGRESSING AS EXPECTED     Problem: Safety  Goal: Will remain free from injury  Outcome: PROGRESSING AS EXPECTED     Problem: Venous Thromboembolism (VTW)/Deep Vein Thrombosis (DVT) Prevention:  Goal: Patient will participate in Venous Thrombosis (VTE)/Deep Vein Thrombosis (DVT)Prevention Measures  Outcome: PROGRESSING AS EXPECTED     Problem: Knowledge Deficit  Goal: Knowledge of disease process/condition, treatment plan, diagnostic tests, and medications will improve  Outcome: PROGRESSING AS EXPECTED     Problem: Pain Management  Goal: Pain level will decrease to patient's comfort goal  Outcome: PROGRESSING AS EXPECTED     Problem: Respiratory:  Goal: Respiratory status will improve  Outcome: PROGRESSING AS EXPECTED

## 2020-03-09 NOTE — PROGRESS NOTES
Pt becoming increasingly irritable. He took off his tele monitor an walked to the shower with no supervision. Pt was educated about safety, importance of the tele box, and importance of proper shower equipment such as shower socks and IV cover. Pt was also educated about the dangers of showering after narcotic administration. Pt currently refusing bed alarm and . Pt refused education. Charge RN and supervisors notified.

## 2020-03-09 NOTE — PROGRESS NOTES
I certify that the pt  Requires  Continued medically necessary hospital services  For the treatment  Of  pneumonia with  Acute resp failure  With reoccurence of pneumothorax and will remain in the hospital for the next few days.discharge plan  Is anticipated for the next few days.

## 2020-03-09 NOTE — PROGRESS NOTES
Monitor Summary     SR/AFIB  65-85  Tachy up to 160 NS  Converted from SR to Afib at 1434  PVC  .18/.12/.44

## 2020-03-10 ENCOUNTER — APPOINTMENT (OUTPATIENT)
Dept: RADIOLOGY | Facility: MEDICAL CENTER | Age: 60
DRG: 166 | End: 2020-03-10
Attending: INTERNAL MEDICINE
Payer: MEDICARE

## 2020-03-10 PROBLEM — R94.31 QT PROLONGATION: Status: RESOLVED | Noted: 2019-09-10 | Resolved: 2020-03-10

## 2020-03-10 LAB
ALBUMIN SERPL BCP-MCNC: 3.5 G/DL (ref 3.2–4.9)
ALBUMIN/GLOB SERPL: 1.2 G/DL
ALP SERPL-CCNC: 65 U/L (ref 30–99)
ALT SERPL-CCNC: 51 U/L (ref 2–50)
ANION GAP SERPL CALC-SCNC: 6 MMOL/L (ref 0–11.9)
AST SERPL-CCNC: 27 U/L (ref 12–45)
BASOPHILS # BLD AUTO: 0.3 % (ref 0–1.8)
BASOPHILS # BLD: 0.06 K/UL (ref 0–0.12)
BILIRUB SERPL-MCNC: 0.3 MG/DL (ref 0.1–1.5)
BUN SERPL-MCNC: 25 MG/DL (ref 8–22)
CALCIUM SERPL-MCNC: 9.4 MG/DL (ref 8.5–10.5)
CHLORIDE SERPL-SCNC: 98 MMOL/L (ref 96–112)
CO2 SERPL-SCNC: 34 MMOL/L (ref 20–33)
CREAT SERPL-MCNC: 0.94 MG/DL (ref 0.5–1.4)
EOSINOPHIL # BLD AUTO: 0.33 K/UL (ref 0–0.51)
EOSINOPHIL NFR BLD: 1.8 % (ref 0–6.9)
ERYTHROCYTE [DISTWIDTH] IN BLOOD BY AUTOMATED COUNT: 52.1 FL (ref 35.9–50)
GLOBULIN SER CALC-MCNC: 2.9 G/DL (ref 1.9–3.5)
GLUCOSE SERPL-MCNC: 87 MG/DL (ref 65–99)
HCT VFR BLD AUTO: 39.7 % (ref 42–52)
HGB BLD-MCNC: 12.2 G/DL (ref 14–18)
IMM GRANULOCYTES # BLD AUTO: 0.25 K/UL (ref 0–0.11)
IMM GRANULOCYTES NFR BLD AUTO: 1.4 % (ref 0–0.9)
LYMPHOCYTES # BLD AUTO: 2.27 K/UL (ref 1–4.8)
LYMPHOCYTES NFR BLD: 12.4 % (ref 22–41)
MCH RBC QN AUTO: 26.9 PG (ref 27–33)
MCHC RBC AUTO-ENTMCNC: 30.7 G/DL (ref 33.7–35.3)
MCV RBC AUTO: 87.6 FL (ref 81.4–97.8)
MONOCYTES # BLD AUTO: 1.02 K/UL (ref 0–0.85)
MONOCYTES NFR BLD AUTO: 5.6 % (ref 0–13.4)
NEUTROPHILS # BLD AUTO: 14.37 K/UL (ref 1.82–7.42)
NEUTROPHILS NFR BLD: 78.5 % (ref 44–72)
NRBC # BLD AUTO: 0 K/UL
NRBC BLD-RTO: 0 /100 WBC
PLATELET # BLD AUTO: 262 K/UL (ref 164–446)
PMV BLD AUTO: 10 FL (ref 9–12.9)
POTASSIUM SERPL-SCNC: 4.1 MMOL/L (ref 3.6–5.5)
PROT SERPL-MCNC: 6.4 G/DL (ref 6–8.2)
RBC # BLD AUTO: 4.53 M/UL (ref 4.7–6.1)
SODIUM SERPL-SCNC: 138 MMOL/L (ref 135–145)
WBC # BLD AUTO: 18.3 K/UL (ref 4.8–10.8)

## 2020-03-10 PROCEDURE — A9270 NON-COVERED ITEM OR SERVICE: HCPCS | Performed by: HOSPITALIST

## 2020-03-10 PROCEDURE — 36415 COLL VENOUS BLD VENIPUNCTURE: CPT

## 2020-03-10 PROCEDURE — 700111 HCHG RX REV CODE 636 W/ 250 OVERRIDE (IP): Performed by: HOSPITALIST

## 2020-03-10 PROCEDURE — 71045 X-RAY EXAM CHEST 1 VIEW: CPT

## 2020-03-10 PROCEDURE — 700102 HCHG RX REV CODE 250 W/ 637 OVERRIDE(OP): Performed by: HOSPITALIST

## 2020-03-10 PROCEDURE — 700102 HCHG RX REV CODE 250 W/ 637 OVERRIDE(OP): Performed by: FAMILY MEDICINE

## 2020-03-10 PROCEDURE — A9270 NON-COVERED ITEM OR SERVICE: HCPCS | Performed by: FAMILY MEDICINE

## 2020-03-10 PROCEDURE — 80053 COMPREHEN METABOLIC PANEL: CPT

## 2020-03-10 PROCEDURE — 770020 HCHG ROOM/CARE - TELE (206)

## 2020-03-10 PROCEDURE — 700111 HCHG RX REV CODE 636 W/ 250 OVERRIDE (IP): Performed by: PSYCHIATRY & NEUROLOGY

## 2020-03-10 PROCEDURE — 92526 ORAL FUNCTION THERAPY: CPT

## 2020-03-10 PROCEDURE — 85025 COMPLETE CBC W/AUTO DIFF WBC: CPT

## 2020-03-10 PROCEDURE — 99232 SBSQ HOSP IP/OBS MODERATE 35: CPT | Performed by: HOSPITALIST

## 2020-03-10 PROCEDURE — 99232 SBSQ HOSP IP/OBS MODERATE 35: CPT | Performed by: INTERNAL MEDICINE

## 2020-03-10 RX ADMIN — HYDROMORPHONE HYDROCHLORIDE 1 MG: 1 INJECTION, SOLUTION INTRAMUSCULAR; INTRAVENOUS; SUBCUTANEOUS at 08:40

## 2020-03-10 RX ADMIN — HYDROMORPHONE HYDROCHLORIDE 1 MG: 1 INJECTION, SOLUTION INTRAMUSCULAR; INTRAVENOUS; SUBCUTANEOUS at 13:22

## 2020-03-10 RX ADMIN — ALPRAZOLAM 0.5 MG: 0.5 TABLET ORAL at 04:04

## 2020-03-10 RX ADMIN — TIMOLOL MALEATE 1 DROP: 5 SOLUTION OPHTHALMIC at 17:21

## 2020-03-10 RX ADMIN — TAMSULOSIN HYDROCHLORIDE 0.4 MG: 0.4 CAPSULE ORAL at 08:40

## 2020-03-10 RX ADMIN — METHADONE HYDROCHLORIDE 10 MG: 10 TABLET ORAL at 21:20

## 2020-03-10 RX ADMIN — LEVOTHYROXINE SODIUM 125 MCG: 125 TABLET ORAL at 04:56

## 2020-03-10 RX ADMIN — HYDROMORPHONE HYDROCHLORIDE 1 MG: 1 INJECTION, SOLUTION INTRAMUSCULAR; INTRAVENOUS; SUBCUTANEOUS at 19:34

## 2020-03-10 RX ADMIN — DILTIAZEM HYDROCHLORIDE 30 MG: 30 TABLET, FILM COATED ORAL at 14:32

## 2020-03-10 RX ADMIN — ALPRAZOLAM 0.5 MG: 0.5 TABLET ORAL at 13:29

## 2020-03-10 RX ADMIN — GABAPENTIN 2400 MG: 400 CAPSULE ORAL at 04:56

## 2020-03-10 RX ADMIN — DILTIAZEM HYDROCHLORIDE 30 MG: 30 TABLET, FILM COATED ORAL at 21:20

## 2020-03-10 RX ADMIN — METHADONE HYDROCHLORIDE 30 MG: 10 TABLET ORAL at 14:32

## 2020-03-10 RX ADMIN — PREDNISONE 60 MG: 50 TABLET ORAL at 04:56

## 2020-03-10 RX ADMIN — METHADONE HYDROCHLORIDE 30 MG: 10 TABLET ORAL at 04:56

## 2020-03-10 RX ADMIN — TRAMADOL HYDROCHLORIDE 50 MG: 50 TABLET, FILM COATED ORAL at 04:56

## 2020-03-10 RX ADMIN — SIMVASTATIN 40 MG: 40 TABLET, FILM COATED ORAL at 21:20

## 2020-03-10 RX ADMIN — DILTIAZEM HYDROCHLORIDE 18.4 MG: 5 INJECTION INTRAVENOUS at 17:14

## 2020-03-10 RX ADMIN — ALPRAZOLAM 0.5 MG: 0.5 TABLET ORAL at 21:19

## 2020-03-10 RX ADMIN — HYDROMORPHONE HYDROCHLORIDE 1 MG: 1 INJECTION, SOLUTION INTRAMUSCULAR; INTRAVENOUS; SUBCUTANEOUS at 04:01

## 2020-03-10 RX ADMIN — GABAPENTIN 1600 MG: 400 CAPSULE ORAL at 17:20

## 2020-03-10 RX ADMIN — OMEPRAZOLE 20 MG: 20 CAPSULE, DELAYED RELEASE ORAL at 04:56

## 2020-03-10 RX ADMIN — DILTIAZEM HYDROCHLORIDE 30 MG: 30 TABLET, FILM COATED ORAL at 04:56

## 2020-03-10 ASSESSMENT — ENCOUNTER SYMPTOMS
DIAPHORESIS: 0
MUSCULOSKELETAL NEGATIVE: 1
MYALGIAS: 0
BRUISES/BLEEDS EASILY: 0
WHEEZING: 0
DIZZINESS: 0
SPUTUM PRODUCTION: 0
CHILLS: 0
WEAKNESS: 1
PSYCHIATRIC NEGATIVE: 1
NECK PAIN: 0
ORTHOPNEA: 0
NAUSEA: 0
BLOOD IN STOOL: 0
NERVOUS/ANXIOUS: 0
PHOTOPHOBIA: 0
STRIDOR: 0
SPEECH CHANGE: 0
HEADACHES: 0
ABDOMINAL PAIN: 0
FOCAL WEAKNESS: 0
WEIGHT LOSS: 0
COUGH: 0
DOUBLE VISION: 0
SINUS PAIN: 0
SENSORY CHANGE: 0
POLYDIPSIA: 0
BACK PAIN: 0
NECK PAIN: 1
HEARTBURN: 0
BLURRED VISION: 0
SHORTNESS OF BREATH: 1
PALPITATIONS: 0
EYE PAIN: 0
FEVER: 0
HEMOPTYSIS: 0
SORE THROAT: 0
VOMITING: 0
TINGLING: 0
DEPRESSION: 0

## 2020-03-10 ASSESSMENT — FIBROSIS 4 INDEX: FIB4 SCORE: 0.87

## 2020-03-10 NOTE — CARE PLAN
Problem: Communication  Goal: The ability to communicate needs accurately and effectively will improve  Outcome: PROGRESSING AS EXPECTED     Problem: Bowel/Gastric:  Goal: Normal bowel function is maintained or improved  Outcome: PROGRESSING AS EXPECTED  Goal: Will not experience complications related to bowel motility  Outcome: PROGRESSING AS EXPECTED     Problem: Discharge Barriers/Planning  Goal: Patient's continuum of care needs will be met  Outcome: PROGRESSING AS EXPECTED

## 2020-03-10 NOTE — PROGRESS NOTES
[facility] Medicine Daily Progress Note    Date of Service  3/10/2020    Chief Complaint  59 y.o. male admitted 2/18/2020 with SOB.    Hospital Course    This is a 59-year-old male with past medical history positive for asthma, chronic back pain, COPD on 3 LNC at home, depression, hypothyroid, peripheral neuropathy. He presented on 2/18/2020 with several days of feeling ill and abdominal pain, nausea and vomiting.  He was hypoxic at 71% on room air on presentation and was found to have spontaneous pneumothorax and right lower lobe pneumonia. After arrival here his chest tube became dislodged and therefore was removed.     On 2/22 he began having increased work of breathing and was noted to have a tension pneumothorax, so chest tube was replaced and patient was intubated, and transferred to the ICU.  Bronchoscopy and biopsy was done on 2/22 for possible ILD and he was started on steroids.  While in ICU he went into AFib RVR and was started on amiodarone.  He was extubated on 2/28 and was doing well; his chest tube was placed on waterseal but a left apical pneumothorax returned and the chest tube was returned to suction.  On 3/2 the chest tube was clamped again and repeat chest x-ray again showed an enlarging pneumothorax.  The chest tube was unclamped and the pneumothorax again resolved per chest x-ray.  During this encounter he went into rapid atrial fibrillation which was treated with IV diltiazem and he was started on p.o. medications for A. fib.      Interval Problem Update  Asks when he's being discharged. Wants to go home. Explain results of CXR and that pneumothorax looks stable. No overnight events.     Consultants/Specialty  Surgery  Pulmonary    Code Status  Full    Disposition  Pending medical clearance.     Review of Systems  Review of Systems   Constitutional: Positive for malaise/fatigue. Negative for chills, diaphoresis and fever.   HENT: Negative for ear discharge, ear pain and nosebleeds.    Eyes:  Negative for double vision, photophobia and pain.   Respiratory: Positive for shortness of breath. Negative for cough and wheezing.    Cardiovascular: Negative for chest pain, palpitations, orthopnea and leg swelling.   Gastrointestinal: Negative for abdominal pain, nausea and vomiting.   Genitourinary: Negative for frequency, hematuria and urgency.   Musculoskeletal: Positive for neck pain. Negative for joint pain and myalgias.   Skin: Negative for itching and rash.   Neurological: Negative for dizziness, tingling and headaches.        Physical Exam  Temp:  [36.2 °C (97.1 °F)-36.6 °C (97.9 °F)] 36.5 °C (97.7 °F)  Pulse:  [56-90] 60  Resp:  [16-17] 17  BP: (109-135)/(63-95) 127/72  SpO2:  [91 %-99 %] 96 %    Physical Exam  Constitutional:       General: He is not in acute distress (restless but cooperative).     Appearance: Normal appearance.   HENT:      Head: Normocephalic and atraumatic.      Nose: No congestion or rhinorrhea.      Mouth/Throat:      Pharynx: Oropharynx is clear.      Comments: Tacky mucous membranes   Eyes:      General: No scleral icterus.     Extraocular Movements: Extraocular movements intact.   Neck:      Musculoskeletal: Neck supple. No neck rigidity.   Cardiovascular:      Rate and Rhythm: Normal rate. Rhythm irregular.      Pulses: Normal pulses.   Pulmonary:      Breath sounds: No stridor. No wheezing or rales.      Comments: Decreased breath sounds  Bilaterally and more on the left side.    Abdominal:      General: There is no distension.      Tenderness: There is no abdominal tenderness. There is no guarding.   Musculoskeletal:         General: No swelling or deformity.   Skin:     General: Skin is warm and dry.      Coloration: Skin is not jaundiced.   Neurological:      Mental Status: He is alert and oriented to person, place, and time. Mental status is at baseline.   Psychiatric:         Mood and Affect: Mood normal.         Fluids  No intake or output data in the 24 hours ending  03/10/20 0848    Laboratory  Recent Labs     03/08/20  0528 03/10/20  0736   WBC 20.1* 18.3*   RBC 4.77 4.53*   HEMOGLOBIN 12.8* 12.2*   HEMATOCRIT 41.1* 39.7*   MCV 86.2 87.6   MCH 26.8* 26.9*   MCHC 31.1* 30.7*   RDW 49.6 52.1*   PLATELETCT 336 262   MPV 9.8 10.0     Recent Labs     03/08/20  0528 03/10/20  0736   SODIUM 138 138   POTASSIUM 3.8 4.1   CHLORIDE 98 98   CO2 34* 34*   GLUCOSE 80 87   BUN 22 25*   CREATININE 1.01 0.94   CALCIUM 9.7 9.4                   Imaging       Assessment/Plan  * Acute on chronic respiratory failure with hypoxia (HCC)- (present on admission)  Assessment & Plan  Likely secondary to nonspecific interstitial pneumonia and pneumothoraces. Baseline uses 2-3 L via NC  - Continuing long steroid taper   - pulmonary following, appreciate recs  - Status post treatment for CAP  -  RT protocol for COPD  - Continue to mobilize and continue IS    Pneumothorax  Assessment & Plan  - s/p left chest tube, multiple tension pneumothoraces when chest tube clamped  - CT surgery evaluated, Dr. Ganser trying to avoid surgery  - monitor clinically    Hypoxia  Assessment & Plan  As a result from COPD, pneumothorax, pneumonia    Pneumonia due to infectious organism- (present on admission)  Assessment & Plan  Now s/p completed ceftriaxone, Flagyl   Bactrim is resumed by pulmonary  Pulmonary input is noted  Leukocytosis appears to be 2nd to prednisone    Chronic neck pain- (present on admission)  Assessment & Plan  On his home regimen of methadone 30mg BID and 10mg qhs  Also on gabapentin  Oxycodone and Dilaudid sparingly as needed for breakthrough as well as Ultram    Pulmonary hypertension (HCC)- (present on admission)  Assessment & Plan  RVSP elevated at 45 mmHg on echo from Sept 2019.    NSIP (nonspecific interstitial pneumonia) (HCC)- (present on admission)  Assessment & Plan  Pulmonary input is noted  On long prednisone taper    Atrial fibrillation with rapid ventricular response (HCC)  Assessment &  Plan  Improved. [id number] score zero? TSH normal  - No need for anticoagulation at this time with pneumothorax  - Increased cardizam    History of tobacco use- (present on admission)  Assessment & Plan  History of extensive use, patient quit in 2015.    Hypothyroid- (present on admission)  Assessment & Plan  TSH normal  - continue home synthroid       VTE prophylaxis: lovenox

## 2020-03-10 NOTE — THERAPY
"Speech Language Therapy dysphagia treatment completed.   Functional Status:  Pt seen for dysphagia tx focused on safe swallow strategy training with breakfast meal of Soft & Bite Sized (Dysphagia 3) (L6)/Thin Liquids (L0). Pt was initially lethargic with imprecise speech noted, likely due to pain medication side effects. He was able to wake himself up, sit upright at 90 degrees in the chair and self-feed. Min cues provided for pt to reduce rate of intake. No overt s/sx occurred with PO intake. Recommend continuing current diet of Soft & Bite Sized (Dysphagia 3) (L6)/Thin Liquids (L0).     Recommendations: Soft & Bite Sized (Dysphagia 3) (L6)/Thin Liquids (L0)  Plan of Care: Will benefit from Speech Therapy 3 times per week  Post-Acute Therapy: Recommend outpatient or home health transitional care services for continued speech therapy services    See \"Rehab Therapy-Acute\" Patient Summary Report for complete documentation.     "

## 2020-03-10 NOTE — PROGRESS NOTES
Pulmonary and Critical Care Progress Note    Date of admission  2/18/2020    Chief Complaint  59 y.o. male who presented 2/18/2020 with history of oxygen dependent chronic hypoxic respiratory failure, requiring 2.5-3 L home oxygen, emphysema, pulmonary hypertension and chronic pain who is on methadone.  He was transferred from [place] for pulmonary specialty care with a left lower lobe pneumonia and pneumothorax on 2/18.  Influenza screening was negative.  Shortly after arriving at [facility] he was noted to have a dislodged chest tube.  A replacement chest tube was not necessary.  He was requiring 5 L of oxygen via nasal cannula on 2/19/2/20.  A pulmonology consultation requested a high resolution CT scan, the initiated a connective tissue work-up and continued broad-spectrum antibiotics.  On the morning of 2/21 patient had increasing work of breathing and hypoxic respiratory failure.  Critical care consultation was requested for further evaluation and management.    Hospital Course    2/21-patient was evaluated in the IR suite for placement of a pigtail catheter.  At the time the residual pneumothorax was felt to be too small to benefit from catheter placement.  At 2335 patient became hypoxic, tachycardic and hypotensive.  Chest x-ray revealed a large pneumothorax.  An emergent chest tube was placed with improvement in hemodynamic and oxygenation parameters.  2/22- Pulmonary biopsy performed.  Prednisone 1 mg/kilogram/day initiated  2/28- Extubated; afib with RVR placed on amio gtt  2/29 -Improved mentation. Did well s/p extubation chest placed on water seal   3/1-  Left apical pneumo seen and chest tube returned to suction   3/2, chest xray no significant pneumothorax (tiny), chest tube clamped per myself.  Repeat chest xray.  3/4: CT on water seal  3/5: Chest tube removed  3/6: reoccurance of pneumothorax on the left    3/8:  Left pneumothorax improved compared to 3/7     3/9, Pertinent  data for today's visit includes home on prednisone soon, Pulm f.u 2-4 weeks; L ptx again noted, sl larger  3/10, Chart review from the past 24 hours includes imaging, laboratory studies, vital signs and notes available.  Pertinent data for today's visit includes CXr miya has small apical ptx, 16mm, not substantially different    Review of Systems  Review of Systems   Constitutional: Positive for malaise/fatigue. Negative for chills, diaphoresis, fever and weight loss.   HENT: Negative for congestion, sinus pain and sore throat.    Eyes: Negative for blurred vision, double vision and photophobia.   Respiratory: Positive for shortness of breath. Negative for cough, hemoptysis, sputum production, wheezing and stridor.    Cardiovascular: Negative for chest pain, palpitations and leg swelling.   Gastrointestinal: Negative for abdominal pain, blood in stool, heartburn, melena, nausea and vomiting.   Genitourinary: Negative.  Negative for dysuria and urgency.   Musculoskeletal: Negative.  Negative for back pain, myalgias and neck pain.   Skin: Negative for itching and rash.   Neurological: Positive for weakness. Negative for dizziness, tingling, sensory change, speech change, focal weakness and headaches.        Generalized weakness form deconditioning   Endo/Heme/Allergies: Negative for polydipsia. Does not bruise/bleed easily.   Psychiatric/Behavioral: Negative.  Negative for depression. The patient is not nervous/anxious.         Vital Signs for last 24 hours   Temp:  [36.2 °C (97.1 °F)-36.6 °C (97.9 °F)] 36.2 °C (97.1 °F)  Pulse:  [56-90] 56  Resp:  [16-17] 16  BP: (109-135)/(63-95) 109/66  SpO2:  [91 %-99 %] 99 %     Physical Exam    Constitutional: Alert, cooperative, not in acute distress.  HEENT: Normocephalic, Atraumatic.  Eyes: PERRLA, EOMI, Conjunctiva not injected.  No scleral icterus    Neck: Trachea is midline;  no thyromegaly   Lymphatic: No lymphadenopathy noted cervical or  supraclavicular  Cardiovascular:  Regular rate and rhythm, no distinct murmur or gallop; neck veins flat  Chest & Lungs:   Abdomen: Soft non-tender, no rebound, no guarding.    Skin: Warm, Dry, No erythema, No rash.   Neurologic: Alert & oriented , cranial nervesgrossly intact, Normal motor function;  No focal deficits noted.   Psychiatric: Affect normal,  Mood normal.   Extremities: Well-perfused, no mottling, no asymmetric edema  Joints: No new swelling or acute arthritis in visible joints  Medications  Current Facility-Administered Medications   Medication Dose Route Frequency Provider Last Rate Last Dose   • DILTIAZem (CARDIZEM) tablet 30 mg  30 mg Oral Q8HRS Garrett Jackson M.D.   30 mg at 03/10/20 0456   • ALPRAZolam (XANAX) tablet 0.5 mg  0.5 mg Oral 4X/DAY PRN Garrett Jackson M.D.   0.5 mg at 03/10/20 0404   • sulfamethoxazole-trimethoprim (BACTRIM DS) 800-160 MG tablet 1 Tab  1 Tab Oral 3x/week Robbie Cates M.D.   1 Tab at 03/09/20 1426   • omeprazole (PRILOSEC) capsule 20 mg  20 mg Oral DAILY Deanna Kwan M.D.   20 mg at 03/10/20 0456   • acetaminophen (TYLENOL) tablet 650 mg  650 mg Oral Q6HRS PRN Deanna Kwan M.D.       • methadone (DOLOPHINE) tablet 30 mg  30 mg Oral BID Deanna Kwan M.D.   30 mg at 03/10/20 0456    And   • methadone (DOLOPHINE) tablet 10 mg  10 mg Oral QHS Deanna Kwan M.D.   10 mg at 03/09/20 2040   • oxyCODONE immediate-release (ROXICODONE) tablet 5 mg  5 mg Oral Q4HRS PRN Deanna Kwan M.D.        Or   • oxyCODONE immediate release (ROXICODONE) tablet 10 mg  10 mg Oral Q4HRS PRN Deanna Kwan M.D.   10 mg at 03/09/20 1427   • tramadol (ULTRAM) 50 MG tablet 50 mg  50 mg Oral BID Deanna Kwan M.D.   50 mg at 03/10/20 0456   • simvastatin (ZOCOR) tablet 40 mg  40 mg Oral Nightly Deanna Kwan M.D.   40 mg at 03/09/20 2040   • levothyroxine (SYNTHROID) tablet 125 mcg  125 mcg Oral QAM AC Deanna Kwan M.D.   Stopped at 03/10/20 0700    • predniSONE (DELTASONE) tablet 60 mg  60 mg Oral DAILY Deanna Kwan M.D.   60 mg at 03/10/20 0456   • senna-docusate (PERICOLACE or SENOKOT S) 8.6-50 MG per tablet 2 Tab  2 Tab Oral BID Deanna Kwan M.D.   Stopped at 03/06/20 1800    And   • polyethylene glycol/lytes (MIRALAX) PACKET 1 Packet  1 Packet Oral BID Deanna Kwan M.D.   Stopped at 03/06/20 1800    And   • magnesium hydroxide (MILK OF MAGNESIA) suspension 30 mL  30 mL Oral QDAY PRN Deanna Kwan M.D.        And   • bisacodyl (DULCOLAX) suppository 10 mg  10 mg Rectal QDAY PRN Deanna Kwan M.D.       • gabapentin (NEURONTIN) capsule 2,400 mg  2,400 mg Oral QAM Deanna Kwan M.D.   2,400 mg at 03/10/20 0456    And   • gabapentin (NEURONTIN) capsule 1,600 mg  1,600 mg Oral Q EVENING Deanna Kwan M.D.   1,600 mg at 03/09/20 1738   • guaiFENesin dextromethorphan (ROBITUSSIN DM) 100-10 MG/5ML syrup 10 mL  10 mL Oral Q6HRS PRN Deanna Kwan M.D.       • promethazine (PHENERGAN) tablet 12.5-25 mg  12.5-25 mg Oral Q4HRS PRN Deanna Kwan M.D.       • tamsulosin (FLOMAX) capsule 0.4 mg  0.4 mg Oral AFTER BREAKFAST Deanna Kwan M.D.   0.4 mg at 03/09/20 0852   • DILTIAZem (CARDIZEM) injection 18.4 mg  0.25 mg/kg Intravenous Q4HRS PRN Perry Paige D.O.   18.4 mg at 03/08/20 2005   • HYDROmorphone pf (DILAUDID) injection 1 mg  1 mg Intravenous Q4HRS PRN Srinath Sauceda M.D.   1 mg at 03/10/20 0401   • ipratropium-albuterol (DUONEB) nebulizer solution  3 mL Nebulization Q2HRS PRN (RT) Marck Javier M.D.       • timolol (TIMOPTIC) 0.5 % ophthalmic solution 1 Drop  1 Drop Both Eyes Q EVENING Tim Bustos M.D.   1 Drop at 03/09/20 1740   • Respiratory Therapy Consult   Nebulization Continuous RT Tim Bustos M.D.       • promethazine (PHENERGAN) suppository 12.5-25 mg  12.5-25 mg Rectal Q4HRS PRN Tim Bustos M.D.       • prochlorperazine (COMPAZINE) injection 5-10 mg  5-10 mg Intravenous Q4HRS PRN  Tim Bustos M.D.   10 mg at 03/01/20 1409   • albuterol (PROVENTIL) 2.5mg/0.5ml nebulizer solution 2.5 mg  2.5 mg Nebulization Q2HRS PRN (RT) Tim Bustos M.D.           Fluids  No intake or output data in the 24 hours ending 03/10/20 0821    Laboratory          Recent Labs     03/08/20  0528 03/10/20  0736   SODIUM 138 138   POTASSIUM 3.8 4.1   CHLORIDE 98 98   CO2 34* 34*   BUN 22 25*   CREATININE 1.01 0.94   CALCIUM 9.7 9.4     Recent Labs     03/08/20  0528 03/10/20  0736   ALTSGPT 47 51*   ASTSGOT 27 27   ALKPHOSPHAT 62 65   TBILIRUBIN 0.3 0.3   GLUCOSE 80 87     Recent Labs     03/08/20  0528 03/10/20  0736   WBC 20.1* 18.3*   NEUTSPOLYS 60.20 78.50*   LYMPHOCYTES 27.40 12.40*   MONOCYTES 7.10 5.60   EOSINOPHILS 3.50 1.80   BASOPHILS 0.90 0.30   ASTSGOT 27 27   ALTSGPT 47 51*   ALKPHOSPHAT 62 65   TBILIRUBIN 0.3 0.3     Recent Labs     03/08/20 0528 03/10/20  0736   RBC 4.77 4.53*   HEMOGLOBIN 12.8* 12.2*   HEMATOCRIT 41.1* 39.7*   PLATELETCT 336 262       Imaging  CXR reviewed 3/6 adnd 3/7and compared to 3/8     No midline shift    Assessment/Plan  Pneumothorax  Assessment & Plan  Chest tube on the left in due to persistent pneumothorax when clamped  Chest tube removed on 3/5  Surgery has been following the left moderate pneumothorax - plan is follow up with surgery outpatient in 7-10 days. No chest tube  is recommended at this time as pneumothorax is improving and Patient is asymptomatic no change in O2 requirement      NSIP (nonspecific interstitial pneumonia) (HCC)- (present on admission)  Assessment & Plan  Trial steroids  Recommendation is 1 mg/kg  Change taper to 60 mg daily for 30 days, then will need reevaluation in pulmonary clinic  Complicated by emphysema/copd changes  S/p biopsy 2/22 of right lung where majority of GG is present  Some granulation tissue of alveoli, possible component of sarcoid - bx sent to [facility] for evaluation-- pending as of 3/7  CTD work up unremarkable    bactrim for PJP prophylaxis      Acute on chronic respiratory failure with hypoxia (HCC)- (present on admission)  Assessment & Plan  Acute hypoxic respiratory failure  Attributed to NSIP + empysema + pneumothorax on the left  Extubated 2/28     Pulmonary hypertension (HCC)- (present on admission)  Assessment & Plan  Secondary to interstitial lung disease and copd/smoking hx  Optimize copd medications  symbicort bid + spiriva  duonebs prn      Pulmonary emphysema (HCC)  Assessment & Plan  Emphysema complicating interstitial disease and pneumothorax  duonebs every 4 hour prn  spiriva daily and symbicort bid  Will need followup outpatient with pft       I have performed a physical exam and reviewed and updated ROS and Plan today (3/10/2020). In review of yesterday's note (3/9/2020), there are no changes except as documented above.       Romy Moulton MD , FCCP, Pulmonary Service

## 2020-03-10 NOTE — PROGRESS NOTES
Received call from primary RN Gaby that patient had ambulated prior to notifying staff and midline dressing was not entirely intact.  Came to bedside to assess dressing.  Stat-lock and securement foam steri-strips still in place as well as biopatch with edges of tegaderm partially not intact.  Performed sterile dressing change and educated patient on need to notify staff prior to ambulation.  Patient was pleasant and cooperative.  Dressing change day changed to every Monday.  Call VAT for questions or concerns ext [phone]

## 2020-03-10 NOTE — CARE PLAN
Problem: Respiratory:  Goal: Respiratory status will improve  Outcome: MET     Problem: Safety - Medical Restraint  Goal: Remains free of injury from restraints (Restraint for Interference with Medical Device)  Description: INTERVENTIONS:  1. Determine that other, less restrictive measures have been tried or would not be effective before applying the restraint  2. Evaluate the patient's condition at the time of restraint application  3. Inform patient/family regarding the reason for restraint  4. Q2H: Monitor safety, psychosocial status, comfort, nutrition and hydration  Outcome: MET  Goal: Free from restraint(s) (Restraint for Interference with Medical Device)  Description: INTERVENTIONS:  1. ONCE/SHIFT or MINIMUM Q12H: Assess and document the continuing need for restraints  2. Q24H: Continued use of restraint requires LIP to perform face to face examination and written order  3. Identify and implement measures to help patient regain control  Outcome: MET     Problem: Urinary Elimination:  Goal: Ability to reestablish a normal urinary elimination pattern will improve  Outcome: MET     Problem: Safety  Goal: Free from accidental injury  Outcome: MET  Goal: Free from intentional harm  Outcome: MET  Goal: Free from self harm  Outcome: MET  Goal: Isolation Precautions for patient and staff safety  Outcome: MET     Problem: Risk for injury related to physical restraint use  Goal: Safe and appropriate use of physical restraints. Restraints discontinued at the earliest possibility while ensuring patient safety.  Outcome: MET     Problem: Skin Integrity  Goal: Skin Integrity is maintained or improved  Outcome: MET     Problem: Risk for Impaired Mobility--Activity Intolerance  Goal: Mobilize and/or Transfer Safely with Maximum [place]  Outcome: MET  Goal: Activity Level appropriate for Discharge or Transfer  Outcome: MET     Problem: Oxygenation/Respiratory Function  Goal: Patient will Achieve/Maintain Optimum  Respiratory Rate/Effort  Outcome: MET     Problem: Mechanical Ventilation  Goal: Safe management of artificial airway and ventilation  Outcome: MET  Goal: Ability to express needs and understand communication  Outcome: MET  Goal: Successful weaning off mechanical ventilator. Spontaneously maintains adequate gas exchange  Outcome: MET     Problem: Risk of Aspiration  Goal: Absence of aspiration  Outcome: MET     Problem: Hemodynamic Status  Goal: Vital Signs and Fluid Balance Management  Outcome: MET     Problem: Positive DVT/VTE  Goal: Safe management of positive DVT/VTE  Outcome: MET     Problem: Nutrition Deficit  Goal: Adequate Food and Fluid Intake  Outcome: MET  Goal: Enteral Nutrition Management  Outcome: MET  Goal: Parenteral Nutrition Management  Outcome: MET     Problem: Self Care Deficit  Goal: Provide hygiene and grooming for the dependent patient  Outcome: MET  Goal: Ability to bathe, groom and dress independently or with assistance  Outcome: MET  Goal: Ability to feed and maintain oral hygiene independently or with assistance  Outcome: MET  Goal: Ability to toilet independently or with assistance  Outcome: MET     Problem: Elimination  Goal: Establishment and Maintenance of regular bowel elimination  Outcome: MET  Goal: Regular urinary elimination  Outcome: MET     Problem: Mobility  Goal: Risk for activity intolerance will decrease  Outcome: MET

## 2020-03-10 NOTE — PROGRESS NOTES
Received report from NOC RN. Assumed care of patient at 0700. Patient A&Ox 4, speaking in full sentences, follows commands and responds appropriately to questions. On 3L NC, no signs of respiratory distress. Respirations are even and unlabored. POC discussed and agreed upon with patient. Call light and belongings within reach. Bed in lowest locked position. Upper side rails raised.  Fall risk precautions in place. Hourly rounding. Will continue to monitor.

## 2020-03-11 ENCOUNTER — APPOINTMENT (OUTPATIENT)
Dept: RADIOLOGY | Facility: MEDICAL CENTER | Age: 60
DRG: 166 | End: 2020-03-11
Attending: INTERNAL MEDICINE
Payer: MEDICARE

## 2020-03-11 ENCOUNTER — PATIENT OUTREACH (OUTPATIENT)
Dept: HEALTH INFORMATION MANAGEMENT | Facility: OTHER | Age: 60
End: 2020-03-11

## 2020-03-11 VITALS
DIASTOLIC BLOOD PRESSURE: 76 MMHG | SYSTOLIC BLOOD PRESSURE: 122 MMHG | WEIGHT: 169.75 LBS | OXYGEN SATURATION: 95 % | BODY MASS INDEX: 22.5 KG/M2 | TEMPERATURE: 98.1 F | HEIGHT: 73 IN | RESPIRATION RATE: 20 BRPM | HEART RATE: 65 BPM

## 2020-03-11 PROBLEM — I48.91 ATRIAL FIBRILLATION WITH RAPID VENTRICULAR RESPONSE (HCC): Status: RESOLVED | Noted: 2020-02-26 | Resolved: 2020-03-11

## 2020-03-11 PROBLEM — J96.21 ACUTE ON CHRONIC RESPIRATORY FAILURE WITH HYPOXIA (HCC): Status: RESOLVED | Noted: 2020-02-20 | Resolved: 2020-03-11

## 2020-03-11 LAB
ANION GAP SERPL CALC-SCNC: 9 MMOL/L (ref 0–11.9)
BASOPHILS # BLD AUTO: 0.3 % (ref 0–1.8)
BASOPHILS # BLD: 0.07 K/UL (ref 0–0.12)
BUN SERPL-MCNC: 20 MG/DL (ref 8–22)
CALCIUM SERPL-MCNC: 9.3 MG/DL (ref 8.5–10.5)
CHLORIDE SERPL-SCNC: 99 MMOL/L (ref 96–112)
CO2 SERPL-SCNC: 30 MMOL/L (ref 20–33)
CREAT SERPL-MCNC: 1.01 MG/DL (ref 0.5–1.4)
EOSINOPHIL # BLD AUTO: 0.26 K/UL (ref 0–0.51)
EOSINOPHIL NFR BLD: 1.3 % (ref 0–6.9)
ERYTHROCYTE [DISTWIDTH] IN BLOOD BY AUTOMATED COUNT: 52.1 FL (ref 35.9–50)
GLUCOSE SERPL-MCNC: 91 MG/DL (ref 65–99)
HCT VFR BLD AUTO: 40 % (ref 42–52)
HGB BLD-MCNC: 12.5 G/DL (ref 14–18)
IMM GRANULOCYTES # BLD AUTO: 0.26 K/UL (ref 0–0.11)
IMM GRANULOCYTES NFR BLD AUTO: 1.3 % (ref 0–0.9)
LYMPHOCYTES # BLD AUTO: 4.73 K/UL (ref 1–4.8)
LYMPHOCYTES NFR BLD: 23.2 % (ref 22–41)
MCH RBC QN AUTO: 26.9 PG (ref 27–33)
MCHC RBC AUTO-ENTMCNC: 31.3 G/DL (ref 33.7–35.3)
MCV RBC AUTO: 86.2 FL (ref 81.4–97.8)
MONOCYTES # BLD AUTO: 1.48 K/UL (ref 0–0.85)
MONOCYTES NFR BLD AUTO: 7.2 % (ref 0–13.4)
NEUTROPHILS # BLD AUTO: 13.62 K/UL (ref 1.82–7.42)
NEUTROPHILS NFR BLD: 66.7 % (ref 44–72)
NRBC # BLD AUTO: 0 K/UL
NRBC BLD-RTO: 0 /100 WBC
PLATELET # BLD AUTO: 302 K/UL (ref 164–446)
PMV BLD AUTO: 9.9 FL (ref 9–12.9)
POTASSIUM SERPL-SCNC: 4.2 MMOL/L (ref 3.6–5.5)
RBC # BLD AUTO: 4.64 M/UL (ref 4.7–6.1)
SODIUM SERPL-SCNC: 138 MMOL/L (ref 135–145)
WBC # BLD AUTO: 20.4 K/UL (ref 4.8–10.8)

## 2020-03-11 PROCEDURE — 700102 HCHG RX REV CODE 250 W/ 637 OVERRIDE(OP): Performed by: HOSPITALIST

## 2020-03-11 PROCEDURE — 700111 HCHG RX REV CODE 636 W/ 250 OVERRIDE (IP): Performed by: HOSPITALIST

## 2020-03-11 PROCEDURE — 71045 X-RAY EXAM CHEST 1 VIEW: CPT

## 2020-03-11 PROCEDURE — 99232 SBSQ HOSP IP/OBS MODERATE 35: CPT | Performed by: INTERNAL MEDICINE

## 2020-03-11 PROCEDURE — 36415 COLL VENOUS BLD VENIPUNCTURE: CPT

## 2020-03-11 PROCEDURE — 700102 HCHG RX REV CODE 250 W/ 637 OVERRIDE(OP): Performed by: FAMILY MEDICINE

## 2020-03-11 PROCEDURE — 80048 BASIC METABOLIC PNL TOTAL CA: CPT

## 2020-03-11 PROCEDURE — 85025 COMPLETE CBC W/AUTO DIFF WBC: CPT

## 2020-03-11 PROCEDURE — 700111 HCHG RX REV CODE 636 W/ 250 OVERRIDE (IP): Performed by: PSYCHIATRY & NEUROLOGY

## 2020-03-11 PROCEDURE — 99239 HOSP IP/OBS DSCHRG MGMT >30: CPT | Performed by: HOSPITALIST

## 2020-03-11 PROCEDURE — A9270 NON-COVERED ITEM OR SERVICE: HCPCS | Performed by: HOSPITALIST

## 2020-03-11 PROCEDURE — A9270 NON-COVERED ITEM OR SERVICE: HCPCS | Performed by: FAMILY MEDICINE

## 2020-03-11 RX ORDER — ALPRAZOLAM 0.5 MG/1
0.5 TABLET ORAL 4 TIMES DAILY PRN
Qty: 20 TAB | Refills: 0 | Status: SHIPPED | OUTPATIENT
Start: 2020-03-11 | End: 2020-03-16

## 2020-03-11 RX ORDER — SULFAMETHOXAZOLE AND TRIMETHOPRIM 800; 160 MG/1; MG/1
1 TABLET ORAL
Qty: 12 TAB | Refills: 0 | Status: SHIPPED | OUTPATIENT
Start: 2020-03-11 | End: 2020-04-10

## 2020-03-11 RX ORDER — OXYCODONE HYDROCHLORIDE 5 MG/1
5 TABLET ORAL EVERY 4 HOURS PRN
Qty: 18 TAB | Refills: 0 | Status: SHIPPED | OUTPATIENT
Start: 2020-03-11 | End: 2020-03-14

## 2020-03-11 RX ORDER — PREDNISONE 20 MG/1
60 TABLET ORAL DAILY
Qty: 90 TAB | Refills: 0 | Status: SHIPPED | OUTPATIENT
Start: 2020-03-11 | End: 2020-04-10

## 2020-03-11 RX ADMIN — OXYCODONE HYDROCHLORIDE 10 MG: 10 TABLET ORAL at 07:46

## 2020-03-11 RX ADMIN — ALPRAZOLAM 0.5 MG: 0.5 TABLET ORAL at 14:18

## 2020-03-11 RX ADMIN — PREDNISONE 60 MG: 50 TABLET ORAL at 04:41

## 2020-03-11 RX ADMIN — HYDROMORPHONE HYDROCHLORIDE 1 MG: 1 INJECTION, SOLUTION INTRAMUSCULAR; INTRAVENOUS; SUBCUTANEOUS at 05:26

## 2020-03-11 RX ADMIN — LEVOTHYROXINE SODIUM 125 MCG: 125 TABLET ORAL at 04:41

## 2020-03-11 RX ADMIN — OXYCODONE HYDROCHLORIDE 10 MG: 10 TABLET ORAL at 14:18

## 2020-03-11 RX ADMIN — METHADONE HYDROCHLORIDE 30 MG: 10 TABLET ORAL at 14:17

## 2020-03-11 RX ADMIN — OMEPRAZOLE 20 MG: 20 CAPSULE, DELAYED RELEASE ORAL at 04:41

## 2020-03-11 RX ADMIN — METHADONE HYDROCHLORIDE 30 MG: 10 TABLET ORAL at 04:40

## 2020-03-11 RX ADMIN — DILTIAZEM HYDROCHLORIDE 30 MG: 30 TABLET, FILM COATED ORAL at 14:18

## 2020-03-11 RX ADMIN — DILTIAZEM HYDROCHLORIDE 30 MG: 30 TABLET, FILM COATED ORAL at 04:41

## 2020-03-11 RX ADMIN — ALPRAZOLAM 0.5 MG: 0.5 TABLET ORAL at 07:43

## 2020-03-11 RX ADMIN — TAMSULOSIN HYDROCHLORIDE 0.4 MG: 0.4 CAPSULE ORAL at 07:44

## 2020-03-11 RX ADMIN — HYDROMORPHONE HYDROCHLORIDE 1 MG: 1 INJECTION, SOLUTION INTRAMUSCULAR; INTRAVENOUS; SUBCUTANEOUS at 01:22

## 2020-03-11 RX ADMIN — GABAPENTIN 2400 MG: 400 CAPSULE ORAL at 04:40

## 2020-03-11 RX ADMIN — TRAMADOL HYDROCHLORIDE 50 MG: 50 TABLET, FILM COATED ORAL at 04:40

## 2020-03-11 ASSESSMENT — ENCOUNTER SYMPTOMS
NECK PAIN: 0
MUSCULOSKELETAL NEGATIVE: 1
SPEECH CHANGE: 0
NERVOUS/ANXIOUS: 0
PSYCHIATRIC NEGATIVE: 1
HEARTBURN: 0
MYALGIAS: 0
CHILLS: 0
PHOTOPHOBIA: 0
ABDOMINAL PAIN: 0
STRIDOR: 0
HEADACHES: 0
DIAPHORESIS: 0
DEPRESSION: 0
DIZZINESS: 0
NAUSEA: 0
VOMITING: 0
HEMOPTYSIS: 0
PALPITATIONS: 0
BRUISES/BLEEDS EASILY: 0
DOUBLE VISION: 0
SENSORY CHANGE: 0
SPUTUM PRODUCTION: 0
WEAKNESS: 1
SINUS PAIN: 0
BACK PAIN: 0
BLOOD IN STOOL: 0
SHORTNESS OF BREATH: 1
FEVER: 0
WHEEZING: 0
WEIGHT LOSS: 0
COUGH: 0
TINGLING: 0
SORE THROAT: 0
POLYDIPSIA: 0
FOCAL WEAKNESS: 0
BLURRED VISION: 0

## 2020-03-11 NOTE — DISCHARGE INSTRUCTIONS
Dysphagia Diet Level 3, Mechanically Advanced  The dysphagia level 3 diet includes foods that are soft, moist, and can be chopped into 1-inch chunks. This diet is helpful for people with mild swallowing difficulties. It reduces the risk of food getting caught in the windpipe, trachea, or lungs.  WHAT DO I NEED TO KNOW ABOUT THIS DIET?  · You may eat foods that are soft and moist.  · If you were on the dysphagia level 1 or level 2 diets, you may eat any of the foods included on those lists.  · Avoid foods that are dry, hard, sticky, chewy, coarse, and crunchy. Also avoid large cuts of food.  · Take small bites. Each bite should contain 1 inch or less of food.  · Thicken liquids if instructed by your health care provider. Follow your health care provider's instructions on how to do this and to what consistency.  · See your dietitian or speech language pathologist regularly for help with your dietary changes.  WHAT FOODS CAN I EAT?  Grains  Moist breads without nuts or seeds. Biscuits, muffins, pancakes, and waffles well-moistened with syrup, jelly, margarine, or butter. Smooth cereals with plenty of milk to moisten them. Moist bread stuffing. Moist rice.  Vegetables  All cooked, soft vegetables. Shredded lettuce. Tender fried potatoes.  Fruits  All canned and cooked fruits. Soft, peeled fresh fruits, such as peaches, nectarines, kiwis, cantaloupe, honeydew melon, and watermelon without seeds. Soft berries, such as strawberries.  Meat and Other Protein Sources  Moist ground or finely diced or sliced meats. Solid, tender cuts of meat. Meatloaf. Hamburger with a bun. Sausage marzena. Deli thin-sliced lunch meat. Chicken, egg, or tuna salad sandwich. Sloppy celeste. Moist fish. Eggs prepared any way. Casseroles with small chunks of meats, ground meats, or tender meats.  Dairy  Cheese spreads without coarse large chunks. Shredded cheese. Cheese slices. Cottage cheese. Milk at the right texture. Smooth frappes. Yogurt without  nuts or coconut. Ask your health care provider whether you can have frozen desserts (such as malts or milk shakes) and thin liquids.  Sweets/Desserts  Soft, smooth, moist desserts. Non-chewy, smooth candy. Jam. Jelly. Honey. Preserves. Ask your health care provider whether you can have frozen desserts.  Fats and Oils  Butter. Oils. Margarine. Mayonnaise. Gravy. Spreads.  Other  All seasonings and sweeteners. All sauces without large chunks.  The items listed above may not be a complete list of recommended foods or beverages. Contact your dietitian for more options.  WHAT FOODS ARE NOT RECOMMENDED?  Grains  Coarse or dry cereals. Dry breads. Toast. Crackers. Tough, crusty breads, such [place] bread and baguettes. Tough, crisp fried potatoes. Potato skins. Dry bread stuffing. Granola. Popcorn. Chips.  Vegetables  All raw vegetables except shredded lettuce. Cooked corn. Rubbery or stiff cooked vegetables. Stringy vegetables, such as celery.  Fruits  Hard fruits that are difficult to chew, such as apples or pears. Stringy, high-pulp fruits, such as pineapple, papaya, or manisha. Fruits with tough skins, such as grapes. Coconut. All dried fruits. Fruit leather. Fruit roll-ups. Fruit snacks.  Meat and Other Protein Sources  Dry or tough meats or poultry. Dry fish. Fish with bones. Peanut butter. All nuts and seeds.  Dairy   Any with nuts, seeds, chocolate chips, dried fruit, coconut, or pineapple.  Sweets/Desserts  Dry cakes. Chewy or dry cookies. Any with nuts, seeds, dry fruits, coconut, pineapple, or anything dry, sticky, or hard. Chewy caramel. Licorice. Taffy-type candies. Ask your health care provider whether you can have frozen desserts.  Fats and Oils  Any with chunks, nuts, seeds, or pineapple. Olives. Pickles.  Other  Soups with tough or large chunks of meats, poultry, or vegetables. Corn or clam chowder.  The items listed above may not be a complete list of foods and beverages to avoid. Contact your dietitian for  more information.     This information is not intended to replace advice given to you by your health care provider. Make sure you discuss any questions you have with your health care provider.     Document Released: 12/18/2006 Document Revised: 01/08/2016 Document Reviewed: 12/01/2014  [employer] Patient Education ©2016 [employer]  Discharge Instructions    Discharged to home by car with relative. Discharged via wheelchair, hospital escort: Yes.  Special equipment needed: Not Applicable    Be sure to schedule a follow-up appointment with your primary care doctor or any specialists as instructed.     Discharge Plan:   Influenza Vaccine Indication: Not indicated: Previously immunized this influenza season and > 8 years of age(1/22 recieved vaccine.)    I understand that a diet low in cholesterol, fat, and sodium is recommended for good health. Unless I have been given specific instructions below for another diet, I accept this instruction as my diet prescription.   Other diet: Soft and cut up food into small pieces  Thin Liquids  Regular    Special Instructions: None    · Is patient discharged on Warfarin / Coumadin?   No     Depression / Suicide Risk    As you are discharged from this [facility] facility, it is important to learn how to keep safe from harming yourself.    Recognize the warning signs:  · Abrupt changes in personality, positive or negative- including increase in energy   · Giving away possessions  · Change in eating patterns- significant weight changes-  positive or negative  · Change in sleeping patterns- unable to sleep or sleeping all the time   · Unwillingness or inability to communicate  · Depression  · Unusual sadness, discouragement and loneliness  · Talk of wanting to die  · Neglect of personal appearance   · Rebelliousness- reckless behavior  · Withdrawal from people/activities they love  · Confusion- inability to concentrate     If you or a loved one observes any of these  behaviors or has concerns about self-harm, here's what you can do:  · Talk about it- your feelings and reasons for harming yourself  · Remove any means that you might use to hurt yourself (examples: pills, rope, extension cords, firearm)  · Get professional help from the community (Mental Health, Substance Abuse, psychological counseling)  · Do not be alone:Call your Safe Contact- someone whom you trust who will be there for you.  · Call your local CRISIS HOTLINE 780-5038 or 439-928-2460  · Call your local Children's Mobile Crisis Response Team [place] (813) 668-8731 or www.[employer]  · Call the toll free National Suicide Prevention Hotlines   · National Suicide Prevention Lifeline 733-055-KIRK (9824)  · [employer] Network 800-SUICIDE (865-6763)    Pneumothorax  You have a pneumothorax. This means that you have a partial collapse of one of your lungs.  This condition may occur spontaneously, or may develop from a chest injury. People with a spontaneous pneumothorax can have a problem with repeated episodes. A catheter (chest tube) may be inserted between your ribs. This slowly removes the air from around the lung over a few days. Hospital care will likely be needed if you have a chest tube. Hospital care may be needed if your condition worsens, if the lung does not expand fully, or if you have other significant injuries like fractured ribs.  Your condition does not appear to require hospital care at this time. Rest as much as possible and avoid any strenuous activity until the lung is normal and your doctor approves. Do not smoke or drink alcohol. Call your doctor or go to the emergency room at once if you develop increased chest pain, more shortness of breath, pain on both sides of the chest, or a fever.  Document Released: 01/25/2006 Document Revised: 03/11/2013 Document Reviewed: 02/15/2010  [employer]® Patient Information ©2013 [employer].      Community-Acquired Pneumonia,  Adult  Introduction  Pneumonia is an infection of the lungs. One type of pneumonia can happen while a person is in a hospital. A different type can happen when a person is not in a hospital (community-acquired pneumonia). It is easy for this kind to spread from person to person. It can spread to you if you breathe near an infected person who coughs or sneezes. Some symptoms include:  · A dry cough.  · A wet (productive) cough.  · Fever.  · Sweating.  · Chest pain.  Follow these instructions at home:  · Take over-the-counter and prescription medicines only as told by your doctor.  ¨ Only take cough medicine if you are losing sleep.  ¨ If you were prescribed an antibiotic medicine, take it as told by your doctor. Do not stop taking the antibiotic even if you start to feel better.  · Sleep with your head and neck raised (elevated). You can do this by putting a few pillows under your head, or you can sleep in a recliner.  · Do not use tobacco products. These include cigarettes, chewing tobacco, and e-cigarettes. If you need help quitting, ask your doctor.  · Drink enough water to keep your pee (urine) clear or pale yellow.  A shot (vaccine) can help prevent pneumonia. Shots are often suggested for:  · People older than 65 years of age.  · People older than 19 years of age:  ¨ Who are having cancer treatment.  ¨ Who have long-term (chronic) lung disease.  ¨ Who have problems with their body's defense system (immune system).  You may also prevent pneumonia if you take these actions:  · Get the flu (influenza) shot every year.  · Go to the dentist as often as told.  · Wash your hands often. If soap and water are not available, use hand .  Contact a doctor if:  · You have a fever.  · You lose sleep because your cough medicine does not help.  Get help right away if:  · You are short of breath and it gets worse.  · You have more chest pain.  · Your sickness gets worse. This is very serious if:  ¨ You are an older  adult.  ¨ Your body's defense system is weak.  · You cough up blood.  This information is not intended to replace advice given to you by your health care provider. Make sure you discuss any questions you have with your health care provider.  Document Released: 06/05/2009 Document Revised: 05/25/2017 Document Reviewed: 04/13/2016  © 2017 [employer]        Discharge Instructions per Aura Recio M.D.    Please follow up with your PCP, pulmonary and surgeon. You will need to continue prednisone 60mg daily and the bactrim 3x/week for PCP prophylaxis while you're immunosuppressed on steroids.     Per surgery, it is Ok to shower with a tegaderm covering. No baths/soaks x 2 weeks. Will need to f/u with Dr. Ganser in 7-10 days for suture removal.    ACTIVITY: As tolerated    DIAGNOSIS: Interstitial lung disease and pneumothorax    Return to ER if you develop new or worsening symptoms.

## 2020-03-11 NOTE — PROGRESS NOTES
Pt refusing bed alarm at this time. Pt educated and instructed to call for assistance and risks of getting up alone. Pt states understanding

## 2020-03-11 NOTE — DISCHARGE SUMMARY
Discharge Summary    CHIEF COMPLAINT ON ADMISSION  Chief Complaint   Patient presents with   • Shortness of Breath       Reason for Admission  [address]     Admission Date  2/18/2020    CODE STATUS  Full Code    HPI & HOSPITAL COURSE  This is a 59 y.o. male with medical history of chronic back pain, oxygen dependent COPD on 3L NC at home, depression, hypothyroidism and peripheral neuropathy here with generalized ill feeling and abdominal pain. He was found to be hypoxemic at 71% on RA and a with a left sided pneumothorax as well as right lower lobe infiltrate. Influenza was negative. Chest tube was placed at an outside facility and he was there transferred here for higher level of care and specialty consultation. Thoracic surgery was consulted and the dislodged chest tube was removed.     On 2/22 he began having increased work of breathing and was noted to have a tension pneumothorax, so chest tube was replaced, patient was intubated, and he transferred to the ICU. Bronchoscopy and biopsy was done on 2/22 for possible ILD and he was started on steroids. While in ICU he went into AFib RVR and was started on amiodarone.  He was extubated on 2/28 and was doing well. His chest tube was placed on waterseal but a left apical pneumothorax returned and the chest tube was returned to suction.  On 3/2 the chest tube was clamped again and repeat chest x-ray again showed an enlarging pneumothorax.  The chest tube was unclamped and the pneumothorax again resolved per chest x-ray.      Eventually, the chest tube was able to be removed and his pneumothorax remained stable. His oxygen requirements returned to his baseline 2-3L. Pulmonary continued to follow him after transfer out of the ICU and he continued to improve. He was eager for discharge and was discharged once cleared by pulmonary. He is to continue prednisone for his ILD/NSIP with bactrim for PJP prophylaxis.    Therefore, he is discharged in good and stable  condition to home with close outpatient follow-up.    The patient met 2-midnight criteria for an inpatient stay at the time of discharge.    Discharge Date  3/11/2020    FOLLOW UP ITEMS POST DISCHARGE  Please follow up with your PCP, thoracic surgery (suture removal) and pulmonary.     DISCHARGE DIAGNOSES  Principal Problem (Resolved):    Acute on chronic respiratory failure with hypoxia (HCC) POA: Yes  Active Problems:    Interstitial lung disease (HCC) POA: Yes    Pneumothorax POA: No    Pulmonary hypertension (HCC) POA: Yes    Chronic neck pain POA: Yes      Overview: Premorbid treated with methadone, Xanax and Neurontin.      Satisfactory analgesia with current PCA settings.      Resume maintenance medications when tolerating diet    History of tobacco use POA: Yes    NSIP (nonspecific interstitial pneumonia) (HCC) POA: Yes    Pulmonary emphysema (HCC) POA: Yes    Hypothyroid POA: Yes      Overview: Premorbid      On levothyroxine  Resolved Problems:    Pneumonia due to infectious organism POA: Yes    QT prolongation POA: Yes    Atrial fibrillation with rapid ventricular response (HCC) POA: Yes      FOLLOW UP  Future Appointments   Date Time Provider Department Center   4/8/2020  8:15 AM Frank Rojo M.D. PULM None     Primary Care Provider  Make appointment with your primary care provider in your home town as that is what you verbalized you prefer.    Schedule an appointment as soon as possible for a visit in 1 week      John H Ganser, M.D.  [address] [place] [id number]  [phone]    Call  [phone] [facility].  Call this office to schedule an appointment with Dr. Ganser to remove sutures.  They should be contacting you to get appointment set up.  If they do not, call in the next couple days.  Have him remove sutures.    Pcp Not In Computer            MEDICATIONS ON DISCHARGE     Medication List      START taking these medications      Instructions   ALPRAZolam 0.5 MG Tabs  Commonly known  as:  XANAX   Take 1 Tab by mouth 4 times a day as needed for Anxiety for up to 5 days.  Dose:  0.5 mg     DILTIAZem 30 MG Tabs  Commonly known as:  CARDIZEM   Take 1 Tab by mouth every 8 hours.  Dose:  30 mg     oxyCODONE immediate-release 5 MG Tabs  Commonly known as:  ROXICODONE   Take 1 Tab by mouth every four hours as needed for Severe Pain for up to 3 days.  Dose:  5 mg     predniSONE 20 MG Tabs  Commonly known as:  DELTASONE   Take 3 Tabs by mouth every day for 30 days.  Dose:  60 mg     sulfamethoxazole-trimethoprim 800-160 MG tablet  Commonly known as:  BACTRIM DS   Take 1 Tab by mouth 3 times a week for 30 days.  Dose:  1 Tab        CONTINUE taking these medications      Instructions   gabapentin 800 MG tablet  Commonly known as:  NEURONTIN   Take 1,600-2,400 mg by mouth 2 times a day. 2400 mg every morning  1600 mg every night  Dose:  1,600-2,400 mg     levothyroxine 125 MCG Tabs  Commonly known as:  SYNTHROID   Take 125 mcg by mouth every morning before breakfast.  Dose:  125 mcg     methadone 10 MG Tabs  Commonly known as:  DOLOPHINE   Take 10-30 mg by mouth 3 times a day. 30 mg every morning  30 mg every afternoon  10 mg every night  Dose:  10-30 mg     omeprazole 20 MG delayed-release capsule  Commonly known as:  PRILOSEC   Take 20 mg by mouth every evening.  Dose:  20 mg     tamsulosin 0.4 MG capsule  Commonly known as:  FLOMAX   Take 0.4 mg by mouth every evening.  Dose:  0.4 mg     timolol 0.5 % Soln  Commonly known as:  TIMOPTIC   Place 1 Drop in both eyes every evening.  Dose:  1 Drop     tramadol 50 MG Tabs  Commonly known as:  ULTRAM   Take 50 mg by mouth 2 Times a Day.  Dose:  50 mg     Zocor 40 MG Tabs  Generic drug:  simvastatin   Take 40 mg by mouth every evening.  Dose:  40 mg            Allergies  Allergies   Allergen Reactions   • Fentanyl        DIET  Orders Placed This Encounter   Procedures   • Diet Order Regular     Standing Status:   Standing     Number of Occurrences:   1     Order  Specific Question:   Diet:     Answer:   Regular [1]     Order Specific Question:   Texture Modifier     Answer:   Level 6 - Soft & Bite Sized (Dysphagia 3)     Order Specific Question:   Liquid level     Answer:   Level 0 - Thin       ACTIVITY  As tolerated.  Weight bearing as tolerated    CONSULTATIONS  Pulmonary/Critical Care  Thoracic surgery    IMAGING AND PROCEDURES  DX-CHEST-LIMITED (1 VIEW)   Final Result         1.  Hazy right pulmonary infiltrates.   2.  Residual left pneumothorax, stable compared to prior study.      DX-CHEST-LIMITED (1 VIEW)   Final Result         1.  Hazy right pulmonary infiltrates.   2.  Residual left pneumothorax, slightly more pronounced compared to prior study.      DX-CHEST-LIMITED (1 VIEW)   Final Result      Mild interval enlargement of small left pneumothorax      DX-CHEST-LIMITED (1 VIEW)   Final Result      1.  Stable left pneumothorax.      2.  Unchanged interstitial infiltrates.      EC-ECHOCARDIOGRAM LTD W/ CONT   Final Result      DX-CHEST-LIMITED (1 VIEW)   Final Result      1.  Slight interval decrease in left-sided pneumothorax which is small to moderate in size.   2.  Unchanged mild diffuse interstitial edema.   3.  Upper limits of normal size of the cardiomediastinal silhouette.      DX-CHEST-PORTABLE (1 VIEW)   Final Result      1.  Stable appearance of moderate, approximately 30-40%, left pneumothorax.      2.  Stable interstitial edema or pneumonia in the right lung.      DX-CHEST-PORTABLE (1 VIEW)   Final Result      1.  Interval removal of LEFT chest tube.   2.  Moderate LEFT pneumothorax, new from prior exam.   3.  Mild worsening RIGHT midlung infiltrate.      DX-CHEST-PORTABLE (1 VIEW)   Final Result      1.  Supportive tubing as described above.   2.  No other significant change from prior exam.         DX-CHEST-PORTABLE (1 VIEW)   Final Result      Stable chest appearance compared with 3/2. Previous tiny left apical pneumothorax however is not seen today.       DX-CHEST-LIMITED (1 VIEW)   Final Result         Near complete resolution of left-sided pneumothorax.      DX-CHEST-PORTABLE (1 VIEW)   Final Result      1.  Interval increase in size of left pneumothorax, with left chest tube in place.   2.  Removal of right IJ central venous catheter.      IR-MIDLINE CATHETER INSERTION WO GUIDANCE > AGE 3   Final Result                  Ultrasound-guided midline placement performed by qualified nursing staff    as above.          DX-CHEST-LIMITED (1 VIEW)   Final Result         Tiny left lateral pneumothorax, similar to prior. Left apical chest tube is redemonstrated.      DX-CHEST-PORTABLE (1 VIEW)   Final Result      Improving pulmonary edema.      DX-CHEST-PORTABLE (1 VIEW)   Final Result         1.  Pulmonary edema and/or infiltrates are identified, which are stable since the prior exam.   2.  Trace left apical pneumothorax, new since prior study, thoracostomy tube remains in place.                        CT-CHEST (THORAX) W/O   Final Result      1.  Emphysema      2.  Subpleural bleb      3.  Bilateral lower lobe consolidation      4.  Assessment for interstitial lung disease is limited given the multiple superimposed processes      5.  Small left pneumothorax      6.  Dilated bowel identified in the upper abdomen               DX-CHEST-PORTABLE (1 VIEW)   Final Result         1.  Pulmonary edema and/or infiltrates are identified, which are stable since the prior exam.                     DX-CHEST-PORTABLE (1 VIEW)   Final Result         1.  Pulmonary edema and/or infiltrates are identified, which are stable since the prior exam.                  DX-CHEST-PORTABLE (1 VIEW)   Final Result         1.  Pulmonary edema and/or infiltrates are identified, which are stable since the prior exam.   2.  Small left apical recurrent pneumothorax, thoracostomy tube remains in place.               DX-CHEST-PORTABLE (1 VIEW)   Final Result         1.  Pulmonary edema and/or infiltrates  are identified, which are stable since the prior exam.            DX-CHEST-PORTABLE (1 VIEW)   Final Result      Stable bilateral infiltrates, right greater than left.      DX-CHEST-PORTABLE (1 VIEW)   Final Result      Resolved small left apical pneumothorax      Otherwise stable chest with right hemidiaphragm elevation, reticular greater than consolidative opacity. Edema More likely than pneumonia      DX-CHEST-PORTABLE (1 VIEW)   Final Result      1.  Stable small left apical pneumothorax.      2.  Stable bilateral infiltrates.      DX-CHEST-LIMITED (1 VIEW)   Final Result      1.  No significant change      2.  Bilateral pulmonary airspace process      3.  Lines and tubes appear appropriately located      4.  Unchanged small left apical pneumothorax      DX-CHEST-PORTABLE (1 VIEW)   Final Result         1.  Pulmonary edema and/or infiltrates are identified, which are stable since the prior exam. Interval resolution of left pneumothorax status post thoracostomy tube placement.         DX-CHEST-PORTABLE (1 VIEW)   Final Result         1.  New large left pneumothorax with rightward shift of mediastinum and hyperexpansion of the left thorax, appearance compatible with tension pneumothorax.   2.  Hazy interstitial pulmonary infiltrates, similar to prior study given difference of technique.      These findings were discussed with the patient's clinician, Gypsy Mackey, on 2/21/2020 11:44 PM.      DX-ABDOMEN FOR TUBE PLACEMENT   Final Result         Feeding tube with tip projecting over the expected area of the stomach.      CT-CHEST (THORAX) W/O   Final Result      Decreased size of LEFT pneumothorax. LEFT chest tube placement deferred. Findings and decisions were discussed with Dr. Marck Javier via [employer] while the patient was on the table.            DX-CHEST-PORTABLE (1 VIEW)   Final Result      1.  interval placement of an endotracheal tube which terminates in satisfactory position at the level of the  aortic arch.   2.  Interval insertion of a central venous catheter which terminates with the tip projecting over the expected region of the mid to distal superior vena cava.   3.  Previously identified trace left pneumothorax is no longer visualized.   4.  Increased diffuse, bilateral, right greater than left interstitial and airspace opacities.      DX-CHEST-PORTABLE (1 VIEW)   Final Result         1.  Pulmonary edema and/or infiltrates are identified, which are stable since the prior exam.   2.  Trace left pneumothorax, decreased since prior study      CT-CHEST, HIGH RESOLUTION LUNG   Final Result      1.  Emphysematous changes again seen, similar to prior exam.   2.  Plan loss of RIGHT hemithorax with diffuse interstitial opacities suggesting pneumonitis/pneumonia.   3.  Multifocal ill-defined groundglass opacities in the LEFT upper and lower lobes also suggesting multifocal pneumonitis.   4.  Small LEFT anterior pneumothorax as seen on prior chest x-ray.   5.  Mild mediastinal adenopathy, nonspecific.               DX-CHEST-PORTABLE (1 VIEW)   Final Result         1.  Pulmonary edema and/or infiltrates are identified, which are stable since the prior exam.   2.  Small left pneumothorax, decreased since prior study      DX-CHEST-2 VIEWS   Final Result      1.  Unchanged small LEFT pneumothorax   2.  Unchanged BILATERAL interstitial and airspace disease and atelectasis      DX-CHEST-LIMITED (1 VIEW)   Final Result      Stable small left pneumothorax. Interval removal of left chest tube.      DX-CHEST-LIMITED (1 VIEW)   Final Result      1.  Left chest tube tip at the origin of the left hemithorax. Advancement is suggested. Stable small left pneumothorax.   2.  Interstitial and airspace opacities, worse on the right which could represent asymmetric edema or infection.      These findings were discussed with nurse Persaud on 2/18/2020 9:16 AM.      OUTSIDE IMAGES-DX CHEST   Final Result      OUTSIDE IMAGES-DX CHEST    Final Result        Bronchoscopy    Left tube thoracostomy.     LABORATORY  Lab Results   Component Value Date    SODIUM 138 03/11/2020    POTASSIUM 4.2 03/11/2020    CHLORIDE 99 03/11/2020    CO2 30 03/11/2020    GLUCOSE 91 03/11/2020    BUN 20 03/11/2020    CREATININE 1.01 03/11/2020        Lab Results   Component Value Date    WBC 20.4 (H) 03/11/2020    HEMOGLOBIN 12.5 (L) 03/11/2020    HEMATOCRIT 40.0 (L) 03/11/2020    PLATELETCT 302 03/11/2020        Total time of the discharge process exceeds 42 minutes.

## 2020-03-11 NOTE — CARE PLAN
Problem: Communication  Goal: The ability to communicate needs accurately and effectively will improve  Outcome: PROGRESSING AS EXPECTED  Note: Communication board updated. All pt questions answered at this time and no further questions. Pt encouraged to voice feelings and ask questions as they arise.        Problem: Safety  Goal: Will remain free from injury  Outcome: PROGRESSING AS EXPECTED  Note: Patient educated about risk of falls and reasoning for use of tread socks, calling for help, and bed alarms.

## 2020-03-11 NOTE — PROGRESS NOTES
Pulmonary and Critical Care Progress Note    Date of admission  2/18/2020    Chief Complaint  59 y.o. male who presented 2/18/2020 with history of oxygen dependent chronic hypoxic respiratory failure, requiring 2.5-3 L home oxygen, emphysema, pulmonary hypertension and chronic pain who is on methadone.  He was transferred from [place] for pulmonary specialty care with a left lower lobe pneumonia and pneumothorax on 2/18.  Influenza screening was negative.  Shortly after arriving at [facility] he was noted to have a dislodged chest tube.  A replacement chest tube was not necessary.  He was requiring 5 L of oxygen via nasal cannula on 2/19/2/20.  A pulmonology consultation requested a high resolution CT scan, the initiated a connective tissue work-up and continued broad-spectrum antibiotics.  On the morning of 2/21 patient had increasing work of breathing and hypoxic respiratory failure.  Critical care consultation was requested for further evaluation and management.    Hospital Course    2/21-patient was evaluated in the IR suite for placement of a pigtail catheter.  At the time the residual pneumothorax was felt to be too small to benefit from catheter placement.  At 2335 patient became hypoxic, tachycardic and hypotensive.  Chest x-ray revealed a large pneumothorax.  An emergent chest tube was placed with improvement in hemodynamic and oxygenation parameters.  2/22- Pulmonary biopsy performed.  Prednisone 1 mg/kilogram/day initiated  2/28- Extubated; afib with RVR placed on amio gtt  2/29 -Improved mentation. Did well s/p extubation chest placed on water seal   3/1-  Left apical pneumo seen and chest tube returned to suction   3/2, chest xray no significant pneumothorax (tiny), chest tube clamped per myself.  Repeat chest xray.  3/4: CT on water seal  3/5: Chest tube removed  3/6: reoccurance of pneumothorax on the left    3/8:  Left pneumothorax improved compared to 3/7     3/9, Pertinent  data for today's visit includes home on prednisone soon, Pulm f.u 2-4 weeks; L ptx again noted, sl larger  3/10, Chart review from the past 24 hours includes imaging, laboratory studies, vital signs and notes available.  Pertinent data for today's visit includes CXr miya has small apical ptx, 16mm, not substantially different  3/11, Home on O2 Pred  60, will see surgery in follow-up for pneumothorax, low flow oxygen and comfortable.  Explained to patient if he develops new shortness of breath chest pain discomfort he needs to go directly to the emergency room given the fact that he has a small residual pneumothorax.  We can see him in the office in 3 to 4 weeks, he plans to travel to [place] and agrees to come in for follow-up when he returns.  Review of Systems  Review of Systems   Constitutional: Positive for malaise/fatigue. Negative for chills, diaphoresis, fever and weight loss.   HENT: Negative for congestion, sinus pain and sore throat.    Eyes: Negative for blurred vision, double vision and photophobia.   Respiratory: Positive for shortness of breath. Negative for cough, hemoptysis, sputum production, wheezing and stridor.    Cardiovascular: Negative for chest pain, palpitations and leg swelling.   Gastrointestinal: Negative for abdominal pain, blood in stool, heartburn, melena, nausea and vomiting.   Genitourinary: Negative.  Negative for dysuria and urgency.   Musculoskeletal: Negative.  Negative for back pain, myalgias and neck pain.   Skin: Negative for itching and rash.   Neurological: Positive for weakness. Negative for dizziness, tingling, sensory change, speech change, focal weakness and headaches.        Generalized weakness form deconditioning   Endo/Heme/Allergies: Negative for polydipsia. Does not bruise/bleed easily.   Psychiatric/Behavioral: Negative.  Negative for depression. The patient is not nervous/anxious.         Vital Signs for last 24 hours   Temp:  [36.2 °C (97.2 °F)-36.6 °C  (97.9 °F)] 36.3 °C (97.4 °F)  Pulse:  [60-99] 72  Resp:  [17-18] 18  BP: (105-130)/(68-83) 130/70  SpO2:  [94 %-96 %] 94 %     Physical Exam    Constitutional: Alert, cooperative, not in acute distress.  HEENT: Normocephalic, Atraumatic.  Eyes: PERRLA, EOMI, Conjunctiva not injected.  No scleral icterus    Neck: Trachea is midline;  no thyromegaly   Lymphatic: No lymphadenopathy noted cervical or supraclavicular  Cardiovascular:  Regular rate and rhythm, no distinct murmur or gallop; neck veins flat  Chest & Lungs:   Abdomen: Soft non-tender, no rebound, no guarding.    Skin: Warm, Dry, No erythema, No rash.   Neurologic: Alert & oriented , cranial nervesgrossly intact, Normal motor function;  No focal deficits noted.   Psychiatric: Affect normal,  Mood normal.   Extremities: Well-perfused, no mottling, no asymmetric edema  Joints: No new swelling or acute arthritis in visible joints  Medications  Current Facility-Administered Medications   Medication Dose Route Frequency Provider Last Rate Last Dose   • DILTIAZem (CARDIZEM) tablet 30 mg  30 mg Oral Q8HRS Garrett Jackson M.D.   30 mg at 03/11/20 0441   • ALPRAZolam (XANAX) tablet 0.5 mg  0.5 mg Oral 4X/DAY PRN Garrett Jackson M.D.   0.5 mg at 03/10/20 2119   • sulfamethoxazole-trimethoprim (BACTRIM DS) 800-160 MG tablet 1 Tab  1 Tab Oral 3x/week Robbie Cates M.D.   1 Tab at 03/09/20 1426   • omeprazole (PRILOSEC) capsule 20 mg  20 mg Oral DAILY Deanna Kwan M.D.   20 mg at 03/11/20 0441   • acetaminophen (TYLENOL) tablet 650 mg  650 mg Oral Q6HRS PRN Deanna Kwan M.D.       • methadone (DOLOPHINE) tablet 30 mg  30 mg Oral BID Deanna Kwan M.D.   30 mg at 03/11/20 0440    And   • methadone (DOLOPHINE) tablet 10 mg  10 mg Oral QHS Deanna Kwan M.D.   10 mg at 03/10/20 2120   • oxyCODONE immediate-release (ROXICODONE) tablet 5 mg  5 mg Oral Q4HRS PRN Deanna Kwan M.D.        Or   • oxyCODONE immediate release (ROXICODONE) tablet  10 mg  10 mg Oral Q4HRS PRN Deanna Kwan M.D.   10 mg at 03/09/20 1427   • tramadol (ULTRAM) 50 MG tablet 50 mg  50 mg Oral BID Deanna Kwan M.D.   50 mg at 03/11/20 0440   • simvastatin (ZOCOR) tablet 40 mg  40 mg Oral Nightly Deanna Kwan M.D.   40 mg at 03/10/20 2120   • levothyroxine (SYNTHROID) tablet 125 mcg  125 mcg Oral QAM AC Deanna Kwan M.D.   125 mcg at 03/11/20 0441   • predniSONE (DELTASONE) tablet 60 mg  60 mg Oral DAILY Deanna Kwan M.D.   60 mg at 03/11/20 0441   • senna-docusate (PERICOLACE or SENOKOT S) 8.6-50 MG per tablet 2 Tab  2 Tab Oral BID Deanna Kwan M.D.   Stopped at 03/06/20 1800    And   • polyethylene glycol/lytes (MIRALAX) PACKET 1 Packet  1 Packet Oral BID Deanna Kwan M.D.   Stopped at 03/06/20 1800    And   • magnesium hydroxide (MILK OF MAGNESIA) suspension 30 mL  30 mL Oral QDAY PRN Deanna Kwan M.D.        And   • bisacodyl (DULCOLAX) suppository 10 mg  10 mg Rectal QDAY PRN Deanna Kwan M.D.       • gabapentin (NEURONTIN) capsule 2,400 mg  2,400 mg Oral QAM Deanna Kwan M.D.   2,400 mg at 03/11/20 0440    And   • gabapentin (NEURONTIN) capsule 1,600 mg  1,600 mg Oral Q EVENING Deanna Kwan M.D.   1,600 mg at 03/10/20 1720   • guaiFENesin dextromethorphan (ROBITUSSIN DM) 100-10 MG/5ML syrup 10 mL  10 mL Oral Q6HRS PRN Deanna Kwan M.D.       • promethazine (PHENERGAN) tablet 12.5-25 mg  12.5-25 mg Oral Q4HRS PRN Deanna Kwan M.D.       • tamsulosin (FLOMAX) capsule 0.4 mg  0.4 mg Oral AFTER BREAKFAST Deanna Kwan M.D.   0.4 mg at 03/10/20 0840   • DILTIAZem (CARDIZEM) injection 18.4 mg  0.25 mg/kg Intravenous Q4HRS PRN Perry Paige D.MELISSA   18.4 mg at 03/10/20 1714   • HYDROmorphone pf (DILAUDID) injection 1 mg  1 mg Intravenous Q4HRS PRN Srinath Sauceda M.D.   1 mg at 03/11/20 0526   • ipratropium-albuterol (DUONEB) nebulizer solution  3 mL Nebulization Q2HRS PRN (RT) Marck Javier M.D.       • timolol  (TIMOPTIC) 0.5 % ophthalmic solution 1 Drop  1 Drop Both Eyes Q EVENING Tim Bustos M.D.   1 Drop at 03/10/20 1721   • Respiratory Therapy Consult   Nebulization Continuous RT Tim Bustos M.D.       • promethazine (PHENERGAN) suppository 12.5-25 mg  12.5-25 mg Rectal Q4HRS PRN Tim Bustos M.D.       • prochlorperazine (COMPAZINE) injection 5-10 mg  5-10 mg Intravenous Q4HRS PRN Tim Bustos M.D.   10 mg at 03/01/20 1409   • albuterol (PROVENTIL) 2.5mg/0.5ml nebulizer solution 2.5 mg  2.5 mg Nebulization Q2HRS PRN (RT) Tim Bustos M.D.           Fluids  No intake or output data in the 24 hours ending 03/11/20 0711    Laboratory          Recent Labs     03/10/20  0736 03/11/20  0340   SODIUM 138 138   POTASSIUM 4.1 4.2   CHLORIDE 98 99   CO2 34* 30   BUN 25* 20   CREATININE 0.94 1.01   CALCIUM 9.4 9.3     Recent Labs     03/10/20  0736 03/11/20  0340   ALTSGPT 51*  --    ASTSGOT 27  --    ALKPHOSPHAT 65  --    TBILIRUBIN 0.3  --    GLUCOSE 87 91     Recent Labs     03/10/20  0736 03/11/20  0340   WBC 18.3* 20.4*   NEUTSPOLYS 78.50* 66.70   LYMPHOCYTES 12.40* 23.20   MONOCYTES 5.60 7.20   EOSINOPHILS 1.80 1.30   BASOPHILS 0.30 0.30   ASTSGOT 27  --    ALTSGPT 51*  --    ALKPHOSPHAT 65  --    TBILIRUBIN 0.3  --      Recent Labs     03/10/20  0736 03/11/20  0340   RBC 4.53* 4.64*   HEMOGLOBIN 12.2* 12.5*   HEMATOCRIT 39.7* 40.0*   PLATELETCT 262 302       Imaging  CXR reviewed 3/6 adnd 3/7and compared to 3/8     No midline shift    Assessment/Plan  Pneumothorax  Assessment & Plan  Chest tube on the left in due to persistent pneumothorax when clamped  Chest tube removed on 3/5  Surgery has been following the left moderate pneumothorax - plan is follow up with surgery outpatient in 7-10 days. No chest tube  is recommended at this time as pneumothorax is improving and Patient is asymptomatic no change in O2 requirement      NSIP (nonspecific interstitial pneumonia) (HCC)- (present on  admission)  Assessment & Plan  Trial steroids  Recommendation is 1 mg/kg  Change taper to 60 mg daily for 30 days, then will need reevaluation in pulmonary clinic  Complicated by emphysema/copd changes  S/p biopsy 2/22 of right lung where majority of GG is present  Some granulation tissue of alveoli, possible component of sarcoid - bx sent to [facility] for evaluation-- pending as of 3/7  CTD work up unremarkable   bactrim for PJP prophylaxis      Acute on chronic respiratory failure with hypoxia (HCC)- (present on admission)  Assessment & Plan  Acute hypoxic respiratory failure  Attributed to NSIP + empysema + pneumothorax on the left  Extubated 2/28     Pulmonary hypertension (HCC)- (present on admission)  Assessment & Plan  Secondary to interstitial lung disease and copd/smoking hx  Optimize copd medications  symbicort bid + spiriva  duonebs prn      Pulmonary emphysema (HCC)  Assessment & Plan  Emphysema complicating interstitial disease and pneumothorax  duonebs every 4 hour prn  spiriva daily and symbicort bid  Will need followup outpatient with pft       I have performed a physical exam and reviewed and updated ROS and Plan today (3/11/2020). In review of yesterday's note (3/10/2020), there are no changes except as documented above.       Romy Moulton MD , FCCP, Pulmonary Service

## 2020-03-18 LAB
FUNGUS SPEC CULT: NORMAL
SIGNIFICANT IND 70042: NORMAL
SITE SITE: NORMAL
SOURCE SOURCE: NORMAL

## 2020-03-31 ENCOUNTER — TELEPHONE (OUTPATIENT)
Dept: PULMONOLOGY | Facility: HOSPICE | Age: 60
End: 2020-03-31

## 2020-03-31 NOTE — TELEPHONE ENCOUNTER
Dr. Rothman needed to know what the plans were for his Prednisone taper. Per chart notes: Change taper to 60 mg daily for 30 days, then will need reevaluation in pulmonary clinic. Information relayed to Dr. Rothman and patient will be seen in clinic 04/08/2020/ap

## 2020-04-15 NOTE — PROCEDURES
Date: 02/21/2020    Procedure:  Emergent orotracheal intubation    Indication: Respiratory failure    Physician:  Dr. Marck Javier MD    Consent:  Emergent     Procedure:  This patient has developed increasing respiratory failure  and requires emergent intubation.  RSI given with Etomidate and Rocuronium.  Using a #4 glidescope, an 8.0 ETT was placed on the first attempt w/o complication.  Tube placement confirmed by direct visualization of placement, end-tidal CO2 monitor color change and equal breath sounds.  No immediate complications.  Vitals remained stable throughout the procedure.  A post-procedure CXR will be reviewed.

## 2020-10-27 NOTE — ASSESSMENT & PLAN NOTE
High-dose methadone as well as Neurontin   Calcipotriene Counseling:  I discussed with the patient the risks of calcipotriene including but not limited to erythema, scaling, itching, and irritation.

## 2021-03-08 NOTE — PROGRESS NOTES
[facility]                Phone: [phone]   Fax: [phone]      Physical Therapy  Non compliance/Attendance Issue    DATE:   3/8/2021            MRN: [id number]     PATIENT NAME:  Gabriela Booth              Diagnosis:  Low back pain     Total Visits to Date: 1 eval only   Cancels/No shows to Date: 0/1    Dear Dr. Melodie Ruiz    This is to notify you that per our physical therapy department policy your patient, noted above, is being discharged from Physical Therapy due to the following reason(s):     · If a Physical Therapy out patient is absent from three consecutive scheduled appointments without notification    · If a Physical Therapy out patient is absent for 50% of the scheduled visits     · Pt's last contact with PT department was on 11/19/20. If you have any questions, please feel free to contact me at [phone].     Thank You,    Electronically Signed by:   Trenton Campbell DPT        [id number]  3/8/2021 Pulmonary Progress Note    Date of admission  9/10/2019    Chief Complaint  59 y.o. male admitted 9/10/2019 with shortness of breath.    Hospital Course    This gentleman was admitted to the hospital with pneumonia and respiratory failure.  He developed worsening respiratory failure and was transferred to the ICU and was intubated.      Interval Problem Update  Reviewed last 24 hour events:  Patient reports symptom improvement, notes he is able to ambulate further than before. Patient reports history of GERD    Review of Systems  Review of Systems   Constitutional: Negative for malaise/fatigue.   HENT: Negative for ear discharge, nosebleeds, sinus pain and tinnitus.    Eyes: Negative for pain, discharge and redness.   Respiratory: Positive for cough, sputum production and shortness of breath. Negative for hemoptysis and wheezing.    Cardiovascular: Negative for chest pain, claudication and PND.   Gastrointestinal: Negative for abdominal pain, constipation, heartburn and melena.   Genitourinary: Negative for dysuria, flank pain and urgency.   Musculoskeletal: Negative for myalgias and neck pain.   Skin: Negative for itching.   Neurological: Negative for dizziness, sensory change, speech change, focal weakness and loss of consciousness.   Endo/Heme/Allergies: Does not bruise/bleed easily.   Psychiatric/Behavioral: Negative for hallucinations and suicidal ideas. The patient is not nervous/anxious.          Vital Signs for last 24 hours   Temp:  [36.2 °C (97.1 °F)-37 °C (98.6 °F)] 36.4 °C (97.5 °F)  Pulse:  [58-76] 71  Resp:  [15-18] 18  BP: ()/(42-65) 92/56  SpO2:  [91 %-95 %] 91 %    Hemodynamic parameters for last 24 hours       Respiratory Information for the last 24 hours       Physical Exam   Physical Exam   Constitutional: He is oriented to person, place, and time. He appears well-developed. No distress.   HENT:   Head: Normocephalic and atraumatic.   Right Ear: External ear normal.   Left Ear: External ear  normal.   Nose: Nose normal.   Mouth/Throat: Oropharynx is clear and moist.   Eyes: Pupils are equal, round, and reactive to light. EOM are normal. Right eye exhibits no discharge. Left eye exhibits no discharge.   Neck: Normal range of motion. Neck supple. No JVD present. No tracheal deviation present.   Cardiovascular: Normal rate, regular rhythm, normal heart sounds and intact distal pulses. Exam reveals no gallop.   No murmur heard.  Pulmonary/Chest: Effort normal. No respiratory distress. He has no decreased breath sounds. He has no wheezes. He has no rales. He exhibits no tenderness.   Abdominal: Soft. Bowel sounds are normal. He exhibits no distension. There is no tenderness. There is no rebound.   Musculoskeletal: Normal range of motion. He exhibits no edema or tenderness.   No clubbing or cyanosis   Lymphadenopathy:     He has no cervical adenopathy.   Neurological: He is alert and oriented to person, place, and time. No cranial nerve deficit. Coordination normal.   No focal weakness   Skin: Skin is warm and dry. No rash noted. He is not diaphoretic. No erythema.       Medications  Current Facility-Administered Medications   Medication Dose Route Frequency Provider Last Rate Last Dose   • midodrine (PROAMATINE) tablet 5 mg  5 mg Oral TID WITH MEALS Rigoberto Galvan M.D.       • budesonide-formoterol (SYMBICORT) 80-4.5 MCG/ACT inhaler 2 Puff  2 Puff Inhalation BID (RT) Lennox Connors M.D.       • busPIRone (BUSPAR) tablet 5 mg  5 mg Oral TID Rigoberto Galvan M.D.   5 mg at 09/23/19 1204   • ALPRAZolam (XANAX) tablet 0.25 mg  0.25 mg Oral Q6HRS PRN Rigoberto Galvan M.D.   0.25 mg at 09/23/19 0527   • tamsulosin (FLOMAX) capsule 0.4 mg  0.4 mg Oral AFTER BREAKFAST Rigoberto Galvan M.D.   0.4 mg at 09/23/19 0804   • timolol (TIMOPTIC) 0.25 % ophthalmic solution 1 Drop  1 Drop Both Eyes BID Rigoberto Galvan M.D.   1 Drop at 09/23/19 0521   • aspirin (ASA) tablet 325 mg  325 mg Oral DAILY  Rigoberto Galvan M.D.   325 mg at 09/23/19 0523   • gabapentin (NEURONTIN) capsule 1,600 mg  1,600 mg Oral Q EVENING Rigoberto Galvan M.D.   1,600 mg at 09/22/19 1720   • gabapentin (NEURONTIN) capsule 2,400 mg  2,400 mg Oral QAM Rigoberto Galvan M.D.   2,400 mg at 09/23/19 0523   • levothyroxine (SYNTHROID) tablet 125 mcg  125 mcg Oral QAM AC Rigoberto Galvan M.D.   125 mcg at 09/23/19 0635   • methadone (DOLOPHINE) tablet 30 mg  30 mg Oral QAM Rigoberto Galvan M.D.   30 mg at 09/23/19 0522    And   • methadone (DOLOPHINE) tablet 20 mg  20 mg Oral DAILY AT NOON Rigoberto Galvan M.D.   20 mg at 09/23/19 1231    And   • methadone (DOLOPHINE) tablet 20 mg  20 mg Oral Q EVENING Rigoberto Galvan M.D.   20 mg at 09/22/19 1720   • senna-docusate (PERICOLACE or SENOKOT S) 8.6-50 MG per tablet 2 Tab  2 Tab Oral BID Rigoberto Galvan M.D.   Stopped at 09/18/19 1711    And   • polyethylene glycol/lytes (MIRALAX) PACKET 1 Packet  1 Packet Oral QDAY PRN Rigoberto Galvan M.D.        And   • magnesium hydroxide (MILK OF MAGNESIA) suspension 30 mL  30 mL Oral QDAY PRN Rigoberto Galvan M.D.        And   • bisacodyl (DULCOLAX) suppository 10 mg  10 mg Rectal QDAY PRN Rigoberto Galvan M.D.       • simvastatin (ZOCOR) tablet 40 mg  40 mg Oral Nightly Rigoberto Galvan M.D.   40 mg at 09/22/19 2014   • temazepam (RESTORIL) capsule 15 mg  15 mg Oral QHS Rigoberto Galvan M.D.   15 mg at 09/22/19 2014   • tramadol (ULTRAM) 50 MG tablet 50 mg  50 mg Oral BID Rigoberto Galvan M.D.   50 mg at 09/22/19 1720   • acetaminophen (TYLENOL) tablet 650 mg  650 mg Oral Q6HRS PRN Rigoberto Galvan M.D.       • oxyCODONE immediate-release (ROXICODONE) tablet 5-10 mg  5-10 mg Oral Q4HRS PRN Rigoberto Galvan M.D.   5 mg at 09/20/19 0305   • Respiratory Care per Protocol   Nebulization Continuous RT Marck Montero Jr., D.O.       • ipratropium-albuterol (DUONEB) nebulizer solution  3 mL  Nebulization Q2HRS PRN (RT) FLORA Mcdermott Jr..O.       • MD Alert...ICU Electrolyte Replacement per Pharmacy   Other PHARMACY TO DOSE FLORA Mcdermott Jr..LUCY.       • enoxaparin (LOVENOX) inj 40 mg  40 mg Subcutaneous DAILY Marck Montero Jr. D.O.   40 mg at 09/23/19 0528   • sodium chloride (OCEAN) 0.65 % nasal spray 2 Spray  2 Spray Nasal Q2HRS PRN Mary Silva M.D.   2 [place] at 09/17/19 0913       Fluids    Intake/Output Summary (Last 24 hours) at 9/23/2019 1507  Last data filed at 9/22/2019 1800  Gross per 24 hour   Intake 240 ml   Output --   Net 240 ml       Laboratory          Recent Labs     09/21/19  0528 09/22/19  0411   SODIUM 137 139   POTASSIUM 4.6 3.7   CHLORIDE 102 101   CO2 26 30   BUN 25* 20   CREATININE 0.80 1.02   MAGNESIUM 2.0 1.9   PHOSPHORUS 2.8 2.5   CALCIUM 9.1 9.1     Recent Labs     09/21/19  0528 09/22/19  0411   GLUCOSE 84 88     Recent Labs     09/21/19  0528 09/22/19  0411   WBC 20.8* 21.1*   NEUTSPOLYS 64.80 64.80   LYMPHOCYTES 20.90* 20.30*   MONOCYTES 8.20 8.70   EOSINOPHILS 3.70 3.20   BASOPHILS 0.20 0.20     Recent Labs     09/21/19  0528 09/22/19  0411   RBC 4.62* 4.41*   HEMOGLOBIN 12.9* 12.5*   HEMATOCRIT 40.5* 38.7*   PLATELETCT 338 330       Imaging  None    Assessment/Plan  Acute respiratory failure with hypoxia, emphsema and pneumonitis (HCC)- (present on admission)  Assessment & Plan  Intubated 9/14-9/16. Improving.   Patient on Duonebs  Plan  -Symbicort      Pneumonitis- (present on admission)  Assessment & Plan  Possibly secondary to GERD, chemical aspiration pneumonitis. Completed 5 days of doxycycline on 9/15 and completed 5 days of Rocephin on 9/18.     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 Patient completed steroid taper      Paraseptal emphysema (HCC)  Assessment & Plan  Possible combined etiology as mentioned earlier.    Pulmonary hypertension (HCC)  Assessment & Plan  RVSP  55 mmHg. Maintain euvolemia

## 2022-03-23 NOTE — CARE PLAN
Problem: Safety  Goal: Will remain free from injury  Outcome: PROGRESSING AS EXPECTED  Note: Bed locked and in lowest position.  Bed alarm on.  Treaded socks.  Call light and belongings with in reach.  Pt educated to call for assistance. Pt verbalized understanding.  Hourly rounding in place.       Problem: Knowledge Deficit  Goal: Knowledge of disease process/condition, treatment plan, diagnostic tests, and medications will improve  Outcome: PROGRESSING AS EXPECTED  Note: Discussed POC and medications with patient.  Patient verbalized understanding.       Is This A New Presentation, Or A Follow-Up?: Skin Lesions What Type Of Note Output Would You Prefer (Optional)?: Standard Output How Severe Is Your Skin Lesion?: mild Has Your Skin Lesion Been Treated?: not been treated

## 2022-09-21 NOTE — PROGRESS NOTES
[facility] Medicine Daily Progress Note    Date of Service  9/23/2019    Chief Complaint  59 y.o. male admitted 9/10/2019 with history of chronic pain syndrome on high-dose narcotics admitted with respiratory failure secondary to pneumonia CTA was negative for PE required intubation on 9/14/2019    Hospital Course          Interval Problem Update    Did not sleep well last night  Low blood pressure this morning SBP 80s denies dizziness or lightheadedness  Tolerating diet          Consultants/Specialty  ID  CC    Code Status  Full code    Disposition  SNF referral    Review of Systems  Review of Systems   Constitutional: Negative for chills and fever.   Cardiovascular: Negative for chest pain and palpitations.   Musculoskeletal: Positive for back pain and joint pain.   Neurological: Negative for dizziness and focal weakness.   Psychiatric/Behavioral: The patient is nervous/anxious.    All other systems reviewed and are negative.       Physical Exam  Temp:  [36.2 °C (97.1 °F)-37 °C (98.6 °F)] 36.7 °C (98.1 °F)  Pulse:  [60-76] 60  Resp:  [16-18] 17  BP: ()/(60-65) 106/62  SpO2:  [91 %-100 %] 95 %    Physical Exam   Constitutional: He is oriented to person, place, and time. He appears well-developed and well-nourished.   HENT:   Head: Normocephalic and atraumatic.   Mouth/Throat: Oropharynx is clear and moist.   Eyes: Conjunctivae are normal. Right eye exhibits no discharge. Left eye exhibits no discharge.   Neck: Neck supple. No JVD present. No tracheal deviation present.   Cardiovascular: Normal rate, regular rhythm and normal heart sounds.   Pulmonary/Chest: Effort normal. No respiratory distress. He has decreased breath sounds. He has no wheezes. He exhibits no tenderness.   Abdominal: Soft. Bowel sounds are normal. He exhibits no distension. There is no tenderness. There is no rebound.   Musculoskeletal: He exhibits no edema or tenderness.   Neurological: He is alert and oriented to person, place, and time. No  cranial nerve deficit. He exhibits normal muscle tone.   Skin: Skin is warm and dry. No rash noted. He is not diaphoretic. No cyanosis. Nails show no clubbing.   Psychiatric: He has a normal mood and affect. Judgment normal.   Nursing note and vitals reviewed.      Fluids    Intake/Output Summary (Last 24 hours) at 9/23/2019 0942  Last data filed at 9/22/2019 1800  Gross per 24 hour   Intake 240 ml   Output --   Net 240 ml       Laboratory  Recent Labs     09/21/19  0528 09/22/19 0411   WBC 20.8* 21.1*   RBC 4.62* 4.41*   HEMOGLOBIN 12.9* 12.5*   HEMATOCRIT 40.5* 38.7*   MCV 87.7 87.8   MCH 27.9 28.3   MCHC 31.9* 32.3*   RDW 43.8 44.6   PLATELETCT 338 330   MPV 9.3 10.2     Recent Labs     09/21/19 0528 09/22/19 0411   SODIUM 137 139   POTASSIUM 4.6 3.7   CHLORIDE 102 101   CO2 26 30   GLUCOSE 84 88   BUN 25* 20   CREATININE 0.80 1.02   CALCIUM 9.1 9.1                   Imaging  DX-CHEST-PORTABLE (1 VIEW)   Final Result         1.  Pulmonary edema and/or infiltrates are identified, which are stable since the prior exam.      DX-CHEST-PORTABLE (1 VIEW)   Final Result         1.  Pulmonary edema and/or infiltrates are identified, which are stable since the prior exam.         DX-CHEST-PORTABLE (1 VIEW)   Final Result         1.  Pulmonary edema and/or infiltrates are identified, which are stable since the prior exam.      DX-CHEST-PORTABLE (1 VIEW)   Final Result         1.  Pulmonary edema and/or infiltrates are identified, which are stable since the prior exam.      DX-CHEST-PORTABLE (1 VIEW)   Final Result         1.  No significant interval change.      CT-CHEST (THORAX) W/O   Final Result      1.  Minimal improvement of presumed bilateral pneumonitis compared to 9/9/2019.  Findings likely significantly improved from prior chest x-ray dated 9/12/2019.   2.  Moderate emphysema, likely paraseptal.               DX-CHEST-FOR LINE PLACEMENT Perform procedure in: Patient's Room (S134)   Final Result            1. A  left central venous catheter with tip projects appropriately over the expected area of the superior vena cava.      DX-ABDOMEN FOR TUBE PLACEMENT   Final Result         Feeding tube with tip projecting over the expected area of the stomach fundus.      DX-CHEST-PORTABLE (1 VIEW)   Final Result         1. Interval intubation. No other significant interval change.      EC-ECHOCARDIOGRAM COMPLETE W/O CONT   Final Result      DX-CHEST-PORTABLE (1 VIEW)   Final Result      Bilateral airspace opacities, left greater than right worrisome for multifocal pneumonia.         DX-CHEST-2 VIEWS   Final Result         New airspace opacity in the left lower lobe, concerning for pneumonia.      OUTSIDE IMAGES-CT CHEST   Final Result           Assessment/Plan  * Hypotension  Assessment & Plan  Likely secondary to hypovolemia and sedating meds  And holding parameters to methadone  Half liter LR bolus now  We will start Midodrin  Monitor blood pressure    Acute respiratory failure with hypoxia, emphsema and pneumonitis (HCC)- (present on admission)  Assessment & Plan  Extubated on 9/16/2019    Continue oxygen and RT protocol  No wheezing on exam we will DC prednisone    Pneumonitis- (present on admission)  Assessment & Plan  Resolved completed antibiotic treatment    Elevated glucose  Assessment & Plan  Likely steroid-induced    Interstitial lung disease (HCC)  Assessment & Plan  Outpatient follow-up with pulmonary for further work-up    Paraseptal emphysema (HCC)  Assessment & Plan  Bronchodilators as needed  Continue oxygen  Taper off steroids  Will need outpatient follow-up with pulmonary with full PFTs and work-up for ILD      Pulmonary hypertension (HCC)  Assessment & Plan  RVSP 55    Monitor I/O    Anxiety  Assessment & Plan  Continue BuSpar and low-dose Xanax  Avoid increasing sedating agents    Urinary retention  Assessment & Plan  Continue Flomax      Dysphasia  Assessment & Plan  Aspiration precautions  SLP    Chronic pain-  (present on admission)  Assessment & Plan  With narcotic dependence    On high-dose methadone and gabapentin chronically and holding parameters to methadone to hold for SBP less than 90    Hypothyroidism- (present on admission)  Assessment & Plan  Continue levothyroxine    Plan of care reviewed with patient and discussed with nursing staff  Await accepting SNF    VTE prophylaxis: Lovenox       Pain Refusal Text: I offered to prescribe pain medication but the patient refused to take this medication.

## 2023-06-03 NOTE — CARE PLAN
Problem: Nutritional:  Goal: Nutrition support tolerated and meeting greater than 85% of estimated needs  Outcome: DISCHARGED-GOAL NOT MET  Goal: Achieve adequate nutritional intake  Description: Patient will consume >50% of meals   Outcome: MET     PO intake generally % last few days. Continue Boost Plus with meals. RD to follow weekly.   Problem: ALTERED NUTRIENT INTAKE - ADULT  Goal: Nutrient intake appropriate for improving, restoring or maintaining nutritional needs  Description: INTERVENTIONS:  - Assess nutritional status and recommend course of action  - Monitor oral intake, labs, and treatment plans  - Recommend appropriate diets, oral nutritional supplements, and vitamin/mineral supplements  - Recommend, monitor, and adjust tube feedings and TPN/PPN based on assessed needs  - Provide specific nutrition education as appropriate  Outcome: Progressing

## 2023-06-12 NOTE — ASSESSMENT & PLAN NOTE
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   abdominal pain

## 2023-07-27 NOTE — DIETARY
I discussed MRI brain results with the patient. MRI brain did not show any clear evidence of metastatic spread.    Patient will meet with Dr. Mckeon of medical oncology today to discuss neck steps in management.  Anticipate biopsy to help tailor systemic therapy plans.     The patient will return to clinic as needed.    We conduct this visit over the telephone. Telephone call lasted 10 minutes.       Reed Shaw M.D.  Department of Radiation Oncology  [facility]      Nutrition Services: Update   Day 13 of admit.  Pancho Martinez Jr. is a 59 y.o. male with admitting DX of Pneumothorax, Pneumonia, Respiratory failure, ILD (interstitial lung disease), COPD (chronic obstructive pulmonary disease)     Pt is currently on regular, liquidized, mildly thick diet with 1:1 supervision. Pt was receiving TF. TF is currently off, however cortrak remains in place. Per chart pt PO 0% 1 meal documented. Wt 3/1: 73.5 kg via bed scale - wt decreased since admit suspect related to fluids. Per I/Os pt +2.2 L. Wt loss percent is 5.8% in ~2 weeks, which is severe    Malnutrition Risk: Pt with severe 5.8% wt loss in ~2 weeks, no other criteria noted at this time.     Recommendations/Plan:  1. Hold TF to stimulate appetite. Keep cortrak in place until pt consumes >50% for at least 3 meals. RD to re-assess need for supplemental TF.   2. Diet upgrades per SLP.   3. Encourage intake of meals  4. Document intake of all meals as % taken in ADL's to provide interdisciplinary communication across all shifts.   5. Monitor weight.  6. Nutrition rep will continue to see patient for ongoing meal and snack preferences.  7. Obtain supplement order per RD as needed.    RD following